# Patient Record
Sex: FEMALE | Race: WHITE | Employment: OTHER
[De-identification: names, ages, dates, MRNs, and addresses within clinical notes are randomized per-mention and may not be internally consistent; named-entity substitution may affect disease eponyms.]

---

## 2017-06-24 ENCOUNTER — HEALTH MAINTENANCE LETTER (OUTPATIENT)
Age: 62
End: 2017-06-24

## 2017-08-01 ENCOUNTER — OFFICE VISIT (OUTPATIENT)
Dept: FAMILY MEDICINE | Facility: CLINIC | Age: 62
End: 2017-08-01
Payer: COMMERCIAL

## 2017-08-01 VITALS
HEART RATE: 87 BPM | BODY MASS INDEX: 34.16 KG/M2 | HEIGHT: 65 IN | DIASTOLIC BLOOD PRESSURE: 87 MMHG | WEIGHT: 205 LBS | OXYGEN SATURATION: 97 % | TEMPERATURE: 97.4 F | SYSTOLIC BLOOD PRESSURE: 138 MMHG

## 2017-08-01 DIAGNOSIS — G89.29 CHRONIC LOW BACK PAIN, UNSPECIFIED BACK PAIN LATERALITY, WITH SCIATICA PRESENCE UNSPECIFIED: ICD-10-CM

## 2017-08-01 DIAGNOSIS — M54.32 LEFT SCIATIC NERVE PAIN: Primary | ICD-10-CM

## 2017-08-01 DIAGNOSIS — M79.662 PAIN OF LEFT LOWER LEG: ICD-10-CM

## 2017-08-01 DIAGNOSIS — M54.5 CHRONIC LOW BACK PAIN, UNSPECIFIED BACK PAIN LATERALITY, WITH SCIATICA PRESENCE UNSPECIFIED: ICD-10-CM

## 2017-08-01 PROCEDURE — 99214 OFFICE O/P EST MOD 30 MIN: CPT | Performed by: FAMILY MEDICINE

## 2017-08-01 RX ORDER — IBUPROFEN 200 MG
200 TABLET ORAL DAILY PRN
Qty: 1 TABLET | Refills: 0 | COMMUNITY
Start: 2017-08-01 | End: 2017-11-14

## 2017-08-01 NOTE — MR AVS SNAPSHOT
After Visit Summary   8/1/2017    Gerri Owens    MRN: [id number]           Patient Information     Date Of Birth          1955        Visit Information        Provider Department      8/1/2017 1:20 PM Karo Doty MD [facility]        Today's Diagnoses     Left sciatic nerve pain    -  1    Pain of left lower leg        Chronic low back pain, unspecified back pain laterality, with sciatica presence unspecified          Care Instructions    Continue with being active  and modify activity that causes more pain   Ice then heat 20 min twice a day  Ibuprofen 600 mg with food three times a day 5 days   Tylenol as needed  Over the counter pain creams to area  Can try flexeril at home  Can make you tired, do not drive or operate machinery on this medication   Therapy and ortho consult for further evaluation and treatment  Follow up with primary if symptoms persist  If get worse such that loose control of bladder , bowels or difficulty walking go to Urgent care/ ER   Return when insurance sorted out for physical , hep c, colon canner screen, mammogram, pap, advance directives.               Follow-ups after your visit        Additional Services     [facility] PT, HAND, AND CHIROPRACTIC REFERRAL       **This order will print in the [facility] Scheduling Office**    Physical Therapy, Hand Therapy and Chiropractic Care are available through:    *[facility]  *[facility]  *[facility]    Call one number to schedule at any of the above locations: [phone].    Your provider has referred you to: Physical Therapy at [facility] or [facility]    Indication/Reason for Referral: Low Back Pain  Onset of Illness: ?  Therapy Orders: Evaluate and Treat  Special Programs: None  Special Request: None    Barbara Jimenez      Additional Comments for the Therapist or Chiropractor:     Please be aware that coverage of these services is subject to the terms and limitations of  your health insurance plan.  Call member services at your health plan with any benefit or coverage questions.      Please bring the following to your appointment:    *Your personal calendar for scheduling future appointments  *Comfortable clothing            ORTHO  REFERRAL       [facility] is referring you to the Orthopedic  Services at [facility].       The  Representative will assist you in the coordination of your Orthopedic and Musculoskeletal Care as prescribed by your physician.    The  Representative will call you within 1 business day to help schedule your appointment, or you may contact the  Representative at:    All areas ~ [phone]     Type of Referral : Spine: Lumbar  **Choose Medical Spine Specialist (unless patient was seen by a Medical Spine Specialist within the past 6 months).**  Surgical Evaluation is advised if the patient presents with one or more of the following red flags: Evidence of Spinal Tumor, Infection or Fracture, Cauda Equina Syndrome, Sudden or Progressive Weakness, Loss of Bowel or Bladder Control, or any other documented emergent neurological condition resulting from a Lumbar Spinal Condition. Medical Spine Specialist        Timeframe requested: 1 - 2 days    Coverage of these services is subject to the terms and limitations of your health insurance plan.  Please call member services at your health plan with any benefit or coverage questions.      If X-rays, CT or MRI's have been performed, please contact the facility where they were done to arrange for , prior to your scheduled appointment.  Please bring this referral request to your appointment and present it to your specialist.                  Who to contact     If you have questions or need follow up information about today's clinic visit or your schedule please contact [facility] directly at [phone].  Normal or  "non-critical lab and imaging results will be communicated to you by MyChart, letter or phone within 4 business days after the clinic has received the results. If you do not hear from us within 7 days, please contact the clinic through Flowgeart or phone. If you have a critical or abnormal lab result, we will notify you by phone as soon as possible.  Submit refill requests through [email or web address] or call your pharmacy and they will forward the refill request to us. Please allow 3 business days for your refill to be completed.          Additional Information About Your Visit        [email or web address] Information     [email or web address] gives you secure access to your electronic health record. If you see a primary care provider, you can also send messages to your care team and make appointments. If you have questions, please call your primary care clinic.  If you do not have a primary care provider, please call [phone] and they will assist you.        Care EveryWhere ID     This is your Care EveryWhere ID. This could be used by other organizations to access your [place] medical records  [id number]        Your Vitals Were     Pulse Temperature Height Last Period Pulse Oximetry BMI (Body Mass Index)    87 97.4  F (36.3  C) (Oral) 5' 4.5\" (1.638 m) 03/04/2005 97% 34.64 kg/m2       Blood Pressure from Last 3 Encounters:   08/01/17 138/87   06/09/16 134/84   09/18/12 122/81    Weight from Last 3 Encounters:   08/01/17 205 lb (93 kg)   06/09/16 208 lb 4 oz (94.5 kg)   09/18/12 216 lb (98 kg)              We Performed the Following     [facility] PT, HAND, AND CHIROPRACTIC REFERRAL     ORTHO  REFERRAL        Primary Care Provider Office Phone # Fax #    Andreina Esparza [phone]       [facility] [address]  [facility] [address]        Equal Access to Services     JEREMÍAS BUCK AH: Gopi Springer, waaxda luqadaha, qaybta kaalmacarol almanzar. So wa " [phone].    ATENCIÓN: Si donna long, tiene a graham disposición servicios gratuitos de asistencia lingüística. Avril jimenez [phone].    We comply with applicable federal civil rights laws and [place] laws. We do not discriminate on the basis of race, color, national origin, age, disability sex, sexual orientation or gender identity.            Thank you!     Thank you for choosing [facility]  for your care. Our goal is always to provide you with excellent care. Hearing back from our patients is one way we can continue to improve our services. Please take a few minutes to complete the written survey that you may receive in the mail after your visit with us. Thank you!             Your Updated Medication List - Protect others around you: Learn how to safely use, store and throw away your medicines at www.disposemymeds.org.          This list is accurate as of: 8/1/17  2:03 PM.  Always use your most recent med list.                   Brand Name Dispense Instructions for use Diagnosis    ascorbic acid 250 MG tablet    VITAMIN C    90    Take  by mouth. 500 mg 1xqd.    Routine general medical examination at a health care facility       cyclobenzaprine 10 MG tablet    FLEXERIL    30 tablet    Take 0.5-1 tablets (5-10 mg) by mouth 3 times daily as needed for muscle spasms    Acute left-sided low back pain without sciatica       GLUCOSAMINE CHONDROITIN COMPLX Tabs      Take  by mouth. 1500 mg 1xqd.    Routine general medical examination at a health care facility       TYLENOL 325 MG tablet   Generic drug:  acetaminophen      Take 325-650 mg by mouth every 6 hours as needed.        vitamin E 400 UNIT capsule      Take 1 capsule by mouth three times a week.

## 2017-08-01 NOTE — NURSING NOTE
"Chief Complaint   Patient presents with     Musculoskeletal Problem     leg       Initial /87  Pulse 87  Temp 97.4  F (36.3  C) (Oral)  Ht 5' 4.5\" (1.638 m)  Wt 205 lb (93 kg)  LMP 03/04/2005  SpO2 97%  BMI 34.64 kg/m2 Estimated body mass index is 34.64 kg/(m^2) as calculated from the following:    Height as of this encounter: 5' 4.5\" (1.638 m).    Weight as of this encounter: 205 lb (93 kg).  Medication Reconciliation: complete   Gege England MA      "

## 2017-08-01 NOTE — PROGRESS NOTES
SUBJECTIVE:                                                    Gerri Owens is a 62 year old female who presents to clinic today for the following health issues:      Musculoskeletal problem/pain      Duration: on going    Description  Location: left leg    Intensity: no    Accompanying signs and symptoms: numbness and tingling    History  Previous similar problem: YES  Previous evaluation:  none    Precipitating or alleviating factors:  Trauma or overuse: no   Aggravating factors include: lifting and bending    Therapies tried and outcome: stretching and exercises    Having left leg pain, thinks coming from her back, different from her back pain in the past., beginning to affect her mobility so  urged her to come in to be checked out.   Previously had low back pain and spasm.. Did PT and it helped a lot. Then home exercises. On stopping exercises had a recurrence. Given flexeril on e year ago. used it one week then and not since. Has rest at home. Leg pain comes and goes. Sometimes hurts with standing other times when lying down, never while sitting. Getting into the car her left leg feels heavier to point where she has to lift it with her hands. Its as if she has no strength. If sitting on th floor has to get up on her right leg.if uses her left leg she has to use her arms to lift herself up on the couch. Is right handed. Walking make sit better. Has had no fall or inciting event she knows of. knows though which specific movements seem to make her pain worse. Movements like if were to bend down to  dog poop and come up again if she were to do that several times or movements like picking up a log and placing it to be chopped at her cabin done repetitively or bending working in her garden will reproduce the pain. No groin tingly on numbness. Has had difficulty lying in lithotomy position so as to get pelvic exam in the past despite doing stretching exercises. Wears supportive hose ever since had  right upper arm DVT unknown cause in . Work up at that time was negative for hypercoagulable state. Took warfarin for 1 yr. After that thinks may have had a phlebitis right leg as it was once swollen, warm and hot and she treated it with symptomatic cares after reading on the Internet and it resolved. Has some stress incontinence that is not new. Stopped taking fish oil. Stopped calcium and instead drinks 4 cups of almond milk a day. Does take 200 mg of ibuprofen a day and occasional Prilosec or Nexium OTC for GERD.  Is a nurse at behavioral health  Had a mix up with her insurance sign up and until can get it fixed wants to defer known over due preventive care.     Problem list and histories reviewed & adjusted, as indicated.  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      Past Surgical History:   Procedure Laterality Date     BREAST BIOPSY, RT/LT      left breast     TONSILLECTOMY  1960    tonsillectomy       Social History   Substance Use Topics     Smoking status: Never Smoker     Smokeless tobacco: Never Used     Alcohol use Yes      Comment: occasional     Family History   Problem Relation Age of Onset     DIABETES Mother      type 2     HEART DISEASE Mother      Hypertension Mother      C.A.D. Mother       after 2nd heart attack     Hypertension Father      C.A.D. Father      mild heart attack     CANCER Father      skin cancer     HEART DISEASE Father      Arthritis Sister      rheumatoid arthritis     HEART DISEASE Brother      hereditary heart murmer     Lipids Brother      Lipids Brother          Current Outpatient Prescriptions   Medication Sig Dispense Refill     ibuprofen (ADVIL/MOTRIN) 200 MG tablet Take 1 tablet (200 mg) by mouth daily as needed for  "mild pain 1 tablet 0     cyclobenzaprine (FLEXERIL) 10 MG tablet Take 0.5-1 tablets (5-10 mg) by mouth 3 times daily as needed for muscle spasms 30 tablet 0     acetaminophen (TYLENOL) 325 MG tablet Take 325-650 mg by mouth every 6 hours as needed.       vitamin E 400 UNIT capsule Take 1 capsule by mouth three times a week.       GLUCOSAMINE CHONDROITIN COMPLX OR TABS Take  by mouth. 1500 mg 1xqd.   0     VITAMIN C 250 MG OR TABS Take  by mouth. 500 mg 1xqd.  90 0     Allergies   Allergen Reactions     Nickel      Recent Labs   Lab Test  09/11/12   1108   LDL  175*   HDL  45*   TRIG  197*   ALT  27   CR  0.85   GFRESTIMATED  69   GFRESTBLACK  84   POTASSIUM  4.1   TSH  1.02      BP Readings from Last 3 Encounters:   08/01/17 138/87   06/09/16 134/84   09/18/12 122/81    Wt Readings from Last 3 Encounters:   08/01/17 205 lb (93 kg)   06/09/16 208 lb 4 oz (94.5 kg)   09/18/12 216 lb (98 kg)                  Labs reviewed in EPIC          Reviewed and updated as needed this visit by clinical staffAllergies       Reviewed and updated as needed this visit by Provider         ROS:  Constitutional, HE ENT, cardiovascular, pulmonary, GI, , musculoskeletal, neuro, skin, endocrine and psych systems are negative, except as otherwise noted.      OBJECTIVE:   /87  Pulse 87  Temp 97.4  F (36.3  C) (Oral)  Ht 5' 4.5\" (1.638 m)  Wt 205 lb (93 kg)  LMP 03/04/2005  SpO2 97%  BMI 34.64 kg/m2  Body mass index is 34.64 kg/(m^2).  GENERAL: healthy, alert and no distress  EYES: Eyes grossly normal to inspection, PERRL and conjunctivae and sclerae normal  HENT: ear canals and TM's normal, nose and mouth without ulcers or lesions  NECK: no adenopathy, no asymmetry, masses, or scars and thyroid normal to palpation  RESP: lungs clear to auscultation - no rales, rhonchi or wheezes  CV: regular rate and rhythm, normal S1 S2, no S3 or S4, no murmur, click or rub, no peripheral edema and peripheral pulses strong  ABDOMEN: soft, " non tender, no hepatosplenomegaly, no masses and bowel sounds normal  MS: has difficult getting up on bed using left leg and uses right instead, no point tenderness, C , T oral spine or B/l lower back. No bruising noted. SLR neg on right and positive on left. Also limited ROM left hip area not sure if just being stiff or having spasm from pain. Able to lift leg against gravity and resistance, no gross musculoskeletal defects noted, no edema  SKIN: no suspicious lesions or rashes  NEURO: Normal strength and tone, mentation intact and speech normal  PSYCH: mentation appears normal, affect normal/bright    Diagnostic Test Results:  No results found for this or any previous visit (from the past 24 hour(s)).    ASSESSMENT/PLAN:   Hx of obesity, BMI 34, HLD, prior DVT RUE 2012 unknown causes reported negative hypercoagulable workup, treated with warfarin 1 year , resolved post phelbetic syndrome RUE, prior tonsillectomy, left breast biopsy, hx of back spasm , seen in 6/2016 and given flexeril and did PT with good resolution here for     1. Left sciatic nerve pain  Sciatica vs pyriformis syndrome. No sign of stroke. Continue with being active  and modify activity that causes more pain. Ice then heat 20 min twice a day. Ibuprofen 600 mg with food three times a day 5 days then back to 200 mg a day. Declined medrol dose pack.  Tylenol as needed. Over the counter pain creams to area. Can try flexeril at home. counseled it could make her tired, advised not to drive or operate machinery on this medication. Therapy and ortho consult for further evaluation and treatment. They can decide if MRI needed. Xray too if not better. Follow up with primary if symptoms persist. If gets worse such that loose control of bladder , bowels or difficulty walking advised to go to Urgent care/ ER. Return when insurance sorted out for physical , hep C, colon canner screen, mammogram, pap, advance directives. Counseled that chronic use of PPI med's  could put her at risk of atypical pneumonia, fractures, C diff, vit B 12  & magnesium deficiency and to consider taking zantac 150 mg twice a day OTC instead.  - [facility] PT, HAND, AND CHIROPRACTIC REFERRAL  - ORTHO  REFERRAL    2. Pain of left lower leg  - [facility] PT, HAND, AND CHIROPRACTIC REFERRAL  - ORTHO  REFERRAL    3. Chronic low back pain, unspecified back pain laterality, with sciatica presence unspecified  No reproducible pain today   - [facility] PT, HAND, AND CHIROPRACTIC REFERRAL  - ORTHO  REFERRAL    See Patient Instructions  Patient Instructions   Continue with being active  and modify activity that causes more pain   Ice then heat 20 min twice a day  Ibuprofen 600 mg with food three times a day 5 days   Tylenol as needed  Over the counter pain creams to area  Can try flexeril at home  Can make you tired, do not drive or operate machinery on this medication   Therapy and ortho consult for further evaluation and treatment  Follow up with primary if symptoms persist  If get worse such that loose control of bladder , bowels or difficulty walking go to Urgent care/ ER   Return when insurance sorted out for physical , hep c, colon canner screen, mammogram, pap, advance directives.           Karo Doty MD  [facility]

## 2017-08-01 NOTE — PATIENT INSTRUCTIONS
Continue with being active  and modify activity that causes more pain   Ice then heat 20 min twice a day  Ibuprofen 600 mg with food three times a day 5 days   Tylenol as needed  Over the counter pain creams to area  Can try flexeril at home  Can make you tired, do not drive or operate machinery on this medication   Therapy and ortho consult for further evaluation and treatment  Follow up with primary if symptoms persist  If get worse such that loose control of bladder , bowels or difficulty walking go to Urgent care/ ER   Return when insurance sorted out for physical , hep c, colon canner screen, mammogram, pap, advance directives.

## 2017-08-08 ENCOUNTER — OFFICE VISIT (OUTPATIENT)
Dept: NEUROSURGERY | Facility: CLINIC | Age: 62
End: 2017-08-08
Attending: NURSE PRACTITIONER
Payer: COMMERCIAL

## 2017-08-08 ENCOUNTER — THERAPY VISIT (OUTPATIENT)
Dept: PHYSICAL THERAPY | Facility: CLINIC | Age: 62
End: 2017-08-08
Payer: COMMERCIAL

## 2017-08-08 VITALS
WEIGHT: 205 LBS | HEIGHT: 65 IN | BODY MASS INDEX: 34.16 KG/M2 | DIASTOLIC BLOOD PRESSURE: 83 MMHG | TEMPERATURE: 98.6 F | OXYGEN SATURATION: 96 % | HEART RATE: 80 BPM | SYSTOLIC BLOOD PRESSURE: 118 MMHG

## 2017-08-08 DIAGNOSIS — M25.552 LEFT HIP PAIN: Primary | ICD-10-CM

## 2017-08-08 DIAGNOSIS — M25.552 HIP PAIN, LEFT: Primary | ICD-10-CM

## 2017-08-08 PROCEDURE — 99203 OFFICE O/P NEW LOW 30 MIN: CPT | Performed by: NURSE PRACTITIONER

## 2017-08-08 PROCEDURE — 97110 THERAPEUTIC EXERCISES: CPT | Mod: GP | Performed by: PHYSICAL THERAPIST

## 2017-08-08 PROCEDURE — 97162 PT EVAL MOD COMPLEX 30 MIN: CPT | Mod: GP | Performed by: PHYSICAL THERAPIST

## 2017-08-08 PROCEDURE — 99211 OFF/OP EST MAY X REQ PHY/QHP: CPT | Performed by: NURSE PRACTITIONER

## 2017-08-08 RX ORDER — CALCIUM CARBONATE 500 MG/1
1 TABLET, CHEWABLE ORAL DAILY
COMMUNITY
End: 2017-11-21

## 2017-08-08 NOTE — PROGRESS NOTES
"[facility] Initial Evaluation    Subjective:  Pt presents to PT with a chief complaint of left anterior thigh pain with reported initial onset ~2-3 years ago w/o an acute precipitating event. Pain has recently worsened over the past 1 week after prolonged forward bending/lifting.. Primary pain location identified at L anterior hip/groin w/ intermittent radiation into anterior thigh. Pt does report having some difficulty lifting her left leg under her own power, and has some sensation of \"heaviness\". Pt denies any sxs distal to her knee and denies any changes her bowel/bladder. Pain reported to be worse with forward bending, transitional movements (sit<->stand), and prolonged walking. Pt does report a previous hx of LBP ~ 4 years ago which was managed successfully with PT.      Patient is a 62 year old female presenting with rehab left hip hpi.   Gerri Owens is a 62 year old female with a left hip condition.  Condition occurred with:  Repetition/overuse.  Condition occurred: in the community.  This is a recurrent condition  1 week ago; 07/2017.    Patient reports pain:  Anterior.  Radiates to:  Thigh.  Pain is described as aching and is intermittent and reported as 5/10.  Associated symptoms:  Loss of motion/stiffness and tingling. Pain is worse in the A.M..  Symptoms are exacerbated by ascending stairs, descending stairs, bending/squatting, walking and weight bearing and relieved by rest.  Since onset symptoms are gradually worsening.    Previous treatment includes physical therapy (LBP).  There was moderate improvement following previous treatment.  General health as reported by patient is fair.  Pertinent medical history includes:  Overweight (DVT R arm 3 yrs ago).  Medical allergies: yes.  Other surgeries include:  None reported.  Current medications:  None as reported by the patient.  Current occupation is RN.  Patient is working in normal job without restrictions.  Primary job tasks " include:  Prolonged sitting.    Barriers include:  None as reported by the patient.    Red flags:  None as reported by the patient.                        Objective:      Gait:      Deviations:  Hip:  Trendelenberg L, Trendelenberg R, decr dynamic control L and decr dynamic control R               Lumbar/SI Evaluation  ROM:    AROM Lumbar:   Flexion:          WNL  Ext:                    Mod loss, min pain at L anterior thigh (stretch)   Side Bend:        Left:     Right:   Rotation:           Left:  Mod loss    Right:  Mod loss  Side Glide:        Left:     Right:                                                               Hip Evaluation  Hip PROM:    Flexion: Left: 100*   Right: 110    Abduction: Left: 25    Right: 30    Internal Rotation: Left: 10*    Right: 15  External Rotation: Left: 60    Right: 60      Pain: Pain reproduced with L hip Flexion and IR         Hip Strength:    Flexion:   Left: 4+/5   +  Pain:  Right: 5-/5   Pain:                    Extension:  Left: 3+/5  Pain:Right: 3+/5    Pain:    Abduction:  Left: 4-/5     Pain:Right: 4-/5    Pain:    Internal Rotation:  Left: 4+/5    Pain:Right: 4+/5   Pain:  External Rotation:  Left: 4/5   Pain:  Right: 4/5   Pain:            Hip Special Testing:    Left hip positive for the following special tests:  Fadir/Labrum      Hip Palpation:    Left hip tenderness present at:   hip flexors and Adductors    Functional Testing:  Functional test hip: decreased hip inferior glide.                       General     ROS    Assessment/Plan:      Patient is a 62 year old female with left side hip complaints.    Patient has the following significant findings with corresponding treatment plan.                Diagnosis 1:  L hip pain  Pain -  hot/cold therapy, manual therapy, self management and education  Decreased ROM/flexibility - manual therapy and therapeutic exercise  Decreased joint mobility - manual therapy and therapeutic exercise  Decreased strength - therapeutic  exercise and therapeutic activities  Impaired muscle performance - neuro re-education    Therapy Evaluation Codes:   1) History comprised of:   Personal factors that impact the plan of care:      Time since onset of symptoms.    Comorbidity factors that impact the plan of care are:      None.     Medications impacting care: None.  2) Examination of Body Systems comprised of:   Body structures and functions that impact the plan of care:      Hip.   Activity limitations that impact the plan of care are:      Bending, Cooking, Lifting, Squatting/kneeling and Stairs.  3) Clinical presentation characteristics are:   Evolving/Changing.  4) Decision-Making    Moderate complexity using standardized patient assessment instrument and/or measureable assessment of functional outcome.  Cumulative Therapy Evaluation is: Moderate complexity.    Previous and current functional limitations:  (See Goal Flow Sheet for this information)    Short term and Long term goals: (See Goal Flow Sheet for this information)     Communication ability:  Patient appears to be able to clearly communicate and understand verbal and written communication and follow directions correctly.  Treatment Explanation - The following has been discussed with the patient:   RX ordered/plan of care  Anticipated outcomes  Possible risks and side effects  This patient would benefit from PT intervention to resume normal activities.   Rehab potential is good.    Frequency:  1 X week, once daily  Duration:  for 8 weeks  Discharge Plan:  Achieve all LTG.  Independent in home treatment program.  Reach maximal therapeutic benefit.    Please refer to the daily flowsheet for treatment today, total treatment time and time spent performing 1:1 timed codes.

## 2017-08-08 NOTE — PROGRESS NOTES
Dr. Isaiah Nails  [facility]  Neurosurgery Clinic Visit        CC: back and left leg pain    Primary care Provider: Andreina Esparza      Reason For Visit:   I was asked by Dr. Esparza to consult on the patient for lumbar radicular pain.      HPI: Gerri Owens is a 62 year old female with lumbar radicular pain. She reports today that after PT she feels that her pain is mostly in her hips.  She states that she found out at PT that she has very weak lower extremities.  She states that when she walks her hips hurt bilaterally.  She states that the left hurts greater than her right.  She denies any back pain. She states that most of her pain is in her left hip and some left quadriceps weakness.  She denies any numbness and tingling to her legs.  She states that sitting does not cause her any pain.      Pain at its worst  More bothersome then pain.   Pain right now:  0    Past Medical History:   Diagnosis Date     Advanced directives, counseling/discussion      DVT of upper extremity (deep vein thrombosis) (H) 3/27/2012     Hyperlipidemia LDL goal < 160      MEDICAL HISTORY OF - 1997    left breast density     Unspecified episodic mood disorder      Whooping cough due to Bordetella pertussis (p. pertussis) infant    whooping cough       Past Medical History reviewed with patient during visit.    Past Surgical History:   Procedure Laterality Date     BREAST BIOPSY, RT/LT  2004    left breast     TONSILLECTOMY  1960    tonsillectomy     Past Surgical History reviewed with patient during visit.    Current Outpatient Prescriptions   Medication     calcium carbonate (TUMS) 500 MG chewable tablet     ibuprofen (ADVIL/MOTRIN) 200 MG tablet     acetaminophen (TYLENOL) 325 MG tablet     vitamin E 400 UNIT capsule     GLUCOSAMINE CHONDROITIN COMPLX OR TABS     VITAMIN C 250 MG OR TABS     cyclobenzaprine (FLEXERIL) 10 MG tablet     No current facility-administered medications for this visit.         Allergies   Allergen Reactions     Nickel        Social History     Social History     Marital status:      Spouse name: Doug     Number of children: 0     Years of education: N/A     Occupational History     [employer]     Social History Main Topics     Smoking status: Never Smoker     Smokeless tobacco: Never Used     Alcohol use Yes      Comment: occasional     Drug use: No     Sexual activity: Yes     Partners: Male     Other Topics Concern     Parent/Sibling W/ Cabg, Mi Or Angioplasty Before 65f 55m? No     Social History Narrative    Balanced Diet - Yes, in the last 6 months    Osteoporosis Prevention Measures - Dairy servings per day: 2 servings plus a supplement    Regular Exercise -  Yes Describe walks for 30 to 40 minutes 4 to 5 times a week    Dental Exam - YES - Date: 1 year ago, and is going today    Eye Exam - YES - Date: 4 months ago    Self Breast Exam - Yes    Abuse: Current or Past (Physical, Sexual or Emotional)- No    Do you feel safe in your environment - Yes    Guns stored in the home - Yes, locked    Sunscreen used - Yes    Seatbelts used - Yes    Lipids -  YES - Date: 10-02    Glucose -  YES - Date: 10-02    Colon Cancer Screening - No    Hemoccults - NO    Pap Test -  YES - Date: 10-02    Do you have any concerns about STD's -  No    Mammography - YES - Date:     DEXA - NO    Immunizations reviewed and up to date - Yes, lst td 7-2000    10-23-07  MEL Mueller CMA       Family History   Problem Relation Age of Onset     DIABETES Mother      type 2     HEART DISEASE Mother      Hypertension Mother      C.A.D. Mother       after 2nd heart attack     Hypertension Father      C.A.D. Father      mild heart attack     CANCER Father      skin cancer     HEART DISEASE Father      Arthritis Sister      rheumatoid arthritis     HEART DISEASE Brother      hereditary heart murmer     Lipids Brother      Lipids Brother          Review Of Systems  Skin: negative  Eyes:  "negative  Ears/Nose/Throat: negative  Respiratory: No shortness of breath, dyspnea on exertion, cough, or hemoptysis  Cardiovascular: negative  Gastrointestinal: negative  Genitourinary: negative  Musculoskeletal: hip pain  Neurologic: negative  Psychiatric: negative  Hematologic/Lymphatic/Immunologic: negative  Endocrine: negative     ROS: 10 point ROS neg other than the symptoms noted above in the HPI.      Vital Signs: /83 (BP Location: Right arm, Cuff Size: Adult Regular)  Pulse 80  Temp 98.6  F (37  C) (Oral)  Ht 5' 4.5\" (1.638 m)  Wt 205 lb (93 kg)  LMP 03/04/2005  SpO2 96%  Breastfeeding? No  BMI 34.64 kg/m2    Examination:  Constitutional:  Alert, well nourished, NAD.  HEENT: Normocephalic, atraumatic.   Pulm:  Without shortness of breath   CV:  No pitting edema of BLE.    Neurological:  Awake  Alert  Oriented x 3  Speech clear  Cranial nerves II - XII intact  PERRL  EOMI  Face symmetric  Tongue midline  Motor exam   Shoulder Abduction:  Right:  5/5   Left:  5/5  Biceps:                      Right:  5/5   Left:  5/5  Triceps:                     Right:  5/5   Left:  5/5  Wrist Extensors:       Right:  5/5   Left:  5/5  Wrist Flexors:           Right:  5/5   Left:  5/5  Intrinsics:                   Right:  5/5   Left:  5/5   Hip Flexor:                Right: 5/5  Left:  5/5  Hip Adductor:             Right:  5/5  Left:  5/5  Hip Abductor:             Right:  5/5  Left:  5/5  Gastroc Soleus:        Right:  5/5  Left:  5/5  Tib/Ant:                      Right:  5/5  Left:  5/5  EHL:                          Right:  5/5  Left:  5/5   Sensation normal to bilateral upper and lower extremities    Gait: Able to stand from a seated position. Normal non-antalgic, non-myelopathic gait.  Lumbar examination reveals no tenderness of the spine or paraspinous muscles.  Hip height is symmetrical. Negative SI joint, Severe pain with palpation to left greater trochanteric. Severe pain with ROM of left hip.  "       Imaging: NONE    Assessment/Plan:   Gerri Owens is a 62 year old female with lumbar radicular pain. She reports today that after PT she feels that her pain is mostly in her hips.  She states that she found out at PT that she has very weak lower extremities.  She states that when she walks her hips hurt bilaterally.  She states that the left hurts greater than her right.  She denies any back pain. She states that most of her pain is in her left hip and some left quadriceps weakness.  She denies any numbness and tingling to her legs.  She states that sitting does not cause her any pain.  The pt does not have any pain with lumbar ROM and no radicular symptoms. She does have severe pain with palpation of her left hip and with left hip ROM. At this time I do not feel that her pain is radicular in nature.  It is recommended that she return to her PCP for a work up of her hip.  She agreed.     Patient Instructions   1.  Return to Primary care or to orthopedics for a work up of your hips.  If pain persists and hip work up negative then call clinic at [phone] for a lumbar MRI order.               Gaye Olmos [facility]  [facility]  [address] [address]    Tel [phone]  Pager [phone]

## 2017-08-08 NOTE — PATIENT INSTRUCTIONS
1.  Return to Primary care or to orthopedics for a work up of your hips.  If pain persists and hip work up negative then call clinic at [phone] for a lumbar MRI order.

## 2017-08-08 NOTE — MR AVS SNAPSHOT
After Visit Summary   8/8/2017    Gerri Owens    MRN: [id number]           Patient Information     Date Of Birth          1955        Visit Information        Provider Department      8/8/2017 7:30 AM Monico Pop PT [facility] Physical [facility]        Today's Diagnoses     Hip pain, left    -  1       Follow-ups after your visit        Your next 10 appointments already scheduled     Aug 15, 2017  7:30 AM CDT   MANE Spine with Monico Pop PT   [facility] Physical Therapy ([facility]  )    [address] [place] [id number] [phone]            Aug 22, 2017  7:30 AM CDT   MANE Spine with Monico Pop PT   [facility] Physical Therapy ([facility]  )    [address] [place] [address] [phone]              Who to contact     If you have questions or need follow up information about today's clinic visit or your schedule please contact [facility] PHYSICAL THERAPY directly at [phone].  Normal or non-critical lab and imaging results will be communicated to you by Social Projecthart, letter or phone within 4 business days after the clinic has received the results. If you do not hear from us within 7 days, please contact the clinic through Social Projecthart or phone. If you have a critical or abnormal lab result, we will notify you by phone as soon as possible.  Submit refill requests through ScreenMedix or call your pharmacy and they will forward the refill request to us. Please allow 3 business days for your refill to be completed.          Additional Information About Your Visit        MyChart Information     ScreenMedix gives you secure access to your electronic health record. If you see a primary care provider, you can also send messages to your care team and make appointments. If you have questions, please call your primary care  clinic.  If you do not have a primary care provider, please call [phone] and they will assist you.        Care EveryWhere ID     This is your Care EveryWhere ID. This could be used by other organizations to access your [place] medical records  [id number]        Your Vitals Were     Last Period                   03/04/2005            Blood Pressure from Last 3 Encounters:   08/08/17 118/83   08/01/17 138/87   06/09/16 134/84    Weight from Last 3 Encounters:   08/08/17 93 kg (205 lb)   08/01/17 93 kg (205 lb)   06/09/16 94.5 kg (208 lb 4 oz)              We Performed the Following     HC PT EVAL, MODERATE COMPLEXITY     MANE INITIAL EVAL REPORT     THERAPEUTIC EXERCISES        Primary Care Provider Office Phone # Fax #    Andreina Esparza [phone]       [address]  [facility] [address]        Equal Access to Services     JEREMÍAS BUCK : Hadii thalia ku hadasho Soisaiahali, waaxda luqadaha, qaybta kaalmada adeegyada, waxay tamarain haychristinen catia escobar . So [facility] [phone].    ATENCIÓN: Si habla español, tiene a graham disposición servicios gratuitos de asistencia lingüística. Avril al [phone].    We comply with applicable federal civil rights laws and [place] laws. We do not discriminate on the basis of race, color, national origin, age, disability sex, sexual orientation or gender identity.            Thank you!     Thank you for choosing [facility] PHYSICAL THERAPY  for your care. Our goal is always to provide you with excellent care. Hearing back from our patients is one way we can continue to improve our services. Please take a few minutes to complete the written survey that you may receive in the mail after your visit with us. Thank you!             Your Updated Medication List - Protect others around you: Learn how to safely use, store and throw away your medicines at www.disposemymeds.org.          This list is accurate as of: 8/8/17   4:57 PM.  Always use your most recent med list.                   Brand Name Dispense Instructions for use Diagnosis    ascorbic acid 250 MG tablet    VITAMIN C    90    Take  by mouth. 500 mg 1xqd.    Routine general medical examination at a health care facility       calcium carbonate 500 MG chewable tablet    TUMS     Take 1 chew tab by mouth daily        cyclobenzaprine 10 MG tablet    FLEXERIL    30 tablet    Take 0.5-1 tablets (5-10 mg) by mouth 3 times daily as needed for muscle spasms    Acute left-sided low back pain without sciatica       GLUCOSAMINE CHONDROITIN COMPLX Tabs      Take  by mouth. 1500 mg 1xqd.    Routine general medical examination at a health care facility       ibuprofen 200 MG tablet    ADVIL/MOTRIN    1 tablet    Take 1 tablet (200 mg) by mouth daily as needed for mild pain        TYLENOL 325 MG tablet   Generic drug:  acetaminophen      Take 325-650 mg by mouth every 6 hours as needed.        vitamin E 400 UNIT capsule      Take 1 capsule by mouth three times a week.

## 2017-08-08 NOTE — NURSING NOTE
"Gerri Owens is a 62 year old female who presents for:  Chief Complaint   Patient presents with     Neurologic Problem     referral from Dr. Doty for low back pain with left sided sciatica        Initial Vitals:  LMP 03/04/2005 Estimated body mass index is 34.64 kg/(m^2) as calculated from the following:    Height as of 8/1/17: 5' 4.5\" (1.638 m).    Weight as of 8/1/17: 205 lb (93 kg).. There is no height or weight on file to calculate BSA. BP completed using cuff size: regular forearm  Data Unavailable    Do you feel safe in your environment?  Yes  Do you need any refills today? No    Nursing Comments: referral from Dr. Doty for low back pain with left sided sciatica.  Patient rates her pain today as 0      5 min. nursing intake time  Haydee Metcalf CMA, AAS      Discharge plan:   1.  Return to Primary care or to orthopedics for a work up of your hips.  If pain persists and hip work up negative then call clinic at [phone] for a lumbar MRI order.      2 min. nursing discharge time  Haydee Metcalf CMA, AAS       "

## 2017-08-08 NOTE — MR AVS SNAPSHOT
After Visit Summary   8/8/2017    Gerri Owens    MRN: [id number]           Patient Information     Date Of Birth          1955        Visit Information        Provider Department      8/8/2017 2:40 PM Gaye Olmos APRN CNP [facility]        Care Instructions    1.  Return to Primary care or to orthopedics for a work up of your hips.  If pain persists and hip work up negative then call clinic at [phone] for a lumbar MRI order.            Follow-ups after your visit        Your next 10 appointments already scheduled     Aug 15, 2017  7:30 AM CDT   MANE Spine with Monico Pop PT   [facility] Physical Therapy ([facility]  )    [address] [facility] [address] [phone]            Aug 22, 2017  7:30 AM CDT   MANE Spine with Monico Pop PT   [facility] Physical Therapy ([facility]  )    [address] [facility] [address] [phone]              Who to contact     If you have questions or need follow up information about today's clinic visit or your schedule please contact [facility] directly at [phone].  Normal or non-critical lab and imaging results will be communicated to you by Telebithart, letter or phone within 4 business days after the clinic has received the results. If you do not hear from us within 7 days, please contact the clinic through Telebithart or phone. If you have a critical or abnormal lab result, we will notify you by phone as soon as possible.  Submit refill requests through OnAir Player or call your pharmacy and they will forward the refill request to us. Please allow 3 business days for your refill to be completed.          Additional Information About Your Visit        TelebitharIndow Windows Information     OnAir Player gives you secure access to your electronic health record. If you see a primary care provider, you can  "also send messages to your care team and make appointments. If you have questions, please call your primary care clinic.  If you do not have a primary care provider, please call [phone] and they will assist you.        Care EveryWhere ID     This is your Care EveryWhere ID. This could be used by other organizations to access your [place] medical records  [id number]        Your Vitals Were     Pulse Temperature Height Last Period Pulse Oximetry Breastfeeding?    80 98.6  F (37  C) (Oral) 5' 4.5\" (1.638 m) 03/04/2005 96% No    BMI (Body Mass Index)                   34.64 kg/m2            Blood Pressure from Last 3 Encounters:   08/08/17 118/83   08/01/17 138/87   06/09/16 134/84    Weight from Last 3 Encounters:   08/08/17 205 lb (93 kg)   08/01/17 205 lb (93 kg)   06/09/16 208 lb 4 oz (94.5 kg)              Today, you had the following     No orders found for display       Primary Care Provider Office Phone # Fax #    Andreina Esparza [phone]       [facility] [place] [facility] [address]  [facility] [address]        Equal Access to Services     JEREMÍAS BUCK : Hadii thalia ku johno Soisaiahali, waaxda luqadaha, qaybta kaalmada adeegyada, carol roland. So [facility] [phone].    ATENCIÓN: Si habla español, tiene a graham disposición servicios gratuitos de asistencia lingüística. Llame al [phone].    We comply with applicable federal civil rights laws and [place] laws. We do not discriminate on the basis of race, color, national origin, age, disability sex, sexual orientation or gender identity.            Thank you!     Thank you for choosing [facility] NEUROSURGERY [facility]  for your care. Our goal is always to provide you with excellent care. Hearing back from our patients is one way we can continue to improve our services. Please take a few minutes to complete the written survey that you may receive in the mail after your visit with us. Thank you!      "        Your Updated Medication List - Protect others around you: Learn how to safely use, store and throw away your medicines at www.disposemymeds.org.          This list is accurate as of: 8/8/17  3:00 PM.  Always use your most recent med list.                   Brand Name Dispense Instructions for use Diagnosis    ascorbic acid 250 MG tablet    VITAMIN C    90    Take  by mouth. 500 mg 1xqd.    Routine general medical examination at a health care facility       calcium carbonate 500 MG chewable tablet    TUMS     Take 1 chew tab by mouth daily        cyclobenzaprine 10 MG tablet    FLEXERIL    30 tablet    Take 0.5-1 tablets (5-10 mg) by mouth 3 times daily as needed for muscle spasms    Acute left-sided low back pain without sciatica       GLUCOSAMINE CHONDROITIN COMPLX Tabs      Take  by mouth. 1500 mg 1xqd.    Routine general medical examination at a health care facility       ibuprofen 200 MG tablet    ADVIL/MOTRIN    1 tablet    Take 1 tablet (200 mg) by mouth daily as needed for mild pain        TYLENOL 325 MG tablet   Generic drug:  acetaminophen      Take 325-650 mg by mouth every 6 hours as needed.        vitamin E 400 UNIT capsule      Take 1 capsule by mouth three times a week.

## 2017-08-09 ENCOUNTER — MYC MEDICAL ADVICE (OUTPATIENT)
Dept: FAMILY MEDICINE | Facility: CLINIC | Age: 62
End: 2017-08-09

## 2017-08-09 DIAGNOSIS — M25.552 HIP PAIN, LEFT: Primary | ICD-10-CM

## 2017-08-09 NOTE — TELEPHONE ENCOUNTER
pended ortho  referral for non surgical with a routine time frame  Please sign if you agree.  Isabella Jaramillo RN

## 2017-08-10 ENCOUNTER — TELEPHONE (OUTPATIENT)
Dept: FAMILY MEDICINE | Facility: CLINIC | Age: 62
End: 2017-08-10

## 2017-08-15 ENCOUNTER — THERAPY VISIT (OUTPATIENT)
Dept: PHYSICAL THERAPY | Facility: CLINIC | Age: 62
End: 2017-08-15
Payer: COMMERCIAL

## 2017-08-15 ENCOUNTER — RADIANT APPOINTMENT (OUTPATIENT)
Dept: GENERAL RADIOLOGY | Facility: CLINIC | Age: 62
End: 2017-08-15
Attending: FAMILY MEDICINE
Payer: COMMERCIAL

## 2017-08-15 ENCOUNTER — OFFICE VISIT (OUTPATIENT)
Dept: ORTHOPEDICS | Facility: CLINIC | Age: 62
End: 2017-08-15
Payer: COMMERCIAL

## 2017-08-15 VITALS
DIASTOLIC BLOOD PRESSURE: 84 MMHG | HEIGHT: 64 IN | BODY MASS INDEX: 35 KG/M2 | WEIGHT: 205 LBS | SYSTOLIC BLOOD PRESSURE: 122 MMHG

## 2017-08-15 DIAGNOSIS — M25.552 HIP PAIN, LEFT: ICD-10-CM

## 2017-08-15 DIAGNOSIS — M16.0 PRIMARY OSTEOARTHRITIS OF BOTH HIPS: ICD-10-CM

## 2017-08-15 DIAGNOSIS — M25.552 LEFT HIP PAIN: ICD-10-CM

## 2017-08-15 DIAGNOSIS — M25.552 LEFT HIP PAIN: Primary | ICD-10-CM

## 2017-08-15 PROCEDURE — 73502 X-RAY EXAM HIP UNI 2-3 VIEWS: CPT

## 2017-08-15 PROCEDURE — 97140 MANUAL THERAPY 1/> REGIONS: CPT | Mod: GP | Performed by: PHYSICAL THERAPIST

## 2017-08-15 PROCEDURE — 97110 THERAPEUTIC EXERCISES: CPT | Mod: GP | Performed by: PHYSICAL THERAPIST

## 2017-08-15 PROCEDURE — 97112 NEUROMUSCULAR REEDUCATION: CPT | Mod: GP | Performed by: PHYSICAL THERAPIST

## 2017-08-15 PROCEDURE — 99203 OFFICE O/P NEW LOW 30 MIN: CPT | Performed by: FAMILY MEDICINE

## 2017-08-15 NOTE — NURSING NOTE
"Chief Complaint   Patient presents with     Musculoskeletal Problem     left hip pain       Initial /84  Ht 5' 4\" (1.626 m)  Wt 205 lb (93 kg)  LMP 03/04/2005  BMI 35.19 kg/m2 Estimated body mass index is 35.19 kg/(m^2) as calculated from the following:    Height as of this encounter: 5' 4\" (1.626 m).    Weight as of this encounter: 205 lb (93 kg).  Medication Reconciliation: complete     Pancho Thompson, ATC    "

## 2017-08-15 NOTE — PROGRESS NOTES
"ASSESSMENT & PLAN    ICD-10-CM    1. Left hip pain M25.552 XR Pelvis and Hip Left 1 View   2. Primary osteoarthritis of both hips M16.0    Discussed your xray - moderate osteoarthritis  Injection options: cortisone or PRP ($450/joint)  Would recommend continuing with Physical therapy once pain is controlled: [facility] - [phone]    Follow up for injection is desired. Pancho can look at my schedule tonight. Call direct clinic number [[phone]] at any time with questions or concerns.    -----    SUBJECTIVE  Gerri Owens is a/an 62 year old female who is seen in consultation at the request of Dr. Doty for evaluation of left lateral and anterior hip pain. The patient is seen by themselves.    Onset: 2 years(s) ago with more constant pain in the last year. Reports insidious onset without acute precipitating event.  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    Patient's past medical, surgical, social, and family histories were reviewed today and no changes are noted.    REVIEW OF SYSTEMS:  10 point ROS is negative other than symptoms noted above in HPI, Past Medical History or as stated below  Constitutional: NEGATIVE for fever, chills, change in weight  Skin: NEGATIVE for worrisome rashes, moles or lesions  GI/: NEGATIVE for bowel or bladder changes  Neuro: NEGATIVE for weakness, dizziness or paresthesias    OBJECTIVE:  /84  Ht 5' 4\" (1.626 m)  Wt 205 lb (93 kg)  LMP 03/04/2005  BMI 35.19 kg/m2   General: healthy, alert and in no distress  HEENT: no scleral icterus or conjunctival erythema  Skin: " no suspicious lesions or rash. No jaundice.  CV: no pedal edema  Resp: normal respiratory effort without conversational dyspnea   Psych: normal mood and affect  Gait: normal steady gait with appropriate coordination and balance  Neuro: Normal light sensory exam of lower extremity  MSK:  LEFT HIP  Inspection:    No obvious deformity or asymmetry, level pelvis  Palpation:    Tender about the anterior groin/joint line. Otherwise all other landmarks are nontender.  Active Range of Motion:     Flexion limited by pain, IR limited by pain, ER  limited by pain  Strength:    Flexion 5-/5, adduction 5-/5, abduction 5-/5  Special Tests:    Positive: Logroll, anterior impingement (FADIR)    Independent visualization of the below image:  PELVIS AND LEFT HIP ONE VIEW   8/15/2017 4:37 PM     HISTORY: Pain in left hip.     COMPARISON: None.     FINDINGS: No fracture or osseous lesion is seen. There is prominent  joint space loss of both hips with marginal osteophyte formation.  Degenerative change is also seen in the visualized lower lumbar spine.  No other abnormality is seen.         IMPRESSION: Prominent degenerative changes of the hips bilaterally.      VANDANA KAUFFMAN MD    Patient's conditions were thoroughly discussed during today's visit with greater than 50% of the visit spent counseling the patient with total time spent face-to-face with the patient being 20 minutes.    Fortino Rausch, DO [facility]

## 2017-08-15 NOTE — MR AVS SNAPSHOT
After Visit Summary   8/15/2017    Gerri Owens    MRN: [id number]           Patient Information     Date Of Birth          1955        Visit Information        Provider Department      8/15/2017 4:20 PM Fortino Rausch DO [facility]        Today's Diagnoses     Left hip pain    -  1    Primary osteoarthritis of both hips          Care Instructions    Thank you for allowing us to participate in your care today.  Please find below your visit diagnosis and the plan going forward.    1. Left hip pain    2. Primary osteoarthritis of both hips      Discussed your xray - moderate osteoarthritis  Injection options: cortisone or PRP ($450/joint)  Would recommend continuing with Physical therapy once pain is controlled: [facility] - [phone]    Follow up for injection is desired. Pancho can look at my schedule tonight. Call direct clinic number [[phone]] at any time with questions or concerns.    Fortino Rausch DO [facility]  Website: [email or web address]  Twitter: [email or web address]            Follow-ups after your visit        Your next 10 appointments already scheduled     Aug 22, 2017  7:30 AM CDT   [facility] Spine with Monico Pop PT   [facility] ([facility]  )    [address] [place] [id number] [phone]              Who to contact     If you have questions or need follow up information about today's clinic visit or your schedule please contact [facility] directly at [phone].  Normal or non-critical lab and imaging results will be communicated to you by MyChart, letter or phone within 4 business days after the clinic has received the results. If you do not hear from us within 7 days, please contact the clinic through MyChart or phone. If you have a critical or abnormal lab result, we will notify you by phone as  "soon as possible.  Submit refill requests through Gigit or call your pharmacy and they will forward the refill request to us. Please allow 3 business days for your refill to be completed.          Additional Information About Your Visit        T[employer] Information     Gigit gives you secure access to your electronic health record. If you see a primary care provider, you can also send messages to your care team and make appointments. If you have questions, please call your primary care clinic.  If you do not have a primary care provider, please call [phone] and they will assist you.        Care EveryWhere ID     This is your Care EveryWhere ID. This could be used by other organizations to access your [place] medical records  [id number]        Your Vitals Were     Height Last Period BMI (Body Mass Index)             5' 4\" (1.626 m) 03/04/2005 35.19 kg/m2          Blood Pressure from Last 3 Encounters:   08/15/17 122/84   08/08/17 118/83   08/01/17 138/87    Weight from Last 3 Encounters:   08/15/17 205 lb (93 kg)   08/08/17 205 lb (93 kg)   08/01/17 205 lb (93 kg)               Primary Care Provider Office Phone # Fax #    Andreina Esparza [phone] [address] [facility] [address]        Equal Access to Services     MARLEY BUCK AH: Hadii thalia ku hadasho Soisaiahali, waaxda luqadaha, qaybta kaalmada adeegyada, carol dang hayblue escobar . So [facility] [phone].    ATENCIÓN: Si habla español, tiene a graham disposición servicios gratuitos de asistencia lingüística. Llame al [phone].    We comply with applicable federal civil rights laws and [place] laws. We do not discriminate on the basis of race, color, national origin, age, disability sex, sexual orientation or gender identity.            Thank you!     Thank you for choosing [facility]  for your care. Our goal is always to provide you with excellent care. Hearing back from our patients is one " way we can continue to improve our services. Please take a few minutes to complete the written survey that you may receive in the mail after your visit with us. Thank you!             Your Updated Medication List - Protect others around you: Learn how to safely use, store and throw away your medicines at www.disposemymeds.org.          This list is accurate as of: 8/15/17  5:16 PM.  Always use your most recent med list.                   Brand Name Dispense Instructions for use Diagnosis    ascorbic acid 250 MG tablet    VITAMIN C    90    Take  by mouth. 500 mg 1xqd.    Routine general medical examination at a health care facility       calcium carbonate 500 MG chewable tablet    TUMS     Take 1 chew tab by mouth daily        cyclobenzaprine 10 MG tablet    FLEXERIL    30 tablet    Take 0.5-1 tablets (5-10 mg) by mouth 3 times daily as needed for muscle spasms    Acute left-sided low back pain without sciatica       GLUCOSAMINE CHONDROITIN COMPLX Tabs      Take  by mouth. 1500 mg 1xqd.    Routine general medical examination at a health care facility       ibuprofen 200 MG tablet    ADVIL/MOTRIN    1 tablet    Take 1 tablet (200 mg) by mouth daily as needed for mild pain        TYLENOL 325 MG tablet   Generic drug:  acetaminophen      Take 325-650 mg by mouth every 6 hours as needed.        vitamin E 400 UNIT capsule      Take 1 capsule by mouth three times a week.

## 2017-08-15 NOTE — PATIENT INSTRUCTIONS
Thank you for allowing us to participate in your care today.  Please find below your visit diagnosis and the plan going forward.    1. Left hip pain    2. Primary osteoarthritis of both hips      Discussed your xray - moderate osteoarthritis  Injection options: cortisone or PRP ($450/joint)  Would recommend continuing with Physical therapy once pain is controlled: [facility] - [phone]    Follow up for injection is desired. Pancho can look at my schedule tonight. Call direct clinic number [[phone]] at any time with questions or concerns.    Fortino Rausch DO [facility]  Website: [email or web address]  Twitter: [email or web address]

## 2017-08-22 ENCOUNTER — THERAPY VISIT (OUTPATIENT)
Dept: PHYSICAL THERAPY | Facility: CLINIC | Age: 62
End: 2017-08-22
Payer: COMMERCIAL

## 2017-08-22 DIAGNOSIS — M25.552 HIP PAIN, LEFT: ICD-10-CM

## 2017-08-22 PROCEDURE — 97110 THERAPEUTIC EXERCISES: CPT | Mod: GP | Performed by: PHYSICAL THERAPIST

## 2017-08-22 PROCEDURE — 97112 NEUROMUSCULAR REEDUCATION: CPT | Mod: GP | Performed by: PHYSICAL THERAPIST

## 2017-08-22 PROCEDURE — 97140 MANUAL THERAPY 1/> REGIONS: CPT | Mod: GP | Performed by: PHYSICAL THERAPIST

## 2017-08-24 ENCOUNTER — OFFICE VISIT (OUTPATIENT)
Dept: ORTHOPEDICS | Facility: CLINIC | Age: 62
End: 2017-08-24
Payer: COMMERCIAL

## 2017-08-24 VITALS — BODY MASS INDEX: 35 KG/M2 | RESPIRATION RATE: 12 BRPM | WEIGHT: 205 LBS | HEIGHT: 64 IN

## 2017-08-24 DIAGNOSIS — M25.552 LEFT HIP PAIN: ICD-10-CM

## 2017-08-24 DIAGNOSIS — M16.0 PRIMARY OSTEOARTHRITIS OF BOTH HIPS: Primary | ICD-10-CM

## 2017-08-24 PROCEDURE — 20611 DRAIN/INJ JOINT/BURSA W/US: CPT | Mod: LT | Performed by: FAMILY MEDICINE

## 2017-08-24 RX ORDER — METHYLPREDNISOLONE ACETATE 40 MG/ML
40 INJECTION, SUSPENSION INTRA-ARTICULAR; INTRALESIONAL; INTRAMUSCULAR; SOFT TISSUE ONCE
Qty: 1 ML | Refills: 0 | OUTPATIENT
Start: 2017-08-24 | End: 2017-08-24

## 2017-08-24 NOTE — PATIENT INSTRUCTIONS
Thank you for allowing us to participate in your care today.  Please find below your visit diagnosis and the plan going forward.    1. Primary osteoarthritis of both hips    2. Left hip pain      Steroid injection of the left hip: intra-articular  was performed today in clinic  - Ok to shower  - No bathtub, hot tub or swimming for 2 days  - The lidocaine (what is giving you pain relief right now) will likely stop working in 1-2 hours.  You will then have pain again, similar to before you received the injection. The corticosteroid will not start working until approximately 1-2 weeks from now.  - Ice today and only do your normal amounts of activity    Follow up as needed. Call direct clinic number [[phone]] at any time with questions or concerns.    Fortino Rausch DO CAQSM  [place] [facility]  Website: [email or web address]  Twitter: [email or web address]

## 2017-08-24 NOTE — MR AVS SNAPSHOT
After Visit Summary   8/24/2017    Gerri Owens    MRN: [id number]           Patient Information     Date Of Birth          1955        Visit Information        Provider Department      8/24/2017 8:40 AM Fortino Rausch DO [facility]        Today's Diagnoses     Primary osteoarthritis of both hips    -  1    Left hip pain          Care Instructions    Thank you for allowing us to participate in your care today.  Please find below your visit diagnosis and the plan going forward.    1. Primary osteoarthritis of both hips    2. Left hip pain      Steroid injection of the left hip: intra-articular  was performed today in clinic  - Ok to shower  - No bathtub, hot tub or swimming for 2 days  - The lidocaine (what is giving you pain relief right now) will likely stop working in 1-2 hours.  You will then have pain again, similar to before you received the injection. The corticosteroid will not start working until approximately 1-2 weeks from now.  - Ice today and only do your normal amounts of activity    Follow up as needed. Call direct clinic number [[phone]] at any time with questions or concerns.    Fortino Rausch DO [facility]  Website: [email or web address]  Twitter: [email or web address]            Follow-ups after your visit        Your next 10 appointments already scheduled     Aug 24, 2017  8:40 AM CDT   (Arrive by 8:30 AM)   Return Visit with Fortino Rausch DO   [facility] ([place] [facility] [place])    [address] [address] [address]   [phone]            Aug 31, 2017  7:30 AM CDT   [facility] Spine with Monico Pop PT   [place] [facility] ([facility] [place]  )    [address] [place] [id number] [phone]            Sep 14, 2017  7:30 AM CDT   [facility] Spine with Monico Pop PT   [place] [facility]   [facility] ([facility]  )    [address] [facility] [id number] [phone]            Sep 28, 2017  7:30 AM CDT   [facility] Spine with Monico Pop PT   [place] [facility] - [facility] ([facility]  )    [address] [facility] [phone]              Who to contact     If you have questions or need follow up information about today's clinic visit or your schedule please contact [facility] directly at [phone].  Normal or non-critical lab and imaging results will be communicated to you by "[employer]"hart, letter or phone within 4 business days after the clinic has received the results. If you do not hear from us within 7 days, please contact the clinic through [employer] or phone. If you have a critical or abnormal lab result, we will notify you by phone as soon as possible.  Submit refill requests through Shoppilot or call your pharmacy and they will forward the refill request to us. Please allow 3 business days for your refill to be completed.          Additional Information About Your Visit        "[employer]"harTalaentia Information     Shoppilot gives you secure access to your electronic health record. If you see a primary care provider, you can also send messages to your care team and make appointments. If you have questions, please call your primary care clinic.  If you do not have a primary care provider, please call [phone] and they will assist you.        Care EveryWhere ID     This is your Care EveryWhere ID. This could be used by other organizations to access your [place] medical records  [id number]        Your Vitals Were     Last Period                   03/04/2005            Blood Pressure from Last 3 Encounters:   08/15/17 122/84   08/08/17 118/83   08/01/17 138/87    Weight from Last 3 Encounters:   08/15/17 205 lb (93 kg)   08/08/17 205 lb (93 kg)   08/01/17 205 lb (93 kg)              Today, you had the  following     No orders found for display       Primary Care Provider Office Phone # Fax #    Andreina Esparza [phone] [address]  [facility] [address]        Equal Access to Services     JEREMÍAS BUCK : Hadii thalia hurd mike Springer, waaxda luqadaha, qaybta kaalmada adecasiyada, carol hunt luz roland. So [facility] [phone].    ATENCIÓN: Si habla español, tiene a graham disposición servicios gratuitos de asistencia lingüística. Llame al [phone].    We comply with applicable federal civil rights laws and [place] laws. We do not discriminate on the basis of race, color, national origin, age, disability sex, sexual orientation or gender identity.            Thank you!     Thank you for choosing [facility]  for your care. Our goal is always to provide you with excellent care. Hearing back from our patients is one way we can continue to improve our services. Please take a few minutes to complete the written survey that you may receive in the mail after your visit with us. Thank you!             Your Updated Medication List - Protect others around you: Learn how to safely use, store and throw away your medicines at www.disposemymeds.org.          This list is accurate as of: 8/24/17  8:39 AM.  Always use your most recent med list.                   Brand Name Dispense Instructions for use Diagnosis    ascorbic acid 250 MG tablet    VITAMIN C    90    Take  by mouth. 500 mg 1xqd.    Routine general medical examination at a health care facility       calcium carbonate 500 MG chewable tablet    TUMS     Take 1 chew tab by mouth daily        cyclobenzaprine 10 MG tablet    FLEXERIL    30 tablet    Take 0.5-1 tablets (5-10 mg) by mouth 3 times daily as needed for muscle spasms    Acute left-sided low back pain without sciatica       GLUCOSAMINE CHONDROITIN COMPLX Tabs      Take  by mouth. 1500 mg 1xqd.    Routine general medical examination at a [facility]  care facility       ibuprofen 200 MG tablet    ADVIL/MOTRIN    1 tablet    Take 1 tablet (200 mg) by mouth daily as needed for mild pain        TYLENOL 325 MG tablet   Generic drug:  acetaminophen      Take 325-650 mg by mouth every 6 hours as needed.        vitamin E 400 UNIT capsule      Take 1 capsule by mouth three times a week.

## 2017-08-24 NOTE — PROGRESS NOTES
Left Hip Intra-Articular Corticosteroid Injection     Diagnoses (preoperative and postoperative):  Primary osteoarthritis of left hip   Current Procedure (include preoperative):  Sonographically guided left hip intra-articular corticosteroid injection  Current Indication (include preoperative):  Alleviation of pain  REASON FOR PROCEDURE: Gerri has requested a left hip joint corticosteroid injection for modulation of pain. Sonographic guidance will be used to ensure accurate placement of the medication within the joint space and avoid nearby neurovascular structures.  PATIENT EDUCATION:  Ready to learn with no apparent learning barriers identified.  Learning preferences include listening. Explained diagnosis and treatment plan as well as treatment alternatives. Patient expressed understanding of the content.  Following denial of allergy and review of potential side effects and complications including but not necessarily limited to infection, bleeding, allergic reaction, post-injection flare, local tissue breakdown (including but not limited to potential for skin depigmentation and/or subcutaneous fat atrophy), systemic effects of corticosteroids, elevation of blood glucose, injury to soft tissue and/or nerves and seizure, patient indicated their understanding and agreed to proceed. Written and signed consent was obtained and is scanned into the chart.  PROCEDURE: Prior to the procedure, the left anterior hip joint was examined with a 4 MHz curvilinear transducer to visualize the joint space and determine the approach for the procedure.  Procedure was carried out using sterile technique including Chloraprep, a sterile transducer cover, and a sterile transducer gel. A simple surgical tray was used.  PROCEDURAL PAUSE:  Procedural pause conducted to verify correct patient identity, procedure to be performed, and as applicable, correct side/site, correct patient position, availability of implants, special equipment, or  special requirements.  Patient position: Supine  Transducer type: 4 MHz curvilinear  Approach: Lateral to medial parallel to long axis of transducer  Local Anesthesia: Sonographically guided 22-gauge 3.5 inch needle was used to anesthetize the skin, subcutaneous tissue and advanced down to the Left hip joint with 5 ml of 1% Lidocaine  Injectate: After confirming needle tip position, syringe was replaced with one containing a solution of 1 ml of 40 mg/ml Depo Medrol and 4 ml of 1% Lidocaine which was injected and seen filling the hip joint.  AFTERCARE:  Patient tolerated the procedure without complication. After a short observation period, patient was discharged under their own power and in excellent condition.    Patient noted to have 4/10 with walking down the jones pain before the procedure and 0/10 with walking after completion of the procedure.  Fortino Rausch DO [facility]

## 2017-08-24 NOTE — NURSING NOTE
"Chief Complaint   Patient presents with     RECHECK     left hip pain       Initial Resp 12  Ht 5' 4\" (1.626 m)  Wt 205 lb (93 kg)  LMP 03/04/2005  BMI 35.19 kg/m2 Estimated body mass index is 35.19 kg/(m^2) as calculated from the following:    Height as of this encounter: 5' 4\" (1.626 m).    Weight as of this encounter: 205 lb (93 kg).  Medication Reconciliation: complete     Pancho Thompson, ATC    "

## 2017-09-14 ENCOUNTER — THERAPY VISIT (OUTPATIENT)
Dept: PHYSICAL THERAPY | Facility: CLINIC | Age: 62
End: 2017-09-14
Payer: COMMERCIAL

## 2017-09-14 DIAGNOSIS — M25.552 HIP PAIN, LEFT: ICD-10-CM

## 2017-09-14 PROCEDURE — 97110 THERAPEUTIC EXERCISES: CPT | Mod: GP | Performed by: PHYSICAL THERAPIST

## 2017-09-14 PROCEDURE — 97112 NEUROMUSCULAR REEDUCATION: CPT | Mod: GP | Performed by: PHYSICAL THERAPIST

## 2017-10-10 ENCOUNTER — THERAPY VISIT (OUTPATIENT)
Dept: PHYSICAL THERAPY | Facility: CLINIC | Age: 62
End: 2017-10-10
Payer: COMMERCIAL

## 2017-10-10 DIAGNOSIS — M25.552 HIP PAIN, LEFT: ICD-10-CM

## 2017-10-10 PROCEDURE — 97110 THERAPEUTIC EXERCISES: CPT | Mod: GP | Performed by: PHYSICAL THERAPIST

## 2017-10-10 PROCEDURE — 97112 NEUROMUSCULAR REEDUCATION: CPT | Mod: GP | Performed by: PHYSICAL THERAPIST

## 2017-10-10 NOTE — MR AVS SNAPSHOT
After Visit Summary   10/10/2017    Gerri Owens    MRN: [id number]           Patient Information     Date Of Birth          1955        Visit Information        Provider Department      10/10/2017 8:10 AM Monico Pop PT [facility]        Today's Diagnoses     Hip pain, left           Follow-ups after your visit        Who to contact     If you have questions or need follow up information about today's clinic visit or your schedule please contact [facility] directly at [phone].  Normal or non-critical lab and imaging results will be communicated to you by [employer]hart, letter or phone within 4 business days after the clinic has received the results. If you do not hear from us within 7 days, please contact the clinic through [employer] Travel Technologieshart or phone. If you have a critical or abnormal lab result, we will notify you by phone as soon as possible.  Submit refill requests through [employer] or call your pharmacy and they will forward the refill request to us. Please allow 3 business days for your refill to be completed.          Additional Information About Your Visit        MyChart Information     [employer] gives you secure access to your electronic health record. If you see a primary care provider, you can also send messages to your care team and make appointments. If you have questions, please call your primary care clinic.  If you do not have a primary care provider, please call [phone] and they will assist you.        Care EveryWhere ID     This is your Care EveryWhere ID. This could be used by other organizations to access your [place] medical records  [id number]        Your Vitals Were     Last Period                   03/04/2005            Blood Pressure from Last 3 Encounters:   08/15/17 122/84   08/08/17 118/83   08/01/17 138/87    Weight from Last 3 Encounters:   08/24/17 93 kg (205  lb)   08/15/17 93 kg (205 lb)   08/08/17 93 kg (205 lb)              We Performed the Following     NEUROMUSCULAR RE-EDUCATION     THERAPEUTIC EXERCISES        Primary Care Provider Office Phone # Fax #    Andreina Esparza [phone] [address]  [facility] [address]        Equal Access to Services     Alta Bates CampusBELLO : Hadii aad ku hadasho Soomaali, waaxda luqadaha, qaybta kaalmada adeegyada, waxay tamarain hayaan adecasi urrutiaboydchristiano laeleazar . So [facility] [phone].    ATENCIÓN: Si donna long, tiene a graham disposición servicios gratuitos de asistencia lingüística. [facility] [phone].    We comply with applicable federal civil rights laws and [place] laws. We do not discriminate on the basis of race, color, national origin, age, disability, sex, sexual orientation, or gender identity.            Thank you!     Thank you for choosing [place] [facility]ER[facility]  for your care. Our goal is always to provide you with excellent care. Hearing back from our patients is one way we can continue to improve our services. Please take a few minutes to complete the written survey that you may receive in the mail after your visit with us. Thank you!             Your Updated Medication List - Protect others around you: Learn how to safely use, store and throw away your medicines at www.disposemymeds.org.          This list is accurate as of: 10/10/17  9:30 AM.  Always use your most recent med list.                   Brand Name Dispense Instructions for use Diagnosis    ascorbic acid 250 MG tablet    VITAMIN C    90    Take  by mouth. 500 mg 1xqd.    Routine general medical examination at a health care facility       calcium carbonate 500 MG chewable tablet    TUMS     Take 1 chew tab by mouth daily        cyclobenzaprine 10 MG tablet    FLEXERIL    30 tablet    Take 0.5-1 tablets (5-10 mg) by mouth 3 times daily as needed for muscle spasms    Acute left-sided low back pain  without sciatica       GLUCOSAMINE CHONDROITIN COMPLX Tabs      Take  by mouth. 1500 mg 1xqd.    Routine general medical examination at a health care facility       ibuprofen 200 MG tablet    ADVIL/MOTRIN    1 tablet    Take 1 tablet (200 mg) by mouth daily as needed for mild pain        TYLENOL 325 MG tablet   Generic drug:  acetaminophen      Take 325-650 mg by mouth every 6 hours as needed.        vitamin E 400 UNIT capsule      Take 1 capsule by mouth three times a week.

## 2017-10-10 NOTE — PROGRESS NOTES
Subjective:    HPI                 Objective:    System    Physical Exam    General     ROS    Assessment/Plan:      PROGRESS  REPORT    Progress reporting period is from 8/8/17 to 10/10/17. Pt has attended 5 PT sessions addressing patient's chief complaints of L anterior hip pain associated with osteoarthritis. Pt had cortizone injection on 8/15/17 and reported significant pain relief. Patient has made good progress throughout PT emphasizing gluteal and quadriceps strengthening and is appropriate for independent management with HEP with plans to f/u as needed/    SUBJECTIVE  Subjective: Pt returns to PT after completing HEP independently over the past month. Pt reports doing very well and continues to have no c/o hip pain after injection ~2 months ago. Still has some difficulty with sit<->stand transfers    Current Pain level: 0/10.     Initial Pain level: 4/10.   Changes in function:  Yes (See Goal flowsheet attached for changes in current functional level)  Adverse reaction to treatment or activity: None    OBJECTIVE  Changes noted in objective findings:  None  L Hip PROM:   Flexion: 100 deg  ER: 45 deg  IR: 5 deg w/ min pain.   Limitation in Hip PROM consistent w/ OA, but much less painful.   Good technique and understanding for HEP.   Pt appropriate for independent management with plans to f/u as needed if sxs return     ASSESSMENT/PLAN  Updated problem list and treatment plan: Diagnosis 1:  L hip pain    STG/LTGs have been met or progress has been made towards goals:  Yes (See Goal flow sheet completed today.)  Assessment of Progress: The patient's condition is improving.  Self Management Plans:  Patient has been instructed in a home treatment program.  Patient is independent in a home treatment program.  I have re-evaluated this patient and find that the nature, scope, duration and intensity of the therapy is appropriate for the medical condition of the patient.  Gerri continues to require the following  intervention to meet STG and LTG's:  PT    Recommendations:  This patient would benefit from continued management with HEP with plans to f/u as needed            Please refer to the daily flowsheet for treatment today, total treatment time and time spent performing 1:1 timed codes.

## 2017-10-25 ENCOUNTER — MYC MEDICAL ADVICE (OUTPATIENT)
Dept: ORTHOPEDICS | Facility: CLINIC | Age: 62
End: 2017-10-25

## 2017-10-25 DIAGNOSIS — M16.12 PRIMARY OSTEOARTHRITIS OF LEFT HIP: Primary | ICD-10-CM

## 2017-11-02 ENCOUNTER — OFFICE VISIT (OUTPATIENT)
Dept: ORTHOPEDICS | Facility: CLINIC | Age: 62
End: 2017-11-02
Payer: COMMERCIAL

## 2017-11-02 VITALS
SYSTOLIC BLOOD PRESSURE: 128 MMHG | BODY MASS INDEX: 35 KG/M2 | WEIGHT: 205 LBS | HEIGHT: 64 IN | DIASTOLIC BLOOD PRESSURE: 83 MMHG

## 2017-11-02 DIAGNOSIS — M16.12 PRIMARY OSTEOARTHRITIS OF LEFT HIP: Primary | ICD-10-CM

## 2017-11-02 PROCEDURE — 99203 OFFICE O/P NEW LOW 30 MIN: CPT | Performed by: ORTHOPAEDIC SURGERY

## 2017-11-02 RX ORDER — AMOXICILLIN 500 MG/1
CAPSULE ORAL
Refills: 0 | COMMUNITY
Start: 2017-10-31 | End: 2017-11-14

## 2017-11-02 NOTE — NURSING NOTE
"Chief Complaint   Patient presents with     Musculoskeletal Problem     L hip pain       Initial /83  Ht 5' 4\" (1.626 m)  Wt 205 lb (93 kg)  LMP 03/04/2005  BMI 35.19 kg/m2 Estimated body mass index is 35.19 kg/(m^2) as calculated from the following:    Height as of this encounter: 5' 4\" (1.626 m).    Weight as of this encounter: 205 lb (93 kg).  Medication Reconciliation: complete     Elver Martinez, ATC    "

## 2017-11-02 NOTE — PROGRESS NOTES
HISTORY OF PRESENT ILLNESS:    Gerri Owens is a 62 year old female who is seen in consultation at the request of Dr. Rausch for chronic left hip pain.    Present symptoms: left anterior hip/groin pain, decreased ROM of the left hip  Treatments tried to this point: Injections:  Steroid of the  left hip: intra-articular  performed via ultrasound  OTC Medication:  Naproxen (Aleve)  Other care:  Physical Therapy Topical Treatments: Ice or Heat  Orthopedic PMH: none     Past Medical History:   Diagnosis Date     Advanced directives, counseling/discussion      DVT of upper extremity (deep vein thrombosis) (H) 3/27/2012     Hyperlipidemia LDL goal < 160      MEDICAL HISTORY OF -     left breast density     Unspecified episodic mood disorder      Whooping cough due to Bordetella pertussis (p. pertussis) infant    whooping cough       Past Surgical History:   Procedure Laterality Date     BREAST BIOPSY, RT/LT      left breast     TONSILLECTOMY  1960    tonsillectomy       Family History   Problem Relation Age of Onset     DIABETES Mother      type 2     HEART DISEASE Mother      Hypertension Mother      C.A.D. Mother       after 2nd heart attack     Hypertension Father      C.A.D. Father      mild heart attack     CANCER Father      skin cancer     HEART DISEASE Father      Arthritis Sister      rheumatoid arthritis     HEART DISEASE Brother      hereditary heart murmer     Lipids Brother      Lipids Brother        Social History     Social History     Marital status:      Spouse name: Doug     Number of children: 0     Years of education: N/A     Occupational History     rn [employer]     Social History Main Topics     Smoking status: Never Smoker     Smokeless tobacco: Never Used     Alcohol use Yes      Comment: occasional     Drug use: No     Sexual activity: Yes     Partners: Male     Other Topics Concern     Parent/Sibling W/ Cabg, Mi Or Angioplasty Before 65f 55m? No     Social History  Narrative    Balanced Diet - Yes, in the last 6 months    Osteoporosis Prevention Measures - Dairy servings per day: 2 servings plus a supplement    Regular Exercise -  Yes Describe walks for 30 to 40 minutes 4 to 5 times a week    Dental Exam - YES - Date: 1 year ago, and is going today    Eye Exam - YES - Date: 4 months ago    Self Breast Exam - Yes    Abuse: Current or Past (Physical, Sexual or Emotional)- No    Do you feel safe in your environment - Yes    Guns stored in the home - Yes, locked    Sunscreen used - Yes    Seatbelts used - Yes    Lipids -  YES - Date: 10-02    Glucose -  YES - Date: 10-02    Colon Cancer Screening - No    Hemoccults - NO    Pap Test -  YES - Date: 10-02    Do you have any concerns about STD's -  No    Mammography - YES - Date: 8-06    DEXA - NO    Immunizations reviewed and up to date - Yes, lst td 7-2000    10-23-07  MEL Mueller CMA       Current Outpatient Prescriptions   Medication Sig Dispense Refill     amoxicillin (AMOXIL) 500 MG capsule TK 1 C PO TID TAT  0     calcium carbonate (TUMS) 500 MG chewable tablet Take 1 chew tab by mouth daily       ibuprofen (ADVIL/MOTRIN) 200 MG tablet Take 1 tablet (200 mg) by mouth daily as needed for mild pain 1 tablet 0     acetaminophen (TYLENOL) 325 MG tablet Take 325-650 mg by mouth every 6 hours as needed.       vitamin E 400 UNIT capsule Take 1 capsule by mouth three times a week.       GLUCOSAMINE CHONDROITIN COMPLX OR TABS Take  by mouth. 1500 mg 1xqd.   0     VITAMIN C 250 MG OR TABS Take  by mouth. 500 mg 1xqd.  90 0       Allergies   Allergen Reactions     Nickel        REVIEW OF SYSTEMS:  CONSTITUTIONAL:  NEGATIVE for fever, chills, change in weight  INTEGUMENTARY/SKIN:  NEGATIVE for worrisome rashes, moles or lesions  EYES:  NEGATIVE for vision changes or irritation  ENT/MOUTH:  NEGATIVE for ear, mouth and throat problems  RESP:  NEGATIVE for significant cough or SOB  BREAST:  NEGATIVE for masses, tenderness or discharge  CV:   "NEGATIVE for chest pain, palpitations or peripheral edema  GI:  NEGATIVE for nausea, abdominal pain, heartburn, or change in bowel habits  :  Negative   MUSCULOSKELETAL:  See HPI above  NEURO:  NEGATIVE for weakness, dizziness or paresthesias  ENDOCRINE:  NEGATIVE for temperature intolerance, skin/hair changes  HEME/ALLERGY/IMMUNE:  NEGATIVE for bleeding problems  PSYCHIATRIC:  NEGATIVE for changes in mood or affect      PHYSICAL EXAM:  /83  Ht 5' 4\" (1.626 m)  Wt 205 lb (93 kg)  LMP 03/04/2005  BMI 35.19 kg/m2  Body mass index is 35.19 kg/(m^2).   GENERAL APPEARANCE: healthy, alert and no distress   SKIN: no suspicious lesions or rashes  NEURO: Normal strength and tone, mentation intact and speech normal  VASCULAR: Good pulses, and capillary refill   LYMPH: no lymphadenopathy   PSYCH:  mentation appears normal and affect normal/bright    MSK:  A&OX3, NAD  Neck supple, no lymphadenopathy  The patient ambulates without an antalgic gait.  The patient is able to get on and off the exam table without difficulty.    Examination of the spine reveals a normal lordosis to the cervical and lumbar spine, and a normal kyphosis to the thoracic spine.  There is no clinical evidence of scoliosis.    The pelvis is clinically level.  Trendelenberg is negative.  The patient is non-tender to palpation over the greater trochanteric bursal region or piriformis fossa.  With the hip flexed to 90 degrees, internal and external rotation is 15/30 respectively,  with pain at extremes.  The calves and thighs are symmetric, without atrophy and non-tender to palpation. Juan Jose's sign is negative, bilaterally.   CMS is intact to the toes.      ASSESSMENT / PLAN: Primary osteoarthritis left hip. We discussed the potential risks as well as benefits of a total hip arthroplasty and she'll take this into consideration.    Imaging Interpretation:       PELVIS AND LEFT HIP ONE VIEW   8/15/2017 4:37 PM     HISTORY: Pain in left " hip.     COMPARISON: None.     FINDINGS: No fracture or osseous lesion is seen. There is prominent  joint space loss of both hips with marginal osteophyte formation.  Degenerative change is also seen in the visualized lower lumbar spine.  No other abnormality is seen.         IMPRESSION: Prominent degenerative changes of the hips bilaterally.      MD Christian KNOWLES MD  Department of Orthopedic Surgery        Disclaimer: This note consists of symbols derived from keyboarding, dictation and/or voice recognition software. As a result, there may be errors in the script that have gone undetected. Please consider this when interpreting information found in this chart.

## 2017-11-02 NOTE — MR AVS SNAPSHOT
After Visit Summary   11/2/2017    Gerri Owens    MRN: [id number]           Patient Information     Date Of Birth          1955        Visit Information        Provider Department      11/2/2017 4:00 PM Rajinder Goodwin MD [facility]        Today's Diagnoses     Primary osteoarthritis of left hip    -  1       Follow-ups after your visit        Your next 10 appointments already scheduled     Nov 14, 2017  8:20 AM CST   Pre-Op physical with Karo Doty MD   [facility]    [address] [place] [address] [phone]            Dec 04, 2017   Procedure with Rajinder Goodwin MD   [facility] ([facility]    [address] [facility] [address]   [phone]            Dec 14, 2017  9:00 AM CST   Return Visit with Anderson Calloway PA-C   [facility] ([facility])    [id number] [place], [place] [address]  [address] [place] [address]   [phone]            Jan 04, 2018  9:00 AM CST   Pre-Op physical with Karo Doty MD   [facility] ([facility])    [address] [place] [address] [phone]            Rashad 15, 2018   Procedure with Rajinder Goodwin MD   [facility] (--)    [address] [facility] [address]   [phone] Jan 25, 2018  8:40 AM CST   Return Visit with Anderson Calloway PA-C   [facility] ([facility])    [address] [facility]  [address] [place] [address]   [phone]              Who to contact     If you have questions or need follow up information about today's clinic visit or your schedule please contact [facility] directly at [phone].  Normal or non-critical lab and imaging results will be communicated to you by MyChart, letter or phone within 4  "business days after the clinic has received the results. If you do not hear from us within 7 days, please contact the clinic through [employer]Auto or phone. If you have a critical or abnormal lab result, we will notify you by phone as soon as possible.  Submit refill requests through gAuto or call your pharmacy and they will forward the refill request to us. Please allow 3 business days for your refill to be completed.          Additional Information About Your Visit        gAuto Information     [employer]Auto gives you secure access to your electronic health record. If you see a primary care provider, you can also send messages to your care team and make appointments. If you have questions, please call your primary care clinic.  If you do not have a primary care provider, please call [phone] and they will assist you.        Care EveryWhere ID     This is your Care EveryWhere ID. This could be used by other organizations to access your [facility] medical records  [id number]        Your Vitals Were     Height Last Period BMI (Body Mass Index)             5' 4\" (1.626 m) 03/04/2005 35.19 kg/m2          Blood Pressure from Last 3 Encounters:   11/02/17 128/83   08/15/17 122/84   08/08/17 118/83    Weight from Last 3 Encounters:   11/02/17 205 lb (93 kg)   08/24/17 205 lb (93 kg)   08/15/17 205 lb (93 kg)              Today, you had the following     No orders found for display         Today's Medication Changes          These changes are accurate as of: 11/2/17 11:59 PM.  If you have any questions, ask your nurse or doctor.               Stop taking these medicines if you haven't already. Please contact your care team if you have questions.     cyclobenzaprine 10 MG tablet   Commonly known as:  FLEXERIL   Stopped by:  Rajinder Goodwin MD                    Primary Care Provider Office Phone # Fax #    Andreina Esparza [phone] [address] [facility] [address]        Equal Access to " Services     [facility]: Hadii thalia hurd johnsissy Kiannaali, waursulada luqadaha, qaybta kaalmanelly benavides, carol escobar . So [facility] [phone].    ATENCIÓN: Si hilaryla mercedes, tiene a graham disposición servicios gratuitos de asistencia lingüística. Llame al [phone].    We comply with applicable federal civil rights laws and [place] laws. We do not discriminate on the basis of race, color, national origin, age, disability, sex, sexual orientation, or gender identity.            Thank you!     Thank you for choosing [facility]  for your care. Our goal is always to provide you with excellent care. Hearing back from our patients is one way we can continue to improve our services. Please take a few minutes to complete the written survey that you may receive in the mail after your visit with us. Thank you!             Your Updated Medication List - Protect others around you: Learn how to safely use, store and throw away your medicines at www.disposemymeds.org.          This list is accurate as of: 11/2/17 11:59 PM.  Always use your most recent med list.                   Brand Name Dispense Instructions for use Diagnosis    amoxicillin 500 MG capsule    AMOXIL     TK 1 C PO TID TAT        ascorbic acid 250 MG tablet    VITAMIN C    90    Take  by mouth. 500 mg 1xqd.    Routine general medical examination at a health care facility       calcium carbonate 500 MG chewable tablet    TUMS     Take 1 chew tab by mouth daily        GLUCOSAMINE CHONDROITIN COMPLX Tabs      Take  by mouth. 1500 mg 1xqd.    Routine general medical examination at a health care facility       ibuprofen 200 MG tablet    ADVIL/MOTRIN    1 tablet    Take 1 tablet (200 mg) by mouth daily as needed for mild pain        TYLENOL 325 MG tablet   Generic drug:  acetaminophen      Take 325-650 mg by mouth every 6 hours as needed.        vitamin E 400 UNIT capsule      Take 1 capsule by mouth three times a  week.

## 2017-11-07 ENCOUNTER — TELEPHONE (OUTPATIENT)
Dept: ORTHOPEDICS | Facility: CLINIC | Age: 62
End: 2017-11-07

## 2017-11-07 NOTE — TELEPHONE ENCOUNTER
Schedule Left PRESLEY for 12/4/17 at [facility] at 10:15am.    Scheduled Right PRESLEY for 1/15/18 at [facility] at 7:30am.    Surgery packed mailed to patient.     Myesha Perdomo, ATC

## 2017-11-09 ENCOUNTER — MYC MEDICAL ADVICE (OUTPATIENT)
Dept: ORTHOPEDICS | Facility: CLINIC | Age: 62
End: 2017-11-09

## 2017-11-09 ENCOUNTER — TELEPHONE (OUTPATIENT)
Dept: ORTHOPEDICS | Facility: CLINIC | Age: 62
End: 2017-11-09

## 2017-11-09 NOTE — TELEPHONE ENCOUNTER
[facility] to verify information for claim#[id number] for STD. Please call [phone].     DAVIDSON White RN

## 2017-11-09 NOTE — TELEPHONE ENCOUNTER
Our goal is to complete and return forms within 5-7 business days of receiving the request.    Type of form Requested: Attending Physician Statement  Form requested by (include company):   [employer]    Phone number for requestor: [phone]  Date form is requested by: not listed    How will form be returned?:  fax to [phone]  Has the patient signed a consent form for release of information (may be included with form)? Unknown  Additional comments: Pt is scheduled for Left PRESLEY on 12/4/2017    Form was started and place in the brown accordion file for provider review/ completion at [facility].

## 2017-11-10 NOTE — TELEPHONE ENCOUNTER
I called and spoke with Sarahi at [facility] - She began asking verification of questions over the phone that sounded the same as the question on the STD form.     I verified that they were indeed the same through Sarahi.    I told her that we would be filling out and faxing the forms 1-2 business days after the surgery has been completed.    She confirmed the surgery date of 12/4/17.    Pancho Thompson ATC

## 2017-11-11 ENCOUNTER — TELEPHONE (OUTPATIENT)
Dept: FAMILY MEDICINE | Facility: CLINIC | Age: 62
End: 2017-11-11

## 2017-11-12 PROBLEM — Z86.718 HISTORY OF DEEP VENOUS THROMBOSIS: Status: ACTIVE | Noted: 2017-11-12

## 2017-11-12 PROBLEM — M16.0 PRIMARY OSTEOARTHRITIS OF BOTH HIPS: Status: ACTIVE | Noted: 2017-11-12

## 2017-11-12 PROBLEM — E78.5 HYPERLIPIDEMIA, UNSPECIFIED HYPERLIPIDEMIA TYPE: Status: ACTIVE | Noted: 2017-11-12

## 2017-11-13 ENCOUNTER — HOSPITAL ENCOUNTER (OUTPATIENT)
Dept: LAB | Facility: CLINIC | Age: 62
Discharge: HOME | End: 2017-11-13
Attending: ORTHOPAEDIC SURGERY | Admitting: ORTHOPAEDIC SURGERY
Payer: COMMERCIAL

## 2017-11-13 DIAGNOSIS — Z01.812 PRE-OPERATIVE LABORATORY EXAMINATION: ICD-10-CM

## 2017-11-13 PROCEDURE — 40000829 ZZHCL STATISTIC STAPH AUREUS SUSCEPT SCREEN PCR: Performed by: ORTHOPAEDIC SURGERY

## 2017-11-13 PROCEDURE — 87641 MR-STAPH DNA AMP PROBE: CPT | Performed by: ORTHOPAEDIC SURGERY

## 2017-11-13 PROCEDURE — 40000830 ZZHCL STATISTIC STAPH AUREUS METH RESIST SCREEN PCR: Performed by: ORTHOPAEDIC SURGERY

## 2017-11-13 PROCEDURE — 87640 STAPH A DNA AMP PROBE: CPT | Performed by: ORTHOPAEDIC SURGERY

## 2017-11-14 ENCOUNTER — OFFICE VISIT (OUTPATIENT)
Dept: FAMILY MEDICINE | Facility: CLINIC | Age: 62
End: 2017-11-14
Payer: COMMERCIAL

## 2017-11-14 ENCOUNTER — TELEPHONE (OUTPATIENT)
Dept: FAMILY MEDICINE | Facility: CLINIC | Age: 62
End: 2017-11-14

## 2017-11-14 VITALS
WEIGHT: 201.75 LBS | HEART RATE: 62 BPM | OXYGEN SATURATION: 95 % | BODY MASS INDEX: 34.63 KG/M2 | RESPIRATION RATE: 12 BRPM | SYSTOLIC BLOOD PRESSURE: 112 MMHG | DIASTOLIC BLOOD PRESSURE: 82 MMHG | TEMPERATURE: 97.7 F

## 2017-11-14 DIAGNOSIS — R06.83 SNORING: ICD-10-CM

## 2017-11-14 DIAGNOSIS — R74.8 ELEVATED CREATINE KINASE: Primary | ICD-10-CM

## 2017-11-14 DIAGNOSIS — R00.1 SINUS BRADYCARDIA: ICD-10-CM

## 2017-11-14 DIAGNOSIS — E78.5 HYPERLIPIDEMIA, UNSPECIFIED HYPERLIPIDEMIA TYPE: ICD-10-CM

## 2017-11-14 DIAGNOSIS — Z01.818 PRE-OPERATIVE GENERAL PHYSICAL EXAMINATION: Primary | ICD-10-CM

## 2017-11-14 DIAGNOSIS — R94.4 DECREASED GFR: ICD-10-CM

## 2017-11-14 DIAGNOSIS — Z86.718 HISTORY OF DEEP VENOUS THROMBOSIS: ICD-10-CM

## 2017-11-14 DIAGNOSIS — Z11.59 NEED FOR HEPATITIS C SCREENING TEST: ICD-10-CM

## 2017-11-14 DIAGNOSIS — M16.0 PRIMARY OSTEOARTHRITIS OF BOTH HIPS: ICD-10-CM

## 2017-11-14 DIAGNOSIS — N93.9 VAGINAL SPOTTING: ICD-10-CM

## 2017-11-14 DIAGNOSIS — Z01.818 PREOP GENERAL PHYSICAL EXAM: ICD-10-CM

## 2017-11-14 DIAGNOSIS — Z86.39 H/O HYPERTHYROIDISM: ICD-10-CM

## 2017-11-14 DIAGNOSIS — M25.552 HIP PAIN, LEFT: ICD-10-CM

## 2017-11-14 LAB
ALBUMIN SERPL-MCNC: 3.7 G/DL (ref 3.4–5)
ALP SERPL-CCNC: 98 U/L (ref 40–150)
ALT SERPL W P-5'-P-CCNC: 22 U/L (ref 0–50)
ANION GAP SERPL CALCULATED.3IONS-SCNC: 7 MMOL/L (ref 3–14)
AST SERPL W P-5'-P-CCNC: 20 U/L (ref 0–45)
BASOPHILS # BLD AUTO: 0.1 10E9/L (ref 0–0.2)
BASOPHILS NFR BLD AUTO: 1.5 %
BILIRUB SERPL-MCNC: 0.5 MG/DL (ref 0.2–1.3)
BUN SERPL-MCNC: 21 MG/DL (ref 7–30)
CALCIUM SERPL-MCNC: 10.2 MG/DL (ref 8.5–10.1)
CHLORIDE SERPL-SCNC: 105 MMOL/L (ref 94–109)
CO2 SERPL-SCNC: 26 MMOL/L (ref 20–32)
CREAT SERPL-MCNC: 1.6 MG/DL (ref 0.52–1.04)
DIFFERENTIAL METHOD BLD: NORMAL
EOSINOPHIL # BLD AUTO: 0.2 10E9/L (ref 0–0.7)
EOSINOPHIL NFR BLD AUTO: 3.6 %
ERYTHROCYTE [DISTWIDTH] IN BLOOD BY AUTOMATED COUNT: 14.3 % (ref 10–15)
GFR SERPL CREATININE-BSD FRML MDRD: 33 ML/MIN/1.7M2
GLUCOSE SERPL-MCNC: 82 MG/DL (ref 70–99)
HCT VFR BLD AUTO: 43.3 % (ref 35–47)
HCV AB SERPL QL IA: NONREACTIVE
HGB BLD-MCNC: 13.8 G/DL (ref 11.7–15.7)
INR PPP: 0.93 (ref 0.86–1.14)
LYMPHOCYTES # BLD AUTO: 1.5 10E9/L (ref 0.8–5.3)
LYMPHOCYTES NFR BLD AUTO: 25 %
MCH RBC QN AUTO: 29.1 PG (ref 26.5–33)
MCHC RBC AUTO-ENTMCNC: 31.9 G/DL (ref 31.5–36.5)
MCV RBC AUTO: 91 FL (ref 78–100)
MONOCYTES # BLD AUTO: 0.7 10E9/L (ref 0–1.3)
MONOCYTES NFR BLD AUTO: 11.1 %
MRSA DNA SPEC QL NAA+PROBE: NEGATIVE
NEUTROPHILS # BLD AUTO: 3.6 10E9/L (ref 1.6–8.3)
NEUTROPHILS NFR BLD AUTO: 58.8 %
PLATELET # BLD AUTO: 223 10E9/L (ref 150–450)
POTASSIUM SERPL-SCNC: 5.1 MMOL/L (ref 3.4–5.3)
PROT SERPL-MCNC: 7.3 G/DL (ref 6.8–8.8)
RBC # BLD AUTO: 4.74 10E12/L (ref 3.8–5.2)
SODIUM SERPL-SCNC: 138 MMOL/L (ref 133–144)
SPECIMEN SOURCE: NORMAL
TSH SERPL DL<=0.005 MIU/L-ACNC: 2.02 MU/L (ref 0.4–4)
WBC # BLD AUTO: 6.2 10E9/L (ref 4–11)

## 2017-11-14 PROCEDURE — 86803 HEPATITIS C AB TEST: CPT | Performed by: FAMILY MEDICINE

## 2017-11-14 PROCEDURE — 36415 COLL VENOUS BLD VENIPUNCTURE: CPT | Performed by: FAMILY MEDICINE

## 2017-11-14 PROCEDURE — 80050 GENERAL HEALTH PANEL: CPT | Performed by: FAMILY MEDICINE

## 2017-11-14 PROCEDURE — 93000 ELECTROCARDIOGRAM COMPLETE: CPT | Performed by: FAMILY MEDICINE

## 2017-11-14 PROCEDURE — 85610 PROTHROMBIN TIME: CPT | Performed by: FAMILY MEDICINE

## 2017-11-14 PROCEDURE — 99215 OFFICE O/P EST HI 40 MIN: CPT | Performed by: FAMILY MEDICINE

## 2017-11-14 NOTE — PATIENT INSTRUCTIONS
Labs and diagnostics today   If stable will clear for surgery otherwise may need additional work up   Take no meds am of surgery  Hold aspirin, antiinflammatories like motrin aleve etc, fish oil and glucosamine, vitamin e preferable 5 to 7 days prior to surgery  Will fax /send note to surgeon once completed  Electronically   Because of DVT or PE history, patient should be on low molecular weight heparin and wear compression hose before & after surgery  See gyn for the spotting and get pap done at same time   Return  for physical , hep C, colon cancer screen, mammogram, pap, advance directives.   chronic use of PPI med's could put you at risk of atypical pneumonia, fractures, C diff, vit B 12  & magnesium deficiency and to consider taking zantac 150 mg twice a day OTC instead.  Can take ranitidine 150 mg twice a day of having a lot of heart burn   Recommend flu shot yearly got at work   Before Your Surgery      Call your surgeon if there is any change in your health. This includes signs of a cold or flu (such as a sore throat, runny nose, cough, rash or fever).    Do not smoke, drink alcohol or take over the counter medicine (unless your surgeon or primary care doctor tells you to) for the 24 hours before and after surgery.    If you take prescribed drugs: Follow your doctor s orders about which medicines to take and which to stop until after surgery.    Eating and drinking prior to surgery: follow the instructions from your surgeon    Take a shower or bath the night before surgery. Use the soap your surgeon gave you to gently clean your skin. If you do not have soap from your surgeon, use your regular soap. Do not shave or scrub the surgery site.  Wear clean pajamas and have clean sheets on your bed.

## 2017-11-14 NOTE — MR AVS SNAPSHOT
After Visit Summary   11/14/2017    Gerri Owens    MRN: [id number]           Patient Information     Date Of Birth          1955        Visit Information        Provider Department      11/14/2017 8:20 AM Karo Doty MD [facility]        Today's Diagnoses     Pre-operative general physical examination    -  1    Primary osteoarthritis of both hips        Hip pain, left        History of deep venous thrombosis        Hyperlipidemia, unspecified hyperlipidemia type        BMI 35.0-35.9,adult        Need for hepatitis C screening test        Preop general physical exam        H/O hyperthyroidism        Vaginal spotting          Care Instructions    Labs and diagnostics today   If stable will clear for surgery otherwise may need additional work up   Take no meds am of surgery  Hold aspirin, antiinflammatories like motrin aleve etc, fish oil and glucosamine, vitamin e preferable 5 to 7 days prior to surgery  Will fax /send note to surgeon once completed  Electronically   Because of DVT or PE history, patient should be on low molecular weight heparin and wear compression hose before & after surgery  See gyn for the spotting and get pap done at same time   Return  for physical , hep C, colon cancer screen, mammogram, pap, advance directives.   chronic use of PPI med's could put you at risk of atypical pneumonia, fractures, C diff, vit B 12  & magnesium deficiency and to consider taking zantac 150 mg twice a day OTC instead.  Can take ranitidine 150 mg twice a day of having a lot of heart burn   Recommend flu shot yearly got at work   Before Your Surgery      Call your surgeon if there is any change in your health. This includes signs of a cold or flu (such as a sore throat, runny nose, cough, rash or fever).    Do not smoke, drink alcohol or take over the counter medicine (unless your surgeon or primary care doctor tells you to) for the 24 hours before and after surgery.    If you  take prescribed drugs: Follow your doctor s orders about which medicines to take and which to stop until after surgery.    Eating and drinking prior to surgery: follow the instructions from your surgeon    Take a shower or bath the night before surgery. Use the soap your surgeon gave you to gently clean your skin. If you do not have soap from your surgeon, use your regular soap. Do not shave or scrub the surgery site.  Wear clean pajamas and have clean sheets on your bed.           Follow-ups after your visit        Additional Services     OB/GYN REFERRAL       Your provider has referred you to:  [facility]: [facility] [phone]   [email or web address]    Please be aware that coverage of these services is subject to the terms and limitations of your health insurance plan.  Call member services at your health plan with any benefit or coverage questions.      Please bring the following with you to your appointment:    (1) Any X-Rays, CTs or MRIs which have been performed.  Contact the facility where they were done to arrange for  prior to your scheduled appointment.  Any new CT, MRI or other procedures ordered by your specialist must be performed at a [place] facility or coordinated by your clinic's referral office.    (2) List of current medications   (3) This referral request   (4) Any documents/labs given to you for this referral                  Your next 10 appointments already scheduled     Dec 04, 2017   Procedure with Rajinder Goodwin MD   [facility] (--[facility]    [address] [facility] [address]   [phone]            Dec 14, 2017  9:00 AM CST   Return Visit with Anderson Calloway PA-C   [facility] ([facility])    [address] [place] [address]  [address]  [facility] [address]   [phone]            Jan 04, 2018  9:00 AM CST   Pre-Op physical with Karo Doty MD   [place]  [facility] ([facility])    [address] [place] [address] [phone]            Rashad 15, 2018   Procedure with Rajinder Goodwin MD   [facility] (--)    [address]  [facility] [address]   [phone]            Jan 25, 2018  8:40 AM CST   Return Visit with Anderson Calloway PA-C   [facility] ([facility])    [address]  [address]  [facility] [address]   [phone]              Future tests that were ordered for you today     Open Future Orders        Priority Expected Expires Ordered    Methicillin Resistant Staph Aureus PCR Routine 11/13/2017 12/4/2017 11/13/2017            Who to contact     If you have questions or need follow up information about today's clinic visit or your schedule please contact [facility] directly at [phone].  Normal or non-critical lab and imaging results will be communicated to you by [employer], letter or phone within 4 business days after the clinic has received the results. If you do not hear from us within 7 days, please contact the clinic through [employer] or phone. If you have a critical or abnormal lab result, we will notify you by phone as soon as possible.  Submit refill requests through [employer] or call your pharmacy and they will forward the refill request to us. Please allow 3 business days for your refill to be completed.          Additional Information About Your Visit        [employer] Information     [employer] gives you secure access to your electronic health record. If you see a primary care provider, you can also send messages to your care team and make appointments. If you have questions, please call your primary care clinic.  If you do not have a primary care provider, please call [phone] and they will assist you.        Care EveryWhere ID     This is your Care EveryWhere ID. This could be used by other  organizations to access your [facility] medical records  [id number]        Your Vitals Were     Pulse Temperature Respirations Last Period Pulse Oximetry BMI (Body Mass Index)    62 97.7  F (36.5  C) (Oral) 12 03/04/2005 95% 34.63 kg/m2       Blood Pressure from Last 3 Encounters:   11/14/17 112/82   11/02/17 128/83   08/15/17 122/84    Weight from Last 3 Encounters:   11/14/17 201 lb 12 oz (91.5 kg)   11/02/17 205 lb (93 kg)   08/24/17 205 lb (93 kg)              We Performed the Following     CBC with platelets differential     Comprehensive metabolic panel     EKG 12-lead complete w/read - Clinics     Hepatitis C Screen Reflex to HCV RNA Quant and Genotype     INR     OB/GYN REFERRAL     TSH with free T4 reflex        Primary Care Provider Office Phone # Fax #    Andreina Esparza [phone]       [address]  [facility] [address]        Equal Access to Services     JEREMÍAS BUCK : Hadii aad ku hadasho Soomaali, waaxda luqadaha, qaybta kaalmada adeegyada, waxay tamarain rafaela escobar . So [facility] [phone].    ATENCIÓN: Si habla español, tiene a graham disposición servicios gratuitos de asistencia lingüística. Llame al [phone].    We comply with applicable federal civil rights laws and [place] laws. We do not discriminate on the basis of race, color, national origin, age, disability, sex, sexual orientation, or gender identity.            Thank you!     Thank you for choosing [facility]  for your care. Our goal is always to provide you with excellent care. Hearing back from our patients is one way we can continue to improve our services. Please take a few minutes to complete the written survey that you may receive in the mail after your visit with us. Thank you!             Your Updated Medication List - Protect others around you: Learn how to safely use, store and throw away your medicines at www.disposemymeds.org.          This list is accurate as of: 11/14/17   8:58 AM.  Always use your most recent med list.                   Brand Name Dispense Instructions for use Diagnosis    ascorbic acid 250 MG tablet    VITAMIN C    90    Take  by mouth. 500 mg 1xqd.    Routine general medical examination at a health care facility       calcium carbonate 500 MG chewable tablet    TUMS     Take 1 chew tab by mouth daily        calcium citrate-vitamin D 315-200 MG-UNIT Tabs per tablet    CITRACAL     Take 2 tablets by mouth daily        GLUCOSAMINE CHONDROITIN COMPLX Tabs      Take  by mouth. 1500 mg 1xqd.    Routine general medical examination at a health care facility       NAPROXEN PO      Take 220 mg by mouth 2 times daily        RANITIDINE HCL PO      Take 75 mg by mouth daily        vitamin E 400 UNIT capsule      Take 1 capsule by mouth three times a week.

## 2017-11-14 NOTE — PROGRESS NOTES
[facility]  [address] [place] [address] [phone]  Dept: [phone]    PRE-OP EVALUATION:  Today's date: 2017    Gerri Owens (: 1955) presents for pre-operative evaluation assessment as requested by Dr. Goodwin, Rajinder Duran MD.  She requires evaluation and anesthesia risk assessment prior to undergoing surgery/procedure for treatment of ARTHROPLASTY HIP .  Proposed procedure: left Total hip Arthroplasty   SurgeoN Request Choice Anesthesia    Date of Surgery/ Procedure: 2017  Time of Surgery/ Procedure: 10:05am  Hospital/Surgical Facility: [facility]  Fax number for surgical facility:   Primary Physician: Andreina Esparza  Type of Anesthesia Anticipated: General    Patient has a Health Care Directive or Living Will:  NO    Preop Questions 2017   1.  Do you have a history of heart attack, stroke, stent, bypass or surgery on an artery in the head, neck, heart or legs? No   2.  Do you ever have any pain or discomfort in your chest? No   3.  Do you have a history of  Heart Failure? No   4.   Are you troubled by shortness of breath when:  walking on a level surface, or up a slight hill, or at night? No   5.  Do you currently have a cold, bronchitis or other respiratory infection? No   6.  Do you have a cough, shortness of breath, or wheezing? No   7.  Do you sometimes get pains in the calves of your legs when you walk? No   8. Do you or anyone in your family have previous history of blood clots? YES - both two  brother DVT and self   9.  Do you or does anyone in your family have a serious bleeding problem such as prolonged bleeding following surgeries or cuts? No   10. Have you ever had problems with anemia or been told to take iron pills? No   11. Have you had any abnormal blood loss such as black, tarry or bloody stools, or abnormal vaginal bleeding? YES - self vaginal bleeding    12. Have you ever had a blood transfusion? No    13. Have you or any of your relatives ever had problems with anesthesia? No   14. Do you have sleep apnea, excessive snoring or daytime drowsiness? No   15. Do you have any prosthetic heart valves? No   16. Do you have prosthetic joints? No   17. Is there any chance that you may be pregnant? No           HPI:                                                      Brief HPI related to upcoming procedure:   Has been having left hip pain,worse then right. Cortisone shot helped some  Here for preop for left PRESLEY, also scheduled for the right but is reconsidering cancelling that  Cut back activity due to hip pain so mostly sedentary   Has a ghada allergy   Tested with titanium patch under bra against skin 1 weeks and no allergy noted   MRSA nasal screen negative    Two brothers with hx of DVT. One had a factor problem but she was checked and  Didn't have it. One brother remains on life long coumadin. She herself had DVT of right upper extremity in 2012  , had maybe phlebitis in both legs 1 yr out  After that never seen for it . 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     Along time ago she had hyperthyroidism, treated methimazole, cleared up and not had since . No symptoms/    Notes Hx of spotting from vagina , off and on long time, thinks from vag dryness, no discharge or itching or burning or pain. No weight loss or night sweats. Is over due for pap.    Had infected tooth , treated with amoxil , tooth extracted 1 week ago. Doing ok.    No fever or chills, no headache or dizziness, no earache, sore throat, runny nose, no trouble hearing, smelling, tasting or swallowing, no chest pain trouble breathing or palpitations, No heart burn, reflux, abdominal pain, nausea or vomiting or diarrhea or constipation, no blood in stools or black stools, no weight loss or night sweats. No urinary complaints. No pelvic complaints. No leg swelling or rash. Or  joint pain.     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    Switched to ranitidine and doing ok . Occasional Tums still though. No longer on PPI    Occasional snoring     MEDICAL HISTORY:                                                    Patient Active Problem List    Diagnosis Date Noted     Primary osteoarthritis of both hips 11/12/2017     Priority: Medium     History of deep venous thrombosis 11/12/2017     Priority: Medium     Hyperlipidemia, unspecified hyperlipidemia type 11/12/2017     Priority: Medium     BMI 35.0-35.9,adult 11/12/2017     Priority: Medium     Hip pain, left 08/08/2017     Priority: Medium     Advanced directives, counseling/discussion 05/07/2012     Priority: Medium     Discussed advance care planning with patient; information given to patient to review. 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  7/17/2012     Unspecified episodic mood disorder      Whooping cough due to Bordetella pertussis (p. pertussis) infant    whooping cough     Past Surgical History:   Procedure Laterality Date     BREAST BIOPSY, RT/LT  2004    left breast     TONSILLECTOMY  1960    tonsillectomy     Current Outpatient Prescriptions   Medication Sig Dispense Refill     NAPROXEN PO Take 220 mg by mouth 2 times daily       RANITIDINE HCL PO Take 75 mg by mouth daily       calcium citrate-vitamin D (CITRACAL) 315-200 MG-UNIT TABS per tablet Take 2 tablets by mouth daily       calcium carbonate (TUMS) 500 MG chewable tablet Take 1 chew tab by mouth daily       vitamin E 400 UNIT capsule Take 1 capsule by mouth three times a week.       GLUCOSAMINE CHONDROITIN COMPLX OR TABS Take  by mouth. 1500 mg 1xqd.   0     VITAMIN C  250 MG OR TABS Take  by mouth. 500 mg 1xqd.  90 0     OTC products: None, except as noted above    Allergies   Allergen Reactions     Nickel       Latex Allergy: NO    Social History   Substance Use Topics     Smoking status: Never Smoker     Smokeless tobacco: Never Used     Alcohol use Yes      Comment: occasional     History   Drug Use No       REVIEW OF SYSTEMS:                                                    C: NEGATIVE for fever, chills, change in weight  I: NEGATIVE for worrisome rashes, moles or lesions  E: NEGATIVE for vision changes or irritation  E/M: NEGATIVE for ear, mouth and throat problems  R: NEGATIVE for significant cough or SOB  B: NEGATIVE for masses, tenderness or discharge  CV: NEGATIVE for chest pain, palpitations or peripheral edema  GI: NEGATIVE for nausea, abdominal pain, heartburn, or change in bowel habits   female: negative for  decreased urinary stream, dysmenorrhea, dyspareunia, dysuria, frequency, hematuria, hesitancy, Hx kidney stone, Hx pyelonephritis, Hx urinary tract infection, Hx STD's, incontinence-urge, incontinence-stress, retention, terminal dribbling, urgency or vaginal discharge  but does report off and on mild vaginal spotting   M: NEGATIVE for significant arthralgias or myalgia  N: NEGATIVE for weakness, dizziness or paresthesias  E: NEGATIVE for temperature intolerance, skin/hair changes  H: NEGATIVE for bleeding problems  P: NEGATIVE for changes in mood or affect    EXAM:                                                    /82 (BP Location: Left arm, Patient Position: Chair, Cuff Size: Adult Large)  Pulse 62  Temp 97.7  F (36.5  C) (Oral)  Resp 12  Wt 201 lb 12 oz (91.5 kg)  LMP 03/04/2005  SpO2 95%  BMI 34.63 kg/m2    GENERAL APPEARANCE: healthy, alert and no distress     EYES: EOMI, PERRL     HENT: ear canals and TM's normal and nose and mouth without ulcers or lesions     NECK: no adenopathy, no asymmetry, masses, or scars and thyroid normal to  palpation     RESP: lungs clear to auscultation - no rales, rhonchi or wheezes     CV: regular rates and rhythm, normal S1 S2, no S3 or S4 and no murmur, click or rub     ABDOMEN:  soft, non tender, no HSM or masses and bowel sounds normal     MS: extremities normal- no gross deformities noted, no evidence of inflammation in joints, FULL RANGE OF MOTION in all extremities except limited mildly at hips     SKIN: no suspicious lesions or rashes     NEURO: Normal strength and tone, sensory exam grossly normal, mentation intact and speech normal     PSYCH: mentation appears normal. and affect normal/bright, mildly anxious     LYMPHATICS: No axillary, cervical, or supraclavicular nodes    DIAGNOSTICS:                                                      EKG: sinus bradycardia, normal axis, normal intervals, no acute ST/T changes C/w ischemia, no LVH by voltage criteria  Labs Resulted Today:   Results for orders placed or performed in visit on 11/14/17   CBC with platelets differential   Result Value Ref Range    WBC 6.2 4.0 - 11.0 10e9/L    RBC Count 4.74 3.8 - 5.2 10e12/L    Hemoglobin 13.8 11.7 - 15.7 g/dL    Hematocrit 43.3 35.0 - 47.0 %    MCV 91 78 - 100 fl    MCH 29.1 26.5 - 33.0 pg    MCHC 31.9 31.5 - 36.5 g/dL    RDW 14.3 10.0 - 15.0 %    Platelet Count 223 150 - 450 10e9/L    Diff Method Automated Method     % Neutrophils 58.8 %    % Lymphocytes 25.0 %    % Monocytes 11.1 %    % Eosinophils 3.6 %    % Basophils 1.5 %    Absolute Neutrophil 3.6 1.6 - 8.3 10e9/L    Absolute Lymphocytes 1.5 0.8 - 5.3 10e9/L    Absolute Monocytes 0.7 0.0 - 1.3 10e9/L    Absolute Eosinophils 0.2 0.0 - 0.7 10e9/L    Absolute Basophils 0.1 0.0 - 0.2 10e9/L     Labs Drawn and in Process:   Unresulted Labs Ordered in the Past 30 Days of this Admission     Date and Time Order Name Status Description    11/14/2017 0856 TSH WITH FREE T4 REFLEX In process     11/14/2017 0856 INR In process     11/14/2017 0856 HEPATITIS C SCREEN REFLEX TO HCV  RNA QUANT AND GENOTYPE In process     11/14/2017 0856 COMPREHENSIVE METABOLIC PANEL In process           Recent Labs   Lab Test 03/21/13 12/27/12 09/11/12   1108   03/27/12   0908   HGB   --    --    --    --    --   14.6   PLT   --    --    --    --    --   165   INR  2.6*  2.5*   < >   --    < >  1.00   NA   --    --    --   142   --    --    POTASSIUM   --    --    --   4.1   --    --    CR   --    --    --   0.85   --    --     < > = values in this interval not displayed.        IMPRESSION:                                                    Nurse at [employer] with Hx of obesity, BMI 34, HLD, prior DVT RUE 2012 unknown causes reported negative hypercoagulable workup, treated with warfarin 1 year , resolved post phelbetic syndrome RUE, prior tonsillectomy, left breast biopsy, hx of back spasm , allergic to ghada, occasional GERD with prn use of OTC PPI, seen in 6/2016 and given flexeril and did PT with good resolution here for left hip pain , ?Left sciatic nerve pain seen 8/1 for low back pain, left hip pain, Sciatica vs pyriformis syndrome. No sign of stroke, referred to PT and ortho, seen by PT 8/8 , after PT felt pain mostly in her hips, seen by ortho 8/15 had moderate OA on xray. Had left interarticular injection 8/24 with significant pain improvement, made good progress with PT completed 10/10/17, seen by ortho 11/2 discussed arthroplasty for primary OA hips, scheduled for Left PRESLEY 12/4 and right PRESLEY 1/15/18. [place]  negative. Here for Preop   Reason for surgery/procedure: Primary OA left hip for Left PRESLEY   Diagnosis/reason for consult: preop for above    The proposed surgical procedure is considered INTERMEDIATE risk.    REVISED CARDIAC RISK INDEX  The patient has the following serious cardiovascular risks for perioperative complications such as (MI, PE, VFib and 3  AV Block):  No serious cardiac risks  INTERPRETATION: 0 risks: Class I (very low risk - 0.4% complication rate)    The patient has the  following additional risks for perioperative complications:  snoring      ICD-10-CM    1. Pre-operative general physical examination Z01.818 CBC with platelets differential     Comprehensive metabolic panel     EKG 12-lead complete w/read - Clinics     INR     TSH with free T4 reflex   2. Primary osteoarthritis of both hips M16.0    3. Hip pain, left M25.552    4. History of deep venous thrombosis Z86.718 INR   5. Hyperlipidemia, unspecified hyperlipidemia type E78.5    6. BMI 35.0-35.9,adult Z68.35    7. Need for hepatitis C screening test Z11.59 Hepatitis C Screen Reflex to HCV RNA Quant and Genotype   8. Preop general physical exam Z01.818    9. H/O hyperthyroidism Z86.39 TSH with free T4 reflex   10. Vaginal spotting N92.0 OB/GYN REFERRAL   11. Sinus bradycardia R00.1    12. Snoring R06.83        RECOMMENDATIONS:                                                    Labs and diagnostics today. If stable will clear for surgery otherwise may need additional work up   Take no meds am of surgery. Hold aspirin, antiinflammatories like motrin aleve etc, fish oil and glucosamine, vitamin e preferable 5 to 7 days prior to surgery. Will fax /send note to surgeon once completed  Electronically. Because of DVT or PE history, patient should be on low molecular weight heparin and wear compression hose before & after surgery. See gyn for the spotting and get pap done at same time. Return  for physical , hep C, colon cancer screen, mammogram, pap, advance directives.   chronic use of PPI med's could put you at risk of atypical pneumonia, fractures, C diff, vit B 12  & magnesium deficiency and to consider taking zantac 150 mg twice a day OTC instead. Has come off PPI so given GERD symptoms increase  ranitidine to 150 mg twice a day if having a lot of heart burn. Recommend flu shot yearly, reports got at work   --Consult hospital rounder / IM to assist post-op medical management  --Because of DVT or PE history, patient should be on  low molecular weight heparin and wear compression hose before & after surgery    Cardiovascular Risk  Performs 4 Mets exercise without symptoms (Light housework (dusting, washing dishes), Climb a flight of stairs, Walk on level ground at 15 minutes per mile (4 miles/hour) and Shoveling) .     Pulmonary Risk  Incentive spirometry post op  Respiratory Therapy (Respiratory Care IP Consult)  post op  NG tube decompression if abdominal distension or significant vomiting     Obstructive Sleep Apnea (or suspected sleep apnea)  Hospital staff are advised to monitor for sleep related oxygen desaturations due to suspicion of QIANA given hx of snoring and obesity    APPROVAL GIVEN to proceed with proposed procedure, without further diagnostic evaluation if TSH and CMP pending are normal        Signed Electronically by: Karo Doty MD    Copy of this evaluation report is provided to requesting physician.    [place] Preop Guidelines

## 2017-11-14 NOTE — PROGRESS NOTES
Flor Ms. Owens,  Your results came back and are within acceptable limits. -Normal red blood cell (hgb) levels, normal white blood cell count and normal platelet levels.. If you have any further concerns please do not hesitate to contact us by message, phone or making an appointment.  Have a good day   Dr Soren MATHIS

## 2017-11-14 NOTE — NURSING NOTE
"Chief Complaint   Patient presents with     Pre-Op Exam       Initial /82 (BP Location: Left arm, Patient Position: Chair, Cuff Size: Adult Large)  Pulse 62  Temp 97.7  F (36.5  C) (Oral)  Resp 12  Wt 201 lb 12 oz (91.5 kg)  LMP 03/04/2005  SpO2 95%  BMI 34.63 kg/m2 Estimated body mass index is 34.63 kg/(m^2) as calculated from the following:    Height as of 11/2/17: 5' 4\" (1.626 m).    Weight as of this encounter: 201 lb 12 oz (91.5 kg).  Medication Reconciliation: complete Esvin Bran MA      "

## 2017-11-15 ENCOUNTER — TELEPHONE (OUTPATIENT)
Dept: ORTHOPEDICS | Facility: CLINIC | Age: 62
End: 2017-11-15

## 2017-11-15 NOTE — TELEPHONE ENCOUNTER
Creatinine elevated at 1.6 making GFR 40 % ( should be around 60 at her age)   Not sure if new acute kidney injury or chronic over time given last cmp was checked in 2012 when normal  Also calcium mildly elevated  Advised increase fluids, avoid all NSAIDs and recheck in 2 days   If still abnorla would recomend seeing nephrology prior to surgery

## 2017-11-15 NOTE — TELEPHONE ENCOUNTER
No extra plan for the calcium, increasing water intake and rechecking bmp which includes a calcium is sufficent thanks

## 2017-11-15 NOTE — PROGRESS NOTES
Flor Ms. Owens,  Your results came back and are within acceptable limits. -Hepatitis C antibody screen test shows no signs of a previous hepatitis C infection.. If you have any further concerns please do not hesitate to contact us by message, phone or making an appointment.  Have a good day   Dr Soren MATHIS

## 2017-11-15 NOTE — TELEPHONE ENCOUNTER
Called patient, reviewed message per below from Dr. Doty.    Patient verbalized understanding and in agreement with plan.    Lab only appt scheduled on 11/17/17.  Appt date, time and location confirmed with patient.    Patient informed these labs are non-fasting.    Per patient:  1. Does take NSAID medication on a regular basis  2. Will stop taking NSAID medication and drink plenty of water  3. Anything needed for Calcium elevation at this time?    Dr. Doty-Please review and advise.    Thank you!  JAMIA RodriguezN, RN

## 2017-11-15 NOTE — TELEPHONE ENCOUNTER
Prior Authorization request sent 11/15/2017 by [employer] in [employer] for surgery, Left total hip arthroplasty with Dr. Goodwin.

## 2017-11-15 NOTE — TELEPHONE ENCOUNTER
Called patient, reviewed message per below from Dr. Doty.    Patient verbalized understanding and in agreement with plan.    JAMIA RodriguezN, RN

## 2017-11-16 ENCOUNTER — MYC MEDICAL ADVICE (OUTPATIENT)
Dept: ORTHOPEDICS | Facility: CLINIC | Age: 62
End: 2017-11-16

## 2017-11-16 ENCOUNTER — TELEPHONE (OUTPATIENT)
Dept: ORTHOPEDICS | Facility: CLINIC | Age: 62
End: 2017-11-16

## 2017-11-16 NOTE — TELEPHONE ENCOUNTER
Letter faxed per [employer] request.   See telephone encounter dated 11/9/17.   Closing encounter.     DAVIDSON White RN

## 2017-11-16 NOTE — TELEPHONE ENCOUNTER
Patient called 11/16/17 to cancel Right PRESLEY scheduled for 1/15/17. She would like to see how her Left PRESLEY goes on 12/4/17. Cancelled Surgery and apportionments.    Myesha Perdomo, ATC

## 2017-11-16 NOTE — TELEPHONE ENCOUNTER
Faxed requested letter. See telephone encounter dated 11/9/17. Closing encounter.   DAVIDSON White RN

## 2017-11-16 NOTE — TELEPHONE ENCOUNTER
Letter faxed to both [employer] and [employer](employer) per patient request in [employer] message dated 11/16/17.     DAVIDSON White RN

## 2017-11-16 NOTE — LETTER
November 16, 2017      Gerri Owens  [address] [facility] [address]        To Whom It May Concern,      Gerri Owens is a patient under my care. She is having left hip surgery on 12/4/17. She will be off work for approximately six weeks with a tentative return to work date of 1/15/18.      Sincerely,          Rajinder Goodwin MD

## 2017-11-17 DIAGNOSIS — R74.8 ELEVATED CREATINE KINASE: ICD-10-CM

## 2017-11-17 DIAGNOSIS — R94.4 DECREASED GFR: ICD-10-CM

## 2017-11-17 PROCEDURE — 36415 COLL VENOUS BLD VENIPUNCTURE: CPT | Performed by: FAMILY MEDICINE

## 2017-11-17 PROCEDURE — 80048 BASIC METABOLIC PNL TOTAL CA: CPT | Performed by: FAMILY MEDICINE

## 2017-11-18 LAB
ANION GAP SERPL CALCULATED.3IONS-SCNC: 8 MMOL/L (ref 3–14)
BUN SERPL-MCNC: 16 MG/DL (ref 7–30)
CALCIUM SERPL-MCNC: 9.5 MG/DL (ref 8.5–10.1)
CHLORIDE SERPL-SCNC: 106 MMOL/L (ref 94–109)
CO2 SERPL-SCNC: 25 MMOL/L (ref 20–32)
CREAT SERPL-MCNC: 1.34 MG/DL (ref 0.52–1.04)
GFR SERPL CREATININE-BSD FRML MDRD: 40 ML/MIN/1.7M2
GLUCOSE SERPL-MCNC: 72 MG/DL (ref 70–99)
POTASSIUM SERPL-SCNC: 4.5 MMOL/L (ref 3.4–5.3)
SODIUM SERPL-SCNC: 139 MMOL/L (ref 133–144)

## 2017-11-19 ENCOUNTER — TELEPHONE (OUTPATIENT)
Dept: FAMILY MEDICINE | Facility: CLINIC | Age: 62
End: 2017-11-19

## 2017-11-19 DIAGNOSIS — N28.9 ABNORMAL KIDNEY FUNCTION: Primary | ICD-10-CM

## 2017-11-19 NOTE — TELEPHONE ENCOUNTER
On recheck bmp   Creatinine improved and calcium normal bu tgfr continues to be low at 49 %. This might be her new baseline from 5 yrs ago when normal  continue increased water intake avoiding NSAIDS as much as possible and recmmen she see nephrology preferably before knee surgery on 12/4  if possible

## 2017-11-20 ENCOUNTER — MYC MEDICAL ADVICE (OUTPATIENT)
Dept: FAMILY MEDICINE | Facility: CLINIC | Age: 62
End: 2017-11-20

## 2017-11-20 NOTE — TELEPHONE ENCOUNTER
Nephrology referral pended.    Dr. Doty-Please sign/adjust referral and sign if agree.  Priority changed to ASAP.    Thank you!  SHAKIRA Calzada, JAMIAN, RN

## 2017-11-20 NOTE — TELEPHONE ENCOUNTER
Called patient and reviewed Dr. Doty's message.    Patient verbalized understanding and in agreement with plan.    Patient stated she will contact her surgeon to see if she can wait to follow up with nephrology until after surgery.    In meantime, writer to send patient [employer] message with nephrology referral.    MyChart message sent to patient, per patient request.    Encouraged patient to call back with any questions or concerns.    JAMIA RodriguezN, RN

## 2017-11-20 NOTE — TELEPHONE ENCOUNTER
Called patient and reviewed message per below from Dr. Doty.    Patient verbalized understanding and stated:  1. Did not take Naproxen Thursday nor Friday of last week   A. Okay to wait a bit longer and recheck lab, or does Dr. Doty prefer nephrology follow up?   B. If unable to get in with nephrology before surgery, should surgery be cancelled/rescheduled?  2. Drinking more water  3. Ranitidine 150 mg BID is helping heartburn  4. Stopped taking TUMS, as she found online taking excess TUMS can raise blood calcium levels    Dr. Doty-Please review and advise.    Thank you!  SHAKIRA Calzada, BSN, RN

## 2017-11-20 NOTE — TELEPHONE ENCOUNTER
Prefer to get in with nephrology  If surgeon ok can go ahead with surgery and adjust meds given kidney function

## 2017-11-21 ENCOUNTER — TELEPHONE (OUTPATIENT)
Dept: ORTHOPEDICS | Facility: CLINIC | Age: 62
End: 2017-11-21

## 2017-11-21 NOTE — TELEPHONE ENCOUNTER
Left message on machine to call back.  Ask to speak to an RN, let them know it's a return call.  Leave a number and time that you can be reached.   Isabella Jaramillo RN

## 2017-11-21 NOTE — TELEPHONE ENCOUNTER
Patient requests a call back with questions regarding her lab results. She would like to know if she is still ok for surgery with Dr. Goodwin for Left total hip arthroplasty on 12/04/2017 @ [facility].  Callback number  [phone].

## 2017-11-21 NOTE — TELEPHONE ENCOUNTER
Lets do lab recheck if stable ckd can proceed with surgery and see nephrology post op if cant get in sooner

## 2017-11-21 NOTE — TELEPHONE ENCOUNTER
Tena Summers RN at [facility] in the pre-admit department called to advise the patient can not get in to a kidney specialist prior to her surgery 12/4/17.  Tena Summers has asked that staff at  advocate on the patients behalf and help facilitate getting her an appointment before her surgery date.  Please follow up with Tena Summers at , okay to leave a message.

## 2017-11-21 NOTE — TELEPHONE ENCOUNTER
Spoke with patient, final call regarding ok for surgery will come from the hospital as the anesthesiologist would have to ok it, the preadmission's nurse from [facility] has called patient, chart is being reviewed at this time.  Pt verbalized understanding will wait for hospital call before rescheduling surgery if needed.

## 2017-11-21 NOTE — PROVIDER NOTIFICATION
Pt had abnormal LFTS at pre op. Pt told by primary that she should see a Nephrologist prior to surgery. Pt called but was unable to get an appointment before surgery. Primary Dr Soren MCKNIGHT's office called and they will try to get pt in before surgery.

## 2017-11-21 NOTE — TELEPHONE ENCOUNTER
Looks like hip surgery is for 12/4/17 and nephrology appt is 12/13/17.    Should pt be calling other options on the nephrology referral? PT did call all the options on the referral.  Some are booked into January.  Pt has been pushing fluids and is off the naproxen. Any chance of a lab recheck?  Routing to Dr. Roberto Sanders RN

## 2017-11-22 DIAGNOSIS — N28.9 ABNORMAL KIDNEY FUNCTION: ICD-10-CM

## 2017-11-22 LAB
ANION GAP SERPL CALCULATED.3IONS-SCNC: 6 MMOL/L (ref 3–14)
BUN SERPL-MCNC: 14 MG/DL (ref 7–30)
CALCIUM SERPL-MCNC: 9.2 MG/DL (ref 8.5–10.1)
CHLORIDE SERPL-SCNC: 108 MMOL/L (ref 94–109)
CO2 SERPL-SCNC: 26 MMOL/L (ref 20–32)
CREAT SERPL-MCNC: 1.12 MG/DL (ref 0.52–1.04)
GFR SERPL CREATININE-BSD FRML MDRD: 49 ML/MIN/1.7M2
GLUCOSE SERPL-MCNC: 87 MG/DL (ref 70–99)
POTASSIUM SERPL-SCNC: 4.5 MMOL/L (ref 3.4–5.3)
SODIUM SERPL-SCNC: 140 MMOL/L (ref 133–144)

## 2017-11-22 PROCEDURE — 80048 BASIC METABOLIC PNL TOTAL CA: CPT | Performed by: FAMILY MEDICINE

## 2017-11-22 PROCEDURE — 36415 COLL VENOUS BLD VENIPUNCTURE: CPT | Performed by: FAMILY MEDICINE

## 2017-11-22 NOTE — PROGRESS NOTES
Flor Ms. Owens,  Your results came back and areasfollows. -Kidney function (GFR) is decreased but improved from before. Keep up with water intake, can go ahead with surgery but see nephrology as planned as likely has chronic kidney disease. Kidney function is now up form 40 % to 60 % which is close to what you should have .  -Sodium is normal.  -Potassium is normal.  -Glucose is normal.  . If you have any further concerns please do not hesitate to contact us by message, phone or making an appointment.  Have a good day   Dr Soren MATHIS

## 2017-11-24 ENCOUNTER — ANESTHESIA EVENT (OUTPATIENT)
Dept: SURGERY | Facility: CLINIC | Age: 62
DRG: 470 | End: 2017-11-24
Payer: COMMERCIAL

## 2017-11-27 ENCOUNTER — TELEPHONE (OUTPATIENT)
Dept: ORTHOPEDICS | Facility: CLINIC | Age: 62
End: 2017-11-27

## 2017-11-27 NOTE — PROGRESS NOTES
Pre-Surgery Review for Left Total Hip Arthroplasty on 12/4/2017:   Patient flagged with the following risk factors:   -  History of DVT in RU in 2012: Cause unknown. Was treated with Coumadin for 1 year. Hypercoagulable workup reportedly negative. Two brothers with history of DVT s. One with a factor problem but she was checked and did not have it. Has had some history of phlebitis and improved with compression stockings.  PCP recommends patient be put on Lovenox post-op and wear compression stockings due to increased risk for DVT given history above. 11/27/17: L/M at [facility] notifying Dr. Goodwin of DVT history and recommendations from PCP above.    -  Decreased renal function at pre-op 11/14/17: GFR 33 and creatinine 1.60 on 11/14/17. Instructed to stop NSAIDS and increase water intake and referred to Nephrology. Patient unable to get into Nephrology until 12/14/17. Renal function improved on re-check 11/22/17: Creatinine 1.12, GFR 49. Ok per PCP to proceed with surgery but should still keep appt with Nephrology on 12/14/17 as likely has CKD. Reviewed by Anesthesia and they have ordered repeat BMP on the day of surgery.   -  Suspected Sleep Apnea per Pre-Op: Monitor post-op for O2 de-saturations.   Assessment/Plan:  Risk Factors identified above. Ok to proceed to surgery as long as renal function stable or improved on repeat BMP day of surgery. Could consider Hospitalist consult to assist with medical management. L/M at [facility] 11/27/17 notifying Dr. Goodwin of patient s past history of DVT and phlebitis- PCP recommendation is that patient should be put on Lovenox and use compression stockings due to increased DVT risk. Recommend avoiding NSAIDs and other nephrotoxins and close monitoring of renal function in alicia-op period given decreased renal function at pre-op and possible CKD. Anesthesia has ordered BMP day of surgery. Recommend close monitoring of respiratory status with monitoring for O2 de-saturations given  suspected sleep apnea (not formally diagnosed and does not have CPAP).

## 2017-11-27 NOTE — TELEPHONE ENCOUNTER
Received voicemail from Austin Gallardo NP, [facility]. He states patient is scheduled for surgery on 12/4/17 L PRESLEY by Dr. Goodwin.   Patient has history of DVT in right upper extremity in 2012 and has two brothers with history of DVT's.  No history of known factor problem or coagulation problems.   PCP felt patient should be on Lovenox after surgery to prevent DVT, as well as wearing compression stockings.   Patient also had labs done showing decreased kidney function.  More recent labs showed improvement. Patient can't get in to see nephrologist until mid December after surgery. Anesthesia felt it was ok to continue surgery but wants to repeat BMP morning of surgery to make sure kidney function remains good.   He is suggesting a hospitalist consult for patient after surgery.   He can be reached at [phone] if questions.     Routing to Dr. Goodwin and Coty White RN

## 2017-11-29 NOTE — PHARMACY-ADMISSION MEDICATION HISTORY
Admission medication history interview status for this patient is complete. See [facility] admission navigator for allergy information, prior to admission medications and immunization status.     PTA med list completed by pre-admitting nurse,    Tena Esposito RN Tue Nov 21, 2017 10:05 AM       Prior to Admission medications    Medication Sig Last Dose Taking? Auth Provider   RANITIDINE HCL PO Take 75 mg by mouth 2 times daily   Yes Reported, Patient   calcium citrate-vitamin D (CITRACAL) 315-200 MG-UNIT TABS per tablet Take 2 tablets by mouth daily  Yes Reported, Patient   vitamin E 400 UNIT capsule Take 1 capsule by mouth three times a week.  Yes Reported, Patient   GLUCOSAMINE CHONDROITIN COMPLX OR TABS Take  by mouth. 1500 mg 1xqd.   Yes Spatz, Lisa, MD   VITAMIN C 250 MG OR TABS Take  by mouth. 500 mg 1xqd.   Yes Spatz, Lisa, MD

## 2017-11-30 ENCOUNTER — TELEPHONE (OUTPATIENT)
Dept: ORTHOPEDICS | Facility: CLINIC | Age: 62
End: 2017-11-30

## 2017-11-30 NOTE — H&P (VIEW-ONLY)
[facility]  [address] [place] [id number] [phone]  Dept: [phone]    PRE-OP EVALUATION:  Today's date: 2017    Gerri Owens (: 1955) presents for pre-operative evaluation assessment as requested by Dr. Goodwin, Rajinder Duran MD.  She requires evaluation and anesthesia risk assessment prior to undergoing surgery/procedure for treatment of ARTHROPLASTY HIP .  Proposed procedure: left Total hip Arthroplasty   SurgeoN Request Choice Anesthesia    Date of Surgery/ Procedure: 2017  Time of Surgery/ Procedure: 10:05am  Hospital/Surgical Facility: [facility]  Fax number for surgical facility:   Primary Physician: Andreina Esparza  Type of Anesthesia Anticipated: General    Patient has a Health Care Directive or Living Will:  NO    Preop Questions 2017   1.  Do you have a history of heart attack, stroke, stent, bypass or surgery on an artery in the head, neck, heart or legs? No   2.  Do you ever have any pain or discomfort in your chest? No   3.  Do you have a history of  Heart Failure? No   4.   Are you troubled by shortness of breath when:  walking on a level surface, or up a slight hill, or at night? No   5.  Do you currently have a cold, bronchitis or other respiratory infection? No   6.  Do you have a cough, shortness of breath, or wheezing? No   7.  Do you sometimes get pains in the calves of your legs when you walk? No   8. Do you or anyone in your family have previous history of blood clots? YES - both two  brother DVT and self   9.  Do you or does anyone in your family have a serious bleeding problem such as prolonged bleeding following surgeries or cuts? No   10. Have you ever had problems with anemia or been told to take iron pills? No   11. Have you had any abnormal blood loss such as black, tarry or bloody stools, or abnormal vaginal bleeding? YES - self vaginal bleeding    12. Have you ever had a blood transfusion? No    13. Have you or any of your relatives ever had problems with anesthesia? No   14. Do you have sleep apnea, excessive snoring or daytime drowsiness? No   15. Do you have any prosthetic heart valves? No   16. Do you have prosthetic joints? No   17. Is there any chance that you may be pregnant? No           HPI:                                                      Brief HPI related to upcoming procedure:   Has been having left hip pain,worse then right. Cortisone shot helped some  Here for preop for left PRESLEY, also scheduled for the right but is reconsidering cancelling that  Cut back activity due to hip pain so mostly sedentary   Has a ghada allergy   Tested with titanium patch under bra against skin 1 weeks and no allergy noted   MRSA nasal screen negative    Two brothers with hx of DVT. One had a factor problem but she was checked and  Didn't have it. One brother remains on life long coumadin. She herself had DVT of right upper extremity in 2012  , had maybe phlebitis in both legs 1 yr out  After that never seen for it . 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     Along time ago she had hyperthyroidism, treated methimazole, cleared up and not had since . No symptoms/    Notes Hx of spotting from vagina , off and on long time, thinks from vag dryness, no discharge or itching or burning or pain. No weight loss or night sweats. Is over due for pap.    Had infected tooth , treated with amoxil , tooth extracted 1 week ago. Doing ok.    No fever or chills, no headache or dizziness, no earache, sore throat, runny nose, no trouble hearing, smelling, tasting or swallowing, no chest pain trouble breathing or palpitations, No heart burn, reflux, abdominal pain, nausea or vomiting or diarrhea or constipation, no blood in stools or black stools, no weight loss or night sweats. No urinary complaints. No pelvic complaints. No leg swelling or rash. Or  joint pain.     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    Switched to ranitidine and doing ok . Occasional Tums still though. No longer on PPI    Occasional snoring     MEDICAL HISTORY:                                                    Patient Active Problem List    Diagnosis Date Noted     Primary osteoarthritis of both hips 11/12/2017     Priority: Medium     History of deep venous thrombosis 11/12/2017     Priority: Medium     Hyperlipidemia, unspecified hyperlipidemia type 11/12/2017     Priority: Medium     BMI 35.0-35.9,adult 11/12/2017     Priority: Medium     Hip pain, left 08/08/2017     Priority: Medium     Advanced directives, counseling/discussion 05/07/2012     Priority: Medium     Discussed advance care planning with patient; information given to patient to review. 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  7/17/2012     Unspecified episodic mood disorder      Whooping cough due to Bordetella pertussis (p. pertussis) infant    whooping cough     Past Surgical History:   Procedure Laterality Date     BREAST BIOPSY, RT/LT  2004    left breast     TONSILLECTOMY  1960    tonsillectomy     Current Outpatient Prescriptions   Medication Sig Dispense Refill     NAPROXEN PO Take 220 mg by mouth 2 times daily       RANITIDINE HCL PO Take 75 mg by mouth daily       calcium citrate-vitamin D (CITRACAL) 315-200 MG-UNIT TABS per tablet Take 2 tablets by mouth daily       calcium carbonate (TUMS) 500 MG chewable tablet Take 1 chew tab by mouth daily       vitamin E 400 UNIT capsule Take 1 capsule by mouth three times a week.       GLUCOSAMINE CHONDROITIN COMPLX OR TABS Take  by mouth. 1500 mg 1xqd.   0     VITAMIN C  250 MG OR TABS Take  by mouth. 500 mg 1xqd.  90 0     OTC products: None, except as noted above    Allergies   Allergen Reactions     Nickel       Latex Allergy: NO    Social History   Substance Use Topics     Smoking status: Never Smoker     Smokeless tobacco: Never Used     Alcohol use Yes      Comment: occasional     History   Drug Use No       REVIEW OF SYSTEMS:                                                    C: NEGATIVE for fever, chills, change in weight  I: NEGATIVE for worrisome rashes, moles or lesions  E: NEGATIVE for vision changes or irritation  E/M: NEGATIVE for ear, mouth and throat problems  R: NEGATIVE for significant cough or SOB  B: NEGATIVE for masses, tenderness or discharge  CV: NEGATIVE for chest pain, palpitations or peripheral edema  GI: NEGATIVE for nausea, abdominal pain, heartburn, or change in bowel habits   female: negative for  decreased urinary stream, dysmenorrhea, dyspareunia, dysuria, frequency, hematuria, hesitancy, Hx kidney stone, Hx pyelonephritis, Hx urinary tract infection, Hx STD's, incontinence-urge, incontinence-stress, retention, terminal dribbling, urgency or vaginal discharge  but does report off and on mild vaginal spotting   M: NEGATIVE for significant arthralgias or myalgia  N: NEGATIVE for weakness, dizziness or paresthesias  E: NEGATIVE for temperature intolerance, skin/hair changes  H: NEGATIVE for bleeding problems  P: NEGATIVE for changes in mood or affect    EXAM:                                                    /82 (BP Location: Left arm, Patient Position: Chair, Cuff Size: Adult Large)  Pulse 62  Temp 97.7  F (36.5  C) (Oral)  Resp 12  Wt 201 lb 12 oz (91.5 kg)  LMP 03/04/2005  SpO2 95%  BMI 34.63 kg/m2    GENERAL APPEARANCE: healthy, alert and no distress     EYES: EOMI, PERRL     HENT: ear canals and TM's normal and nose and mouth without ulcers or lesions     NECK: no adenopathy, no asymmetry, masses, or scars and thyroid normal to  palpation     RESP: lungs clear to auscultation - no rales, rhonchi or wheezes     CV: regular rates and rhythm, normal S1 S2, no S3 or S4 and no murmur, click or rub     ABDOMEN:  soft, non tender, no HSM or masses and bowel sounds normal     MS: extremities normal- no gross deformities noted, no evidence of inflammation in joints, FULL RANGE OF MOTION in all extremities except limited mildly at hips     SKIN: no suspicious lesions or rashes     NEURO: Normal strength and tone, sensory exam grossly normal, mentation intact and speech normal     PSYCH: mentation appears normal. and affect normal/bright, mildly anxious     LYMPHATICS: No axillary, cervical, or supraclavicular nodes    DIAGNOSTICS:                                                      EKG: sinus bradycardia, normal axis, normal intervals, no acute ST/T changes C/w ischemia, no LVH by voltage criteria  Labs Resulted Today:   Results for orders placed or performed in visit on 11/14/17   CBC with platelets differential   Result Value Ref Range    WBC 6.2 4.0 - 11.0 10e9/L    RBC Count 4.74 3.8 - 5.2 10e12/L    Hemoglobin 13.8 11.7 - 15.7 g/dL    Hematocrit 43.3 35.0 - 47.0 %    MCV 91 78 - 100 fl    MCH 29.1 26.5 - 33.0 pg    MCHC 31.9 31.5 - 36.5 g/dL    RDW 14.3 10.0 - 15.0 %    Platelet Count 223 150 - 450 10e9/L    Diff Method Automated Method     % Neutrophils 58.8 %    % Lymphocytes 25.0 %    % Monocytes 11.1 %    % Eosinophils 3.6 %    % Basophils 1.5 %    Absolute Neutrophil 3.6 1.6 - 8.3 10e9/L    Absolute Lymphocytes 1.5 0.8 - 5.3 10e9/L    Absolute Monocytes 0.7 0.0 - 1.3 10e9/L    Absolute Eosinophils 0.2 0.0 - 0.7 10e9/L    Absolute Basophils 0.1 0.0 - 0.2 10e9/L     Labs Drawn and in Process:   Unresulted Labs Ordered in the Past 30 Days of this Admission     Date and Time Order Name Status Description    11/14/2017 0856 TSH WITH FREE T4 REFLEX In process     11/14/2017 0856 INR In process     11/14/2017 0856 HEPATITIS C SCREEN REFLEX TO HCV  RNA QUANT AND GENOTYPE In process     11/14/2017 0856 COMPREHENSIVE METABOLIC PANEL In process           Recent Labs   Lab Test 03/21/13 12/27/12 09/11/12   1108   03/27/12   0908   HGB   --    --    --    --    --   14.6   PLT   --    --    --    --    --   165   INR  2.6*  2.5*   < >   --    < >  1.00   NA   --    --    --   142   --    --    POTASSIUM   --    --    --   4.1   --    --    CR   --    --    --   0.85   --    --     < > = values in this interval not displayed.        IMPRESSION:                                                    Nurse at [employer] with Hx of obesity, BMI 34, HLD, prior DVT RUE 2012 unknown causes reported negative hypercoagulable workup, treated with warfarin 1 year , resolved post phelbetic syndrome RUE, prior tonsillectomy, left breast biopsy, hx of back spasm , allergic to ghada, occasional GERD with prn use of OTC PPI, seen in 6/2016 and given flexeril and did PT with good resolution here for left hip pain , ?Left sciatic nerve pain seen 8/1 for low back pain, left hip pain, Sciatica vs pyriformis syndrome. No sign of stroke, referred to PT and ortho, seen by PT 8/8 , after PT felt pain mostly in her hips, seen by ortho 8/15 had moderate OA on xray. Had left interarticular injection 8/24 with significant pain improvement, made good progress with PT completed 10/10/17, seen by ortho 11/2 discussed arthroplasty for primary OA hips, scheduled for Left PRESLEY 12/4 and right PRESLEY 1/15/18. [place]  negative. Here for Preop   Reason for surgery/procedure: Primary OA left hip for Left PRESLEY   Diagnosis/reason for consult: preop for above    The proposed surgical procedure is considered INTERMEDIATE risk.    REVISED CARDIAC RISK INDEX  The patient has the following serious cardiovascular risks for perioperative complications such as (MI, PE, VFib and 3  AV Block):  No serious cardiac risks  INTERPRETATION: 0 risks: Class I (very low risk - 0.4% complication rate)    The patient has the  following additional risks for perioperative complications:  snoring      ICD-10-CM    1. Pre-operative general physical examination Z01.818 CBC with platelets differential     Comprehensive metabolic panel     EKG 12-lead complete w/read - Clinics     INR     TSH with free T4 reflex   2. Primary osteoarthritis of both hips M16.0    3. Hip pain, left M25.552    4. History of deep venous thrombosis Z86.718 INR   5. Hyperlipidemia, unspecified hyperlipidemia type E78.5    6. BMI 35.0-35.9,adult Z68.35    7. Need for hepatitis C screening test Z11.59 Hepatitis C Screen Reflex to HCV RNA Quant and Genotype   8. Preop general physical exam Z01.818    9. H/O hyperthyroidism Z86.39 TSH with free T4 reflex   10. Vaginal spotting N92.0 OB/GYN REFERRAL   11. Sinus bradycardia R00.1    12. Snoring R06.83        RECOMMENDATIONS:                                                    Labs and diagnostics today. If stable will clear for surgery otherwise may need additional work up   Take no meds am of surgery. Hold aspirin, antiinflammatories like motrin aleve etc, fish oil and glucosamine, vitamin e preferable 5 to 7 days prior to surgery. Will fax /send note to surgeon once completed  Electronically. Because of DVT or PE history, patient should be on low molecular weight heparin and wear compression hose before & after surgery. See gyn for the spotting and get pap done at same time. Return  for physical , hep C, colon cancer screen, mammogram, pap, advance directives.   chronic use of PPI med's could put you at risk of atypical pneumonia, fractures, C diff, vit B 12  & magnesium deficiency and to consider taking zantac 150 mg twice a day OTC instead. Has come off PPI so given GERD symptoms increase  ranitidine to 150 mg twice a day if having a lot of heart burn. Recommend flu shot yearly, reports got at work   --Consult hospital rounder / IM to assist post-op medical management  --Because of DVT or PE history, patient should be on  low molecular weight heparin and wear compression hose before & after surgery    Cardiovascular Risk  Performs 4 Mets exercise without symptoms (Light housework (dusting, washing dishes), Climb a flight of stairs, Walk on level ground at 15 minutes per mile (4 miles/hour) and Shoveling) .     Pulmonary Risk  Incentive spirometry post op  Respiratory Therapy (Respiratory Care IP Consult)  post op  NG tube decompression if abdominal distension or significant vomiting     Obstructive Sleep Apnea (or suspected sleep apnea)  Hospital staff are advised to monitor for sleep related oxygen desaturations due to suspicion of QIANA given hx of snoring and obesity    APPROVAL GIVEN to proceed with proposed procedure, without further diagnostic evaluation if TSH and CMP pending are normal        Signed Electronically by: Karo Doty MD    Copy of this evaluation report is provided to requesting physician.    [facility] Preop Guidelines

## 2017-11-30 NOTE — TELEPHONE ENCOUNTER
Rosa M from [facility] Pre-admitting calls needing corrected order for surgery. Order states Right PRESLEY and should be Left PRESLEY.     Dr. Buddy dick.     DAVIDSON White RN

## 2017-12-04 ENCOUNTER — HOSPITAL ENCOUNTER (INPATIENT)
Facility: CLINIC | Age: 62
LOS: 2 days | Discharge: HOME | DRG: 470 | End: 2017-12-06
Attending: ORTHOPAEDIC SURGERY | Admitting: ORTHOPAEDIC SURGERY
Payer: COMMERCIAL

## 2017-12-04 ENCOUNTER — APPOINTMENT (OUTPATIENT)
Dept: PHYSICAL THERAPY | Facility: CLINIC | Age: 62
DRG: 470 | End: 2017-12-04
Attending: ORTHOPAEDIC SURGERY
Payer: COMMERCIAL

## 2017-12-04 ENCOUNTER — APPOINTMENT (OUTPATIENT)
Dept: GENERAL RADIOLOGY | Facility: CLINIC | Age: 62
DRG: 470 | End: 2017-12-04
Attending: ORTHOPAEDIC SURGERY
Payer: COMMERCIAL

## 2017-12-04 ENCOUNTER — ANESTHESIA (OUTPATIENT)
Dept: SURGERY | Facility: CLINIC | Age: 62
DRG: 470 | End: 2017-12-04
Payer: COMMERCIAL

## 2017-12-04 DIAGNOSIS — Z96.642 STATUS POST LEFT HIP REPLACEMENT: Primary | ICD-10-CM

## 2017-12-04 DIAGNOSIS — R94.4 ABNORMAL RENAL FUNCTION TEST: Primary | ICD-10-CM

## 2017-12-04 DIAGNOSIS — K59.03 CONSTIPATION DUE TO PAIN MEDICATION: ICD-10-CM

## 2017-12-04 PROBLEM — M16.9 HIP OSTEOARTHRITIS: Status: ACTIVE | Noted: 2017-12-04

## 2017-12-04 LAB
ABO + RH BLD: NORMAL
ABO + RH BLD: NORMAL
ANION GAP SERPL CALCULATED.3IONS-SCNC: 6 MMOL/L (ref 3–14)
BLD GP AB SCN SERPL QL: NORMAL
BLOOD BANK CMNT PATIENT-IMP: NORMAL
BUN SERPL-MCNC: 15 MG/DL (ref 7–30)
CALCIUM SERPL-MCNC: 8.8 MG/DL (ref 8.5–10.1)
CHLORIDE SERPL-SCNC: 108 MMOL/L (ref 94–109)
CO2 SERPL-SCNC: 26 MMOL/L (ref 20–32)
CREAT SERPL-MCNC: 0.97 MG/DL (ref 0.52–1.04)
CREAT SERPL-MCNC: 1.13 MG/DL (ref 0.52–1.04)
GFR SERPL CREATININE-BSD FRML MDRD: 49 ML/MIN/1.7M2
GFR SERPL CREATININE-BSD FRML MDRD: 58 ML/MIN/1.7M2
GLUCOSE SERPL-MCNC: 82 MG/DL (ref 70–99)
PLATELET # BLD AUTO: 199 10E9/L (ref 150–450)
POTASSIUM SERPL-SCNC: 3.9 MMOL/L (ref 3.4–5.3)
SODIUM SERPL-SCNC: 140 MMOL/L (ref 133–144)
SPECIMEN EXP DATE BLD: NORMAL

## 2017-12-04 PROCEDURE — 25000128 H RX IP 250 OP 636: Performed by: NURSE ANESTHETIST, CERTIFIED REGISTERED

## 2017-12-04 PROCEDURE — 97116 GAIT TRAINING THERAPY: CPT | Mod: GP | Performed by: PHYSICAL THERAPIST

## 2017-12-04 PROCEDURE — 36415 COLL VENOUS BLD VENIPUNCTURE: CPT | Performed by: ANESTHESIOLOGY

## 2017-12-04 PROCEDURE — 25000128 H RX IP 250 OP 636: Performed by: ORTHOPAEDIC SURGERY

## 2017-12-04 PROCEDURE — 27210794 ZZH OR GENERAL SUPPLY STERILE: Performed by: ORTHOPAEDIC SURGERY

## 2017-12-04 PROCEDURE — 82565 ASSAY OF CREATININE: CPT | Performed by: ORTHOPAEDIC SURGERY

## 2017-12-04 PROCEDURE — 73502 X-RAY EXAM HIP UNI 2-3 VIEWS: CPT

## 2017-12-04 PROCEDURE — 97161 PT EVAL LOW COMPLEX 20 MIN: CPT | Mod: GP | Performed by: PHYSICAL THERAPIST

## 2017-12-04 PROCEDURE — 25000128 H RX IP 250 OP 636: Performed by: ANESTHESIOLOGY

## 2017-12-04 PROCEDURE — 86901 BLOOD TYPING SEROLOGIC RH(D): CPT | Performed by: ANESTHESIOLOGY

## 2017-12-04 PROCEDURE — 37000008 ZZH ANESTHESIA TECHNICAL FEE, 1ST 30 MIN: Performed by: ORTHOPAEDIC SURGERY

## 2017-12-04 PROCEDURE — C1713 ANCHOR/SCREW BN/BN,TIS/BN: HCPCS | Performed by: ORTHOPAEDIC SURGERY

## 2017-12-04 PROCEDURE — 12000007 ZZH R&B INTERMEDIATE

## 2017-12-04 PROCEDURE — 97530 THERAPEUTIC ACTIVITIES: CPT | Mod: GP | Performed by: PHYSICAL THERAPIST

## 2017-12-04 PROCEDURE — 97110 THERAPEUTIC EXERCISES: CPT | Mod: GP | Performed by: PHYSICAL THERAPIST

## 2017-12-04 PROCEDURE — 40000306 ZZH STATISTIC PRE PROC ASSESS II: Performed by: ORTHOPAEDIC SURGERY

## 2017-12-04 PROCEDURE — 37000009 ZZH ANESTHESIA TECHNICAL FEE, EACH ADDTL 15 MIN: Performed by: ORTHOPAEDIC SURGERY

## 2017-12-04 PROCEDURE — 36415 COLL VENOUS BLD VENIPUNCTURE: CPT | Performed by: ORTHOPAEDIC SURGERY

## 2017-12-04 PROCEDURE — 85049 AUTOMATED PLATELET COUNT: CPT | Performed by: ORTHOPAEDIC SURGERY

## 2017-12-04 PROCEDURE — 71000013 ZZH RECOVERY PHASE 1 LEVEL 1 EA ADDTL HR: Performed by: ORTHOPAEDIC SURGERY

## 2017-12-04 PROCEDURE — 36000063 ZZH SURGERY LEVEL 4 EA 15 ADDTL MIN: Performed by: ORTHOPAEDIC SURGERY

## 2017-12-04 PROCEDURE — 25800025 ZZH RX 258: Performed by: ORTHOPAEDIC SURGERY

## 2017-12-04 PROCEDURE — 86900 BLOOD TYPING SEROLOGIC ABO: CPT | Performed by: ANESTHESIOLOGY

## 2017-12-04 PROCEDURE — 71000012 ZZH RECOVERY PHASE 1 LEVEL 1 FIRST HR: Performed by: ORTHOPAEDIC SURGERY

## 2017-12-04 PROCEDURE — 27211024 ZZHC OR SUPPLY OTHER OPNP: Performed by: ORTHOPAEDIC SURGERY

## 2017-12-04 PROCEDURE — 36000093 ZZH SURGERY LEVEL 4 1ST 30 MIN: Performed by: ORTHOPAEDIC SURGERY

## 2017-12-04 PROCEDURE — 25000125 ZZHC RX 250: Performed by: NURSE ANESTHETIST, CERTIFIED REGISTERED

## 2017-12-04 PROCEDURE — 80048 BASIC METABOLIC PNL TOTAL CA: CPT | Performed by: ANESTHESIOLOGY

## 2017-12-04 PROCEDURE — 25000132 ZZH RX MED GY IP 250 OP 250 PS 637: Performed by: ORTHOPAEDIC SURGERY

## 2017-12-04 PROCEDURE — 86850 RBC ANTIBODY SCREEN: CPT | Performed by: ANESTHESIOLOGY

## 2017-12-04 PROCEDURE — 40000193 ZZH STATISTIC PT WARD VISIT: Performed by: PHYSICAL THERAPIST

## 2017-12-04 PROCEDURE — C1776 JOINT DEVICE (IMPLANTABLE): HCPCS | Performed by: ORTHOPAEDIC SURGERY

## 2017-12-04 PROCEDURE — 27130 TOTAL HIP ARTHROPLASTY: CPT | Mod: LT | Performed by: ORTHOPAEDIC SURGERY

## 2017-12-04 PROCEDURE — 0SRB06A REPLACEMENT OF LEFT HIP JOINT WITH OXIDIZED ZIRCONIUM ON POLYETHYLENE SYNTHETIC SUBSTITUTE, UNCEMENTED, OPEN APPROACH: ICD-10-PCS | Performed by: ORTHOPAEDIC SURGERY

## 2017-12-04 DEVICE — IMP SHELL SNR ACET R3 3H 54MM 71335554: Type: IMPLANTABLE DEVICE | Site: HIP | Status: FUNCTIONAL

## 2017-12-04 DEVICE — IMP STEM FEM HIP SNN SYNERGY POROUS SZ 13 71306613: Type: IMPLANTABLE DEVICE | Site: HIP | Status: FUNCTIONAL

## 2017-12-04 DEVICE — IMPLANTABLE DEVICE: Type: IMPLANTABLE DEVICE | Site: HIP | Status: FUNCTIONAL

## 2017-12-04 RX ORDER — HYDROMORPHONE HYDROCHLORIDE 1 MG/ML
.3-.5 INJECTION, SOLUTION INTRAMUSCULAR; INTRAVENOUS; SUBCUTANEOUS EVERY 5 MIN PRN
Status: DISCONTINUED | OUTPATIENT
Start: 2017-12-04 | End: 2017-12-04 | Stop reason: HOSPADM

## 2017-12-04 RX ORDER — HYDROMORPHONE HYDROCHLORIDE 1 MG/ML
.5-1 INJECTION, SOLUTION INTRAMUSCULAR; INTRAVENOUS; SUBCUTANEOUS
Status: DISCONTINUED | OUTPATIENT
Start: 2017-12-04 | End: 2017-12-06 | Stop reason: HOSPADM

## 2017-12-04 RX ORDER — KETOROLAC TROMETHAMINE 30 MG/ML
30 INJECTION, SOLUTION INTRAMUSCULAR; INTRAVENOUS EVERY 6 HOURS
Status: DISPENSED | OUTPATIENT
Start: 2017-12-04 | End: 2017-12-05

## 2017-12-04 RX ORDER — LABETALOL HYDROCHLORIDE 5 MG/ML
10 INJECTION, SOLUTION INTRAVENOUS
Status: DISCONTINUED | OUTPATIENT
Start: 2017-12-04 | End: 2017-12-04 | Stop reason: HOSPADM

## 2017-12-04 RX ORDER — PROPOFOL 10 MG/ML
INJECTION, EMULSION INTRAVENOUS CONTINUOUS PRN
Status: DISCONTINUED | OUTPATIENT
Start: 2017-12-04 | End: 2017-12-04

## 2017-12-04 RX ORDER — FENTANYL CITRATE 50 UG/ML
INJECTION, SOLUTION INTRAMUSCULAR; INTRAVENOUS PRN
Status: DISCONTINUED | OUTPATIENT
Start: 2017-12-04 | End: 2017-12-04

## 2017-12-04 RX ORDER — CEFAZOLIN SODIUM 1 G/3ML
1 INJECTION, POWDER, FOR SOLUTION INTRAMUSCULAR; INTRAVENOUS SEE ADMIN INSTRUCTIONS
Status: DISCONTINUED | OUTPATIENT
Start: 2017-12-04 | End: 2017-12-04 | Stop reason: HOSPADM

## 2017-12-04 RX ORDER — HYDROMORPHONE HYDROCHLORIDE 2 MG/1
2-4 TABLET ORAL
Status: DISCONTINUED | OUTPATIENT
Start: 2017-12-04 | End: 2017-12-06 | Stop reason: HOSPADM

## 2017-12-04 RX ORDER — SODIUM CHLORIDE 9 MG/ML
INJECTION, SOLUTION INTRAVENOUS CONTINUOUS
Status: DISCONTINUED | OUTPATIENT
Start: 2017-12-04 | End: 2017-12-06 | Stop reason: HOSPADM

## 2017-12-04 RX ORDER — SODIUM CHLORIDE, SODIUM LACTATE, POTASSIUM CHLORIDE, CALCIUM CHLORIDE 600; 310; 30; 20 MG/100ML; MG/100ML; MG/100ML; MG/100ML
INJECTION, SOLUTION INTRAVENOUS CONTINUOUS
Status: DISCONTINUED | OUTPATIENT
Start: 2017-12-04 | End: 2017-12-04 | Stop reason: HOSPADM

## 2017-12-04 RX ORDER — NALOXONE HYDROCHLORIDE 0.4 MG/ML
.1-.4 INJECTION, SOLUTION INTRAMUSCULAR; INTRAVENOUS; SUBCUTANEOUS
Status: DISCONTINUED | OUTPATIENT
Start: 2017-12-04 | End: 2017-12-06 | Stop reason: HOSPADM

## 2017-12-04 RX ORDER — DIPHENHYDRAMINE HCL 25 MG
25 CAPSULE ORAL EVERY 6 HOURS PRN
Status: DISCONTINUED | OUTPATIENT
Start: 2017-12-04 | End: 2017-12-06 | Stop reason: HOSPADM

## 2017-12-04 RX ORDER — DIPHENHYDRAMINE HYDROCHLORIDE 50 MG/ML
25 INJECTION INTRAMUSCULAR; INTRAVENOUS EVERY 6 HOURS PRN
Status: DISCONTINUED | OUTPATIENT
Start: 2017-12-04 | End: 2017-12-06 | Stop reason: HOSPADM

## 2017-12-04 RX ORDER — CEFAZOLIN SODIUM 2 G/100ML
2 INJECTION, SOLUTION INTRAVENOUS EVERY 8 HOURS
Status: COMPLETED | OUTPATIENT
Start: 2017-12-04 | End: 2017-12-05

## 2017-12-04 RX ORDER — LIDOCAINE 40 MG/G
CREAM TOPICAL
Status: DISCONTINUED | OUTPATIENT
Start: 2017-12-04 | End: 2017-12-04 | Stop reason: HOSPADM

## 2017-12-04 RX ORDER — ACETAMINOPHEN 325 MG/1
975 TABLET ORAL EVERY 8 HOURS
Status: DISCONTINUED | OUTPATIENT
Start: 2017-12-04 | End: 2017-12-06 | Stop reason: HOSPADM

## 2017-12-04 RX ORDER — CEFAZOLIN SODIUM 2 G/100ML
2 INJECTION, SOLUTION INTRAVENOUS
Status: COMPLETED | OUTPATIENT
Start: 2017-12-04 | End: 2017-12-04

## 2017-12-04 RX ORDER — TEMAZEPAM 7.5 MG/1
15 CAPSULE ORAL
Status: DISCONTINUED | OUTPATIENT
Start: 2017-12-05 | End: 2017-12-06 | Stop reason: HOSPADM

## 2017-12-04 RX ORDER — EPHEDRINE SULFATE 50 MG/ML
INJECTION, SOLUTION INTRAMUSCULAR; INTRAVENOUS; SUBCUTANEOUS PRN
Status: DISCONTINUED | OUTPATIENT
Start: 2017-12-04 | End: 2017-12-04

## 2017-12-04 RX ORDER — DIAZEPAM 5 MG
5 TABLET ORAL EVERY 6 HOURS PRN
Status: DISCONTINUED | OUTPATIENT
Start: 2017-12-04 | End: 2017-12-06 | Stop reason: HOSPADM

## 2017-12-04 RX ORDER — BUPIVACAINE HYDROCHLORIDE 7.5 MG/ML
INJECTION, SOLUTION INTRASPINAL PRN
Status: DISCONTINUED | OUTPATIENT
Start: 2017-12-04 | End: 2017-12-04

## 2017-12-04 RX ORDER — GLYCOPYRROLATE 0.2 MG/ML
INJECTION, SOLUTION INTRAMUSCULAR; INTRAVENOUS PRN
Status: DISCONTINUED | OUTPATIENT
Start: 2017-12-04 | End: 2017-12-04

## 2017-12-04 RX ORDER — ONDANSETRON 2 MG/ML
4 INJECTION INTRAMUSCULAR; INTRAVENOUS EVERY 30 MIN PRN
Status: DISCONTINUED | OUTPATIENT
Start: 2017-12-04 | End: 2017-12-04 | Stop reason: HOSPADM

## 2017-12-04 RX ORDER — ONDANSETRON 4 MG/1
4 TABLET, ORALLY DISINTEGRATING ORAL EVERY 30 MIN PRN
Status: DISCONTINUED | OUTPATIENT
Start: 2017-12-04 | End: 2017-12-04 | Stop reason: HOSPADM

## 2017-12-04 RX ORDER — LIDOCAINE 40 MG/G
CREAM TOPICAL
Status: DISCONTINUED | OUTPATIENT
Start: 2017-12-04 | End: 2017-12-06 | Stop reason: HOSPADM

## 2017-12-04 RX ORDER — FENTANYL CITRATE 50 UG/ML
25-50 INJECTION, SOLUTION INTRAMUSCULAR; INTRAVENOUS
Status: DISCONTINUED | OUTPATIENT
Start: 2017-12-04 | End: 2017-12-04 | Stop reason: HOSPADM

## 2017-12-04 RX ORDER — ACETAMINOPHEN 325 MG/1
650 TABLET ORAL EVERY 4 HOURS PRN
Status: DISCONTINUED | OUTPATIENT
Start: 2017-12-07 | End: 2017-12-06 | Stop reason: HOSPADM

## 2017-12-04 RX ADMIN — FENTANYL CITRATE 25 MCG: 50 INJECTION, SOLUTION INTRAMUSCULAR; INTRAVENOUS at 11:20

## 2017-12-04 RX ADMIN — Medication 10 MG: at 10:44

## 2017-12-04 RX ADMIN — BUPIVACAINE HYDROCHLORIDE IN DEXTROSE 1.8 ML: 7.5 INJECTION, SOLUTION SUBARACHNOID at 10:39

## 2017-12-04 RX ADMIN — RANITIDINE 150 MG: 150 TABLET ORAL at 19:19

## 2017-12-04 RX ADMIN — SODIUM CHLORIDE: 9 INJECTION, SOLUTION INTRAVENOUS at 14:15

## 2017-12-04 RX ADMIN — CEFAZOLIN SODIUM 2 G: 2 INJECTION, SOLUTION INTRAVENOUS at 18:03

## 2017-12-04 RX ADMIN — Medication 5 MG: at 11:35

## 2017-12-04 RX ADMIN — SODIUM CHLORIDE, POTASSIUM CHLORIDE, SODIUM LACTATE AND CALCIUM CHLORIDE: 600; 310; 30; 20 INJECTION, SOLUTION INTRAVENOUS at 10:31

## 2017-12-04 RX ADMIN — ACETAMINOPHEN 975 MG: 325 TABLET, FILM COATED ORAL at 14:37

## 2017-12-04 RX ADMIN — KETOROLAC TROMETHAMINE 30 MG: 30 INJECTION, SOLUTION INTRAMUSCULAR at 21:42

## 2017-12-04 RX ADMIN — HYDROMORPHONE HYDROCHLORIDE 2 MG: 2 TABLET ORAL at 16:21

## 2017-12-04 RX ADMIN — CEFAZOLIN SODIUM 2 G: 2 INJECTION, SOLUTION INTRAVENOUS at 10:31

## 2017-12-04 RX ADMIN — HYDROMORPHONE HYDROCHLORIDE 4 MG: 2 TABLET ORAL at 19:19

## 2017-12-04 RX ADMIN — ACETAMINOPHEN 975 MG: 325 TABLET, FILM COATED ORAL at 21:42

## 2017-12-04 RX ADMIN — Medication 0.5 MG: at 15:12

## 2017-12-04 RX ADMIN — SODIUM CHLORIDE, POTASSIUM CHLORIDE, SODIUM LACTATE AND CALCIUM CHLORIDE: 600; 310; 30; 20 INJECTION, SOLUTION INTRAVENOUS at 10:49

## 2017-12-04 RX ADMIN — HYDROMORPHONE HYDROCHLORIDE 2 MG: 2 TABLET ORAL at 22:35

## 2017-12-04 RX ADMIN — PROPOFOL 75 MCG/KG/MIN: 10 INJECTION, EMULSION INTRAVENOUS at 10:39

## 2017-12-04 RX ADMIN — GLYCOPYRROLATE 0.2 MG: 0.2 INJECTION, SOLUTION INTRAMUSCULAR; INTRAVENOUS at 10:41

## 2017-12-04 RX ADMIN — MIDAZOLAM HYDROCHLORIDE 2 MG: 1 INJECTION, SOLUTION INTRAMUSCULAR; INTRAVENOUS at 10:31

## 2017-12-04 RX ADMIN — Medication 5 MG: at 11:27

## 2017-12-04 RX ADMIN — Medication 0.5 MG: at 15:34

## 2017-12-04 RX ADMIN — FENTANYL CITRATE 75 MCG: 50 INJECTION, SOLUTION INTRAMUSCULAR; INTRAVENOUS at 10:35

## 2017-12-04 ASSESSMENT — ENCOUNTER SYMPTOMS: DYSRHYTHMIAS: 0

## 2017-12-04 NOTE — IP AVS SNAPSHOT
[facility]    [address]    [facility] [address]    Phone:  [phone]    Fax:  [phone]                                       After Visit Summary   12/4/2017    Gerri Owens    MRN: [id number]           After Visit Summary Signature Page     I have received my discharge instructions, and my questions have been answered. I have discussed any challenges I see with this plan with the nurse or doctor.    ..........................................................................................................................................  Patient/Patient Representative Signature      ..........................................................................................................................................  Patient Representative Print Name and Relationship to Patient    ..................................................               ................................................  Date                                            Time    ..........................................................................................................................................  Reviewed by Signature/Title    ...................................................              ..............................................  Date                                                            Time

## 2017-12-04 NOTE — ANESTHESIA PROCEDURE NOTES
Peripheral nerve/Neuraxial procedure note : intrathecal  Pre-Procedure  Performed by CED ORTIZ  Referred by LUCÍA  Location: OR      Pre-Anesthestic Checklist: patient identified, IV checked, site marked, risks and benefits discussed, informed consent, monitors and equipment checked, pre-op evaluation, at physician/surgeon's request and post-op pain management    .   Procedure Documentation    .    Procedure:    Intrathecal.   (midline approach)      Patient Prep;mask, sterile gloves, povidone-iodine 7.5% surgical scrub.  .  Needle: Ned tip Spinal Needle (gauge): 25  Spinal/LP Needle Length (inches): 3 # of attempts: 2 and 1 and # of redirects:  Introducer used Introducer: 20 G .       Assessment/Narrative  Paresthesias: No.  .  .  clear CSF fluid removed .

## 2017-12-04 NOTE — IP AVS SNAPSHOT
MRN:[id number]                      After Visit Summary   12/4/2017    Gerri Owens    MRN: [id number]           Thank you!     Thank you for choosing [facility] for your care. Our goal is always to provide you with excellent care. Hearing back from our patients is one way we can continue to improve our services. Please take a few minutes to complete the written survey that you may receive in the mail after you visit. If you would like to speak to someone directly about your visit please contact Patient Relations at [phone]. Thank you!          Patient Information     Date Of Birth          1955        Designated Caregiver       Most Recent Value    Caregiver    Will someone help with your care after discharge? yes    Name of designated caregiver Doug ()    Phone number of caregiver [phone]    Caregiver address [place], [place]      About your hospital stay     You were admitted on:  December 4, 2017 You last received care in the:  [facility]    You were discharged on:  December 6, 2017        Reason for your hospital stay       Left total hip arthroplasty                  Who to Call     For medical emergencies, please call 911.  For non-urgent questions about your medical care, please call your primary care provider or clinic, [phone]  For questions related to your surgery, please call your surgery clinic        Attending Provider     Provider Specialty    Rajinder Goodwin MD Orthopedics       Primary Care Provider Office Phone # Fax #    Karo Doty [phone]      After Care Instructions     Diet       Follow this diet upon discharge: Advance to a regular diet as tolerated  Increase water and fiber intake while on pain medication                  Follow-up Appointments     Follow-up and recommended labs and tests        Follow up with me,  Anderson Calloway, within 14 days as previously scheduled. to evaluate  after surgery.  No follow up labs or test are needed.    Anderson Calloway PA-C  [facility] - Surgery  [facility]  [id number] [place] [address]  [place]  [address]  [phone]  [phone] fax  [phone] for scheduling                  Your next 10 appointments already scheduled     Dec 13, 2017 10:30 AM CST   LAB with LAB FIRST FLOOR [facility] ([facility])    [id number] [place] [id number]   [phone]           Please do not eat 10-12 hours before your appointment if you are coming in fasting for labs on lipids, cholesterol, or glucose (sugar). This does not apply to pregnant women. Water, hot tea and black coffee (with nothing added) are okay. Do not drink other fluids, diet soda or chew gum.            Dec 13, 2017 11:00 AM CST   New Visit with Ambar Pollard MD   [facility])    [address] [place] [id number]   [phone]            Dec 14, 2017  9:00 AM CST   Return Visit with Anderson Calloway PA-C   [facility] ([facility])    [id number] [place] [address]   [phone]              Further instructions from your care team       POST OP TOTAL HIP INSTRUCTION:    Incision care:  To close the incision, staples will be used in most cases. The staples will be removed at the office in 10-14 days from the surgery. If you are going to a TCU (nursing home) from the hospital, the staples can be removed at the nursing home in that time frame. For first 3 days, place a water proof cover over the dressing for showers. If the dressings get wet, change it with new gauze and paper tape. The incision can be wet after 4 days from the surgery.  You can take a shower but do not soak the incision. The incision should be covered with a light gauze that is held by 2 inch paper tape until  follow up.     If you have an aquacell dressing, (flesh colored rubber like bandage), you may leave this dressing in place until follow up in the clinic, unless; the dressing becomes completely saturated with blood, is leaking, gets water inside as evident by moisture appearing on the inside of the dressing, or you have a skin reaction.  In this case you should remove and use dressings like what is mentioned above.    Soft tissue: It is not unusual to have swelling in the leg (thigh down to the foot) up to 2 months from the surgery. Frequent ankle pumping, elevation of the leg above the heart level and gentle compression support with ace wrap should help with swelling. Icing regularly, for 20 minutes every hour, will also help with swelling and pain.  Due to soft tissue swelling, increased amount of redness around the incision site is also commonly seen. Progressively worsening redness along with increasing pain or increasing drainage from the wound should be a reason to alert us immediately.  You may also experience bruising.  This may occur anywhere from your toes, through the leg and even onto your torso.  It may show up even 1 week after surgery.      Medication:You will be discharged from the hospital with pain medication(s). In most cases, consistent use the pain pills can be limited to a few days. After this period, the medication should be weaned off as soon as possible to avoid potential complications of constipation, nausea, or rash, etc. Until you can be completely off the pain pills, using them only at nighttime along with controlling the pain with over-the-counter medication(s) such as Ibuprofen/Naproxen and/or Tylenol during the day would be a good way of managing the pain.       In order to minimize the potential problem of blood clot, you'll be asked to take aspirin (low risk patients) or undergo a period of blood thinner injections. If you were on Coumadin before the surgery you will be going back  to it after the surgery.    It is also recommend you take a stool softener while taking opioid prescription pain mediation to avoid constipation.  Also stay well hydrated in general during this period.    Activities:  One of the main problems related to the hip replacement is that there is 1-3% chance for dislocation. This means the ball comes out of the socket.  This is due to the fact that the size of the femoral head is going to be smaller than the native femoral head and also due to the fact that the hip capsule which supports the joint is disrupted and repaired to perform the surgery.  As a result, there is a lack of a protecting barrier until it is re-established with healing of the capsule, which takes a minimum of 4 months.  For this reason, you are asked to avoid bending your hip greater than 90 degrees of flexion for the first 4 months. This also means avoiding sitting on a very plush chair/couch and not crossing the legs.    As long as there is no fracture complication, you can put all the weight on the operated leg. Walker/crutches can be discontinued according to your own progress. The general guideline is to discontinue the walker/crutches when you feel fairly comfortable and confident with your balance. You can return to walking, swimming, golfing, double tennis but jogging or running are not recommended.     You can lie on either side of your body as soon as you feel comfortable putting pressure on the incision. We recommend putting one or 2 pillows in between the legs when you lie on that side.     Please do not undergo procedures such as dental cleaning, oral surgery, colonoscopy as well as any type of surgery that involves a significant incision for 4 months after surgery.  In case of an emergency, you will need an antibiotic, usually amoxicillin or clindamycin is used for this purpose.  You'll be taking the medication one hour before the procedure.  Please let the provider know that you have  artificial implants in your body    Also, for the rest of your life, some providers recommend you take an antibiotic for procedures such as dental cleaning, oral surgery, colonoscopy as well as any type of surgery that involves a significant incision.  Please check with the provider performing the procedure and notify them of your implant.    Follow up in clinic within 14 days after surgery.  May call [phone] to schedule.    Anderson Calloway PA-C  [facility] - Surgery  [facility]  [address] [place]  [address]  [phone]              Pending Results     No orders found from 12/2/2017 to 12/5/2017.            Statement of Approval     Ordered          12/06/17 0779  I have reviewed and agree with all the recommendations and orders detailed in this document.  EFFECTIVE NOW     Approved and electronically signed by:  Anderson Calloway PA-C             Admission Information     Date & Time Provider Department Dept. Phone    12/4/2017 Rajinder oGodwin MD [facility] [phone]      Your Vitals Were     Blood Pressure Pulse Temperature Respirations Weight Last Period    110/61 80 97.9  F (36.6  C) 16 88.9 kg (196 lb) 03/04/2005    Pulse Oximetry BMI (Body Mass Index)                98% 33.64 kg/m2          MyChart Information     [email or web address] gives you secure access to your electronic health record. If you see a primary care provider, you can also send messages to your care team and make appointments. If you have questions, please call your primary care clinic.  If you do not have a primary care provider, please call [phone] and they will assist you.        Care EveryWhere ID     This is your Care EveryWhere ID. This could be used by other organizations to access your [facility] records  [id number]        Equal Access to Services     JEREMÍAS BUCK : kiko Zamarripa qaybta kaalmada adeegyada, waxay  laurence kirbycasi duarte'aan ah. So [facility] [phone].    ATENCIÓN: Si hilaryla mercedes, tiene a graham disposición servicios gratuitos de asistencia lingüística. Avril al [phone].    We comply with applicable federal civil rights laws and [place] laws. We do not discriminate on the basis of race, color, national origin, age, disability, sex, sexual orientation, or gender identity.               Review of your medicines      START taking        Dose / Directions    acetaminophen 325 MG tablet   Commonly known as:  TYLENOL   Used for:  Status post left hip replacement        Dose:  975 mg   Take 3 tablets (975 mg) by mouth every 8 hours   Quantity:  100 tablet   Refills:  0       enoxaparin 40 MG/0.4ML injection   Commonly known as:  LOVENOX   Used for:  Venous thromboembolism (VTE) prophylaxis prescribed at discharge        Dose:  40 mg   Start taking on:  12/7/2017   Inject 0.4 mLs (40 mg) Subcutaneous every 24 hours   Quantity:  20 Syringe   Refills:  0       HYDROmorphone 2 MG tablet   Commonly known as:  DILAUDID   Used for:  Status post left hip replacement        Dose:  2-4 mg   Take 1-2 tablets (2-4 mg) by mouth every 4 hours as needed for moderate to severe pain   Quantity:  40 tablet   Refills:  0       senna-docusate 8.6-50 MG per tablet   Commonly known as:  SENOKOT-S;PERICOLACE   Used for:  Constipation due to pain medication        Dose:  1-2 tablet   Take 1-2 tablets by mouth 2 times daily as needed for constipation   Quantity:  60 tablet   Refills:  1         CONTINUE these medicines which have NOT CHANGED        Dose / Directions    ascorbic acid 250 MG tablet   Commonly known as:  VITAMIN C   Used for:  Routine general medical examination at a health care facility   Notes to Patient:  Home schedule        Take  by mouth. 500 mg 1xqd.   Quantity:  90   Refills:  0       calcium citrate-vitamin D 315-200 MG-UNIT Tabs per tablet   Commonly known as:  CITRACAL   Indication:  630/500 mg   Notes to  Patient:  Home schedule        Dose:  2 tablet   Take 2 tablets by mouth daily   Refills:  0       GLUCOSAMINE CHONDROITIN COMPLX Tabs   Used for:  Routine general medical examination at a health care facility   Notes to Patient:  Home schedule        Take  by mouth. 1500 mg 1xqd.   Refills:  0       RANITIDINE HCL PO        Dose:  150 mg   Take 150 mg by mouth 2 times daily   Refills:  0       VITAMIN D (CHOLECALCIFEROL) PO   Notes to Patient:  Home schedule        Dose:  2000 Units   Take 2,000 Units by mouth daily   Refills:  0       vitamin E 400 UNIT capsule   Notes to Patient:  Home schedule        Dose:  1 capsule   Take 1 capsule by mouth three times a week.   Refills:  0            Where to get your medicines      These medications were sent to [place], [place] - [address] [facility]  [address] [facility] [address]     Phone:  [phone]     acetaminophen 325 MG tablet    enoxaparin 40 MG/0.4ML injection    senna-docusate 8.6-50 MG per tablet         Some of these will need a paper prescription and others can be bought over the counter. Ask your nurse if you have questions.     Bring a paper prescription for each of these medications     HYDROmorphone 2 MG tablet                Protect others around you: Learn how to safely use, store and throw away your medicines at www.disposemymeds.org.             Medication List: This is a list of all your medications and when to take them. Check marks below indicate your daily home schedule. Keep this list as a reference.      Medications           Morning Afternoon Evening Bedtime As Needed    acetaminophen 325 MG tablet   Commonly known as:  TYLENOL   Take 3 tablets (975 mg) by mouth every 8 hours   Last time this was given:  975 mg on 12/6/2017  6:22 AM   Next Dose Due:  200pm                                         ascorbic acid 250 MG tablet   Commonly known as:  VITAMIN C   Take  by mouth. 500 mg 1xqd.   Notes to  Patient:  Home schedule                                calcium citrate-vitamin D 315-200 MG-UNIT Tabs per tablet   Commonly known as:  CITRACAL   Take 2 tablets by mouth daily   Notes to Patient:  Home schedule                                enoxaparin 40 MG/0.4ML injection   Commonly known as:  LOVENOX   Inject 0.4 mLs (40 mg) Subcutaneous every 24 hours   Start taking on:  12/7/2017   Last time this was given:  40 mg on 12/6/2017 11:27 AM   Next Dose Due:  Thursday at 1100                                   GLUCOSAMINE CHONDROITIN COMPLX Tabs   Take  by mouth. 1500 mg 1xqd.   Notes to Patient:  Home schedule                                HYDROmorphone 2 MG tablet   Commonly known as:  DILAUDID   Take 1-2 tablets (2-4 mg) by mouth every 4 hours as needed for moderate to severe pain   Last time this was given:  2 mg on 12/6/2017  9:21 AM                                   RANITIDINE HCL PO   Take 150 mg by mouth 2 times daily   Last time this was given:  150 mg on 12/6/2017  8:31 AM   Next Dose Due:  This evening                                      senna-docusate 8.6-50 MG per tablet   Commonly known as:  SENOKOT-S;PERICOLACE   Take 1-2 tablets by mouth 2 times daily as needed for constipation   Last time this was given:  2 tablets on 12/6/2017  9:21 AM   Next Dose Due:  This evening                                      VITAMIN D (CHOLECALCIFEROL) PO   Take 2,000 Units by mouth daily   Notes to Patient:  Home schedule                                vitamin E 400 UNIT capsule   Take 1 capsule by mouth three times a week.   Notes to Patient:  Home schedule                                          More Information        IT IS IMPORTANT TO CONTINUE TO USE YOUR INCENTIVE SPIROMETER WHILE AT HOME.    YOU will continue to be at Risk for Pneumonia following your surgery during your recovery, especially while on continued narcotics.   If you continue to take narcotics at home or are experiencing at Temperature it is  recommended to continue using .   Using an Incentive Spirometer    An incentive spirometer is a device that helps you do deep breathing exercises. These exercises expand your lungs, aid in circulation, and help prevent pneumonia. Deep breathing exercises also help you breathe better and improve the function of your lungs by:    Keeping your lungs clear    Strengthening your breathing muscles    Helping prevent respiratory complications or problems  The incentive spirometer gives you a way to take an active part in recover. A nurse or therapist will teach you breathing exercises. To do these exercises, you will breathe in through your mouth and not your nose. The incentive spirometer only works correctly if you breathe in through your mouth.  Steps to clear lungs  Step 1. Exhale normally. Then, inhale normally.    Relax and breathe out.  Step 2. Place your lips tightly around the mouthpiece.    Make sure the device is upright and not tilted.  Step 3. Inhale as much air as you can through the mouthpiece (don't breath through your nose).    Inhale slowly and deeply.    Hold your breath long enough to keep the balls or disk raised for at least 3 to 5 seconds, or as instructed by your healthcare provider.    Some spirometers have an indicator to let you know that you are breathing in too fast. If the indicator goes off, breathe in more slowly.  Step 4. Repeat the exercise regularly.    Do this exercise every hour while you're awake, or as instructed by your healthcare provider.    If you were taught deep breathing and coughing exercises, do them regularly as instructed by your healthcare provider.   Follow-up care  Make a follow up appointment, or as directed. Also, follow up with your healthcare provider as advised or if your symptoms do not improve or continue to get worse.  When to call your healthcare provider  Call your healthcare provider right away if you have any of the following:    Fever 100.4  (38 C) or  higher, or as directed by your healthcare provider    Brownish, bloody, or smelly sputum  Call 911  Call 911 if any of these occur:     Shortness of breath that doesn't get better after taking your medicine    Cool, moist, pale or blue skin    Trouble breathing or swallowing, wheezing    Fainting or loss of consciousness    Feeling of fizziness or weakness or a sudden drop in blood pressure    Feeling of doom or lightheaded    Chest pain or rapid heart rate  Date Last Reviewed: 1/1/2017 2000-2017 [employer]. [address] [place] [address]. All rights reserved. This information is not intended as a substitute for professional medical care. Always follow your healthcare professional's instructions.                Constipation (Adult)  Constipation means that you have bowel movements that are less frequent than usual. Stools often become very hard and difficult to pass.  Constipation is very common. At some point in life it affects almost everyone. Since everyone's bowel habits are different, what is constipation to one person may not be to another. Your healthcare provider may do tests to diagnose constipation. It depends on what he or she finds when evaluating you.    Symptoms of constipation include:    Abdominal pain    Bloating    Vomiting    Painful bowel movements    Itching, swelling, bleeding, or pain around the anus  Causes  Constipation can have many causes. These include:    Diet low in fiber    Too much dairy    Not drinking enough liquids    Lack of exercise or physical activity. This is especially true for older adults.    Changes in lifestyle or daily routine, including pregnancy, aging, work, and travel    Frequent use or misuse of laxatives    Ignoring the urge to have a bowel movement or delaying it until later    Medicines, such as certain prescription pain medicines, iron supplements, antacids, certain antidepressants, and calcium supplements    Diseases like irritable  bowel syndrome, bowel obstructions, stroke, diabetes, thyroid disease, Parkinson disease, hemorrhoids, and colon cancer  Complications  Potential complications of constipation can include:    Hemorrhoids    Rectal bleeding from hemorrhoids or anal fissures (skin tears)    Hernias    Dependency on laxatives    Chronic constipation    Fecal impaction    Bowel obstruction or perforation  Home care  All treatment should be done after talking with your healthcare provider. This is especially true if you have another medical problems, are taking prescription medicines, or are an older adult. Treatment most often involves lifestyle changes. You may also need medicines. Your healthcare provider will tell you which will work best for you. Follow the advice below to help avoid this problem in the future.  Lifestyle changes  These lifestyle changes can help prevent constipation:    Diet. Eat a high-fiber diet, with fresh fruit and vegetables, and reduce dairy intake, meats, and processed foods    Fluids. It's important to get enough fluids each day. Drink plenty of water when you eat more fiber. If you are on diet that limits the amount of fluid you can have, talk about this with your healthcare provider.    Regular exercise. Check with your healthcare provider first.  Medications  Take any medicines as directed. Some laxatives are safe to use only every now and then. Others can be taken on a regular basis. Talk with your doctor or pharmacist if you have questions.  Prescription pain medicines can cause constipation. If you are taking this kind of medicine, ask your healthcare provider if you should also take a stool softener.  Medicines you may take to treat constipation include:    Fiber supplements    Stool softeners    Laxatives    Enemas    Rectal suppositories  Follow-up care  Follow up with your healthcare provider if symptoms don't get better in the next few days. You may need to have more tests or see a  specialist.  Call 911  Call 911 if any of these occur:    Trouble breathing    Stiff, rigid abdomen that is severely painful to touch    Confusion    Fainting or loss of consciousness    Rapid heart rate    Chest pain  When to seek medical advice  Call your healthcare provider right away if any of these occur:    Fever over 100.4 F (38 C)    Failure to resume normal bowel movements    Pain in your abdomen or back gets worse    Nausea or vomiting    Swelling in your abdomen    Blood in the stool    Black, tarry stool    Involuntary weight loss    Weakness  Date Last Reviewed: 12/30/2015 2000-2017 [employer]. [address] [place] [address]. All rights reserved. This information is not intended as a substitute for professional medical care. Always follow your healthcare professional's instructions.

## 2017-12-04 NOTE — BRIEF OP NOTE
[facility]  Orthopedics Brief Operative Note    Pre-operative diagnosis: Osteoarthritis    Post-operative diagnosis Same   Procedure: Procedure(s):  Left Total hip Arthroplasty    - Wound Class: I-Clean   Surgeon: Rajinder Goodwin MD   Assistants(s): Surgeon(s) and Role:     * Rajinder Goodwin MD - Primary     * Anderson Calloway PA-C   Anesthesia: Spinal    Estimated blood loss: 100 mL    Total IV fluids: See anesthesia record   Blood transfusion: None       Drains: None   Specimens: * No specimens in log *   Implants: Foster Nephew 56 acetab, 30mm screw, 20 degree liner, #13 stem, 32mm+8 Oxinium head   Findings: Dictated   Complications: None   Condition: Stable   Comments: See Dictated Operative report for full details

## 2017-12-04 NOTE — PLAN OF CARE
Problem: Patient Care Overview  Goal: Plan of Care/Patient Progress Review  PT: Order received; Initial evaluation completed and treatment initiated POD #0 s/p L PRESLEY; Prior to admit patient was independent with mobility but limited by pain; Patient has stairs to access home envirpnment but will have assist of spouse as needed.  Discharge Planner PT   Patient plan for discharge: Home with assist from spouse  Current status: Patient needing min/mod A for bed mobility, tx, and min A x 1/SBA of another for gait x 60 feet with a rolling walker; not symptomatic with OOB mobility except pain.  Barriers to return to prior living situation: stairs; pain; will have assist of spouse  Recommendations for discharge: Defer to POD #2  Rationale for recommendations: Defer to POD #2       Entered by: Natalia Raymond 12/04/2017 5:16 PM

## 2017-12-04 NOTE — OP NOTE
DATE OF PROCEDURE:  12/04/2017      SURGEON:  Rajinder Goodwin MD      1ST ASSISTANT:  Anderson Calloway PA-C   2ND ASSISTANT:   Siddharth Owusu      PREOPERATIVE DIAGNOSIS:  Primary osteoarthritis, left hip.      POSTOPERATIVE DIAGNOSIS:  Primary osteoarthritis, left hip.      PROCEDURE PERFORMED:  Left total hip arthroplasty.      INDICATIONS FOR PROCEDURE:  Gerri Owens is a 62-year-old woman with bilateral osteoarthritis of her hips who elected to undergo a left total hip arthroplasty today, fully understanding the potential risks as well as benefits of this procedure.      OPERATIVE NOTE:  Patient was brought to the operating room, placed in a supine position on the operating table, where general anesthesia was administered without difficulty.  She was then placed in a right lateral decubitus position in a hip operating frame, carefully padding all dependent appendages.  Her left hip and lower extremity were scrubbed and prepped in the usual sterile fashion and draped sterilely.      Using a standard posterolateral approach the skin and subcutaneous tissue were dissected sharply down to the gluteus derrick and iliotibial band which were split along their fibers over the greater trochanter.  The dissection was carried around the posterior aspect of the greater trochanter to the short external rotators and posterior hip capsule which were opened in a T fashion and tagged with #1 Ethibond suture.  The hip was dislocated and the femoral neck was cut off 15 mm above the lesser trochanter as preoperatively planned.  Sequential reaming of the acetabulum allowed for an ingrowth size 56 mm acetabular shell to be placed.  This shell was quite solid in the pelvis, but was augmented with a single 30 mm screw.  A 20 degree superior and posterior poly liner for a 32 mm head was then fitted into the shell.      Our attention was turned to the femoral side where sequential reaming and broaching allowed for a size 13 ingrowth  stem.  Following trialing the prosthetic ingrowth size 13 femoral stem from the [employer] system was implanted.  It was trialed and a 32 mm head with a +8 mm neck length gave the best stability and range of motion.  An Oxinium head of that size was then fitted to the taper and the hip was reduced one final time.  Hemostasis was observed.  The wound was irrigated.  The short external rotators and posterior hip capsule were reapproximated to the greater trochanter through drill holes.  The wound was then closed in a routine layered fashion.  It was dressed sterilely.  Patient tolerated the procedure well and left the operating room in satisfactory and stable condition.      ESTIMATED BLOOD LOSS:  200 mL.      SPONGE AND NEEDLE COUNT:  Correct x2.         ANTHONY CASTREJON MD             D: 2017 11:53   T: 2017 13:43   MT: ALLEN#[id number]      Name:     MARILEE EDWARD   MRN:      [id number]        Account:        [id number]   :      1955           Procedure Date: 2017      Document: [id number]

## 2017-12-04 NOTE — ANESTHESIA PREPROCEDURE EVALUATION
Anesthesia Evaluation     .             ROS/MED HX    ENT/Pulmonary:      (-) asthma, sleep apnea and Other pulmonary disease   Neurologic:      (-) TIA, Other neuro hx and Dementia   Cardiovascular:        (-) hypertension, CHF, arrhythmias, pulmonary hypertension, dyslipidemia and stent   METS/Exercise Tolerance:     Hematologic:        (-) anemia   Musculoskeletal:   (+) arthritis, , , -       GI/Hepatic:     (+) GERD      (-) hiatal hernia and hepatitis   Renal/Genitourinary:     (+) chronic renal disease, type: CRI,       Endo:     (+) Obesity, .   (-) Type I DM, Type II DM, thyroid disease, chronic steroid usage and other endocrine disorder   Psychiatric:        (-) psychiatric history   Infectious Disease:  - neg infectious disease ROS       Malignancy:         Other:    (+) H/O Chronic Pain,                   Physical Exam      Airway   Mallampati: II  TM distance: >3 FB  Neck ROM: full    Dental     Cardiovascular   Rhythm and rate: regular and normal  (-) no murmur    Pulmonary    breath sounds clear to auscultation    Other findings: Lab Test        11/14/17 03/21/13 12/27/12      --          03/27/12                       0923                                               --           0908           WBC          6.2           --           --           --          6.1           HGB          13.8          --           --           --          14.6          MCV          91            --           --           --          89            PLT          223           --           --           --          165           INR          0.93         2.6*         2.5*           < >        1.00           < > = values in this interval not displayed.                  Lab Test        11/22/17 11/17/17 11/14/17                       0839          0847          0923          NA           140          139          138           POTASSIUM    4.5          4.5          5.1           CHLORIDE     108          106           105           CO2          26           25           26            BUN          14           16           21            CR           1.12*        1.34*        1.60*         ANIONGAP     6            8            7             LAURENT          9.2          9.5          10.2*         GLC          87           72           82                          Anesthesia Plan      History & Physical Review  History and physical reviewed and following examination; no interval change.    ASA Status:  3 .    NPO Status:  > 8 hours    Plan for Spinal with Propofol induction.   PONV prophylaxis:  Ondansetron (or other 5HT-3) and Dexamethasone or Solumedrol       Postoperative Care  Postoperative pain management:  IV analgesics and Oral pain medications.      Consents  Anesthetic plan, risks, benefits and alternatives discussed with:  Patient.  Use of blood products discussed: Yes.   Use of blood products discussed with Patient.  Consented to blood products.  .                          .

## 2017-12-04 NOTE — ANESTHESIA POSTPROCEDURE EVALUATION
Patient: Gerri Owens    Procedure(s):  Left Total hip Arthroplasty    - Wound Class: I-Clean    Diagnosis:Osteoarthritis   Diagnosis Additional Information: Pre-operative diagnosis: Osteoarthritis   Post-operative diagnosis Same  Procedure: Procedure(s):  Left Total hip Arthroplasty    - Wound Class: I-Clean        Anesthesia Type:  Spinal    Note:  Anesthesia Post Evaluation    Patient location during evaluation: PACU  Patient participation: Able to participate in evaluation but full recovery from regional anesthesia has not yet ocurrred but is anticipated to occur within 48 hours  Level of consciousness: awake  Pain management: adequate  Airway patency: patent  Cardiovascular status: acceptable  Respiratory status: acceptable  Hydration status: euvolemic  PONV: controlled     Anesthetic complications: None          Last vitals:  Vitals:    12/04/17 0841 12/04/17 0843 12/04/17 1200   BP:  116/72 102/65   Resp: 16  15   Temp: 98  F (36.7  C)  97.1  F (36.2  C)   SpO2: 94%  90%         Electronically Signed By: Doug Schneider MD  December 4, 2017  12:45 PM

## 2017-12-04 NOTE — PROGRESS NOTES
12/04/17 1600   Quick Adds   Type of Visit Initial PT Evaluation   Living Environment   Lives With spouse   Living Arrangements house   Home Accessibility stairs to enter home;stairs within home   Number of Stairs to Enter Home 4  (rail present)   Number of Stairs Within Home 10  (rail present)   Transportation Available car;family or friend will provide   Living Environment Comment patient lives in a split level home   Self-Care   Usual Activity Tolerance moderate   Current Activity Tolerance poor   Regular Exercise no  (limited by pain)   Equipment Currently Used at Home none  (has a cane and walker)   Activity/Exercise/Self-Care Comment normally independent; has been limited by L LE pain   Functional Level Prior   Ambulation 0-->independent   Transferring 0-->independent   Toileting 0-->independent   Bathing 0-->independent   Dressing 0-->independent   Eating 0-->independent   Communication 0-->understands/communicates without difficulty   Swallowing 0-->swallows foods/liquids without difficulty   Cognition 0 - no cognition issues reported   Fall history within last six months no   Which of the above functional risks had a recent onset or change? ambulation;transferring   Prior Functional Level Comment patient normally independent and active prior to admit without use of an assistive device; limited by L LE pain   General Information   Onset of Illness/Injury or Date of Surgery - Date 12/04/17   Referring Physician Rajinder Goodwin MD   Patient/Family Goals Statement return home with assist of spouse   Pertinent History of Current Problem (include personal factors and/or comorbidities that impact the POC) Gerri Owens is a 62-year-old woman with bilateral osteoarthritis of her hips who elected to undergo a left total hip arthroplasty today; also has R hip arthritis but less pain   Precautions/Limitations left hip precautions   Weight-Bearing Status - LLE weight-bearing as tolerated   General Info  Comments Activity: ambulate   Cognitive Status Examination   Orientation orientation to person, place and time   Level of Consciousness alert   Follows Commands and Answers Questions 100% of the time   Personal Safety and Judgment intact   Memory intact   Pain Assessment   Patient Currently in Pain Yes, see Vital Sign flowsheet  (L hip discomfort; doesn't rate)   Integumentary/Edema   Integumentary/Edema Comments L hip incision; draining   Posture    Posture Comments WFL   Range of Motion (ROM)   ROM Comment L hip limited by recent surgery and pain; others appear WFL   Strength   Strength Comments L hip limited by recent surgery and pain; others appear WFL   Bed Mobility   Bed Mobility Comments min A x 2 for supine to sit bed mobility; mod A of 1 to return to supine   Transfer Skills   Transfer Comments sit<>stand with min-mod A of 1-2 with safety and sequencing cues; use of a walker   Gait   Gait Comments able to ambulate with min A of 1 and rolling walker; SBA of another to assist in management of equipment   Balance   Balance Comments current need for a walker and assist   Sensory Examination   Sensory Perception Comments intact   Modality Interventions   Planned Modality Interventions Comments ice to L hip   General Therapy Interventions   Planned Therapy Interventions bed mobility training;gait training;ROM;strengthening;transfer training;progressive activity/exercise   Clinical Impression   Criteria for Skilled Therapeutic Intervention yes, treatment indicated   PT Diagnosis impaired gait/transfers   Influenced by the following impairments L hip precautions; decreased L hip ROM/strength; pain   Functional limitations due to impairments impaired independence with functional mobility   Clinical Presentation Stable/Uncomplicated   Clinical Presentation Rationale assist for mobility; supportive living environment; medically stable   Clinical Decision Making (Complexity) Low complexity   Therapy Frequency` 2  "times/day   Predicted Duration of Therapy Intervention (days/wks) 3 days   Anticipated Equipment Needs at Discharge front wheeled walker   Anticipated Discharge Disposition Home with Assist   Risk & Benefits of therapy have been explained Yes   Patient, Family & other staff in agreement with plan of care Yes   [facility] TM \"6 Clicks\"   2016, Trustees of [facility], under license to [employer].  All rights reserved.   6 Clicks Short Forms Basic Mobility Inpatient Short Form   [facility]  \"6 Clicks\" V.2 Basic Mobility Inpatient Short Form   1. Turning from your back to your side while in a flat bed without using bedrails? 3 - A Little   2. Moving from lying on your back to sitting on the side of a flat bed without using bedrails? 2 - A Lot   3. Moving to and from a bed to a chair (including a wheelchair)? 2 - A Lot   4. Standing up from a chair using your arms (e.g., wheelchair, or bedside chair)? 2 - A Lot   5. To walk in hospital room? 3 - A Little   6. Climbing 3-5 steps with a railing? 2 - A Lot   Basic Mobility Raw Score (Score out of 24.Lower scores equate to lower levels of function) 14   Total Evaluation Time   Total Evaluation Time (Minutes) 10     "

## 2017-12-04 NOTE — PLAN OF CARE
Problem: Patient Care Overview  Goal: Plan of Care/Patient Progress Review  Outcome: No Change  Day RN  VS monitored, 2L NC, numbness present until spinal block wore off and then pt very painful, IV Dilaudid given 2x w/small relief, pt declined PO Dilaudid afterwards for a little bit, drsg w/scant drainage, 2+ dorsal pedals, able to wiggle toes and feel some sensation in feet, pena patent, will cont to monitor.

## 2017-12-04 NOTE — LETTER
Transition Communication Hand-off for Care Transitions to Next Level of Care Provider    Name: Gerri Owens  MRN #: [id number]  Primary Care Provider: Karo Doty     Primary Clinic: [address] [facility] [address]     Reason for Hospitalization:  Osteoarthritis   Hip osteoarthritis  Admit Date/Time: 12/4/2017  8:22 AM  Discharge Date: ***  Payor Source: Payor: [employer] / Plan: [employer] / Product Type: HMO /     Readmission Assessment Measure (VIN) Risk Score/category: LOW          Reason for Communication Hand-off Referral: Other Post op Hip Patient flagged pre-surgery with the following risk factors: History of DVT, Decreased renal function, Suspected sleep apnea.     ORTHO  discharge follow up plan to include,   DVT Prophylaxis ***    Pt.'s  PCP is Karo Doty    No post operative acute medical concerns identified for PCP follow up on discharge.     NO follow up appointment/handoff indicated.****  PTA medications were reconciled prior to DC***  These were the new meds these were changed etc etc ***  IP consulted prior to dc--copy and paste***    Mitzy Salinas RN BSN CTS  Care Transitions Team  [phone]

## 2017-12-04 NOTE — ANESTHESIA CARE TRANSFER NOTE
Patient: Gerri Owens    Procedure(s):  Left Total hip Arthroplasty    - Wound Class: I-Clean    Diagnosis: Osteoarthritis   Diagnosis Additional Information: No value filed.    Anesthesia Type:   Spinal     Note:  Airway :Face Mask  Patient transferred to:PACU  Comments: To PACU, Awake, VSS, SV, O2, Report to RNHandoff Report: Identifed the Patient, Identified the Reponsible Provider, Reviewed the pertinent medical history, Discussed the surgical course, Reviewed Intra-OP anesthesia mangement and issues during anesthesia, Set expectations for post-procedure period and Allowed opportunity for questions and acknowledgement of understanding      Vitals: (Last set prior to Anesthesia Care Transfer)    CRNA VITALS  12/4/2017 1124 - 12/4/2017 1201      12/4/2017             Pulse: 71    SpO2: 99 %                Electronically Signed By: Dean Dennis Severson, APRN CRNA  December 4, 2017  12:01 PM

## 2017-12-05 ENCOUNTER — APPOINTMENT (OUTPATIENT)
Dept: OCCUPATIONAL THERAPY | Facility: CLINIC | Age: 62
DRG: 470 | End: 2017-12-05
Attending: ORTHOPAEDIC SURGERY
Payer: COMMERCIAL

## 2017-12-05 ENCOUNTER — APPOINTMENT (OUTPATIENT)
Dept: PHYSICAL THERAPY | Facility: CLINIC | Age: 62
DRG: 470 | End: 2017-12-05
Attending: ORTHOPAEDIC SURGERY
Payer: COMMERCIAL

## 2017-12-05 LAB
GLUCOSE SERPL-MCNC: 88 MG/DL (ref 70–99)
HGB BLD-MCNC: 10.4 G/DL (ref 11.7–15.7)

## 2017-12-05 PROCEDURE — 40000133 ZZH STATISTIC OT WARD VISIT: Performed by: REHABILITATION PRACTITIONER

## 2017-12-05 PROCEDURE — 40000193 ZZH STATISTIC PT WARD VISIT: Performed by: PHYSICAL THERAPY ASSISTANT

## 2017-12-05 PROCEDURE — 97110 THERAPEUTIC EXERCISES: CPT | Mod: GP | Performed by: PHYSICAL THERAPY ASSISTANT

## 2017-12-05 PROCEDURE — 82947 ASSAY GLUCOSE BLOOD QUANT: CPT | Performed by: ORTHOPAEDIC SURGERY

## 2017-12-05 PROCEDURE — 97165 OT EVAL LOW COMPLEX 30 MIN: CPT | Mod: GO | Performed by: REHABILITATION PRACTITIONER

## 2017-12-05 PROCEDURE — 25000128 H RX IP 250 OP 636: Performed by: ORTHOPAEDIC SURGERY

## 2017-12-05 PROCEDURE — 97116 GAIT TRAINING THERAPY: CPT | Mod: GP | Performed by: PHYSICAL THERAPY ASSISTANT

## 2017-12-05 PROCEDURE — 97535 SELF CARE MNGMENT TRAINING: CPT | Mod: GO | Performed by: REHABILITATION PRACTITIONER

## 2017-12-05 PROCEDURE — 36415 COLL VENOUS BLD VENIPUNCTURE: CPT | Performed by: ORTHOPAEDIC SURGERY

## 2017-12-05 PROCEDURE — 97530 THERAPEUTIC ACTIVITIES: CPT | Mod: GP | Performed by: PHYSICAL THERAPY ASSISTANT

## 2017-12-05 PROCEDURE — 85018 HEMOGLOBIN: CPT | Performed by: ORTHOPAEDIC SURGERY

## 2017-12-05 PROCEDURE — 25000132 ZZH RX MED GY IP 250 OP 250 PS 637: Performed by: ORTHOPAEDIC SURGERY

## 2017-12-05 PROCEDURE — 12000000 ZZH R&B MED SURG/OB

## 2017-12-05 RX ADMIN — KETOROLAC TROMETHAMINE 30 MG: 30 INJECTION, SOLUTION INTRAMUSCULAR at 04:09

## 2017-12-05 RX ADMIN — HYDROMORPHONE HYDROCHLORIDE 2 MG: 2 TABLET ORAL at 13:19

## 2017-12-05 RX ADMIN — HYDROMORPHONE HYDROCHLORIDE 2 MG: 2 TABLET ORAL at 09:16

## 2017-12-05 RX ADMIN — RANITIDINE 150 MG: 150 TABLET ORAL at 19:47

## 2017-12-05 RX ADMIN — ACETAMINOPHEN 975 MG: 325 TABLET, FILM COATED ORAL at 22:08

## 2017-12-05 RX ADMIN — HYDROMORPHONE HYDROCHLORIDE 2 MG: 2 TABLET ORAL at 02:07

## 2017-12-05 RX ADMIN — SODIUM CHLORIDE: 9 INJECTION, SOLUTION INTRAVENOUS at 08:05

## 2017-12-05 RX ADMIN — CEFAZOLIN SODIUM 2 G: 2 INJECTION, SOLUTION INTRAVENOUS at 02:07

## 2017-12-05 RX ADMIN — RANITIDINE 150 MG: 150 TABLET ORAL at 08:06

## 2017-12-05 RX ADMIN — HYDROMORPHONE HYDROCHLORIDE 2 MG: 2 TABLET ORAL at 06:05

## 2017-12-05 RX ADMIN — KETOROLAC TROMETHAMINE 30 MG: 30 INJECTION, SOLUTION INTRAMUSCULAR at 09:25

## 2017-12-05 RX ADMIN — ENOXAPARIN SODIUM 40 MG: 40 INJECTION SUBCUTANEOUS at 12:03

## 2017-12-05 RX ADMIN — ACETAMINOPHEN 975 MG: 325 TABLET, FILM COATED ORAL at 06:05

## 2017-12-05 RX ADMIN — ACETAMINOPHEN 975 MG: 325 TABLET, FILM COATED ORAL at 13:19

## 2017-12-05 ASSESSMENT — ACTIVITIES OF DAILY LIVING (ADL): PREVIOUS_RESPONSIBILITIES: MEAL PREP;HOUSEKEEPING;LAUNDRY;SHOPPING;YARDWORK;MEDICATION MANAGEMENT;FINANCES;DRIVING;WORK

## 2017-12-05 NOTE — PLAN OF CARE
Problem: Patient Care Overview  Goal: Plan of Care/Patient Progress Review  Discharge Planner PT   Patient plan for discharge: home hoping for later today  Current status: S for basic transfers and gait to 120 feet with RW not dizziness noted  Barriers to return to prior living situation: stairs but will try this Pm session  Recommendations for discharge: TBD POD#2  Rationale for recommendations: will met goals soon        Entered by: Ankita Azevedo 12/05/2017 10:28 AM

## 2017-12-05 NOTE — PROGRESS NOTES
12/05/17 1130   Quick Adds   Type of Visit Initial Occupational Therapy Evaluation   Living Environment   Lives With spouse   Living Arrangements house   Home Accessibility stairs to enter home;stairs within home   Number of Stairs to Enter Home 4   Number of Stairs Within Home 10   Transportation Available car;family or friend will provide   Living Environment Comment Pt reported to be independent in mobility while living in a split foyer home PTA.   Self-Care   Dominant Hand right   Usual Activity Tolerance moderate   Current Activity Tolerance poor   Regular Exercise no   Equipment Currently Used at Home none   Activity/Exercise/Self-Care Comment Pt reported to be independent in ADLs/IADLs PTA.   Functional Level Prior   Ambulation 0-->independent   Transferring 0-->independent   Toileting 0-->independent   Bathing 0-->independent   Dressing 0-->independent   Eating 0-->independent   Communication 0-->understands/communicates without difficulty   Swallowing 0-->swallows foods/liquids without difficulty   Cognition 0 - no cognition issues reported   Fall history within last six months no   Which of the above functional risks had a recent onset or change? ambulation;transferring;bathing       Present no   General Information   Onset of Illness/Injury or Date of Surgery - Date 12/04/17   Referring Physician Dr. Rajinder Goodwin   Patient/Family Goals Statement Return to home   Additional Occupational Profile Info/Pertinent History of Current Problem Pt is a 62-year-old woman with bilateral osteoarthritis of her hips who elected to undergo a left total hip arthroplasty today; also has R hip arthritis but less pain   Precautions/Limitations left hip precautions   Weight-Bearing Status - LLE weight-bearing as tolerated   Cognitive Status Examination   Orientation orientation to person, place and time   Level of Consciousness alert   Able to Follow Commands WNL/WFL   Personal Safety (Cognitive)  WNL/WFL   Memory intact   Attention No deficits were identified   Organization/Problem Solving No deficits were identified   Executive Function No deficits were identified   Visual Perception   Visual Perception Wears glasses   Sensory Examination   Sensory Quick Adds No deficits were identified   Pain Assessment   Patient Currently in Pain No   Posture   Posture not impaired   Range of Motion (ROM)   ROM Quick Adds No deficits were identified   ROM Comment B UE AROM WFL   Strength   Manual Muscle Testing Quick Adds No deficits were identified   Strength Comments B UE strength WFL   Hand Strength   Hand Strength Comments WFL   Muscle Tone Assessment   Muscle Tone Quick Adds No deficits were identified   Coordination   Upper Extremity Coordination No deficits were identified   Transfer Skill: Bed to Chair/Chair to Bed   Level of Ware: Bed to Chair contact guard   Physical Assist/Nonphysical Assist: Bed to Chair verbal cues   Weight-Bearing Restrictions weight-bearing as tolerated   Assistive Device - Transfer Skill Bed to Chair Chair to Bed Rehab Eval rolling walker   Transfer Skill: Sit to Stand   Level of Ware: Sit/Stand contact guard   Physical Assist/Nonphysical Assist: Sit/Stand verbal cues   Transfer Skill: Sit to Stand weight-bearing as tolerated   Assistive Device for Transfer: Sit/Stand rolling walker   Transfer Skill: Toilet Transfer   Level of Ware: Toilet contact guard   Physical Assist/Nonphysical Assist: Toilet verbal cues   Weight-Bearing Restrictions: Toilet weight-bearing as tolerated   Assistive Device rolling walker;grab bars   Lower Body Dressing   Level of Ware: Dress Lower Body maximum assist (25% patients effort)   Physical Assist/Nonphysical Assist: Dress Lower Body set-up required   Grooming   Level of Ware: Grooming stand-by assist   Physical Assist/Nonphysical Assist: Grooming set-up required   Eating/Self Feeding   Level of Ware: Eating  "independent   Physical Assist/Nonphysical Assist: Eating set-up required   Instrumental Activities of Daily Living (IADL)   Previous Responsibilities meal prep;housekeeping;laundry;shopping;yardwork;medication management;finances;driving;work   Activities of Daily Living Analysis   Impairments Contributing to Impaired Activities of Daily Living pain;post surgical precautions;ROM decreased;strength decreased   General Therapy Interventions   Planned Therapy Interventions ADL retraining;transfer training   Clinical Impression   Criteria for Skilled Therapeutic Interventions Met yes, treatment indicated   OT Diagnosis decreased ADL performance   Influenced by the following impairments L PRESLEY   Assessment of Occupational Performance 3-5 Performance Deficits   Identified Performance Deficits Pt with decreased ability to manage dressing, bathing, toileting, cooking, homemaking   Clinical Decision Making (Complexity) Low complexity   Therapy Frequency daily   Predicted Duration of Therapy Intervention (days/wks) 3 days   Anticipated Equipment Needs at Discharge dressing equipment   Anticipated Discharge Disposition Home with Assist   Risks and Benefits of Treatment have been explained. Yes   Patient, Family & other staff in agreement with plan of care Yes   [facility] TM \"6 Clicks\"   2016, Trustees of [facility], under license to [employer].  All rights reserved.   6 Clicks Short Forms Daily Activity Inpatient Short Form   [facility]  \"6 Clicks\" Daily Activity Inpatient Short Form   1. Putting on and taking off regular lower body clothing? 2 - A Lot   2. Bathing (including washing, rinsing, drying)? 2 - A Lot   3. Toileting, which includes using toilet, bedpan or urinal? 3 - A Little   4. Putting on and taking off regular upper body clothing? 4 - None   5. Taking care of personal grooming such as brushing teeth? 3 - A Little   6. Eating meals? 4 - None   Daily Activity Raw Score (Score " out of 24.Lower scores equate to lower levels of function) 18   Total Evaluation Time   Total Evaluation Time (Minutes) 8

## 2017-12-05 NOTE — PLAN OF CARE
Problem: Hip Arthroplasty (Total, Partial) (Adult)  Goal: Signs and Symptoms of Listed Potential Problems Will be Absent, Minimized or Managed (Hip Arthroplasty)  Signs and symptoms of listed potential problems will be absent, minimized or managed by discharge/transition of care (reference Hip Arthroplasty (Total, Partial) (Adult) CPG).   Outcome: Improving  Afeb, BPs running 80's/40's w/o symptoms did rise after up walking. Cms intact, reinforced dressing stays clean and dry. Up with assist of 1 and walker, ambulated in the halls with 1 assist twice today and sat up in chair twice inspite of the lower BPs. Minimal pain ratings kept in check with dilaudid po [id number]. Plans on going home tomorrow with family.

## 2017-12-05 NOTE — PLAN OF CARE
Problem: Hip Arthroplasty (Total, Partial) (Adult)  Goal: Signs and Symptoms of Listed Potential Problems Will be Absent, Minimized or Managed (Hip Arthroplasty)  Signs and symptoms of listed potential problems will be absent, minimized or managed by discharge/transition of care (reference Hip Arthroplasty (Total, Partial) (Adult) CPG).   Outcome: Improving  Alert and oriented. VSS. Tolerating full liquid diet. Ambulated in room and to hallway with Ax2, gait belt, and walker. Dressing had moderate amount moist drainage, reinforced with ABDs and pressure tape, CDI. CMS intact. Lund putting out adequate amounts. Pain managed with scheduled Tylenol and prn PO Dilaudid. Plans d/c to home.

## 2017-12-05 NOTE — PLAN OF CARE
Problem: Patient Care Overview  Goal: Plan of Care/Patient Progress Review  OT:  eval complete and treatment initiated.  Pt is a 62-year-old woman with bilateral osteoarthritis of her hips who elected to undergo a left total hip arthroplasty today; also has R hip arthritis but less pain.  She reported to be independent in ADLs, IADLs and mobility while living in a multi-level house with  PTA.  Pt does have a RTS and grab bars in place at home.    Discharge Planner OT   Patient plan for discharge: home with assist  Current status: Pt sitting up in chair upon arrival.  OT provided education in L total hip precautions and demonstration in use of AE for LE dressing.  Pt now able to don/doff pants and socks with min A using reacher and sock aide.  Sit<>stand and functional mobility to [facility] completed with CGA and cueing for walker safety.  OT provided instruction in safe technique for toilet and tub transfers.  Pt now able to transfer to toilet with SBA using raised seat with handrails.  Tub transfer completed with min A using FWW and grab bars with cueing for safety, sequencing and hand placement.  Barriers to return to prior living situation: stairs to enter and within, L total hip precautions, pain, mobility and increased need for assistance with ADLs/IADLs.  Recommendations for discharge: TBD POD #2  Rationale for recommendations: TBD POD #2       Entered by: Mariposa Marin 12/05/2017 5:27 PM

## 2017-12-05 NOTE — PROGRESS NOTES
[facility]  Orthopedics Progress Note             Interval History:     62 year old female admitted postoperatively for elective Left total hip arthroplasty  with Dr. JENNIFER Goodwin due to DJD unresponsive to non operative treatment.  There were no intraoperative complications.  Patient currently Post op day #1.    Patient reports doing well.  Minimal pain, eating, resting.  Lund present draining.  PT/OT going well.  No new complaints.  Has several stairs at home.    Pain: tolerable  Nausea: none  Light headedness : none  Chest pain: none  Shortness of breath: none              Assessment and Plan:   S/p L PRESLEY, day #1.  Some mild hypotension otherwise,VSS, hemodynamically asymptomatic, pain management adequate.    PLAN:  DVT proph  PT/OT  Hip restrictions  Pain control    Discharge to home tomorrow if progress continues and managed stairs.                  Physical Exam:   Patient Vitals for the past 12 hrs:   BP Temp Temp src Heart Rate Resp SpO2   12/05/17 1159 (!) 89/48 98.5  F (36.9  C) - 60 16 96 %   12/05/17 1025 116/49 - - 77 - 97 %   12/05/17 0743 (!) 83/47 97.7  F (36.5  C) - 54 15 97 %   12/05/17 0346 (!) 84/47 96.2  F (35.7  C) Oral 55 16 95 %     Hemoglobin   Date Value Ref Range Status   12/05/2017 10.4 (L) 11.7 - 15.7 g/dL Final   11/14/2017 13.8 11.7 - 15.7 g/dL Final       Patient is alert and oriented.  Vitals: stable  Neurovascular status: intact  Dressing: clean and dry  Calves: soft and non tender          Anderson Calloway PA-C  [facility] - Surgery    12/5/2017

## 2017-12-05 NOTE — PLAN OF CARE
Problem: Patient Care Overview  Goal: Plan of Care/Patient Progress Review  A&O x4. Soft BPs mid80s/40s, continued IVF. All other VSS. Reports dizziness on standing only, left pena catheter in for this reason. LS CTA all fields on 2LPM of O2. BS hypoactive, no flatus. Reinforced dressing to L hip is CDI, CMS intact. shamar PO well. up with A1 and walker. PO dilaudid 2mg managing pain. voiding in good amts. Will continue to monitor.

## 2017-12-06 ENCOUNTER — APPOINTMENT (OUTPATIENT)
Dept: OCCUPATIONAL THERAPY | Facility: CLINIC | Age: 62
DRG: 470 | End: 2017-12-06
Attending: ORTHOPAEDIC SURGERY
Payer: COMMERCIAL

## 2017-12-06 ENCOUNTER — APPOINTMENT (OUTPATIENT)
Dept: PHYSICAL THERAPY | Facility: CLINIC | Age: 62
DRG: 470 | End: 2017-12-06
Attending: ORTHOPAEDIC SURGERY
Payer: COMMERCIAL

## 2017-12-06 VITALS
BODY MASS INDEX: 33.64 KG/M2 | RESPIRATION RATE: 16 BRPM | OXYGEN SATURATION: 98 % | DIASTOLIC BLOOD PRESSURE: 61 MMHG | TEMPERATURE: 97.9 F | HEART RATE: 80 BPM | WEIGHT: 196 LBS | SYSTOLIC BLOOD PRESSURE: 110 MMHG

## 2017-12-06 LAB
GLUCOSE SERPL-MCNC: 83 MG/DL (ref 70–99)
HGB BLD-MCNC: 10.4 G/DL (ref 11.7–15.7)

## 2017-12-06 PROCEDURE — 40000193 ZZH STATISTIC PT WARD VISIT: Performed by: PHYSICAL THERAPY ASSISTANT

## 2017-12-06 PROCEDURE — 85018 HEMOGLOBIN: CPT | Performed by: ORTHOPAEDIC SURGERY

## 2017-12-06 PROCEDURE — 97535 SELF CARE MNGMENT TRAINING: CPT | Mod: GO

## 2017-12-06 PROCEDURE — 25000132 ZZH RX MED GY IP 250 OP 250 PS 637: Performed by: PHYSICIAN ASSISTANT

## 2017-12-06 PROCEDURE — 25000128 H RX IP 250 OP 636: Performed by: ORTHOPAEDIC SURGERY

## 2017-12-06 PROCEDURE — 82947 ASSAY GLUCOSE BLOOD QUANT: CPT | Performed by: ORTHOPAEDIC SURGERY

## 2017-12-06 PROCEDURE — 97530 THERAPEUTIC ACTIVITIES: CPT | Mod: GP | Performed by: PHYSICAL THERAPY ASSISTANT

## 2017-12-06 PROCEDURE — 97116 GAIT TRAINING THERAPY: CPT | Mod: GP | Performed by: PHYSICAL THERAPY ASSISTANT

## 2017-12-06 PROCEDURE — 25000132 ZZH RX MED GY IP 250 OP 250 PS 637: Performed by: ORTHOPAEDIC SURGERY

## 2017-12-06 PROCEDURE — 36415 COLL VENOUS BLD VENIPUNCTURE: CPT | Performed by: ORTHOPAEDIC SURGERY

## 2017-12-06 PROCEDURE — 40000133 ZZH STATISTIC OT WARD VISIT

## 2017-12-06 PROCEDURE — 97110 THERAPEUTIC EXERCISES: CPT | Mod: GP | Performed by: PHYSICAL THERAPY ASSISTANT

## 2017-12-06 RX ORDER — HYDROMORPHONE HYDROCHLORIDE 2 MG/1
2-4 TABLET ORAL EVERY 4 HOURS PRN
Qty: 40 TABLET | Refills: 0 | Status: SHIPPED | OUTPATIENT
Start: 2017-12-06 | End: 2018-03-13

## 2017-12-06 RX ORDER — AMOXICILLIN 250 MG
1-2 CAPSULE ORAL 2 TIMES DAILY PRN
Qty: 60 TABLET | Refills: 1 | Status: SHIPPED | OUTPATIENT
Start: 2017-12-06 | End: 2018-03-13

## 2017-12-06 RX ORDER — AMOXICILLIN 250 MG
1-2 CAPSULE ORAL 2 TIMES DAILY PRN
Status: DISCONTINUED | OUTPATIENT
Start: 2017-12-06 | End: 2017-12-06 | Stop reason: HOSPADM

## 2017-12-06 RX ORDER — ACETAMINOPHEN 325 MG/1
975 TABLET ORAL EVERY 8 HOURS
Qty: 100 TABLET | Refills: 0 | Status: SHIPPED | OUTPATIENT
Start: 2017-12-06 | End: 2018-03-22

## 2017-12-06 RX ADMIN — ACETAMINOPHEN 975 MG: 325 TABLET, FILM COATED ORAL at 13:05

## 2017-12-06 RX ADMIN — SENNOSIDES AND DOCUSATE SODIUM 2 TABLET: 8.6; 5 TABLET ORAL at 09:21

## 2017-12-06 RX ADMIN — ACETAMINOPHEN 975 MG: 325 TABLET, FILM COATED ORAL at 06:22

## 2017-12-06 RX ADMIN — HYDROMORPHONE HYDROCHLORIDE 2 MG: 2 TABLET ORAL at 13:05

## 2017-12-06 RX ADMIN — ENOXAPARIN SODIUM 40 MG: 40 INJECTION SUBCUTANEOUS at 11:27

## 2017-12-06 RX ADMIN — HYDROMORPHONE HYDROCHLORIDE 2 MG: 2 TABLET ORAL at 09:21

## 2017-12-06 RX ADMIN — RANITIDINE 150 MG: 150 TABLET ORAL at 08:31

## 2017-12-06 RX ADMIN — HYDROMORPHONE HYDROCHLORIDE 2 MG: 2 TABLET ORAL at 01:17

## 2017-12-06 NOTE — PLAN OF CARE
Problem: Patient Care Overview  Goal: Plan of Care/Patient Progress Review  Discharge Planner OT   Patient plan for discharge:Home with A  Current status: SBA- MARIBEL for LE dressing with AE. Supervision and grab bar for tub transfer  Barriers to return to prior living situation: Stairs, step-over bathtub  Recommendations for discharge: Home with A  Rationale for recommendations: Pt able to complete ADL's safely with AE or A from        Entered by: Lucille Estrada 12/06/2017 3:29 PM         Occupational Therapy Discharge Summary    Reason for therapy discharge:    All goals and outcomes met, no further needs identified.    Progress towards therapy goal(s). See goals on Care Plan in [facility] electronic health record for goal details.  Goals met    Therapy recommendation(s):    No further therapy is recommended.

## 2017-12-06 NOTE — PLAN OF CARE
Problem: Hip Arthroplasty (Total, Partial) (Adult)  Goal: Signs and Symptoms of Listed Potential Problems Will be Absent, Minimized or Managed (Hip Arthroplasty)  Signs and symptoms of listed potential problems will be absent, minimized or managed by discharge/transition of care (reference Hip Arthroplasty (Total, Partial) (Adult) CPG).   Outcome: Improving  A&O x4. VSS. LS CTA all fields. BS active x4. Reinforcement to L hip dressing is CDI, CMS intact. shamar PO well. up with A1 and walker. PO dilaudid 2mg managing pain. voiding in good amts. plans to dc home today.

## 2017-12-06 NOTE — DISCHARGE INSTRUCTIONS
POST OP TOTAL HIP INSTRUCTION:    Incision care:  To close the incision, staples will be used in most cases. The staples will be removed at the office in 10-14 days from the surgery. If you are going to a TCU (nursing home) from the hospital, the staples can be removed at the nursing home in that time frame. For first 3 days, place a water proof cover over the dressing for showers. If the dressings get wet, change it with new gauze and paper tape. The incision can be wet after 4 days from the surgery.  You can take a shower but do not soak the incision. The incision should be covered with a light gauze that is held by 2 inch paper tape until follow up.     If you have an aquacell dressing, (flesh colored rubber like bandage), you may leave this dressing in place until follow up in the clinic, unless; the dressing becomes completely saturated with blood, is leaking, gets water inside as evident by moisture appearing on the inside of the dressing, or you have a skin reaction.  In this case you should remove and use dressings like what is mentioned above.    Soft tissue: It is not unusual to have swelling in the leg (thigh down to the foot) up to 2 months from the surgery. Frequent ankle pumping, elevation of the leg above the heart level and gentle compression support with ace wrap should help with swelling. Icing regularly, for 20 minutes every hour, will also help with swelling and pain.  Due to soft tissue swelling, increased amount of redness around the incision site is also commonly seen. Progressively worsening redness along with increasing pain or increasing drainage from the wound should be a reason to alert us immediately.  You may also experience bruising.  This may occur anywhere from your toes, through the leg and even onto your torso.  It may show up even 1 week after surgery.      Medication:You will be discharged from the hospital with pain medication(s). In most cases, consistent use the pain pills can  be limited to a few days. After this period, the medication should be weaned off as soon as possible to avoid potential complications of constipation, nausea, or rash, etc. Until you can be completely off the pain pills, using them only at nighttime along with controlling the pain with over-the-counter medication(s) such as Ibuprofen/Naproxen and/or Tylenol during the day would be a good way of managing the pain.       In order to minimize the potential problem of blood clot, you'll be asked to take aspirin (low risk patients) or undergo a period of blood thinner injections. If you were on Coumadin before the surgery you will be going back to it after the surgery.    It is also recommend you take a stool softener while taking opioid prescription pain mediation to avoid constipation.  Also stay well hydrated in general during this period.    Activities:  One of the main problems related to the hip replacement is that there is 1-3% chance for dislocation. This means the ball comes out of the socket.  This is due to the fact that the size of the femoral head is going to be smaller than the native femoral head and also due to the fact that the hip capsule which supports the joint is disrupted and repaired to perform the surgery.  As a result, there is a lack of a protecting barrier until it is re-established with healing of the capsule, which takes a minimum of 4 months.  For this reason, you are asked to avoid bending your hip greater than 90 degrees of flexion for the first 4 months. This also means avoiding sitting on a very plush chair/couch and not crossing the legs.    As long as there is no fracture complication, you can put all the weight on the operated leg. Walker/crutches can be discontinued according to your own progress. The general guideline is to discontinue the walker/crutches when you feel fairly comfortable and confident with your balance. You can return to walking, swimming, golfing, double tennis but  jogging or running are not recommended.     You can lie on either side of your body as soon as you feel comfortable putting pressure on the incision. We recommend putting one or 2 pillows in between the legs when you lie on that side.     Please do not undergo procedures such as dental cleaning, oral surgery, colonoscopy as well as any type of surgery that involves a significant incision for 4 months after surgery.  In case of an emergency, you will need an antibiotic, usually amoxicillin or clindamycin is used for this purpose.  You'll be taking the medication one hour before the procedure.  Please let the provider know that you have artificial implants in your body    Also, for the rest of your life, some providers recommend you take an antibiotic for procedures such as dental cleaning, oral surgery, colonoscopy as well as any type of surgery that involves a significant incision.  Please check with the provider performing the procedure and notify them of your implant.    Follow up in clinic within 14 days after surgery.  May call [phone] to schedule.    Anderson Calloway PA-C  [facility] - Surgery  [facility]  [address] [place]  [address]  [phone]

## 2017-12-06 NOTE — DISCHARGE SUMMARY
[facility]  Discharge Summary            Interval History:     62 year old female admitted postoperatively for elective Left Total Hip arthroplasty  with Dr. JENNIFER Goodwin due to DJD unresponsive to non operative treatment.  There were no notable intraoperative complications.  Patient being discharged post op Day #2.  Gerri Owens received skilled nursing, PT, OT, SW inquiry.  There was no Hospitalist care during the stay.     Gerri Owens has progressed satisfactorily with ambulation and pain management and without medical complication.  Patient is confident in their discharge and has  met goals with PT and OT. Pt lives at home with capable spouse and will be discharged to home based on home living conditions and level of support.  Gerri Owens leaves the hospital in stable condition with good chance for recovery.  Today: doing well.  Voiding, eating, ambulating with walker, was observed doing stairs with cane.  No new complaints or concerns.  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    Pain: min.   Nausea: none.   Light headedness : none  Chest pain: none  Shortness of breath: none              Assessment and Plan:   S/P Left total hip replacement, Day #2.  Final Diagnosis: same.  VSS, Hemodynamically asymptomatic, pain management adequate.    PLAN:  DVT prophylaxis with Lovenox sub Q, as patient has history of VTE.  Pain management with Dilaudid and tylenol.  Pericolace for constipation  Hip restrictions.      Patient instructions attached to discharge.      Discharge to home  Follow up in clinic as previously scheduled, approx 10-14 days post op.    [facility]  [address]  [facility]  [address]  [phone]                    Physical Exam:   Patient Vitals for the past 12 hrs:   BP Temp Temp src Pulse Resp SpO2   12/06/17 0710 110/61 97.9  F (36.6  C) - 80 16 98 %   12/06/17 0101 111/62 97.4  F (36.3  C) Oral 86 16 95 %     Hemoglobin    Date Value Ref Range Status   12/06/2017 10.4 (L) 11.7 - 15.7 g/dL Final   12/05/2017 10.4 (L) 11.7 - 15.7 g/dL Final       Patient is alert and oriented.  Vitals: stable  Neurovascular status: intact  Dressing: clean and dry  Calves: soft and non tender          Anderson Calloway PA-C  [facility] - Surgery    12/6/2017

## 2017-12-06 NOTE — PLAN OF CARE
Problem: Patient Care Overview  Goal: Plan of Care/Patient Progress Review  Discharge Planner PT   Patient plan for discharge: home today  Current status: Mod I to Ind all mobility and gait with RW not ready for SEC yet. No DME needs, goals are all   Barriers to return to prior living situation: none  Recommendations for discharge: after conversation with PT recommend home today no issues and all goals are met  Rationale for recommendations: goals all met will sign off from skilled PT       Entered by: Ankita Azevedo 12/06/2017 11:30 AM

## 2017-12-06 NOTE — PLAN OF CARE
Problem: Hip Arthroplasty (Total, Partial) (Adult)  Goal: Signs and Symptoms of Listed Potential Problems Will be Absent, Minimized or Managed (Hip Arthroplasty)  Signs and symptoms of listed potential problems will be absent, minimized or managed by discharge/transition of care (reference Hip Arthroplasty (Total, Partial) (Adult) CPG).   Outcome: Improving  AOx4, Ax1 with transfers, gait belt and walker, able to make needs known. Left hip dressing is C/D/I. Pt c/o pain with movement, denies interventions. Pt continues to be hypotensive but is asymptomatic. Pt is voiding sufficient amounts of urine.Fluids encouraged. P,R,T WNL's.

## 2017-12-07 ENCOUNTER — TELEPHONE (OUTPATIENT)
Dept: FAMILY MEDICINE | Facility: CLINIC | Age: 62
End: 2017-12-07

## 2017-12-07 ENCOUNTER — TELEPHONE (OUTPATIENT)
Dept: ORTHOPEDICS | Facility: CLINIC | Age: 62
End: 2017-12-07

## 2017-12-07 NOTE — TELEPHONE ENCOUNTER
Total Hip replacement. Discharged yesterday.    ED / Discharge Outreach Protocol    Patient Contact    Attempt # 1    Was call answered?  No.  Left message on voicemail with information to call me back.

## 2017-12-07 NOTE — TELEPHONE ENCOUNTER
"Patient returned Donte' call. She states she is \"doing just fine\".   She states she discussed 4 to 6 weeks with Dr. Goodwin and has a sedentary job, but would like to return to work on 1/15/18 and take the full 6 weeks off.  She pays for STD and would like to take the full 6 weeks. Informed that medical necessity will need to be determined. Will discuss with Donte Calloway PA-C and get back with her.     Please advise if we are able to complete for RTW 1/15/18.     DAVIDSON White RN    "

## 2017-12-07 NOTE — TELEPHONE ENCOUNTER
NA / LVM - Surgical follow up call: Left non descript, confirming follow up and left phone number for questions.    Need to discuss RTW date to complete STD forms.    Anderson Calloway PA-C  [facility] - Surgery

## 2017-12-07 NOTE — TELEPHONE ENCOUNTER
"ED/Discharge Protocol    \"Hi, my name is DAVIDSON Jaramillo RN, a registered nurse, and I am calling on behalf of Dr. Doty's office at [facility].  I am calling to follow up and see how things are going for you after your recent visit.\"    \"I see that you were in the hospital with discharge yesterday   How are you doing now that you are home?\" fine    Is patient experiencing symptoms that may require a hospital visit?  no    Discharge Instructions    \"Let's review your discharge instructions.  What is/are the follow-up recommendations?  Pt. Response: See the orthopedic surgeon's office     \"Were you instructed to make a follow-up appointment?\"  Pt. Response: Yes.  Has appointment been made?   Yes      \"When you see the provider, I would recommend that you bring your discharge instructions with you.    Medications    \"How many new medications are you on since your hospitalization/ED visit?\"    Lovenox, pain med and stool softener  \"How many of your current medicines changed (dose, timing, name, etc.) while you were in the hospital/ED visit?\"   0-1  \"Do you have questions about your medications?\"   No  \"Were you newly diagnosed with heart failure, COPD, diabetes or did you have a heart attack?\"   No  For patients on insulin: \"Did you start on insulin in the hospital or did you have your insulin dose changed?\"   No    Medication reconciliation completed? Yes    Was MTM referral placed (*Make sure to put transitions as reason for referral)?   No    Call Summary    \"Do you have any questions or concerns about your condition or care plan at the moment?\"    No  Triage nurse advice given: follow-up as directed by orthopedic office    Patient was in ER  in the past year (assess appropriateness of ER visits.)      \"If you have questions or things don't continue to improve, we encourage you contact us through the main clinic number,  [phone].  Even if the clinic is not open, triage nurses are available 24/7 to help you.     We would " "like you to know that our clinic has extended hours (provide information).  We also have urgent care (provide details on closest location and hours/contact info)\"      \"Thank you for your time and take care!\"      "

## 2017-12-14 ENCOUNTER — OFFICE VISIT (OUTPATIENT)
Dept: ORTHOPEDICS | Facility: CLINIC | Age: 62
End: 2017-12-14
Payer: COMMERCIAL

## 2017-12-14 DIAGNOSIS — Z96.642 S/P HIP REPLACEMENT, LEFT: ICD-10-CM

## 2017-12-14 DIAGNOSIS — Z47.89 ORTHOPEDIC AFTERCARE: Primary | ICD-10-CM

## 2017-12-14 PROCEDURE — 99024 POSTOP FOLLOW-UP VISIT: CPT | Performed by: PHYSICIAN ASSISTANT

## 2017-12-14 NOTE — PROGRESS NOTES
HISTORY OF PRESENT ILLNESS:    Gerri Owens is a 62 year old female who is seen in follow up for Left PRESLEY, DOS 12/4/2017, Dr. Goodwin.  Present symptoms: Pt reports using walker, Taking lovenox though side effect of diarrhea, using tylenol for pain on schedule and Dilaudid PRN usually at night.  Following hip restrictions.  Sleep ok, couple hours at a time.  No new complaints.    Denies Chest pain, Calve pain, Fever, Chills.    Current Treatment: Post op.    PHYSICAL EXAM:  [facility] 03/04/2005  There is no height or weight on file to calculate BMI.   GENERAL APPEARANCE: healthy, alert and no distress   PSYCH:  mentation appears normal and affect normal/bright    MSK:  Left: Hip .  Ambulates: WEll with walker.  Incision clean and dry, STaples under intact aquacell present, healing.  Appropriate incisional erythema.   Yes Ecchymosis mild resolving.  No calve pain on palpation.  Edema Min at ankle.  CMS: alicia incisional numbness, otherwise grossly intact..      IMAGING INTERPRETATION:  None today.  Images read and interpreted by me.     ASSESSMENT:  Gerri Owens is a 62 year old female S/P Left PRESLEY, DOS 12/4/2017, Dr. Goodwin..    Doing well.    PLAN:  - Surgery discussed, images reviewed if applicable, and all questions were answered at this time.  - Staples removed with sterile technique, steri-strips applied in usual fashion, care instructions given and verbally acknowledged.  - Medications: as current, add immodium  - Physical therapy: new referral  - Hip restrictions  - AAT  - disability parking permit.    Return to clinic 6, weeks.    Anderson Calloway PA-C    Dept. Orthopedic Surgery  [facility]   12/14/2017

## 2017-12-14 NOTE — PATIENT INSTRUCTIONS
Incision care instructions:  Keep dry 24 hours.  Showering is ok after that time, however no soaking or scrubbing of incision for 2 weeks.  Tape-strips will most likely fall off on their own, however they may be removed after 2 weeks with rubbing alcohol if they have not.    If draining or bleeding stops the tape-strips are enough coverage unless you were instructed otherwise or you would like to cover for comfort.  If drainage or bleeding continues please cover with clean dressings.     Some providers recommend that from now on, you take a single dose antibiotic prior to any invasive procedure such as dental work, colonoscopy or minor surgeries.  Please notify the provider of the procedure of your implant prior to the procedure. They may prescribe an antibiotic for you.    It is recommended that you avoid invasive procedures such as the before mentioned for 4 months after surgery unless it is an emergency.    Continue lovenox until complete.    You may increase your activities as you can tolerate them.  Walking is a good activity.    No bending past 90 degrees at the hip joint, do not cross your legs, or perform excessive twisting at the hip for 4 months from surgery.    Physical therapy - may call [phone] to schedule.    Please follow up in 6 weeks or sooner if needed.

## 2017-12-14 NOTE — MR AVS SNAPSHOT
After Visit Summary   12/14/2017    Gerri Owens    MRN: [id number]           Patient Information     Date Of Birth          1955        Visit Information        Provider Department      12/14/2017 9:00 AM Anderson Calloway PA-C [facility] ORTHOPEDIC SURGERY        Today's Diagnoses     Orthopedic aftercare    -  1      Care Instructions    Incision care instructions:  Keep dry 24 hours.  Showering is ok after that time, however no soaking or scrubbing of incision for 2 weeks.  Tape-strips will most likely fall off on their own, however they may be removed after 2 weeks with rubbing alcohol if they have not.    If draining or bleeding stops the tape-strips are enough coverage unless you were instructed otherwise or you would like to cover for comfort.  If drainage or bleeding continues please cover with clean dressings.     Some providers recommend that from now on, you take a single dose antibiotic prior to any invasive procedure such as dental work, colonoscopy or minor surgeries.  Please notify the provider of the procedure of your implant prior to the procedure. They may prescribe an antibiotic for you.    It is recommended that you avoid invasive procedures such as the before mentioned for 4 months after surgery unless it is an emergency.    Continue lovenox until complete.    You may increase your activities as you can tolerate them.  Walking is a good activity.    No bending past 90 degrees at the hip joint, do not cross your legs, or perform excessive twisting at the hip for 4 months from surgery.    Physical therapy - may call [phone] to schedule.    Please follow up in 6 weeks or sooner if needed.            Follow-ups after your visit        Follow-up notes from your care team     Return in about 6 weeks (around 1/25/2018).      Your next 10 appointments already scheduled     Jan 30, 2018  9:00 AM CST   New Visit with Santo Espino MD   [facility]  [facility] ([facility])    [address] [address] [place] [id number] [phone]              Who to contact     If you have questions or need follow up information about today's clinic visit or your schedule please contact [facility] ORTHOPEDIC SURGERY directly at [phone].  Normal or non-critical lab and imaging results will be communicated to you by MyChart, letter or phone within 4 business days after the clinic has received the results. If you do not hear from us within 7 days, please contact the clinic through MyChart or phone. If you have a critical or abnormal lab result, we will notify you by phone as soon as possible.  Submit refill requests through [employer] or call your pharmacy and they will forward the refill request to us. Please allow 3 business days for your refill to be completed.          Additional Information About Your Visit        MyChart Information     [employer] gives you secure access to your electronic health record. If you see a primary care provider, you can also send messages to your care team and make appointments. If you have questions, please call your primary care clinic.  If you do not have a primary care provider, please call [phone] and they will assist you.        Care EveryWhere ID     This is your Care EveryWhere ID. This could be used by other organizations to access your [facility] medical records  [id number]        Your Vitals Were     Last Period                   03/04/2005            Blood Pressure from Last 3 Encounters:   12/06/17 110/61   11/14/17 112/82   11/02/17 128/83    Weight from Last 3 Encounters:   12/04/17 196 lb (88.9 kg)   11/14/17 201 lb 12 oz (91.5 kg)   11/02/17 205 lb (93 kg)              Today, you had the following     No orders found for display       Primary Care Provider Office Phone # Fax #    Karo Doty [phone] [address] [facility] [address]        Equal Access to Services      JEREMÍAS BUCK : Hadii aad ku mike Springer, waaxda luqadaha, qaybta kaalmada makayla, carol laurence hayblue urrutiaboydchristiano escobar . So [facility] [phone].    ATENCIÓN: Si habla español, tiene a graham disposición servicios gratuitos de asistencia lingüística. Llame al [phone].    We comply with applicable federal civil rights laws and [place] laws. We do not discriminate on the basis of race, color, national origin, age, disability, sex, sexual orientation, or gender identity.            Thank you!     Thank you for choosing [facility] ORTHOPEDIC SURGERY  for your care. Our goal is always to provide you with excellent care. Hearing back from our patients is one way we can continue to improve our services. Please take a few minutes to complete the written survey that you may receive in the mail after your visit with us. Thank you!             Your Updated Medication List - Protect others around you: Learn how to safely use, store and throw away your medicines at www.disposemymeds.org.          This list is accurate as of: 12/14/17  9:31 AM.  Always use your most recent med list.                   Brand Name Dispense Instructions for use Diagnosis    acetaminophen 325 MG tablet    TYLENOL    100 tablet    Take 3 tablets (975 mg) by mouth every 8 hours    Status post left hip replacement       ascorbic acid 250 MG tablet    VITAMIN C    90    Take  by mouth. 500 mg 1xqd.    Routine general medical examination at a health care facility       calcium citrate-vitamin D 315-200 MG-UNIT Tabs per tablet    CITRACAL     Take 2 tablets by mouth daily        enoxaparin 40 MG/0.4ML injection    LOVENOX    20 Syringe    Inject 0.4 mLs (40 mg) Subcutaneous every 24 hours    Venous thromboembolism (VTE) prophylaxis prescribed at discharge       GLUCOSAMINE CHONDROITIN COMPLX Tabs      Take  by mouth. 1500 mg 1xqd.    Routine general medical examination at a health care facility       HYDROmorphone 2 MG tablet    DILAUDID     40 tablet    Take 1-2 tablets (2-4 mg) by mouth every 4 hours as needed for moderate to severe pain    Status post left hip replacement       RANITIDINE HCL PO      Take 150 mg by mouth 2 times daily        senna-docusate 8.6-50 MG per tablet    SENOKOT-S;PERICOLACE    60 tablet    Take 1-2 tablets by mouth 2 times daily as needed for constipation    Constipation due to pain medication       VITAMIN D (CHOLECALCIFEROL) PO      Take 2,000 Units by mouth daily        vitamin E 400 UNIT capsule      Take 1 capsule by mouth three times a week.

## 2017-12-26 ENCOUNTER — THERAPY VISIT (OUTPATIENT)
Dept: PHYSICAL THERAPY | Facility: CLINIC | Age: 62
End: 2017-12-26
Payer: COMMERCIAL

## 2017-12-26 DIAGNOSIS — Z47.1 AFTERCARE FOLLOWING LEFT HIP JOINT REPLACEMENT SURGERY: ICD-10-CM

## 2017-12-26 DIAGNOSIS — Z96.642 PRESENCE OF LEFT HIP IMPLANT: Primary | ICD-10-CM

## 2017-12-26 DIAGNOSIS — Z96.642 AFTERCARE FOLLOWING LEFT HIP JOINT REPLACEMENT SURGERY: ICD-10-CM

## 2017-12-26 PROCEDURE — 97161 PT EVAL LOW COMPLEX 20 MIN: CPT | Mod: GP | Performed by: PHYSICAL THERAPIST

## 2017-12-26 PROCEDURE — 97110 THERAPEUTIC EXERCISES: CPT | Mod: GP | Performed by: PHYSICAL THERAPIST

## 2017-12-26 ASSESSMENT — ACTIVITIES OF DAILY LIVING (ADL)
GOING_DOWN_1_FLIGHT_OF_STAIRS: MODERATE DIFFICULTY
WALKING_INITIALLY: SLIGHT DIFFICULTY
WALKING_UP_STEEP_HILLS: UNABLE TO DO
LIGHT_TO_MODERATE_WORK: EXTREME DIFFICULTY
RECREATIONAL_ACTIVITIES: UNABLE TO DO
WALKING_15_MINUTES_OR_GREATER: UNABLE TO DO
HOS_ADL_HIGHEST_POTENTIAL_SCORE: 64
HOS_ADL_SCORE(%): 21.88
GOING_UP_1_FLIGHT_OF_STAIRS: MODERATE DIFFICULTY
HOW_WOULD_YOU_RATE_YOUR_CURRENT_LEVEL_OF_FUNCTION_DURING_YOUR_USUAL_ACTIVITIES_OF_DAILY_LIVING_FROM_0_TO_100_WITH_100_BEING_YOUR_LEVEL_OF_FUNCTION_PRIOR_TO_YOUR_HIP_PROBLEM_AND_0_BEING_THE_INABILITY_TO_PERFORM_ANY_OF_YOUR_USUAL_DAILY_ACTIVITIES?: 25
STANDING_FOR_15_MINUTES: EXTREME DIFFICULTY
HEAVY_WORK: UNABLE TO DO
DEEP_SQUATTING: UNABLE TO DO
PUTTING_ON_SOCKS_AND_SHOES: UNABLE TO DO
GETTING_INTO_AND_OUT_OF_A_BATHTUB: EXTREME DIFFICULTY
ROLLING_OVER_IN_BED: MODERATE DIFFICULTY
TWISTING/PIVOTING_ON_INVOLVED_LEG: UNABLE TO DO
STEPPING_UP_AND_DOWN_CURBS: UNABLE TO DO
HOS_ADL_ITEM_SCORE_TOTAL: 14
HOS_ADL_COUNT: 16
WALKING_DOWN_STEEP_HILLS: UNABLE TO DO
SITTING_FOR_15_MINUTES: NO DIFFICULTY AT ALL
WALKING_APPROXIMATELY_10_MINUTES: MODERATE DIFFICULTY

## 2017-12-26 NOTE — MR AVS SNAPSHOT
After Visit Summary   12/26/2017    Gerri Owens    MRN: [id number]           Patient Information     Date Of Birth          1955        Visit Information        Provider Department      12/26/2017 2:00 PM Sravanthi Fry PT [facility] Physical Therapy        Today's Diagnoses     Presence of left hip implant    -  1    Aftercare following left hip joint replacement surgery           Follow-ups after your visit        Your next 10 appointments already scheduled     Jan 02, 2018  2:00 PM CST   MANE Extremity with Sravanthi Fry PT   [facility] Physical Therapy ([facility]  )    [address] [place] [id number]   [phone]            Jan 09, 2018  2:00 PM CST   MANE Extremity with Sravanthi Fry PT   [facility] Physical Therapy ([facility]  )    [address] [place] [address]   [phone]            Jan 30, 2018  9:00 AM CST   New Visit with Santo Espino MD   [facility] ([facility])    [address] [place] [id number] [phone]              Who to contact     If you have questions or need follow up information about today's clinic visit or your schedule please contact [facility] PHYSICAL THERAPY directly at [phone].  Normal or non-critical lab and imaging results will be communicated to you by MyChart, letter or phone within 4 business days after the clinic has received the results. If you do not hear from us within 7 days, please contact the clinic through MyChart or phone. If you have a critical or abnormal lab result, we will notify you by phone as soon as possible.  Submit refill requests through [employer] or call your pharmacy and they will forward the refill request to us. Please allow 3 business days for your refill to be completed.           Additional Information About Your Visit        MyChart Information     [employer] gives you secure access to your electronic health record. If you see a primary care provider, you can also send messages to your care team and make appointments. If you have questions, please call your primary care clinic.  If you do not have a primary care provider, please call [phone] and they will assist you.        Care EveryWhere ID     This is your Care EveryWhere ID. This could be used by other organizations to access your [facility] medical records  [id number]        Your Vitals Were     Last Period                   03/04/2005            Blood Pressure from Last 3 Encounters:   12/06/17 110/61   11/14/17 112/82   11/02/17 128/83    Weight from Last 3 Encounters:   12/04/17 88.9 kg (196 lb)   11/14/17 91.5 kg (201 lb 12 oz)   11/02/17 93 kg (205 lb)              We Performed the Following     MANE Inital Eval Report     PT Eval, Low Complexity ([address])     Therapeutic Exercises        Primary Care Provider Office Phone # Fax #    Karo Doty [phone] [address]  [facility] [address]        Equal Access to Services     [facility]: Hadii aad ku hadasho Soisaiahali, waaxda luqadaha, qaybta kaalmada adeegyada, carol dang haychristinen catia escobar . So [facility] [phone].    ATENCIÓN: Si habla español, tiene a graham disposición servicios gratowjos de asistencia lingüística. Llame al [phone].    We comply with applicable federal civil rights laws and [place] laws. We do not discriminate on the basis of race, color, national origin, age, disability, sex, sexual orientation, or gender identity.            Thank you!     Thank you for choosing [facility] PHYSICAL THERAPY  for your care. Our goal is always to provide you with excellent care. Hearing back from our patients is one way we can continue to improve our services. Please take a few minutes to complete  the written survey that you may receive in the mail after your visit with us. Thank you!             Your Updated Medication List - Protect others around you: Learn how to safely use, store and throw away your medicines at www.disposemymeds.org.          This list is accurate as of: 12/26/17  4:43 PM.  Always use your most recent med list.                   Brand Name Dispense Instructions for use Diagnosis    acetaminophen 325 MG tablet    TYLENOL    100 tablet    Take 3 tablets (975 mg) by mouth every 8 hours    Status post left hip replacement       ascorbic acid 250 MG tablet    VITAMIN C    90    Take  by mouth. 500 mg 1xqd.    Routine general medical examination at a health care facility       calcium citrate-vitamin D 315-200 MG-UNIT Tabs per tablet    CITRACAL     Take 2 tablets by mouth daily        enoxaparin 40 MG/0.4ML injection    LOVENOX    20 Syringe    Inject 0.4 mLs (40 mg) Subcutaneous every 24 hours    Venous thromboembolism (VTE) prophylaxis prescribed at discharge       GLUCOSAMINE CHONDROITIN COMPLX Tabs      Take  by mouth. 1500 mg 1xqd.    Routine general medical examination at a health care facility       HYDROmorphone 2 MG tablet    DILAUDID    40 tablet    Take 1-2 tablets (2-4 mg) by mouth every 4 hours as needed for moderate to severe pain    Status post left hip replacement       RANITIDINE HCL PO      Take 150 mg by mouth 2 times daily        senna-docusate 8.6-50 MG per tablet    SENOKOT-S;PERICOLACE    60 tablet    Take 1-2 tablets by mouth 2 times daily as needed for constipation    Constipation due to pain medication       VITAMIN D (CHOLECALCIFEROL) PO      Take 2,000 Units by mouth daily        vitamin E 400 UNIT capsule      Take 1 capsule by mouth three times a week.

## 2017-12-26 NOTE — PROGRESS NOTES
[facility] Initial Evaluation  Subjective:  Patient is a 62 year old female presenting with rehab left hip hpi. The history is provided by the patient.   Gerri Owens is a 62 year old female with a left hip condition.  Condition occurred with:  Degenerative joint disease.  Condition occurred: other.  This is a new condition  12/4/17; L hip PRESLEY, 2 nights in the hospital, dc to home.    Notes uneven gait, feels like her legs are uneven.     Social: works as an RN, desk job. Stairs at home, using a walker in the middle of the night at home.    PMH: Low back pain, worked on PT  .    Patient reports pain:  Lateral and posterior.  Radiates to:  Low back.  Pain is described as aching and is intermittent and reported as 4/10.  Associated symptoms:  Loss of strength. Pain is worse in the P.M..  Symptoms are exacerbated by standing, walking, ascending stairs and descending stairs and relieved by NSAID's, ice and rest.  Since onset symptoms are rapidly improving.    Previous treatment includes physical therapy.  There was moderate improvement following previous treatment.  General health as reported by patient is good.                                              Objective:  Standing Alignment:              Knee:  Genu varus R      Gait:    Weight Bearing Status:  WBAT   Assistive Devices:  Cane  Deviations:  Hip:  Trendelenberg R and Trendelenberg LAnkle:  Push off decr LGeneral Deviations:  Base of support decr and stance time decr                                                 Hip Evaluation    Hip Strength:  : Poor QS B, poor glute control in standing with stance phase, poor lumbopelvic strength.                            Hip Palpation:    Left hip tenderness present at:   Greater Trachanter and IT Band               General     ROS    Assessment/Plan:    Patient is a 62 year old female with left side hip complaints.    Patient has the following significant findings with corresponding treatment  plan.                Diagnosis 1:  S/p L hip PRESLEY  Pain -  hot/cold therapy, manual therapy, self management, education and home program  Decreased ROM/flexibility - manual therapy, therapeutic exercise and home program  Decreased joint mobility - manual therapy, therapeutic exercise and home program  Decreased strength - therapeutic exercise, therapeutic activities and home program  Decreased proprioception - neuro re-education, gait training, therapeutic activities and home program  Impaired gait - gait training, assistive devices and home program    Therapy Evaluation Codes:   1) History comprised of:   Personal factors that impact the plan of care:      Age, Anxiety, Education, Gender, Living environment and Time since onset of symptoms.    Comorbidity factors that impact the plan of care are:      Osteoarthritis and Weakness.     Medications impacting care: Anti-inflammatory, Pain and None.  2) Examination of Body Systems comprised of:   Body structures and functions that impact the plan of care:      Hip and Lumbar spine.   Activity limitations that impact the plan of care are:      Bathing, Bending, Driving, Dressing, Lifting, Sitting, Squatting/kneeling, Stairs, Standing, Walking and Laying down.  3) Clinical presentation characteristics are:   Evolving/Changing.  4) Decision-Making    Low complexity using standardized patient assessment instrument and/or measureable assessment of functional outcome.  Cumulative Therapy Evaluation is: Low complexity.    Previous and current functional limitations:  (See Goal Flow Sheet for this information)    Short term and Long term goals: (See Goal Flow Sheet for this information)     Communication ability:  Patient appears to be able to clearly communicate and understand verbal and written communication and follow directions correctly.  Treatment Explanation - The following has been discussed with the patient:   RX ordered/plan of care  Anticipated outcomes  Possible  risks and side effects  This patient would benefit from PT intervention to resume normal activities.   Rehab potential is excellent.    Frequency:  1 X week, once daily  Duration:  for 6 weeks tapering to 2 X a month over 4 weeks  Discharge Plan:  Achieve all LTG.  Independent in home treatment program.  Return to work with or without restrictions.  Return to previous functional level by discharge.  Reach maximal therapeutic benefit.    Please refer to the daily flowsheet for treatment today, total treatment time and time spent performing 1:1 timed codes.

## 2018-01-02 ENCOUNTER — THERAPY VISIT (OUTPATIENT)
Dept: PHYSICAL THERAPY | Facility: CLINIC | Age: 63
End: 2018-01-02
Payer: COMMERCIAL

## 2018-01-02 DIAGNOSIS — Z96.642 AFTERCARE FOLLOWING LEFT HIP JOINT REPLACEMENT SURGERY: ICD-10-CM

## 2018-01-02 DIAGNOSIS — Z96.642 PRESENCE OF LEFT HIP IMPLANT: ICD-10-CM

## 2018-01-02 DIAGNOSIS — Z47.1 AFTERCARE FOLLOWING LEFT HIP JOINT REPLACEMENT SURGERY: ICD-10-CM

## 2018-01-02 PROCEDURE — 97110 THERAPEUTIC EXERCISES: CPT | Mod: GP | Performed by: PHYSICAL THERAPIST

## 2018-01-09 ENCOUNTER — MYC MEDICAL ADVICE (OUTPATIENT)
Dept: ORTHOPEDICS | Facility: CLINIC | Age: 63
End: 2018-01-09

## 2018-01-09 ENCOUNTER — THERAPY VISIT (OUTPATIENT)
Dept: PHYSICAL THERAPY | Facility: CLINIC | Age: 63
End: 2018-01-09
Payer: COMMERCIAL

## 2018-01-09 DIAGNOSIS — Z47.1 AFTERCARE FOLLOWING LEFT HIP JOINT REPLACEMENT SURGERY: ICD-10-CM

## 2018-01-09 DIAGNOSIS — Z96.642 AFTERCARE FOLLOWING LEFT HIP JOINT REPLACEMENT SURGERY: ICD-10-CM

## 2018-01-09 DIAGNOSIS — Z96.642 PRESENCE OF LEFT HIP IMPLANT: ICD-10-CM

## 2018-01-09 PROCEDURE — 97112 NEUROMUSCULAR REEDUCATION: CPT | Mod: GP | Performed by: PHYSICAL THERAPIST

## 2018-01-09 PROCEDURE — 97110 THERAPEUTIC EXERCISES: CPT | Mod: GP | Performed by: PHYSICAL THERAPIST

## 2018-01-30 ENCOUNTER — OFFICE VISIT (OUTPATIENT)
Dept: ORTHOPEDICS | Facility: CLINIC | Age: 63
End: 2018-01-30
Payer: COMMERCIAL

## 2018-01-30 DIAGNOSIS — Z47.89 ORTHOPEDIC AFTERCARE: Primary | ICD-10-CM

## 2018-01-30 PROCEDURE — 99024 POSTOP FOLLOW-UP VISIT: CPT | Performed by: PHYSICIAN ASSISTANT

## 2018-01-30 NOTE — PROGRESS NOTES
HISTORY OF PRESENT ILLNESS:    Gerri Owens is a 62 year old female who is seen in follow up for Left PRESLEY, DOS 12/4/2017, Dr. Goodwin.  Present symptoms: Pt walking better.  Using cane for longer walks.  Occasional tylenol 500 mg.  Right hip hurting, thinks may be due to increased use.  Following hip restrictions.  Walks daily.  Still doing PT, focusing on left hip strength.  Wearing lift in right shoe.  Denies Chest pain, Calve pain, Fever, Chills.    Current Treatment: POstop, restrictions, cane, PT.    PHYSICAL EXAM:  LMP 03/04/2005  There is no height or weight on file to calculate BMI.   GENERAL APPEARANCE: healthy, alert and no distress   PSYCH:  mentation appears normal and affect normal/bright    MSK:  Left: HIp .  Ambulates: Well with cane contralateral..  Incision clean and dry, well healed.  Appropriate incisional erythema.   No Ecchymosis.  No calve pain on palpation.  Edema min.  CMS: alicia incisional numbness, otherwise grossly intact.      IMAGING INTERPRETATION:  None today, formers reviewed- impression is that that the left hip is only 4-6 mm longer than pre op.  Images read and interpreted by me.     ASSESSMENT:  Gerri Owens is a 62 year old female S/P Left PRESLEY, DOS 12/4/2017, Dr. Goodwin.  Doing well functionally.  Posture issues more than anatomic affecting leg length.  It is unclear as to the level of spinal involvement.    PLAN:  - Care instructions given and verbally acknowledged.  - Medications: as current.  - Physical therapy: continue with current physical therapy  - 4 months restrictions.     Return to clinic 4 weeks.    Anderson Calloway PA-C    Dept. Orthopedic Surgery  [facility]   1/30/2018

## 2018-01-30 NOTE — PATIENT INSTRUCTIONS
Some providers recommend that from now on, you take a single dose antibiotic prior to any invasive procedure such as dental work, colonoscopy or minor surgeries.  Please notify the provider of the procedure of your implant prior to the procedure. They may prescribe an antibiotic for you.    It is recommended that you avoid invasive procedures such as the before mentioned for 4 months after surgery unless it is an emergency.    You may discontinue the aspirin regimen or return to a dose recommended by your Primary care provider 6 weeks after surgery.    You may increase your activities as you can tolerate them.  Walking is a good activity.    No bending past 90 degrees at the hip joint, do not cross your legs, or perform excessive twisting at the hip for 4 months from surgery.    Please follow up in 6 weeks.

## 2018-02-07 ENCOUNTER — THERAPY VISIT (OUTPATIENT)
Dept: PHYSICAL THERAPY | Facility: CLINIC | Age: 63
End: 2018-02-07
Payer: COMMERCIAL

## 2018-02-07 DIAGNOSIS — Z96.642 PRESENCE OF LEFT HIP IMPLANT: ICD-10-CM

## 2018-02-07 DIAGNOSIS — Z47.1 AFTERCARE FOLLOWING LEFT HIP JOINT REPLACEMENT SURGERY: ICD-10-CM

## 2018-02-07 DIAGNOSIS — Z96.642 AFTERCARE FOLLOWING LEFT HIP JOINT REPLACEMENT SURGERY: ICD-10-CM

## 2018-02-07 PROCEDURE — 97110 THERAPEUTIC EXERCISES: CPT | Mod: GP | Performed by: PHYSICAL THERAPIST

## 2018-02-07 PROCEDURE — 97112 NEUROMUSCULAR REEDUCATION: CPT | Mod: GP | Performed by: PHYSICAL THERAPIST

## 2018-02-13 ENCOUNTER — RADIANT APPOINTMENT (OUTPATIENT)
Dept: MAMMOGRAPHY | Facility: CLINIC | Age: 63
End: 2018-02-13
Payer: COMMERCIAL

## 2018-02-13 ENCOUNTER — OFFICE VISIT (OUTPATIENT)
Dept: OBGYN | Facility: CLINIC | Age: 63
End: 2018-02-13
Payer: COMMERCIAL

## 2018-02-13 VITALS
HEART RATE: 98 BPM | WEIGHT: 193 LBS | BODY MASS INDEX: 33.13 KG/M2 | SYSTOLIC BLOOD PRESSURE: 127 MMHG | TEMPERATURE: 98.3 F | DIASTOLIC BLOOD PRESSURE: 75 MMHG

## 2018-02-13 DIAGNOSIS — Z12.31 VISIT FOR SCREENING MAMMOGRAM: ICD-10-CM

## 2018-02-13 DIAGNOSIS — N95.0 POSTMENOPAUSAL BLEEDING: Primary | ICD-10-CM

## 2018-02-13 DIAGNOSIS — N95.0 POST-MENOPAUSAL BLEEDING: Primary | ICD-10-CM

## 2018-02-13 DIAGNOSIS — Z12.4 CERVICAL CANCER SCREENING: ICD-10-CM

## 2018-02-13 PROCEDURE — 87624 HPV HI-RISK TYP POOLED RSLT: CPT | Performed by: OBSTETRICS & GYNECOLOGY

## 2018-02-13 PROCEDURE — G0145 SCR C/V CYTO,THINLAYER,RESCR: HCPCS | Performed by: OBSTETRICS & GYNECOLOGY

## 2018-02-13 PROCEDURE — 77067 SCR MAMMO BI INCL CAD: CPT

## 2018-02-13 PROCEDURE — 99204 OFFICE O/P NEW MOD 45 MIN: CPT | Performed by: OBSTETRICS & GYNECOLOGY

## 2018-02-13 NOTE — Clinical Note
Thank you for the referral.  After discussion today, we made the plan to schedule hysteroscopy, D&C in the operating room for evaluation of endometrium.  Adry Tineo MD

## 2018-02-13 NOTE — PROGRESS NOTES
GYN Clinic Note  Date: 2018    CC:  Abnormal Uterine Bleeding    HPI:  Gerri Owens is a 62 year old female  presentsfor evaluation of abnormal uterine bleeding inconsultation from Ramin Doty MD.     Duration of bleeding problem: 1.5 years  Frequency of bleeding: once a week  Flow: very light.  Wears pad daily for incontinence, but wouldn't have to for this bleeding  Post-coital bleeding: not having vaginal intercourse due to dryness, hip arthritis  Pelvic pain: no    Her work-up thus far for her abnormal bleeding has included:  - TSH: 2.02 on 17  - Hgb: 10.4 on 17   - Last pap: NIL in     Patient has tried to following treatments: none.     GYN HISTORY:  Patient's last menstrual period was 2005.     Menarche: 11 (5th grade)  Menopause: 50yo  Menses: occured every 30 days lasting 3 days in duration.   STI history: no  History of abnormal pap: no  History of cervical procedures: no   Contraceptive History: OCP  The patient is sexually active with male partner.   She has the following concerns about sexual function: vaginal dryness, limitations due to hip arthritis   She reports she is satisfied in her relationship.     Patient has following risk factors for endometrial cancer:  - Unopposed estrogen exposure: No  - Obesity: Yes. Details: BMI 33  - Smoking: No  - Nulliparity: Yes. Details: didn't get  until 40yo  - Infertility: No  - Early age of menarche / late age of menopause: No  - Family history of colon cancer, ovarian cancer, and type I endometrial cancer: No    OBSTETRIC HISTORY:   Obstetric History       T0      L0     SAB0   TAB0   Ectopic0   Multiple0   Live Births0           Past Medical History:   Diagnosis Date     Advanced directives, counseling/discussion      Arthritis      Coagulation disorder (H)     Had DVT     DVT (deep vein thrombosis) in pregnancy (H)     right arm     DVT of upper extremity (deep vein thrombosis) (H) 3/27/2012      Gastroesophageal reflux disease      Hyperlipidemia LDL goal < 160      MEDICAL HISTORY OF -     left breast density     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 pertussis) infant    whooping cough       Past Surgical History:   Procedure Laterality Date     ARTHROPLASTY HIP Left 2017    Procedure: ARTHROPLASTY HIP;  Left total hip arthroplasty;  Surgeon: Rajinder Goodwin MD;  Location: [facility] OR     BREAST BIOPSY, RT/LT      left breast     TONSILLECTOMY      tonsillectomy       SOCIAL HISTORY:  Lives with .  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  Tobacco: no  Alcohol: no  Drugs: no  History of abuse:  no    Family History   Problem Relation Age of Onset     DIABETES Mother      type 2     HEART DISEASE Mother      Hypertension Mother      C.A.D. Mother       after 2nd heart attack     Hypertension Father      C.A.D. Father      mild heart attack     CANCER Father      skin cancer     HEART DISEASE Father      Arthritis Sister      rheumatoid arthritis     HEART DISEASE Brother      hereditary heart murmer     Lipids Brother      Lipids Brother      There is no family history of uterine, ovarian, breast, colon cancer.     Current Outpatient Prescriptions   Medication Sig Dispense Refill     HYDROmorphone (DILAUDID) 2 MG tablet Take 1-2 tablets (2-4 mg) by mouth every 4 hours as needed for moderate to severe pain 40 tablet 0     acetaminophen (TYLENOL) 325 MG tablet Take 3 tablets (975 mg) by mouth every 8 hours 100 tablet 0     senna-docusate (SENOKOT-S;PERICOLACE) 8.6-50 MG per tablet Take 1-2 tablets by mouth 2 times daily as needed for constipation 60 tablet 1     enoxaparin (LOVENOX) 40 MG/0.4ML injection Inject 0.4 mLs (40 mg) Subcutaneous every 24 hours 20 Syringe 0     VITAMIN D,  CHOLECALCIFEROL, PO Take 2,000 Units by mouth daily       RANITIDINE HCL PO Take 150 mg by mouth 2 times daily        calcium citrate-vitamin D (CITRACAL) 315-200 MG-UNIT TABS per tablet Take 2 tablets by mouth daily       vitamin E 400 UNIT capsule Take 1 capsule by mouth three times a week.       GLUCOSAMINE CHONDROITIN COMPLX OR TABS Take  by mouth. 1500 mg 1xqd.   0     VITAMIN C 250 MG OR TABS Take  by mouth. 500 mg 1xqd.  90 0       Allergies: Nickel    ROS:  C: NEGATIVE for fever, chills, change in weight  I: NEGATIVE for worrisome rashes, moles or lesions  E: NEGATIVE for vision changes or irritation  E/M: NEGATIVE for ear, mouth and throat problems  R: NEGATIVE for significant cough or SOB  CV: NEGATIVE for chest pain, palpitations or peripheral edema  GI: NEGATIVE for nausea, abdominal pain, heartburn, or change in bowel habits  : +abnormal bleeding as above. NEGATIVE for frequency, dysuria, hematuria, vaginal discharge  M: NEGATIVE for significant arthralgias or myalgia  N: NEGATIVE for weakness, dizziness or paresthesias  E: NEGATIVE for temperature intolerance, skin/hair changes  P: NEGATIVE for changes in mood or affect    EXAM:  Last menstrual period 03/04/2005, not currently breastfeeding.   BMI= Body mass index is 33.13 kg/(m^2).  General - pleasant female in no acute distress.  HEENT:  Normocephalic, atraumatic.  No cervical LAD  CV:  RRR  Pulm:  CTAB  Abdomen - soft, nontender, nondistended, no hepatosplenomegaly.  Pelvic - external genitalia: normal adult female without lesions or abnormalities; BUS: within normal limits; Vagina: significant atrophy, no discharge, no lesions; Cervix: no lesions or CMT, but also atrophied; Uterus: anteverted, unable to palpate but appears nontender; Adnexae: no masses or tenderness.  Rectovaginal - deferred.  Musculoskeletal - no gross deformities.  Neurological - normal strength, sensation, and mental status.  Psych:  Appropriate mood and  affect      ASSESSMENT:  Gerri Owens is a 62 year old  who presents with abnormal uterine bleeding. Discussed differential diagnosis: vaginal bleeding from atrophy, endometrial polyp or fibroid, hyperplasia or malignancy     PLAN:  Discussed options for evaluation including endometrial biopsy and ultrasound.  Given significant atrophy on exam and nulliparous state, uncertain that office biopsy would be successful.  Discussed biopsy as gold standard for evaluation.  However, if lining thin on US, malignancy is considered unlikely.  After review of this information, patient would like to proceed with biopsy, but in the OR.  Given the high likelihood for failed office biopsy, this is not unreasonable.  Will schedule with ultrasound guidance.  Reviewed risks of hysteroscopy D&C.  Risk include bleeding, infection, uterine perforation, cervical laceration, injury to other organs, VTE, risks of anesthesia.    As patient was instructed to defer any procedures until 4 weeks after hip replacement, this procedure will be scheduled after 2018.  Will need to see PCP for pre-op clearance.    Transvaginal ultrasound: deferred as patient would like biopsy in OR   Cervical cancer screening: pap smear obtained today    Adry Tineo MD

## 2018-02-13 NOTE — MR AVS SNAPSHOT
After Visit Summary   2/13/2018    Gerri Owens    MRN: [id number]           Patient Information     Date Of Birth          1955        Visit Information        Provider Department      2/13/2018 10:00 AM Adry Tineo MD [facility]        Today's Diagnoses     Post-menopausal bleeding    -  1    Cervical cancer screening           Follow-ups after your visit        Your next 10 appointments already scheduled     Feb 20, 2018 10:10 AM CST   MANE Extremity with Sravanthi Fry PT   [facility] ([facility]  )    [address] [place] [id number]   [phone]            Mar 06, 2018 10:10 AM CST   MANE Extremity with Sravanthi Fry PT   [facility] ([facility]  )    [address] [place] [address]   [phone]            Apr 09, 2018  8:30 AM CDT   LAB with RD LAB   [facility] ([facility])    [address] [place] [id number] [phone]           Please do not eat 10-12 hours before your appointment if you are coming in fasting for labs on lipids, cholesterol, or glucose (sugar). This does not apply to pregnant women. Water, hot tea and black coffee (with nothing added) are okay. Do not drink other fluids, diet soda or chew gum.            Apr 11, 2018   Procedure with Adry Tineo MD   [facility], Same Day Surgery (--)    [address] [place] [id number] [phone]            Apr 11, 2018  7:30 AM CDT   (Arrive by 7:15 AM)   US INTRAOPERATIVE with URUS3   [facility], Ultrasound ([facility])    [address] [place] [address] [phone]           Please bring a list of your medicines (including vitamins, minerals and over-the-counter drugs). Also, tell your doctor about any allergies  you may have. Wear comfortable clothes and leave your valuables at home.  You do not need to do anything special to prepare for your exam.  Please call the Imaging Department at your exam site with any questions.              Future tests that were ordered for you today     Open Future Orders        Priority Expected Expires Ordered    US Intraoperative Routine 4/11/2018 2/13/2019 2/13/2018            Who to contact     If you have questions or need follow up information about today's clinic visit or your schedule please contact [facility] directly at [phone].  Normal or non-critical lab and imaging results will be communicated to you by [employer], letter or phone within 4 business days after the clinic has received the results. If you do not hear from us within 7 days, please contact the clinic through [employer] or phone. If you have a critical or abnormal lab result, we will notify you by phone as soon as possible.  Submit refill requests through [employer] or call your pharmacy and they will forward the refill request to us. Please allow 3 business days for your refill to be completed.          Additional Information About Your Visit        [employer] Information     [employer] gives you secure access to your electronic health record. If you see a primary care provider, you can also send messages to your care team and make appointments. If you have questions, please call your primary care clinic.  If you do not have a primary care provider, please call [phone] and they will assist you.        Care EveryWhere ID     This is your Care EveryWhere ID. This could be used by other organizations to access your [place] medical records  [id number]        Your Vitals Were     Pulse Temperature Last Period BMI (Body Mass Index)          98 98.3  F (36.8  C) (Oral) 03/04/2005 33.13 kg/m2         Blood Pressure from Last 3 Encounters:   02/13/18 127/75   12/06/17 110/61   11/14/17 112/82    Weight from Last 3  Encounters:   02/13/18 193 lb (87.5 kg)   12/04/17 196 lb (88.9 kg)   11/14/17 201 lb 12 oz (91.5 kg)              We Performed the Following     Pap imaged thin layer screen with HPV - recommended age 30 - 65 years (select HPV order below)        Primary Care Provider Office Phone # Fax #    Karo Doty [phone]       [address]  [facility] [address]        Equal Access to Services     JEREMÍAS BUCK : Hadii aad ku hadasho Soomaali, waaxda luqadaha, qaybta kaalmada adeegyada, waxay idiin hayaan adeeg kharachristiano laeleazar . So [facility] [phone].    ATENCIÓN: Si habla español, tiene a graham disposición servicios gratuitos de asistencia lingüística. [facility] [phone].    We comply with applicable federal civil rights laws and [place] laws. We do not discriminate on the basis of race, color, national origin, age, disability, sex, sexual orientation, or gender identity.            Thank you!     Thank you for choosing [facility]  for your care. Our goal is always to provide you with excellent care. Hearing back from our patients is one way we can continue to improve our services. Please take a few minutes to complete the written survey that you may receive in the mail after your visit with us. Thank you!             Your Updated Medication List - Protect others around you: Learn how to safely use, store and throw away your medicines at www.disposemymeds.org.          This list is accurate as of 2/13/18 12:06 PM.  Always use your most recent med list.                   Brand Name Dispense Instructions for use Diagnosis    acetaminophen 325 MG tablet    TYLENOL    100 tablet    Take 3 tablets (975 mg) by mouth every 8 hours    Status post left hip replacement       ascorbic acid 250 MG tablet    VITAMIN C    90    Take  by mouth. 500 mg 1xqd.    Routine general medical examination at a health care facility       calcium citrate-vitamin D 315-200 MG-UNIT Tabs per tablet    CITRACAL      Take 2 tablets by mouth daily        enoxaparin 40 MG/0.4ML injection    LOVENOX    20 Syringe    Inject 0.4 mLs (40 mg) Subcutaneous every 24 hours    Venous thromboembolism (VTE) prophylaxis prescribed at discharge       GLUCOSAMINE CHONDROITIN COMPLX Tabs      Take  by mouth. 1500 mg 1xqd.    Routine general medical examination at a health care facility       HYDROmorphone 2 MG tablet    DILAUDID    40 tablet    Take 1-2 tablets (2-4 mg) by mouth every 4 hours as needed for moderate to severe pain    Status post left hip replacement       RANITIDINE HCL PO      Take 150 mg by mouth 2 times daily        senna-docusate 8.6-50 MG per tablet    SENOKOT-S;PERICOLACE    60 tablet    Take 1-2 tablets by mouth 2 times daily as needed for constipation    Constipation due to pain medication       VITAMIN D (CHOLECALCIFEROL) PO      Take 2,000 Units by mouth daily        vitamin E 400 UNIT capsule      Take 1 capsule by mouth three times a week.

## 2018-02-13 NOTE — NURSING NOTE
"Chief Complaint   Patient presents with     Vaginal Problem     Spotting       Initial /75  Pulse 98  Temp 98.3  F (36.8  C) (Oral)  Wt 193 lb (87.5 kg)  LMP 2005  BMI 33.13 kg/m2 Estimated body mass index is 33.13 kg/(m^2) as calculated from the following:    Height as of 17: 5' 4\" (1.626 m).    Weight as of this encounter: 193 lb (87.5 kg).  BP completed using cuff size: regular        The following HM Due: pap smear      The following patient reported/Care Every where data was sent to:  P ABSTRACT QUALITY INITIATIVES [08213]  DOUGLAS Chamorro           "

## 2018-02-13 NOTE — Clinical Note
Surgeon: Adry Tineo Assistant: yes resident Procedure: hysteroscopy, D&C under US Diagnosis: post-menopausal bleeding Length of surgery: 60min Morning admit: No Day/Time Preference: after April 4 Anesthesia: IV sedation with paracervical block Pre-op: With Primary Latex Allergy: No Want pre op labs done: Yes

## 2018-02-15 ENCOUNTER — TELEPHONE (OUTPATIENT)
Dept: FAMILY MEDICINE | Facility: CLINIC | Age: 63
End: 2018-02-15

## 2018-02-15 DIAGNOSIS — R92.8 ABNORMALITY OF LEFT BREAST ON SCREENING MAMMOGRAM: Primary | ICD-10-CM

## 2018-02-15 LAB
COPATH REPORT: NORMAL
PAP: NORMAL

## 2018-02-15 NOTE — PROGRESS NOTES
Flor Ms. Owens,  I got your routine screening mammogram back today and it says it sees some shadows on the left breast that need further investigation with a diagnostic mammogram and ultrasound. I will put those orders in and can schedule those in next week.  I see you had your joint surgery. Hope it went well and that you are now seeing gyn as discussed.    If you have any further concerns please do not hesitate to contact us by message, phone or making an appointment.  Have a good day   Dr Soren MATHIS

## 2018-02-15 NOTE — TELEPHONE ENCOUNTER
diag mammogram dn u/s ordered and message net on abnormal screening mammogram to Gerri via my chart

## 2018-02-16 LAB
FINAL DIAGNOSIS: NORMAL
HPV HR 12 DNA CVX QL NAA+PROBE: NEGATIVE
HPV16 DNA SPEC QL NAA+PROBE: NEGATIVE
HPV18 DNA SPEC QL NAA+PROBE: NEGATIVE
SPECIMEN DESCRIPTION: NORMAL
SPECIMEN SOURCE CVX/VAG CYTO: NORMAL

## 2018-02-26 DIAGNOSIS — R94.4 ABNORMAL RENAL FUNCTION TEST: Primary | ICD-10-CM

## 2018-02-27 ENCOUNTER — RADIANT APPOINTMENT (OUTPATIENT)
Dept: MAMMOGRAPHY | Facility: CLINIC | Age: 63
End: 2018-02-27
Attending: FAMILY MEDICINE
Payer: OTHER GOVERNMENT

## 2018-02-27 DIAGNOSIS — R92.8 ABNORMALITY OF LEFT BREAST ON SCREENING MAMMOGRAM: ICD-10-CM

## 2018-02-27 NOTE — LETTER
Gerri Owens  [address] [facility] [address]        February 27, 2018  Date of Exam: 2/27/2018      Dear Gerri:    Thank you for your recent visit.  Mammogram Result: Normal. We are pleased to inform you that the interpretation of your recent mammography did not find cancer.    Breast Problems: If you are experiencing any breast problems such as a lump or localized pain we request that you discuss this with your health care provider if you haven t already done so, as additional testing may be necessary.    Your Next Mammogram: The American College of Radiology and the Society of Breast Imaging recommend a yearly screening mammogram beginning at the age of 40 as the most effective way of saving lives from breast cancer. Please be aware that other screening recommendations do exist.    Mammogram Records: A report of your mammogram results was sent to the health care provider you indicated. Your mammogram and report will become part of your medical file here at [facility] for at least 10 years. You are responsible for informing any new health care provider or mammography facility of the date and location of this examination.     We appreciate the opportunity to participate in your health care.    Sincerely,    Dr. Angelia Ga  Interpreting Radiologist  [facility]

## 2018-02-27 NOTE — PROGRESS NOTES
Flor MsAarti Roman,  Your left diagnostic mammogram results were normal  If you have any further concerns please do not hesitate to contact us by message, phone or making an appointment.  Have a good day   Dr Soren MATHIS

## 2018-03-07 ASSESSMENT — ENCOUNTER SYMPTOMS
HEARTBURN: 1
STIFFNESS: 1
ARTHRALGIAS: 1

## 2018-03-13 ENCOUNTER — RADIANT APPOINTMENT (OUTPATIENT)
Dept: ULTRASOUND IMAGING | Facility: CLINIC | Age: 63
End: 2018-03-13
Attending: INTERNAL MEDICINE
Payer: OTHER GOVERNMENT

## 2018-03-13 ENCOUNTER — OFFICE VISIT (OUTPATIENT)
Dept: NEPHROLOGY | Facility: CLINIC | Age: 63
End: 2018-03-13
Attending: INTERNAL MEDICINE
Payer: COMMERCIAL

## 2018-03-13 VITALS
OXYGEN SATURATION: 99 % | TEMPERATURE: 98.2 F | BODY MASS INDEX: 31.79 KG/M2 | HEIGHT: 64 IN | HEART RATE: 76 BPM | WEIGHT: 186.2 LBS | SYSTOLIC BLOOD PRESSURE: 103 MMHG | DIASTOLIC BLOOD PRESSURE: 72 MMHG

## 2018-03-13 DIAGNOSIS — R31.9 HEMATURIA, UNSPECIFIED TYPE: Primary | ICD-10-CM

## 2018-03-13 DIAGNOSIS — N28.9 ABNORMAL KIDNEY FUNCTION: ICD-10-CM

## 2018-03-13 DIAGNOSIS — R94.4 ABNORMAL RENAL FUNCTION TEST: ICD-10-CM

## 2018-03-13 DIAGNOSIS — R31.9 HEMATURIA: ICD-10-CM

## 2018-03-13 DIAGNOSIS — R31.9 HEMATURIA: Primary | ICD-10-CM

## 2018-03-13 LAB
ALBUMIN SERPL-MCNC: 3.6 G/DL (ref 3.4–5)
ALBUMIN UR-MCNC: 30 MG/DL
ANION GAP SERPL CALCULATED.3IONS-SCNC: 6 MMOL/L (ref 3–14)
APPEARANCE UR: ABNORMAL
BILIRUB UR QL STRIP: NEGATIVE
BUN SERPL-MCNC: 15 MG/DL (ref 7–30)
CALCIUM SERPL-MCNC: 9.3 MG/DL (ref 8.5–10.1)
CHLORIDE SERPL-SCNC: 106 MMOL/L (ref 94–109)
CO2 SERPL-SCNC: 28 MMOL/L (ref 20–32)
COLOR UR AUTO: YELLOW
CREAT SERPL-MCNC: 0.93 MG/DL (ref 0.52–1.04)
CREAT UR-MCNC: 210 MG/DL
ERYTHROCYTE [DISTWIDTH] IN BLOOD BY AUTOMATED COUNT: 12.8 % (ref 10–15)
FERRITIN SERPL-MCNC: 18 NG/ML (ref 8–252)
GFR SERPL CREATININE-BSD FRML MDRD: 61 ML/MIN/1.7M2
GLUCOSE SERPL-MCNC: 85 MG/DL (ref 70–99)
GLUCOSE UR STRIP-MCNC: NEGATIVE MG/DL
HCT VFR BLD AUTO: 44.3 % (ref 35–47)
HGB BLD-MCNC: 13.8 G/DL (ref 11.7–15.7)
HGB UR QL STRIP: ABNORMAL
IRON SATN MFR SERPL: 15 % (ref 15–46)
IRON SERPL-MCNC: 51 UG/DL (ref 35–180)
KETONES UR STRIP-MCNC: NEGATIVE MG/DL
LEUKOCYTE ESTERASE UR QL STRIP: ABNORMAL
MCH RBC QN AUTO: 28.4 PG (ref 26.5–33)
MCHC RBC AUTO-ENTMCNC: 31.2 G/DL (ref 31.5–36.5)
MCV RBC AUTO: 91 FL (ref 78–100)
MUCOUS THREADS #/AREA URNS LPF: PRESENT /LPF
NITRATE UR QL: NEGATIVE
PH UR STRIP: 5 PH (ref 5–7)
PHOSPHATE SERPL-MCNC: 2.9 MG/DL (ref 2.5–4.5)
PLATELET # BLD AUTO: 225 10E9/L (ref 150–450)
POTASSIUM SERPL-SCNC: 4 MMOL/L (ref 3.4–5.3)
PROT UR-MCNC: 0.46 G/L
PROT/CREAT 24H UR: 0.22 G/G CR (ref 0–0.2)
PTH-INTACT SERPL-MCNC: 34 PG/ML (ref 18–80)
RBC # BLD AUTO: 4.86 10E12/L (ref 3.8–5.2)
RBC #/AREA URNS AUTO: 122 /HPF (ref 0–2)
SODIUM SERPL-SCNC: 140 MMOL/L (ref 133–144)
SOURCE: ABNORMAL
SP GR UR STRIP: 1.03 (ref 1–1.03)
SQUAMOUS #/AREA URNS AUTO: <1 /HPF (ref 0–1)
TIBC SERPL-MCNC: 337 UG/DL (ref 240–430)
UROBILINOGEN UR STRIP-MCNC: 0 MG/DL (ref 0–2)
WBC # BLD AUTO: 6.3 10E9/L (ref 4–11)
WBC #/AREA URNS AUTO: 28 /HPF (ref 0–5)

## 2018-03-13 PROCEDURE — 83540 ASSAY OF IRON: CPT | Performed by: INTERNAL MEDICINE

## 2018-03-13 PROCEDURE — 87086 URINE CULTURE/COLONY COUNT: CPT | Performed by: INTERNAL MEDICINE

## 2018-03-13 PROCEDURE — G0463 HOSPITAL OUTPT CLINIC VISIT: HCPCS | Mod: ZF

## 2018-03-13 PROCEDURE — 85027 COMPLETE CBC AUTOMATED: CPT | Performed by: INTERNAL MEDICINE

## 2018-03-13 PROCEDURE — 82728 ASSAY OF FERRITIN: CPT | Performed by: INTERNAL MEDICINE

## 2018-03-13 PROCEDURE — 80069 RENAL FUNCTION PANEL: CPT | Performed by: INTERNAL MEDICINE

## 2018-03-13 PROCEDURE — 81001 URINALYSIS AUTO W/SCOPE: CPT | Performed by: INTERNAL MEDICINE

## 2018-03-13 PROCEDURE — 84156 ASSAY OF PROTEIN URINE: CPT | Performed by: INTERNAL MEDICINE

## 2018-03-13 PROCEDURE — 83550 IRON BINDING TEST: CPT | Performed by: INTERNAL MEDICINE

## 2018-03-13 PROCEDURE — 83970 ASSAY OF PARATHORMONE: CPT | Performed by: INTERNAL MEDICINE

## 2018-03-13 PROCEDURE — 82306 VITAMIN D 25 HYDROXY: CPT | Performed by: INTERNAL MEDICINE

## 2018-03-13 PROCEDURE — 36415 COLL VENOUS BLD VENIPUNCTURE: CPT | Performed by: INTERNAL MEDICINE

## 2018-03-13 ASSESSMENT — PAIN SCALES - GENERAL: PAINLEVEL: NO PAIN (0)

## 2018-03-13 NOTE — PATIENT INSTRUCTIONS
1. Your kidney function have improved  2. We will obtain renal Ultrasound  3. Your should have a repeat urine analysis after your vaginal bleeding resolves  4. Avoid NSAIDs  5. Continue ranitidine  6. Follow up with nephrology clinic as needed

## 2018-03-13 NOTE — NURSING NOTE
"Chief Complaint   Patient presents with     New Patient     abnormal kidney levels consult       Initial /72  Pulse 76  Temp 98.2  F (36.8  C) (Oral)  Ht 1.626 m (5' 4\")  Wt 84.5 kg (186 lb 3.2 oz)  LMP 03/04/2005  SpO2 99%  BMI 31.96 kg/m2 Estimated body mass index is 31.96 kg/(m^2) as calculated from the following:    Height as of this encounter: 1.626 m (5' 4\").    Weight as of this encounter: 84.5 kg (186 lb 3.2 oz).  Medication Reconciliation: complete   ESTELA GAITAN CMA      "

## 2018-03-13 NOTE — PROGRESS NOTES
Assessment and Plan:   1. Stage II CKD-baseline serum creatinine of 0.8 mg/dL and GFR of 69 ml/min/1.73m2 BSA likely from chronic NSAIDs use. Low range proteinuria, protein-to-creatinine ratio is 0.22 g/gCr.Microscopic hematuria( hpf) is likely secondary to ongoing uterine bleeding.   -we will obtain Renal Ultrasound to assess renal anatomy and rule out nephrolithiasis or renal mass  -patient should have a repeat urine analysis after her uterine bleeding resolved. She should have further evaluation by urologist if her hematuria persist  -patient was advised to avoid NSAIDs  -patient should follow up with nephrology clinic as needed    2.Electrolytes/ Acid/Base status:-within acceptable limits    3. Blood pressure/Volume status: Patient's blood pressure is stable, controlled. Patient is not on antihypertensive medications at home. Patient looks euvolemic on exam    4. Anemia of acute blood loss: Likely secondary to blood loss during  Left PRESLEY n 12/2017. Resolved. Hgb is within normal limits 13.8 g/dl. Patient is iron replete .     7. BMD-normal serum calcium and phosphorus. Normal PTH level. She dose have elevated vitamin D level of 78 ug/L. Patient was advised to discontinue vitamin D supplements.    8. GERD-continue ranitidine. Avoid PPIs.       Assessment and plan was discussed with patient and she voiced her understanding and agreement.    I have seen and discussed the patient with Dr.Manske Clare Garcia MD  Nephrology Fellow          Consult:  Gerri Owens was seen in consultation at the request of Dr. Doty for CKD management.    Reason for Visit:  Gerri Owens is a 62 year old female with CKD from , who presents for further evaluation    HPI:  A 62 year old female with PMHx significant for one episode of unprovoked deep vein thrombosis in the right upper extremity in 3/2012 treated with coumadin (patient reports negative extensive work up, she is not on chronic anticoagulation),  hyperlipidemia, GERD and osteoarthritis. Patient states that she was found to have elevated creatinine of 1.6 mg/dl during routing pre-op blood work in November of 2017. Patient was scheduled to have  right total hip replacement in December of 2017. Her previous creatinine in 9/2012 was within normal limits at 0.85 mg/dl. Patient did not have creatinine checked in between.      Patient reports taking OCT Ibuprofen 200 mg every 4 hours for few months, then OCT Naproxen 500 mg every 6 hours for few weeks prior to surgery. The patient was evaluated by her primary physician  who discontinued over-the-counter NSAIDs ( naproxen and ibuprofen) and switched to Acetaminophen 975 mg every 9 hours as needed. Patient reports history of acid reflex. She has been taking OCT Prevacid for many years. Prevacid was discontinued as well by patient's PCP in 11/2017. Patient states that she takes OCT Ranitidine 150 mg twice daily with good control of her GERD symptoms.  Subsequently, patient's creatinine improved down to 0.97 mg/dl on day of surgery 12/04/2017.     Today laboratory work up showed creatinine of 0.93 mg/dl. Urine analysis showed 30 g/gcr of protein albumin, moderate blood, 122 RBC hpf, pyuria with 28 WBC. Patient states that she developed vaginal bleeding about 1.5 years ago. She gets them 1-2 times per week. Patient states that she had vaginal bleeding this morning. She was evaluated by gynecologist in February of 2018 and planning to have an endometrial biopsy in near future.     Patient denies: fever, chills, weight loss, dizziness, adenopathy, sore throat, rhinorrhea, cough, shortness of breath , chest pain, palpitations, orthopnea, nausea, vomiting, abdominal pain, changes in bowel habits, dysuria, urinary frequency, urgency, hematuria, rash.  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 She is planning to retire in April of 2018. She walks one mile a day. And lost ~15lbs in last  3 months.           Kidney Disease and Medical Hx:       h/o HTN: No  Usual BP: 110/70 mmHg       h/o DM: No       h/o Protein in Urine: No       h/o Blood in Urine: No       h/o Kidney Stones:No       h/o UTI  No       h/o Chronic NSAID Use: Yes         Previous Transplant Hx:      No         Uremic Symptoms:       Fatigue: No; Cold: No; Nausea: No; Poor Appetite: No; Metallic Taste: No; Edema: No; Pruritis: No; Tremor: No;    ROS:   A comprehensive review of systems was obtained and negative, except as noted in the HPI or PMH.    Active Medical Problems:  Patient Active Problem List   Diagnosis     Advanced directives, counseling/discussion     Hip pain, left     Primary osteoarthritis of both hips     History of deep venous thrombosis     Hyperlipidemia, unspecified hyperlipidemia type     BMI 35.0-35.9,adult     Abnormal renal function test     Hip osteoarthritis     Aftercare following left hip joint replacement surgery     Presence of left hip implant     PMH:   Medical record was reviewed and PMH was discussed with patient and noted below.  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  7/17/2012     Renal disease     Did have some abnormal labs but improved after stopping advil     Unspecified episodic mood disorder      Whooping cough due to Bordetella pertussis (p. pertussis) infant    whooping cough     PSH:   Past Surgical History:   Procedure Laterality Date     ARTHROPLASTY HIP Left 12/4/2017    Procedure: ARTHROPLASTY HIP;  Left total hip arthroplasty;  Surgeon: Rajinder Goodwin MD;  Location:   OR     BREAST BIOPSY, RT/LT      left breast     TONSILLECTOMY  1960    tonsillectomy       Family Hx:   Family History   Problem Relation Age of Onset     DIABETES Mother      type 2     HEART DISEASE Mother      Hypertension Mother      C.A.D. Mother       after 2nd heart attack     Hypertension Father      C.A.D. Father      mild heart attack     CANCER Father      skin cancer     HEART DISEASE Father      Arthritis Sister      rheumatoid arthritis     HEART DISEASE Brother      hereditary heart murmer     Lipids Brother      Lipids Brother      KIDNEY DISEASE No family hx of      Personal Hx:   Social History     Social History     Marital status:      Spouse name: Doug     Number of children: 0     Years of education: N/A     Occupational History     rn [employer]     Social History Main Topics     Smoking status: Never Smoker     Smokeless tobacco: Never Used     Alcohol use Yes      Comment: 1 glass of wine weekly     Drug use: No     Sexual activity: Yes     Partners: Male     Other Topics Concern     Parent/Sibling W/ Cabg, Mi Or Angioplasty Before 65f 55m? No     Social History Narrative    Balanced Diet - Yes, in the last 6 months    Osteoporosis Prevention Measures - Dairy servings per day: 2 servings plus a supplement    Regular Exercise -  Yes Describe walks for 30 to 40 minutes 4 to 5 times a week    Dental Exam - YES - Date: 1 year ago, and is going today    Eye Exam - YES - Date: 4 months ago    Self Breast Exam - Yes    Abuse: Current or Past (Physical, Sexual or Emotional)- No    Do you feel safe in your environment - Yes    Guns stored in the home - Yes, locked    Sunscreen used - Yes    Seatbelts used - Yes    Lipids -  YES - Date: 10-02    Glucose -  YES - Date: 10-02    Colon Cancer Screening - No    Hemoccults - NO    Pap Test -  YES - Date: 10-02    Do you have any concerns about STD's -  No    Mammography - YES - Date:     DEXA - NO    Immunizations reviewed and  "up to date - Yes, lst td 7-2000    10-23-07  MEL Mueller CMA       Allergies:  Allergies   Allergen Reactions     Nickel Rash       Medications:  Prior to Admission medications    Medication Sig Start Date End Date Taking? Authorizing Provider   acetaminophen (TYLENOL) 325 MG tablet Take 3 tablets (975 mg) by mouth every 8 hours 12/6/17  Yes Anderson Calloway PA-C   VITAMIN D, CHOLECALCIFEROL, PO Take 2,000 Units by mouth daily   Yes Reported, Patient   RANITIDINE HCL PO Take 150 mg by mouth 2 times daily    Yes Reported, Patient   calcium citrate-vitamin D (CITRACAL) 315-200 MG-UNIT TABS per tablet Take 2 tablets by mouth daily   Yes Reported, Patient   vitamin E 400 UNIT capsule Take 1 capsule by mouth three times a week.   Yes Reported, Patient   GLUCOSAMINE CHONDROITIN COMPLX OR TABS Take  by mouth. 1500 mg 1xqd.  10/23/07  Yes Spatz, Lisa, MD   VITAMIN C 250 MG OR TABS Take  by mouth. 500 mg 1xqd.  10/23/07  Yes Spatz, Lisa, MD     Vitals:  /72  Pulse 76  Temp 98.2  F (36.8  C) (Oral)  Ht 1.626 m (5' 4\")  Wt 84.5 kg (186 lb 3.2 oz)  LMP 03/04/2005  SpO2 99%  BMI 31.96 kg/m2    Exam:  GENERAL APPEARANCE: alert and no distress  HENT: mouth without ulcers or lesions  LYMPHATICS: no cervical or supraclavicular nodes  RESP: lungs clear to auscultation - no rales, rhonchi or wheezes  CV: regular rhythm, normal rate, no rub, no murmur  EDEMA: no LE edema bilaterally  ABDOMEN: soft, nondistended, nontender, bowel sounds normal  MS: extremities normal - no gross deformities noted, no evidence of inflammation in joints, no muscle tenderness  SKIN: no rash      Results:  Recent Results (from the past 336 hour(s))   Renal panel    Collection Time: 03/13/18 11:51 AM   Result Value Ref Range    Sodium 140 133 - 144 mmol/L    Potassium 4.0 3.4 - 5.3 mmol/L    Chloride 106 94 - 109 mmol/L    Carbon Dioxide 28 20 - 32 mmol/L    Anion Gap 6 3 - 14 mmol/L    Glucose 85 70 - 99 mg/dL    Urea Nitrogen 15 7 - 30 " mg/dL    Creatinine 0.93 0.52 - 1.04 mg/dL    GFR Estimate 61 >60 mL/min/1.7m2    GFR Estimate If Black 74 >60 mL/min/1.7m2    Calcium 9.3 8.5 - 10.1 mg/dL    Phosphorus 2.9 2.5 - 4.5 mg/dL    Albumin 3.6 3.4 - 5.0 g/dL   CBC with platelets    Collection Time: 03/13/18 11:51 AM   Result Value Ref Range    WBC 6.3 4.0 - 11.0 10e9/L    RBC Count 4.86 3.8 - 5.2 10e12/L    Hemoglobin 13.8 11.7 - 15.7 g/dL    Hematocrit 44.3 35.0 - 47.0 %    MCV 91 78 - 100 fl    MCH 28.4 26.5 - 33.0 pg    MCHC 31.2 (L) 31.5 - 36.5 g/dL    RDW 12.8 10.0 - 15.0 %    Platelet Count 225 150 - 450 10e9/L   Ferritin    Collection Time: 03/13/18 11:51 AM   Result Value Ref Range    Ferritin 18 8 - 252 ng/mL   Iron and iron binding capacity    Collection Time: 03/13/18 11:51 AM   Result Value Ref Range    Iron 51 35 - 180 ug/dL    Iron Binding Cap 337 240 - 430 ug/dL    Iron Saturation Index 15 15 - 46 %   Parathyroid Hormone Intact    Collection Time: 03/13/18 11:51 AM   Result Value Ref Range    Parathyroid Hormone Intact 34 18 - 80 pg/mL   Vitamin D Deficiency    Collection Time: 03/13/18 11:51 AM   Result Value Ref Range    Vitamin D Deficiency screening 78 (H) 20 - 75 ug/L   Protein  random urine with Creat Ratio    Collection Time: 03/13/18 12:07 PM   Result Value Ref Range    Protein Random Urine 0.46 g/L    Protein Total Urine g/gr Creatinine 0.22 (H) 0 - 0.2 g/g Cr   UA with Microscopic reflex to Culture    Collection Time: 03/13/18 12:07 PM   Result Value Ref Range    Color Urine Yellow     Appearance Urine Slightly Cloudy     Glucose Urine Negative NEG^Negative mg/dL    Bilirubin Urine Negative NEG^Negative    Ketones Urine Negative NEG^Negative mg/dL    Specific Gravity Urine 1.031 1.003 - 1.035    Blood Urine Moderate (A) NEG^Negative    pH Urine 5.0 5.0 - 7.0 pH    Protein Albumin Urine 30 (A) NEG^Negative mg/dL    Urobilinogen mg/dL 0.0 0.0 - 2.0 mg/dL    Nitrite Urine Negative NEG^Negative    Leukocyte Esterase Urine Large (A)  NEG^Negative    Source Midstream Urine     WBC Urine 28 (H) 0 - 5 /HPF    RBC Urine 122 (H) 0 - 2 /HPF    Squamous Epithelial /HPF Urine <1 0 - 1 /HPF    Mucous Urine Present (A) NEG^Negative /LPF   Creatinine urine calculation only    Collection Time: 03/13/18 12:07 PM   Result Value Ref Range    Creatinine Urine 210 mg/dL   Urine Culture Aerobic Bacterial    Collection Time: 03/13/18 12:07 PM   Result Value Ref Range    Specimen Description Midstream Urine     Culture Micro No growth      I have seen and examined this patient with the resident.  This note reflects our joint assessment and plan.     Teresa Painter MD

## 2018-03-13 NOTE — MR AVS SNAPSHOT
After Visit Summary   3/13/2018    Gerri Owens    MRN: [id number]           Patient Information     Date Of Birth          1955        Visit Information        Provider Department      3/13/2018 1:00 PM Clare Garcia MD [facility]        Today's Diagnoses     Hematuria, unspecified type    -  1    Abnormal kidney function          Care Instructions    1. Your kidney function have improved  2. We will obtain renal Ultrasound  3. Your should have a repeat urine analysis after your vaginal bleeding resolves  4. Avoid NSAIDs  5. Continue ranitidine  6. Follow up with nephrology clinic as needed          Follow-ups after your visit        Your next 10 appointments already scheduled     Mar 13, 2018  3:00 PM CDT   US RENAL COMPLETE with [email or web address]   [facility] ([facility])    [address] [address] [phone]           Please bring a list of your medicines (including vitamins, minerals and over-the-counter drugs). Also, tell your doctor about any allergies you may have. Wear comfortable clothes and leave your valuables at home.  You do not need to do anything special to prepare for your exam.  Please call the Imaging Department at your exam site with any questions.            Mar 22, 2018  4:20 PM CDT   Pre-Op physical with Karo Doty MD   [facility]    [address] [place] [address] [phone]            Apr 09, 2018  8:30 AM CDT   LAB with RD LAB   [facility] ([facility])    [address] [address] [phone]           Please do not eat 10-12 hours before your appointment if you are coming in fasting for labs on lipids, cholesterol, or glucose (sugar). This does not apply to pregnant women. Water, hot tea and black coffee (with nothing added) are okay. Do not drink  other fluids, diet soda or chew gum.            Apr 11, 2018   Procedure with Adry Tineo MD   [facility] [place], Same Day Surgery (--)    [address] [facility] [address] [phone]            Apr 11, 2018  7:30 AM CDT   (Arrive by 7:15 AM)   US INTRAOPERATIVE with URUS3   [facility] [place], Ultrasound ([facility])    [address] [facility] [address] [phone]           Please bring a list of your medicines (including vitamins, minerals and over-the-counter drugs). Also, tell your doctor about any allergies you may have. Wear comfortable clothes and leave your valuables at home.  You do not need to do anything special to prepare for your exam.  Please call the Imaging Department at your exam site with any questions.              Future tests that were ordered for you today     Open Future Orders        Priority Expected Expires Ordered    US Retroperitoneal complete Routine  3/13/2019 3/13/2018    US Renal Complete Routine  3/13/2019 3/13/2018            Who to contact     If you have questions or need follow up information about today's clinic visit or your schedule please contact [facility] NEPH[facility]OLOG[facility] directly at [phone].  Normal or non-critical lab and imaging results will be communicated to you by [employer], letter or phone within 4 business days after the clinic has received the results. If you do not hear from us within 7 days, please contact the clinic through [employer] or phone. If you have a critical or abnormal lab result, we will notify you by phone as soon as possible.  Submit refill requests through [employer] or call your pharmacy and they will forward the refill request to us. Please allow 3 business days for your refill to be completed.          Additional Information About Your Visit        [employer] Information     [employer] gives you secure access to your electronic health record. If you see a primary care provider, you can  "also send messages to your care team and make appointments. If you have questions, please call your primary care clinic.  If you do not have a primary care provider, please call [phone] and they will assist you.        Care EveryWhere ID     This is your Care EveryWhere ID. This could be used by other organizations to access your [facility] medical records  [id number]        Your Vitals Were     Pulse Temperature Height Last Period Pulse Oximetry BMI (Body Mass Index)    76 98.2  F (36.8  C) (Oral) 1.626 m (5' 4\") 03/04/2005 99% 31.96 kg/m2       Blood Pressure from Last 3 Encounters:   03/13/18 103/72   02/13/18 127/75   12/06/17 110/61    Weight from Last 3 Encounters:   03/13/18 84.5 kg (186 lb 3.2 oz)   02/13/18 87.5 kg (193 lb)   12/04/17 88.9 kg (196 lb)               Primary Care Provider Office Phone # Fax #    Karo Doty [phone] [address] [place] [address]        Equal Access to Services     [facility] : Hadii thalia hurd hadmohindero Sograce, waaxda ludennisadaha, qaybta kaalmada makayla, carol escobar . So [facility] [phone].    ATENCIÓN: Si habla español, tiene a graham disposición servicios gratuitos de asistencia lingüística. [facility] [phone].    We comply with applicable federal civil rights laws and [place] laws. We do not discriminate on the basis of race, color, national origin, age, disability, sex, sexual orientation, or gender identity.            Thank you!     Thank you for choosing [facility] NEPHROLOGY  for your care. Our goal is always to provide you with excellent care. Hearing back from our patients is one way we can continue to improve our services. Please take a few minutes to complete the written survey that you may receive in the mail after your visit with us. Thank you!             Your Updated Medication List - Protect others around you: Learn how to safely use, store and throw away your medicines at www.[employer].org. "          This list is accurate as of 3/13/18  2:16 PM.  Always use your most recent med list.                   Brand Name Dispense Instructions for use Diagnosis    acetaminophen 325 MG tablet    TYLENOL    100 tablet    Take 3 tablets (975 mg) by mouth every 8 hours    Status post left hip replacement       ascorbic acid 250 MG tablet    VITAMIN C    90    Take  by mouth. 500 mg 1xqd.    Routine general medical examination at a health care facility       calcium citrate-vitamin D 315-200 MG-UNIT Tabs per tablet    CITRACAL     Take 2 tablets by mouth daily        GLUCOSAMINE CHONDROITIN COMPLX Tabs      Take  by mouth. 1500 mg 1xqd.    Routine general medical examination at a health care facility       RANITIDINE HCL PO      Take 150 mg by mouth 2 times daily        VITAMIN D (CHOLECALCIFEROL) PO      Take 2,000 Units by mouth daily        vitamin E 400 UNIT capsule      Take 1 capsule by mouth three times a week.

## 2018-03-13 NOTE — LETTER
3/13/2018      RE: Gerri Owens  [address]  [facility] [id number]       Assessment and Plan:   1. Stage II CKD-baseline serum creatinine of 0.8 mg/dL and GFR of 69 ml/min/1.73m2 BSA likely from chronic NSAIDs use. Low range proteinuria, protein-to-creatinine ratio is 0.22 g/gCr.Microscopic hematuria( hpf) is likely secondary to ongoing uterine bleeding.   -we will obtain Renal Ultrasound to assess renal anatomy and rule out nephrolithiasis or renal mass  -patient should have a repeat urine analysis after her uterine bleeding resolved. She should have further evaluation by urologist if her hematuria persist  -patient was advised to avoid NSAIDs  -patient should follow up with nephrology clinic as needed    2.Electrolytes/ Acid/Base status:-within acceptable limits    3. Blood pressure/Volume status: Patient's blood pressure is stable, controlled. Patient is not on antihypertensive medications at home. Patient looks euvolemic on exam    4. Anemia of acute blood loss: Likely secondary to blood loss during  Left PRESLEY n 12/2017. Resolved. Hgb is within normal limits 13.8 g/dl. Patient is iron replete .     7. BMD-normal serum calcium and phosphorus. Normal PTH level. She dose have elevated vitamin D level of 78 ug/L. Patient was advised to discontinue vitamin D supplements.    8. GERD-continue ranitidine. Avoid PPIs.       Assessment and plan was discussed with patient and she voiced her understanding and agreement.    I have seen and discussed the patient with Dr.Manske Clare Garcia MD  Nephrology Fellow          Consult:  Gerri Owens was seen in consultation at the request of Dr. Doty for CKD management.    Reason for Visit:  Gerri Owens is a 62 year old female with CKD from , who presents for further evaluation    HPI:  A 62 year old female with PMHx significant for one episode of unprovoked deep vein thrombosis in the right upper extremity in 3/2012 treated with coumadin  (patient reports negative extensive work up, she is not on chronic anticoagulation), hyperlipidemia, GERD and osteoarthritis. Patient states that she was found to have elevated creatinine of 1.6 mg/dl during routing pre-op blood work in November of 2017. Patient was scheduled to have  right total hip replacement in December of 2017. Her previous creatinine in 9/2012 was within normal limits at 0.85 mg/dl. Patient did not have creatinine checked in between.      Patient reports taking OCT Ibuprofen 200 mg every 4 hours for few months, then OCT Naproxen 500 mg every 6 hours for few weeks prior to surgery. The patient was evaluated by her primary physician  who discontinued over-the-counter NSAIDs ( naproxen and ibuprofen) and switched to Acetaminophen 975 mg every 9 hours as needed. Patient reports history of acid reflex. She has been taking OCT Prevacid for many years. Prevacid was discontinued as well by patient's PCP in 11/2017. Patient states that she takes OCT Ranitidine 150 mg twice daily with good control of her GERD symptoms.  Subsequently, patient's creatinine improved down to 0.97 mg/dl on day of surgery 12/04/2017.     Today laboratory work up showed creatinine of 0.93 mg/dl. Urine analysis showed 30 g/gcr of protein albumin, moderate blood, 122 RBC hpf, pyuria with 28 WBC. Patient states that she developed vaginal bleeding about 1.5 years ago. She gets them 1-2 times per week. Patient states that she had vaginal bleeding this morning. She was evaluated by gynecologist in February of 2018 and planning to have an endometrial biopsy in near future.     Patient denies: fever, chills, weight loss, dizziness, adenopathy, sore throat, rhinorrhea, cough, shortness of breath , chest pain, palpitations, orthopnea, nausea, vomiting, abdominal pain, changes in bowel habits, dysuria, urinary frequency, urgency, hematuria, rash.  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 She is  planning to retire in April of 2018. She walks one mile a day. And lost ~15lbs in last 3 months.           Kidney Disease and Medical Hx:       h/o HTN: No  Usual BP: 110/70 mmHg       h/o DM: No       h/o Protein in Urine: No       h/o Blood in Urine: No       h/o Kidney Stones:No       h/o UTI  No       h/o Chronic NSAID Use: Yes         Previous Transplant Hx:      No         Uremic Symptoms:       Fatigue: No; Cold: No; Nausea: No; Poor Appetite: No; Metallic Taste: No; Edema: No; Pruritis: No; Tremor: No;    ROS:   A comprehensive review of systems was obtained and negative, except as noted in the HPI or PMH.    Active Medical Problems:  Patient Active Problem List   Diagnosis     Advanced directives, counseling/discussion     Hip pain, left     Primary osteoarthritis of both hips     History of deep venous thrombosis     Hyperlipidemia, unspecified hyperlipidemia type     BMI 35.0-35.9,adult     Abnormal renal function test     Hip osteoarthritis     Aftercare following left hip joint replacement surgery     Presence of left hip implant     PMH:   Medical record was reviewed and PMH was discussed with patient and noted below.  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  7/17/2012     Renal disease     Did have some abnormal labs but improved after stopping advil     Unspecified episodic mood disorder      Whooping cough due to Bordetella pertussis (p. pertussis) infant    whooping cough     PSH:   Past Surgical History:   Procedure Laterality Date     ARTHROPLASTY HIP Left 12/4/2017    Procedure: ARTHROPLASTY  HIP;  Left total hip arthroplasty;  Surgeon: Rajinder Goodwin MD;  Location: [facility] OR     BREAST BIOPSY, RT/LT      left breast     TONSILLECTOMY  1960    tonsillectomy       Family Hx:   Family History   Problem Relation Age of Onset     DIABETES Mother      type 2     HEART DISEASE Mother      Hypertension Mother      C.A.D. Mother       after 2nd heart attack     Hypertension Father      C.A.D. Father      mild heart attack     CANCER Father      skin cancer     HEART DISEASE Father      Arthritis Sister      rheumatoid arthritis     HEART DISEASE Brother      hereditary heart murmer     Lipids Brother      Lipids Brother      KIDNEY DISEASE No family hx of      Personal Hx:   Social History     Social History     Marital status:      Spouse name: Doug     Number of children: 0     Years of education: N/A     Occupational History     rn [employer]     Social History Main Topics     Smoking status: Never Smoker     Smokeless tobacco: Never Used     Alcohol use Yes      Comment: 1 glass of wine weekly     Drug use: No     Sexual activity: Yes     Partners: Male     Other Topics Concern     Parent/Sibling W/ Cabg, Mi Or Angioplasty Before 65f 55m? No     Social History Narrative    Balanced Diet - Yes, in the last 6 months    Osteoporosis Prevention Measures - Dairy servings per day: 2 servings plus a supplement    Regular Exercise -  Yes Describe walks for 30 to 40 minutes 4 to 5 times a week    Dental Exam - YES - Date: 1 year ago, and is going today    Eye Exam - YES - Date: 4 months ago    Self Breast Exam - Yes    Abuse: Current or Past (Physical, Sexual or Emotional)- No    Do you feel safe in your environment - Yes    Guns stored in the home - Yes, locked    Sunscreen used - Yes    Seatbelts used - Yes    Lipids -  YES - Date: 10-02    Glucose -  YES - Date: 10-02    Colon Cancer Screening - No    Hemoccults - NO    Pap Test -  YES - Date: 10-02    Do you have any concerns about  "STD's -  No    Mammography - YES - Date: 8-06    DEXA - NO    Immunizations reviewed and up to date - Yes, lst td 7-2000    10-23-07  MEL Mueller CMA       Allergies:  Allergies   Allergen Reactions     Nickel Rash       Medications:  Prior to Admission medications    Medication Sig Start Date End Date Taking? Authorizing Provider   acetaminophen (TYLENOL) 325 MG tablet Take 3 tablets (975 mg) by mouth every 8 hours 12/6/17  Yes Anderson Calloway PA-C   VITAMIN D, CHOLECALCIFEROL, PO Take 2,000 Units by mouth daily   Yes Reported, Patient   RANITIDINE HCL PO Take 150 mg by mouth 2 times daily    Yes Reported, Patient   calcium citrate-vitamin D (CITRACAL) 315-200 MG-UNIT TABS per tablet Take 2 tablets by mouth daily   Yes Reported, Patient   vitamin E 400 UNIT capsule Take 1 capsule by mouth three times a week.   Yes Reported, Patient   GLUCOSAMINE CHONDROITIN COMPLX OR TABS Take  by mouth. 1500 mg 1xqd.  10/23/07  Yes Spatz, Lisa, MD   VITAMIN C 250 MG OR TABS Take  by mouth. 500 mg 1xqd.  10/23/07  Yes Spatz, Lisa, MD     Vitals:  /72  Pulse 76  Temp 98.2  F (36.8  C) (Oral)  Ht 1.626 m (5' 4\")  Wt 84.5 kg (186 lb 3.2 oz)  LMP 03/04/2005  SpO2 99%  BMI 31.96 kg/m2    Exam:  GENERAL APPEARANCE: alert and no distress  HENT: mouth without ulcers or lesions  LYMPHATICS: no cervical or supraclavicular nodes  RESP: lungs clear to auscultation - no rales, rhonchi or wheezes  CV: regular rhythm, normal rate, no rub, no murmur  EDEMA: no LE edema bilaterally  ABDOMEN: soft, nondistended, nontender, bowel sounds normal  MS: extremities normal - no gross deformities noted, no evidence of inflammation in joints, no muscle tenderness  SKIN: no rash      Results:  Recent Results (from the past 336 hour(s))   Renal panel    Collection Time: 03/13/18 11:51 AM   Result Value Ref Range    Sodium 140 133 - 144 mmol/L    Potassium 4.0 3.4 - 5.3 mmol/L    Chloride 106 94 - 109 mmol/L    Carbon Dioxide 28 20 - 32 mmol/L "    Anion Gap 6 3 - 14 mmol/L    Glucose 85 70 - 99 mg/dL    Urea Nitrogen 15 7 - 30 mg/dL    Creatinine 0.93 0.52 - 1.04 mg/dL    GFR Estimate 61 >60 mL/min/1.7m2    GFR Estimate If Black 74 >60 mL/min/1.7m2    Calcium 9.3 8.5 - 10.1 mg/dL    Phosphorus 2.9 2.5 - 4.5 mg/dL    Albumin 3.6 3.4 - 5.0 g/dL   CBC with platelets    Collection Time: 03/13/18 11:51 AM   Result Value Ref Range    WBC 6.3 4.0 - 11.0 10e9/L    RBC Count 4.86 3.8 - 5.2 10e12/L    Hemoglobin 13.8 11.7 - 15.7 g/dL    Hematocrit 44.3 35.0 - 47.0 %    MCV 91 78 - 100 fl    MCH 28.4 26.5 - 33.0 pg    MCHC 31.2 (L) 31.5 - 36.5 g/dL    RDW 12.8 10.0 - 15.0 %    Platelet Count 225 150 - 450 10e9/L   Ferritin    Collection Time: 03/13/18 11:51 AM   Result Value Ref Range    Ferritin 18 8 - 252 ng/mL   Iron and iron binding capacity    Collection Time: 03/13/18 11:51 AM   Result Value Ref Range    Iron 51 35 - 180 ug/dL    Iron Binding Cap 337 240 - 430 ug/dL    Iron Saturation Index 15 15 - 46 %   Parathyroid Hormone Intact    Collection Time: 03/13/18 11:51 AM   Result Value Ref Range    Parathyroid Hormone Intact 34 18 - 80 pg/mL   Vitamin D Deficiency    Collection Time: 03/13/18 11:51 AM   Result Value Ref Range    Vitamin D Deficiency screening 78 (H) 20 - 75 ug/L   Protein  random urine with Creat Ratio    Collection Time: 03/13/18 12:07 PM   Result Value Ref Range    Protein Random Urine 0.46 g/L    Protein Total Urine g/gr Creatinine 0.22 (H) 0 - 0.2 g/g Cr   UA with Microscopic reflex to Culture    Collection Time: 03/13/18 12:07 PM   Result Value Ref Range    Color Urine Yellow     Appearance Urine Slightly Cloudy     Glucose Urine Negative NEG^Negative mg/dL    Bilirubin Urine Negative NEG^Negative    Ketones Urine Negative NEG^Negative mg/dL    Specific Gravity Urine 1.031 1.003 - 1.035    Blood Urine Moderate (A) NEG^Negative    pH Urine 5.0 5.0 - 7.0 pH    Protein Albumin Urine 30 (A) NEG^Negative mg/dL    Urobilinogen mg/dL 0.0 0.0 - 2.0  mg/dL    Nitrite Urine Negative NEG^Negative    Leukocyte Esterase Urine Large (A) NEG^Negative    Source Midstream Urine     WBC Urine 28 (H) 0 - 5 /HPF    RBC Urine 122 (H) 0 - 2 /HPF    Squamous Epithelial /HPF Urine <1 0 - 1 /HPF    Mucous Urine Present (A) NEG^Negative /LPF   Creatinine urine calculation only    Collection Time: 03/13/18 12:07 PM   Result Value Ref Range    Creatinine Urine 210 mg/dL   Urine Culture Aerobic Bacterial    Collection Time: 03/13/18 12:07 PM   Result Value Ref Range    Specimen Description Midstream Urine     Culture Micro No growth      I have seen and examined this patient with the resident.  This note reflects our joint assessment and plan.     MD Clare Delgado MD

## 2018-03-14 LAB
BACTERIA SPEC CULT: NO GROWTH
DEPRECATED CALCIDIOL+CALCIFEROL SERPL-MC: 78 UG/L (ref 20–75)
SPECIMEN SOURCE: NORMAL

## 2018-03-14 RX ORDER — PHENAZOPYRIDINE HYDROCHLORIDE 200 MG/1
200 TABLET, FILM COATED ORAL ONCE
Status: CANCELLED | OUTPATIENT
Start: 2018-04-11

## 2018-03-15 ENCOUNTER — TELEPHONE (OUTPATIENT)
Dept: NEPHROLOGY | Facility: CLINIC | Age: 63
End: 2018-03-15

## 2018-03-15 NOTE — TELEPHONE ENCOUNTER
Per Dr. Garcia:    Please call this patient and let her know that her vitamin D level is slightly above upper limit of normal. She should stop taking vitamin D supplements. Furthermore, her renal Ultrasound showed 7 mm nonobstructing calculus in the left mid/upper pole. She should seek medical help if she develops left flank pain.     Left detailed voicemail for patient to call back.    Jacqueline Watson RN

## 2018-03-16 NOTE — TELEPHONE ENCOUNTER
Spoke with patient, confirmed message received. Discussed symptoms related to kidney stones and when to seek medical attention. Patient denied further questions.    Jacqueline Watson RN

## 2018-03-22 ENCOUNTER — OFFICE VISIT (OUTPATIENT)
Dept: FAMILY MEDICINE | Facility: CLINIC | Age: 63
End: 2018-03-22
Payer: COMMERCIAL

## 2018-03-22 VITALS
BODY MASS INDEX: 31.97 KG/M2 | SYSTOLIC BLOOD PRESSURE: 117 MMHG | WEIGHT: 186.25 LBS | RESPIRATION RATE: 18 BRPM | OXYGEN SATURATION: 99 % | DIASTOLIC BLOOD PRESSURE: 76 MMHG | TEMPERATURE: 98 F | HEART RATE: 69 BPM

## 2018-03-22 DIAGNOSIS — N95.0 POST-MENOPAUSAL BLEEDING: ICD-10-CM

## 2018-03-22 DIAGNOSIS — Z01.818 PREOP GENERAL PHYSICAL EXAM: Primary | ICD-10-CM

## 2018-03-22 DIAGNOSIS — N20.0 CALCULUS OF LEFT KIDNEY: ICD-10-CM

## 2018-03-22 DIAGNOSIS — Z86.718 HISTORY OF DEEP VENOUS THROMBOSIS: ICD-10-CM

## 2018-03-22 DIAGNOSIS — Z12.11 SPECIAL SCREENING FOR MALIGNANT NEOPLASMS, COLON: ICD-10-CM

## 2018-03-22 DIAGNOSIS — R31.29 MICROSCOPIC HEMATURIA: ICD-10-CM

## 2018-03-22 DIAGNOSIS — Z13.6 ENCOUNTER FOR SCREENING FOR CARDIOVASCULAR DISORDERS: ICD-10-CM

## 2018-03-22 DIAGNOSIS — Z96.642 PRESENCE OF LEFT HIP IMPLANT: ICD-10-CM

## 2018-03-22 DIAGNOSIS — N18.2 CKD (CHRONIC KIDNEY DISEASE) STAGE 2, GFR 60-89 ML/MIN: ICD-10-CM

## 2018-03-22 DIAGNOSIS — Z71.89 ADVANCED DIRECTIVES, COUNSELING/DISCUSSION: ICD-10-CM

## 2018-03-22 PROBLEM — Z47.1 AFTERCARE FOLLOWING LEFT HIP JOINT REPLACEMENT SURGERY: Status: RESOLVED | Noted: 2017-12-26 | Resolved: 2018-03-22

## 2018-03-22 PROBLEM — R94.4 ABNORMAL RENAL FUNCTION TEST: Status: RESOLVED | Noted: 2017-12-04 | Resolved: 2018-03-22

## 2018-03-22 PROBLEM — M16.9 HIP OSTEOARTHRITIS: Status: RESOLVED | Noted: 2017-12-04 | Resolved: 2018-03-22

## 2018-03-22 PROBLEM — M25.552 HIP PAIN, LEFT: Status: RESOLVED | Noted: 2017-08-08 | Resolved: 2018-03-22

## 2018-03-22 PROCEDURE — 36415 COLL VENOUS BLD VENIPUNCTURE: CPT | Performed by: FAMILY MEDICINE

## 2018-03-22 PROCEDURE — 80061 LIPID PANEL: CPT | Performed by: FAMILY MEDICINE

## 2018-03-22 PROCEDURE — 99215 OFFICE O/P EST HI 40 MIN: CPT | Performed by: FAMILY MEDICINE

## 2018-03-22 RX ORDER — AMOXICILLIN 500 MG/1
2000 CAPSULE ORAL ONCE
Qty: 4 CAPSULE | Refills: 0 | Status: SHIPPED | OUTPATIENT
Start: 2018-03-22 | End: 2018-03-22

## 2018-03-22 NOTE — PROGRESS NOTES
[facility]  [address] [place] [id number] [phone]  Dept: [phone]    PRE-OP EVALUATION:  Today's date: 3/22/2018    Gerri Owens (: 1955) presents for pre-operative evaluation assessment as requested by Dr. Dr Tineo .  She requires evaluation and anesthesia risk assessment prior to undergoing surgery/procedure for vaginal bleeding with evaluation by Hysteroscopy with ultrasound  .    Fax number for surgical facility: [facility]   Primary Physician: Karo Doty  Type of Anesthesia Anticipated: General    Patient has a Health Care Directive or Living Will:  NO    Pre op Questions 3/20/2018   Who is doing your surgery? Dr Tineo    What are you having done? Hysteroscopy with ultrasound    Date of Surgery/Procedure: 18   Facility or Hospital where procedure/surgery will be performed: [facility]   1.  Do you have a history of Heart attack, stroke, stent, coronary bypass surgery, or other heart surgery? No   2.  Do you ever have any pain or discomfort in your chest? No   3.  Do you have a history of  Heart Failure? No   4.   Are you troubled by shortness of breath when:  walking on a level surface, or up a slight hill, or at night? No   5.  Do you currently have a cold, bronchitis or other respiratory infection? No   6.  Do you have a cough, shortness of breath, or wheezing? No   7.  Do you sometimes get pains in the calves of your legs when you walk? No   8. Do you or anyone in your family have previous history of blood clots? YES - brothers DVT x 2, one has factor 5, had severe Clots & on chronic on warfarin, she and another brother do not have the factor issue   9.  Do you or does anyone in your family have a serious bleeding problem such as prolonged bleeding following surgeries or cuts? No   10. Have you ever had problems with anemia or been told to take iron pills? No   11. Have you had any abnormal blood loss such as black, tarry or  bloody stools, or abnormal vaginal bleeding? YES - only vaginal spotting. Every week, every other , wears a pad, first noted 1.5 yr ago, wear a larger one   12. Have you ever had a blood transfusion? No   13. Have you or any of your relatives ever had problems with anesthesia? No   14. Do you have sleep apnea, excessive snoring or daytime drowsiness? No   15. Do you have any prosthetic heart valves? No   16. Do you have prosthetic joints? YES - left hip replaced 12/2017    17. Is there any chance that you may be pregnant? No         HPI:     HPI related to upcoming procedure:   Has had Hx of spotting from vagina , off and on long time at least 1.5 years , thinks from vag dryness, no discharge or itching or burning or pain. No weight loss or night sweats. Seen by gyn 2/13 and to get Hysteroscopy, Dilation and Curettage Under Ultrasound Guidance (choice with paracervical block). Here for pre op.      HYPERLIPIDEMIA - Patient has a long history of mild Hyperlipidemia not on medication                                        .  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 One had a factor problem but reports she was checked and didn't have it. One brother remains on life long coumadin. She herself had DVT of right upper extremity in 2012 , had maybe phlebitis in both legs 1 yr out  After that never seen for it . Took coumadin 1 yr after original DVT and since then wears compression socks which helped with post phlebitic syndrome. Along time ago she had hyperthyroidism, treated methimazole, cleared up and not had since. Is euthyroid clinically and chemically. 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 No sign of stroke, referred to PT and ortho, seen by PT 8/8 , after PT felt pain mostly in her hips, seen by ortho 8/15 had moderate OA on xray. Had left interarticular injection 8/24 with significant pain improvement, made good progress with PT completed 10/10/17, seen by ortho 11/2 discussed arthroplasty for primary OA hips, scheduled for Left PRESLEY 12/4 and right PRESLEY 1/15/18.  Seen November 14, 2017 for pre op.  CBC was normal.  Creatinine was elevated at 1.6 with GFR down at 40%.  Hepatitis C was negative.  Was recommended to stop NSAIDS,  PPI's and increase fluid intake.  Recheck GFR 11/19 was 49% and creatinine had improved and recheck again 11/22 showed further improvement with GFR 60%.  Underwent left hip arthroplasty 12/4/2017 and discharged on postop day 2 with good progress and received OT, PT and social work consult in house.  Seen 12/14 for staple removal.  Seen 1/30/2018 postop doing well on physical therapy.  Seen by Gyn 2/13 for abnormal uterine bleeding of 1-1/2 year Pap was normal opted, to get her endometrial biopsy done under anesthesia.  Mammogram done was slightly abnormal but diagnostic studies were done and those were normal.  Seen by nephrology 3/13 as noted below.  Vitamin D noted to be elevated so discontinued.  Renal ultrasound showed a stone of 7 mm nonobstructive in left mid kidney.  Has occasional cramps relieved with Tylenol.  Manages her hip pain with ice and rest.  Plans to retire by the summer.  Post hip surgery was on Lovenox 3 weeks then discontinued. continues with her compression socks daily.    RENAL INSUFFICIENCY -  Improved. Seen by nephrology 3/13 noted had Stage II CKD-baseline serum creatinine of 0.8 mg/dL and GFR of 69 ml/min/1.73m2 BSA likely from chronic NSAIDS use. Low range proteinuria, protein-to-creatinine ratio is 0.22  g/gCr.Microscopic hematuria( hpf) was likely secondary to ongoing uterine bleeding. Recommended a repeat urine analysis after her uterine bleeding has resolved.and further evaluation by urologist if her hematuria persist, was advised to avoid NSAIDS, follow up with nephrology clinic as needed, Electrolytes/ Acid/Base status were within acceptable limits. Her blood pressure was stable & controlled. She was not on antihypertensive medications at home. Euvolemic on exam. Anemia of acute blood loss secondary to blood loss during Left PRESLEY in 12/2017 had resolved and Hgb was within normal limits of 13.8 g/dl and iron replete Had normal serum calcium and phosphorus. Normal PTH level. She did have elevated vitamin D level of 78 ug/L and was advised to discontinue vitamin D supplements. For her GERD was advised to continue ranitidine & avoid PPI's.  Agreeable to FIT and lipids today  She will work on advance directives  MEDICAL HISTORY:     Patient Active Problem List    Diagnosis Date Noted     CKD (chronic kidney disease) stage 2, GFR 60-89 ml/min 03/22/2018     Priority: Medium     Microscopic hematuria 03/22/2018     Priority: Medium     Calculus of left kidney 03/22/2018     Priority: Medium     Presence of left hip implant 12/26/2017     Priority: Medium     Primary osteoarthritis of both hips 11/12/2017     Priority: Medium     History of deep venous thrombosis 11/12/2017     Priority: Medium     Hyperlipidemia, unspecified hyperlipidemia type 11/12/2017     Priority: Medium     BMI 35.0-35.9,adult 11/12/2017     Priority: Medium     Advanced directives, counseling/discussion 05/07/2012     Priority: Medium     Discussed advance care planning with patient; information given to patient to review. 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  7/17/2012     Renal disease     Did have some abnormal labs but improved after stopping advil     Unspecified episodic mood disorder      Whooping cough due to Bordetella pertussis (p. pertussis) infant    whooping cough     Past Surgical History:   Procedure Laterality Date     ARTHROPLASTY HIP Left 12/4/2017    Procedure: ARTHROPLASTY HIP;  Left total hip arthroplasty;  Surgeon: Rajinder Goodwin MD;  Location: [facility] OR     BREAST BIOPSY, RT/LT  2004    left breast     TONSILLECTOMY  1960    tonsillectomy     Current Outpatient Prescriptions   Medication Sig Dispense Refill     amoxicillin (AMOXIL) 500 MG capsule Take 4 capsules (2,000 mg) by mouth once for 1 dose 4 capsule 0     ACETAMINOPHEN PO Take 500 mg by mouth       RANITIDINE HCL PO Take 150 mg by mouth 2 times daily        calcium citrate-vitamin D (CITRACAL) 315-200 MG-UNIT TABS per tablet Take 2 tablets by mouth daily       vitamin E 400 UNIT capsule Take 1 capsule by mouth three times a week.       GLUCOSAMINE CHONDROITIN COMPLX OR TABS Take  by mouth. 1500 mg 1xqd.   0     VITAMIN C 250 MG OR TABS 250 mg 90 0     OTC products: None, except as noted above    Allergies   Allergen Reactions     Nickel Rash      Latex Allergy: NO    Social History   Substance Use Topics     Smoking status: Never Smoker     Smokeless tobacco: Never Used     Alcohol use Yes      Comment: 1 glass of wine weekly     History   Drug Use No       REVIEW OF SYSTEMS:   CONSTITUTIONAL: NEGATIVE for fever, chills, change in weight  INTEGUMENTARY/SKIN: NEGATIVE for worrisome rashes, moles or lesions  EYES: NEGATIVE for vision changes or irritation  ENT/MOUTH: NEGATIVE for ear, mouth and  throat problems  RESP: NEGATIVE for significant cough or SOB  BREAST: NEGATIVE for masses, tenderness or discharge  CV: NEGATIVE for chest pain, palpitations or peripheral edema  GI: NEGATIVE for nausea, abdominal pain, heartburn, or change in bowel habits   female: Hx kidney stone and irregular vaginal bleeding  MUSCULOSKELETAL: NEGATIVE for significant arthralgias or myalgia  NEURO: NEGATIVE for weakness, dizziness or paresthesias  ENDOCRINE: NEGATIVE for temperature intolerance, skin/hair changes  HEME: NEGATIVE for bleeding problems  PSYCHIATRIC: NEGATIVE for changes in mood or affect    EXAM:   /76 (BP Location: Left arm, Patient Position: Chair, Cuff Size: Adult Regular)  Pulse 69  Temp 98  F (36.7  C) (Oral)  Resp 18  Wt 186 lb 4 oz (84.5 kg)  LMP 03/04/2005  SpO2 99%  BMI 31.97 kg/m2    GENERAL APPEARANCE: healthy, alert and no distress     EYES: EOMI, PERRL     HENT: ear canals and TM's normal and nose and mouth without ulcers or lesions     NECK: no adenopathy, no asymmetry, masses, or scars and thyroid normal to palpation     RESP: lungs clear to auscultation - no rales, rhonchi or wheezes     CV: regular rates and rhythm, normal S1 S2, no S3 or S4 and no murmur, click or rub     ABDOMEN:  soft, non tender, no HSM or masses and bowel sounds normal     MS: extremities normal- no gross deformities noted, no evidence of inflammation in joints, FULL RANGE OF MOTION in all extremities. Left hip scar present     SKIN: no suspicious lesions or rashes     NEURO: Normal strength and tone, sensory exam grossly normal, mentation intact and speech normal     PSYCH: mentation appears normal. and affect normal/bright     LYMPHATICS: No cervical adenopathy    DIAGNOSTICS:     EKG: Not indicated due to non-vascular surgery and low risk of event (age <65 and without cardiac risk factors) plus normal in nov 2017  CXR not needed  BMP & CBC normal 3/13/18  Labs Resulted Today:   Results for orders placed or  performed in visit on 03/13/18   US Retroperitoneal complete    Narrative    EXAMINATION: Renal ultrasound, 3/13/2018 2:44 PM     COMPARISON: None.    HISTORY: Hematuria    FINDINGS:    Right kidney: Measures 9.4 cm in length. Parenchyma is of normal  thickness and echogenicity. No focal mass. No hydronephrosis or  nephrolithiasis.    Left kidney: Measures 9.5 cm in length. Parenchyma is of normal  thickness and echogenicity. No focal mass. No hydronephrosis. In the  mid/upper pole, there is a 7 x 5 x 5 mm echogenic calculus with  twinkle artifact.    Bladder: Unremarkable.      Impression    IMPRESSION: 7 mm nonobstructing calculus in the left mid/upper pole.    I have personally reviewed the examination and initial interpretation  and I agree with the findings.    TAB BENZ MD     Labs Drawn and in Process:   Unresulted Labs Ordered in the Past 30 Days of this Admission     Date and Time Order Name Status Description    3/22/2018 1653 LIPID REFLEX TO DIRECT LDL PANEL In process           Recent Labs   Lab Test  03/13/18   1151  12/06/17   0735   12/04/17   1653  12/04/17   0920   11/14/17   0923 03/21/13   HGB  13.8  10.4*   < >   --    --    --   13.8   --    PLT  225   --    --   199   --    --   223   --    INR   --    --    --    --    --    --   0.93  2.6*   NA  140   --    --    --   140   < >  138   --    POTASSIUM  4.0   --    --    --   3.9   < >  5.1   --    CR  0.93   --    --   0.97  1.13*   < >  1.60*   --     < > = values in this interval not displayed.      IMPRESSION:     Reason for surgery/procedure: post menopausal vaginal bleeding   Diagnosis/reason for consult: pre op for D & C for above    The proposed surgical procedure is considered INTERMEDIATE risk.    REVISED CARDIAC RISK INDEX  The patient has the following serious cardiovascular risks for perioperative complications such as (MI, PE, VFib and 3  AV Block):  No serious cardiac risks  INTERPRETATION: 0 risks: Class I (very low risk -  0.4% complication rate)    The patient has the following additional risks for perioperative complications:  No identified additional risks  The ASCVD Risk score (Aditya ROSAS Jr, et al., 2013) failed to calculate for the following reasons:    Cannot find a previous HDL lab    Cannot find a previous total cholesterol lab      ICD-10-CM    1. Preop general physical exam Z01.818 amoxicillin (AMOXIL) 500 MG capsule   2. Post-menopausal bleeding N95.0    3. History of deep venous thrombosis Z86.718    4. BMI 35.0-35.9,adult Z68.35 Lipid panel reflex to direct LDL Non-fasting   5. CKD (chronic kidney disease) stage 2, GFR 60-89 ml/min N18.2    6. Microscopic hematuria R31.29    7. Calculus of left kidney N20.0    8. Presence of left hip implant Z96.642 amoxicillin (AMOXIL) 500 MG capsule   9. Encounter for screening for cardiovascular disorders Z13.6 Lipid panel reflex to direct LDL Non-fasting   10. Special screening for malignant neoplasms, colon Z12.11 Fecal colorectal cancer screen FIT   11. Advanced directives, counseling/discussion Z71.89        RECOMMENDATIONS:     --Consult hospital rounder / IM to assist post-op medical management  --Because of remote DVT history, wear compression hose before & after surgery. Given short duration of day procedure dont feel Lovenox currently indicated, early ambulation recommended.  Cbc , & BMP normal 3/13/18 so no need to repeat  EKG normal in nov so no need to repeat as normal and non cardiac surgery  FIT & lipids ordered  Cleared for surgery  Take amoxil 2 gram total 1 hour prior to surgery with small sip of water   Ask ortho surgeon for future guidelines on abx and operations/ procedures prophylaxsis  Hold aspirin, antiinflammatories like motrin aleve etc, fish oil and glucosamine preferable 5 to 7 days prior to surgery  Will fax /send note to surgeon once completed   Work on advance directives  Follow up ortho  Follow up nephrology prn  Monitor for kidney stone symptoms  A repeat  urine analysis after her uterine bleeding has resolved.and further evaluation by urologist if her hematuria persist    Cardiovascular Risk  Performs 4 METs exercise without symptoms (Light housework (dusting, washing dishes), Climb a flight of stairs and Walk on level ground at 15 minutes per mile (4 miles/hour)) .     Anemia  resolved    APPROVAL GIVEN to proceed with proposed procedure, without further diagnostic evaluation     Signed Electronically by: Karo Doty MD    Copy of this evaluation report is provided to requesting physician.    [facility] Pre op Guidelines

## 2018-03-22 NOTE — MR AVS SNAPSHOT
After Visit Summary   3/22/2018    Gerri Owens    MRN: [id number]           Patient Information     Date Of Birth          1955        Visit Information        Provider Department      3/22/2018 4:20 PM Karo Doty MD [facility]        Today's Diagnoses     Preop general physical exam    -  1    Post-menopausal bleeding        History of deep venous thrombosis        BMI 35.0-35.9,adult        CKD (chronic kidney disease) stage 2, GFR 60-89 ml/min        Microscopic hematuria        Calculus of left kidney        Presence of left hip implant        Encounter for screening for cardiovascular disorders        Special screening for malignant neoplasms, colon        Advanced directives, counseling/discussion          Care Instructions    Cbc , & BMP normal 3/13/18 so no need to repeat  EKG normal nov so no need to repeat as normal and non cardiac surgery  FIT & lipids ordered  Cleared for surgery otherwise may need additional work up   Take amoxil 2 gram total 1 hour prior to surgery with small sip of water   Ask ortho surgeon for future guidelines  Hold aspirin, antiinflammatories like motrin aleve etc, fish oil and glucosamine preferable 5 to 7 days prior to surgery  Will fax /s end note to surgeon once completed       Before Your Surgery      Call your surgeon if there is any change in your health. This includes signs of a cold or flu (such as a sore throat, runny nose, cough, rash or fever).    Do not smoke, drink alcohol or take over the counter medicine (unless your surgeon or primary care doctor tells you to) for the 24 hours before and after surgery.    If you take prescribed drugs: Follow your doctor s orders about which medicines to take and which to stop until after surgery.    Eating and drinking prior to surgery: follow the instructions from your surgeon    Take a shower or bath the night before surgery. Use the soap your surgeon gave you to gently clean your skin. If  you do not have soap from your surgeon, use your regular soap. Do not shave or scrub the surgery site.  Wear clean pajamas and have clean sheets on your bed.           Follow-ups after your visit        Your next 10 appointments already scheduled     Apr 03, 2018  8:20 AM CDT   Return Visit with Anderson Calloway PA-C   [facility] ([facility])    [address] [facility] [address]  [address]  [facility] [address]   [phone]            Apr 09, 2018  8:30 AM CDT   LAB with RD LAB   [facility] ([facility])    [address] [place] [address]  [address]  [facility] [address] [phone]           Please do not eat 10-12 hours before your appointment if you are coming in fasting for labs on lipids, cholesterol, or glucose (sugar). This does not apply to pregnant women. Water, hot tea and black coffee (with nothing added) are okay. Do not drink other fluids, diet soda or chew gum.            Apr 11, 2018   Procedure with Adry Tineo MD   [facility] [place], Same Day Surgery (--)    [address] [place] [id number] [phone]            Apr 11, 2018  7:30 AM CDT   (Arrive by 7:15 AM)   US INTRAOPERATIVE with URUS3   [facility] [place], Ultrasound ([facility])    [address] [facility] [address] [phone]           Please bring a list of your medicines (including vitamins, minerals and over-the-counter drugs). Also, tell your doctor about any allergies you may have. Wear comfortable clothes and leave your valuables at home.  You do not need to do anything special to prepare for your exam.  Please call the Imaging Department at your exam site with any questions.              Future tests that were ordered for you today     Open Future Orders        Priority Expected Expires Ordered    Fecal colorectal cancer screen FIT Routine 4/12/2018 6/14/2018 3/22/2018            Who to contact      If you have questions or need follow up information about today's clinic visit or your schedule please contact [facility] directly at [phone].  Normal or non-critical lab and imaging results will be communicated to you by Gen[employer] Expressionhart, letter or phone within 4 business days after the clinic has received the results. If you do not hear from us within 7 days, please contact the clinic through [employer] or phone. If you have a critical or abnormal lab result, we will notify you by phone as soon as possible.  Submit refill requests through [employer]ap or call your pharmacy and they will forward the refill request to us. Please allow 3 business days for your refill to be completed.          Additional Information About Your Visit        Cotap Information     Cotap gives you secure access to your electronic health record. If you see a primary care provider, you can also send messages to your care team and make appointments. If you have questions, please call your primary care clinic.  If you do not have a primary care provider, please call [phone] and they will assist you.        Care EveryWhere ID     This is your Care EveryWhere ID. This could be used by other organizations to access your [facility] medical records  [id number]        Your Vitals Were     Pulse Temperature Respirations Last Period Pulse Oximetry BMI (Body Mass Index)    69 98  F (36.7  C) (Oral) 18 03/04/2005 99% 31.97 kg/m2       Blood Pressure from Last 3 Encounters:   03/22/18 117/76   03/13/18 103/72   02/13/18 127/75    Weight from Last 3 Encounters:   03/22/18 186 lb 4 oz (84.5 kg)   03/13/18 186 lb 3.2 oz (84.5 kg)   02/13/18 193 lb (87.5 kg)              We Performed the Following     Lipid panel reflex to direct LDL Non-fasting          Today's Medication Changes          These changes are accurate as of 3/22/18  5:03 PM.  If you have any questions, ask your nurse or doctor.               Start taking these medicines.         Dose/Directions    amoxicillin 500 MG capsule   Commonly known as:  AMOXIL   Used for:  Preop general physical exam, Presence of left hip implant   Started by:  Karo Doty MD        Dose:  2000 mg   Take 4 capsules (2,000 mg) by mouth once for 1 dose   Quantity:  4 capsule   Refills:  0         These medicines have changed or have updated prescriptions.        Dose/Directions    ACETAMINOPHEN PO   This may have changed:  Another medication with the same name was removed. Continue taking this medication, and follow the directions you see here.   Changed by:  Karo Doty MD        Dose:  500 mg   Take 500 mg by mouth   Refills:  0            Where to get your medicines      These medications were sent to [facility] [place], [place] - [address]  [address], [facility] [address]     Phone:  [phone]     amoxicillin 500 MG capsule                Primary Care Provider Office Phone # Fax #    Karo Doty [phone]       [address]  [facility] [address]        Equal Access to Services     [facility]: Hadii thalia hurd hadasho Soisaiahali, waaxda luqadaha, qaybta kaalmada adeegyada, carol mcdonaldin rafaela escobar . So [facility] [phone].    ATENCIÓN: Si habla español, tiene a graham disposición servicios gratuitos de asistencia lingüística. Llame al [phone].    We comply with applicable federal civil rights laws and [place] laws. We do not discriminate on the basis of race, color, national origin, age, disability, sex, sexual orientation, or gender identity.            Thank you!     Thank you for choosing [facility]  for your care. Our goal is always to provide you with excellent care. Hearing back from our patients is one way we can continue to improve our services. Please take a few minutes to complete the written survey that you may receive in the mail after your visit with us. Thank you!             Your Updated Medication List -  Protect others around you: Learn how to safely use, store and throw away your medicines at www.disposemymeds.org.          This list is accurate as of 3/22/18  5:03 PM.  Always use your most recent med list.                   Brand Name Dispense Instructions for use Diagnosis    ACETAMINOPHEN PO      Take 500 mg by mouth        amoxicillin 500 MG capsule    AMOXIL    4 capsule    Take 4 capsules (2,000 mg) by mouth once for 1 dose    Preop general physical exam, Presence of left hip implant       ascorbic acid 250 MG tablet    VITAMIN C    90    250 mg    Routine general medical examination at a health care facility       calcium citrate-vitamin D 315-200 MG-UNIT Tabs per tablet    CITRACAL     Take 2 tablets by mouth daily        GLUCOSAMINE CHONDROITIN COMPLX Tabs      Take  by mouth. 1500 mg 1xqd.    Routine general medical examination at a health care facility       RANITIDINE HCL PO      Take 150 mg by mouth 2 times daily        vitamin E 400 UNIT capsule      Take 1 capsule by mouth three times a week.

## 2018-03-22 NOTE — PATIENT INSTRUCTIONS
Cbc , & BMP normal 3/13/18 so no need to repeat  EKG normal nov so no need to repeat as normal and non cardiac surgery  FIT & lipids ordered  Cleared for surgery  Take amoxil 2 gram total 1 hour prior to surgery with small sip of water   Ask ortho surgeon for future guidelines  Hold aspirin, antiinflammatories like motrin aleve etc, fish oil and glucosamine preferable 5 to 7 days prior to surgery  Will fax /s end note to surgeon once completed       Before Your Surgery      Call your surgeon if there is any change in your health. This includes signs of a cold or flu (such as a sore throat, runny nose, cough, rash or fever).    Do not smoke, drink alcohol or take over the counter medicine (unless your surgeon or primary care doctor tells you to) for the 24 hours before and after surgery.    If you take prescribed drugs: Follow your doctor s orders about which medicines to take and which to stop until after surgery.    Eating and drinking prior to surgery: follow the instructions from your surgeon    Take a shower or bath the night before surgery. Use the soap your surgeon gave you to gently clean your skin. If you do not have soap from your surgeon, use your regular soap. Do not shave or scrub the surgery site.  Wear clean pajamas and have clean sheets on your bed.

## 2018-03-23 LAB
CHOLEST SERPL-MCNC: 225 MG/DL
HDLC SERPL-MCNC: 47 MG/DL
LDLC SERPL CALC-MCNC: 149 MG/DL
NONHDLC SERPL-MCNC: 178 MG/DL
TRIGL SERPL-MCNC: 143 MG/DL

## 2018-03-23 NOTE — PROGRESS NOTES
Flor Ms. Owens,  Your results came back and show some improvement in your cholesterol levels.   -LDL(bad) cholesterol level is elevated,  A diet high in fat and simple carbohydrates, genetics and being overweight can contribute to this. ADVISE: Exercise, a low fat, low carbohydrate diet and weight control are helpful to improve this.  Rechecking your fasting cholesterol panel in 6 months is recommended (Lipid w/ LDL reflex, DX:hyperlipidemia). If you have any further concerns please do not hesitate to contact us by message, phone or making an appointment.  The 10-year ASCVD risk score (Mason ROSAS Jr, et al., 2013) is: 3.7%    Values used to calculate the score:      Age: 62 years      Sex: Female      Is Non- : No      Diabetic: No      Tobacco smoker: No      Systolic Blood Pressure: 117 mmHg      Is BP treated: No      HDL Cholesterol: 47 mg/dL      Total Cholesterol: 225 mg/dL    Have a good day   Dr Soren MATHIS

## 2018-04-03 ENCOUNTER — OFFICE VISIT (OUTPATIENT)
Dept: ORTHOPEDICS | Facility: CLINIC | Age: 63
End: 2018-04-03
Payer: COMMERCIAL

## 2018-04-03 DIAGNOSIS — Z47.89 ORTHOPEDIC AFTERCARE: Primary | ICD-10-CM

## 2018-04-03 PROCEDURE — 99212 OFFICE O/P EST SF 10 MIN: CPT | Performed by: PHYSICIAN ASSISTANT

## 2018-04-03 NOTE — PROGRESS NOTES
HISTORY OF PRESENT ILLNESS:    Gerri Owens is a 62 year old female who is seen in follow up for L PRESLEY, DOS 12/04/18, Dr. Goodwin.  Present symptoms: Pt reports no pain.  Following protocol.  Leg length seem to be more symmetric.  Stopped PT, feels right hip is more of a problem.  Denies Chest pain, Calve pain, Fever, Chills.    Current Treatment: Postop.    PHYSICAL EXAM:  [facility] 03/04/2005  There is no height or weight on file to calculate BMI.   GENERAL APPEARANCE: healthy, alert and no distress   PSYCH:  mentation appears normal and affect normal/bright    MSK:  Left: Hip. .  Ambulates: Slight trendeleburg.  Incision well healed.  Edema none at ankles..      IMAGING INTERPRETATION:  None today.     ASSESSMENT:  Gerri Owens is a 62 year old female S/P L PRESLEY, DOS 12/04/18, Dr. Goodwin.  Doing well.    Some weakness.    PLAN:  - Long term care instructions given and verbally acknowledged.  - Medications: none.  - Hip restrictions discussed.  - Pt may change insurance, recommended she follow up at 1 year post op regardless with an orthopedist.    Return to clinic 1, year post op, or FROILAN Calloway PA-C    Dept. Orthopedic Surgery  [facility]   4/3/2018

## 2018-04-03 NOTE — MR AVS SNAPSHOT
After Visit Summary   4/3/2018    Gerri Owens    MRN: [id number]           Patient Information     Date Of Birth          1955        Visit Information        Provider Department      4/3/2018 8:20 AM Anderson Calloway PA-C [facility]        Today's Diagnoses     Orthopedic aftercare    -  1      Care Instructions    Some providers recommend that from now on, you take a single dose antibiotic prior to any invasive procedure such as dental work, colonoscopy or minor surgeries.  Please notify the provider of the procedure of your implant prior to the procedure. They may prescribe an antibiotic for you if you are an infection risk..    It is recommended that you avoid invasive procedures such as the before mentioned for 4 months after surgery unless it is an emergency.    You may increase your activities as you can tolerate them.  Walking is a good activity.    No bending past 90 degrees at the hip joint, do not cross your legs, or perform excessive twisting at the hip for 4 months from surgery.    Please follow up with your surgeon or any orthopedic surgion at one year from surgery for an X ray of your hip.              Follow-ups after your visit        Follow-up notes from your care team     Return in about 8 months (around 12/3/2018).      Your next 10 appointments already scheduled     Apr 09, 2018  8:30 AM CDT   LAB with RD LAB   [facility] ([facility])    [address] [place] [address] [phone]           Please do not eat 10-12 hours before your appointment if you are coming in fasting for labs on lipids, cholesterol, or glucose (sugar). This does not apply to pregnant women. Water, hot tea and black coffee (with nothing added) are okay. Do not drink other fluids, diet soda or chew gum.            Apr 11, 2018   Procedure with Adry Tineo MD   [facility] [place], Same Day Surgery (--)     [address] [facility] [id number] [phone]            Apr 11, 2018  7:30 AM CDT   (Arrive by 7:15 AM)   US INTRAOPERATIVE with URUS3   [facility]    [address] [facility] [address] [phone]           Please bring a list of your medicines (including vitamins, minerals and over-the-counter drugs). Also, tell your doctor about any allergies you may have. Wear comfortable clothes and leave your valuables at home.  You do not need to do anything special to prepare for your exam.  Please call the Imaging Department at your exam site with any questions.              Who to contact     If you have questions or need follow up information about today's clinic visit or your schedule please contact [facility] directly at [phone].  Normal or non-critical lab and imaging results will be communicated to you by MyChart, letter or phone within 4 business days after the clinic has received the results. If you do not hear from us within 7 days, please contact the clinic through [employer] e-Mediahart or phone. If you have a critical or abnormal lab result, we will notify you by phone as soon as possible.  Submit refill requests through [employer] or call your pharmacy and they will forward the refill request to us. Please allow 3 business days for your refill to be completed.          Additional Information About Your Visit        MyChart Information     [employer] gives you secure access to your electronic health record. If you see a primary care provider, you can also send messages to your care team and make appointments. If you have questions, please call your primary care clinic.  If you do not have a primary care provider, please call [phone] and they will assist you.        Care EveryWhere ID     This is your Care EveryWhere ID. This could be used by other organizations to access your [facility]  records  [id number]        Your Vitals Were     Last Period                   03/04/2005            Blood Pressure from Last 3 Encounters:   03/22/18 117/76   03/13/18 103/72   02/13/18 127/75    Weight from Last 3 Encounters:   03/22/18 186 lb 4 oz (84.5 kg)   03/13/18 186 lb 3.2 oz (84.5 kg)   02/13/18 193 lb (87.5 kg)              Today, you had the following     No orders found for display       Primary Care Provider Office Phone # Fax #    Karo Doty [phone] [address]  [facility] [address]        Equal Access to Services     [employer]ER[employer]AS BU[employer] : Gopi Springer, kiko colbert, catracho benavides, carol roland. So [facility] [phone].    ATENCIÓN: Si habla español, tiene a graham disposición servicios gratuitos de asistencia lingüística. Llame al [phone].    We comply with applicable federal civil rights laws and [place] laws. We do not discriminate on the basis of race, color, national origin, age, disability, sex, sexual orientation, or gender identity.            Thank you!     Thank you for choosing [facility]  for your care. Our goal is always to provide you with excellent care. Hearing back from our patients is one way we can continue to improve our services. Please take a few minutes to complete the written survey that you may receive in the mail after your visit with us. Thank you!             Your Updated Medication List - Protect others around you: Learn how to safely use, store and throw away your medicines at www.disposemymeds.org.          This list is accurate as of 4/3/18  8:36 AM.  Always use your most recent med list.                   Brand Name Dispense Instructions for use Diagnosis    ACETAMINOPHEN PO      Take 500 mg by mouth        ascorbic acid 250 MG tablet    VITAMIN C    90    250 mg    Routine general medical examination at a health care facility       calcium citrate-vitamin D  315-200 MG-UNIT Tabs per tablet    CITRACAL     Take 2 tablets by mouth daily        GLUCOSAMINE CHONDROITIN COMPLX Tabs      Take  by mouth. 1500 mg 1xqd.    Routine general medical examination at a health care facility       RANITIDINE HCL PO      Take 150 mg by mouth 2 times daily        vitamin E 400 UNIT capsule      Take 1 capsule by mouth three times a week.

## 2018-04-03 NOTE — PATIENT INSTRUCTIONS
Some providers recommend that from now on, you take a single dose antibiotic prior to any invasive procedure such as dental work, colonoscopy or minor surgeries.  Please notify the provider of the procedure of your implant prior to the procedure. They may prescribe an antibiotic for you if you are an infection risk..    It is recommended that you avoid invasive procedures such as the before mentioned for 4 months after surgery unless it is an emergency.    You may increase your activities as you can tolerate them.  Walking is a good activity.    No bending past 90 degrees at the hip joint, do not cross your legs, or perform excessive twisting at the hip for 4 months from surgery.    Please follow up with your surgeon or any orthopedic surgion at one year from surgery for an X ray of your hip.

## 2018-04-04 DIAGNOSIS — N95.0 POSTMENOPAUSAL BLEEDING: Primary | ICD-10-CM

## 2018-04-09 DIAGNOSIS — R30.0 DYSURIA: ICD-10-CM

## 2018-04-09 DIAGNOSIS — N95.0 POSTMENOPAUSAL BLEEDING: ICD-10-CM

## 2018-04-09 LAB
ABO + RH BLD: NORMAL
ABO + RH BLD: NORMAL
ALBUMIN UR-MCNC: 100 MG/DL
APPEARANCE UR: ABNORMAL
BILIRUB UR QL STRIP: NEGATIVE
BLD GP AB SCN SERPL QL: NORMAL
BLOOD BANK CMNT PATIENT-IMP: NORMAL
COLOR UR AUTO: ABNORMAL
ERYTHROCYTE [DISTWIDTH] IN BLOOD BY AUTOMATED COUNT: 13.6 % (ref 10–15)
GLUCOSE UR STRIP-MCNC: NEGATIVE MG/DL
HCT VFR BLD AUTO: 44.7 % (ref 35–47)
HGB BLD-MCNC: 13.9 G/DL (ref 11.7–15.7)
HGB UR QL STRIP: ABNORMAL
KETONES UR STRIP-MCNC: NEGATIVE MG/DL
LEUKOCYTE ESTERASE UR QL STRIP: ABNORMAL
MCH RBC QN AUTO: 28.4 PG (ref 26.5–33)
MCHC RBC AUTO-ENTMCNC: 31.1 G/DL (ref 31.5–36.5)
MCV RBC AUTO: 91 FL (ref 78–100)
MUCOUS THREADS #/AREA URNS LPF: PRESENT /LPF
NITRATE UR QL: NEGATIVE
PH UR STRIP: 6 PH (ref 5–7)
PLATELET # BLD AUTO: 269 10E9/L (ref 150–450)
RBC # BLD AUTO: 4.89 10E12/L (ref 3.8–5.2)
RBC #/AREA URNS AUTO: >182 /HPF (ref 0–2)
SOURCE: ABNORMAL
SP GR UR STRIP: 1.02 (ref 1–1.03)
SPECIMEN EXP DATE BLD: NORMAL
TRANS CELLS #/AREA URNS HPF: 4 /HPF (ref 0–1)
UROBILINOGEN UR STRIP-MCNC: NORMAL MG/DL (ref 0–2)
WBC # BLD AUTO: 9.7 10E9/L (ref 4–11)
WBC #/AREA URNS AUTO: >182 /HPF (ref 0–5)
WBC CLUMPS #/AREA URNS HPF: PRESENT /HPF

## 2018-04-09 PROCEDURE — 81003 URINALYSIS AUTO W/O SCOPE: CPT | Performed by: OBSTETRICS & GYNECOLOGY

## 2018-04-09 PROCEDURE — 85027 COMPLETE CBC AUTOMATED: CPT | Performed by: OBSTETRICS & GYNECOLOGY

## 2018-04-09 PROCEDURE — 86900 BLOOD TYPING SEROLOGIC ABO: CPT | Performed by: OBSTETRICS & GYNECOLOGY

## 2018-04-09 PROCEDURE — 86850 RBC ANTIBODY SCREEN: CPT | Performed by: OBSTETRICS & GYNECOLOGY

## 2018-04-09 PROCEDURE — 86901 BLOOD TYPING SEROLOGIC RH(D): CPT | Performed by: OBSTETRICS & GYNECOLOGY

## 2018-04-09 PROCEDURE — 81001 URINALYSIS AUTO W/SCOPE: CPT | Performed by: OBSTETRICS & GYNECOLOGY

## 2018-04-09 PROCEDURE — 36415 COLL VENOUS BLD VENIPUNCTURE: CPT | Performed by: OBSTETRICS & GYNECOLOGY

## 2018-04-09 PROCEDURE — 87088 URINE BACTERIA CULTURE: CPT

## 2018-04-09 PROCEDURE — 87086 URINE CULTURE/COLONY COUNT: CPT

## 2018-04-09 PROCEDURE — 87186 SC STD MICRODIL/AGAR DIL: CPT

## 2018-04-10 ENCOUNTER — ANESTHESIA EVENT (OUTPATIENT)
Dept: SURGERY | Facility: CLINIC | Age: 63
End: 2018-04-10
Payer: COMMERCIAL

## 2018-04-10 LAB
BACTERIA SPEC CULT: ABNORMAL
BACTERIA SPEC CULT: ABNORMAL
SPECIMEN SOURCE: ABNORMAL

## 2018-04-11 ENCOUNTER — ANESTHESIA (OUTPATIENT)
Dept: SURGERY | Facility: CLINIC | Age: 63
End: 2018-04-11
Payer: COMMERCIAL

## 2018-04-11 ENCOUNTER — HOSPITAL ENCOUNTER (OUTPATIENT)
Facility: CLINIC | Age: 63
Discharge: HOME | End: 2018-04-11
Attending: OBSTETRICS & GYNECOLOGY | Admitting: OBSTETRICS & GYNECOLOGY
Payer: COMMERCIAL

## 2018-04-11 ENCOUNTER — HOSPITAL ENCOUNTER (OUTPATIENT)
Dept: ULTRASOUND IMAGING | Facility: CLINIC | Age: 63
End: 2018-04-11
Attending: OBSTETRICS & GYNECOLOGY | Admitting: OBSTETRICS & GYNECOLOGY
Payer: COMMERCIAL

## 2018-04-11 ENCOUNTER — SURGERY (OUTPATIENT)
Age: 63
End: 2018-04-11

## 2018-04-11 VITALS
WEIGHT: 185.19 LBS | BODY MASS INDEX: 30.85 KG/M2 | SYSTOLIC BLOOD PRESSURE: 124 MMHG | RESPIRATION RATE: 12 BRPM | TEMPERATURE: 97.7 F | HEIGHT: 65 IN | HEART RATE: 63 BPM | DIASTOLIC BLOOD PRESSURE: 69 MMHG | OXYGEN SATURATION: 96 %

## 2018-04-11 DIAGNOSIS — N95.0 POSTMENOPAUSAL BLEEDING: ICD-10-CM

## 2018-04-11 LAB — GLUCOSE BLDC GLUCOMTR-MCNC: 75 MG/DL (ref 70–99)

## 2018-04-11 PROCEDURE — 40000170 ZZH STATISTIC PRE-PROCEDURE ASSESSMENT II: Performed by: OBSTETRICS & GYNECOLOGY

## 2018-04-11 PROCEDURE — 88342 IMHCHEM/IMCYTCHM 1ST ANTB: CPT | Performed by: OBSTETRICS & GYNECOLOGY

## 2018-04-11 PROCEDURE — 25000132 ZZH RX MED GY IP 250 OP 250 PS 637: Performed by: OBSTETRICS & GYNECOLOGY

## 2018-04-11 PROCEDURE — 71000027 ZZH RECOVERY PHASE 2 EACH 15 MINS: Performed by: OBSTETRICS & GYNECOLOGY

## 2018-04-11 PROCEDURE — 36000057 ZZH SURGERY LEVEL 3 1ST 30 MIN - UMMC: Performed by: OBSTETRICS & GYNECOLOGY

## 2018-04-11 PROCEDURE — 36000059 ZZH SURGERY LEVEL 3 EA 15 ADDTL MIN UMMC: Performed by: OBSTETRICS & GYNECOLOGY

## 2018-04-11 PROCEDURE — 76499 UNLISTED DX RADIOGRAPHIC PX: CPT

## 2018-04-11 PROCEDURE — 25000128 H RX IP 250 OP 636: Performed by: NURSE ANESTHETIST, CERTIFIED REGISTERED

## 2018-04-11 PROCEDURE — 25000125 ZZHC RX 250: Performed by: NURSE ANESTHETIST, CERTIFIED REGISTERED

## 2018-04-11 PROCEDURE — 37000008 ZZH ANESTHESIA TECHNICAL FEE, 1ST 30 MIN: Performed by: OBSTETRICS & GYNECOLOGY

## 2018-04-11 PROCEDURE — 37000009 ZZH ANESTHESIA TECHNICAL FEE, EACH ADDTL 15 MIN: Performed by: OBSTETRICS & GYNECOLOGY

## 2018-04-11 PROCEDURE — 88341 IMHCHEM/IMCYTCHM EA ADD ANTB: CPT | Performed by: OBSTETRICS & GYNECOLOGY

## 2018-04-11 PROCEDURE — 25000132 ZZH RX MED GY IP 250 OP 250 PS 637: Performed by: ANESTHESIOLOGY

## 2018-04-11 PROCEDURE — 58558 HYSTEROSCOPY BIOPSY: CPT | Mod: GC | Performed by: OBSTETRICS & GYNECOLOGY

## 2018-04-11 PROCEDURE — 82962 GLUCOSE BLOOD TEST: CPT

## 2018-04-11 PROCEDURE — 88341 IMHCHEM/IMCYTCHM EA ADD ANTB: CPT | Mod: 26 | Performed by: OBSTETRICS & GYNECOLOGY

## 2018-04-11 PROCEDURE — 40000985 US INTRAOPERATIVE: Mod: TC

## 2018-04-11 PROCEDURE — 71000014 ZZH RECOVERY PHASE 1 LEVEL 2 FIRST HR: Performed by: OBSTETRICS & GYNECOLOGY

## 2018-04-11 PROCEDURE — 88342 IMHCHEM/IMCYTCHM 1ST ANTB: CPT | Mod: 26 | Performed by: OBSTETRICS & GYNECOLOGY

## 2018-04-11 PROCEDURE — 25000125 ZZHC RX 250: Performed by: OBSTETRICS & GYNECOLOGY

## 2018-04-11 PROCEDURE — 88305 TISSUE EXAM BY PATHOLOGIST: CPT | Performed by: OBSTETRICS & GYNECOLOGY

## 2018-04-11 PROCEDURE — 27210794 ZZH OR GENERAL SUPPLY STERILE: Performed by: OBSTETRICS & GYNECOLOGY

## 2018-04-11 PROCEDURE — 88305 TISSUE EXAM BY PATHOLOGIST: CPT | Mod: 26 | Performed by: OBSTETRICS & GYNECOLOGY

## 2018-04-11 RX ORDER — ACETAMINOPHEN 325 MG/1
975 TABLET ORAL ONCE
Status: COMPLETED | OUTPATIENT
Start: 2018-04-11 | End: 2018-04-11

## 2018-04-11 RX ORDER — FENTANYL CITRATE 50 UG/ML
INJECTION, SOLUTION INTRAMUSCULAR; INTRAVENOUS PRN
Status: DISCONTINUED | OUTPATIENT
Start: 2018-04-11 | End: 2018-04-11

## 2018-04-11 RX ORDER — PROPOFOL 10 MG/ML
INJECTION, EMULSION INTRAVENOUS CONTINUOUS PRN
Status: DISCONTINUED | OUTPATIENT
Start: 2018-04-11 | End: 2018-04-11

## 2018-04-11 RX ORDER — LIDOCAINE HYDROCHLORIDE 10 MG/ML
INJECTION, SOLUTION EPIDURAL; INFILTRATION; INTRACAUDAL; PERINEURAL PRN
Status: DISCONTINUED | OUTPATIENT
Start: 2018-04-11 | End: 2018-04-11 | Stop reason: HOSPADM

## 2018-04-11 RX ORDER — SODIUM CHLORIDE, SODIUM LACTATE, POTASSIUM CHLORIDE, CALCIUM CHLORIDE 600; 310; 30; 20 MG/100ML; MG/100ML; MG/100ML; MG/100ML
INJECTION, SOLUTION INTRAVENOUS CONTINUOUS
Status: DISCONTINUED | OUTPATIENT
Start: 2018-04-11 | End: 2018-04-11 | Stop reason: HOSPADM

## 2018-04-11 RX ORDER — DEXAMETHASONE SODIUM PHOSPHATE 4 MG/ML
INJECTION, SOLUTION INTRA-ARTICULAR; INTRALESIONAL; INTRAMUSCULAR; INTRAVENOUS; SOFT TISSUE PRN
Status: DISCONTINUED | OUTPATIENT
Start: 2018-04-11 | End: 2018-04-11

## 2018-04-11 RX ORDER — ACETAMINOPHEN 325 MG/1
650 TABLET ORAL
Status: DISCONTINUED | OUTPATIENT
Start: 2018-04-11 | End: 2018-04-11 | Stop reason: HOSPADM

## 2018-04-11 RX ORDER — PROPOFOL 10 MG/ML
INJECTION, EMULSION INTRAVENOUS PRN
Status: DISCONTINUED | OUTPATIENT
Start: 2018-04-11 | End: 2018-04-11

## 2018-04-11 RX ORDER — ONDANSETRON 2 MG/ML
4 INJECTION INTRAMUSCULAR; INTRAVENOUS EVERY 30 MIN PRN
Status: DISCONTINUED | OUTPATIENT
Start: 2018-04-11 | End: 2018-04-11 | Stop reason: HOSPADM

## 2018-04-11 RX ORDER — ONDANSETRON 4 MG/1
4 TABLET, ORALLY DISINTEGRATING ORAL EVERY 30 MIN PRN
Status: DISCONTINUED | OUTPATIENT
Start: 2018-04-11 | End: 2018-04-11 | Stop reason: HOSPADM

## 2018-04-11 RX ORDER — LIDOCAINE 40 MG/G
CREAM TOPICAL
Status: DISCONTINUED | OUTPATIENT
Start: 2018-04-11 | End: 2018-04-11 | Stop reason: HOSPADM

## 2018-04-11 RX ORDER — SODIUM CHLORIDE, SODIUM LACTATE, POTASSIUM CHLORIDE, CALCIUM CHLORIDE 600; 310; 30; 20 MG/100ML; MG/100ML; MG/100ML; MG/100ML
INJECTION, SOLUTION INTRAVENOUS CONTINUOUS PRN
Status: DISCONTINUED | OUTPATIENT
Start: 2018-04-11 | End: 2018-04-11

## 2018-04-11 RX ORDER — NALOXONE HYDROCHLORIDE 0.4 MG/ML
.1-.4 INJECTION, SOLUTION INTRAMUSCULAR; INTRAVENOUS; SUBCUTANEOUS
Status: DISCONTINUED | OUTPATIENT
Start: 2018-04-11 | End: 2018-04-11 | Stop reason: HOSPADM

## 2018-04-11 RX ORDER — LIDOCAINE HYDROCHLORIDE 20 MG/ML
INJECTION, SOLUTION INFILTRATION; PERINEURAL PRN
Status: DISCONTINUED | OUTPATIENT
Start: 2018-04-11 | End: 2018-04-11

## 2018-04-11 RX ORDER — AMOXICILLIN 875 MG
1750 TABLET ORAL ONCE
Status: COMPLETED | OUTPATIENT
Start: 2018-04-11 | End: 2018-04-11

## 2018-04-11 RX ADMIN — PROPOFOL 200 MCG/KG/MIN: 10 INJECTION, EMULSION INTRAVENOUS at 07:38

## 2018-04-11 RX ADMIN — DEXAMETHASONE SODIUM PHOSPHATE 4 MG: 4 INJECTION, SOLUTION INTRAMUSCULAR; INTRAVENOUS at 07:39

## 2018-04-11 RX ADMIN — FENTANYL CITRATE 50 MCG: 50 INJECTION, SOLUTION INTRAMUSCULAR; INTRAVENOUS at 08:05

## 2018-04-11 RX ADMIN — AMOXICILLIN 1750 MG: 875 TABLET, FILM COATED ORAL at 07:17

## 2018-04-11 RX ADMIN — SODIUM CHLORIDE, POTASSIUM CHLORIDE, SODIUM LACTATE AND CALCIUM CHLORIDE: 600; 310; 30; 20 INJECTION, SOLUTION INTRAVENOUS at 07:28

## 2018-04-11 RX ADMIN — PROPOFOL 200 MG: 10 INJECTION, EMULSION INTRAVENOUS at 07:37

## 2018-04-11 RX ADMIN — FENTANYL CITRATE 50 MCG: 50 INJECTION, SOLUTION INTRAMUSCULAR; INTRAVENOUS at 07:37

## 2018-04-11 RX ADMIN — ACETAMINOPHEN 975 MG: 325 TABLET ORAL at 06:00

## 2018-04-11 RX ADMIN — LIDOCAINE HYDROCHLORIDE 100 MG: 20 INJECTION, SOLUTION INFILTRATION; PERINEURAL at 07:37

## 2018-04-11 RX ADMIN — MIDAZOLAM 2 MG: 1 INJECTION INTRAMUSCULAR; INTRAVENOUS at 07:28

## 2018-04-11 RX ADMIN — PROPOFOL 50 MG: 10 INJECTION, EMULSION INTRAVENOUS at 08:00

## 2018-04-11 RX ADMIN — LIDOCAINE HYDROCHLORIDE 20 ML: 10 INJECTION, SOLUTION EPIDURAL; INFILTRATION; INTRACAUDAL; PERINEURAL at 08:00

## 2018-04-11 NOTE — IP AVS SNAPSHOT
MAIN OR    [address]    [place] [address]    Phone:  [phone]                                       After Visit Summary   4/11/2018    Gerri Owens    MRN: [id number]           After Visit Summary Signature Page     I have received my discharge instructions, and my questions have been answered. I have discussed any challenges I see with this plan with the nurse or doctor.    ..........................................................................................................................................  Patient/Patient Representative Signature      ..........................................................................................................................................  Patient Representative Print Name and Relationship to Patient    ..................................................               ................................................  Date                                            Time    ..........................................................................................................................................  Reviewed by Signature/Title    ...................................................              ..............................................  Date                                                            Time

## 2018-04-11 NOTE — IP AVS SNAPSHOT
MRN:[id number]                      After Visit Summary   4/11/2018    Gerri Owens    MRN: [id number]           Thank you!     Thank you for choosing [facility] for your care. Our goal is always to provide you with excellent care. Hearing back from our patients is one way we can continue to improve our services. Please take a few minutes to complete the written survey that you may receive in the mail after you visit with us. Thank you!        Patient Information     Date Of Birth          1955        About your hospital stay     You were admitted on:  April 11, 2018 You last received care in the:  [facility] OR    You were discharged on:  April 11, 2018       Who to Call     For medical emergencies, please call 911.  For non-urgent questions about your medical care, please call your primary care provider or clinic, [phone]  For questions related to your surgery, please call your surgery clinic        Attending Provider     Provider Adry Salinas MD OB/Gyn       Primary Care Provider Office Phone # Fax #    Karo Doty [phone]      After Care Instructions     Discharge Instructions       Resume pre procedure diet            Discharge Instructions       Pelvic Rest. No tampons, douching or intercourse for 1 week.            Discharge Instructions       No follow-up appointment needed.  Dr. Tineo will call patient with results.            No alcohol       NO ALCOHOL for 24 hours post procedure            No driving or operating machinery       No driving or operating machinery until day after procedure                  Further instructions from your care team       Same-Day Surgery   Adult Discharge Orders & Instructions     For 24 hours after surgery:  1. Get plenty of rest.  A responsible adult must stay with you for at least 24 hours after you leave the hospital.   2. Pain medication can slow your reflexes. Do not drive or use heavy equipment.   If you have weakness or tingling, don't drive or use heavy equipment until this feeling goes away.  3. Mixing alcohol and pain medication can cause dizziness and slow your breathing. It can even be fatal. Do not drink alcohol while taking pain medication.  4. Avoid strenuous or risky activities.  Ask for help when climbing stairs.   5. You may feel lightheaded.  If so, sit for a few minutes before standing.  Have someone help you get up.   6. If you have nausea (feel sick to your stomach), drink only clear liquids such as apple juice, ginger ale, broth or 7-Up.  Rest may also help.  Be sure to drink enough fluids.  Move to a regular diet as you feel able. Take pain medications with a small amount of solid food, such as toast or crackers, to avoid nausea.   7. A slight fever is normal. Call the doctor if your fever is over 100 F (37.7 C) (taken under the tongue) or lasts longer than 24 hours.  8. You may have a dry mouth, muscle aches, trouble sleeping or a sore throat.  These symptoms should go away after 24 hours.  9. Do not make important or legal decisions.   Pain Management:      1. Take pain medication (if prescribed) for pain as directed by your physician.        2. WARNING: If the pain medication you have been prescribed contains Tylenol  (acetaminophen), DO NOT take additional doses of Tylenol (acetaminophen).     Call your doctor for any of the followin.  Signs of infection (fever, growing tenderness at the surgery site, severe pain, a large amount of drainage or bleeding, foul-smelling drainage, redness, swelling).    2.  It has been over 8 to 10 hours since surgery and you are still not able to urinate (pee).    3.  Headache for over 24 hours.    4.  Numbness, tingling or weakness the day after surgery (if you had spinal anesthesia).  To contact a doctor, call :      [phone] and ask for the Resident On Call for:          OB/GYN, Dr. Ocampo (answered 24 hours a day)      Emergency  Department:  [facility]: [phone]  [facility]: [phone]               Rev. 10/2014   Discharge Instructions: Following a Dilation   and Curettage    What to expect:    Expect small to moderate amount of vaginal bleeding which should taper off in 4-5 days. It should not be heavier than your regular menstrual flow.    Do not douche, and use a pad rather than tampons.     No intercourse until bleeding has ceased.    Activity:    Rest the day of surgery. You may resume normal activity the next day.    You may bathe or shower.    Avoid heavy lifting (10-15 lbs) for one week.    Comfort:    The amount of discomfort you can expect is very unpredictable. If you have pain that cannot be controlled with non-aspirin pain relievers or with the prescription you may have received, you should notify your doctor.    Abdominal cramping (like menstrual cramps) or low back ache are common and should not be a cause for concern. You will be drowsy and weak the day of surgery and possibly the following day.    Diet:    You have no restrictions on your diet. Following surgery, drink plenty of fluids and eat a light meal.    Nausea:    The anesthesia medications you received during your surgical procedure may produce some nausea.    If you feel nauseated, stay in bed, keep your head down and try drinking fluids such as Seven-Up, tea or soup.    Notify Physician at once if you experience:    A fever over 100 degrees (a low grade fever under 100 degrees is usual after surgery).    Heavy flow and/or passing large clots. Saturating more than 1 pad per hour for 2 or more hours.     Severe pain or cramps.  Rev. 5/12      What happens after hysteroscopy?  You may have cramps and bleeding for 24 hours after the procedure. This is normal. Use pads instead of tampons.  Do not douche or use tampons until your health care provider says it s OK.  Do not use any vaginal medicines until you are told it s  "OK.  Ask your health care provider when it s OK to have sex again.  When should I call my health care provider?  Call your health care provider if you have:  Heavy bleeding (more than 1 pad an hour for 2 or more hours)  A fever over 100.4 F (38.0 C)  Increasing abdominal pain or tenderness  Foul-smelling discharge  Follow-up care  Schedule a follow-up visit with your health care provider. Based on the results of your test, you may need more treatment. Be sure to follow instructions and keep your appointments.      3567-8077 [employer]. [address] [place] [address]. All rights reserved. This information is not intended as a substitute for professional medical care. Always follow your healthcare professional's instructions.           Pending Results     No orders found from 4/9/2018 to 4/12/2018.            Admission Information     Date & Time Provider Department Dept. Phone    4/11/2018 Adry Tineo MD [facility] [phone]      Your Vitals Were     Blood Pressure Pulse Temperature Respirations Height Weight    107/75 53 96.3  F (35.7  C) (Axillary) 14 1.651 m (5' 5\") 84 kg (185 lb 3 oz)    Last Period Pulse Oximetry BMI (Body Mass Index)             03/04/2005 100% 30.82 kg/m2         MyChart Information     "[employer] gives you secure access to your electronic health record. If you see a primary care provider, you can also send messages to your care team and make appointments. If you have questions, please call your primary care clinic.  If you do not have a primary care provider, please call [phone] and they will assist you.        Care EveryWhere ID     This is your Care EveryWhere ID. This could be used by other organizations to access your [facility] medical records  [id number]        Equal Access to Services     JEREMÍAS BUCK AH: Gopi Springer, kiko colbert, catracho benavides, carol roland. So trina " [phone].    ATENCIÓN: Si donna long, tiene a graham disposición servicios gratuitos de asistencia lingüística. Avril jimenez [phone].    We comply with applicable federal civil rights laws and [place] laws. We do not discriminate on the basis of race, color, national origin, age, disability, sex, sexual orientation, or gender identity.               Review of your medicines      CONTINUE these medicines which have NOT CHANGED        Dose / Directions    ACETAMINOPHEN PO        Dose:  500 mg   Take 500 mg by mouth   Refills:  0       ascorbic acid 250 MG tablet   Commonly known as:  VITAMIN C   Used for:  Routine general medical examination at a health care facility        250 mg   Quantity:  90   Refills:  0       calcium citrate-vitamin D 315-200 MG-UNIT Tabs per tablet   Commonly known as:  CITRACAL   Indication:  630/500 mg        Dose:  2 tablet   Take 2 tablets by mouth daily   Refills:  0       GLUCOSAMINE CHONDROITIN COMPLX Tabs   Used for:  Routine general medical examination at a health care facility        Take  by mouth. 1500 mg 1xqd.   Refills:  0       nitroFURantoin (macrocrystal-monohydrate) 100 MG capsule   Commonly known as:  MACROBID   Used for:  Dysuria        Dose:  100 mg   Take 1 capsule (100 mg) by mouth 2 times daily for 5 days   Quantity:  10 capsule   Refills:  0       RANITIDINE HCL PO        Dose:  150 mg   Take 150 mg by mouth 2 times daily   Refills:  0       vitamin E 400 UNIT capsule        Dose:  1 capsule   Take 1 capsule by mouth three times a week.   Refills:  0                Protect others around you: Learn how to safely use, store and throw away your medicines at www.disposemymeds.org.             Medication List: This is a list of all your medications and when to take them. Check marks below indicate your daily home schedule. Keep this list as a reference.      Medications           Morning Afternoon Evening Bedtime As Needed    ACETAMINOPHEN PO   Take 500 mg by mouth    Last time this was given:  975 mg on 4/11/2018  6:00 AM                                ascorbic acid 250 MG tablet   Commonly known as:  VITAMIN C   250 mg                                calcium citrate-vitamin D 315-200 MG-UNIT Tabs per tablet   Commonly known as:  CITRACAL   Take 2 tablets by mouth daily                                GLUCOSAMINE CHONDROITIN COMPLX Tabs   Take  by mouth. 1500 mg 1xqd.                                nitroFURantoin (macrocrystal-monohydrate) 100 MG capsule   Commonly known as:  MACROBID   Take 1 capsule (100 mg) by mouth 2 times daily for 5 days                                RANITIDINE HCL PO   Take 150 mg by mouth 2 times daily                                vitamin E 400 UNIT capsule   Take 1 capsule by mouth three times a week.

## 2018-04-11 NOTE — ANESTHESIA POSTPROCEDURE EVALUATION
Patient: Gerri Owens    Procedure(s):  Hysteroscopy, Dilation and Curettage Under Ultrasound Guidance  - Wound Class: II-Clean Contaminated    Diagnosis:Post Menopausal Bleeding   Diagnosis Additional Information: No value filed.    Anesthesia Type:  General, LMA    Note:  Anesthesia Post Evaluation    Patient location during evaluation: PACU  Patient participation: Able to fully participate in evaluation  Level of consciousness: awake  Pain management: adequate  Airway patency: patent  Cardiovascular status: acceptable  Respiratory status: acceptable  Hydration status: acceptable  PONV: none             Last vitals:  Vitals:    04/11/18 0900 04/11/18 0915 04/11/18 0942   BP: 124/69 115/75 124/69   Pulse:      Resp: 22 24 12   Temp:  36.5  C (97.7  F) 36.5  C (97.7  F)   SpO2: 92% 94% 96%         Electronically Signed By: Frandy Hebert MD  April 11, 2018  3:31 PM

## 2018-04-11 NOTE — ANESTHESIA CARE TRANSFER NOTE
Patient: Gerri Owens    Procedure(s):  Hysteroscopy, Dilation and Curettage Under Ultrasound Guidance  - Wound Class: II-Clean Contaminated    Diagnosis: Post Menopausal Bleeding   Diagnosis Additional Information: No value filed.    Anesthesia Type:   General, LMA     Note:  Airway :Face Mask  Patient transferred to:PACU  Comments: Report to Carol RN  Supine on cart with O2 FM  RR 14, clear, SaO2 100 %,  Denies pain or nausea.  Temp 35.7 C  Skin warm, pt does not feel cold.  Vs at baselineHandoff Report: Identifed the Patient, Identified the Reponsible Provider, Reviewed the pertinent medical history, Discussed the surgical course, Reviewed Intra-OP anesthesia mangement and issues during anesthesia, Set expectations for post-procedure period and Allowed opportunity for questions and acknowledgement of understanding      Vitals: (Last set prior to Anesthesia Care Transfer)    CRNA VITALS  4/11/2018 0805 - 4/11/2018 0842      4/11/2018             NIBP: 107/75    Ht Rate: 63    Resp Rate (observed): 14                Electronically Signed By: ALFONZO Mott CRNA  April 11, 2018  8:42 AM

## 2018-04-11 NOTE — OP NOTE
[facility] Operative Note    Patient: Gerri Owens  : 1955  MRN: [id number]    Date of Service: 2018    Pre-operative diagnosis:  1. Postmenopausal bleeding    Post-operative diagnosis:  1. Same    Procedure:   1. Exam under anesthesia  2. 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  Small vaginal canal; normal cervix and anteverted uterus, no adnexal masses.  Uterine cavity with diffuse proliferative, vascular endometrium    Indications: Gerri Owens is a 62 year old female who developed postmenopausal bleeding.  Given her nulliparous status, she did not think she could tolerate biopsy in clinic and plan was made for sampling in the OR. Risks, benefits, and alternatives to the procedure were discussed. The patient's questions were answered, understanding confirmed, and the patient signed written informed consent.    Technique: The patient was taken to the operating room where she was placed under General anesthesia.  She was placed in the dorsal lithotomy position with feet in yellow fin stirrups in what appeared to be a neurologically safe position.  An exam under anesthesia was perfromed. The patient was prepped and draped in the usual sterile fashion. A speculum was placed in the vagina and the cervix visualized. A single-toothed tenaculum was placed on the anterior lip of the cervix at 12 o'clock.  A paracervical block was placed at 4- and 8-o'clock with 1% lidocaine. The cervical os was carefully dilated to 7 mm with sequential Hegar dilators under ultrasound guidance. The operating hysteroscope introducer was placed through the cervix into  the uterine cavity. The hysteroscope was attached. Examination of the uterine cavity demonstrated the above findings. The remainder of the cavity was unremarkable. Pictures were taken. The reciprocating hysteroscopic morcellator was used to sample the proliferative endometrium throughout the cavity with good success and again under ultrasound guidance.The hysteroscope apparatus was removed and total of 210 mL fluid deficit of normal saline noted. The curetings were sent to pathology. The tenaculum was removed from the cervix and good hemostasis was noted. The speculum was removed from the vagina.    Instrument counts were correct x2. Dr. Tineo was present and scrubbed for the entire procedure. The patient was awoken in the OR and transferred to the PACU in stable condition.    Roxie Martinez MD  OB/GYN Resident, PGY4  04/11/18

## 2018-04-11 NOTE — DISCHARGE INSTRUCTIONS
Same-Day Surgery   Adult Discharge Orders & Instructions     For 24 hours after surgery:  1. Get plenty of rest.  A responsible adult must stay with you for at least 24 hours after you leave the hospital.   2. Pain medication can slow your reflexes. Do not drive or use heavy equipment.  If you have weakness or tingling, don't drive or use heavy equipment until this feeling goes away.  3. Mixing alcohol and pain medication can cause dizziness and slow your breathing. It can even be fatal. Do not drink alcohol while taking pain medication.  4. Avoid strenuous or risky activities.  Ask for help when climbing stairs.   5. You may feel lightheaded.  If so, sit for a few minutes before standing.  Have someone help you get up.   6. If you have nausea (feel sick to your stomach), drink only clear liquids such as apple juice, ginger ale, broth or 7-Up.  Rest may also help.  Be sure to drink enough fluids.  Move to a regular diet as you feel able. Take pain medications with a small amount of solid food, such as toast or crackers, to avoid nausea.   7. A slight fever is normal. Call the doctor if your fever is over 100 F (37.7 C) (taken under the tongue) or lasts longer than 24 hours.  8. You may have a dry mouth, muscle aches, trouble sleeping or a sore throat.  These symptoms should go away after 24 hours.  9. Do not make important or legal decisions.   Pain Management:      1. Take pain medication (if prescribed) for pain as directed by your physician.        2. WARNING: If the pain medication you have been prescribed contains Tylenol  (acetaminophen), DO NOT take additional doses of Tylenol (acetaminophen).     Call your doctor for any of the followin.  Signs of infection (fever, growing tenderness at the surgery site, severe pain, a large amount of drainage or bleeding, foul-smelling drainage, redness, swelling).    2.  It has been over 8 to 10 hours since surgery and you are still not able to urinate (pee).    3.   Headache for over 24 hours.    4.  Numbness, tingling or weakness the day after surgery (if you had spinal anesthesia).  To contact a doctor, call :      [phone] and ask for the Resident On Call for:          OB/GYN, Dr. Ocampo (answered 24 hours a day)      Emergency Department:  [facility]: [phone]  [facility]: [phone]               Rev. 10/2014   Discharge Instructions: Following a Dilation   and Curettage    What to expect:    Expect small to moderate amount of vaginal bleeding which should taper off in 4-5 days. It should not be heavier than your regular menstrual flow.    Do not douche, and use a pad rather than tampons.     No intercourse until bleeding has ceased.    Activity:    Rest the day of surgery. You may resume normal activity the next day.    You may bathe or shower.    Avoid heavy lifting (10-15 lbs) for one week.    Comfort:    The amount of discomfort you can expect is very unpredictable. If you have pain that cannot be controlled with non-aspirin pain relievers or with the prescription you may have received, you should notify your doctor.    Abdominal cramping (like menstrual cramps) or low back ache are common and should not be a cause for concern. You will be drowsy and weak the day of surgery and possibly the following day.    Diet:    You have no restrictions on your diet. Following surgery, drink plenty of fluids and eat a light meal.    Nausea:    The anesthesia medications you received during your surgical procedure may produce some nausea.    If you feel nauseated, stay in bed, keep your head down and try drinking fluids such as Seven-Up, tea or soup.    Notify Physician at once if you experience:    A fever over 100 degrees (a low grade fever under 100 degrees is usual after surgery).    Heavy flow and/or passing large clots. Saturating more than 1 pad per hour for 2 or more hours.     Severe pain or cramps.  Rev. 5/12      What  happens after hysteroscopy?  You may have cramps and bleeding for 24 hours after the procedure. This is normal. Use pads instead of tampons.  Do not douche or use tampons until your health care provider says it s OK.  Do not use any vaginal medicines until you are told it s OK.  Ask your health care provider when it s OK to have sex again.  When should I call my health care provider?  Call your health care provider if you have:  Heavy bleeding (more than 1 pad an hour for 2 or more hours)  A fever over 100.4 F (38.0 C)  Increasing abdominal pain or tenderness  Foul-smelling discharge  Follow-up care  Schedule a follow-up visit with your health care provider. Based on the results of your test, you may need more treatment. Be sure to follow instructions and keep your appointments.      5358-7410 [employer]. [address] [place] [address]. All rights reserved. This information is not intended as a substitute for professional medical care. Always follow your healthcare professional's instructions.

## 2018-04-11 NOTE — ANESTHESIA PREPROCEDURE EVALUATION
Anesthesia Evaluation     . Pt has had prior anesthetic. Type: General    No history of anesthetic complications          ROS/MED HX    ENT/Pulmonary:  - neg pulmonary ROS     Neurologic:  - neg neurologic ROS     Cardiovascular:     (+) Dyslipidemia, ----. : . . . :. . Previous cardiac testing date:results:date: results:ECG reviewed date:11/14/17 results:SR date: results:          METS/Exercise Tolerance:  >4 METS   Hematologic:         Musculoskeletal:         GI/Hepatic:     (+) GERD Asymptomatic on medication,       Renal/Genitourinary:     (+) chronic renal disease, type: CRI,       Endo:  - neg endo ROS   (+) Obesity, .      Psychiatric:  - neg psychiatric ROS       Infectious Disease:  - neg infectious disease ROS       Malignancy:      - no malignancy   Other:                   Procedure: Procedure(s):  Hysteroscopy, Dilation and Curttage Under Ultrasound Guidance (choice with paracervical block)  - Wound Class:     HPI: Gerri Owens is a 62 year old female who is presenting for above stated procedure.    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  7/17/2012     Renal disease     Did have some abnormal labs but improved after stopping advil     Unspecified episodic mood disorder      Whooping cough due to Bordetella pertussis (p. pertussis) infant    whooping cough       Past Surgical History:   Procedure Laterality Date     ARTHROPLASTY HIP Left 12/4/2017    Procedure: ARTHROPLASTY HIP;  Left total hip arthroplasty;  Surgeon: Rajinder Goodwin  MD Roger;  Location: [facility] OR     BREAST BIOPSY, RT/LT  2004    left breast     TONSILLECTOMY  1960    tonsillectomy       ROS as stated above    Soc Hx:   Social History   Substance Use Topics     Smoking status: Never Smoker     Smokeless tobacco: Never Used     Alcohol use Yes      Comment: 1 glass of wine weekly       Allergies:   Allergies   Allergen Reactions     Nickel Rash       Meds:   No prescriptions prior to admission.       Current Outpatient Prescriptions   Medication Sig Dispense Refill     nitroFURantoin, macrocrystal-monohydrate, (MACROBID) 100 MG capsule Take 1 capsule (100 mg) by mouth 2 times daily for 5 days 10 capsule 0     ACETAMINOPHEN PO Take 500 mg by mouth       RANITIDINE HCL PO Take 150 mg by mouth 2 times daily        calcium citrate-vitamin D (CITRACAL) 315-200 MG-UNIT TABS per tablet Take 2 tablets by mouth daily       vitamin E 400 UNIT capsule Take 1 capsule by mouth three times a week.       GLUCOSAMINE CHONDROITIN COMPLX OR TABS Take  by mouth. 1500 mg 1xqd.   0     VITAMIN C 250 MG OR TABS 250 mg 90 0       Physical Exam:  VS:      , Weight   Wt Readings from Last 2 Encounters:   03/22/18 84.5 kg (186 lb 4 oz)   03/13/18 84.5 kg (186 lb 3.2 oz)       Labs:    BMP:  Recent Labs   Lab Test  03/13/18   1151   NA  140   POTASSIUM  4.0   CHLORIDE  106   CO2  28   BUN  15   CR  0.93   GLC  85   LAURENT  9.3     LFTs:   Recent Labs   Lab Test  03/13/18   1151  11/14/17   0923   PROTTOTAL   --   7.3   ALBUMIN  3.6  3.7   BILITOTAL   --   0.5   ALKPHOS   --   98   AST   --   20   ALT   --   22     CBC:   Recent Labs   Lab Test  04/09/18   0837   WBC  9.7   RBC  4.89   HGB  13.9   HCT  44.7   MCV  91   MCH  28.4   MCHC  31.1*   RDW  13.6   PLT  269     Coags:  Recent Labs   Lab Test  11/14/17   0923   INR  0.93         Physical Exam  Normal systems: dental    Airway   Mallampati: I  TM distance: >3 FB  Neck ROM: full  Comment: Small mouth opening    Dental     Cardiovascular   Rhythm and rate:  regular and normal      Pulmonary    breath sounds clear to auscultation                    Anesthesia Plan      History & Physical Review  History and physical reviewed and following examination; no interval change.    ASA Status:  2 .    NPO Status:  > 8 hours    Plan for General and LMA with Intravenous and Propofol induction. Maintenance will be TIVA.    PONV prophylaxis:  Ondansetron (or other 5HT-3) and Dexamethasone or Solumedrol       Postoperative Care  Postoperative pain management:  Oral pain medications and Multi-modal analgesia.      Consents  Anesthetic plan, risks, benefits and alternatives discussed with:  Patient.  Use of blood products discussed: Yes.   Consented to blood products.  .        I have personally discussed the risks and benefits of anesthesia with the patient and patient agrees to proceed.    Frandy Hebert MD  Anesthesiology                  .

## 2018-04-16 ENCOUNTER — TELEPHONE (OUTPATIENT)
Dept: OBGYN | Facility: CLINIC | Age: 63
End: 2018-04-16

## 2018-04-16 DIAGNOSIS — C54.1 ENDOMETRIAL ADENOCARCINOMA (H): Primary | ICD-10-CM

## 2018-04-16 ASSESSMENT — ENCOUNTER SYMPTOMS
DECREASED LIBIDO: 0
FLANK PAIN: 0
DYSURIA: 1
HEMATURIA: 1
DIFFICULTY URINATING: 0

## 2018-04-16 NOTE — TELEPHONE ENCOUNTER
Gyn/onc referral placed. TC to gyn/onc to schedule consult. Appt on 4/23 at 11am at the [facility] ([address] [place] [address]), 2nd floor.  parking in front. Pt can call [phone] if she needs to change the time or has any questions.   Sowmya Villalobos RN-BSN

## 2018-04-16 NOTE — TELEPHONE ENCOUNTER
----- Message from Adry Tineo MD sent at 4/16/2018 12:18 PM CDT -----  Regarding: gyn onc consult  Please schedule this patient for consult with gyn onc for new diagnosis of endometrial cancer.  Biopsy results pasted at the end of this message.    I will call the patient to discuss results and notify of appointment AFTER the consult is made, so please let them know not to get in touch with her immediately.    Thanks!  Adry    SPECIMEN(S):   Endometrial curettings     FINAL DIAGNOSIS:   ENDOMETRIAL CURETTINGS:   - Endometrial endometrioid adenocarcinoma, FIGO grade 2     COMMENT:   While the tumor maintained a glandular morphology, the occasional high   nuclear grade warranted upgrading to   FIGO 2.   I have personally reviewed all specimens and/or slides, including the   listed special stains, and used them   with my medical judgement to determine or confirm the final diagnosis.

## 2018-04-22 NOTE — PROGRESS NOTES
Consult Notes on Referred Patient    Date: 2018       Dr. Karo Doty MD  [address] [address]       RE: Gerri Owens  : 1955  PRO: 2018    Dear Dr. Karo Doty:    I had the pleasure of seeing your patient Gerri Owens here at the Gynecologic Cancer Clinic at the [facility] on 2018.  As you know she is a very pleasant 62 year old woman with a recent diagnosis of  endometrial cancer.  Given these findings she was subsequently sent to the Gynecologic Cancer Clinic for new patient consultation.     Today the patient reports that she is feeling overall well.  She is nervous about the visit and her recent cancer diagnosis.    Cancer Treatment History:  18- OBGYN visit with Dr. Tineo.  Complains of 1.5 years of post-menopausal bleeding.     EMB performed and showed     18- EUA, hysteroscopy D&C under ultrasound guidance.  Diffuse proliferative vascular endometrium noted.   Pathology:    FINAL DIAGNOSIS:    ENDOMETRIAL CURETTINGS:    - Endometrial endometrioid adenocarcinoma, FIGO grade 2      COMMENT:    While the tumor maintained a glandular morphology, the occasional high    nuclear grade warranted upgrading to    FIGO 2.     18- Patient reports that she continues to have some post-menopausal spotting       Review of Systems:    Systemic           no weight changes; no fever; no chills; no night sweats; no appetite changes  Skin           no rashes, or lesions  Eye           no irritation; no changes in vision  Consuelo-Laryngeal           no dysphagia; no hoarseness   Pulmonary    no cough; no shortness of breath  Cardiovascular    no chest pain; no palpitations  Gastrointestinal    no diarrhea; no constipation; no abdominal pain; no changes in bowel  habits; no blood in stool, positive for acid reflux  Genitourinary   no urinary frequency; no urinary urgency; no dysuria; no pain; no abnormal vaginal discharge; Positive for  abnormal vaginal bleeding over the past two years, positive for vaginal dryness  Breast   no breast discharge; no breast changes; no breast pain  Musculoskeletal    no myalgias; no back pain, Positive for right knee and right hip pain.  Positive for limited mobility in left hip following hip replacement  Psychiatric           no depressed mood; no anxiety    Hematologic           no tender lymph nodes; no noticeable swellings or lumps   Endocrine    no hot flashes; no heat/cold intolerance         Neurological   no tremor; no numbness and tingling; no headaches; no difficulty  Sleeping    OBGYNHx:    Menarche age 12  Menopause age 47  No history of hormone replacement therapy  Does not have vaginal intercourse due to discomfort after menopause.      Past Medical History:  Patient reports that she has a history of arthritis.  Currently affects her right hip, right knee, and hands.  History of unprovoked DVT.  Patient reports that she had a full work up and she was negative for everything including negative factor V Leiden testing.    GERD  History of whooping cough  CKD.  Patient reports that her kidney function has improved since stopping her NSAID use.      Past Surgical History:    Past Surgical History:   Procedure Laterality Date     ARTHROPLASTY HIP Left 2017    Procedure: ARTHROPLASTY HIP;  Left total hip arthroplasty;  Surgeon: Rajinder Goodwin MD;  Location: [facility] OR     BREAST BIOPSY, RT/LT      left breast     DILATION AND CURETTAGE, OPERATIVE HYSTEROSCOPY WITH MORCELLATOR, COMBINED N/A 2018    Procedure: COMBINED DILATION AND CURETTAGE, OPERATIVE HYSTEROSCOPY WITH MORCELLATOR;  Hysteroscopy, Dilation and Curettage Under Ultrasound Guidance ;  Surgeon: Adry Tineo MD;  [facility]     DILATION AND CURETTAGE, WITH ULTRASOUND GUIDANCE  2018    Procedure: DILATION AND CURETTAGE, WITH ULTRASOUND GUIDANCE;;  Surgeon: Adry Tineo MD;  Location: [facility]      "TONSILLECTOMY  1960    tonsillectomy     Patient denies a history of anesthesia complication       Health Maintenance:  Health Maintenance Due   Topic Date Due     HIV SCREEN (SYSTEM ASSIGNED)  1974     FIT Q1 YR  2013     INFLUENZA VACCINE (1) 2017       Last Pap Smear: 18- NILM HPV negative    Last Mammogram: 2018 BIRADS 1    Last Colonoscopy: Patient reports that she has never had a colonoscopy.         Current Medications:   has a current medication list which includes the following prescription(s): acetaminophen, calcium citrate-vitamin d, glucosamine chondroitin complx, ranitidine hcl, ascorbic acid, and vitamin e.     Allergies:   Patient denies any allergies    Social History:  Patient denies any tobacco use.  Patient states that she drinks one drink per month.  She denies any drug use.    Patient is      Family History:   The patient's family history is notable for:    Family History   Problem Relation Age of Onset     DIABETES Mother      type 2     HEART DISEASE Mother      Hypertension Mother      C.A.D. Mother       after 2nd heart attack     Hypertension Father      C.A.D. Father      mild heart attack     CANCER Father      skin cancer     HEART DISEASE Father      Arthritis Sister      rheumatoid arthritis     HEART DISEASE Brother      hereditary heart murmer     Lipids Brother      Lipids Brother      KIDNEY DISEASE No family hx of    Patient reports that she has two brothers who also have a history of DVT.  One brother was tested and was positive for factor V Leiden mutation and her other brother was tested and was negative for factor V Leiden mutation.  No on in her family is a smoker.        Physical Exam:     /66  Pulse 61  Temp 98.1  F (36.7  C) (Oral)  Ht 1.676 m (5' 6\")  Wt 84.9 kg (187 lb 2.7 oz)  LMP 2005  SpO2 100%  BMI 30.21 kg/m2  Body mass index is 30.21 kg/(m^2).    General Appearance: healthy and alert, no distress     HEENT:  no " thyromegaly, no palpable nodules or masses        Cardiovascular: regular rate and rhythm, no gallops, rubs or murmurs     Respiratory: lungs clear, no rales, rhonchi or wheezes, normal diaphragmatic excursion    Musculoskeletal: extremities non tender.  Bilateral lower extremities without edema.  Compression stockings in place     Skin: no lesions or rashes     Neurological: normal gait, no gross defects     Psychiatric: appropriate mood and affect                               Hematological: normal cervical, supraclavicular and inguinal lymph nodes     Gastrointestinal:       abdomen soft, non-tender, non-distended, no organomegaly or masses    Genitourinary: External genitalia and urethral meatus appears normal.  Moderate amount of thin clear vaginal discharge.  Atrophic appearing vaginal mucosa.  Small nulliparous appearing cervix.  On bimanual exam 10 week sized uterus.  No adnexal masses palpated.  On recto-vaginal exam there is no clear evidence of malignancy.  The uterus will be a tight fit for vaginal delivery - but I think this is feasible.      Assessment:    Gerri Owens is a 62 year old woman with a new diagnosis of grade 2 endometrial adenocarcinoma.      A total of 60 minutes was spent with the patient, 40 minutes of which were spent in counseling the patient and/or treatment planning.      Plan:     1.)   Endometrial adenocarcinoma - we have discussed the clinical and pathologic findings.  She is aware that the standard of care is for surgery.  She is amenable to this and we have discussed the potential options including open and laparoscopic approaches.  She is prepared for staging if indicated by the intra-operative findings.  Indications, alternatives, benefits, and risks were explained, questions and concerns were addressed and surgical consent was signed.  We explained that we will start with a laparoscopic approach and if it is too large to be removed from the vagina, then she will require  an open abdominal incision.  The patient expresses understanding and is in agreement with the plan.  We also discussed the option of participating in the Pap study.  We explained the objectives of the study, explained that she is not obligated to participate, explained the steps of collecting the specimens intraoperatively, and that by participating in the study she would be agreeing to have her chart reviewed in addition to specimens collected.  The patient was very interested and enthusiastic about participating.  Will plan to sign consent for the Pap study the day of surgery.      2.) Genetic risk factors were assessed and the patient does not meet the qualifications for a referral.      3.) Labs and/or tests ordered include:  Pre-operative labs.     4.) Health maintenance issues addressed today include none.    5.) Pre-op teaching was completed today.  Risks of surgery were discussed to include: bleeding, transfusion, infection, unintentional injury to surrounding organs/structures.      Thank you for allowing us to participate in the care of your patient.         Sincerely,    Bradley Tierney        Patient Care Team:  Karo Doty MD as PCP - General (Family Practice)  KARO DOTY    Answers for HPI/ROS submitted by the patient on 4/16/2018   General Symptoms: No  Skin Symptoms: No  HENT Symptoms: No  EYE SYMPTOMS: No  HEART SYMPTOMS: No  LUNG SYMPTOMS: No  INTESTINAL SYMPTOMS: No  URINARY SYMPTOMS: Yes  GYNECOLOGIC SYMPTOMS: Yes  BREAST SYMPTOMS: No  SKELETAL SYMPTOMS: No  BLOOD SYMPTOMS: No  NERVOUS SYSTEM SYMPTOMS: No  MENTAL HEALTH SYMPTOMS: No  Trouble holding urine or incontinence: No  Pain or burning: Yes  Trouble starting or stopping: No  Increased frequency of urination: Yes  Blood in urine: Yes  Decreased frequency of urination: No  Frequent nighttime urination: Yes  Flank pain: No  Difficulty emptying bladder: No  Bleeding or spotting between periods: No  Heavy or painful periods:  No  Irregular periods: No  Vaginal discharge: No  Vaginal dryness: Yes  Genital ulcers: No  Reduced libido: No  Painful intercourse: Yes  Difficulty with sexual arousal: No  Post-menopausal bleeding: Yes

## 2018-04-23 ENCOUNTER — RADIANT APPOINTMENT (OUTPATIENT)
Dept: GENERAL RADIOLOGY | Facility: CLINIC | Age: 63
End: 2018-04-23
Payer: OTHER GOVERNMENT

## 2018-04-23 ENCOUNTER — RESULTS ONLY (OUTPATIENT)
Dept: ADMINISTRATIVE | Facility: CLINIC | Age: 63
End: 2018-04-23

## 2018-04-23 ENCOUNTER — ONCOLOGY VISIT (OUTPATIENT)
Dept: ONCOLOGY | Facility: CLINIC | Age: 63
End: 2018-04-23
Attending: OBSTETRICS & GYNECOLOGY
Payer: COMMERCIAL

## 2018-04-23 VITALS
HEART RATE: 61 BPM | DIASTOLIC BLOOD PRESSURE: 66 MMHG | HEIGHT: 66 IN | OXYGEN SATURATION: 100 % | BODY MASS INDEX: 30.08 KG/M2 | TEMPERATURE: 98.1 F | WEIGHT: 187.17 LBS | SYSTOLIC BLOOD PRESSURE: 121 MMHG

## 2018-04-23 DIAGNOSIS — C54.1 ENDOMETRIAL ADENOCARCINOMA (H): ICD-10-CM

## 2018-04-23 DIAGNOSIS — C54.1 ENDOMETRIAL ADENOCARCINOMA (H): Primary | ICD-10-CM

## 2018-04-23 LAB
ALBUMIN SERPL-MCNC: 3.4 G/DL (ref 3.4–5)
ALP SERPL-CCNC: 106 U/L (ref 40–150)
ALT SERPL W P-5'-P-CCNC: 20 U/L (ref 0–50)
ANION GAP SERPL CALCULATED.3IONS-SCNC: 7 MMOL/L (ref 3–14)
AST SERPL W P-5'-P-CCNC: 18 U/L (ref 0–45)
BILIRUB SERPL-MCNC: 0.8 MG/DL (ref 0.2–1.3)
BUN SERPL-MCNC: 13 MG/DL (ref 7–30)
CALCIUM SERPL-MCNC: 9.2 MG/DL (ref 8.5–10.1)
CHLORIDE SERPL-SCNC: 107 MMOL/L (ref 94–109)
CO2 SERPL-SCNC: 26 MMOL/L (ref 20–32)
CREAT SERPL-MCNC: 0.86 MG/DL (ref 0.52–1.04)
ERYTHROCYTE [DISTWIDTH] IN BLOOD BY AUTOMATED COUNT: 13.7 % (ref 10–15)
GFR SERPL CREATININE-BSD FRML MDRD: 66 ML/MIN/1.7M2
GLUCOSE SERPL-MCNC: 80 MG/DL (ref 70–99)
HCT VFR BLD AUTO: 43.3 % (ref 35–47)
HGB BLD-MCNC: 13.3 G/DL (ref 11.7–15.7)
INTERPRETATION ECG - MUSE: NORMAL
MCH RBC QN AUTO: 28.2 PG (ref 26.5–33)
MCHC RBC AUTO-ENTMCNC: 30.7 G/DL (ref 31.5–36.5)
MCV RBC AUTO: 92 FL (ref 78–100)
PLATELET # BLD AUTO: 240 10E9/L (ref 150–450)
POTASSIUM SERPL-SCNC: 4.2 MMOL/L (ref 3.4–5.3)
PROT SERPL-MCNC: 7.4 G/DL (ref 6.8–8.8)
RBC # BLD AUTO: 4.71 10E12/L (ref 3.8–5.2)
SODIUM SERPL-SCNC: 139 MMOL/L (ref 133–144)
WBC # BLD AUTO: 7 10E9/L (ref 4–11)

## 2018-04-23 PROCEDURE — 85027 COMPLETE CBC AUTOMATED: CPT | Performed by: OBSTETRICS & GYNECOLOGY

## 2018-04-23 PROCEDURE — 99205 OFFICE O/P NEW HI 60 MIN: CPT | Mod: ZP | Performed by: OBSTETRICS & GYNECOLOGY

## 2018-04-23 PROCEDURE — 80053 COMPREHEN METABOLIC PANEL: CPT | Performed by: OBSTETRICS & GYNECOLOGY

## 2018-04-23 PROCEDURE — G0463 HOSPITAL OUTPT CLINIC VISIT: HCPCS | Mod: ZF

## 2018-04-23 PROCEDURE — 93010 ELECTROCARDIOGRAM REPORT: CPT | Performed by: INTERNAL MEDICINE

## 2018-04-23 RX ORDER — PHENAZOPYRIDINE HYDROCHLORIDE 200 MG/1
200 TABLET, FILM COATED ORAL ONCE
Status: CANCELLED | OUTPATIENT
Start: 2018-04-23 | End: 2018-04-23

## 2018-04-23 RX ORDER — CEFAZOLIN SODIUM 1 G/50ML
1 INJECTION, SOLUTION INTRAVENOUS SEE ADMIN INSTRUCTIONS
Status: CANCELLED | OUTPATIENT
Start: 2018-04-23

## 2018-04-23 RX ORDER — ACETAMINOPHEN 325 MG/1
975 TABLET ORAL ONCE
Status: CANCELLED | OUTPATIENT
Start: 2018-04-23 | End: 2018-04-23

## 2018-04-23 NOTE — NURSING NOTE
Pre Op Nurse Teaching Template    Relevant Diagnosis: uterine cancer    Teaching Topic: laparoscopy removal of uterus tubes ovaries cervix  Possible open surgery possible cancer operation    Person(s) involved in teaching :  Patient    Motivation Level:  Asks Questions:    Yes      Eager to Learn:     Yes     Cooperative:          Yes    Receptive (willing. Able to accept information):    Yes      Patient and those who are listed above demonstrates understanding of the following:   Reason for the appointment, diagnosis and treatment plan:   Yes   Knowledge of proper use of medications and conditions for which they are ordered (with special attention to potential side effects or drug interactions): Yes   Which situations necessitate calling provider and whom to contact: Yes         Nutritional needs and diet plan:  Yes      Pain management techniques:     Yes, Pain Scale   Diet:   Yes, [facility] Diet Instructions    Teaching Concerns addressed: Yes    Infection Prevention:  Patient and those who are listed above demonstrate understanding of the following:  Pre-Op CHG Bathing Instructions: Yes  Surgical procedure site care taught:   Yes   Signs and symptoms of infection taught: Yes       Instructional Materials Used/Given:  The [facility] Before You Surgery Booklet  Showering or Bathing before Surgery Instructions & CHG Product  Hysterectomy Guidelines  Pain Assessment Tool   Home Care after Major Abdominal or Vaginal Surgery  Map  Accommodations Brochure  Phone numbers for [facility] and [facility]  Copy of Surgical Consent    Done Today:  Lab work, EKG, Chest Xray,   Preop Visit not needed   Tests Ordered:  Post Op Visit Scheduled:tbd  Comments:    Surgery date/time:tbd    Consent to file in medical records  .

## 2018-04-23 NOTE — MR AVS SNAPSHOT
"              After Visit Summary   4/23/2018    Gerri Owens    MRN: [id number]           Patient Information     Date Of Birth          1955        Visit Information        Provider Department      4/23/2018 11:00 AM Bradley Tierney MD [facility]        Today's Diagnoses     Endometrial adenocarcinoma (H)    -  1       Follow-ups after your visit        Who to contact     If you have questions or need follow up information about today's clinic visit or your schedule please contact [facility] directly at [phone].  Normal or non-critical lab and imaging results will be communicated to you by [employer], letter or phone within 4 business days after the clinic has received the results. If you do not hear from us within 7 days, please contact the clinic through [employer] or phone. If you have a critical or abnormal lab result, we will notify you by phone as soon as possible.  Submit refill requests through [employer] or call your pharmacy and they will forward the refill request to us. Please allow 3 business days for your refill to be completed.          Additional Information About Your Visit        MyChart Information     [employer] gives you secure access to your electronic health record. If you see a primary care provider, you can also send messages to your care team and make appointments. If you have questions, please call your primary care clinic.  If you do not have a primary care provider, please call [phone] and they will assist you.        Care EveryWhere ID     This is your Care EveryWhere ID. This could be used by other organizations to access your [facility] medical records  [id number]        Your Vitals Were     Pulse Temperature Height Last Period Pulse Oximetry BMI (Body Mass Index)    61 98.1  F (36.7  C) (Oral) 1.676 m (5' 6\") 03/04/2005 100% 30.21 kg/m2       Blood Pressure from Last 3 Encounters:   04/23/18 121/66   04/11/18 124/69   03/22/18 " 117/76    Weight from Last 3 Encounters:   04/23/18 84.9 kg (187 lb 2.7 oz)   04/11/18 84 kg (185 lb 3 oz)   03/22/18 84.5 kg (186 lb 4 oz)              We Performed the Following     EKG 12-lead complete w/read - Clinics     [facility]hi-Operative Worksheet - Laparoscopic Total Hysterectomy, bilateral, Salpingo-Oophorectomy, possible open, possible lymph node dissection        Primary Care Provider Office Phone # Fax #    Karo Doty [phone] [address]  [facility] [address]        Equal Access to Services     [facility] : Hadii thalia hurd haddeepak Sograce, waaxda filemon, qaybta kaalmada makayla, acrol escobar . [facility] [phone].    ATENCIÓN: Si habla español, tiene a graham disposición servicios gratuitos de asistencia lingüística. [facility] [phone].    We comply with applicable federal civil rights laws and [place] laws. We do not discriminate on the basis of race, color, national origin, age, disability, sex, sexual orientation, or gender identity.            Thank you!     Thank you for choosing [facility]  for your care. Our goal is always to provide you with excellent care. Hearing back from our patients is one way we can continue to improve our services. Please take a few minutes to complete the written survey that you may receive in the mail after your visit with us. Thank you!             Your Updated Medication List - Protect others around you: Learn how to safely use, store and throw away your medicines at www.disposemymeds.org.          This list is accurate as of 4/23/18  1:33 PM.  Always use your most recent med list.                   Brand Name Dispense Instructions for use Diagnosis    ACETAMINOPHEN PO      Take 500 mg by mouth        ascorbic acid 250 MG tablet    VITAMIN C    90    250 mg    Routine general medical examination at a health care facility       calcium citrate-vitamin D 315-200 MG-UNIT Tabs per tablet     CITRACAL     Take 2 tablets by mouth daily        GLUCOSAMINE CHONDROITIN COMPLX Tabs      Take  by mouth. 1500 mg 1xqd.    Routine general medical examination at a health care facility       RANITIDINE HCL PO      Take 150 mg by mouth 2 times daily        vitamin E 400 UNIT capsule      Take 1 capsule by mouth three times a week.

## 2018-04-23 NOTE — LETTER
2018       RE: Gerri Owens  [address]  [facility] [address]     Dear Colleague,    Thank you for referring your patient, Gerri Owens, to the [facility]. Please see a copy of my visit note below.                            Consult Notes on Referred Patient    Date: 2018       Dr. Karo Doty MD  [address] [place] [address]       RE: Gerri Owens  : 1955  PRO: 2018    Dear Dr. Karo Doty:    I had the pleasure of seeing your patient Gerri Owens here at the Gynecologic Cancer Clinic at the [facility] on 2018.  As you know she is a very pleasant 62 year old woman with a recent diagnosis of  endometrial cancer.  Given these findings she was subsequently sent to the Gynecologic Cancer Clinic for new patient consultation.     Today the patient reports that she is feeling overall well.  She is nervous about the visit and her recent cancer diagnosis.    Cancer Treatment History:  18- OBGYN visit with Dr. Tineo.  Complains of 1.5 years of post-menopausal bleeding.     EMB performed and showed     18- EUA, hysteroscopy D&C under ultrasound guidance.  Diffuse proliferative vascular endometrium noted.   Pathology:    FINAL DIAGNOSIS:    ENDOMETRIAL CURETTINGS:    - Endometrial endometrioid adenocarcinoma, FIGO grade 2      COMMENT:    While the tumor maintained a glandular morphology, the occasional high    nuclear grade warranted upgrading to    FIGO 2.     18- Patient reports that she continues to have some post-menopausal spotting       Review of Systems:    Systemic           no weight changes; no fever; no chills; no night sweats; no appetite changes  Skin           no rashes, or lesions  Eye           no irritation; no changes in vision  Consuelo-Laryngeal           no dysphagia; no hoarseness   Pulmonary    no cough; no shortness of breath  Cardiovascular    no chest pain; no palpitations  Gastrointestinal     no diarrhea; no constipation; no abdominal pain; no changes in bowel  habits; no blood in stool, positive for acid reflux  Genitourinary   no urinary frequency; no urinary urgency; no dysuria; no pain; no abnormal vaginal discharge; Positive for abnormal vaginal bleeding over the past two years, positive for vaginal dryness  Breast   no breast discharge; no breast changes; no breast pain  Musculoskeletal    no myalgias; no back pain, Positive for right knee and right hip pain.  Positive for limited mobility in left hip following hip replacement  Psychiatric           no depressed mood; no anxiety    Hematologic           no tender lymph nodes; no noticeable swellings or lumps   Endocrine    no hot flashes; no heat/cold intolerance         Neurological   no tremor; no numbness and tingling; no headaches; no difficulty  Sleeping    OBGYNHx:    Menarche age 12  Menopause age 47  No history of hormone replacement therapy  Does not have vaginal intercourse due to discomfort after menopause.      Past Medical History:  Patient reports that she has a history of arthritis.  Currently affects her right hip, right knee, and hands.  History of unprovoked DVT.  Patient reports that she had a full work up and she was negative for everything including negative factor V Leiden testing.    GERD  History of whooping cough  CKD.  Patient reports that her kidney function has improved since stopping her NSAID use.      Past Surgical History:    Past Surgical History:   Procedure Laterality Date     ARTHROPLASTY HIP Left 2017    Procedure: ARTHROPLASTY HIP;  Left total hip arthroplasty;  Surgeon: Rajinder Goodwin MD;  Location: [facility] OR     BREAST BIOPSY, RT/LT      left breast     DILATION AND CURETTAGE, OPERATIVE HYSTEROSCOPY WITH MORCELLATOR, COMBINED N/A 2018    Procedure: COMBINED DILATION AND CURETTAGE, OPERATIVE HYSTEROSCOPY WITH MORCELLATOR;  Hysteroscopy, Dilation and Curettage Under Ultrasound Guidance  ;  Surgeon: Adry Tineo MD;  Location: [facility] OR     DILATION AND CURETTAGE, WITH ULTRASOUND GUIDANCE  2018    Procedure: DILATION AND CURETTAGE, WITH ULTRASOUND GUIDANCE;;  Surgeon: Adry Tineo MD;  Location: [facility] OR     TONSILLECTOMY  1960    tonsillectomy     Patient denies a history of anesthesia complication       Health Maintenance:  Health Maintenance Due   Topic Date Due     HIV SCREEN (SYSTEM ASSIGNED)  1974     FIT Q1 YR  2013     INFLUENZA VACCINE (1) 2017       Last Pap Smear: 18- NILM HPV negative    Last Mammogram:  BIRADS 1    Last Colonoscopy: Patient reports that she has never had a colonoscopy.         Current Medications:   has a current medication list which includes the following prescription(s): acetaminophen, calcium citrate-vitamin d, glucosamine chondroitin complx, ranitidine hcl, ascorbic acid, and vitamin e.     Allergies:   Patient denies any allergies    Social History:  Patient denies any tobacco use.  Patient states that she drinks one drink per month.  She denies any drug use.    Patient is      Family History:   The patient's family history is notable for:    Family History   Problem Relation Age of Onset     DIABETES Mother      type 2     HEART DISEASE Mother      Hypertension Mother      C.A.D. Mother       after 2nd heart attack     Hypertension Father      C.A.D. Father      mild heart attack     CANCER Father      skin cancer     HEART DISEASE Father      Arthritis Sister      rheumatoid arthritis     HEART DISEASE Brother      hereditary heart murmer     Lipids Brother      Lipids Brother      KIDNEY DISEASE No family hx of    Patient reports that she has two brothers who also have a history of DVT.  One brother was tested and was positive for factor V Leiden mutation and her other brother was tested and was negative for factor V Leiden mutation.  No on in her family is a smoker.        Physical Exam:     /66   "Pulse 61  Temp 98.1  F (36.7  C) (Oral)  Ht 1.676 m (5' 6\")  Wt 84.9 kg (187 lb 2.7 oz)  LMP 03/04/2005  SpO2 100%  BMI 30.21 kg/m2  Body mass index is 30.21 kg/(m^2).    General Appearance: healthy and alert, no distress     HEENT:  no thyromegaly, no palpable nodules or masses        Cardiovascular: regular rate and rhythm, no gallops, rubs or murmurs     Respiratory: lungs clear, no rales, rhonchi or wheezes, normal diaphragmatic excursion    Musculoskeletal: extremities non tender.  Bilateral lower extremities without edema.  Compression stockings in place     Skin: no lesions or rashes     Neurological: normal gait, no gross defects     Psychiatric: appropriate mood and affect                               Hematological: normal cervical, supraclavicular and inguinal lymph nodes     Gastrointestinal:       abdomen soft, non-tender, non-distended, no organomegaly or masses    Genitourinary: External genitalia and urethral meatus appears normal.  Moderate amount of thin clear vaginal discharge.  Atrophic appearing vaginal mucosa.  Small nulliparous appearing cervix.  On bimanual exam 10 week sized uterus.  No adnexal masses palpated.  On recto-vaginal exam there is no clear evidence of malignancy.  The uterus will be a tight fit for vaginal delivery - but I think this is feasible.      Assessment:    Gerri Owens is a 62 year old woman with a new diagnosis of grade 2 endometrial adenocarcinoma.      A total of 60 minutes was spent with the patient, 40 minutes of which were spent in counseling the patient and/or treatment planning.      Plan:     1.)   Endometrial adenocarcinoma - we have discussed the clinical and pathologic findings.  She is aware that the standard of care is for surgery.  She is amenable to this and we have discussed the potential options including open and laparoscopic approaches.  She is prepared for staging if indicated by the intra-operative findings.  Indications, alternatives, " benefits, and risks were explained, questions and concerns were addressed and surgical consent was signed.  We explained that we will start with a laparoscopic approach and if it is too large to be removed from the vagina, then she will require an open abdominal incision.  The patient expresses understanding and is in agreement with the plan.  We also discussed the option of participating in the Pap study.  We explained the objectives of the study, explained that she is not obligated to participate, explained the steps of collecting the specimens intraoperatively, and that by participating in the study she would be agreeing to have her chart reviewed in addition to specimens collected.  The patient was very interested and enthusiastic about participating.  Will plan to sign consent for the Pap study the day of surgery.      2.) Genetic risk factors were assessed and the patient does not meet the qualifications for a referral.      3.) Labs and/or tests ordered include:  Pre-operative labs.     4.) Health maintenance issues addressed today include none.    5.) Pre-op teaching was completed today.  Risks of surgery were discussed to include: bleeding, transfusion, infection, unintentional injury to surrounding organs/structures.      Thank you for allowing us to participate in the care of your patient.         Sincerely,    Bradley Tierney      CC  Patient Care Team:  Karo Doty MD as PCP - General (Family Practice)

## 2018-04-23 NOTE — NURSING NOTE
"Oncology Rooming Note    April 23, 2018 10:50 AM   Gerri Owens is a 62 year old female who presents for:    Chief Complaint   Patient presents with     Oncology Clinic Visit     consult for endometrial CA     Initial Vitals: /66  Pulse 61  Temp 98.1  F (36.7  C) (Oral)  Ht 1.676 m (5' 6\")  Wt 84.9 kg (187 lb 2.7 oz)  LMP 03/04/2005  SpO2 100%  BMI 30.21 kg/m2 Estimated body mass index is 30.21 kg/(m^2) as calculated from the following:    Height as of this encounter: 1.676 m (5' 6\").    Weight as of this encounter: 84.9 kg (187 lb 2.7 oz). Body surface area is 1.99 meters squared.  Data Unavailable Comment: Data Unavailable   Patient's last menstrual period was 03/04/2005.  Allergies reviewed: Yes  Medications reviewed: Yes    Medications: Medication refills not needed today.  Pharmacy name entered into [facility]:    [facility] [place], [place] - [address] [place] [address] OUTPATIENT PHARMACY  [facility] [place], [place] - [address] [facility]    Clinical concerns: Here for consult regarding endometrial CA Dr Tierney was notified.    12 minutes for nursing intake (face to face time)     Sujata Sainz CMA              "

## 2018-04-25 LAB — INTERPRETATION ECG - MUSE: NORMAL

## 2018-04-26 ENCOUNTER — ANESTHESIA EVENT (OUTPATIENT)
Dept: SURGERY | Facility: CLINIC | Age: 63
DRG: 740 | End: 2018-04-26
Payer: COMMERCIAL

## 2018-04-27 ENCOUNTER — ANESTHESIA (OUTPATIENT)
Dept: SURGERY | Facility: CLINIC | Age: 63
DRG: 740 | End: 2018-04-27
Payer: COMMERCIAL

## 2018-04-27 ENCOUNTER — HOSPITAL ENCOUNTER (INPATIENT)
Facility: CLINIC | Age: 63
LOS: 5 days | Discharge: HOME | DRG: 740 | End: 2018-05-02
Attending: OBSTETRICS & GYNECOLOGY | Admitting: OBSTETRICS & GYNECOLOGY
Payer: COMMERCIAL

## 2018-04-27 ENCOUNTER — SURGERY (OUTPATIENT)
Age: 63
End: 2018-04-27
Payer: COMMERCIAL

## 2018-04-27 DIAGNOSIS — Z90.710 S/P TAH-BSO: Primary | ICD-10-CM

## 2018-04-27 DIAGNOSIS — Z90.722 S/P TAH-BSO: Primary | ICD-10-CM

## 2018-04-27 DIAGNOSIS — Z90.79 S/P TAH-BSO: Primary | ICD-10-CM

## 2018-04-27 LAB
ABO + RH BLD: NORMAL
ABO + RH BLD: NORMAL
BLD GP AB SCN SERPL QL: NORMAL
BLOOD BANK CMNT PATIENT-IMP: NORMAL
COPATH REPORT: NORMAL
GLUCOSE BLDC GLUCOMTR-MCNC: 79 MG/DL (ref 70–99)
SPECIMEN EXP DATE BLD: NORMAL

## 2018-04-27 PROCEDURE — 86900 BLOOD TYPING SEROLOGIC ABO: CPT | Performed by: ANESTHESIOLOGY

## 2018-04-27 PROCEDURE — 88307 TISSUE EXAM BY PATHOLOGIST: CPT | Performed by: OBSTETRICS & GYNECOLOGY

## 2018-04-27 PROCEDURE — 12000001 ZZH R&B MED SURG/OB UMMC

## 2018-04-27 PROCEDURE — C9290 INJ, BUPIVACAINE LIPOSOME: HCPCS | Performed by: ANESTHESIOLOGY

## 2018-04-27 PROCEDURE — 88341 IMHCHEM/IMCYTCHM EA ADD ANTB: CPT | Performed by: OBSTETRICS & GYNECOLOGY

## 2018-04-27 PROCEDURE — 27210995 ZZH RX 272: Performed by: OBSTETRICS & GYNECOLOGY

## 2018-04-27 PROCEDURE — 00000146 ZZHCL STATISTIC GLUCOSE BY METER IP

## 2018-04-27 PROCEDURE — 86850 RBC ANTIBODY SCREEN: CPT | Performed by: ANESTHESIOLOGY

## 2018-04-27 PROCEDURE — 0DBU0ZZ EXCISION OF OMENTUM, OPEN APPROACH: ICD-10-PCS | Performed by: OBSTETRICS & GYNECOLOGY

## 2018-04-27 PROCEDURE — 36000064 ZZH SURGERY LEVEL 4 EA 15 ADDTL MIN - UMMC: Performed by: OBSTETRICS & GYNECOLOGY

## 2018-04-27 PROCEDURE — 88305 TISSUE EXAM BY PATHOLOGIST: CPT | Performed by: OBSTETRICS & GYNECOLOGY

## 2018-04-27 PROCEDURE — 37000009 ZZH ANESTHESIA TECHNICAL FEE, EACH ADDTL 15 MIN: Performed by: OBSTETRICS & GYNECOLOGY

## 2018-04-27 PROCEDURE — 88331 PATH CONSLTJ SURG 1 BLK 1SPC: CPT | Performed by: OBSTETRICS & GYNECOLOGY

## 2018-04-27 PROCEDURE — 25000128 H RX IP 250 OP 636: Performed by: STUDENT IN AN ORGANIZED HEALTH CARE EDUCATION/TRAINING PROGRAM

## 2018-04-27 PROCEDURE — P9041 ALBUMIN (HUMAN),5%, 50ML: HCPCS | Performed by: STUDENT IN AN ORGANIZED HEALTH CARE EDUCATION/TRAINING PROGRAM

## 2018-04-27 PROCEDURE — 25000128 H RX IP 250 OP 636: Performed by: ANESTHESIOLOGY

## 2018-04-27 PROCEDURE — 88112 CYTOPATH CELL ENHANCE TECH: CPT | Performed by: OBSTETRICS & GYNECOLOGY

## 2018-04-27 PROCEDURE — 38770 REMOVE PELVIS LYMPH NODES: CPT | Mod: GC | Performed by: OBSTETRICS & GYNECOLOGY

## 2018-04-27 PROCEDURE — 25000566 ZZH SEVOFLURANE, EA 15 MIN: Performed by: OBSTETRICS & GYNECOLOGY

## 2018-04-27 PROCEDURE — 37000008 ZZH ANESTHESIA TECHNICAL FEE, 1ST 30 MIN: Performed by: OBSTETRICS & GYNECOLOGY

## 2018-04-27 PROCEDURE — 25000125 ZZHC RX 250: Performed by: ANESTHESIOLOGY

## 2018-04-27 PROCEDURE — 86850 RBC ANTIBODY SCREEN: CPT | Performed by: OBSTETRICS & GYNECOLOGY

## 2018-04-27 PROCEDURE — 86901 BLOOD TYPING SEROLOGIC RH(D): CPT | Performed by: OBSTETRICS & GYNECOLOGY

## 2018-04-27 PROCEDURE — 86900 BLOOD TYPING SEROLOGIC ABO: CPT | Performed by: OBSTETRICS & GYNECOLOGY

## 2018-04-27 PROCEDURE — 40000171 ZZH STATISTIC PRE-PROCEDURE ASSESSMENT III: Performed by: OBSTETRICS & GYNECOLOGY

## 2018-04-27 PROCEDURE — 36000062 ZZH SURGERY LEVEL 4 1ST 30 MIN - UMMC: Performed by: OBSTETRICS & GYNECOLOGY

## 2018-04-27 PROCEDURE — 27210794 ZZH OR GENERAL SUPPLY STERILE: Performed by: OBSTETRICS & GYNECOLOGY

## 2018-04-27 PROCEDURE — 88309 TISSUE EXAM BY PATHOLOGIST: CPT | Performed by: OBSTETRICS & GYNECOLOGY

## 2018-04-27 PROCEDURE — 0UT70ZZ RESECTION OF BILATERAL FALLOPIAN TUBES, OPEN APPROACH: ICD-10-PCS | Performed by: OBSTETRICS & GYNECOLOGY

## 2018-04-27 PROCEDURE — 25000132 ZZH RX MED GY IP 250 OP 250 PS 637: Performed by: OBSTETRICS & GYNECOLOGY

## 2018-04-27 PROCEDURE — 25000128 H RX IP 250 OP 636: Performed by: OBSTETRICS & GYNECOLOGY

## 2018-04-27 PROCEDURE — 58150 TOTAL HYSTERECTOMY: CPT | Mod: GC | Performed by: OBSTETRICS & GYNECOLOGY

## 2018-04-27 PROCEDURE — 0DCC0ZZ EXTIRPATION OF MATTER FROM ILEOCECAL VALVE, OPEN APPROACH: ICD-10-PCS | Performed by: OBSTETRICS & GYNECOLOGY

## 2018-04-27 PROCEDURE — 00000155 ZZHCL STATISTIC H-CELL BLOCK W/STAIN: Performed by: OBSTETRICS & GYNECOLOGY

## 2018-04-27 PROCEDURE — C9399 UNCLASSIFIED DRUGS OR BIOLOG: HCPCS | Performed by: STUDENT IN AN ORGANIZED HEALTH CARE EDUCATION/TRAINING PROGRAM

## 2018-04-27 PROCEDURE — 0DCP0ZZ EXTIRPATION OF MATTER FROM RECTUM, OPEN APPROACH: ICD-10-PCS | Performed by: OBSTETRICS & GYNECOLOGY

## 2018-04-27 PROCEDURE — 0UT20ZZ RESECTION OF BILATERAL OVARIES, OPEN APPROACH: ICD-10-PCS | Performed by: OBSTETRICS & GYNECOLOGY

## 2018-04-27 PROCEDURE — 36415 COLL VENOUS BLD VENIPUNCTURE: CPT | Performed by: ANESTHESIOLOGY

## 2018-04-27 PROCEDURE — 88342 IMHCHEM/IMCYTCHM 1ST ANTB: CPT | Performed by: OBSTETRICS & GYNECOLOGY

## 2018-04-27 PROCEDURE — 3E1M38X IRRIGATION OF PERITONEAL CAVITY USING IRRIGATING SUBSTANCE, PERCUTANEOUS APPROACH, DIAGNOSTIC: ICD-10-PCS | Performed by: OBSTETRICS & GYNECOLOGY

## 2018-04-27 PROCEDURE — 71000014 ZZH RECOVERY PHASE 1 LEVEL 2 FIRST HR: Performed by: OBSTETRICS & GYNECOLOGY

## 2018-04-27 PROCEDURE — 0WJJ4ZZ INSPECTION OF PELVIC CAVITY, PERCUTANEOUS ENDOSCOPIC APPROACH: ICD-10-PCS | Performed by: OBSTETRICS & GYNECOLOGY

## 2018-04-27 PROCEDURE — 25000125 ZZHC RX 250: Performed by: STUDENT IN AN ORGANIZED HEALTH CARE EDUCATION/TRAINING PROGRAM

## 2018-04-27 PROCEDURE — 86901 BLOOD TYPING SEROLOGIC RH(D): CPT | Performed by: ANESTHESIOLOGY

## 2018-04-27 PROCEDURE — 0UT90ZZ RESECTION OF UTERUS, OPEN APPROACH: ICD-10-PCS | Performed by: OBSTETRICS & GYNECOLOGY

## 2018-04-27 RX ORDER — SODIUM CHLORIDE, SODIUM LACTATE, POTASSIUM CHLORIDE, CALCIUM CHLORIDE 600; 310; 30; 20 MG/100ML; MG/100ML; MG/100ML; MG/100ML
INJECTION, SOLUTION INTRAVENOUS CONTINUOUS PRN
Status: DISCONTINUED | OUTPATIENT
Start: 2018-04-27 | End: 2018-04-27

## 2018-04-27 RX ORDER — FENTANYL CITRATE 50 UG/ML
INJECTION, SOLUTION INTRAMUSCULAR; INTRAVENOUS PRN
Status: DISCONTINUED | OUTPATIENT
Start: 2018-04-27 | End: 2018-04-27

## 2018-04-27 RX ORDER — PHENAZOPYRIDINE HYDROCHLORIDE 200 MG/1
200 TABLET, FILM COATED ORAL ONCE
Status: COMPLETED | OUTPATIENT
Start: 2018-04-27 | End: 2018-04-27

## 2018-04-27 RX ORDER — ONDANSETRON 4 MG/1
4 TABLET, ORALLY DISINTEGRATING ORAL EVERY 8 HOURS PRN
Status: DISCONTINUED | OUTPATIENT
Start: 2018-04-28 | End: 2018-05-02 | Stop reason: HOSPADM

## 2018-04-27 RX ORDER — POLYETHYLENE GLYCOL 3350 17 G/17G
17 POWDER, FOR SOLUTION ORAL DAILY PRN
Status: DISCONTINUED | OUTPATIENT
Start: 2018-04-27 | End: 2018-05-02 | Stop reason: HOSPADM

## 2018-04-27 RX ORDER — SODIUM CHLORIDE, SODIUM LACTATE, POTASSIUM CHLORIDE, CALCIUM CHLORIDE 600; 310; 30; 20 MG/100ML; MG/100ML; MG/100ML; MG/100ML
INJECTION, SOLUTION INTRAVENOUS CONTINUOUS
Status: DISCONTINUED | OUTPATIENT
Start: 2018-04-27 | End: 2018-04-28

## 2018-04-27 RX ORDER — OXYCODONE HYDROCHLORIDE 5 MG/1
5-10 TABLET ORAL
Status: DISCONTINUED | OUTPATIENT
Start: 2018-04-27 | End: 2018-05-02 | Stop reason: HOSPADM

## 2018-04-27 RX ORDER — DEXAMETHASONE SODIUM PHOSPHATE 4 MG/ML
INJECTION, SOLUTION INTRA-ARTICULAR; INTRALESIONAL; INTRAMUSCULAR; INTRAVENOUS; SOFT TISSUE PRN
Status: DISCONTINUED | OUTPATIENT
Start: 2018-04-27 | End: 2018-04-27

## 2018-04-27 RX ORDER — LIDOCAINE HYDROCHLORIDE 20 MG/ML
INJECTION, SOLUTION INFILTRATION; PERINEURAL PRN
Status: DISCONTINUED | OUTPATIENT
Start: 2018-04-27 | End: 2018-04-27

## 2018-04-27 RX ORDER — LIDOCAINE 40 MG/G
CREAM TOPICAL
Status: DISCONTINUED | OUTPATIENT
Start: 2018-04-27 | End: 2018-05-02 | Stop reason: HOSPADM

## 2018-04-27 RX ORDER — HYDROMORPHONE HYDROCHLORIDE 1 MG/ML
.3-.5 INJECTION, SOLUTION INTRAMUSCULAR; INTRAVENOUS; SUBCUTANEOUS EVERY 10 MIN PRN
Status: DISCONTINUED | OUTPATIENT
Start: 2018-04-27 | End: 2018-04-28

## 2018-04-27 RX ORDER — NALOXONE HYDROCHLORIDE 0.4 MG/ML
.1-.4 INJECTION, SOLUTION INTRAMUSCULAR; INTRAVENOUS; SUBCUTANEOUS
Status: DISCONTINUED | OUTPATIENT
Start: 2018-04-27 | End: 2018-04-28

## 2018-04-27 RX ORDER — FENTANYL CITRATE 50 UG/ML
25-50 INJECTION, SOLUTION INTRAMUSCULAR; INTRAVENOUS
Status: DISCONTINUED | OUTPATIENT
Start: 2018-04-27 | End: 2018-04-27 | Stop reason: HOSPADM

## 2018-04-27 RX ORDER — SIMETHICONE 80 MG
80 TABLET,CHEWABLE ORAL 4 TIMES DAILY PRN
Status: DISCONTINUED | OUTPATIENT
Start: 2018-04-27 | End: 2018-05-02 | Stop reason: HOSPADM

## 2018-04-27 RX ORDER — ALBUMIN, HUMAN INJ 5% 5 %
SOLUTION INTRAVENOUS CONTINUOUS PRN
Status: DISCONTINUED | OUTPATIENT
Start: 2018-04-27 | End: 2018-04-27

## 2018-04-27 RX ORDER — FLUMAZENIL 0.1 MG/ML
0.2 INJECTION, SOLUTION INTRAVENOUS
Status: DISCONTINUED | OUTPATIENT
Start: 2018-04-27 | End: 2018-04-27 | Stop reason: HOSPADM

## 2018-04-27 RX ORDER — NALOXONE HYDROCHLORIDE 0.4 MG/ML
.1-.4 INJECTION, SOLUTION INTRAMUSCULAR; INTRAVENOUS; SUBCUTANEOUS
Status: DISCONTINUED | OUTPATIENT
Start: 2018-04-27 | End: 2018-05-02 | Stop reason: HOSPADM

## 2018-04-27 RX ORDER — AMOXICILLIN 250 MG
1 CAPSULE ORAL 2 TIMES DAILY PRN
Status: DISCONTINUED | OUTPATIENT
Start: 2018-04-27 | End: 2018-05-02 | Stop reason: HOSPADM

## 2018-04-27 RX ORDER — PROPOFOL 10 MG/ML
INJECTION, EMULSION INTRAVENOUS PRN
Status: DISCONTINUED | OUTPATIENT
Start: 2018-04-27 | End: 2018-04-27

## 2018-04-27 RX ORDER — BUPIVACAINE HYDROCHLORIDE 2.5 MG/ML
INJECTION, SOLUTION EPIDURAL; INFILTRATION; INTRACAUDAL PRN
Status: DISCONTINUED | OUTPATIENT
Start: 2018-04-27 | End: 2018-04-27

## 2018-04-27 RX ORDER — CEFAZOLIN SODIUM 2 G/100ML
2 INJECTION, SOLUTION INTRAVENOUS
Status: COMPLETED | OUTPATIENT
Start: 2018-04-27 | End: 2018-04-27

## 2018-04-27 RX ORDER — HYDROMORPHONE HCL/0.9% NACL/PF 0.2MG/0.2
0.2 SYRINGE (ML) INTRAVENOUS
Status: DISCONTINUED | OUTPATIENT
Start: 2018-04-27 | End: 2018-04-30

## 2018-04-27 RX ORDER — FENTANYL CITRATE 50 UG/ML
25-50 INJECTION, SOLUTION INTRAMUSCULAR; INTRAVENOUS
Status: DISCONTINUED | OUTPATIENT
Start: 2018-04-27 | End: 2018-04-28

## 2018-04-27 RX ORDER — ACETAMINOPHEN 325 MG/1
650 TABLET ORAL EVERY 6 HOURS
Status: DISCONTINUED | OUTPATIENT
Start: 2018-04-27 | End: 2018-05-02 | Stop reason: HOSPADM

## 2018-04-27 RX ORDER — IBUPROFEN 200 MG
600 TABLET ORAL
Status: DISCONTINUED | OUTPATIENT
Start: 2018-04-28 | End: 2018-04-29

## 2018-04-27 RX ORDER — SODIUM CHLORIDE 9 MG/ML
INJECTION, SOLUTION INTRAVENOUS CONTINUOUS
Status: DISCONTINUED | OUTPATIENT
Start: 2018-04-27 | End: 2018-04-28

## 2018-04-27 RX ORDER — BISACODYL 10 MG
10 SUPPOSITORY, RECTAL RECTAL DAILY
Status: DISCONTINUED | OUTPATIENT
Start: 2018-04-28 | End: 2018-05-02 | Stop reason: HOSPADM

## 2018-04-27 RX ORDER — AMOXICILLIN 250 MG
2 CAPSULE ORAL 2 TIMES DAILY PRN
Status: DISCONTINUED | OUTPATIENT
Start: 2018-04-27 | End: 2018-05-02 | Stop reason: HOSPADM

## 2018-04-27 RX ORDER — ONDANSETRON 2 MG/ML
4 INJECTION INTRAMUSCULAR; INTRAVENOUS EVERY 30 MIN PRN
Status: DISCONTINUED | OUTPATIENT
Start: 2018-04-27 | End: 2018-04-28

## 2018-04-27 RX ORDER — ONDANSETRON 4 MG/1
4 TABLET, ORALLY DISINTEGRATING ORAL EVERY 8 HOURS
Status: DISPENSED | OUTPATIENT
Start: 2018-04-27 | End: 2018-04-28

## 2018-04-27 RX ORDER — ONDANSETRON 4 MG/1
4 TABLET, ORALLY DISINTEGRATING ORAL EVERY 30 MIN PRN
Status: DISCONTINUED | OUTPATIENT
Start: 2018-04-27 | End: 2018-04-28

## 2018-04-27 RX ORDER — ONDANSETRON 2 MG/ML
INJECTION INTRAMUSCULAR; INTRAVENOUS PRN
Status: DISCONTINUED | OUTPATIENT
Start: 2018-04-27 | End: 2018-04-27

## 2018-04-27 RX ORDER — NALOXONE HYDROCHLORIDE 0.4 MG/ML
.1-.4 INJECTION, SOLUTION INTRAMUSCULAR; INTRAVENOUS; SUBCUTANEOUS
Status: DISCONTINUED | OUTPATIENT
Start: 2018-04-27 | End: 2018-04-27 | Stop reason: HOSPADM

## 2018-04-27 RX ORDER — ACETAMINOPHEN 325 MG/1
975 TABLET ORAL ONCE
Status: COMPLETED | OUTPATIENT
Start: 2018-04-27 | End: 2018-04-27

## 2018-04-27 RX ORDER — CEFAZOLIN SODIUM 1 G/3ML
1 INJECTION, POWDER, FOR SOLUTION INTRAMUSCULAR; INTRAVENOUS SEE ADMIN INSTRUCTIONS
Status: DISCONTINUED | OUTPATIENT
Start: 2018-04-27 | End: 2018-04-27 | Stop reason: HOSPADM

## 2018-04-27 RX ORDER — KETOROLAC TROMETHAMINE 30 MG/ML
30 INJECTION, SOLUTION INTRAMUSCULAR; INTRAVENOUS EVERY 6 HOURS
Status: DISCONTINUED | OUTPATIENT
Start: 2018-04-27 | End: 2018-04-27

## 2018-04-27 RX ADMIN — SODIUM CHLORIDE: 9 INJECTION, SOLUTION INTRAVENOUS at 23:56

## 2018-04-27 RX ADMIN — PROPOFOL 110 MG: 10 INJECTION, EMULSION INTRAVENOUS at 18:34

## 2018-04-27 RX ADMIN — ROCURONIUM BROMIDE 20 MG: 10 INJECTION INTRAVENOUS at 19:04

## 2018-04-27 RX ADMIN — BUPIVACAINE 20 ML: 13.3 INJECTION, SUSPENSION, LIPOSOMAL INFILTRATION at 17:00

## 2018-04-27 RX ADMIN — HYDROMORPHONE HYDROCHLORIDE 0.3 MG: 1 INJECTION, SOLUTION INTRAMUSCULAR; INTRAVENOUS; SUBCUTANEOUS at 19:57

## 2018-04-27 RX ADMIN — CEFAZOLIN SODIUM 1 G: 2 INJECTION, SOLUTION INTRAVENOUS at 21:01

## 2018-04-27 RX ADMIN — FENTANYL CITRATE 50 MCG: 50 INJECTION, SOLUTION INTRAMUSCULAR; INTRAVENOUS at 22:26

## 2018-04-27 RX ADMIN — DEXAMETHASONE SODIUM PHOSPHATE 4 MG: 4 INJECTION, SOLUTION INTRA-ARTICULAR; INTRALESIONAL; INTRAMUSCULAR; INTRAVENOUS; SOFT TISSUE at 19:11

## 2018-04-27 RX ADMIN — FENTANYL CITRATE 50 MCG: 50 INJECTION INTRAMUSCULAR; INTRAVENOUS at 23:22

## 2018-04-27 RX ADMIN — FENTANYL CITRATE 50 MCG: 50 INJECTION INTRAMUSCULAR; INTRAVENOUS at 23:11

## 2018-04-27 RX ADMIN — ALBUMIN HUMAN: 0.05 INJECTION, SOLUTION INTRAVENOUS at 21:40

## 2018-04-27 RX ADMIN — LIDOCAINE HYDROCHLORIDE 80 MG: 20 INJECTION, SOLUTION INFILTRATION; PERINEURAL at 18:34

## 2018-04-27 RX ADMIN — CEFAZOLIN SODIUM 2 G: 2 INJECTION, SOLUTION INTRAVENOUS at 18:59

## 2018-04-27 RX ADMIN — ONDANSETRON 4 MG: 2 INJECTION INTRAMUSCULAR; INTRAVENOUS at 22:24

## 2018-04-27 RX ADMIN — ACETAMINOPHEN 975 MG: 325 TABLET, FILM COATED ORAL at 16:10

## 2018-04-27 RX ADMIN — PHENYLEPHRINE HYDROCHLORIDE 100 MCG: 10 INJECTION, SOLUTION INTRAMUSCULAR; INTRAVENOUS; SUBCUTANEOUS at 21:28

## 2018-04-27 RX ADMIN — FENTANYL CITRATE 50 MCG: 50 INJECTION, SOLUTION INTRAMUSCULAR; INTRAVENOUS at 19:27

## 2018-04-27 RX ADMIN — FENTANYL CITRATE 50 MCG: 50 INJECTION INTRAMUSCULAR; INTRAVENOUS at 23:01

## 2018-04-27 RX ADMIN — SUGAMMADEX 170 MG: 100 INJECTION, SOLUTION INTRAVENOUS at 22:36

## 2018-04-27 RX ADMIN — ROCURONIUM BROMIDE 20 MG: 10 INJECTION INTRAVENOUS at 19:46

## 2018-04-27 RX ADMIN — FENTANYL CITRATE 50 MCG: 50 INJECTION INTRAMUSCULAR; INTRAVENOUS at 23:05

## 2018-04-27 RX ADMIN — ROCURONIUM BROMIDE 20 MG: 10 INJECTION INTRAVENOUS at 20:19

## 2018-04-27 RX ADMIN — HYDROMORPHONE HYDROCHLORIDE 0.2 MG: 1 INJECTION, SOLUTION INTRAMUSCULAR; INTRAVENOUS; SUBCUTANEOUS at 22:00

## 2018-04-27 RX ADMIN — Medication 1 PAD.: at 20:45

## 2018-04-27 RX ADMIN — ALBUMIN HUMAN: 0.05 INJECTION, SOLUTION INTRAVENOUS at 21:29

## 2018-04-27 RX ADMIN — FENTANYL CITRATE 50 MCG: 50 INJECTION, SOLUTION INTRAMUSCULAR; INTRAVENOUS at 19:17

## 2018-04-27 RX ADMIN — PHENAZOPYRIDINE HYDROCHLORIDE 200 MG: 200 TABLET, FILM COATED ORAL at 16:09

## 2018-04-27 RX ADMIN — ROCURONIUM BROMIDE 20 MG: 10 INJECTION INTRAVENOUS at 21:21

## 2018-04-27 RX ADMIN — FENTANYL CITRATE 50 MCG: 50 INJECTION, SOLUTION INTRAMUSCULAR; INTRAVENOUS at 19:04

## 2018-04-27 RX ADMIN — PROPOFOL 20 MG: 10 INJECTION, EMULSION INTRAVENOUS at 22:27

## 2018-04-27 RX ADMIN — FENTANYL CITRATE 50 MCG: 50 INJECTION, SOLUTION INTRAMUSCULAR; INTRAVENOUS at 18:34

## 2018-04-27 RX ADMIN — BUPIVACAINE HYDROCHLORIDE 20 ML: 2.5 INJECTION, SOLUTION EPIDURAL; INFILTRATION; INTRACAUDAL at 17:00

## 2018-04-27 RX ADMIN — SODIUM CHLORIDE, POTASSIUM CHLORIDE, SODIUM LACTATE AND CALCIUM CHLORIDE: 600; 310; 30; 20 INJECTION, SOLUTION INTRAVENOUS at 18:22

## 2018-04-27 RX ADMIN — HYDROMORPHONE HYDROCHLORIDE 0.5 MG: 1 INJECTION, SOLUTION INTRAMUSCULAR; INTRAVENOUS; SUBCUTANEOUS at 23:44

## 2018-04-27 RX ADMIN — ROCURONIUM BROMIDE 40 MG: 10 INJECTION INTRAVENOUS at 18:34

## 2018-04-27 NOTE — IP AVS SNAPSHOT
[address] [address]    Phone:  [phone]                                       After Visit Summary   4/27/2018    Gerri Owens    MRN: [id number]           After Visit Summary Signature Page     I have received my discharge instructions, and my questions have been answered. I have discussed any challenges I see with this plan with the nurse or doctor.    ..........................................................................................................................................  Patient/Patient Representative Signature      ..........................................................................................................................................  Patient Representative Print Name and Relationship to Patient    ..................................................               ................................................  Date                                            Time    ..........................................................................................................................................  Reviewed by Signature/Title    ...................................................              ..............................................  Date                                                            Time

## 2018-04-27 NOTE — ANESTHESIA PREPROCEDURE EVALUATION
Anesthesia Evaluation     . Pt has had prior anesthetic. Type: General    No history of anesthetic complications          ROS/MED HX    ENT/Pulmonary:  - neg pulmonary ROS     Neurologic:  - neg neurologic ROS     Cardiovascular:     (+) Dyslipidemia, ----. : . . . :. . Previous cardiac testing date:results:date: results:ECG reviewed date:04/23/2018 results:Sinus bradycardia. Otherwise normal EKG. date: results:          METS/Exercise Tolerance:  >4 METS   Hematologic:  - neg hematologic  ROS       Musculoskeletal:  - neg musculoskeletal ROS       GI/Hepatic:     (+) GERD Asymptomatic on medication,       Renal/Genitourinary:     (+) chronic renal disease, type: CRI, Pt does not require dialysis, Pt has no history of transplant,       Endo:  - neg endo ROS       Psychiatric:  - neg psychiatric ROS       Infectious Disease:  - neg infectious disease ROS       Malignancy:      - no malignancy   Other:    - neg other ROS                 Physical Exam  Normal systems: pulmonary and dental    Airway   Mallampati: II  TM distance: >3 FB  Neck ROM: full    Dental     Cardiovascular   Rhythm and rate: regular and normal      Pulmonary                        Lab / Radiology Results:   Reviewed current labs when avail, see EMR for details.      BMP:  Recent Labs   Lab Test  04/23/18   1250   NA  139   POTASSIUM  4.2   CHLORIDE  107   CO2  26   BUN  13   CR  0.86   GLC  80   LAURENT  9.2       LFTs:   Recent Labs   Lab Test  04/23/18   1250   PROTTOTAL  7.4   ALBUMIN  3.4   BILITOTAL  0.8   ALKPHOS  106   AST  18   ALT  20       CBC:   Recent Labs   Lab Test  04/23/18   1250   WBC  7.0   HGB  13.3   PLT  240       Coags:  Recent Labs   Lab Test  11/14/17   0923   INR  0.93       Blood Bank:  Lab Results   Component Value Date    ABO PENDING 04/27/2018    [facility] Pos 04/09/2018    AS PENDING 04/27/2018       Studies:  See EMR for current studies, reviewed when available.      Anesthesia Plan      History & Physical Review  History and  physical reviewed and following examination; no interval change.    ASA Status:  2 .    NPO Status:  > 8 hours    Plan for General and ETT with Intravenous induction. Maintenance will be Balanced.    PONV prophylaxis:  Ondansetron (or other 5HT-3) and Dexamethasone or Solumedrol  Additional equipment: 2nd IV     Plan for GETA with PIV x 2, routine analgesia and antiemetics, bilateral TAP blocks for post-operative pain management.      Reinier Porter MD  Anesthesiologist  5:09 PM  April 27, 2018        Postoperative Care  Postoperative pain management:  Peripheral nerve block (Single Shot) and Multi-modal analgesia.      Consents  Anesthetic plan, risks, benefits and alternatives discussed with:  Patient.  Use of blood products discussed: Yes.   Use of blood products discussed with Patient.  Consented to blood products.  .                          .

## 2018-04-27 NOTE — OR NURSING
Pt received a preop block.  Pt received 1mg Versed and 50mcg Fentanyl; pt's O2 sat decreased to 91% on 2L nasal cannula post medication, O2 increased to 4L.  No s/s of complications noted.

## 2018-04-27 NOTE — IP AVS SNAPSHOT
MRN:[id number]                      After Visit Summary   4/27/2018    Gerri Owens    MRN: [id number]           Thank you!     Thank you for choosing [facility] for your care. Our goal is always to provide you with excellent care. Hearing back from our patients is one way we can continue to improve our services. Please take a few minutes to complete the written survey that you may receive in the mail after you visit with us. Thank you!        Patient Information     Date Of Birth          1955        Designated Caregiver       Most Recent Value    Caregiver    Will someone help with your care after discharge? yes    Name of designated caregiver Doug ()    Phone number of caregiver [phone]    Caregiver address same as patient      About your hospital stay     You were admitted on:  April 27, 2018 You last received care in the:  [address] [facility]    You were discharged on:  May 2, 2018        Reason for your hospital stay       Surgery                  Who to Call     For medical emergencies, please call 911.  For non-urgent questions about your medical care, please call your primary care provider or clinic, [phone]  For questions related to your surgery, please call your surgery clinic        Attending Provider     Provider Specialty    Bradley Tierney MD Oncology    Madison Rocha MD Oncology       Primary Care Provider Office Phone # Fax #    Karo Doty [phone]       When to contact your care team       GENERAL POST-OPERATIVE  PATIENT INSTRUCTIONS      FOLLOW-UP:    Call Surgeon if you have:  Temperature greater than 100.4  Persistent nausea and vomiting  Severe uncontrolled pain  Redness, tenderness, or signs of infection (pain, swelling, redness, odor or green/yellow discharge around the site)  Difficulty breathing, headache or visual disturbances  Hives  Persistent dizziness or light-headedness  Extreme fatigue  Any other  questions or concerns you may have after discharge    In an emergency, call 911 or go to an Emergency Department at a nearby hospital       WOUND CARE INSTRUCTIONS:  Keep a dry clean dressing on the wound if there is drainage. If you had a bandage initially, it may be removed after 24 hours.  Once the wound has quit draining you may leave it open to air.  If clothing rubs against the wound or causes irritation and the wound is not draining you may cover it with a dry dressing during the daytime.  Try to keep the wound dry and avoid ointments on the wound unless directed to do so.  If the wound becomes bright red and painful or starts to drain infected material that is not clear, please contact your physician immediately.    1.  You may shower 24 hrs after surgery   2.  No soaking in the tub for 4 weeks       DIET:  There are no dietary restrictions.  You may eat any foods that you can tolerate unless instructed otherwise.  It is a good idea to eat a high fiber diet and take in plenty of fluids to prevent constipation.  If you become constipated, please follow the instructions below.    ACTIVITY:  You are encouraged to cough, deep breath and use your incentive spirometer if you were given one, every 15-30 minutes when awake.  This will help prevent respiratory complications and low grade fevers post-operatively.  You may want to hug a pillow when coughing and sneezing to add additional support to the surgical area, if you had abdominal surgery, which will decrease pain during these times.      1.  No heavy lifting >20lbs or strenuous exercise for six-eight weeks.  No exercise in which you are using core muscles (yoga, pilates, swimming, weight lifting)  2.  You may walk as much as you wish.  You are encouraged to increase your activity each day after surgery.  Stairs are okay.   3.  Nothing per vagina for eight weeks.  No tampons, no intercourse, no douching.  You can expect some light vaginal spotting and discharge  for up to six weeks.  If bleeding becomes heavy, please contact the office.     MEDICATIONS:  Try to take narcotic medications and anti-inflammatory medications, such as tylenol, ibuprofen, naprosyn, etc., with food.  This will minimize stomach upset from the medication.  Should you develop nausea and vomiting from the pain medication, or develop a rash, please discontinue the medication and contact your physician.  You should not drive, make important decisions, or operate machinery when taking narcotic pain medication.    OTHER:  Patients are often constipated after general anesthesia and surgery.  The patient should continue to take stool softeners (for example, Senokot-S) for the next six weeks (unless diarrhea develops) and consume adequate amounts of water.  If the patient remains constipated or unable to pass stool, please try one or all of the following measures:  1.  Milk of Magnesia 30cc twice a day as needed by mouth  2.  Metamucil 2 tablespoons in 12 ounces of fluid  3.  Dulcolax oral or suppositories  4.  Prunes or prune juice  5.  Miralax daily      QUESTIONS:  Please feel free to call your physician or the hospital  if you have any questions, and they will be glad to assist you.                  After Care Instructions     Activity       Your activity upon discharge: activity as tolerated            Diet       Follow this diet upon discharge: Orders Placed This Encounter      Regular Diet Adult                  Follow-up Appointments     Adult [facility] Follow-up and recommended labs and tests       Follow up in gyn onc clinic 10-14 days post op to have your staples removed.     Follow up 5/14/18 with Dr Tierney. Please call prior to then for questions or concerns [phone]    Follow up with your PCP regarding hospital follow up and thyroid nodule seen on your CT of the chest    Appointments on [facility] and/or [facility] (with [facility] provider or service). Call [phone] if  "you haven't heard regarding these appointments within 7 days of discharge.                  Your next 10 appointments already scheduled     May 07, 2018 12:00 PM CDT   Nurse Visit with [facility] Nurse   [facility]    [address]  [address]  [place] [id number] [phone]            May 14, 2018 12:00 PM CDT   (Arrive by 11:45 AM)   Post-Op with Bradley Tierney MD   [facility] ([facility])    [address]  [address]  [place] [id number] [phone]              Additional Information     If you use hormonal birth control (such as the pill, patch, ring or implants): You'll need a second form of birth control for 7 days (condoms, a diaphragm or contraceptive foam). While in the hospital, you received a medicine called Bridion. Your normal birth control will not work as well for a week after taking this medicine.          Pending Results     Date and Time Order Name Status Description    4/27/2018 1920 Surgical pathology exam In process             Statement of Approval     Ordered          05/02/18 1013  I have reviewed and agree with all the recommendations and orders detailed in this document.  EFFECTIVE NOW     Approved and electronically signed by:  Tara Izquierdo MD             Admission Information     Date & Time Provider Department Dept. Phone    4/27/2018 Madison Rocha MD [address] [facility] [phone]      Your Vitals Were     Blood Pressure Pulse Temperature Respirations Height Weight    93/57 (BP Location: Left arm) 101 97.5  F (36.4  C) (Oral) 16 1.67 m (5' 5.75\") 83.9 kg (184 lb 14.4 oz)    Last Period Pulse Oximetry BMI (Body Mass Index)             03/04/2005 92% 30.07 kg/m2         Passport Systemshart Information     [facility] gives you secure access to your electronic health record. If you see a primary care provider, you can also send messages to your care " team and make appointments. If you have questions, please call your primary care clinic.  If you do not have a primary care provider, please call [phone] and they will assist you.        Care EveryWhere ID     This is your Care EveryWhere ID. This could be used by other organizations to access your [facility] [place] medical records  [id number]        Equal Access to Services     JEREMÍAS BUCK : Hadlisha mckeon Sograce, waaxda elbertqadaha, qaybta kaalmada makayla, carol escobar . So [facility] [phone].    ATENCIÓN: Si habla español, tiene a graham disposición servicios gratuitos de asistencia lingüística. Llame al [phone].    We comply with applicable federal civil rights laws and [place] laws. We do not discriminate on the basis of race, color, national origin, age, disability, sex, sexual orientation, or gender identity.               Review of your medicines      START taking        Dose / Directions    enoxaparin 40 MG/0.4ML injection   Commonly known as:  LOVENOX        Dose:  40 mg   Inject 0.4 mLs (40 mg) Subcutaneous every 24 hours   Quantity:  25 Syringe   Refills:  0       ibuprofen 600 MG tablet   Commonly known as:  ADVIL/MOTRIN        Dose:  600 mg   Take 1 tablet (600 mg) by mouth every 6 hours as needed for moderate pain   Quantity:  30 tablet   Refills:  0       oxyCODONE IR 5 MG tablet   Commonly known as:  ROXICODONE   Notes to Patient:  Take 10mg for severe pain. If pain is feeling better controlled, try 5mg.         Dose:  5-10 mg   Take 1-2 tablets (5-10 mg) by mouth every 4 hours as needed for other (pain control or improvement in physical function.??Hold dose for analgesics side effects.)   Quantity:  25 tablet   Refills:  0       polyethylene glycol Packet   Commonly known as:  MIRALAX/GLYCOLAX        Dose:  17 g   Take 17 g by mouth daily as needed for constipation   Quantity:  7 packet   Refills:  1       senna-docusate 8.6-50 MG per tablet   Commonly known as:   SENOKOT-S;PERICOLACE        Dose:  1-2 tablet   Take 1-2 tablets by mouth 2 times daily as needed for constipation   Quantity:  100 tablet   Refills:  0         CONTINUE these medicines which may have CHANGED, or have new prescriptions. If we are uncertain of the size of tablets/capsules you have at home, strength may be listed as something that might have changed.        Dose / Directions    acetaminophen 500 MG tablet   Commonly known as:  TYLENOL   This may have changed:    - medication strength  - when to take this  - reasons to take this        Dose:  500 mg   Take 1 tablet (500 mg) by mouth every 4 hours as needed   Refills:  0         CONTINUE these medicines which have NOT CHANGED        Dose / Directions    ascorbic acid 250 MG tablet   Commonly known as:  VITAMIN C   Used for:  Routine general medical examination at a health care facility        250 mg   Quantity:  90   Refills:  0       calcium citrate-vitamin D 315-200 MG-UNIT Tabs per tablet   Commonly known as:  CITRACAL   Indication:  630/500 mg        Dose:  2 tablet   Take 2 tablets by mouth daily   Refills:  0       GLUCOSAMINE CHONDROITIN COMPLX Tabs   Used for:  Routine general medical examination at a health care facility        Take  by mouth. 1500 mg 1xqd.   Refills:  0       RANITIDINE HCL PO   Indication:  8am and 8pm        Dose:  150 mg   Take 150 mg by mouth 2 times daily   Refills:  0       vitamin E 400 UNIT capsule        Dose:  1 capsule   Take 1 capsule by mouth three times a week.   Refills:  0            Where to get your medicines      These medications were sent to [facility] - [place], [place] - [address] [address]     Phone:  [phone]     enoxaparin 40 MG/0.4ML injection    ibuprofen 600 MG tablet    polyethylene glycol Packet    senna-docusate 8.6-50 MG per tablet         Some of these will need a paper prescription and others can be bought over the counter. Ask  your nurse if you have questions.     Bring a paper prescription for each of these medications     oxyCODONE IR 5 MG tablet       You don't need a prescription for these medications     acetaminophen 500 MG tablet                Protect others around you: Learn how to safely use, store and throw away your medicines at www.disposemymeds.org.        Information about OPIOIDS     PRESCRIPTION OPIOIDS: WHAT YOU NEED TO KNOW   You have a prescription for an opioid (narcotic) pain medicine. Opioids can cause addiction. If you have a history of chemical dependency of any type, you are at a higher risk of becoming addicted to opioids. Only take this medicine after all other options have been tried. Take it for as short a time and as few doses as possible.     Do not:    Drive. If you drive while taking these medicines, you could be arrested for driving under the influence (DUI).    Operate heavy machinery    Do any other dangerous activities while taking these medicines.     Drink any alcohol while taking these medicines.      Take with any other medicines that contain acetaminophen. Read all labels carefully. Look for the word  acetaminophen  or  Tylenol.  Ask your pharmacist if you have questions or are unsure.    Store your pills in a secure place, locked if possible. We will not replace any lost or stolen medicine. If you don t finish your medicine, please throw away (dispose) as directed by your pharmacist. The [place] [employer]ution [employer] has more information about safe disposal: https://www.pca.Counts include 234 beds at the [facility].[place]/living-green/managing-unwanted-medications    All opioids tend to cause constipation. Drink plenty of water and eat foods that have a lot of fiber, such as fruits, vegetables, prune juice, apple juice and high-fiber cereal. Take a laxative (Miralax, milk of magnesia, Colace, Senna) if you don t move your bowels at least every other day.              Medication List: This is a list of all your medications and  when to take them. Check marks below indicate your daily home schedule. Keep this list as a reference.      Medications           Morning Afternoon Evening Bedtime As Needed    acetaminophen 500 MG tablet   Commonly known as:  TYLENOL   Take 1 tablet (500 mg) by mouth every 4 hours as needed   Last time this was given:  650 mg on 5/2/2018  8:12 AM                                   ascorbic acid 250 MG tablet   Commonly known as:  VITAMIN C   250 mg                                   calcium citrate-vitamin D 315-200 MG-UNIT Tabs per tablet   Commonly known as:  CITRACAL   Take 2 tablets by mouth daily                                   enoxaparin 40 MG/0.4ML injection   Commonly known as:  LOVENOX   Inject 0.4 mLs (40 mg) Subcutaneous every 24 hours   Last time this was given:  40 mg on 5/2/2018  9:57 AM            10 am                       GLUCOSAMINE CHONDROITIN COMPLX Tabs   Take  by mouth. 1500 mg 1xqd.                                   ibuprofen 600 MG tablet   Commonly known as:  ADVIL/MOTRIN   Take 1 tablet (600 mg) by mouth every 6 hours as needed for moderate pain                                   oxyCODONE IR 5 MG tablet   Commonly known as:  ROXICODONE   Take 1-2 tablets (5-10 mg) by mouth every 4 hours as needed for other (pain control or improvement in physical function.??Hold dose for analgesics side effects.)   Last time this was given:  10 mg on 5/2/2018  8:36 AM   Next Dose Due:  10:30 AM   Notes to Patient:  Take 10mg for severe pain. If pain is feeling better controlled, try 5mg.                                    polyethylene glycol Packet   Commonly known as:  MIRALAX/GLYCOLAX   Take 17 g by mouth daily as needed for constipation   Last time this was given:  17 g on 4/29/2018  9:04 PM                                   RANITIDINE HCL PO   Take 150 mg by mouth 2 times daily   Last time this was given:  150 mg on 5/2/2018  8:12 AM            Before breakfast           Before dinner                senna-docusate 8.6-50 MG per tablet   Commonly known as:  SENOKOT-S;PERICOLACE   Take 1-2 tablets by mouth 2 times daily as needed for constipation   Last time this was given:  1 tablet on 5/1/2018  8:06 AM                                   vitamin E 400 UNIT capsule   Take 1 capsule by mouth three times a week.

## 2018-04-27 NOTE — ANESTHESIA PROCEDURE NOTES
Peripheral Nerve Block Procedure Note    Staff:     Anesthesiologist:  LISA WATKINS    Resident/CRNA:  LISA NUÑEZ    Block performed by resident/CRNA in the presence of a teaching physician    Location: Pre-op  Procedure Start/Stop TImes:      4/27/2018 4:55 PM     4/27/2018 5:04 PM    patient identified, IV checked, site marked, risks and benefits discussed, informed consent, monitors and equipment checked, pre-op evaluation, at physician/surgeon's request and post-op pain management      Correct Patient: Yes      Correct Position: Yes      Correct Site: Yes      Correct Procedure: Yes      Correct Laterality:  Yes    Site Marked:  Yes  Procedure details:     Procedure:  TAP    ASA:  2    Laterality:  Bilateral    Position:  Supine    Sterile Prep: chloraprep, mask and sterile gloves      Needle:  Short bevel and insulated    Needle gauge:  21    Needle length (inches):  3.1    Ultrasound: Yes      Ultrasound used to identify targeted nerve, plexus, or vascular structure and placed a needle adjacent to it      Permanent Image entered into patiient's record      Abnormal pain on injection: No      Blood Aspirated: No      Paresthesias:  No    Bleeding at site: No      Bolus via:  Needle    Infusion Method:  Single Shot    Complications:  None

## 2018-04-28 LAB
ALBUMIN SERPL-MCNC: 3.3 G/DL (ref 3.4–5)
ALP SERPL-CCNC: 64 U/L (ref 40–150)
ALT SERPL W P-5'-P-CCNC: 24 U/L (ref 0–50)
ANION GAP SERPL CALCULATED.3IONS-SCNC: 8 MMOL/L (ref 3–14)
AST SERPL W P-5'-P-CCNC: 28 U/L (ref 0–45)
BILIRUB SERPL-MCNC: 0.9 MG/DL (ref 0.2–1.3)
BUN SERPL-MCNC: 13 MG/DL (ref 7–30)
CALCIUM SERPL-MCNC: 8 MG/DL (ref 8.5–10.1)
CHLORIDE SERPL-SCNC: 108 MMOL/L (ref 94–109)
CO2 SERPL-SCNC: 24 MMOL/L (ref 20–32)
CREAT SERPL-MCNC: 0.77 MG/DL (ref 0.52–1.04)
ERYTHROCYTE [DISTWIDTH] IN BLOOD BY AUTOMATED COUNT: 14.4 % (ref 10–15)
GFR SERPL CREATININE-BSD FRML MDRD: 76 ML/MIN/1.7M2
GLUCOSE SERPL-MCNC: 125 MG/DL (ref 70–99)
HCT VFR BLD AUTO: 31.8 % (ref 35–47)
HGB BLD-MCNC: 10.1 G/DL (ref 11.7–15.7)
MCH RBC QN AUTO: 28.5 PG (ref 26.5–33)
MCHC RBC AUTO-ENTMCNC: 31.8 G/DL (ref 31.5–36.5)
MCV RBC AUTO: 90 FL (ref 78–100)
PLATELET # BLD AUTO: 207 10E9/L (ref 150–450)
POTASSIUM SERPL-SCNC: 4.1 MMOL/L (ref 3.4–5.3)
PROT SERPL-MCNC: 5.8 G/DL (ref 6.8–8.8)
RBC # BLD AUTO: 3.55 10E12/L (ref 3.8–5.2)
SODIUM SERPL-SCNC: 140 MMOL/L (ref 133–144)
WBC # BLD AUTO: 9.8 10E9/L (ref 4–11)

## 2018-04-28 PROCEDURE — 85027 COMPLETE CBC AUTOMATED: CPT | Performed by: STUDENT IN AN ORGANIZED HEALTH CARE EDUCATION/TRAINING PROGRAM

## 2018-04-28 PROCEDURE — 25000132 ZZH RX MED GY IP 250 OP 250 PS 637: Performed by: STUDENT IN AN ORGANIZED HEALTH CARE EDUCATION/TRAINING PROGRAM

## 2018-04-28 PROCEDURE — 25000125 ZZHC RX 250: Performed by: STUDENT IN AN ORGANIZED HEALTH CARE EDUCATION/TRAINING PROGRAM

## 2018-04-28 PROCEDURE — 80053 COMPREHEN METABOLIC PANEL: CPT | Performed by: STUDENT IN AN ORGANIZED HEALTH CARE EDUCATION/TRAINING PROGRAM

## 2018-04-28 PROCEDURE — 36415 COLL VENOUS BLD VENIPUNCTURE: CPT | Performed by: STUDENT IN AN ORGANIZED HEALTH CARE EDUCATION/TRAINING PROGRAM

## 2018-04-28 PROCEDURE — 99024 POSTOP FOLLOW-UP VISIT: CPT | Mod: GC | Performed by: OBSTETRICS & GYNECOLOGY

## 2018-04-28 PROCEDURE — 25000128 H RX IP 250 OP 636: Performed by: STUDENT IN AN ORGANIZED HEALTH CARE EDUCATION/TRAINING PROGRAM

## 2018-04-28 PROCEDURE — 12000001 ZZH R&B MED SURG/OB UMMC

## 2018-04-28 RX ORDER — PHENAZOPYRIDINE HYDROCHLORIDE 100 MG/1
100 TABLET, FILM COATED ORAL 3 TIMES DAILY PRN
Status: DISCONTINUED | OUTPATIENT
Start: 2018-04-28 | End: 2018-05-02 | Stop reason: HOSPADM

## 2018-04-28 RX ADMIN — ENOXAPARIN SODIUM 40 MG: 40 INJECTION SUBCUTANEOUS at 11:40

## 2018-04-28 RX ADMIN — OXYCODONE HYDROCHLORIDE 5 MG: 5 TABLET ORAL at 02:15

## 2018-04-28 RX ADMIN — OXYCODONE HYDROCHLORIDE 10 MG: 5 TABLET ORAL at 14:20

## 2018-04-28 RX ADMIN — Medication 0.2 MG: at 01:28

## 2018-04-28 RX ADMIN — ONDANSETRON 4 MG: 4 TABLET, ORALLY DISINTEGRATING ORAL at 14:20

## 2018-04-28 RX ADMIN — OXYCODONE HYDROCHLORIDE 10 MG: 5 TABLET ORAL at 06:30

## 2018-04-28 RX ADMIN — PROCHLORPERAZINE EDISYLATE 5 MG: 5 INJECTION INTRAMUSCULAR; INTRAVENOUS at 08:30

## 2018-04-28 RX ADMIN — RANITIDINE 150 MG: 150 TABLET ORAL at 08:18

## 2018-04-28 RX ADMIN — OXYCODONE HYDROCHLORIDE 10 MG: 5 TABLET ORAL at 09:45

## 2018-04-28 RX ADMIN — OXYCODONE HYDROCHLORIDE 5 MG: 5 TABLET ORAL at 23:51

## 2018-04-28 RX ADMIN — ACETAMINOPHEN 650 MG: 325 TABLET, FILM COATED ORAL at 20:09

## 2018-04-28 RX ADMIN — MAGNESIUM HYDROXIDE 15 ML: 400 SUSPENSION ORAL at 08:17

## 2018-04-28 RX ADMIN — ACETAMINOPHEN 650 MG: 325 TABLET, FILM COATED ORAL at 08:17

## 2018-04-28 RX ADMIN — RANITIDINE 150 MG: 150 TABLET ORAL at 01:27

## 2018-04-28 RX ADMIN — ACETAMINOPHEN 650 MG: 325 TABLET, FILM COATED ORAL at 14:20

## 2018-04-28 RX ADMIN — ACETAMINOPHEN 650 MG: 325 TABLET, FILM COATED ORAL at 01:27

## 2018-04-28 RX ADMIN — MAGNESIUM HYDROXIDE 15 ML: 400 SUSPENSION ORAL at 18:44

## 2018-04-28 RX ADMIN — OXYCODONE HYDROCHLORIDE 10 MG: 5 TABLET ORAL at 20:09

## 2018-04-28 RX ADMIN — Medication 0.2 MG: at 05:49

## 2018-04-28 RX ADMIN — RANITIDINE 150 MG: 150 TABLET ORAL at 20:09

## 2018-04-28 ASSESSMENT — PAIN DESCRIPTION - DESCRIPTORS
DESCRIPTORS: ACHING;SORE
DESCRIPTORS: ACHING
DESCRIPTORS: SORE
DESCRIPTORS: ACHING
DESCRIPTORS: SORE
DESCRIPTORS: SORE
DESCRIPTORS: SHARP
DESCRIPTORS: ACHING;SHARP
DESCRIPTORS: SORE
DESCRIPTORS: SORE;ACHING;TENDER

## 2018-04-28 ASSESSMENT — ACTIVITIES OF DAILY LIVING (ADL)
BATHING: 0-->INDEPENDENT
RETIRED_COMMUNICATION: 0-->UNDERSTANDS/COMMUNICATES WITHOUT DIFFICULTY
TOILETING: 0-->INDEPENDENT
AMBULATION: 0-->INDEPENDENT
DRESS: 0-->INDEPENDENT
FALL_HISTORY_WITHIN_LAST_SIX_MONTHS: NO
COGNITION: 0 - NO COGNITION ISSUES REPORTED
RETIRED_EATING: 0-->INDEPENDENT
TRANSFERRING: 0-->INDEPENDENT
SWALLOWING: 0-->SWALLOWS FOODS/LIQUIDS WITHOUT DIFFICULTY

## 2018-04-28 NOTE — OR NURSING
Dr. TALI Raymond notified that pt needs anesthesia sign out. MD said she will place sign out. Pt ok to transfer to floor.

## 2018-04-28 NOTE — DISCHARGE SUMMARY
Gynecologic Oncology Discharge Summary    Gerri Owens  [id number]    Admit Date: 4/27/2018  Discharge Date: 5/2/18  Admitting Provider: Dr. Bradley Tierney  Discharge Provider: Dr. Madison Rocha    Admission Dx:   -grade 2 endometrial adenocarcinoma  -H/O unprovoked DVT    Discharge Dx:  -grade 2 endometrial adenocarcinoma  -H/O unprovoked DVT  - acute blood loss anemia    Patient Active Problem List   Diagnosis     Advanced directives, counseling/discussion     Primary osteoarthritis of both hips     History of deep venous thrombosis     Hyperlipidemia, unspecified hyperlipidemia type     BMI 35.0-35.9,adult     Presence of left hip implant     CKD (chronic kidney disease) stage 2, GFR 60-89 ml/min     Microscopic hematuria     Calculus of left kidney     S/P DEMETRIO-BSO     Procedures: diagnostic laparoscopy converted to total abdominal hysterectomy, bilateral salpingo-ophorectomy, pelvic and para-aortic lymphadenectomy, tumor debulking    Prior to Admission Medications:  Prescriptions Prior to Admission   Medication Sig Dispense Refill Last Dose     RANITIDINE HCL PO Take 150 mg by mouth 2 times daily    4/27/2018 at 0700     VITAMIN C 250 MG OR TABS 250 mg 90 0 Past Week at Unknown time     calcium citrate-vitamin D (CITRACAL) 315-200 MG-UNIT TABS per tablet Take 2 tablets by mouth daily   4/20/2018     GLUCOSAMINE CHONDROITIN COMPLX OR TABS Take  by mouth. 1500 mg 1xqd.   0 4/26/2018 at 0700     vitamin E 400 UNIT capsule Take 1 capsule by mouth three times a week.   More than a month at Unknown time       Discharge Medications:     Review of your medicines      START taking       Dose / Directions    enoxaparin 40 MG/0.4ML injection   Commonly known as:  LOVENOX        Dose:  40 mg   Inject 0.4 mLs (40 mg) Subcutaneous every 24 hours   Quantity:  25 Syringe   Refills:  0       ibuprofen 600 MG tablet   Commonly known as:  ADVIL/MOTRIN        Dose:  600 mg   Take 1 tablet (600 mg) by mouth every 6 hours as  needed for moderate pain   Quantity:  30 tablet   Refills:  0       oxyCODONE IR 5 MG tablet   Commonly known as:  ROXICODONE        Dose:  5-10 mg   Take 1-2 tablets (5-10 mg) by mouth every 4 hours as needed for other (pain control or improvement in physical function.  Hold dose for analgesics side effects.)   Quantity:  25 tablet   Refills:  0       polyethylene glycol Packet   Commonly known as:  MIRALAX/GLYCOLAX        Dose:  17 g   Take 17 g by mouth daily as needed for constipation   Quantity:  7 packet   Refills:  1       senna-docusate 8.6-50 MG per tablet   Commonly known as:  SENOKOT-S;PERICOLACE        Dose:  1-2 tablet   Take 1-2 tablets by mouth 2 times daily as needed for constipation   Quantity:  100 tablet   Refills:  0         CONTINUE these medicines which may have CHANGED, or have new prescriptions. If we are uncertain of the size of tablets/capsules you have at home, strength may be listed as something that might have changed.       Dose / Directions    acetaminophen 500 MG tablet   Commonly known as:  TYLENOL   This may have changed:    - medication strength  - when to take this  - reasons to take this        Dose:  500 mg   Take 1 tablet (500 mg) by mouth every 4 hours as needed   Refills:  0         CONTINUE these medicines which have NOT CHANGED       Dose / Directions    ascorbic acid 250 MG tablet   Commonly known as:  VITAMIN C   Used for:  Routine general medical examination at a health care facility        250 mg   Quantity:  90   Refills:  0       calcium citrate-vitamin D 315-200 MG-UNIT Tabs per tablet   Commonly known as:  CITRACAL   Indication:  630/500 mg        Dose:  2 tablet   Take 2 tablets by mouth daily   Refills:  0       GLUCOSAMINE CHONDROITIN COMPLX Tabs   Used for:  Routine general medical examination at a health care facility        Take  by mouth. 1500 mg 1xqd.   Refills:  0       RANITIDINE HCL PO   Indication:  8am and 8pm        Dose:  150 mg   Take 150 mg by mouth  2 times daily   Refills:  0       vitamin E 400 UNIT capsule        Dose:  1 capsule   Take 1 capsule by mouth three times a week.   Refills:  0            Where to get your medicines      These medications were sent to [facility] - [place], [place] - [address]  [address] [address]     Phone:  [phone]      enoxaparin 40 MG/0.4ML injection     ibuprofen 600 MG tablet     polyethylene glycol Packet     senna-docusate 8.6-50 MG per tablet         Some of these will need a paper prescription and others can be bought over the counter. Ask your nurse if you have questions.     Bring a paper prescription for each of these medications      oxyCODONE IR 5 MG tablet       You don't need a prescription for these medications      acetaminophen 500 MG tablet           Consultations:   anesthesiology    Brief History of Illness:  Gerri Owens is a 62 year old female who presented to her gynecologist with 1.5 years of postmenopausal bleeding.  She underwent an endometrial biopsy which showed grade 2 endometrial adenocarcinoma.  She underwent consultation with Dr. Tierney and hysterectomy with staging was recommended.  She was admitted after the procedure.    Hospital Course:  Dz:   - Preoperative diagnosis was grade 2 endometrial adenocarcinoma.  Frozen section at the time of the surgery showed endometrial adenocarcinoma on the surface of the uterus.  Final pathology is pending at the time of discharge.  She will follow-up postoperatively for a care plan.  FEN:   - She was maintained on IVF until POD#1, when her diet was slowly advanced.  By discharge, she was tolerating a regular diet without nausea and vomiting and able to maintain her hydration without IVF supplementation.  Pain:   - Her pain was initially controlled on a combination of IV and PO pain medications.  Once tolerating PO intake, she was transitioned to a PO pain regimen.  Her pain was well controlled on this  and she was discharged home with these medications.  CV:   - She has no history of CV issues.  Her vital signs were stable while in house and she had no acute CV issues. On POD#3 she had new hypoxia at rest and tachycardia. She had a CT PE which was negative for pulmonary emboli.  PULM:   - She has no history of pulmonary issues.  She was initially given O2 supplementation in to maintain her O2 sats in the immediate postop period and was transitioned off of this without difficulty.  By discharge, her O2 sats were greater than 94% on RA.  She was encouraged to use her bedside IS while in house.     - Her preoperative Hgb was 13.9.  Her hgb dropped to 10.1 postoperatively and was stable at 8.3 at the time of discharge.  She had no other acute heme issues while in house.  GI:   - She was made NPO prior to the procedure.  On POD#0, her diet was advanced to clear liquids and then advanced slowly as tolerated.  At the time of discharge, she was tolerating a regular diet without nausea and vomiting.  She will be discharged with a bowel regimen to prevent constipation in the postoperative period.  She was maintained on her home ranitidine for GERD.  She had no acute GI issues while in house.  She was regaining bowel function at the time of discharge.  :    -  A pena catheter was placed at the time of the surgery.  Once ambulating unassisted, the pena catheter was removed.  Prior to discharge, the patient was voiding spontaneously without difficulty.  She had no acute  issues while in house.  ID:   - The patient was afebrile during her hospitalization.  She received standard preoperative antibiotics without incident.    ENDO:   - no issues  PSYCH/NEURO:   - no issues  PPX:    -  She was given SCDs, IS, and lovenox during her hospital course.  She tolerated these prophylactic interventions without incident. She was continued on lovenox for a total of 28 days.    Discharge Instructions and Follow up:  Ms. Gerri MONSALVE  Roman was discharged from the hospital with follow up for post-operative planning.    Discharge Diet: Regular  Discharge Activity: Activity as tolerated  Discharge Follow up: Dr. Tierney 5/14, RN visit 5/7/18 for staple removal.    Discharge Disposition:  Discharged to home    Discharge Staff: Dr. Madison Izquierdo MD  OBGYN PGY3    Madison Rocha MD    Department of Ob/Gyn and Women's Health  Division of Gynecologic Oncology  [facility]  [phone]    I saw and evaluated the patient with the resident.  I edited and reviewed the above note.       PATHOLOGY DIAGNOSIS ADDENDUM    DIAGNOSIS:  Endometrial endometrioid adenocarcinoma FIGO grade 1 with cervical and left adnexa invasion.  Right ovarian surface adhesions involved by endometrial adenocarcinoma and metastasis to pelvic lymph nodes.  Right fallopian tube endometriosis.    Addendum added for Madison Rocha MD on 5/24/2018 by Nabila Rocha MD    Department of Ob/Gyn and Women's Health  Division of Gynecologic Oncology  [facility]  [phone]

## 2018-04-28 NOTE — PLAN OF CARE
Problem: Patient Care Overview  Goal: Plan of Care/Patient Progress Review  POD1: DEMETRIO-BSO, pelvic and para-aortic lymphadectomy, tumor debulking. Writer cared for pt from 2322-1209.  A&Ox4. AVSS, on RA, needed 1L NC while asleep. BPs soft in 90s/50s-60s. CAPNO DC'd per provider orders. Pain well controlled with PRN oxycodone and tylenol. Pt refusing to take ibuprofen d/t primary Dr's recommendation. Incision dressing changed this morning per nursing d/t dressing saturation, MD gave verbal order. Staples in place, serosanguinous drainage, mild dehiscence at bottom of incision. Abdominal binder on during transfers and ambulation. Pt c/o nausea, well controlled with scheduled zofran and PRN 5mg compazine. Pt taking mostly fluids today, on regular diet. Lund DC'd around 1000, pt voiding adequate amounts, frequent voiding encouraged and alicia-pad changed, scant drainage. Pt ambulated 3x with assist of 1. No BM, no gas. Continue POC.

## 2018-04-28 NOTE — PLAN OF CARE
"Problem: Patient Care Overview  Goal: Plan of Care/Patient Progress Review  Outcome: No Change  Pt arrived to 7C @approx 0030 from PACU. A&Ox4. Soft BP, slightly tachy to low 100s at times when pain increased, AOVSS on 2L O2. C/o abdominal pain, somewhat managed with IV dilaudid, tylenol & oxycodone. Pt anxious. Says pain is tolerable if \"I just don't really breathe.\" Reinforced pain management plan with patient. Due to dangle. ML covered with primapore, drainage extended. Regular diet - tolerating sips of water, declining scheduled zofran as not nauseated. Post-op instruction of IS completed. Continue to provide emotional support, cont post-op POC.       "

## 2018-04-28 NOTE — ANESTHESIA POSTPROCEDURE EVALUATION
Patient: Gerri Ownes    Procedure(s):  Laparoscopic converted to open Removal Of Uterus, Tubes, Ovaries, Cervix, Pelvic and Para Aortic Lymphnode Dissection, Tumor Debulking, CUSA, Partial Omentectomy, Anesthesia Block - Wound Class: II-Clean Contaminated   - Wound Class: II-Clean Contaminated    Diagnosis:Endometrial Adenocarcinoma  Diagnosis Additional Information: No value filed.    Anesthesia Type:  General, ETT    Note:  Anesthesia Post Evaluation    Patient location during evaluation: PACU  Patient participation: Able to fully participate in evaluation  Level of consciousness: awake and alert  Pain management: adequate  Airway patency: patent  Cardiovascular status: acceptable and hemodynamically stable  Respiratory status: acceptable  Hydration status: acceptable  PONV: none     Anesthetic complications: None          Last vitals:  Vitals:    04/27/18 2345 04/28/18 0000 04/28/18 0035   BP: 126/76 102/75 123/63   Pulse:      Resp: 15 15 13   Temp: 36.6  C (97.8  F)  35.4  C (95.7  F)   SpO2: 98% 97% 97%         Electronically Signed By: Cyn Raymond MD  April 28, 2018  1:06 AM

## 2018-04-28 NOTE — PROGRESS NOTES
"    Gynecology Oncology Progress Note    Date: 4/28/2018     POD# 1 (Postop check note)  Procedure: diagnostic laparoscopy converted to total abdominal hysterectomy, bilateral salpingo-ophorectomy, pelvic and para-aortic lymphadenectomy, tumor debulking  Disease: endometrial endometrioid adenocarcinoma, FIGO grade II.    24 hour events:   - above stated procedure    Subjective: Patient is feeling well, but starting to notice some pain.  She feels thirsty.  Denies nausea or vomiting.  Denies dizziness, chest pain, shortness of breath.    Objective:   Patient Vitals for the past 12 hrs:   BP Temp Temp src Pulse Heart Rate Resp SpO2 Height Weight   04/28/18 0035 123/63 95.7  F (35.4  C) Axillary - 75 13 97 % - -   04/28/18 0000 102/75 - - - 78 15 97 % - -   04/27/18 2345 126/76 97.8  F (36.6  C) Oral - 85 15 98 % - -   04/27/18 2330 119/72 - - - 90 16 97 % - -   04/27/18 2315 122/72 98  F (36.7  C) Oral - 83 17 96 % - -   04/27/18 2300 116/77 - - - 86 16 98 % - -   04/27/18 2254 125/76 98  F (36.7  C) Oral - 86 19 97 % - -   04/27/18 1745 105/64 - - - - 16 97 % - -   04/27/18 1730 108/72 - - - - 16 98 % - -   04/27/18 1700 98/60 - - 62 - 14 98 % - -   04/27/18 1655 107/62 - - 65 - 15 96 % - -   04/27/18 1530 111/79 97.8  F (36.6  C) Oral 63 - 16 99 % 1.67 m (5' 5.75\") 83.1 kg (183 lb 3.2 oz)     EXAM:   General: appears comfortable, in NAD  CV: RRR, no murmurs noted  Resp: CTAB, no increased work of breathing  Abdomen: soft, nontender to gentle palpation, nondistended  Incision: island dressing is dry, intact with extensive shadowing.  Laparoscopic port site dressings c/d/i.  Extremities: nontender, no edema, SCDs in place    Hemoglobin   Date Value Ref Range Status   04/23/2018 13.3 11.7 - 15.7 g/dL Final   04/09/2018 13.9 11.7 - 15.7 g/dL Final     Assessment: Gerri BELLO Owens is a 62 year old female POD#1 s/p diagnostic laparoscopy converted to total abdominal hysterectomy, bilateral salpingo-ophorectomy, pelvic and " para-aortic lymphadenectomy, tumor debulking for grade 2 EAC    Active Problem list:  Grade 2 endometrial adenocarcinoma  H/O unprovoked DVT    Plan:    Dz: endometrial endometrioid adenocarcinoma, FIGO grade II.  Surgical findings suggest at least stage IIIC1.  FEN: IVF NS @ 100 mL/h, ADAT to regular diet  Pain: s/p TAP block. Will continue PO acetaminophen, ibuprofen, and oxycodone.  Heme: stable, no symptoms of acute blood loss anemia.  Remote H/O unprovoked DVT in RUE and strong FamHx of DVT with and with factor V Leiden, pt is thus high risk for thrombus.  CV: no issues  Resp: no issues  GI: PRN antiemetics and stool softener.  : pena in place, plan to remove POD#1 as long as UOP adequate and ambulating  ID: afebrile, s/p periop abx  MSK: no issues  Neuro/Psych:no issues  Endocrine: no issues  PPx: SCDs in place, IS, Lovenox today  Disposition: Discharge when meeting discharge goals including tolerating regular diet without nausea/emesis, pain well controlled on PO medications, voiding/ambulating without issue, passing flatus, vitals within normal limits. Anticipate discharge home POD#2-4.    Kary Cloud MD  OB/GYN PGY2  Gyn Onc Pager [phone]

## 2018-04-28 NOTE — PROGRESS NOTES
Pt arrived to 7C @approx 0030 from PACU. A&Ox4. C/o abdominal pain, improving with cold packs, IV dilaudid, tylenol & oxycodone. ML covered with primapore, drainage extended. Regular diet - tolerating sips of water. Oriented to room, call light, PCDs in place.

## 2018-04-28 NOTE — BRIEF OP NOTE
[facility], [place]    Brief Operative Note    Pre-operative diagnosis: Endometrial Adenocarcinoma  Post-operative diagnosis * No post-op diagnosis entered *  Procedure: Procedure(s):  Laparoscopic converted to open Removal Of Uterus, Tubes, Ovaries, Cervix, Pelvic and Para Aortic Lymphnode Dissection, Tumor Debulking, CUSA, Partial Omentectomy, Anesthesia Block - Wound Class: II-Clean Contaminated   - Wound Class: II-Clean Contaminated  Surgeon: Surgeon(s) and Role:     * Bradley Tierney MD - Primary     * Kary Cloud MD - Resident - Assisting  Anesthesia: Combined General with Block   Estimated blood loss: 500 ml  IVF: 2L  UOP: 125mL  Drains: Lund  Specimens:   ID Type Source Tests Collected by Time Destination   1 : Pelvic Washings Washings Pelvis CYTOLOGY NON GYN Bradley Tierney MD 4/27/2018  7:21 PM    A : Anterior Fundus Biopsy Tissue Uterus SURGICAL PATHOLOGY EXAM Bradley Tierney MD 4/27/2018  7:20 PM    B : Uterus, Cervix, Bilateral Fallopian tubes and Ovaries Tissue Uterus with Bilateral Ovaries and Fallopian Tubes SURGICAL PATHOLOGY EXAM Bradley Tierney MD 4/27/2018  8:57 PM    C : Left Pelvic Lymphnodes Tissue Lymph Node, Left Pelvic SURGICAL PATHOLOGY EXAM Bradley Tierney MD 4/27/2018  9:05 PM    D : Left Para Aortic Lymphnodes Tissue Lymph Node, Left Para Aortic SURGICAL PATHOLOGY EXAM Bradley Tierney MD 4/27/2018  9:09 PM    E : Right Pelvic Lymphnodes Tissue Lymph Node, Right Pelvic SURGICAL PATHOLOGY EXAM Bradley Tierney MD 4/27/2018  9:16 PM    F : Right Para Aortic Lymphnodes Tissue Lymph Node, Right Para Aortic SURGICAL PATHOLOGY EXAM Bradley Tierney MD 4/27/2018  9:29 PM    G : Omentum Tissue Omentum SURGICAL PATHOLOGY EXAM Bradley Tierney MD 4/27/2018  9:56 PM      Findings:   Normal cervix but sharply deviated to patient's right.  Lapacoscopy revealed lesion suspicion for  malignancy on anterior fundus and diaphragm.  Anterior fundal biopsy was positive for adenocarcinoma,  Cystic pelvic mass in right pelvis.  Tumor palpable in omentum and mesentery.  Tumor on right uterine corpus/fundus.  Enlarged right pelvic lymph node..  Complications: None.  Implants: None.

## 2018-04-28 NOTE — PROGRESS NOTES
Gynecology Oncology Progress Note    Date: 4/28/2018     POD# 1 (Postop check note)  Procedure: diagnostic laparoscopy converted to total abdominal hysterectomy, bilateral salpingo-ophorectomy, pelvic and para-aortic lymphadenectomy, tumor debulking  Disease: endometrial endometrioid adenocarcinoma, FIGO grade II.    24 hour events:   - above stated procedure    Subjective: Patient is feeling a lot of pain just a few hours out from oxycodone.  She is concerned about using IV pain meds.  She tolerated water PO last night.  She denies nausea/vomiting.  She denies dizziness, chest pain, shortness of breath.    Objective:   Patient Vitals for the past 12 hrs:   BP Temp Temp src Pulse Heart Rate Resp SpO2   04/28/18 0651 94/61 97.9  F (36.6  C) Oral 82 - 16 96 %   04/28/18 0420 105/56 96.5  F (35.8  C) Oral 75 - 12 96 %   04/28/18 0035 123/63 95.7  F (35.4  C) Axillary - 75 13 97 %   04/28/18 0000 102/75 - - - 78 15 97 %   04/27/18 2345 126/76 97.8  F (36.6  C) Oral - 85 15 98 %   04/27/18 2330 119/72 - - - 90 16 97 %   04/27/18 2315 122/72 98  F (36.7  C) Oral - 83 17 96 %   04/27/18 2300 116/77 - - - 86 16 98 %   04/27/18 2254 125/76 98  F (36.7  C) Oral - 86 19 97 %     EXAM:   General: appears comfortable, in NAD  CV: RRR, no murmurs noted  Resp: CTAB, no increased work of breathing  Abdomen: soft, nontender to gentle palpation, nondistended  Incision: island dressing with shadowing.  Laparoscopic port site dressings c/d/i.  Extremities: nontender, no edema, SCDs in place    Hemoglobin   Date Value Ref Range Status   04/28/2018 10.1 (L) 11.7 - 15.7 g/dL Final   04/23/2018 13.3 11.7 - 15.7 g/dL Final     Assessment: Gerri Owens is a 62 year old female POD#1 s/p diagnostic laparoscopy converted to total abdominal hysterectomy, bilateral salpingo-ophorectomy, pelvic and para-aortic lymphadenectomy, tumor debulking for grade 2 EAC    Active Problem list:  Grade 2 endometrial adenocarcinoma  H/O unprovoked  DVT    Plan:    Dz: endometrial endometrioid adenocarcinoma, FIGO grade II.  Surgical findings suggest at least stage IIIC1.  FEN: Cont regular diet, d/c IVF today.  Pain: Will continue PO acetaminophen, ibuprofen, and oxycodone.  Heme: acute blood loss anemia due to surgical blood loss.   CV: no issues  Resp: no issues  GI: PRN antiemetics and stool softener.  : pena to be removed this morning  ID:  No issues  MSK: no issues  Neuro/Psych:no issues  Endocrine: no issues  PPx: SCDs in place, IS, Lovenox today  Disposition: Discharge when meeting discharge goals. Anticipate discharge home POD#2-4.    Kary Cloud MD  OB/GYN PGY2  Gyn Onc Pager [phone]    I, Efren Kee personally examined and evaluated this patient on 04/28/18.  I discussed the patient with the resident and care team, and agree with the assessment and plan of care as documented in the residents note above.    I personally reviewed vital signs, laboratory values and imaging results.    Pt is POD#1 s/p cancer staging procedure for endometrial cancer.  Plan routine post-operative cares.      Namrata Kee MD  Gynecologic Oncology  [facility]

## 2018-04-28 NOTE — ANESTHESIA CARE TRANSFER NOTE
Patient: Gerri Owens    Procedure(s):  Laparoscopic converted to open Removal Of Uterus, Tubes, Ovaries, Cervix, Pelvic and Para Aortic Lymphnode Dissection, Tumor Debulking, CUSA, Partial Omentectomy, Anesthesia Block - Wound Class: II-Clean Contaminated   - Wound Class: II-Clean Contaminated    Diagnosis: Endometrial Adenocarcinoma  Diagnosis Additional Information: No value filed.    Anesthesia Type:   General, ETT     Note:  Airway :Face Mask  Patient transferred to:PACU  Handoff Report: Identifed the Patient, Identified the Reponsible Provider, Reviewed the pertinent medical history, Discussed the surgical course, Reviewed Intra-OP anesthesia mangement and issues during anesthesia, Set expectations for post-procedure period and Allowed opportunity for questions and acknowledgement of understanding      Vitals: (Last set prior to Anesthesia Care Transfer)    CRNA VITALS  4/27/2018 2220 - 4/27/2018 2257      4/27/2018             NIBP: 125/76    Pulse: 80                Electronically Signed By: Emil Artis MD  April 27, 2018  10:57 PM

## 2018-04-29 ENCOUNTER — APPOINTMENT (OUTPATIENT)
Dept: CT IMAGING | Facility: CLINIC | Age: 63
DRG: 740 | End: 2018-04-29
Attending: OBSTETRICS & GYNECOLOGY
Payer: COMMERCIAL

## 2018-04-29 LAB
ANION GAP SERPL CALCULATED.3IONS-SCNC: 6 MMOL/L (ref 3–14)
BUN SERPL-MCNC: 12 MG/DL (ref 7–30)
CALCIUM SERPL-MCNC: 8 MG/DL (ref 8.5–10.1)
CHLORIDE SERPL-SCNC: 103 MMOL/L (ref 94–109)
CO2 SERPL-SCNC: 27 MMOL/L (ref 20–32)
CREAT SERPL-MCNC: 0.94 MG/DL (ref 0.52–1.04)
ERYTHROCYTE [DISTWIDTH] IN BLOOD BY AUTOMATED COUNT: 14.6 % (ref 10–15)
ERYTHROCYTE [DISTWIDTH] IN BLOOD BY AUTOMATED COUNT: 14.7 % (ref 10–15)
GFR SERPL CREATININE-BSD FRML MDRD: 60 ML/MIN/1.7M2
GLUCOSE BLDC GLUCOMTR-MCNC: 102 MG/DL (ref 70–99)
GLUCOSE SERPL-MCNC: 109 MG/DL (ref 70–99)
HCT VFR BLD AUTO: 27.2 % (ref 35–47)
HCT VFR BLD AUTO: 28.1 % (ref 35–47)
HGB BLD-MCNC: 8.3 G/DL (ref 11.7–15.7)
HGB BLD-MCNC: 8.5 G/DL (ref 11.7–15.7)
HGB BLD-MCNC: 9.4 G/DL (ref 11.7–15.7)
MCH RBC QN AUTO: 28.1 PG (ref 26.5–33)
MCH RBC QN AUTO: 28.4 PG (ref 26.5–33)
MCHC RBC AUTO-ENTMCNC: 30.2 G/DL (ref 31.5–36.5)
MCHC RBC AUTO-ENTMCNC: 30.5 G/DL (ref 31.5–36.5)
MCV RBC AUTO: 93 FL (ref 78–100)
MCV RBC AUTO: 93 FL (ref 78–100)
PLATELET # BLD AUTO: 170 10E9/L (ref 150–450)
PLATELET # BLD AUTO: 187 10E9/L (ref 150–450)
POTASSIUM SERPL-SCNC: 3.9 MMOL/L (ref 3.4–5.3)
RBC # BLD AUTO: 2.92 10E12/L (ref 3.8–5.2)
RBC # BLD AUTO: 3.02 10E12/L (ref 3.8–5.2)
SODIUM SERPL-SCNC: 136 MMOL/L (ref 133–144)
WBC # BLD AUTO: 5 10E9/L (ref 4–11)
WBC # BLD AUTO: 5.7 10E9/L (ref 4–11)

## 2018-04-29 PROCEDURE — 25000128 H RX IP 250 OP 636: Performed by: OBSTETRICS & GYNECOLOGY

## 2018-04-29 PROCEDURE — 12000001 ZZH R&B MED SURG/OB UMMC

## 2018-04-29 PROCEDURE — 25000125 ZZHC RX 250: Performed by: STUDENT IN AN ORGANIZED HEALTH CARE EDUCATION/TRAINING PROGRAM

## 2018-04-29 PROCEDURE — 25000132 ZZH RX MED GY IP 250 OP 250 PS 637: Performed by: STUDENT IN AN ORGANIZED HEALTH CARE EDUCATION/TRAINING PROGRAM

## 2018-04-29 PROCEDURE — 00000146 ZZHCL STATISTIC GLUCOSE BY METER IP

## 2018-04-29 PROCEDURE — 80048 BASIC METABOLIC PNL TOTAL CA: CPT | Performed by: OBSTETRICS & GYNECOLOGY

## 2018-04-29 PROCEDURE — 85027 COMPLETE CBC AUTOMATED: CPT | Performed by: STUDENT IN AN ORGANIZED HEALTH CARE EDUCATION/TRAINING PROGRAM

## 2018-04-29 PROCEDURE — 71260 CT THORAX DX C+: CPT

## 2018-04-29 PROCEDURE — 85018 HEMOGLOBIN: CPT | Performed by: STUDENT IN AN ORGANIZED HEALTH CARE EDUCATION/TRAINING PROGRAM

## 2018-04-29 PROCEDURE — 36415 COLL VENOUS BLD VENIPUNCTURE: CPT | Performed by: OBSTETRICS & GYNECOLOGY

## 2018-04-29 PROCEDURE — 85027 COMPLETE CBC AUTOMATED: CPT | Performed by: OBSTETRICS & GYNECOLOGY

## 2018-04-29 PROCEDURE — 40000141 ZZH STATISTIC PERIPHERAL IV START W/O US GUIDANCE

## 2018-04-29 PROCEDURE — 36415 COLL VENOUS BLD VENIPUNCTURE: CPT | Performed by: STUDENT IN AN ORGANIZED HEALTH CARE EDUCATION/TRAINING PROGRAM

## 2018-04-29 PROCEDURE — 25000128 H RX IP 250 OP 636: Performed by: STUDENT IN AN ORGANIZED HEALTH CARE EDUCATION/TRAINING PROGRAM

## 2018-04-29 PROCEDURE — 25000125 ZZHC RX 250: Performed by: OBSTETRICS & GYNECOLOGY

## 2018-04-29 RX ORDER — IBUPROFEN 600 MG/1
600 TABLET, FILM COATED ORAL EVERY 6 HOURS PRN
Status: DISCONTINUED | OUTPATIENT
Start: 2018-04-29 | End: 2018-05-02 | Stop reason: HOSPADM

## 2018-04-29 RX ORDER — IOPAMIDOL 755 MG/ML
63 INJECTION, SOLUTION INTRAVASCULAR ONCE
Status: COMPLETED | OUTPATIENT
Start: 2018-04-29 | End: 2018-04-29

## 2018-04-29 RX ADMIN — ACETAMINOPHEN 650 MG: 325 TABLET, FILM COATED ORAL at 14:49

## 2018-04-29 RX ADMIN — OXYCODONE HYDROCHLORIDE 5 MG: 5 TABLET ORAL at 05:20

## 2018-04-29 RX ADMIN — IOPAMIDOL 63 ML: 755 INJECTION, SOLUTION INTRAVENOUS at 22:53

## 2018-04-29 RX ADMIN — SENNOSIDES AND DOCUSATE SODIUM 1 TABLET: 8.6; 5 TABLET ORAL at 18:23

## 2018-04-29 RX ADMIN — POLYETHYLENE GLYCOL 3350 17 G: 17 POWDER, FOR SOLUTION ORAL at 21:04

## 2018-04-29 RX ADMIN — OXYCODONE HYDROCHLORIDE 5 MG: 5 TABLET ORAL at 20:38

## 2018-04-29 RX ADMIN — OXYCODONE HYDROCHLORIDE 5 MG: 5 TABLET ORAL at 02:08

## 2018-04-29 RX ADMIN — ACETAMINOPHEN 650 MG: 325 TABLET, FILM COATED ORAL at 20:38

## 2018-04-29 RX ADMIN — ENOXAPARIN SODIUM 40 MG: 40 INJECTION SUBCUTANEOUS at 10:11

## 2018-04-29 RX ADMIN — SODIUM CHLORIDE 92 ML: 9 INJECTION, SOLUTION INTRAVENOUS at 22:53

## 2018-04-29 RX ADMIN — RANITIDINE 150 MG: 150 TABLET ORAL at 08:29

## 2018-04-29 RX ADMIN — OXYCODONE HYDROCHLORIDE 10 MG: 5 TABLET ORAL at 08:29

## 2018-04-29 RX ADMIN — OXYCODONE HYDROCHLORIDE 5 MG: 5 TABLET ORAL at 17:45

## 2018-04-29 RX ADMIN — ONDANSETRON 4 MG: 4 TABLET, ORALLY DISINTEGRATING ORAL at 15:09

## 2018-04-29 RX ADMIN — OXYCODONE HYDROCHLORIDE 5 MG: 5 TABLET ORAL at 12:56

## 2018-04-29 RX ADMIN — ACETAMINOPHEN 650 MG: 325 TABLET, FILM COATED ORAL at 08:29

## 2018-04-29 RX ADMIN — RANITIDINE 150 MG: 150 TABLET ORAL at 20:38

## 2018-04-29 RX ADMIN — ACETAMINOPHEN 650 MG: 325 TABLET, FILM COATED ORAL at 02:08

## 2018-04-29 RX ADMIN — SENNOSIDES AND DOCUSATE SODIUM 1 TABLET: 8.6; 5 TABLET ORAL at 10:11

## 2018-04-29 ASSESSMENT — PAIN DESCRIPTION - DESCRIPTORS
DESCRIPTORS: ACHING;SORE
DESCRIPTORS: SORE
DESCRIPTORS: BURNING;SORE
DESCRIPTORS: ACHING;SORE;TENDER
DESCRIPTORS: SORE;ACHING
DESCRIPTORS: SORE
DESCRIPTORS: ACHING;SORE;TENDER
DESCRIPTORS: SORE
DESCRIPTORS: BURNING;ACHING

## 2018-04-29 NOTE — PROGRESS NOTES
Gynecology Oncology Progress Note    Date: 4/29/2018     POD#2  Procedure: diagnostic laparoscopy converted to total abdominal hysterectomy, bilateral salpingo-ophorectomy, pelvic and para-aortic lymphadenectomy, tumor debulking  Disease: endometrial endometrioid adenocarcinoma, FIGO grade II.    24 hour events:   - Lund removed    Subjective: Patient is feeling well.  No issues with PO intake  She denies nausea/vomiting.  She denies dizziness, chest pain, shortness of breath.  She has not passed flatus.    Objective:   Patient Vitals for the past 12 hrs:   BP Temp Temp src Pulse Heart Rate Resp SpO2 Weight   04/28/18 2221 101/64 98.6  F (37  C) Oral - 99 16 97 % -   04/28/18 2000 - - - - - - 95 % -   04/28/18 1946 104/65 98.6  F (37  C) Oral 90 90 16 (!) 88 % -   04/28/18 1505 - - - - - - - 82.6 kg (182 lb 3.2 oz)   04/28/18 1425 94/54 98  F (36.7  C) Oral - 77 16 96 % -     EXAM:   General: appears comfortable, in NAD  CV: RRR, no murmurs noted  Resp: CTAB, no increased work of breathing  Abdomen: soft, nontender to palpation, nondistended  Incision: c/d/i VML incision closed with staples.  Laparoscopic port site dressings c/d/i.  Extremities: nontender, no edema, SCDs in place    Hemoglobin   Date Value Ref Range Status   04/28/2018 10.1 (L) 11.7 - 15.7 g/dL Final   04/23/2018 13.3 11.7 - 15.7 g/dL Final     Assessment: Gerri Owens is a 62 year old female POD#2 s/p diagnostic laparoscopy converted to total abdominal hysterectomy, bilateral salpingo-ophorectomy, pelvic and para-aortic lymphadenectomy, tumor debulking for grade 2 EAC    Active Problem list:  Grade 2 endometrial adenocarcinoma  H/O unprovoked DVT    Plan:    Dz: endometrial endometrioid adenocarcinoma, FIGO grade II.  Surgical findings suggest at least stage IIIC1.  FEN: Cont regular diet  Pain: Will continue PO acetaminophen, ibuprofen, and oxycodone.  Heme: acute blood loss anemia due to surgical blood loss.  Will repeat Hgb this  The patient is a 47y Male complaining of dizziness. afternoon  CV: no issues  Resp: no issues  GI: PRN antiemetics and stool softener.  : S/p pena  ID:  No issues  MSK: no issues  Neuro/Psych:no issues  Endocrine: no issues  PPx: SCDs in place, IS, Lovenox  Disposition: Discharge when meeting discharge goals, likely tomorrow    Kary Cloud MD  OB/GYN PGY2  Gyn Onc Pager [phone]      GYN ONC Fellow Addendum:  Pt seen and examined at bedside with team.    POD#2 s/p diagnostic laparoscopy converted to total abdominal hysterectomy, bilateral salpingo-ophorectomy, pelvic and para-aortic lymphadenectomy, tumor debulking for grade 2 EAC, likely stage IIIC1. No acute events, doing well overall: pain controlled, tolerating regular diet.  Voiding without issue.  Flatus pending.    Hgb trending down, asymptomatic - no CP/SOB/dizziness/lightheadedness.  Ambulating without difficulty.  10.1 > 8.5 this AM, repeat at noon 9.4, appropriate.    Awaiting bowel function, continue routine postop care.    Isabela Aggarwal MD

## 2018-04-29 NOTE — PLAN OF CARE
Problem: Patient Care Overview  Goal: Plan of Care/Patient Progress Review  Outcome: Improving  [id number]:   Soft bps but within parameters, AOVSS on 1-2L O2, continuing to encourage IS and deep breathing/coughing (pt was limited by pain to perform this earlier, now pain is improved) to wean O2. Denies dizziness. Pt reports pain well managed with scheduled tylenol + oxycodone prn. Pt refuses ibuprofen. ML incision covered with gauze & binder. Regular diet, pt had apple juice/water this shift, denies nausea. Voids spont with adequate UOP, no alicia spotting. Denies passing gas, no BM this shift. Ambulated in halls this evening with SBA/assist of 1, total of 4 walks 4/28. Awaiting ROBF. Cont POC.

## 2018-04-29 NOTE — PLAN OF CARE
Problem: Patient Care Overview  Goal: Plan of Care/Patient Progress Review  Outcome: Improving  Writer cared for pt from [id number]. AVSS, hypotensive but within parameters. Pt c/o abdominal pain by incision, well controlled with tylenol and 5 mg oxycodone. Pt worried about nausea before a big lunch, zofran given. Pt had 1 tab of senna today, no BM, no gas. Refused suppository. Pt voiding adequately. Pt up independently, ambulated 3x in halls. Using IS. Showered today. Continue POC.

## 2018-04-29 NOTE — OP NOTE
[facility]  Operative Note    Date of Service:  4/27/2018    Surgeon: Bradley Tierney MD  Assistants: Kary Cloud MD, PGY-2    Maria M Jasso, MS3  Preop Dx: Endometrioid endometrial adenocarcinoma, FIGO grade II  Postop Dx: Same, s/p procedure    Procedure: Laparoscopic converted to open hysterectomy, bilateral salpingo-oophorectomy, pelvic and para-aortic lymph node dissection, tumor debulking, CUSA, partial omentectomy    Anesthesia: General endotracheal  IVF: 2000 mL crystalloid  EBL: 500 mL  UOP: 125 mL urine at the end of the case with pyridium effect  Drains: Lund catheter  Specimens: Uterus, fallopian tubes, ovaries  Complications: None apparent    Indications:   Gerri Owens is a 62 year old female who presented to her gynecologist with 1.5 years of postmenopausal bleeding.  She underwent an endometrial biopsy which showed grade 2 endometrial adenocarcinoma.  She underwent consultation with Dr. Tierney and hysterectomy with staging was recommended.  The risks, benefits, and alternatives to the procedure were discussed and she wished to proceed.  Written informed consent was obtained.    Findings:   Normal cervix but sharply deviated to patient's right.  Laparoscopy revealed lesion suspicion for malignancy on anterior fundus and diaphragm.  Smooth liver surface.  Peritoneal surfaces including pelvic, bladder peritoneum, and cul-de-sac without nodularity. Some nodularity palpated on diaphragm.   Anterior fundal biopsy was positive for adenocarcinoma. Cystic pelvic mass in right pelvis. Nodularity palpable in omentum and mesentery.  Tumor on right uterine corpus/fundus.  Enlarged right pelvic lymph nodes.    Procedure Details:  The consent was reviewed with the patient in the preoperative setting and confirmed.  The patient was brought to the operating room, where adequate general anesthesia was administered. She was placed in the dorsal lithotomy position and prepped and draped in  the usual sterile fashion. Surgical time out was performed. She received perioperative antibiotics prophylactically.    The umbilicus was everted and incised. The abdomen was then insufflated with carbon dioxide gas via a Veress needle. Opening pressure was 1mm Hg. A 5mm laparoscopic trochar was placed through the umbilical incision without difficulty. The laparoscope was introduced, and no evidence of trauma beneath the port site was found. An abdominal survey was performed with findings as listed above. The patient was placed in steep Trendelenberg, and a 12 mm trocar was placed in the right lower quadrant, 2 cm medial and 2 cm superior to the ASIS, under direct visualization, followed by a 5 mm trochar medial and slightly more cephalad on the left.  Placement of a uterine manipulator was attempted and appropriate placement was not possible due to the uterine cervix being deviated sharply to the right.  The decision was made to proceed with an open procedure.  Subsequently, a sharp vertical skin incision was made with the scalpel from the level of the pubic symphysis extending to the umbilicus. Incision was carried through the subcutaneous layer with cautery. Fascia was scored with electrocautery and the fascial incision was extended superiorly and inferiorly. The peritoneum was then elevated and entered sharply with the Metzenbaum scissors. Exploration of the pelvis and upper abdomen was performed with the findings described above. Pelvic washings were then obtained and sent off to cytology. The Bookwalter retractor was positioned and bowels were packed up into the upper abdomen.   Attention was then turned to completion of the hysterectomy. We isolated and suture ligated the right round ligament. This was transected. Peritoneum over the psoas muscle was incised. Right retroperitoneal exploration was next performed. We identified the ureter deep in the pelvis. Well above this site, the right ovarian vessels were  isolated, doubly clamped, cut, and suture ligated. The peritoneal incision was then continued anteriorly along the vesicouterine peritoneum. We developed the bladder flap to the level of the upper vagina.  This procedure was performed on the left. Attention was then turned to skeletonizing the uterine arteries with the use of cautery. Bilateral uterine arteries were then isolated, clamped, cut, and suture ligated with 2-0 Vicryl. We continued along the cardinal and uterosacral ligaments and these were similarly clamped, cut, and suture ligated with 2-0 Vicryl. At this point, the bladder flap had been developed well beyond the upper vagina. We were then able to clamp the vaginal cuff angles and the full cervix was resected intact. Specimen was sent off to Pathology. The vaginal cuff was then closed with multiple interrupted 2-0 Vicryl sutures with excellent reapproximation. Pelvic lymph node dissection was next performed, initially on the left and repeated on the right. The borders of dissection were lateral to the genitofemoral nerve, medial to the ureter, cephalad to the bifurcation of the common iliac artery, caudad to the deep circumflex iliac vein. The lymph nodes overlying the vessels were removed. Next, we identified the obturator nerve and above the level of the nerve we resected lymph nodes without injury to the vasculature. Similarly, we performed the right pelvic lymph node dissection without injury to the vasculature.  There were two firm prominent right pelvic lymph nodes.  Hemostasis was assured and included placement of several vascular clips.   Attention was then turned to the upper abdomen to examine the omentum.  A nodule was noted and a partial omentectomy was performed. The Bovie cautery and Ligasure device was used to resect the omentum and then the omentum was sent to pathology.  We then explored the full-course of the bowel noted a patch of tiny nodularities at the ileocecal junction and the  serosa on the anterior rectum.  The CUSA was used on these areas.  At this point, the pelvis and upper abdomen were copiously irrigated and all pedicles were noted to be hemostatic. All laparotomy sponges were removed. Fascia was closed with 0 looped PDS in 2 segments meeting in the middle. Subcutaneous tissue was irrigated and hemostasis assured.  The subcutaneous tissue was closed with 3-0 Vicryl with a series of single simple interrupted sutures.  The fascia of the 12 mm incision was closed with 0 Vicryl.  The skin of this incision was closed with 4-0 Monocryl in a running subcuticular fashion. Skin was closed with staples on the vertical midline incision.  The 5mm laparoscopic sites were closed with a single horizontal mattress suture of 4-0 Monocryl.  Sterile bandages were placed over all incision.  All sponge, needle, and instrument counts were correct. The patient tolerated the procedure well and was transferred to recovery in stable condition. Dr. Tierney was present and scrubbed for the entire procedure.    Kary Cloud MD  PGY-2 OB/GYN      I was present and scrubbed throughout this case.    Bradley Tierney

## 2018-04-30 LAB
ERYTHROCYTE [DISTWIDTH] IN BLOOD BY AUTOMATED COUNT: 14.7 % (ref 10–15)
HCT VFR BLD AUTO: 26.7 % (ref 35–47)
HGB BLD-MCNC: 8.1 G/DL (ref 11.7–15.7)
MCH RBC QN AUTO: 28 PG (ref 26.5–33)
MCHC RBC AUTO-ENTMCNC: 30.3 G/DL (ref 31.5–36.5)
MCV RBC AUTO: 92 FL (ref 78–100)
PLATELET # BLD AUTO: 193 10E9/L (ref 150–450)
RBC # BLD AUTO: 2.89 10E12/L (ref 3.8–5.2)
WBC # BLD AUTO: 4.9 10E9/L (ref 4–11)

## 2018-04-30 PROCEDURE — 25000132 ZZH RX MED GY IP 250 OP 250 PS 637: Performed by: STUDENT IN AN ORGANIZED HEALTH CARE EDUCATION/TRAINING PROGRAM

## 2018-04-30 PROCEDURE — 99024 POSTOP FOLLOW-UP VISIT: CPT | Mod: GC | Performed by: OBSTETRICS & GYNECOLOGY

## 2018-04-30 PROCEDURE — 25000128 H RX IP 250 OP 636: Performed by: STUDENT IN AN ORGANIZED HEALTH CARE EDUCATION/TRAINING PROGRAM

## 2018-04-30 PROCEDURE — 85027 COMPLETE CBC AUTOMATED: CPT | Performed by: STUDENT IN AN ORGANIZED HEALTH CARE EDUCATION/TRAINING PROGRAM

## 2018-04-30 PROCEDURE — 12000001 ZZH R&B MED SURG/OB UMMC

## 2018-04-30 PROCEDURE — 36415 COLL VENOUS BLD VENIPUNCTURE: CPT | Performed by: STUDENT IN AN ORGANIZED HEALTH CARE EDUCATION/TRAINING PROGRAM

## 2018-04-30 RX ORDER — HYDROMORPHONE HYDROCHLORIDE 1 MG/ML
0.2 INJECTION, SOLUTION INTRAMUSCULAR; INTRAVENOUS; SUBCUTANEOUS
Status: DISCONTINUED | OUTPATIENT
Start: 2018-04-30 | End: 2018-05-02 | Stop reason: HOSPADM

## 2018-04-30 RX ORDER — ACETAMINOPHEN 500 MG
500 TABLET ORAL EVERY 4 HOURS PRN
COMMUNITY
Start: 2018-04-30 | End: 2018-11-19

## 2018-04-30 RX ORDER — OXYCODONE HYDROCHLORIDE 5 MG/1
5-10 TABLET ORAL EVERY 4 HOURS PRN
Qty: 25 TABLET | Refills: 0 | Status: SHIPPED | OUTPATIENT
Start: 2018-04-30 | End: 2018-05-25

## 2018-04-30 RX ORDER — IBUPROFEN 600 MG/1
600 TABLET, FILM COATED ORAL EVERY 6 HOURS PRN
Qty: 30 TABLET | Refills: 0 | Status: SHIPPED | OUTPATIENT
Start: 2018-04-30 | End: 2018-05-03

## 2018-04-30 RX ORDER — AMOXICILLIN 250 MG
1-2 CAPSULE ORAL 2 TIMES DAILY PRN
Qty: 100 TABLET | Refills: 0 | Status: SHIPPED | OUTPATIENT
Start: 2018-04-30 | End: 2018-05-25

## 2018-04-30 RX ADMIN — ACETAMINOPHEN 650 MG: 325 TABLET, FILM COATED ORAL at 02:52

## 2018-04-30 RX ADMIN — SENNOSIDES AND DOCUSATE SODIUM 1 TABLET: 8.6; 5 TABLET ORAL at 21:45

## 2018-04-30 RX ADMIN — ACETAMINOPHEN 650 MG: 325 TABLET, FILM COATED ORAL at 20:55

## 2018-04-30 RX ADMIN — ACETAMINOPHEN 650 MG: 325 TABLET, FILM COATED ORAL at 13:37

## 2018-04-30 RX ADMIN — BISACODYL 10 MG: 10 SUPPOSITORY RECTAL at 07:45

## 2018-04-30 RX ADMIN — OXYCODONE HYDROCHLORIDE 5 MG: 5 TABLET ORAL at 10:13

## 2018-04-30 RX ADMIN — OXYCODONE HYDROCHLORIDE 5 MG: 5 TABLET ORAL at 05:46

## 2018-04-30 RX ADMIN — ENOXAPARIN SODIUM 40 MG: 40 INJECTION SUBCUTANEOUS at 10:12

## 2018-04-30 RX ADMIN — ACETAMINOPHEN 650 MG: 325 TABLET, FILM COATED ORAL at 07:44

## 2018-04-30 RX ADMIN — OXYCODONE HYDROCHLORIDE 5 MG: 5 TABLET ORAL at 18:38

## 2018-04-30 RX ADMIN — OXYCODONE HYDROCHLORIDE 5 MG: 5 TABLET ORAL at 15:41

## 2018-04-30 RX ADMIN — OXYCODONE HYDROCHLORIDE 5 MG: 5 TABLET ORAL at 07:44

## 2018-04-30 RX ADMIN — OXYCODONE HYDROCHLORIDE 5 MG: 5 TABLET ORAL at 21:45

## 2018-04-30 RX ADMIN — RANITIDINE 150 MG: 150 TABLET ORAL at 20:56

## 2018-04-30 RX ADMIN — RANITIDINE 150 MG: 150 TABLET ORAL at 07:44

## 2018-04-30 ASSESSMENT — PAIN DESCRIPTION - DESCRIPTORS
DESCRIPTORS: SORE;ACHING
DESCRIPTORS: ACHING
DESCRIPTORS: BURNING;ACHING
DESCRIPTORS: ACHING;DISCOMFORT;SORE
DESCRIPTORS: ACHING
DESCRIPTORS: BURNING;ACHING

## 2018-04-30 NOTE — PROGRESS NOTES
"MD to the bedside due to new tachycardia and O2 requirements.   Pt reports that she needed some O2 this morning, but was able to maintain her saturation off oxygen for the entire day. She has been walking a lot today and think this might be why she is tachycardic. She has been sitting in bed for 1 hr though by the time of interview. She is tolerating a regular diet with no n/v. Voiding spontaneously. Pain is well-controlled. She denies feeling short of breath, but does feel like she isn't taking a full deep breath because of pressure on her incision. She denies any cough or wheeze. No leg pain or swelling. Denies feeling anxious. She has been doing her IS every hour. No flatus or BM as of yet. Really wants to go home tomorrow.   O:   BP 94/62 (BP Location: Left arm)  Pulse 90  Temp 98.9  F (37.2  C) (Oral)  Resp 16  Ht 1.67 m (5' 5.75\")  Wt 82.6 kg (182 lb 3.2 oz)  LMP 03/04/2005  SpO2 97%  BMI 29.63 kg/m2  Gen: NAD, resting comfortably in bed  Resp: lungs clear, no wheeze or crackles, requiring 2L O2 to maintain 94% O2 saturation   Cardiac: tachycardic to 120s during my interview, regular rhtyhm, no m/g/r  Abd: soft, non-tender  LE: non-tender, SCDs in place  A/P: new hypoxia and tachycardia concerning for poss PE, especially in pt with hx of DVT. Will get a STAT CT PE, CBC and BMP.  Discussed with GYN ONC Fellow and OTIS Yun MD  OB/GYN Resident PGY-4    "

## 2018-04-30 NOTE — PROVIDER NOTIFICATION
Notified MD at 2050 regarding changes in vital signs. Pt's -120s, and de-satting to mid-upper 80%s on room air. Denies SOB, chest pain, any other s/s. Pt denies new or worsened pain or anxiety.     Spoke with: Jennie Yun #[id number]    Orders were obtained. Order for CBC, BMP and Hg stat as well as chest CT with contrast.    Comments: Resident assessed patient. Tests ordered to r/o PE. Pulse ox on continuous, will cont to monitor closely. Pt updated.

## 2018-04-30 NOTE — PROGRESS NOTES
Gynecology Oncology Progress Note    Date: 5/1/2018     POD#4  Procedure: diagnostic laparoscopy converted to total abdominal hysterectomy, bilateral salpingo-ophorectomy, pelvic and para-aortic lymphadenectomy, tumor debulking  Disease: endometrial endometrioid adenocarcinoma, FIGO grade II.    24 hour events:   - none    Subjective: Patient is feeling well.  Pain is under good control with PO meds.  She denies nausea/vomiting.  Abdominal fullness improved and pt passing small amount of gas, no BM.  She denies dizziness, chest pain, shortness of breath. Ambulating well.  Voiding spontaneously.       Objective:   Patient Vitals for the past 12 hrs:   BP Temp Temp src Heart Rate Resp SpO2 Weight   04/30/18 1446 101/76 98.3  F (36.8  C) Oral 107 18 94 % -   04/30/18 1338 - - - 102 - 94 % -   04/30/18 1129 - - - 109 - 93 % -   04/30/18 0900 - - - - - - 83.9 kg (184 lb 14.4 oz)   04/30/18 0753 - - - 91 - 97 % -     EXAM:   General: appears comfortable, in NAD  CV: RRR, no murmurs noted  Resp: CTAB, no increased work of breathing  Abdomen: normoactive bowel sounds, mildly distended in upper abdomen but nontender, lower abdomen is soft and nondistended, nontender to palpation,   Incision: c/d/i VML incision closed with staples. Laparoscopic port site dressings c/d/i.  Extremities: nontender, no edema, SCDs in place    Intake/Output   // 0  // 360    Hemoglobin   Date Value Ref Range Status   04/30/2018 8.1 (L) 11.7 - 15.7 g/dL Final   04/29/2018 8.3 (L) 11.7 - 15.7 g/dL Final     Assessment: Gerri Owens is a 62 year old female POD#4 s/p diagnostic laparoscopy converted to total abdominal hysterectomy, bilateral salpingo-ophorectomy, pelvic and para-aortic lymphadenectomy, tumor debulking for grade 2 EAC    Active Problem list:  - Grade 2 endometrial adenocarcinoma  - H/O unprovoked DVT  - Adynamic ileus - resolved    Plan:    Dz: endometrial endometrioid adenocarcinoma, FIGO grade II. Surgical findings  suggest at least stage IIIC1.  FEN: Cont regular diet  Pain: Will continue PO acetaminophen, ibuprofen, and oxycodone.  Heme: acute blood loss anemia due to surgical blood loss, now stable with serial blood draws, asymptomatic  CV: Mild tachycardia, improving. Likely due to acute blood loss anemia.  CT-PE negative.  Resp: Improving respiratory status and now able to maintain O2 saturation on room air with pulmonary toilet.  CT-PE negative as above. Suspect atelectasis.  Patient is using IS and have encouraged ambulation.  Will do trending pulse ox today.  GI: Concern for adynamic ileus on CT-PE.  Now with return of bowel function.  PRN antiemetics and stool softener.  : S/p pena, adequate UOP   ID:  No issues  MSK: no issues  Neuro/Psych:no issues  Endocrine: no issues  PPx: SCDs in place, IS, Lovenox  Disposition: Discharge when meeting discharge goals, likely today     Kary Cloud MD  OB/GYN Resident PGY-2  Gyn Onc Pager [phone]    Madison Rocha MD    Department of Ob/Gyn and Women's Health  Division of Gynecologic Oncology  [facility]  [phone]    I saw and evaluated the patient with the resident.  I edited and reviewed the above note.   Ate half an omelette this am. No nausea or vomiting. Passed flatus yesterday but none today. Abdomen less distended today but patient is feeling full. Will see how lunch goes today and decide if to be discharged today or tomorrow. Suppository given today.

## 2018-04-30 NOTE — PROGRESS NOTES
"Gynecology Oncology Progress Note    Date: 4/30/2018     POD#3  Procedure: diagnostic laparoscopy converted to total abdominal hysterectomy, bilateral salpingo-ophorectomy, pelvic and para-aortic lymphadenectomy, tumor debulking  Disease: endometrial endometrioid adenocarcinoma, FIGO grade II.    24 hour events:   - new hypoxia and tachycardia  - CT-PE    Subjective: Patient is feeling well.  Pain is under good control with PO meds.  She denies nausea/vomiting.  She felt fullness in her upper abdomen after drinking a lot of water.  She denies dizziness, chest pain, shortness of breath. She ambulated 6 times in the jones yesterday and did not feel lightheaded but she feels a little \"winded\" which she attributes to shallow breathing to avoid incisional pain.  Voiding spontaneously.  Passing flatus.     Objective:   Patient Vitals for the past 12 hrs:   BP Temp Temp src Heart Rate Resp SpO2 Weight   04/30/18 1446 101/76 98.3  F (36.8  C) Oral 107 18 94 % -   04/30/18 1338 - - - 102 - 94 % -   04/30/18 1129 - - - 109 - 93 % -   04/30/18 0900 - - - - - - 83.9 kg (184 lb 14.4 oz)   04/30/18 0753 - - - 91 - 97 % -   04/30/18 0500 92/53 98  F (36.7  C) Oral 94 18 98 % -     EXAM:   General: appears comfortable, in NAD  CV: RRR, no murmurs noted  Resp: CTAB, no increased work of breathing  Abdomen: normoactive bowel sounds, mildly distended in upper abdomen but nontender, lower abdomen is soft and nondistended, nontender to palpation,   Incision: c/d/i VML incision closed with staples. Laparoscopic port site dressings c/d/i.  Extremities: nontender, no edema, SCDs in place    Intake/Output   // 0  UOP 1550// 0    Hemoglobin   Date Value Ref Range Status   04/30/2018 8.1 (L) 11.7 - 15.7 g/dL Final   04/29/2018 8.3 (L) 11.7 - 15.7 g/dL Final     Assessment: Gerri Owens is a 62 year old female POD#3 s/p diagnostic laparoscopy converted to total abdominal hysterectomy, bilateral salpingo-ophorectomy, pelvic and " para-aortic lymphadenectomy, tumor debulking for grade 2 EAC    Active Problem list:  Grade 2 endometrial adenocarcinoma  H/O unprovoked DVT    Plan:    Dz: endometrial endometrioid adenocarcinoma, FIGO grade II. Surgical findings suggest at least stage IIIC1.  FEN: Cont regular diet  Pain: Will continue PO acetaminophen, ibuprofen, and oxycodone.  Heme: acute blood loss anemia due to surgical blood loss, now stable with serial blood draws, asymptomatic  CV: Tachycardia no 120s likely due to acute blood loss anemia.  CT-PE negative.  Resp: Oxygen saturations to 80s after walking, low 90s at rest in bed while talking, CT-PE negative as above.  Suspect atelectasis.  Patient is using IS and have encouraged ambulation.  GI: Upper abdomen is somewhat distended and patient has not passed flatus. On CT-PE, part of the upper abdomen was observed and adynamic ileus was suggested.  No nausea/vomiting so will continue to monitor.  Patient will try suppository today.  PRN antiemetics and stool softener.  : S/p pena, adequate UOP   ID:  No issues  MSK: no issues  Neuro/Psych:no issues  Endocrine: no issues  PPx: SCDs in place, IS, Lovenox  Disposition: Discharge when meeting discharge goals, likely today     Kary Cloud MD  OB/GYN Resident PGY-2  Gyn Onc Pager [phone]    Madison Rocha MD    Department of Ob/Gyn and Women's Health  Division of Gynecologic Oncology  [facility]  [phone]    I saw and evaluated the patient with the resident. I edited and reviewed the above note.  Awaiting flatus, upper abdominal distension. Probable adynamic ileus, suppository today. Hold discharge until flatus. Tolerating po now.

## 2018-04-30 NOTE — PLAN OF CARE
"Problem: Patient Care Overview  Goal: Plan of Care/Patient Progress Review  Outcome: No Change  9352-6019:   Tmax 99.5 (improved to 98), HR max 120s (MDs aware) - has since improved and has lately been in 80s-90s, hypotensive but within parameters, AOVSS on 1-1.5 L O2. Denies SOB, chest pain. Using IS independently. Denies passing gas, no BM yet, awaiting ROBF. Voids adequate. Up with minimal assistance, ambulating frequently. ML incision with staples, bruised, very scant drainage covered with ABD. Steristrips CDI. Wears binder at all times for comfort. Regular diet, had minimal intake and per pt she felt \"full\" but this improved by this am. Hg 8.1 this am. CT results were negative for PE. PCDs in place. Cares clustered per request. Cont to monitor for ROBF, wean O2, cont POC.       "

## 2018-04-30 NOTE — PLAN OF CARE
Problem: Patient Care Overview  Goal: Plan of Care/Patient Progress Review  Outcome: Improving  Pt A/O x4, HR . Sating 93-96% on rm air. Afebrile, OVSS. Denies any SOB, chest pain, dizziness. Hgb trending down from POD1. Pain well managed with oxy prn and schduled Tylenol. Abd incision c/d/i w/ staples, covered with abd for scant drainage. Abd binder on for comfort. Pt reports improved gas pain with ambulation. Supp given this AM, passed gas x1. No BM yet. Reg diet, pt not eating much d/t abd fullness. Ate small lunch. Will order small lunch. UO decreasing, 275 out from 4140-8965. Resident aware. No fluid orders placed, wants to watch and wait. Will cont to monitor. UAL, ambulating in halls several times. PIV SL. Plan: cont to monitor 02 needs, HR and return of bowel function. Possible DC tomorrow.

## 2018-05-01 LAB — COPATH REPORT: NORMAL

## 2018-05-01 PROCEDURE — 25000132 ZZH RX MED GY IP 250 OP 250 PS 637: Performed by: STUDENT IN AN ORGANIZED HEALTH CARE EDUCATION/TRAINING PROGRAM

## 2018-05-01 PROCEDURE — 25000125 ZZHC RX 250: Performed by: STUDENT IN AN ORGANIZED HEALTH CARE EDUCATION/TRAINING PROGRAM

## 2018-05-01 PROCEDURE — 12000001 ZZH R&B MED SURG/OB UMMC

## 2018-05-01 PROCEDURE — 25000128 H RX IP 250 OP 636: Performed by: STUDENT IN AN ORGANIZED HEALTH CARE EDUCATION/TRAINING PROGRAM

## 2018-05-01 RX ADMIN — SIMETHICONE CHEW TAB 80 MG 80 MG: 80 TABLET ORAL at 17:11

## 2018-05-01 RX ADMIN — RANITIDINE 150 MG: 150 TABLET ORAL at 20:12

## 2018-05-01 RX ADMIN — OXYCODONE HYDROCHLORIDE 5 MG: 5 TABLET ORAL at 13:20

## 2018-05-01 RX ADMIN — OXYCODONE HYDROCHLORIDE 10 MG: 5 TABLET ORAL at 20:12

## 2018-05-01 RX ADMIN — ACETAMINOPHEN 650 MG: 325 TABLET, FILM COATED ORAL at 08:06

## 2018-05-01 RX ADMIN — ONDANSETRON 4 MG: 4 TABLET, ORALLY DISINTEGRATING ORAL at 14:16

## 2018-05-01 RX ADMIN — ACETAMINOPHEN 650 MG: 325 TABLET, FILM COATED ORAL at 02:00

## 2018-05-01 RX ADMIN — OXYCODONE HYDROCHLORIDE 10 MG: 5 TABLET ORAL at 23:29

## 2018-05-01 RX ADMIN — ACETAMINOPHEN 650 MG: 325 TABLET, FILM COATED ORAL at 20:12

## 2018-05-01 RX ADMIN — ENOXAPARIN SODIUM 40 MG: 40 INJECTION SUBCUTANEOUS at 09:58

## 2018-05-01 RX ADMIN — SIMETHICONE CHEW TAB 80 MG 80 MG: 80 TABLET ORAL at 09:58

## 2018-05-01 RX ADMIN — OXYCODONE HYDROCHLORIDE 10 MG: 5 TABLET ORAL at 17:11

## 2018-05-01 RX ADMIN — RANITIDINE 150 MG: 150 TABLET ORAL at 08:06

## 2018-05-01 RX ADMIN — BISACODYL 10 MG: 10 SUPPOSITORY RECTAL at 08:06

## 2018-05-01 RX ADMIN — SENNOSIDES AND DOCUSATE SODIUM 1 TABLET: 8.6; 5 TABLET ORAL at 08:06

## 2018-05-01 RX ADMIN — ACETAMINOPHEN 650 MG: 325 TABLET, FILM COATED ORAL at 13:20

## 2018-05-01 RX ADMIN — OXYCODONE HYDROCHLORIDE 5 MG: 5 TABLET ORAL at 06:16

## 2018-05-01 RX ADMIN — OXYCODONE HYDROCHLORIDE 5 MG: 5 TABLET ORAL at 09:58

## 2018-05-01 ASSESSMENT — PAIN DESCRIPTION - DESCRIPTORS
DESCRIPTORS: ACHING
DESCRIPTORS: ACHING;DISCOMFORT
DESCRIPTORS: ACHING

## 2018-05-01 NOTE — PLAN OF CARE
Problem: Patient Care Overview  Goal: Plan of Care/Patient Progress Review   05/01/18 4931   OTHER   Plan Of Care Reviewed With patient   Plan of Care Review   Progress improving   Midline abdominal incision with staples and lap sites with dermabond CDI. Hypoactive bowel sounds, had medium brown liquid stool. Pt reports abdominal fullness and gas discomfort, simethicone given. Abdominal pain managed with tylenol and 5 mg oxycodone. PIV saline locked. On regular diet, low appetite but eating 50-75% of meals. Voiding, continue to monitor output. Up ad binh. Ambulated in the halls several times this shift, oxygen saturations >92%.

## 2018-05-01 NOTE — PLAN OF CARE
Problem: Patient Care Overview  Goal: Plan of Care/Patient Progress Review  Outcome: No Change  Midline abdominal incision with staples and lap sites with dermabond CDI. Hypoactive bowel sounds. Pt reports not passing flatus since yesterday afternoon. Pt continues to report some abdominal fullness but gas discomfort has decreased since yesterday. Abdominal pain managed with tylenol and 5 mg oxycodone. PIV saline locked. On regular diet, encouraging PO intake. Voiding spontaneously marginal amounts, continue to monitor output. Up ad binh. Tachycardic, temp max 99. Placed on 1 L O2 overnight d/t desating to mid-80s on room air when sleeping.

## 2018-05-02 VITALS
TEMPERATURE: 97.5 F | RESPIRATION RATE: 16 BRPM | BODY MASS INDEX: 29.72 KG/M2 | HEART RATE: 101 BPM | WEIGHT: 184.9 LBS | SYSTOLIC BLOOD PRESSURE: 93 MMHG | OXYGEN SATURATION: 92 % | HEIGHT: 66 IN | DIASTOLIC BLOOD PRESSURE: 57 MMHG

## 2018-05-02 PROCEDURE — 25000128 H RX IP 250 OP 636: Performed by: STUDENT IN AN ORGANIZED HEALTH CARE EDUCATION/TRAINING PROGRAM

## 2018-05-02 PROCEDURE — 25000132 ZZH RX MED GY IP 250 OP 250 PS 637: Performed by: STUDENT IN AN ORGANIZED HEALTH CARE EDUCATION/TRAINING PROGRAM

## 2018-05-02 PROCEDURE — 99024 POSTOP FOLLOW-UP VISIT: CPT | Mod: GC | Performed by: OBSTETRICS & GYNECOLOGY

## 2018-05-02 RX ORDER — POLYETHYLENE GLYCOL 3350 17 G/17G
17 POWDER, FOR SOLUTION ORAL DAILY PRN
Qty: 7 PACKET | Refills: 1 | Status: SHIPPED | OUTPATIENT
Start: 2018-05-02 | End: 2018-05-03

## 2018-05-02 RX ADMIN — OXYCODONE HYDROCHLORIDE 5 MG: 5 TABLET ORAL at 02:28

## 2018-05-02 RX ADMIN — ACETAMINOPHEN 650 MG: 325 TABLET, FILM COATED ORAL at 08:12

## 2018-05-02 RX ADMIN — ACETAMINOPHEN 650 MG: 325 TABLET, FILM COATED ORAL at 02:28

## 2018-05-02 RX ADMIN — OXYCODONE HYDROCHLORIDE 10 MG: 5 TABLET ORAL at 08:36

## 2018-05-02 RX ADMIN — ENOXAPARIN SODIUM 40 MG: 40 INJECTION SUBCUTANEOUS at 09:57

## 2018-05-02 RX ADMIN — OXYCODONE HYDROCHLORIDE 10 MG: 5 TABLET ORAL at 05:24

## 2018-05-02 RX ADMIN — RANITIDINE 150 MG: 150 TABLET ORAL at 08:12

## 2018-05-02 ASSESSMENT — PAIN DESCRIPTION - DESCRIPTORS
DESCRIPTORS: SORE
DESCRIPTORS: ACHING
DESCRIPTORS: SORE
DESCRIPTORS: ACHING;SORE

## 2018-05-02 NOTE — PLAN OF CARE
Problem: Patient Care Overview  Goal: Plan of Care/Patient Progress Review  A&Ox4. AVSS. Pt c/o abdominal fullness/gas pain, well controlled with PRN oxycodone, simethicone and frequent ambulation. Pt denies nausea, tolerating regular diet. Pt ambulating independently. PIV SL. Voiding good amounts, not saving. Pt reported having 1 BM this morning, none since, + gas. Pt to possibly DC home tomorrow. Continue POC.

## 2018-05-02 NOTE — PLAN OF CARE
Problem: Patient Care Overview  Goal: Plan of Care/Patient Progress Review  Outcome: Adequate for Discharge Date Met: 05/02/18  A&Ox4. AVSS. Pt's abdominal pain well controlled with tylenol and oxycodone. Pt denies nausea, tolerating regular diet. Pt up independently. Passing gas, last BM was on 5/1. Incision c/d/i. Writer went over DC instructions with pt, pt voiced understanding. Pt picked up meds from DC and DC'd home with transport from spouse. Pt has given lovenox to herself before, feels comfortable doing so at home, declined any education materials as she has it at home.

## 2018-05-02 NOTE — PROGRESS NOTES
Gynecology Oncology Progress Note    Date: 5/2/2018     POD#5  Procedure: diagnostic laparoscopy converted to total abdominal hysterectomy, bilateral salpingo-ophorectomy, pelvic and para-aortic lymphadenectomy, tumor debulking  Disease: endometrial endometrioid adenocarcinoma, FIGO grade II.    24 hour events:   - return of bowel function     Subjective: Patient is feeling well.  Pain is under good control with PO meds.  She denies nausea/vomiting.  Abdominal fullness improved and pt passing gas.  She was able to eat dinner last night.  She did have watery diarrhea once yesterday.  She denies dizziness, chest pain, shortness of breath. Ambulating well.  Voiding spontaneously.       Objective:   Patient Vitals for the past 12 hrs:   BP Temp Temp src Pulse Heart Rate Resp SpO2   05/01/18 1511 101/58 97  F (36.1  C) Axillary - 95 18 90 %   05/01/18 1319 116/70 97.1  F (36.2  C) Oral 101 - 20 92 %     EXAM:   General: appears comfortable, in NAD  CV: RRR, no murmurs noted  Resp: CTAB, no increased work of breathing  Abdomen: normoactive bowel sounds, soft and nondistended, nontender to palpation,   Incision: c/d/i VML incision closed with staples. Laparoscopic port site dressings c/d/i.  Extremities: nontender, no edema, SCDs in place    Intake/Output      BM x2    Hemoglobin   Date Value Ref Range Status   04/30/2018 8.1 (L) 11.7 - 15.7 g/dL Final   04/29/2018 8.3 (L) 11.7 - 15.7 g/dL Final     Assessment: Gerri Owens is a 62 year old female POD#5 s/p diagnostic laparoscopy converted to total abdominal hysterectomy, bilateral salpingo-ophorectomy, pelvic and para-aortic lymphadenectomy, tumor debulking for grade 2 EAC    Active Problem list:  - Grade 2 endometrial adenocarcinoma  - H/O unprovoked DVT  - Adynamic ileus - resolved    Plan:    Dz: endometrial endometrioid adenocarcinoma, FIGO grade II. Surgical findings suggest at least stage IIIC1.  FEN: Cont regular diet  Pain: Will continue PO  acetaminophen, ibuprofen, and oxycodone.  Heme: acute blood loss anemia due to surgical blood loss, now stable with serial blood draws, asymptomatic  CV: Mild tachycardia - resolved. Likely due to acute blood loss anemia.  CT-PE negative.  Resp: Improving respiratory status and now able to maintain O2 saturation on room air with pulmonary toilet.  CT-PE negative as above. Suspect atelectasis.  Patient is using IS and have encouraged ambulation. Walking O2 saturation normal.   GI: Concern for adynamic ileus on CT-PE. Now with return of bowel function.  PRN antiemetics and stool softener.  : S/p pena, adequate UOP   ID:  No issues  MSK: no issues  Neuro/Psych:no issues  Endocrine: no issues  PPx: SCDs in place, IS, Lovenox  Disposition: Discharge when meeting discharge goals, likely today     Kary Cloud MD  OB/GYN Resident PGY-2  Gyn Onc Pager [phone]    Madison Rocha MD    Department of Ob/Gyn and Women's Health  Division of Gynecologic Oncology  [facility]  [phone]    I saw and evaluated the patient with the resident.  I edited and reviewed the above note.

## 2018-05-02 NOTE — PLAN OF CARE
Problem: Patient Care Overview  Goal: Plan of Care/Patient Progress Review  Outcome: Improving  Assumed care of patient 1930. AVSS on RA, A+OX4. Pain controlled with prn oxycodone and scheduled tylenol. Does report feeling more distended/bloated. Regular diet, denies nausea. Incisions c/d/i. Voiding good amounts, had BM yesterday morning. Reports passing gas. UAL, steady on feet.     Continue POC. Possible DC to home today.

## 2018-05-03 ENCOUNTER — TELEPHONE (OUTPATIENT)
Dept: FAMILY MEDICINE | Facility: CLINIC | Age: 63
End: 2018-05-03

## 2018-05-03 DIAGNOSIS — R14.2 FLATULENCE, ERUCTATION AND GAS PAIN: Primary | ICD-10-CM

## 2018-05-03 DIAGNOSIS — R14.1 FLATULENCE, ERUCTATION AND GAS PAIN: Primary | ICD-10-CM

## 2018-05-03 DIAGNOSIS — R14.3 FLATULENCE, ERUCTATION AND GAS PAIN: Primary | ICD-10-CM

## 2018-05-03 RX ORDER — SIMETHICONE 180 MG
CAPSULE ORAL
Qty: 0.0001 CAPSULE | Refills: 0 | COMMUNITY
Start: 2018-05-03 | End: 2018-05-25

## 2018-05-03 NOTE — TELEPHONE ENCOUNTER
"Dr.Paul CASE only,  --Patient was told when she was [facility]  that her o2 sats dropped and pulse went up at one time post op.  Patient says hospital providers wondered if she should have a sleep study.   Patient says she did not want me even to tell this to   because she is sure her o2 sat dropped and pulse went up when she was awake and relaxing in bed.  --Also patient plans to  contact the endocrine clinic she saw in 2007 to get  all her records on her thyroid so they are in her chart..  --Also she is taking one medication that is not on her medication list - simethicone.  She says she was told to take this medication and she told hosp she did  Not need order because she has at home.  I added to medication list.        Hospital/TCU/ED for chronic condition Discharge Protocol    \"Hi, my name is Bella Wooten, a registered nurse, and I am calling from [facility].  I am calling to follow up and see how things are going for you after your recent emergency visit/hospital/TCU stay.\"    Tell me how you are doing now that you are home?\" \" Little more sore than I thought but I am good.\"   Discharge Instructions    \"Let's review your discharge instructions.  What is/are the follow-up recommendations?  Pt. Response: She says she is not to lift more than 20 pounds, don't take anything out of the oven, does breathing exersize very hour and walk and drink some water.    \"Has an appointment with your primary care provider been scheduled?\"   No (schedule appointment)    \"When you see the provider, I would recommend that you bring your medications with you.\"    Medications    \"Tell me what changed about your medicines when you discharged?\"    Changes to chronic meds?    0-1    \"What questions do you have about your medications?\"    None     New diagnoses of heart failure, COPD, diabetes, or MI?    No              Medication reconciliation completed? Yes  Was MTM referral placed (*Make sure to put " "transitions as reason for referral)?   No    Call Summary    \"What questions or concerns do you have about your recent visit and your follow-up care?\"     none    \"If you have questions or things don't continue to improve, we encourage you contact us through the main clinic number (give number).  Even if the clinic is not open, triage nurses are available 24/7 to help you.     We would like you to know that our clinic has extended hours (provide information).  We also have urgent care (provide details on closest location and hours/contact info)\"      \"Thank you for your time and take care!\"             "

## 2018-05-04 ENCOUNTER — TELEPHONE (OUTPATIENT)
Dept: FAMILY MEDICINE | Facility: CLINIC | Age: 63
End: 2018-05-04

## 2018-05-04 NOTE — TELEPHONE ENCOUNTER
15 pages of records form endocrine clinic of [place] received reviewed and sent to scanning from 2007 to 2008

## 2018-05-07 ENCOUNTER — ALLIED HEALTH/NURSE VISIT (OUTPATIENT)
Dept: ONCOLOGY | Facility: CLINIC | Age: 63
End: 2018-05-07
Attending: OBSTETRICS & GYNECOLOGY
Payer: OTHER GOVERNMENT

## 2018-05-07 VITALS — TEMPERATURE: 97.1 F

## 2018-05-07 DIAGNOSIS — C54.1 ENDOMETRIAL ADENOCARCINOMA (H): Primary | ICD-10-CM

## 2018-05-07 PROCEDURE — 40000114 ZZH STATISTIC NO CHARGE CLINIC VISIT

## 2018-05-07 PROCEDURE — 40000268 ZZH STATISTIC NO CHARGES: Mod: ZF

## 2018-05-07 NOTE — MR AVS SNAPSHOT
After Visit Summary   5/7/2018    Gerri Owens    MRN: [id number]           Patient Information     Date Of Birth          1955        Visit Information        Provider Department      5/7/2018 12:00 PM Nurse, Irene [facility]        Today's Diagnoses     Endometrial adenocarcinoma (H)    -  1       Follow-ups after your visit        Your next 10 appointments already scheduled     May 14, 2018 12:00 PM CDT   (Arrive by 11:45 AM)   Post-Op with Bradley Tierney MD   [facility] ([facility])    [address] [place] [address]  [address] [place] [id number] [phone]              Who to contact     If you have questions or need follow up information about today's clinic visit or your schedule please contact [facility] directly at [phone].  Normal or non-critical lab and imaging results will be communicated to you by MyChart, letter or phone within 4 business days after the clinic has received the results. If you do not hear from us within 7 days, please contact the clinic through Reviews[employer] or phone. If you have a critical or abnormal lab result, we will notify you by phone as soon as possible.  Submit refill requests through [employer] or call your pharmacy and they will forward the refill request to us. Please allow 3 business days for your refill to be completed.          Additional Information About Your Visit        MyChart Information     [employer] gives you secure access to your electronic health record. If you see a primary care provider, you can also send messages to your care team and make appointments. If you have questions, please call your primary care clinic.  If you do not have a primary care provider, please call [phone] and they will assist you.        Care EveryWhere ID     This is your Care EveryWhere ID. This could be used by other organizations to access your [facility]  records  [id number]        Your Vitals Were     Temperature Last Period                97.1  F (36.2  C) (Oral) 03/04/2005           Blood Pressure from Last 3 Encounters:   05/02/18 93/57   04/23/18 121/66   04/11/18 124/69    Weight from Last 3 Encounters:   04/30/18 83.9 kg (184 lb 14.4 oz)   04/23/18 84.9 kg (187 lb 2.7 oz)   04/11/18 84 kg (185 lb 3 oz)              Today, you had the following     No orders found for display       Primary Care Provider Office Phone # Fax #    Karo Doty [phone] [address]  [facility] [address]        Equal Access to Services     JEREMÍAS BUCK : Gopi lopezo Gian, waaxda luqadaha, qaybta kaalmada adeegyanelly, carol roland. So [facility] [phone].    ATENCIÓN: Si habla español, tiene a graham disposición servicios gratuitos de asistencia lingüística. [facility] [phone].    We comply with applicable federal civil rights laws and [place] laws. We do not discriminate on the basis of race, color, national origin, age, disability, sex, sexual orientation, or gender identity.            Thank you!     Thank you for choosing [facility]  for your care. Our goal is always to provide you with excellent care. Hearing back from our patients is one way we can continue to improve our services. Please take a few minutes to complete the written survey that you may receive in the mail after your visit with us. Thank you!             Your Updated Medication List - Protect others around you: Learn how to safely use, store and throw away your medicines at www.disposemymeds.org.          This list is accurate as of 5/7/18 12:18 PM.  Always use your most recent med list.                   Brand Name Dispense Instructions for use Diagnosis    acetaminophen 500 MG tablet    TYLENOL     Take 1 tablet (500 mg) by mouth every 4 hours as needed    S/P DEMETRIO-BSO       ascorbic acid 250 MG tablet    VITAMIN C    90     250 mg    Routine general medical examination at a health care facility       calcium citrate-vitamin D 315-200 MG-UNIT Tabs per tablet    CITRACAL     Take 2 tablets by mouth daily        enoxaparin 40 MG/0.4ML injection    LOVENOX    25 Syringe    Inject 0.4 mLs (40 mg) Subcutaneous every 24 hours    S/P DEMETRIO-BSO       GLUCOSAMINE CHONDROITIN COMPLX Tabs      Take  by mouth. 1500 mg 1xqd.    Routine general medical examination at a health care facility       oxyCODONE IR 5 MG tablet    ROXICODONE    25 tablet    Take 1-2 tablets (5-10 mg) by mouth every 4 hours as needed for other (pain control or improvement in physical function.??Hold dose for analgesics side effects.)    S/P DEMETRIO-BSO       RANITIDINE HCL PO      Take 150 mg by mouth 2 times daily        senna-docusate 8.6-50 MG per tablet    SENOKOT-S;PERICOLACE    100 tablet    Take 1-2 tablets by mouth 2 times daily as needed for constipation    S/P DEMETRIO-BSO       Simethicone 180 MG Caps     0.0001 capsule    Patient is taking only two per day as needed as instructed on package.  Purchased OTC. This medication is reported by patient and she says she was told in hospital to take as needed.        vitamin E 400 UNIT capsule      Take 1 capsule by mouth three times a week.

## 2018-05-07 NOTE — NURSING NOTE
Gerri Owens presents to the clinic today for removal of sutures and staples.  The patient has had the sutures and staples in place for 11 days.  There has been no history of infection or drainage.  25 sutures and staples are seen located on the lower abdomen.  The wound is healing well with no signs of infection.  Tetanus status is up to date.   All sutures and staples were easily removed today.  Routine wound care discussed.  The patient will follow up with Dr Tierney on 05/14/18.    Sujata Sainz CMA ([facility])

## 2018-05-08 ENCOUNTER — CARE COORDINATION (OUTPATIENT)
Dept: ONCOLOGY | Facility: CLINIC | Age: 63
End: 2018-05-08

## 2018-05-08 ASSESSMENT — ENCOUNTER SYMPTOMS
HOT FLASHES: 0
DECREASED LIBIDO: 0

## 2018-05-14 ENCOUNTER — OFFICE VISIT (OUTPATIENT)
Dept: ONCOLOGY | Facility: CLINIC | Age: 63
End: 2018-05-14
Attending: OBSTETRICS & GYNECOLOGY
Payer: COMMERCIAL

## 2018-05-14 VITALS
HEART RATE: 82 BPM | BODY MASS INDEX: 29.25 KG/M2 | HEIGHT: 66 IN | WEIGHT: 182 LBS | DIASTOLIC BLOOD PRESSURE: 80 MMHG | SYSTOLIC BLOOD PRESSURE: 114 MMHG | OXYGEN SATURATION: 99 % | RESPIRATION RATE: 16 BRPM | TEMPERATURE: 98.2 F

## 2018-05-14 DIAGNOSIS — C54.1 ENDOMETRIAL ADENOCARCINOMA (H): ICD-10-CM

## 2018-05-14 LAB — COPATH REPORT: NORMAL

## 2018-05-14 PROCEDURE — G0463 HOSPITAL OUTPT CLINIC VISIT: HCPCS | Mod: ZF

## 2018-05-14 PROCEDURE — 99214 OFFICE O/P EST MOD 30 MIN: CPT | Mod: 24 | Performed by: OBSTETRICS & GYNECOLOGY

## 2018-05-14 ASSESSMENT — PAIN SCALES - GENERAL: PAINLEVEL: NO PAIN (0)

## 2018-05-14 NOTE — MR AVS SNAPSHOT
"              After Visit Summary   5/14/2018    Gerri Owens    MRN: [id number]           Patient Information     Date Of Birth          1955        Visit Information        Provider Department      5/14/2018 12:00 PM Bradley Tierney MD [facility]        Today's Diagnoses     Endometrial adenocarcinoma (H)    -  1       Follow-ups after your visit        Who to contact     If you have questions or need follow up information about today's clinic visit or your schedule please contact [facility] directly at [phone].  Normal or non-critical lab and imaging results will be communicated to you by [employer], letter or phone within 4 business days after the clinic has received the results. If you do not hear from us within 7 days, please contact the clinic through VHTt or phone. If you have a critical or abnormal lab result, we will notify you by phone as soon as possible.  Submit refill requests through [employer] or call your pharmacy and they will forward the refill request to us. Please allow 3 business days for your refill to be completed.          Additional Information About Your Visit        MyChart Information     [employer] gives you secure access to your electronic health record. If you see a primary care provider, you can also send messages to your care team and make appointments. If you have questions, please call your primary care clinic.  If you do not have a primary care provider, please call [phone] and they will assist you.        Care EveryWhere ID     This is your Care EveryWhere ID. This could be used by other organizations to access your [place] medical records  [id number]        Your Vitals Were     Pulse Temperature Respirations Height Last Period Pulse Oximetry    82 98.2  F (36.8  C) (Oral) 16 1.67 m (5' 5.75\") 03/04/2005 99%    BMI (Body Mass Index)                   29.6 kg/m2            Blood Pressure from Last 3 Encounters: "   05/14/18 114/80   05/02/18 93/57   04/23/18 121/66    Weight from Last 3 Encounters:   05/14/18 82.6 kg (182 lb)   04/30/18 83.9 kg (184 lb 14.4 oz)   04/23/18 84.9 kg (187 lb 2.7 oz)              Today, you had the following     No orders found for display       Primary Care Provider Office Phone # Fax #    Karo Doty [phone] [address]  [facility] [address]        Equal Access to Services     [facility] Center[facility] : Hadii thalia ku hadasho Sograce, waaxda luqadaha, qaybta kaalmada makayla, carol escobar . So [facility] [phone].    ATENCIÓN: Si habla español, tiene a graham disposición servicios gratuitos de asistencia lingüística. [facility] [phone].    We comply with applicable federal civil rights laws and [place] laws. We do not discriminate on the basis of race, color, national origin, age, disability, sex, sexual orientation, or gender identity.            Thank you!     Thank you for choosing [facility]  for your care. Our goal is always to provide you with excellent care. Hearing back from our patients is one way we can continue to improve our services. Please take a few minutes to complete the written survey that you may receive in the mail after your visit with us. Thank you!             Your Updated Medication List - Protect others around you: Learn how to safely use, store and throw away your medicines at www.disposemymeds.org.          This list is accurate as of 5/14/18  2:14 PM.  Always use your most recent med list.                   Brand Name Dispense Instructions for use Diagnosis    acetaminophen 500 MG tablet    TYLENOL     Take 1 tablet (500 mg) by mouth every 4 hours as needed    S/P DEMETRIO-BSO       ascorbic acid 250 MG tablet    VITAMIN C    90    250 mg    Routine general medical examination at a health care facility       calcium citrate-vitamin D 315-200 MG-UNIT Tabs per tablet    CITRACAL     Take 2 tablets by mouth  daily        enoxaparin 40 MG/0.4ML injection    LOVENOX    25 Syringe    Inject 0.4 mLs (40 mg) Subcutaneous every 24 hours    S/P DEMETRIO-BSO       GLUCOSAMINE CHONDROITIN COMPLX Tabs      Take  by mouth. 1500 mg 1xqd.    Routine general medical examination at a health care facility       oxyCODONE IR 5 MG tablet    ROXICODONE    25 tablet    Take 1-2 tablets (5-10 mg) by mouth every 4 hours as needed for other (pain control or improvement in physical function.??Hold dose for analgesics side effects.)    S/P DEMETRIO-BSO       RANITIDINE HCL PO      Take 150 mg by mouth 2 times daily        senna-docusate 8.6-50 MG per tablet    SENOKOT-S;PERICOLACE    100 tablet    Take 1-2 tablets by mouth 2 times daily as needed for constipation    S/P DEMETRIO-BSO       Simethicone 180 MG Caps     0.0001 capsule    Patient is taking only two per day as needed as instructed on package.  Purchased OTC. This medication is reported by patient and she says she was told in hospital to take as needed.        vitamin E 400 UNIT capsule      Take 1 capsule by mouth three times a week.

## 2018-05-14 NOTE — LETTER
2018       RE: Gerri Owens  [address]  [facility] [address]     Dear Colleague,    Thank you for referring your patient, Gerri Owens, to the [facility]. Please see a copy of my visit note below.                            Consult Notes on Referred Patient      Dr. Karo Doty MD  [address] [place] [address]       RE: Gerri Owens  : 1955  PRO: 2018     Dear Dr. Karo Doty:    I had the pleasure of seeing your patient Gerri Owens here at the Gynecologic Cancer Clinic at the [facility] on 2018.  As you know she is a very pleasant 62 year old woman with a recent diagnosis of  endometrial cancer.  Given these findings she was subsequently sent to the Gynecologic Cancer Clinic for new patient consultation.     Today the patient reports that she is feeling overall well.  She is nervous about the visit and her recent cancer diagnosis.    Cancer Treatment History:  18- OBGYN visit with Dr. Tineo.  Complains of 1.5 years of post-menopausal bleeding.     EMB performed and showed     18- EUA, hysteroscopy D&C under ultrasound guidance.  Diffuse proliferative vascular endometrium noted.   Pathology:    FINAL DIAGNOSIS:    ENDOMETRIAL CURETTINGS:    - Endometrial endometrioid adenocarcinoma, FIGO grade 2      COMMENT:    While the tumor maintained a glandular morphology, the occasional high    nuclear grade warranted upgrading to    FIGO 2.     18- Patient reports that she continues to have some post-menopausal spotting     18 DEMETRIO/BSO and staging:  PATH:  A. ANTERIOR FUNDUS, BIOPSY:   - Positive for adenocarcinoma     B. UTERUS, CERVIX, BILATERAL TUBES AND OVARIES, HYSTERECTOMY WITH   BILATERAL SALPINGO-OOPHORECTOMY:   - Endometrial endometrioid adenocarcinoma, FIGO grade 1   - Adenomyosis   - Cervix invaded by endometrial adenocarcinoma   - Right ovarian surface adhesions involved by endometrial adenocarcinoma    - Right fallopian tube with endometriosis   - Left adnexa invaded by endometrial adenocarcinoma (5.5 cm)     C. LYMPH NODES, LEFT PELVIC, EXCISION:   - Metastatic adenocarcinoma to one of eight lymph nodes (1/8)   - The metastatic focus is 3 mm in greatest dimension with no extranodal   extension     D. LYMPH NODES, LEFT PARA-AORTIC, EXCISION:   - One reactive lymph node, negative for malignancy (0/1)     E. LYMPH NODES, RIGHT PELVIC, EXCISION:   - Metastatic adenocarcinoma to one of six lymph nodes (1/6)   - The metastatic focus is 21 mm in greatest dimension with positive   extranodal extension     F. LYMPH NODES, RIGHT PARA-AORTIC, EXCISION:   - Three reactive lymph nodes, negative for malignancy (0/3)     G. OMENTUM, RESECTION:   - Omental adipose tissue with focal inflammation and surface mesothelial   hyperplasia   - Negative for malignancy     COMMENT:   The final diagnosis confirms the interpretation provided intraoperatively.     Report Name: Endometrium - Hysterectomy        Status: Submitted Checklist Inst: 1      Last Updated By: Fernie Ramirez M.D., PhD, [facility],   05/06/2018 13:52:11   Part(s) Involved:   B: Uterus, cervix, bilateral tubes and ovaries     Synoptic Report:     SPECIMEN     Procedure:         - Simple hysterectomy         - Bilateral salpingo-oophorectomy         - Omentectomy         - Peritoneal washing     Specimen Integrity:         - Opened     TUMOR     Tumor Site:         - Endometrium         - Lower uterine segment     Histologic Type:         - Endometrioid carcinoma, NOS     Histologic Grade:         - FIGO grade 1     Tumor Size: 5.5 Centimeters (cm)     Tumor Extent       Myometrial Invasion:           - Present         Depth of Invasion: 15 Millimeters (mm)         Myometrial Thickness: 15 Millimeters (mm)         Percentage of Myometrial Invasion: 100%       Adenomyosis:           - Present, involved by carcinoma       Uterine Serosa Involvement:           -  Present       Lower Uterine Segment Involvement:           - Present         Level of Tumor Involvement:             - Myoinvasive       Cervical Stromal Involvement:           - Present       Other Tissue / Organ Involvement:           - Right ovary           - Left ovary           - Left fallopian tube       Peritoneal Ascitic Fluid:           - Negative for malignancy (normal / benign)     Accessory Findings       Lymphovascular Invasion:           - Present     MARGINS     Ectocervical / Vaginal Cuff Margin:         - Uninvolved by carcinoma     LYMPH NODES     Number of Pelvic Nodes with Macrometastasis: 2     Number of Pelvic Nodes with Micrometastasis: 0     Laterality of Pelvic Node(s) with Tumor:         - Right         - Left     Total Number of Pelvic Node(s) Examined (sentinel and nonsentinel): 14     Number of Pelvic [place] Nodes Examined: 0     Laterality of Pelvic Node(s) Examined:         - Right         - Left     Number of Para-Aortic Nodes with Macrometastasis: 0     Number of Para-Aortic Nodes with Micrometastasis: 0     Total Number of Para-Aortic Node(s) Examined (sentinel and nonsentinel):    4     Number of Para-Aortic [place] Nodes Examined: 0     Laterality of Para-Aortic Node(s) Examined:         - Right         - Left     PATHOLOGIC STAGE CLASSIFICATION (PTNM, AJCC 8TH EDITION)     Primary Tumor (pT):         - pT3a     Regional Lymph Nodes (pN)       Category (pN):           - pN1a     FIGO STAGE     FIGO Stage:         - IIIC1     4/29/2018: CT:  IMPRESSION:   1. No pulmonary embolism. Postop atelectasis.  2. Fluid-filled loops of dilated small bowel in the upper abdomen,  favored to represent adynamic ileus although incompletely visualized.  3. Pneumoperitoneum and small volume ascites, expected given  postoperative day 2 following open surgery.  4. Partially visualized 10 mm right thyroid nodule.      Review of Systems:    Systemic           no weight changes; no fever; no chills;  no night sweats; no appetite changes  Skin           no rashes, or lesions  Eye           no irritation; no changes in vision  Consuelo-Laryngeal           no dysphagia; no hoarseness   Pulmonary    no cough; no shortness of breath  Cardiovascular    no chest pain; no palpitations  Gastrointestinal    no diarrhea; no constipation; no abdominal pain; no changes in bowel  habits; no blood in stool, positive for acid reflux  Genitourinary   no urinary frequency; no urinary urgency; no dysuria; no pain; no abnormal vaginal discharge; Positive for abnormal vaginal bleeding over the past two years, positive for vaginal dryness  Breast   no breast discharge; no breast changes; no breast pain  Musculoskeletal    no myalgias; no back pain, Positive for right knee and right hip pain.  Positive for limited mobility in left hip following hip replacement  Psychiatric           no depressed mood; no anxiety    Hematologic           no tender lymph nodes; no noticeable swellings or lumps   Endocrine    no hot flashes; no heat/cold intolerance         Neurological   no tremor; no numbness and tingling; no headaches; no difficulty  Sleeping    OBGYNHx:    Menarche age 12  Menopause age 47  No history of hormone replacement therapy  Does not have vaginal intercourse due to discomfort after menopause.      Past Medical History:  Patient reports that she has a history of arthritis.  Currently affects her right hip, right knee, and hands.  History of unprovoked DVT.  Patient reports that she had a full work up and she was negative for everything including negative factor V Leiden testing.    GERD  History of whooping cough  CKD.  Patient reports that her kidney function has improved since stopping her NSAID use.      Past Surgical History:    Past Surgical History:   Procedure Laterality Date     ARTHROPLASTY HIP Left 2017    Procedure: ARTHROPLASTY HIP;  Left total hip arthroplasty;  Surgeon: Rajinder Goodwin MD;  Location:  [facility] OR     BREAST BIOPSY, RT/LT  2004    left breast     DILATION AND CURETTAGE, OPERATIVE HYSTEROSCOPY WITH MORCELLATOR, COMBINED N/A 4/11/2018    Procedure: COMBINED DILATION AND CURETTAGE, OPERATIVE HYSTEROSCOPY WITH MORCELLATOR;  Hysteroscopy, Dilation and Curettage Under Ultrasound Guidance ;  Surgeon: Adry Tineo MD;  Location: [facility] OR     DILATION AND CURETTAGE, WITH ULTRASOUND GUIDANCE  4/11/2018    Procedure: DILATION AND CURETTAGE, WITH ULTRASOUND GUIDANCE;;  Surgeon: Adry Tineo MD;  Location: [facility] OR     HYSTERECTOMY TOTAL ABDOMINAL, BILATERAL SALPINGO-OOPHORECTOMY, NODE DISSECTION, COMBINED Bilateral 4/27/2018    Procedure: COMBINED HYSTERECTOMY TOTAL ABDOMINAL, SALPINGO-OOPHORECTOMY, NODE DISSECTION;;  Surgeon: Bradley Tierney MD;  Location: [facility] OR     LAPAROSCOPIC HYSTERECTOMY TOTAL, BILATERAL SALPINGO-OOPHORECTOMY, NODE DISSECTION, COMBINED N/A 4/27/2018    Procedure: COMBINED LAPAROSCOPIC HYSTERECTOMY TOTAL, SALPINGO-OOPHORECTOMY, NODE DISSECTION;  Laparoscopic converted to open Removal Of Uterus, Tubes, Ovaries, Cervix, Pelvic and Para Aortic Lymphnode Dissection, Tumor Debulking, CUSA, Partial Omentectomy, Anesthesia Block;  Surgeon: Bradley Tierney MD;  Location: [facility] OR     TONSILLECTOMY  1960    tonsillectomy     Patient denies a history of anesthesia complication       Health Maintenance:  Health Maintenance Due   Topic Date Due     HIV SCREEN (SYSTEM ASSIGNED)  05/07/1974     FIT Q1 YR  04/03/2013       Last Pap Smear: 2/13/18- NILM HPV negative    Last Mammogram: 2018 BIRADS 1    Last Colonoscopy: Patient reports that she has never had a colonoscopy.         Current Medications:   has a current medication list which includes the following prescription(s): acetaminophen, calcium citrate-vitamin d, enoxaparin, glucosamine chondroitin complx, ranitidine hcl, ascorbic acid, oxycodone ir, senna-docusate, simethicone, and vitamin e.     Allergies:   Patient denies  "any allergies    Social History:  Patient denies any tobacco use.  Patient states that she drinks one drink per month.  She denies any drug use.    Patient is      Family History:   The patient's family history is notable for:    Family History   Problem Relation Age of Onset     DIABETES Mother      type 2     HEART DISEASE Mother      Hypertension Mother      C.A.D. Mother       after 2nd heart attack     Hypertension Father      C.A.D. Father      mild heart attack     CANCER Father      skin cancer     HEART DISEASE Father      Arthritis Sister      rheumatoid arthritis     HEART DISEASE Brother      hereditary heart murmer     Lipids Brother      Lipids Brother      KIDNEY DISEASE No family hx of    Patient reports that she has two brothers who also have a history of DVT.  One brother was tested and was positive for factor V Leiden mutation and her other brother was tested and was negative for factor V Leiden mutation.  No on in her family is a smoker.        Physical Exam:     /80  Pulse 82  Temp 98.2  F (36.8  C) (Oral)  Resp 16  Ht 1.67 m (5' 5.75\")  Wt 82.6 kg (182 lb)  LMP 2005  SpO2 99%  BMI 29.6 kg/m2  Body mass index is 29.6 kg/(m^2).    General Appearance: healthy and alert, no distress     HEENT:  no thyromegaly, no palpable nodules or masses     Musculoskeletal: extremities non tender.  Bilateral lower extremities without edema.  Compression stockings in place     Skin: no lesions or rashes     Neurological: normal gait, no gross defects     Psychiatric: appropriate mood and affect                               Hematological: normal cervical, supraclavicular and inguinal lymph nodes    Genitourinary: deferred    Assessment:    Gerri Owens is a  woman with a new diagnosis of IIIC1 EMCA      A total of 40 minutes was spent with the patient, 30 minutes of which were spent in counseling the patient and/or treatment planning.      Plan:     1.)   Endometrial " adenocarcinoma - WE have discussed the findings and plan I have recommended consideration of sandwiched chemotherapy and RT.  She is reticent to consider this and asked about alternate options. We therefore discussed hormonal therapy which would be reasonable, albeit not our first line treatment,  Unfortunately she has a h/o DVT for which she has been on anti-coagulation in the past, which would typically be a relative contra-indication for progestin therapy.  Lastly we discussed pelvic RT alone which would not be ideal, but may have benefit should she be unwilling to take further treatment.  WE have discussed the NCCN guidelines which are not definitive in this setting.    At the conclusion of this meeting she and her  are interested in further considering their options prior to making a definitive decision.  This is reasonable     2.) Genetic risk factors were assessed and the patient does not meet the qualifications for a referral.          Thank you for allowing us to participate in the care of your patient.         Sincerely,    Bradley WONG  Patient Care Team:  Karo Doty MD as PCP - General ([facility])

## 2018-05-14 NOTE — PROGRESS NOTES
Consult Notes on Referred Patient      Dr. Karo Doty MD  [address] [place] [address]       RE: Gerri Owens  : 1955  PRO: 2018     Dear Dr. Karo Doty:    I had the pleasure of seeing your patient Gerri Owesn here at the Gynecologic Cancer Clinic at the [facility] on 2018.  As you know she is a very pleasant 62 year old woman with a recent diagnosis of  endometrial cancer.  Given these findings she was subsequently sent to the Gynecologic Cancer Clinic for new patient consultation.     Today the patient reports that she is feeling overall well.  She is nervous about the visit and her recent cancer diagnosis.    Cancer Treatment History:  18- OBGYN visit with Dr. Tineo.  Complains of 1.5 years of post-menopausal bleeding.     EMB performed and showed     18- EUA, hysteroscopy D&C under ultrasound guidance.  Diffuse proliferative vascular endometrium noted.   Pathology:    FINAL DIAGNOSIS:    ENDOMETRIAL CURETTINGS:    - Endometrial endometrioid adenocarcinoma, FIGO grade 2      COMMENT:    While the tumor maintained a glandular morphology, the occasional high    nuclear grade warranted upgrading to    FIGO 2.     18- Patient reports that she continues to have some post-menopausal spotting     18 DEMETRIO/BSO and staging:  PATH:  A. ANTERIOR FUNDUS, BIOPSY:   - Positive for adenocarcinoma     B. UTERUS, CERVIX, BILATERAL TUBES AND OVARIES, HYSTERECTOMY WITH   BILATERAL SALPINGO-OOPHORECTOMY:   - Endometrial endometrioid adenocarcinoma, FIGO grade 1   - Adenomyosis   - Cervix invaded by endometrial adenocarcinoma   - Right ovarian surface adhesions involved by endometrial adenocarcinoma   - Right fallopian tube with endometriosis   - Left adnexa invaded by endometrial adenocarcinoma (5.5 cm)     C. LYMPH NODES, LEFT PELVIC, EXCISION:   - Metastatic adenocarcinoma to one of eight lymph nodes ()   - The metastatic focus is 3  mm in greatest dimension with no extranodal   extension     D. LYMPH NODES, LEFT PARA-AORTIC, EXCISION:   - One reactive lymph node, negative for malignancy (0/1)     E. LYMPH NODES, RIGHT PELVIC, EXCISION:   - Metastatic adenocarcinoma to one of six lymph nodes (1/6)   - The metastatic focus is 21 mm in greatest dimension with positive   extranodal extension     F. LYMPH NODES, RIGHT PARA-AORTIC, EXCISION:   - Three reactive lymph nodes, negative for malignancy (0/3)     G. OMENTUM, RESECTION:   - Omental adipose tissue with focal inflammation and surface mesothelial   hyperplasia   - Negative for malignancy     COMMENT:   The final diagnosis confirms the interpretation provided intraoperatively.     Report Name: Endometrium - Hysterectomy        Status: Submitted Checklist Inst: 1      Last Updated By: Fernie Ramirez M.D., PhD, RUST,   05/06/2018 13:52:11   Part(s) Involved:   B: Uterus, cervix, bilateral tubes and ovaries     Synoptic Report:     SPECIMEN     Procedure:         - Simple hysterectomy         - Bilateral salpingo-oophorectomy         - Omentectomy         - Peritoneal washing     Specimen Integrity:         - Opened     TUMOR     Tumor Site:         - Endometrium         - Lower uterine segment     Histologic Type:         - Endometrioid carcinoma, NOS     Histologic Grade:         - FIGO grade 1     Tumor Size: 5.5 Centimeters (cm)     Tumor Extent       Myometrial Invasion:           - Present         Depth of Invasion: 15 Millimeters (mm)         Myometrial Thickness: 15 Millimeters (mm)         Percentage of Myometrial Invasion: 100%       Adenomyosis:           - Present, involved by carcinoma       Uterine Serosa Involvement:           - Present       Lower Uterine Segment Involvement:           - Present         Level of Tumor Involvement:             - Myoinvasive       Cervical Stromal Involvement:           - Present       Other Tissue / Organ Involvement:           - Right  ovary           - Left ovary           - Left fallopian tube       Peritoneal Ascitic Fluid:           - Negative for malignancy (normal / benign)     Accessory Findings       Lymphovascular Invasion:           - Present     MARGINS     Ectocervical / Vaginal Cuff Margin:         - Uninvolved by carcinoma     LYMPH NODES     Number of Pelvic Nodes with Macrometastasis: 2     Number of Pelvic Nodes with Micrometastasis: 0     Laterality of Pelvic Node(s) with Tumor:         - Right         - Left     Total Number of Pelvic Node(s) Examined (sentinel and nonsentinel): 14     Number of Pelvic Hawley Nodes Examined: 0     Laterality of Pelvic Node(s) Examined:         - Right         - Left     Number of Para-Aortic Nodes with Macrometastasis: 0     Number of Para-Aortic Nodes with Micrometastasis: 0     Total Number of Para-Aortic Node(s) Examined (sentinel and nonsentinel):    4     Number of Para-Aortic Hawley Nodes Examined: 0     Laterality of Para-Aortic Node(s) Examined:         - Right         - Left     PATHOLOGIC STAGE CLASSIFICATION (PTNM, AJCC 8TH EDITION)     Primary Tumor (pT):         - pT3a     Regional Lymph Nodes (pN)       Category (pN):           - pN1a     FIGO STAGE     FIGO Stage:         - IIIC1     4/29/2018: CT:  IMPRESSION:   1. No pulmonary embolism. Postop atelectasis.  2. Fluid-filled loops of dilated small bowel in the upper abdomen,  favored to represent adynamic ileus although incompletely visualized.  3. Pneumoperitoneum and small volume ascites, expected given  postoperative day 2 following open surgery.  4. Partially visualized 10 mm right thyroid nodule.      Review of Systems:    Systemic           no weight changes; no fever; no chills; no night sweats; no appetite changes  Skin           no rashes, or lesions  Eye           no irritation; no changes in vision  Consuelo-Laryngeal           no dysphagia; no hoarseness   Pulmonary    no cough; no shortness of breath  Cardiovascular     no chest pain; no palpitations  Gastrointestinal    no diarrhea; no constipation; no abdominal pain; no changes in bowel  habits; no blood in stool, positive for acid reflux  Genitourinary   no urinary frequency; no urinary urgency; no dysuria; no pain; no abnormal vaginal discharge; Positive for abnormal vaginal bleeding over the past two years, positive for vaginal dryness  Breast   no breast discharge; no breast changes; no breast pain  Musculoskeletal    no myalgias; no back pain, Positive for right knee and right hip pain.  Positive for limited mobility in left hip following hip replacement  Psychiatric           no depressed mood; no anxiety    Hematologic           no tender lymph nodes; no noticeable swellings or lumps   Endocrine    no hot flashes; no heat/cold intolerance         Neurological   no tremor; no numbness and tingling; no headaches; no difficulty  Sleeping    OBGYNHx:    Menarche age 12  Menopause age 47  No history of hormone replacement therapy  Does not have vaginal intercourse due to discomfort after menopause.      Past Medical History:  Patient reports that she has a history of arthritis.  Currently affects her right hip, right knee, and hands.  History of unprovoked DVT.  Patient reports that she had a full work up and she was negative for everything including negative factor V Leiden testing.    GERD  History of whooping cough  CKD.  Patient reports that her kidney function has improved since stopping her NSAID use.      Past Surgical History:    Past Surgical History:   Procedure Laterality Date     ARTHROPLASTY HIP Left 2017    Procedure: ARTHROPLASTY HIP;  Left total hip arthroplasty;  Surgeon: Rajinder Goodwin MD;  Location: [facility] OR     BREAST BIOPSY, RT/LT      left breast     DILATION AND CURETTAGE, OPERATIVE HYSTEROSCOPY WITH MORCELLATOR, COMBINED N/A 2018    Procedure: COMBINED DILATION AND CURETTAGE, OPERATIVE HYSTEROSCOPY WITH MORCELLATOR;   Hysteroscopy, Dilation and Curettage Under Ultrasound Guidance ;  Surgeon: Adry Tineo MD;  Location: [facility]     DILATION AND CURETTAGE, WITH ULTRASOUND GUIDANCE  4/11/2018    Procedure: DILATION AND CURETTAGE, WITH ULTRASOUND GUIDANCE;;  Surgeon: Adry Tineo MD;  Location: [facility]     HYSTERECTOMY TOTAL ABDOMINAL, BILATERAL SALPINGO-OOPHORECTOMY, NODE DISSECTION, COMBINED Bilateral 4/27/2018    Procedure: COMBINED HYSTERECTOMY TOTAL ABDOMINAL, SALPINGO-OOPHORECTOMY, NODE DISSECTION;;  Surgeon: Bradley Tierney MD;  Location: [facility]     LAPAROSCOPIC HYSTERECTOMY TOTAL, BILATERAL SALPINGO-OOPHORECTOMY, NODE DISSECTION, COMBINED N/A 4/27/2018    Procedure: COMBINED LAPAROSCOPIC HYSTERECTOMY TOTAL, SALPINGO-OOPHORECTOMY, NODE DISSECTION;  Laparoscopic converted to open Removal Of Uterus, Tubes, Ovaries, Cervix, Pelvic and Para Aortic Lymphnode Dissection, Tumor Debulking, CUSA, Partial Omentectomy, Anesthesia Block;  Surgeon: Bradley Tierney MD;  Location: [facility] OR     TONSILLECTOMY  1960    tonsillectomy     Patient denies a history of anesthesia complication       Health Maintenance:  Health Maintenance Due   Topic Date Due     HIV SCREEN (SYSTEM ASSIGNED)  05/07/1974     FIT Q1 YR  04/03/2013       Last Pap Smear: 2/13/18- NILM HPV negative    Last Mammogram: 2018 BIRADS 1    Last Colonoscopy: Patient reports that she has never had a colonoscopy.         Current Medications:   has a current medication list which includes the following prescription(s): acetaminophen, calcium citrate-vitamin d, enoxaparin, glucosamine chondroitin complx, ranitidine hcl, ascorbic acid, oxycodone ir, senna-docusate, simethicone, and vitamin e.     Allergies:   Patient denies any allergies    Social History:  Patient denies any tobacco use.  Patient states that she drinks one drink per month.  She denies any drug use.    Patient is      Family History:   The patient's family history is notable  "for:    Family History   Problem Relation Age of Onset     DIABETES Mother      type 2     HEART DISEASE Mother      Hypertension Mother      C.A.D. Mother       after 2nd heart attack     Hypertension Father      C.A.D. Father      mild heart attack     CANCER Father      skin cancer     HEART DISEASE Father      Arthritis Sister      rheumatoid arthritis     HEART DISEASE Brother      hereditary heart murmer     Lipids Brother      Lipids Brother      KIDNEY DISEASE No family hx of    Patient reports that she has two brothers who also have a history of DVT.  One brother was tested and was positive for factor V Leiden mutation and her other brother was tested and was negative for factor V Leiden mutation.  No on in her family is a smoker.        Physical Exam:     /80  Pulse 82  Temp 98.2  F (36.8  C) (Oral)  Resp 16  Ht 1.67 m (5' 5.75\")  Wt 82.6 kg (182 lb)  LMP 2005  SpO2 99%  BMI 29.6 kg/m2  Body mass index is 29.6 kg/(m^2).    General Appearance: healthy and alert, no distress     HEENT:  no thyromegaly, no palpable nodules or masses     Musculoskeletal: extremities non tender.  Bilateral lower extremities without edema.  Compression stockings in place     Skin: no lesions or rashes     Neurological: normal gait, no gross defects     Psychiatric: appropriate mood and affect                               Hematological: normal cervical, supraclavicular and inguinal lymph nodes    Genitourinary: deferred    Assessment:    Gerri Owens is a  woman with a new diagnosis of IIIC1 EMCA      A total of 40 minutes was spent with the patient, 30 minutes of which were spent in counseling the patient and/or treatment planning.      Plan:     1.)   Endometrial adenocarcinoma - WE have discussed the findings and plan I have recommended consideration of sandwiched chemotherapy and RT.  She is reticent to consider this and asked about alternate options. We therefore discussed hormonal therapy which " would be reasonable, albeit not our first line treatment,  Unfortunately she has a h/o DVT for which she has been on anti-coagulation in the past, which would typically be a relative contra-indication for progestin therapy.  Lastly we discussed pelvic RT alone which would not be ideal, but may have benefit should she be unwilling to take further treatment.  WE have discussed the NCCN guidelines which are not definitive in this setting.    At the conclusion of this meeting she and her  are interested in further considering their options prior to making a definitive decision.  This is reasonable     2.) Genetic risk factors were assessed and the patient does not meet the qualifications for a referral.          Thank you for allowing us to participate in the care of your patient.         Sincerely,    Bradley Tierney      CC  Patient Care Team:  Karo Doty MD as PCP - General (Family Practice)  KARO DOTY      Answers for HPI/ROS submitted by the patient on 5/8/2018   General Symptoms: No  Skin Symptoms: No  HENT Symptoms: No  EYE SYMPTOMS: No  HEART SYMPTOMS: No  LUNG SYMPTOMS: No  INTESTINAL SYMPTOMS: No  URINARY SYMPTOMS: No  GYNECOLOGIC SYMPTOMS: Yes  BREAST SYMPTOMS: No  SKELETAL SYMPTOMS: No  BLOOD SYMPTOMS: No  NERVOUS SYSTEM SYMPTOMS: No  MENTAL HEALTH SYMPTOMS: No  Vaginal discharge: Yes  Hot flashes: No  Vaginal dryness: No  Genital ulcers: No  Reduced libido: No  Painful intercourse: Yes  Difficulty with sexual arousal: No  Post-menopausal bleeding: Yes

## 2018-05-14 NOTE — NURSING NOTE
"Oncology Rooming Note    May 14, 2018 11:53 AM   Gerri Owens is a 63 year old female who presents for:    Chief Complaint   Patient presents with     Oncology Clinic Visit     Return Hysterectomy Post op     Initial Vitals: /80  Pulse 82  Temp 98.2  F (36.8  C) (Oral)  Resp 16  Ht 1.67 m (5' 5.75\")  Wt 82.6 kg (182 lb)  LMP 03/04/2005  SpO2 99%  BMI 29.6 kg/m2 Estimated body mass index is 29.6 kg/(m^2) as calculated from the following:    Height as of this encounter: 1.67 m (5' 5.75\").    Weight as of this encounter: 82.6 kg (182 lb). Body surface area is 1.96 meters squared.  No Pain (0) Comment: Data Unavailable   Patient's last menstrual period was 03/04/2005.  Allergies reviewed: Yes  Medications reviewed: Yes    Medications: Medication refills not needed today.  Pharmacy name entered into [facility]:    [facility] [place], [place] - [address] [place] [address] OUTPATIENT PHARMACY  [facility] [place], [place] - [address] [facility]    Clinical concerns: Post op     6 minutes for nursing intake (face to face time)     Joyce Jimenez CMA              "

## 2018-05-16 ENCOUNTER — CARE COORDINATION (OUTPATIENT)
Dept: ONCOLOGY | Facility: CLINIC | Age: 63
End: 2018-05-16

## 2018-05-16 NOTE — PROGRESS NOTES
Care Coordinator Note  Returned call from patient.  She states she has decided to go forward with Dr. Tierney's recommendation of chemotherapy/radiation therapy (sandwich protocol).  She will meet with me on 5/22/18 for a teaching session.  Patient states she would like to start after holiday.      Patient verbalized back understanding of the above information discussed.     Joyce MEDEROS, RN  Care Coordinator  Gynecologic Cancer   Office:  [phone]  Pager: [phone] #[phone]

## 2018-05-21 PROBLEM — C54.1 ENDOMETRIAL CANCER (H): Status: ACTIVE | Noted: 2018-05-21

## 2018-05-21 PROBLEM — Z79.899 ENCOUNTER FOR LONG-TERM (CURRENT) USE OF MEDICATIONS: Status: ACTIVE | Noted: 2018-05-21

## 2018-05-21 RX ORDER — DIPHENHYDRAMINE HYDROCHLORIDE 50 MG/ML
50 INJECTION INTRAMUSCULAR; INTRAVENOUS
Status: CANCELLED
Start: 2018-06-13

## 2018-05-21 RX ORDER — ALBUTEROL SULFATE 0.83 MG/ML
2.5 SOLUTION RESPIRATORY (INHALATION)
Status: CANCELLED | OUTPATIENT
Start: 2018-06-13

## 2018-05-21 RX ORDER — LORAZEPAM 2 MG/ML
1 INJECTION INTRAMUSCULAR EVERY 6 HOURS PRN
Status: CANCELLED
Start: 2018-06-13

## 2018-05-21 RX ORDER — MEPERIDINE HYDROCHLORIDE 25 MG/ML
25 INJECTION INTRAMUSCULAR; INTRAVENOUS; SUBCUTANEOUS EVERY 30 MIN PRN
Status: CANCELLED | OUTPATIENT
Start: 2018-06-13

## 2018-05-21 RX ORDER — METHYLPREDNISOLONE SODIUM SUCCINATE 125 MG/2ML
125 INJECTION, POWDER, LYOPHILIZED, FOR SOLUTION INTRAMUSCULAR; INTRAVENOUS
Status: CANCELLED
Start: 2018-06-13

## 2018-05-21 RX ORDER — ALBUTEROL SULFATE 90 UG/1
1-2 AEROSOL, METERED RESPIRATORY (INHALATION)
Status: CANCELLED
Start: 2018-06-13

## 2018-05-21 RX ORDER — EPINEPHRINE 1 MG/ML
0.3 INJECTION, SOLUTION INTRAMUSCULAR; SUBCUTANEOUS EVERY 5 MIN PRN
Status: CANCELLED | OUTPATIENT
Start: 2018-06-13

## 2018-05-21 RX ORDER — PALONOSETRON 0.05 MG/ML
0.25 INJECTION, SOLUTION INTRAVENOUS ONCE
Status: CANCELLED
Start: 2018-06-13

## 2018-05-21 RX ORDER — EPINEPHRINE 0.3 MG/.3ML
0.3 INJECTION SUBCUTANEOUS EVERY 5 MIN PRN
Status: CANCELLED | OUTPATIENT
Start: 2018-06-13

## 2018-05-21 RX ORDER — DIPHENHYDRAMINE HCL 25 MG
50 CAPSULE ORAL ONCE
Status: CANCELLED
Start: 2018-06-13

## 2018-05-21 RX ORDER — SODIUM CHLORIDE 9 MG/ML
1000 INJECTION, SOLUTION INTRAVENOUS CONTINUOUS PRN
Status: CANCELLED
Start: 2018-06-13

## 2018-05-22 ENCOUNTER — APPOINTMENT (OUTPATIENT)
Dept: ONCOLOGY | Facility: CLINIC | Age: 63
End: 2018-05-22
Attending: NURSE PRACTITIONER
Payer: COMMERCIAL

## 2018-05-22 ENCOUNTER — CARE COORDINATION (OUTPATIENT)
Dept: ONCOLOGY | Facility: CLINIC | Age: 63
End: 2018-05-22

## 2018-05-22 DIAGNOSIS — C54.1 ENDOMETRIAL CANCER (H): Primary | ICD-10-CM

## 2018-05-22 DIAGNOSIS — Z79.899 ENCOUNTER FOR LONG-TERM (CURRENT) USE OF MEDICATIONS: ICD-10-CM

## 2018-05-22 PROCEDURE — 40000114 ZZH STATISTIC NO CHARGE CLINIC VISIT

## 2018-05-22 NOTE — PATIENT INSTRUCTIONS
Call your Care Coordinator if you have chills and/or temperature greater then or equal to 100.4, uncontrolled nausea and vomiting, diarrhea, constipation, dizziness, light headedness, shortness of breath, chest pain, unexplained bruising, bleeding that does not stop with 10 minutes of pressure or any new symptoms, questions/concerns  Monday- Friday.    If after hours, weekends or holidays call the [facility] hospital at [phone] and ask for the GYN ONCOLOGY resident on call.    Your  Care Coordinator is    Joyce MEDEROS, RN  Gynecologic Cancer   Office:  [phone]  Pager: [phone] #[phone]

## 2018-05-22 NOTE — PROGRESS NOTES
[facility] GYN-Oncology Teaching Note    Relevant Diagnosis:  Endometrial Cancer    Teaching Topic:    Chemotherapy:  Taxol/Carbo  Implanted Port    Person(s) involved in teaching:  Patient and     Motivation Level:   Asks Questions:   Yes  Eager to Learn:  Yes  Cooperative:  Yes  Receptive (willing/able to accept information):  Yes      Patient demonstrates understanding of the following:  Reason for the appointment, diagnosis and treatment plan:  Yes  Knowledge of proper use of medications and conditions for which they are ordered (with special attention to potential side effects or drug interactions):  Yes  Which situations necessitate calling provider and whom to contact:  Yes    Teaching Concerns:  Plan port and then sandwich protocol (Taxol 175 mg/m2/Carbo AUC 6 x 3/Rad Rx/Taxol/Carbo x 3).  Patient would like to start as soon as possible and is okay to come in on separate days for port and chemo start.  She is retired and has halfway celebration trip planned with her  end of September.       Instructional Materials Used/Given:  Implanted Port Booklet  Chemotherapy Packet and Cards  Disease Packet   Cancer Center Handbook  Care Coordinator Card and after hours contact information     Time spent teaching with patient:  60 minutes    Joyce MEDEROS, RN  Care Coordinator  Gynecologic Cancer   Office:  [phone]  Pager: [phone] #[phone]

## 2018-05-23 ENCOUNTER — CARE COORDINATION (OUTPATIENT)
Dept: ONCOLOGY | Facility: CLINIC | Age: 63
End: 2018-05-23

## 2018-05-23 NOTE — PROGRESS NOTES
Care Coordinator Note  Reviewed plan for port 5/31/18 and chemotherapy 6/1/18 with patient.  Discussed port prep and patient states she has completed Lovenox.      Patient verbalized back understanding of the above information discussed.     Joyce Gil MSN, RN  Care Coordinator  Gynecologic Cancer   Office:  [phone]  Pager: [phone] #[phone]

## 2018-05-30 ENCOUNTER — ANESTHESIA EVENT (OUTPATIENT)
Dept: SURGERY | Facility: AMBULATORY SURGERY CENTER | Age: 63
End: 2018-05-30

## 2018-05-31 ENCOUNTER — RADIANT APPOINTMENT (OUTPATIENT)
Dept: RADIOLOGY | Facility: AMBULATORY SURGERY CENTER | Age: 63
End: 2018-05-31
Attending: OBSTETRICS & GYNECOLOGY
Payer: OTHER GOVERNMENT

## 2018-05-31 ENCOUNTER — ANESTHESIA (OUTPATIENT)
Dept: SURGERY | Facility: AMBULATORY SURGERY CENTER | Age: 63
End: 2018-05-31

## 2018-05-31 ENCOUNTER — HOSPITAL ENCOUNTER (OUTPATIENT)
Facility: AMBULATORY SURGERY CENTER | Age: 63
End: 2018-05-31
Attending: PHYSICIAN ASSISTANT
Payer: OTHER GOVERNMENT

## 2018-05-31 ENCOUNTER — SURGERY (OUTPATIENT)
Age: 63
End: 2018-05-31

## 2018-05-31 VITALS
OXYGEN SATURATION: 98 % | HEIGHT: 66 IN | RESPIRATION RATE: 16 BRPM | SYSTOLIC BLOOD PRESSURE: 97 MMHG | DIASTOLIC BLOOD PRESSURE: 59 MMHG | WEIGHT: 182 LBS | TEMPERATURE: 97.5 F | BODY MASS INDEX: 29.25 KG/M2

## 2018-05-31 DIAGNOSIS — C54.1 ENDOMETRIAL CANCER (H): Primary | ICD-10-CM

## 2018-05-31 DIAGNOSIS — C54.1 ENDOMETRIAL CANCER (H): ICD-10-CM

## 2018-05-31 DIAGNOSIS — Z79.899 ENCOUNTER FOR LONG-TERM (CURRENT) USE OF MEDICATIONS: ICD-10-CM

## 2018-05-31 LAB
ERYTHROCYTE [DISTWIDTH] IN BLOOD BY AUTOMATED COUNT: 13.7 % (ref 10–15)
HCT VFR BLD AUTO: 31.9 % (ref 35–47)
HGB BLD-MCNC: 9.7 G/DL (ref 11.7–15.7)
INR PPP: 1 (ref 0.86–1.14)
MCH RBC QN AUTO: 27.3 PG (ref 26.5–33)
MCHC RBC AUTO-ENTMCNC: 30.4 G/DL (ref 31.5–36.5)
MCV RBC AUTO: 90 FL (ref 78–100)
PLATELET # BLD AUTO: 207 10E9/L (ref 150–450)
RBC # BLD AUTO: 3.55 10E12/L (ref 3.8–5.2)
WBC # BLD AUTO: 3.3 10E9/L (ref 4–11)

## 2018-05-31 DEVICE — CATH PORT POWERPORT CLEARVUE ISP 8FR 5608062: Type: IMPLANTABLE DEVICE | Site: CHEST  WALL | Status: FUNCTIONAL

## 2018-05-31 RX ORDER — PROPOFOL 10 MG/ML
INJECTION, EMULSION INTRAVENOUS CONTINUOUS PRN
Status: DISCONTINUED | OUTPATIENT
Start: 2018-05-31 | End: 2018-05-31

## 2018-05-31 RX ORDER — SODIUM CHLORIDE, SODIUM LACTATE, POTASSIUM CHLORIDE, CALCIUM CHLORIDE 600; 310; 30; 20 MG/100ML; MG/100ML; MG/100ML; MG/100ML
INJECTION, SOLUTION INTRAVENOUS CONTINUOUS
Status: DISCONTINUED | OUTPATIENT
Start: 2018-05-31 | End: 2018-06-01 | Stop reason: HOSPADM

## 2018-05-31 RX ORDER — NALOXONE HYDROCHLORIDE 0.4 MG/ML
.1-.4 INJECTION, SOLUTION INTRAMUSCULAR; INTRAVENOUS; SUBCUTANEOUS
Status: DISCONTINUED | OUTPATIENT
Start: 2018-05-31 | End: 2018-06-01 | Stop reason: HOSPADM

## 2018-05-31 RX ORDER — PROCHLORPERAZINE MALEATE 10 MG
10 TABLET ORAL EVERY 6 HOURS PRN
Qty: 30 TABLET | Refills: 2 | Status: SHIPPED | OUTPATIENT
Start: 2018-05-31 | End: 2018-08-17

## 2018-05-31 RX ORDER — ACETAMINOPHEN 325 MG/1
975 TABLET ORAL ONCE
Status: COMPLETED | OUTPATIENT
Start: 2018-05-31 | End: 2018-05-31

## 2018-05-31 RX ORDER — SODIUM CHLORIDE 9 MG/ML
INJECTION, SOLUTION INTRAVENOUS CONTINUOUS
Status: DISCONTINUED | OUTPATIENT
Start: 2018-05-31 | End: 2018-06-01 | Stop reason: HOSPADM

## 2018-05-31 RX ORDER — ONDANSETRON 4 MG/1
4 TABLET, ORALLY DISINTEGRATING ORAL EVERY 30 MIN PRN
Status: DISCONTINUED | OUTPATIENT
Start: 2018-05-31 | End: 2018-06-01 | Stop reason: HOSPADM

## 2018-05-31 RX ORDER — PROPOFOL 10 MG/ML
INJECTION, EMULSION INTRAVENOUS PRN
Status: DISCONTINUED | OUTPATIENT
Start: 2018-05-31 | End: 2018-05-31

## 2018-05-31 RX ORDER — LIDOCAINE 40 MG/G
CREAM TOPICAL
Status: DISCONTINUED | OUTPATIENT
Start: 2018-05-31 | End: 2018-06-01 | Stop reason: HOSPADM

## 2018-05-31 RX ORDER — HEPARIN SODIUM (PORCINE) LOCK FLUSH IV SOLN 100 UNIT/ML 100 UNIT/ML
5 SOLUTION INTRAVENOUS
Status: DISCONTINUED | OUTPATIENT
Start: 2018-05-31 | End: 2018-06-01 | Stop reason: HOSPADM

## 2018-05-31 RX ORDER — HEPARIN SODIUM,PORCINE 10 UNIT/ML
5 VIAL (ML) INTRAVENOUS EVERY 24 HOURS
Status: DISCONTINUED | OUTPATIENT
Start: 2018-05-31 | End: 2018-06-01 | Stop reason: HOSPADM

## 2018-05-31 RX ORDER — LORAZEPAM 1 MG/1
1 TABLET ORAL EVERY 6 HOURS PRN
Qty: 30 TABLET | Refills: 1 | Status: SHIPPED | OUTPATIENT
Start: 2018-05-31 | End: 2018-11-19

## 2018-05-31 RX ORDER — HEPARIN SODIUM,PORCINE 10 UNIT/ML
VIAL (ML) INTRAVENOUS PRN
Status: DISCONTINUED | OUTPATIENT
Start: 2018-05-31 | End: 2018-05-31 | Stop reason: HOSPADM

## 2018-05-31 RX ORDER — ONDANSETRON 2 MG/ML
4 INJECTION INTRAMUSCULAR; INTRAVENOUS EVERY 30 MIN PRN
Status: DISCONTINUED | OUTPATIENT
Start: 2018-05-31 | End: 2018-06-01 | Stop reason: HOSPADM

## 2018-05-31 RX ORDER — LIDOCAINE HYDROCHLORIDE 20 MG/ML
INJECTION, SOLUTION INFILTRATION; PERINEURAL PRN
Status: DISCONTINUED | OUTPATIENT
Start: 2018-05-31 | End: 2018-05-31

## 2018-05-31 RX ORDER — HEPARIN SODIUM,PORCINE 10 UNIT/ML
3 VIAL (ML) INTRAVENOUS
Status: DISCONTINUED | OUTPATIENT
Start: 2018-05-31 | End: 2018-06-01 | Stop reason: HOSPADM

## 2018-05-31 RX ORDER — GABAPENTIN 300 MG/1
300 CAPSULE ORAL ONCE
Status: COMPLETED | OUTPATIENT
Start: 2018-05-31 | End: 2018-05-31

## 2018-05-31 RX ADMIN — LIDOCAINE HYDROCHLORIDE 40 MG: 20 INJECTION, SOLUTION INFILTRATION; PERINEURAL at 07:05

## 2018-05-31 RX ADMIN — Medication 15 ML: at 07:18

## 2018-05-31 RX ADMIN — GABAPENTIN 300 MG: 300 CAPSULE ORAL at 06:22

## 2018-05-31 RX ADMIN — PROPOFOL 20 MG: 10 INJECTION, EMULSION INTRAVENOUS at 07:18

## 2018-05-31 RX ADMIN — SODIUM CHLORIDE, SODIUM LACTATE, POTASSIUM CHLORIDE, CALCIUM CHLORIDE: 600; 310; 30; 20 INJECTION, SOLUTION INTRAVENOUS at 06:59

## 2018-05-31 RX ADMIN — PROPOFOL 30 MG: 10 INJECTION, EMULSION INTRAVENOUS at 07:07

## 2018-05-31 RX ADMIN — ACETAMINOPHEN 975 MG: 325 TABLET ORAL at 06:23

## 2018-05-31 RX ADMIN — PROPOFOL 100 MCG/KG/MIN: 10 INJECTION, EMULSION INTRAVENOUS at 07:07

## 2018-05-31 RX ADMIN — Medication 5 ML: at 07:41

## 2018-05-31 NOTE — ANESTHESIA PREPROCEDURE EVALUATION
Anesthesia Evaluation     . Pt has had prior anesthetic. Type: General    No history of anesthetic complications          ROS/MED HX    ENT/Pulmonary:  - neg pulmonary ROS     Neurologic:  - neg neurologic ROS     Cardiovascular:     (+) Dyslipidemia, ----. : . . . :. . Previous cardiac testing date:results:date: results:ECG reviewed date:04/23/2018 results:Sinus bradycardia  Otherwise normal ECG  When compared with ECG of 23-APR-2018 12:02, (unconfirmed)  No significant change was found date: results:          METS/Exercise Tolerance:  >4 METS   Hematologic:  - neg hematologic  ROS   (+) History of blood clots -      Musculoskeletal:  - neg musculoskeletal ROS       GI/Hepatic:     (+) GERD Asymptomatic on medication,       Renal/Genitourinary:     (+) chronic renal disease, type: CRI, Pt does not require dialysis, Pt has no history of transplant,       Endo:  - neg endo ROS       Psychiatric:  - neg psychiatric ROS       Infectious Disease:  - neg infectious disease ROS       Malignancy:      - no malignancy   Other:    - neg other ROS                 Physical Exam  Normal systems: pulmonary and dental    Airway   Mallampati: II  TM distance: >3 FB  Neck ROM: full    Dental     Cardiovascular   Rhythm and rate: regular and normal      Pulmonary                     Anesthesia Plan      History & Physical Review  History and physical reviewed and following examination; no interval change.    ASA Status:  3 .    NPO Status:  > 8 hours    Plan for MAC Reason for MAC:  Deep or markedly invasive procedure (G8) and Procedure to face, neck, head or breast  PONV prophylaxis:  Ondansetron (or other 5HT-3)       Postoperative Care  Postoperative pain management:  Multi-modal analgesia.      Consents  Anesthetic plan, risks, benefits and alternatives discussed with:  Patient.  Use of blood products discussed: No .   .                          .

## 2018-05-31 NOTE — PROCEDURES
Interventional Radiology Brief Post Procedure Note    Procedure:  Chest Port Placement    Proceduralist: Jamila Major MS, PA-C    Assistant: None    Time Out: Prior to the start of the procedure and with procedural staff participation, I verbally confirmed the patient s identity using two indicators, relevant allergies, that the procedure was appropriate and matched the consent or emergent situation, and that the correct equipment/implants were available. Immediately prior to starting the procedure I conducted the Time Out with the procedural staff and re-confirmed the patient s name, procedure, and site/side. (The Joint Commission universal protocol was followed.)  Yes        Sedation: Monitored Anesthesia Care (MAC) administered and documented by Anesthesia Care Provider    Findings: Completed image-guided placement of 8 Polish 21 cm single lumen power-injectable central venous chest port via right IJ. Aspirates and flushes freely, heparin locked and is ready for immediate use.    Estimated Blood Loss: Minimal    Fluoroscopy Time:  minute(s)    SPECIMENS: None    Complications: 1. None     Condition: Stable    Plan: Port accessed and ready for immediate use.    Comments: See dictated procedure note for full details.    Jamila Major PA-C

## 2018-05-31 NOTE — IP AVS SNAPSHOT
MRN:[id number]                      After Visit Summary   5/31/2018    Gerri Owens    MRN: [id number]           Thank you!     Thank you for choosing [facility] for your care. Our goal is always to provide you with excellent care. Hearing back from our patients is one way we can continue to improve our services. Please take a few minutes to complete the written survey that you may receive in the mail after you visit with us. Thank you!        Patient Information     Date Of Birth          1955        About your hospital stay     You were admitted on:  May 31, 2018 You last received care in the:  [facility]    You were discharged on:  May 31, 2018       Who to Call     For medical emergencies, please call 911.  For non-urgent questions about your medical care, please call your primary care provider or clinic, [phone]  For questions related to your surgery, please call your surgery clinic        Attending Provider     Provider Specialty    Jamila Major PA-C Physician Assistant       Primary Care Provider Office Phone # Fax #    Karo Doty [phone]      Your next 10 appointments already scheduled     Jun 01, 2018  6:30 AM CDT   [facility] Lab Draw with  MASONIC LAB DRAW   [facility] ([facility])    [address]  [address]  [place] [facility] [address]   [phone]            Jun 01, 2018  7:00 AM CDT   Infusion 360 with  ONCOLOGY INFUSION, UC 11 ATC   [facility] ([facility])    [address]  [address]  [place] [facility] [address]   [phone]            Jun 22, 2018  6:30 AM CDT   Masonic Lab Draw with UC MASONIC LAB DRAW   [facility] ([facility])    [address]  [address]  [place] [facility] [address]   [phone]            Jun 22, 2018  7:00 AM CDT   (Arrive by 6:45 AM)   Return Visit with  ALFONZO Weathers CNP   [facility] ([facility])    [address]  [address]  [place] [facility] [address]   [phone]            Jun 22, 2018  9:00 AM CDT   Infusion 360 with UC ONCOLOGY INFUSION, UC 26 ATC   [facility] ([facility])    [address]  [address]  [place] [facility] [address]   [phone]            Jul 13, 2018  8:30 AM CDT   Masonic Lab Draw with  MASONIC LAB DRAW   [facility] ([facility])    [address]  [address]  [place] [facility] [address]   [phone]            Jul 13, 2018  9:00 AM CDT   (Arrive by 8:45 AM)   Return Visit with ALFONZO Canales CNP   [facility] ([facility])    [address]  [address]  [place] [facility] [address]   [phone]            Jul 13, 2018 10:00 AM CDT   Infusion 360 with UC ONCOLOGY INFUSION   [facility] ([facility])    [address]  [address]  [place] [facility] [address]   [phone]              Further instructions from your care team         A collaboration between [facility] and [facility]  Experts in minimally invasive, targeted treatments performed using imaging guidance    Venous Access Device,  Port Catheter or Tunneled or Non-Tunneled Central Line Placement    Today you had a procedure today to install a venous access device; either a tunneled central vein catheter or a subcutaneous port catheter.    One of our Radiology PAs performed this procedure for you today:  ? Gael Castillo PA-C  ? KIERA Mcclelland PA-C  ? Gigi Gibson PA-C  ? Jamila Major PA-C   ? Feng Jones PA-C    After you go home:  - Drink plenty of fluids.  Generally 6-8 (8 ounce) glasses a day is recommended.  - Resume your regular diet unless otherwise ordered  by a medical provider.  - Keep any applied tape/gauze dressings clean and dry.  Change tape/gauze dressings if they get wet or soiled.  - You may shower the following day after procedure, however cover and protect from moisture any tape/gauze dressings.  You may let water hit and run over dried skin glue, but do not scrub.  Pat the area dry after showering.  - Port placement incisions are closed with absorbable suture, meaning they do not need to be removed at a later date, and a topical skin adhesive (skin glue).  This glue will wear off in 7-14 days.  Do not remove before this time.  If 14 days have passed and residual glue is present, you may gently remove it.  - Do not apply gels, lotions, or ointments to the glue site for the first 10 days as this may cause the glue to prematurely soften and fail.  - Do not perform strenuous activities or lift greater than 10 pounds for the next three days.  - If there is bleeding or oozing from the procedure site, apply firm pressure to the area for 5-10 minutes.  If the bleeding continues seek medical advice at the numbers below.  - Mild procedure site discomfort can be treated with an ice pack and over-the-counter pain relievers.        For 24 hours after any sedation used:  - Relax and take it easy.  No strenuous activities.  - Do not drive or operate machines at home or at work.  - No alcohol consumption.  - Do not make any important or legal decisions.    Call our Interventional Radiology (IR) service if:  - If you start bleeding from the procedure site.  If you do start to bleed from the site, lie down and hold some pressure on the site.  Our radiology provider can help you decide if you need to return to the hospital.  - If you have new or worsening pain related to the procedure.  - If you have concerning swelling at the procedure site.  - If you develop persistent nausea or vomiting.  - If you develop hives or a rash or any unexplained itching.  - If you have a fever  "of greater than 100.5  F and chills in the first 5 days after procedure.  - Any other concerns related to your procedure.      [facility]  Interventional Radiology (IR)  [address]  [address]nd Floor, [place] [address]    Contact Number:  [phone]  (IR control desk)  - Monday - Friday 8:00 am - 4:30 pm    After hours for urgent concerns:  [phone]  - After 4:30 pm Monday - Friday, Weekends and Holidays.   - Ask for Interventional Radiology on-call.  Someone is available 24 hours a day.  - [facility] toll free number:  [phone]        Pending Results     Date and Time Order Name Status Description    5/31/2018 0639 IR CHEST PORT PLACEMENT > 5 YRS OF AGE In process             Admission Information     Date & Time Provider Department Dept. Phone    5/31/2018 Jamila Major PA-C [facility] [phone]      Your Vitals Were     Blood Pressure Temperature Respirations Height Weight Last Period    104/67 98  F (36.7  C) (Oral) 16 1.67 m (5' 5.75\") 82.6 kg (182 lb) 03/04/2005    Pulse Oximetry BMI (Body Mass Index)                98% 29.6 kg/m2          [facility] Information     [facility] gives you secure access to your electronic health record. If you see a primary care provider, you can also send messages to your care team and make appointments. If you have questions, please call your primary care clinic.  If you do not have a primary care provider, please call [phone] and they will assist you.      [facility] is an electronic gateway that provides easy, online access to your medical records. With [facility], you can request a clinic appointment, read your test results, renew a prescription or communicate with your care team.     To access your existing account, please contact your [facility] or call [phone] for assistance.        Care EveryWhere ID     This is your Care EveryWhere ID. This could be used " by other organizations to access your [facility] medical records  [id number]        Equal Access to Services     TIMARLEY HEBER : Hadii thalia Springer, argeliada filemon, yulissaoscar malikdennelly benavides, carol urrutiaboydchristiano roland. So [facility] [phone].    ATENCIÓN: Si habla español, tiene a graham disposición servicios gratuitos de asistencia lingüística. Danielame al [phone].    We comply with applicable federal civil rights laws and [place] laws. We do not discriminate on the basis of race, color, national origin, age, disability, sex, sexual orientation, or gender identity.               Review of your medicines      UNREVIEWED medicines. Ask your doctor about these medicines        Dose / Directions    acetaminophen 500 MG tablet   Commonly known as:  TYLENOL   Used for:  S/P DEMETRIO-BSO        Dose:  500 mg   Take 1 tablet (500 mg) by mouth every 4 hours as needed   Refills:  0       ascorbic acid 250 MG tablet   Commonly known as:  VITAMIN C   Used for:  Routine general medical examination at a health care facility        250 mg   Quantity:  90   Refills:  0       calcium citrate-vitamin D 315-200 MG-UNIT Tabs per tablet   Commonly known as:  CITRACAL   Indication:  630/500 mg        Dose:  2 tablet   Take 2 tablets by mouth daily   Refills:  0       enoxaparin 40 MG/0.4ML injection   Commonly known as:  LOVENOX   Used for:  S/P DEMETRIO-BSO        Dose:  40 mg   Inject 0.4 mLs (40 mg) Subcutaneous every 24 hours   Quantity:  25 Syringe   Refills:  0       GLUCOSAMINE CHONDROITIN COMPLX Tabs   Used for:  Routine general medical examination at a health care facility        Take  by mouth. 1500 mg 1xqd.   Refills:  0       RANITIDINE HCL PO   Indication:  8am and 8pm        Dose:  150 mg   Take 150 mg by mouth 2 times daily   Refills:  0                Protect others around you: Learn how to safely use, store and throw away your medicines at www.disposemymeds.org.             Medication List: This is a list  of all your medications and when to take them. Check marks below indicate your daily home schedule. Keep this list as a reference.      Medications           Morning Afternoon Evening Bedtime As Needed    acetaminophen 500 MG tablet   Commonly known as:  TYLENOL   Take 1 tablet (500 mg) by mouth every 4 hours as needed   Last time this was given:  975 mg on 5/31/2018  6:23 AM                                ascorbic acid 250 MG tablet   Commonly known as:  VITAMIN C   250 mg                                calcium citrate-vitamin D 315-200 MG-UNIT Tabs per tablet   Commonly known as:  CITRACAL   Take 2 tablets by mouth daily                                enoxaparin 40 MG/0.4ML injection   Commonly known as:  LOVENOX   Inject 0.4 mLs (40 mg) Subcutaneous every 24 hours                                GLUCOSAMINE CHONDROITIN COMPLX Tabs   Take  by mouth. 1500 mg 1xqd.                                RANITIDINE HCL PO   Take 150 mg by mouth 2 times daily

## 2018-05-31 NOTE — ANESTHESIA POSTPROCEDURE EVALUATION
Patient: Gerri Owens    Procedure(s):  Single Lumen Chest Power Port - Wound Class: I-Clean    Diagnosis:Endometrial Cancer  Diagnosis Additional Information: No value filed.    Anesthesia Type:  MAC    Note:  Anesthesia Post Evaluation    Patient location during evaluation: Phase 2  Patient participation: Able to fully participate in evaluation  Level of consciousness: awake and alert  Pain management: adequate  Airway patency: patent  Cardiovascular status: acceptable  Respiratory status: acceptable  Hydration status: acceptable  PONV: none     Anesthetic complications: None          Last vitals:  Vitals:    05/31/18 0601 05/31/18 0800 05/31/18 0815   BP: 104/67 102/60 97/59   Resp: 16 16 16   Temp: 36.7  C (98  F) 36.4  C (97.5  F)    SpO2: 98% 98%          Electronically Signed By: Varghese Lee DO  May 31, 2018  10:20 AM

## 2018-05-31 NOTE — DISCHARGE INSTRUCTIONS
A collaboration between [facility] and [facility]  Experts in minimally invasive, targeted treatments performed using imaging guidance    Venous Access Device,  Port Catheter or Tunneled or Non-Tunneled Central Line Placement    Today you had a procedure today to install a venous access device; either a tunneled central vein catheter or a subcutaneous port catheter.    One of our Radiology PAs performed this procedure for you today:  ? Gael Castillo PA-C  ? KIERA Mcclelland PA-C  ? Gigi Gibson PA-C  ? Jamila Major PA-C   ? Feng Jones PA-C    After you go home:  - Drink plenty of fluids.  Generally 6-8 (8 ounce) glasses a day is recommended.  - Resume your regular diet unless otherwise ordered by a medical provider.  - Keep any applied tape/gauze dressings clean and dry.  Change tape/gauze dressings if they get wet or soiled.  - You may shower the following day after procedure, however cover and protect from moisture any tape/gauze dressings.  You may let water hit and run over dried skin glue, but do not scrub.  Pat the area dry after showering.  - Port placement incisions are closed with absorbable suture, meaning they do not need to be removed at a later date, and a topical skin adhesive (skin glue).  This glue will wear off in 7-14 days.  Do not remove before this time.  If 14 days have passed and residual glue is present, you may gently remove it.  - Do not apply gels, lotions, or ointments to the glue site for the first 10 days as this may cause the glue to prematurely soften and fail.  - Do not perform strenuous activities or lift greater than 10 pounds for the next three days.  - If there is bleeding or oozing from the procedure site, apply firm pressure to the area for 5-10 minutes.  If the bleeding continues seek medical advice at the numbers below.  - Mild procedure site discomfort can be treated with an ice pack and over-the-counter pain  relievers.        For 24 hours after any sedation used:  - Relax and take it easy.  No strenuous activities.  - Do not drive or operate machines at home or at work.  - No alcohol consumption.  - Do not make any important or legal decisions.    Call our Interventional Radiology (IR) service if:  - If you start bleeding from the procedure site.  If you do start to bleed from the site, lie down and hold some pressure on the site.  Our radiology provider can help you decide if you need to return to the hospital.  - If you have new or worsening pain related to the procedure.  - If you have concerning swelling at the procedure site.  - If you develop persistent nausea or vomiting.  - If you develop hives or a rash or any unexplained itching.  - If you have a fever of greater than 100.5  F and chills in the first 5 days after procedure.  - Any other concerns related to your procedure.      [facility]  Interventional Radiology (IR)  [address] [place] [address]    Contact Number:  [phone]  (IR control desk)  - Monday - Friday 8:00 am - 4:30 pm    After hours for urgent concerns:  [phone]  - After 4:30 pm Monday - Friday, Weekends and Holidays.   - Ask for Interventional Radiology on-call.  Someone is available 24 hours a day.  - [employer] toll free number:  [phone]

## 2018-05-31 NOTE — IP AVS SNAPSHOT
[facility]    [address] [place] [address]    Phone:  [phone]    Fax:  [phone]                                       After Visit Summary   5/31/2018    Gerri Owens    MRN: [id number]           After Visit Summary Signature Page     I have received my discharge instructions, and my questions have been answered. I have discussed any challenges I see with this plan with the nurse or doctor.    ..........................................................................................................................................  Patient/Patient Representative Signature      ..........................................................................................................................................  Patient Representative Print Name and Relationship to Patient    ..................................................               ................................................  Date                                            Time    ..........................................................................................................................................  Reviewed by Signature/Title    ...................................................              ..............................................  Date                                                            Time

## 2018-05-31 NOTE — ANESTHESIA CARE TRANSFER NOTE
Patient: Gerri Owens    Procedure(s):  Single Lumen Chest Power Port - Wound Class: I-Clean    Diagnosis: Endometrial Cancer  Diagnosis Additional Information: No value filed.    Anesthesia Type:   MAC     Note:  Airway :Room Air  Patient transferred to:Phase II  Comments: Report to RN    97.5, 16, 64, 97%, 88/53Handoff Report: Identifed the Patient, Identified the Reponsible Provider, Reviewed the pertinent medical history, Discussed the surgical course, Reviewed Intra-OP anesthesia mangement and issues during anesthesia, Set expectations for post-procedure period and Allowed opportunity for questions and acknowledgement of understanding      Vitals: (Last set prior to Anesthesia Care Transfer)    CRNA VITALS  5/31/2018 0713 - 5/31/2018 0749      5/31/2018             Pulse: 59    Ht Rate: 59    SpO2: 100 %    Resp Rate (set): 10                Electronically Signed By: ALFONZO Hwang CRNA  May 31, 2018  7:49 AM

## 2018-06-01 ENCOUNTER — INFUSION THERAPY VISIT (OUTPATIENT)
Dept: ONCOLOGY | Facility: CLINIC | Age: 63
End: 2018-06-01
Attending: OBSTETRICS & GYNECOLOGY
Payer: COMMERCIAL

## 2018-06-01 ENCOUNTER — CARE COORDINATION (OUTPATIENT)
Dept: ONCOLOGY | Facility: CLINIC | Age: 63
End: 2018-06-01

## 2018-06-01 ENCOUNTER — PRE VISIT (OUTPATIENT)
Dept: RADIATION ONCOLOGY | Facility: CLINIC | Age: 63
End: 2018-06-01

## 2018-06-01 ENCOUNTER — APPOINTMENT (OUTPATIENT)
Dept: LAB | Facility: CLINIC | Age: 63
End: 2018-06-01
Attending: OBSTETRICS & GYNECOLOGY
Payer: COMMERCIAL

## 2018-06-01 VITALS
BODY MASS INDEX: 29.5 KG/M2 | RESPIRATION RATE: 18 BRPM | SYSTOLIC BLOOD PRESSURE: 115 MMHG | OXYGEN SATURATION: 99 % | DIASTOLIC BLOOD PRESSURE: 78 MMHG | HEART RATE: 78 BPM | WEIGHT: 181.4 LBS | TEMPERATURE: 98 F

## 2018-06-01 DIAGNOSIS — Z79.899 ENCOUNTER FOR LONG-TERM (CURRENT) USE OF MEDICATIONS: ICD-10-CM

## 2018-06-01 DIAGNOSIS — C54.1 ENDOMETRIAL CANCER (H): Primary | ICD-10-CM

## 2018-06-01 LAB
ALBUMIN SERPL-MCNC: 3.2 G/DL (ref 3.4–5)
ALP SERPL-CCNC: 81 U/L (ref 40–150)
ALT SERPL W P-5'-P-CCNC: 12 U/L (ref 0–50)
ANION GAP SERPL CALCULATED.3IONS-SCNC: 6 MMOL/L (ref 3–14)
AST SERPL W P-5'-P-CCNC: 11 U/L (ref 0–45)
BASOPHILS # BLD AUTO: 0 10E9/L (ref 0–0.2)
BASOPHILS # BLD AUTO: 0 10E9/L (ref 0–0.2)
BASOPHILS NFR BLD AUTO: 0.9 %
BASOPHILS NFR BLD AUTO: 0.9 %
BILIRUB SERPL-MCNC: 0.3 MG/DL (ref 0.2–1.3)
BUN SERPL-MCNC: 12 MG/DL (ref 7–30)
CALCIUM SERPL-MCNC: 8.7 MG/DL (ref 8.5–10.1)
CHLORIDE SERPL-SCNC: 110 MMOL/L (ref 94–109)
CO2 SERPL-SCNC: 25 MMOL/L (ref 20–32)
CREAT SERPL-MCNC: 0.85 MG/DL (ref 0.52–1.04)
DIFFERENTIAL METHOD BLD: ABNORMAL
DIFFERENTIAL METHOD BLD: ABNORMAL
EOSINOPHIL # BLD AUTO: 0.4 10E9/L (ref 0–0.7)
EOSINOPHIL # BLD AUTO: 0.4 10E9/L (ref 0–0.7)
EOSINOPHIL NFR BLD AUTO: 11 %
EOSINOPHIL NFR BLD AUTO: 11.6 %
ERYTHROCYTE [DISTWIDTH] IN BLOOD BY AUTOMATED COUNT: 13.6 % (ref 10–15)
ERYTHROCYTE [DISTWIDTH] IN BLOOD BY AUTOMATED COUNT: 13.8 % (ref 10–15)
GFR SERPL CREATININE-BSD FRML MDRD: 67 ML/MIN/1.7M2
GLUCOSE SERPL-MCNC: 81 MG/DL (ref 70–99)
HCT VFR BLD AUTO: 32.3 % (ref 35–47)
HCT VFR BLD AUTO: 32.9 % (ref 35–47)
HGB BLD-MCNC: 9.7 G/DL (ref 11.7–15.7)
HGB BLD-MCNC: 9.8 G/DL (ref 11.7–15.7)
IMM GRANULOCYTES # BLD: 0 10E9/L (ref 0–0.4)
IMM GRANULOCYTES # BLD: 0 10E9/L (ref 0–0.4)
IMM GRANULOCYTES NFR BLD: 0 %
IMM GRANULOCYTES NFR BLD: 0 %
LYMPHOCYTES # BLD AUTO: 1 10E9/L (ref 0.8–5.3)
LYMPHOCYTES # BLD AUTO: 1.1 10E9/L (ref 0.8–5.3)
LYMPHOCYTES NFR BLD AUTO: 29.6 %
LYMPHOCYTES NFR BLD AUTO: 32.9 %
MAGNESIUM SERPL-MCNC: 2.1 MG/DL (ref 1.6–2.3)
MCH RBC QN AUTO: 26.9 PG (ref 26.5–33)
MCH RBC QN AUTO: 26.9 PG (ref 26.5–33)
MCHC RBC AUTO-ENTMCNC: 29.8 G/DL (ref 31.5–36.5)
MCHC RBC AUTO-ENTMCNC: 30 G/DL (ref 31.5–36.5)
MCV RBC AUTO: 90 FL (ref 78–100)
MCV RBC AUTO: 90 FL (ref 78–100)
MONOCYTES # BLD AUTO: 0.4 10E9/L (ref 0–1.3)
MONOCYTES # BLD AUTO: 0.4 10E9/L (ref 0–1.3)
MONOCYTES NFR BLD AUTO: 11.3 %
MONOCYTES NFR BLD AUTO: 12.9 %
NEUTROPHILS # BLD AUTO: 1.3 10E9/L (ref 1.6–8.3)
NEUTROPHILS # BLD AUTO: 1.6 10E9/L (ref 1.6–8.3)
NEUTROPHILS NFR BLD AUTO: 41.7 %
NEUTROPHILS NFR BLD AUTO: 47.2 %
NRBC # BLD AUTO: 0 10*3/UL
NRBC # BLD AUTO: 0 10*3/UL
NRBC BLD AUTO-RTO: 0 /100
NRBC BLD AUTO-RTO: 0 /100
PLATELET # BLD AUTO: 210 10E9/L (ref 150–450)
PLATELET # BLD AUTO: 212 10E9/L (ref 150–450)
POTASSIUM SERPL-SCNC: 3.8 MMOL/L (ref 3.4–5.3)
PROT SERPL-MCNC: 6.3 G/DL (ref 6.8–8.8)
RBC # BLD AUTO: 3.6 10E12/L (ref 3.8–5.2)
RBC # BLD AUTO: 3.64 10E12/L (ref 3.8–5.2)
SODIUM SERPL-SCNC: 142 MMOL/L (ref 133–144)
WBC # BLD AUTO: 3.2 10E9/L (ref 4–11)
WBC # BLD AUTO: 3.5 10E9/L (ref 4–11)

## 2018-06-01 PROCEDURE — 85025 COMPLETE CBC W/AUTO DIFF WBC: CPT | Performed by: OBSTETRICS & GYNECOLOGY

## 2018-06-01 PROCEDURE — G0463 HOSPITAL OUTPT CLINIC VISIT: HCPCS

## 2018-06-01 PROCEDURE — 80053 COMPREHEN METABOLIC PANEL: CPT | Performed by: OBSTETRICS & GYNECOLOGY

## 2018-06-01 PROCEDURE — 36591 DRAW BLOOD OFF VENOUS DEVICE: CPT

## 2018-06-01 PROCEDURE — 83735 ASSAY OF MAGNESIUM: CPT | Performed by: OBSTETRICS & GYNECOLOGY

## 2018-06-01 PROCEDURE — 25000128 H RX IP 250 OP 636: Mod: ZF | Performed by: OBSTETRICS & GYNECOLOGY

## 2018-06-01 RX ORDER — HEPARIN SODIUM (PORCINE) LOCK FLUSH IV SOLN 100 UNIT/ML 100 UNIT/ML
5 SOLUTION INTRAVENOUS DAILY PRN
Status: DISCONTINUED | OUTPATIENT
Start: 2018-06-01 | End: 2018-06-01 | Stop reason: HOSPADM

## 2018-06-01 RX ORDER — HEPARIN SODIUM (PORCINE) LOCK FLUSH IV SOLN 100 UNIT/ML 100 UNIT/ML
5 SOLUTION INTRAVENOUS ONCE
Status: COMPLETED | OUTPATIENT
Start: 2018-06-01 | End: 2018-06-01

## 2018-06-01 RX ADMIN — Medication 5 ML: at 06:41

## 2018-06-01 RX ADMIN — SODIUM CHLORIDE, PRESERVATIVE FREE 5 ML: 5 INJECTION INTRAVENOUS at 07:43

## 2018-06-01 ASSESSMENT — PAIN SCALES - GENERAL: PAINLEVEL: NO PAIN (0)

## 2018-06-01 NOTE — MR AVS SNAPSHOT
After Visit Summary   6/1/2018    Gerri Owens    MRN: [id number]           Patient Information     Date Of Birth          1955        Visit Information        Provider Department      6/1/2018 7:00 AM  11 ATC;  ONCOLOGY INFUSION [facility]        Today's Diagnoses     Endometrial cancer (H)    -  1    Encounter for long-term (current) use of medications          Care Instructions    -Joyce Gil will call you with a plan with how to proceed.  You can reach her by calling the triage number below if you have further questions.      Contact Numbers    [facility] Main Line: [phone]  [facility] Triage and after hours / weekends / holidays:  [phone]      Please call the triage or after hours line if you experience a temperature greater than or equal to 100.5, shaking chills, have uncontrolled nausea, vomiting and/or diarrhea, dizziness, shortness of breath, chest pain, bleeding, unexplained bruising, or if you have any other new/concerning symptoms, questions or concerns.      If you are having any concerning symptoms or wish to speak to a provider before your next infusion visit, please call your care coordinator or triage to notify them so we can adequately serve you.     If you need a refill on a narcotic prescription or other medication, please call before your infusion appointment.           June 2018 Sunday Monday Tuesday Wednesday Thursday Friday Saturday                            1     [facility] DRAW    6:30 AM   (15 min.)    [facility] LAB DRAW   [facility] Lab Draw     P ONC INFUSION 360    7:00 AM   (360 min.)    ONCOLOGY INFUSION   [facility] 2       3     4     5     6     7     8     9       10     11     12     13     14     15     16       17     18     19     20     21     22     [facility] DRAW    6:30 AM   (15 min.)    [facility] DRAW   [facility] Lab Draw     [facility] RETURN    6:45 AM   (40 min.)   Tresa  ALFONZO Villalpando CNP   [facility] ONC INFUSION 360    9:00 AM   (360 min.)    ONCOLOGY INFUSION   [facility] 23       24     25     26     27     28     29     30                July 2018 Sunday Monday Tuesday Wednesday Thursday Friday Saturday   1     2     3     4     5     6     7       8     9     10     11     12     13     [facility] DRAW    8:30 AM   (15 min.)   [facility]   [facility] Lab Draw     P RETURN    8:45 AM   (30 min.)   Lisette Klein APRN CNP   [facility] ONC INFUSION 360   10:00 AM   (360 min.)    ONCOLOGY INFUSION   [facility] 14       15     16     17     18     19     20     21       22     23     24     25     26     27     28       29     30     31                                     Recent Results (from the past 24 hour(s))   CBC with platelets differential    Collection Time: 06/01/18  6:40 AM   Result Value Ref Range    WBC 3.2 (L) 4.0 - 11.0 10e9/L    RBC Count 3.64 (L) 3.8 - 5.2 10e12/L    Hemoglobin 9.8 (L) 11.7 - 15.7 g/dL    Hematocrit 32.9 (L) 35.0 - 47.0 %    MCV 90 78 - 100 fl    MCH 26.9 26.5 - 33.0 pg    MCHC 29.8 (L) 31.5 - 36.5 g/dL    RDW 13.8 10.0 - 15.0 %    Platelet Count 212 150 - 450 10e9/L    Diff Method Automated Method     % Neutrophils 41.7 %    % Lymphocytes 32.9 %    % Monocytes 12.9 %    % Eosinophils 11.6 %    % Basophils 0.9 %    % Immature Granulocytes 0.0 %    Nucleated RBCs 0 0 /100    Absolute Neutrophil 1.3 (L) 1.6 - 8.3 10e9/L    Absolute Lymphocytes 1.1 0.8 - 5.3 10e9/L    Absolute Monocytes 0.4 0.0 - 1.3 10e9/L    Absolute Eosinophils 0.4 0.0 - 0.7 10e9/L    Absolute Basophils 0.0 0.0 - 0.2 10e9/L    Abs Immature Granulocytes 0.0 0 - 0.4 10e9/L    Absolute Nucleated RBC 0.0    Comprehensive metabolic panel    Collection Time: 06/01/18  6:40 AM   Result Value Ref Range    Sodium 142 133 - 144 mmol/L    Potassium 3.8 3.4 - 5.3  mmol/L    Chloride 110 (H) 94 - 109 mmol/L    Carbon Dioxide 25 20 - 32 mmol/L    Anion Gap 6 3 - 14 mmol/L    Glucose 81 70 - 99 mg/dL    Urea Nitrogen 12 7 - 30 mg/dL    Creatinine 0.85 0.52 - 1.04 mg/dL    GFR Estimate 67 >60 mL/min/1.7m2    GFR Estimate If Black 82 >60 mL/min/1.7m2    Calcium 8.7 8.5 - 10.1 mg/dL    Bilirubin Total 0.3 0.2 - 1.3 mg/dL    Albumin 3.2 (L) 3.4 - 5.0 g/dL    Protein Total 6.3 (L) 6.8 - 8.8 g/dL    Alkaline Phosphatase 81 40 - 150 U/L    ALT 12 0 - 50 U/L    AST 11 0 - 45 U/L   Magnesium    Collection Time: 06/01/18  6:40 AM   Result Value Ref Range    Magnesium 2.1 1.6 - 2.3 mg/dL   CBC with platelets differential    Collection Time: 06/01/18  7:40 AM   Result Value Ref Range    WBC 3.5 (L) 4.0 - 11.0 10e9/L    RBC Count 3.60 (L) 3.8 - 5.2 10e12/L    Hemoglobin 9.7 (L) 11.7 - 15.7 g/dL    Hematocrit 32.3 (L) 35.0 - 47.0 %    MCV 90 78 - 100 fl    MCH 26.9 26.5 - 33.0 pg    MCHC 30.0 (L) 31.5 - 36.5 g/dL    RDW 13.6 10.0 - 15.0 %    Platelet Count 210 150 - 450 10e9/L    Diff Method Automated Method     % Neutrophils 47.2 %    % Lymphocytes 29.6 %    % Monocytes 11.3 %    % Eosinophils 11.0 %    % Basophils 0.9 %    % Immature Granulocytes 0.0 %    Nucleated RBCs 0 0 /100    Absolute Neutrophil 1.6 1.6 - 8.3 10e9/L    Absolute Lymphocytes 1.0 0.8 - 5.3 10e9/L    Absolute Monocytes 0.4 0.0 - 1.3 10e9/L    Absolute Eosinophils 0.4 0.0 - 0.7 10e9/L    Absolute Basophils 0.0 0.0 - 0.2 10e9/L    Abs Immature Granulocytes 0.0 0 - 0.4 10e9/L    Absolute Nucleated RBC 0.0                  Follow-ups after your visit        Your next 10 appointments already scheduled     Jun 22, 2018  6:30 AM CDT   [facility] Lab Draw with  [facility] DRAW   [facility] Draw ([facility])    [address] [place] [address]   [phone]            Jun 22, 2018  7:00 AM CDT   (Arrive by 6:45 AM)   Return Visit with ALFONZO Weathers CNP  [facility]    [address]  [address]  [place] [place] [address]   [phone]            Jun 22, 2018  9:00 AM CDT   Infusion 360 with UC ONCOLOGY INFUSION, UC 26 ATC   [facility]    [address]  [address]  [place] [address]   [phone]            Jul 13, 2018  8:30 AM CDT   [facility] Draw with [facility] DRAW   [facility] Lab Draw ([facility])    [address]  [address]  [place] [address]   [phone]            Jul 13, 2018  9:00 AM CDT   (Arrive by 8:45 AM)   Return Visit with ALFONZO Canales CNP   [facility]    [address]  [address]  [place] [address]   [phone]            Jul 13, 2018 10:00 AM CDT   Infusion 360 with UC ONCOLOGY INFUSION, UC 12 ATC   [facility]    [address]  [address]  [place] [address]   [phone]              Future tests that were ordered for you today     Open Future Orders        Priority Expected Expires Ordered    CBC with platelets differential STAT 6/1/2018 5/31/2019 5/31/2018    Magnesium STAT 6/1/2018 5/31/2019 5/31/2018    Comprehensive metabolic panel STAT 6/1/2018 5/31/2019 5/31/2018            Who to contact     If you have questions or need follow up information about today's clinic visit or your schedule please contact [facility] directly at [phone].  Normal or non-critical lab and imaging results will be communicated to you by MyChart, letter or phone within 4 business days after the clinic has received the results. If you do not hear from us within 7 days, please contact the clinic through MyChart or phone. If you have a critical or abnormal lab result, we will notify you by phone as soon  as possible.  Submit refill requests through batter[employer] or call your pharmacy and they will forward the refill request to us. Please allow 3 business days for your refill to be completed.          Additional Information About Your Visit        [employer] Information     [employer]atter[employer] gives you secure access to your electronic health record. If you see a primary care provider, you can also send messages to your care team and make appointments. If you have questions, please call your primary care clinic.  If you do not have a primary care provider, please call [phone] and they will assist you.        Care EveryWhere ID     This is your Care EveryWhere ID. This could be used by other organizations to access your [place] medical records  [id number]        Your Vitals Were     Pulse Temperature Respirations Last Period Pulse Oximetry BMI (Body Mass Index)    78 98  F (36.7  C) 18 03/04/2005 99% 29.5 kg/m2       Blood Pressure from Last 3 Encounters:   06/01/18 115/78   05/31/18 97/59   05/14/18 114/80    Weight from Last 3 Encounters:   06/01/18 82.3 kg (181 lb 6.4 oz)   05/31/18 82.6 kg (182 lb)   05/14/18 82.6 kg (182 lb)              We Performed the Following     CBC with platelets differential     CBC with platelets differential     Comprehensive metabolic panel     Magnesium          Today's Medication Changes          These changes are accurate as of 6/1/18  8:03 AM.  If you have any questions, ask your nurse or doctor.               Start taking these medicines.        Dose/Directions    LORazepam 1 MG tablet   Commonly known as:  ATIVAN   Used for:  Endometrial cancer (H), Encounter for long-term (current) use of medications        Dose:  1 mg   Take 1 tablet (1 mg) by mouth every 6 hours as needed (nausea/vomiting, anxiety or sleep)   Quantity:  30 tablet   Refills:  1       prochlorperazine 10 MG tablet   Commonly known as:  COMPAZINE   Used for:  Endometrial cancer (H), Encounter for long-term (current) use of  medications        Dose:  10 mg   Take 1 tablet (10 mg) by mouth every 6 hours as needed (nausea/vomiting)   Quantity:  30 tablet   Refills:  2            Where to get your medicines      These medications were sent to [place], [place] - [address]  [address], [place] [facility] [address]    Hours:  TRANSPLANT PHONE NUMBER [phone] Phone:  [phone]     prochlorperazine 10 MG tablet         Some of these will need a paper prescription and others can be bought over the counter.  Ask your nurse if you have questions.     Bring a paper prescription for each of these medications     LORazepam 1 MG tablet                Primary Care Provider Office Phone # Fax #    Karo Doty [phone] [address]  [facility] [address]        Equal Access to Services     JEREMÍAS BUCK : Gopi mckeon Sograce, waaxda luqadaha, qaybta kaalmada makayla, carol roland. So [facility] [phone].    ATENCIÓN: Si habla español, tiene a graham disposición servicios gratuitos de asistencia lingüística. Avril al [phone].    We comply with applicable federal civil rights laws and [place] laws. We do not discriminate on the basis of race, color, national origin, age, disability, sex, sexual orientation, or gender identity.            Thank you!     Thank you for choosing [facility]  for your care. Our goal is always to provide you with excellent care. Hearing back from our patients is one way we can continue to improve our services. Please take a few minutes to complete the written survey that you may receive in the mail after your visit with us. Thank you!             Your Updated Medication List - Protect others around you: Learn how to safely use, store and throw away your medicines at www.disposemymeds.org.          This list is accurate as of 6/1/18  8:03 AM.  Always use your most recent med  list.                   Brand Name Dispense Instructions for use Diagnosis    acetaminophen 500 MG tablet    TYLENOL     Take 1 tablet (500 mg) by mouth every 4 hours as needed    S/P DEMETRIO-BSO       ascorbic acid 250 MG tablet    VITAMIN C    90    250 mg    Routine general medical examination at a health care facility       calcium citrate-vitamin D 315-200 MG-UNIT Tabs per tablet    CITRACAL     Take 2 tablets by mouth daily        GLUCOSAMINE CHONDROITIN COMPLX Tabs      Take  by mouth. 1500 mg 1xqd.    Routine general medical examination at a health care facility       LORazepam 1 MG tablet    ATIVAN    30 tablet    Take 1 tablet (1 mg) by mouth every 6 hours as needed (nausea/vomiting, anxiety or sleep)    Endometrial cancer (H), Encounter for long-term (current) use of medications       prochlorperazine 10 MG tablet    COMPAZINE    30 tablet    Take 1 tablet (10 mg) by mouth every 6 hours as needed (nausea/vomiting)    Endometrial cancer (H), Encounter for long-term (current) use of medications       RANITIDINE HCL PO      Take 150 mg by mouth 2 times daily

## 2018-06-01 NOTE — NURSING NOTE
Port accessed by RN previously, pt tolerated well. Labs collected and sent, line flushed with NS and heparin. Vitals taken and pt checked in for next appt. Sadie Samuel

## 2018-06-01 NOTE — PROGRESS NOTES
Infusion Nursing Note:  Gerri Owens presents today for cycle 1 taxol, carboplatin-Cancelled   Patient seen by provider today: No   present during visit today: Not Applicable.    Note: Patient new to infusion, oriented to infusion suite and call light.  Patient confirms that she has received written chemotherapy teaching materials. Basic side effects of medications reviewed today.  Patient given a thermometer for home. Instructed to call with any fevers >100.4, shaking chills, other infectious symptoms, uncontrolled nausea, vomiting, diarrhea, constipation or any other new or concerning symptoms.  Provided with the phone numbers for triage and the on-call physician.      Intravenous Access:  Implanted Port.    Treatment Conditions:  Lab Results   Component Value Date    HGB 9.8 06/01/2018     Lab Results   Component Value Date    WBC 3.2 06/01/2018      Lab Results   Component Value Date    ANEU 1.3 06/01/2018     Lab Results   Component Value Date     06/01/2018      Lab Results   Component Value Date     06/01/2018                   Lab Results   Component Value Date    POTASSIUM 3.8 06/01/2018           Lab Results   Component Value Date    MAG 2.1 06/01/2018            Lab Results   Component Value Date    CR 0.85 06/01/2018                   Lab Results   Component Value Date    LAURENT 8.7 06/01/2018                Lab Results   Component Value Date    BILITOTAL 0.3 06/01/2018           Lab Results   Component Value Date    ALBUMIN 3.2 06/01/2018                    Lab Results   Component Value Date    ALT 12 06/01/2018           Lab Results   Component Value Date    AST 11 06/01/2018       Results reviewed, labs did NOT meet treatment parameters: ANC 1.3.    6/1/2018 0735 TORB Bradley Tierney MD/Ankita LawsonRN  -recheck CBC now  -if CBC results are consistent with first draw this am, cancel chemo and Joyce Gil will call patient with further instructions with how to proceed (Patient  will need work up for low WBC/ANC)    Repeat WBC was 3.5 with ANC 1.6.  Results reviewed with Dr Tierney who confirmed that we should cancel chemo today  RN reviewed lab results with patient including her recent downward trend in WBC.  She verbalized understanding of the plan and was given contact information for Joyce Gil in case of further questions.        Post Infusion Assessment:  Access discontinued per protocol.    Discharge Plan:   No prescriptions were filled today as patient was not treated. Pharmacy to profile the scripts for ativan and compazine.    Discharge instructions reviewed with: Patient.  Patient and/or family verbalized understanding of discharge instructions and all questions answered.  Copy of AVS reviewed with patient and/or family.  Patient's return visit is pending at this time. Joyce Gil inbasketed with an update and will follow up with the patient.   Patient discharged in stable condition accompanied by: self and .  Departure Mode: Ambulatory.  Face to Face time: 3 minutes.    Ankita Lawson RN

## 2018-06-01 NOTE — PROGRESS NOTES
Care Coordinator Note  Left message for patient that I was calling in follow up regarding her chemotherapy being held today.  Informed her I had talked with Dr. Tierney and he would like her to see hematology to have her WBC evaluated as it is low prior to starting chemotherapy and a  would be calling with the appointment information.      Joyce Gil MSN, RN  Care Coordinator  Gynecologic Cancer   Office:  [phone]  Pager: [phone] #[phone]

## 2018-06-01 NOTE — PATIENT INSTRUCTIONS
-Joyce Gil will call you with a plan with how to proceed.  You can reach her by calling the triage number below if you have further questions.      Contact Numbers    [facility] [place] [facility] Main Line: [phone]  [facility] Triage and after hours / weekends / holidays:  [phone]      Please call the triage or after hours line if you experience a temperature greater than or equal to 100.5, shaking chills, have uncontrolled nausea, vomiting and/or diarrhea, dizziness, shortness of breath, chest pain, bleeding, unexplained bruising, or if you have any other new/concerning symptoms, questions or concerns.      If you are having any concerning symptoms or wish to speak to a provider before your next infusion visit, please call your care coordinator or triage to notify them so we can adequately serve you.     If you need a refill on a narcotic prescription or other medication, please call before your infusion appointment.           June 2018 Sunday Monday Tuesday Wednesday Thursday Friday Saturday                            1     P [facility] LAB DRAW    6:30 AM   (15 min.)    [facility] LAB DRAW   [facility] Lab Draw     P ONC INFUSION 360    7:00 AM   (360 min.)    ONCOLOGY INFUSION   [facility] 2       3     4     5     6     7     8     9       10     11     12     13     14     15     16       17     18     19     20     21     22     [facility]    6:30 AM   (15 min.)    [facility] LAB DRAW   [facility] Lab Draw     [facility] RETURN    6:45 AM   (40 min.)   Irasema Gtz APRN CNP   [facility]    9:00 AM   (360 min.)    ONCOLOGY INFUSION   [facility] 23       24     25     26     27     28     29     30 July 2018 Sunday Monday Tuesday Wednesday Thursday Friday Saturday   1     2     3     4     5     6     7       8     9     10     11     12     13     [facility] D[facility]    8:30 AM   (15 min.)    [facility]  LAB DRAW   [facility] Lab Draw     [facility] RETURN    8:45 AM   (30 min.)   Lisette Klein APRN CNP   [facility] ONC INFUSION 360   10:00 AM   (360 min.)   UC ONCOLOGY INFUSION   [facility] 14       15     16     17     18     19     20     21       22     23     24     25     26     27     28       29     30     31                                     Recent Results (from the past 24 hour(s))   CBC with platelets differential    Collection Time: 06/01/18  6:40 AM   Result Value Ref Range    WBC 3.2 (L) 4.0 - 11.0 10e9/L    RBC Count 3.64 (L) 3.8 - 5.2 10e12/L    Hemoglobin 9.8 (L) 11.7 - 15.7 g/dL    Hematocrit 32.9 (L) 35.0 - 47.0 %    MCV 90 78 - 100 fl    MCH 26.9 26.5 - 33.0 pg    MCHC 29.8 (L) 31.5 - 36.5 g/dL    RDW 13.8 10.0 - 15.0 %    Platelet Count 212 150 - 450 10e9/L    Diff Method Automated Method     % Neutrophils 41.7 %    % Lymphocytes 32.9 %    % Monocytes 12.9 %    % Eosinophils 11.6 %    % Basophils 0.9 %    % Immature Granulocytes 0.0 %    Nucleated RBCs 0 0 /100    Absolute Neutrophil 1.3 (L) 1.6 - 8.3 10e9/L    Absolute Lymphocytes 1.1 0.8 - 5.3 10e9/L    Absolute Monocytes 0.4 0.0 - 1.3 10e9/L    Absolute Eosinophils 0.4 0.0 - 0.7 10e9/L    Absolute Basophils 0.0 0.0 - 0.2 10e9/L    Abs Immature Granulocytes 0.0 0 - 0.4 10e9/L    Absolute Nucleated RBC 0.0    Comprehensive metabolic panel    Collection Time: 06/01/18  6:40 AM   Result Value Ref Range    Sodium 142 133 - 144 mmol/L    Potassium 3.8 3.4 - 5.3 mmol/L    Chloride 110 (H) 94 - 109 mmol/L    Carbon Dioxide 25 20 - 32 mmol/L    Anion Gap 6 3 - 14 mmol/L    Glucose 81 70 - 99 mg/dL    Urea Nitrogen 12 7 - 30 mg/dL    Creatinine 0.85 0.52 - 1.04 mg/dL    GFR Estimate 67 >60 mL/min/1.7m2    GFR Estimate If Black 82 >60 mL/min/1.7m2    Calcium 8.7 8.5 - 10.1 mg/dL    Bilirubin Total 0.3 0.2 - 1.3 mg/dL    Albumin 3.2 (L) 3.4 - 5.0 g/dL    Protein Total 6.3 (L) 6.8 - 8.8 g/dL     Alkaline Phosphatase 81 40 - 150 U/L    ALT 12 0 - 50 U/L    AST 11 0 - 45 U/L   Magnesium    Collection Time: 06/01/18  6:40 AM   Result Value Ref Range    Magnesium 2.1 1.6 - 2.3 mg/dL   CBC with platelets differential    Collection Time: 06/01/18  7:40 AM   Result Value Ref Range    WBC 3.5 (L) 4.0 - 11.0 10e9/L    RBC Count 3.60 (L) 3.8 - 5.2 10e12/L    Hemoglobin 9.7 (L) 11.7 - 15.7 g/dL    Hematocrit 32.3 (L) 35.0 - 47.0 %    MCV 90 78 - 100 fl    MCH 26.9 26.5 - 33.0 pg    MCHC 30.0 (L) 31.5 - 36.5 g/dL    RDW 13.6 10.0 - 15.0 %    Platelet Count 210 150 - 450 10e9/L    Diff Method Automated Method     % Neutrophils 47.2 %    % Lymphocytes 29.6 %    % Monocytes 11.3 %    % Eosinophils 11.0 %    % Basophils 0.9 %    % Immature Granulocytes 0.0 %    Nucleated RBCs 0 0 /100    Absolute Neutrophil 1.6 1.6 - 8.3 10e9/L    Absolute Lymphocytes 1.0 0.8 - 5.3 10e9/L    Absolute Monocytes 0.4 0.0 - 1.3 10e9/L    Absolute Eosinophils 0.4 0.0 - 0.7 10e9/L    Absolute Basophils 0.0 0.0 - 0.2 10e9/L    Abs Immature Granulocytes 0.0 0 - 0.4 10e9/L    Absolute Nucleated RBC 0.0

## 2018-06-06 ENCOUNTER — OFFICE VISIT (OUTPATIENT)
Dept: RADIATION ONCOLOGY | Facility: CLINIC | Age: 63
End: 2018-06-06
Attending: RADIOLOGY
Payer: COMMERCIAL

## 2018-06-06 ENCOUNTER — ONCOLOGY VISIT (OUTPATIENT)
Dept: ONCOLOGY | Facility: CLINIC | Age: 63
End: 2018-06-06
Attending: INTERNAL MEDICINE
Payer: COMMERCIAL

## 2018-06-06 VITALS
DIASTOLIC BLOOD PRESSURE: 66 MMHG | BODY MASS INDEX: 29.27 KG/M2 | WEIGHT: 180 LBS | SYSTOLIC BLOOD PRESSURE: 105 MMHG | HEART RATE: 73 BPM

## 2018-06-06 VITALS
HEART RATE: 71 BPM | TEMPERATURE: 97.8 F | OXYGEN SATURATION: 99 % | BODY MASS INDEX: 29.33 KG/M2 | WEIGHT: 180.34 LBS | DIASTOLIC BLOOD PRESSURE: 61 MMHG | RESPIRATION RATE: 16 BRPM | SYSTOLIC BLOOD PRESSURE: 98 MMHG

## 2018-06-06 DIAGNOSIS — D64.9 ANEMIA, UNSPECIFIED TYPE: Primary | ICD-10-CM

## 2018-06-06 DIAGNOSIS — D72.819 LEUKOPENIA, UNSPECIFIED TYPE: ICD-10-CM

## 2018-06-06 DIAGNOSIS — Z79.899 ENCOUNTER FOR LONG-TERM (CURRENT) USE OF MEDICATIONS: ICD-10-CM

## 2018-06-06 DIAGNOSIS — D64.9 ANEMIA, UNSPECIFIED TYPE: ICD-10-CM

## 2018-06-06 DIAGNOSIS — C54.1 ENDOMETRIAL CANCER (H): ICD-10-CM

## 2018-06-06 DIAGNOSIS — C54.1 ENDOMETRIAL CANCER (H): Primary | ICD-10-CM

## 2018-06-06 LAB
ALBUMIN SERPL-MCNC: 3.6 G/DL (ref 3.4–5)
ALP SERPL-CCNC: 95 U/L (ref 40–150)
ALT SERPL W P-5'-P-CCNC: 12 U/L (ref 0–50)
ANION GAP SERPL CALCULATED.3IONS-SCNC: 6 MMOL/L (ref 3–14)
AST SERPL W P-5'-P-CCNC: 14 U/L (ref 0–45)
BASOPHILS # BLD AUTO: 0 10E9/L (ref 0–0.2)
BASOPHILS NFR BLD AUTO: 0.7 %
BILIRUB SERPL-MCNC: 0.5 MG/DL (ref 0.2–1.3)
BUN SERPL-MCNC: 12 MG/DL (ref 7–30)
CALCIUM SERPL-MCNC: 9.6 MG/DL (ref 8.5–10.1)
CHLORIDE SERPL-SCNC: 108 MMOL/L (ref 94–109)
CO2 SERPL-SCNC: 26 MMOL/L (ref 20–32)
CREAT SERPL-MCNC: 1 MG/DL (ref 0.52–1.04)
CRP SERPL-MCNC: <2.9 MG/L (ref 0–8)
DIFFERENTIAL METHOD BLD: ABNORMAL
EOSINOPHIL # BLD AUTO: 0.2 10E9/L (ref 0–0.7)
EOSINOPHIL NFR BLD AUTO: 4.2 %
ERYTHROCYTE [DISTWIDTH] IN BLOOD BY AUTOMATED COUNT: 13.6 % (ref 10–15)
FERRITIN SERPL-MCNC: 10 NG/ML (ref 8–252)
GFR SERPL CREATININE-BSD FRML MDRD: 56 ML/MIN/1.7M2
GLUCOSE SERPL-MCNC: 83 MG/DL (ref 70–99)
HCT VFR BLD AUTO: 34.9 % (ref 35–47)
HGB BLD-MCNC: 10.5 G/DL (ref 11.7–15.7)
IMM GRANULOCYTES # BLD: 0 10E9/L (ref 0–0.4)
IMM GRANULOCYTES NFR BLD: 0.2 %
IRON SATN MFR SERPL: 7 % (ref 15–46)
IRON SERPL-MCNC: 27 UG/DL (ref 35–180)
LYMPHOCYTES # BLD AUTO: 0.8 10E9/L (ref 0.8–5.3)
LYMPHOCYTES NFR BLD AUTO: 18.6 %
MAGNESIUM SERPL-MCNC: 2.2 MG/DL (ref 1.6–2.3)
MCH RBC QN AUTO: 26.9 PG (ref 26.5–33)
MCHC RBC AUTO-ENTMCNC: 30.1 G/DL (ref 31.5–36.5)
MCV RBC AUTO: 89 FL (ref 78–100)
MONOCYTES # BLD AUTO: 0.4 10E9/L (ref 0–1.3)
MONOCYTES NFR BLD AUTO: 9.1 %
NEUTROPHILS # BLD AUTO: 2.9 10E9/L (ref 1.6–8.3)
NEUTROPHILS NFR BLD AUTO: 67.2 %
NRBC # BLD AUTO: 0 10*3/UL
NRBC BLD AUTO-RTO: 0 /100
PLATELET # BLD AUTO: 234 10E9/L (ref 150–450)
POTASSIUM SERPL-SCNC: 4.1 MMOL/L (ref 3.4–5.3)
PROT SERPL-MCNC: 7 G/DL (ref 6.8–8.8)
RBC # BLD AUTO: 3.91 10E12/L (ref 3.8–5.2)
RETICS # AUTO: 38.3 10E9/L (ref 25–95)
RETICS/RBC NFR AUTO: 1 % (ref 0.5–2)
SODIUM SERPL-SCNC: 140 MMOL/L (ref 133–144)
TIBC SERPL-MCNC: 373 UG/DL (ref 240–430)
TSH SERPL DL<=0.005 MIU/L-ACNC: 1.58 MU/L (ref 0.4–4)
VIT B12 SERPL-MCNC: 335 PG/ML (ref 193–986)
WBC # BLD AUTO: 4.3 10E9/L (ref 4–11)

## 2018-06-06 PROCEDURE — G0463 HOSPITAL OUTPT CLINIC VISIT: HCPCS

## 2018-06-06 PROCEDURE — 36415 COLL VENOUS BLD VENIPUNCTURE: CPT | Performed by: INTERNAL MEDICINE

## 2018-06-06 PROCEDURE — 83540 ASSAY OF IRON: CPT | Performed by: INTERNAL MEDICINE

## 2018-06-06 PROCEDURE — 84443 ASSAY THYROID STIM HORMONE: CPT | Performed by: INTERNAL MEDICINE

## 2018-06-06 PROCEDURE — 83550 IRON BINDING TEST: CPT | Performed by: INTERNAL MEDICINE

## 2018-06-06 PROCEDURE — 82728 ASSAY OF FERRITIN: CPT | Performed by: INTERNAL MEDICINE

## 2018-06-06 PROCEDURE — 86140 C-REACTIVE PROTEIN: CPT | Performed by: INTERNAL MEDICINE

## 2018-06-06 PROCEDURE — G0463 HOSPITAL OUTPT CLINIC VISIT: HCPCS | Performed by: RADIOLOGY

## 2018-06-06 PROCEDURE — 85025 COMPLETE CBC W/AUTO DIFF WBC: CPT | Performed by: INTERNAL MEDICINE

## 2018-06-06 PROCEDURE — 85045 AUTOMATED RETICULOCYTE COUNT: CPT | Performed by: INTERNAL MEDICINE

## 2018-06-06 PROCEDURE — 99204 OFFICE O/P NEW MOD 45 MIN: CPT | Mod: ZP | Performed by: INTERNAL MEDICINE

## 2018-06-06 PROCEDURE — 82607 VITAMIN B-12: CPT | Performed by: INTERNAL MEDICINE

## 2018-06-06 ASSESSMENT — ENCOUNTER SYMPTOMS
BLURRED VISION: 0
DIZZINESS: 0
WEIGHT LOSS: 0
HEARTBURN: 1
COUGH: 0
CHILLS: 0
TINGLING: 0
DYSURIA: 0
FREQUENCY: 0
NAUSEA: 0
VOMITING: 0
NERVOUS/ANXIOUS: 0
SHORTNESS OF BREATH: 0
SEIZURES: 0
DEPRESSION: 0
SENSORY CHANGE: 0
FEVER: 0
WHEEZING: 0
BRUISES/BLEEDS EASILY: 0
CONSTIPATION: 0
HEADACHES: 0

## 2018-06-06 ASSESSMENT — PAIN SCALES - GENERAL: PAINLEVEL: NO PAIN (0)

## 2018-06-06 NOTE — PROGRESS NOTES
RADIATION ONCOLOGY CONSULT NOTE  Date of Visit: 2018    Gerri Owens  MRN: [id number]  : 1955    Gerri Owens is being seen today for initial consultation at the request of Dr. Bradley Tierney for consideration of radiation therapy for FIGO grade 1 endometrioid endometrial adenocarcinoma, FIGO stage IIIC1 (AJCC pT3a pN1 M0). All pertinent labs, imaging, and pathology findings have been reviewed.     HISTORY OF PRESENT ILLNESS:  Ms. Owens is a 63 year old woman with a recent diagnosis of stage IIIC1 endometrioid endometrial adenocarcinoma.  She first presented to gynecology clinic on 2018 for abnormal uterine bleeding.  She reports that approximately 2 years ago she developed what she believed to be urinary incontinence, with some mild tan colored vaginal discharge.  By her recollection, this very slowly changed in color to be slightly darker.  Finally, there seemed to be occasional vaginal bleeding, which prompted her to seek medical evaluation.  Pelvic exam on the clinic visit was essentially normal with atrophic vagina, atrophic cervix, no adnexal masses.  She was scheduled in April for biopsy in the operating room, as she experienced positional discomfort due to recent total hip replacement and pelvic discomfort on exam.    On 2018, pelvic ultrasound was significant for enlarged uterus with significantly thickened and vascular endometrium.  She underwent dilation and curettage by Dr. Roxie Martinez, which was significant for FIGO grade 2 (final pathologic specimen is later FIGO grade 1) endometrioid adenocarcinoma, with normal mismatch repair.  She met with Dr. Tierney on 2018, who recommended surgery.  His exam was consistent with a 10 week size uterus, and no adnexal masses.    On 2018, she underwent laparoscopic converted to open total hysterectomy, BSO, pelvic and alicia-aortic lymph node dissection (PPALND), tumor debulking, CUSA, and partial omentectomy.   Intraoperative findings were consistent for a normal cervix but sharply deviated to the patient's right.  Laparoscopy revealed a lesion suspicious for malignancy on anterior fundus and diaphragm.  Peritoneal surfaces including the pelvic, bladder peritoneum, and cul-de-sac were without nodularity.  There was some nodularity palpated on the diaphragm.  A cystic mass was noted in the right pelvis, and there was palpable nodularity in the omentum and mesentery.  Operative note indicates to prominent firm right pelvic lymph nodes.  Finally, there were a patch of tiny nodularities at the ileocecal junction and anterior rectal serosa, for which the CUSA was used.    Pathology revealed grade 1 endometrioid adenocarcinoma the endometrium.  This involved the lower uterine segment and invaded the cervical stroma, and involved right ovarian surface adhesions.  The left adnexa was also invaded by endometrial adenocarcinoma.  The anterior pericervical surgical margin was positive for malignancy.  The tumor size was 5.5 cm, and there was 15 of 15 mm myometrial invasion as well as uterine serosal involvement.  There was adenomyosis with carcinoma involving the left fallopian tube as well.  LVSI was present; peritoneal washings were negative for malignancy. Involved lymph nodes were as follows: 1 of 8 left pelvic (3mm, no extranodal extension), 1/6 R pelvic (21 mm, extranodal extension present), and 0/4 periaortic.  The initial anterior fundal biopsy was positive for carcinoma, and omentectomy was negative.    She met with Dr. Tierney on 5/14/2018, and he recommended sandwich chemo radiation therapy with carboplatin and Taxol.  The patient was initially hesitant, however ultimately agreed to proceed with therapy.  She had her port placement on 5/31/2018, with initial infusion planned for 6/1/2018.  However, this was delayed due to findings of leukopenia (baseline WBC between 4.0-9.8, then found to be 3.3 on 5/31/18, 3.2 and 3.5 on  18).  She met with Dr. Rapp of hematology this morning, who performed repeat WBC today which shows a rebound to 4.3.  Only one of her 2 CBC w Diff tests on 18 showed a low ANC (1.3); today, it was 2.9. Dr. Rapp is recommending proceeding with chemotherapy.    CHEMOTHERAPY HISTORY: None    PAST RADIATION THERAPY HISTORY: None    PAST MEDICAL HISTORY:  Osteoarthritis  History of DVT of the upper extremity  Endometrial cancer as per HPI  GERD  Dyslipidemia  Hyperthyroidism with right lobe nodule, FNA negative.    PAST SURGICAL HISTORY:  Left total hip arthroplasty  Dilation and curettage per HPI  DEMETRIO, BSO, PPALND as per HPI  Tonsillectomy    ALLERGIES:  Nickel    MEDICATIONS:  Calcium and vitamin D  Glucosamine/chondroitin  Ranitidine  Vitamin C    FAMILY HISTORY:  Her father has a history of skin cancer. No other pertinent oncologic family history.    SOCIAL HISTORY:  Gerri is ; she retired in April.  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  She is a lifelong never smoker, and rarely drinks alcohol.  She is .    REVIEW OF SYSTEMS: A 10 point review of systems was obtained. Pertinent findings are noted in the HPI and are otherwise unremarkable.     PHYSICAL EXAM:  VITALS: /66  Pulse 73  Wt 81.6 kg (180 lb)  LMP 2005  BMI 29.27 kg/m2  GEN: Appears well, alert, oriented, and in NAD  HEENT: EOMI, normal conjunctiva, MMM, no thrush  NECK: Supple, full ROM, no cervical or clavicular lymphadenopathy  CV: Warm and well-perfused  RESP: Breathing comfortably on room air  ABDOMEN: Soft, NT, ND, bowel sounds present. Vertical hysterectomy incision healing well.  SKIN: Normal color and turgor  PSYCH: Appropriate mood and affect    ECOG PERFORMANCE STATUS: 0    PACEMAKER: None    PREGNANCY STATUS: s/p hysterectomy    IMPRESSION:  Ms. Owens is a 63 year old woman with FIGO grade 1  endometrioid endometrial adenocarcinoma, FIGO stage IIIC1.  Pathology showed cervical stromal involvement, 100% myometrial invasion with uterine serosal involvement, bilateral ovarian and left fallopian tube involvement, positive anterior surface margin, 2 of 14 pelvic and 0 of 4 periaortic lymph nodes involved, one with extranodal extension.  She is now approximately 5 weeks status post laparoscopic converted to open total abdominal hysterectomy, BSO, PPALND and omentectomy. She was scheduled to start adjuvant carboplatin and paclitaxel chemotherapy last week, which was delayed due to low white blood cell count.    RECOMMENDATION:  We agree with the recommendation for adjuvant radiation therapy and chemotherapy.  She has significant risk factors for recurrence, many of which are listed above.  The endometrial cancer was locally quite advanced.  While initial biopsy showed grade 2 carcinoma, her final pathologic specimen was reported as grade 1 endometrioid histology.    We explained the recommendation for both pelvic external beam radiation therapy (EBRT), consisting of 5040 cGy in 28 daily fractions over 5 weeks, as well as HDR brachytherapy using a vaginal cylinder to boost the vaginal cuff, consisting of 2 treatments of 600 cGy prescribed to the vaginal surface.  We explained our rationale, as well as the logistics, risks, benefits, and alternatives to adjuvant radiation therapy.      We encouraged Ms. Owens to ask questions, which we answered to the best of our ability.  Informed consent form was signed.    To celebrate her recent senior care, her  and her are planning to take a trip to the [place] via [employer], leaving September 29, 2018.  If she can start her first cycle of chemotherapy prior to what is currently scheduled on 6/22/2018, she should be able to finish the 3 cycles of carboplatin and paclitaxel as well as both EBRT and vaginal cuff brachytherapy prior to leaving.  Otherwise,  if she continues to have issues with cytopenias, we could start her therapy with radiation therapy and proceed to chemotherapy at the completion of RT, given that she is less likely to have issues with blood counts while undergoing radiation therapy alone.    The patient was seen and discussed with staff, Dr. Billings. Thank you for involving us in the care of this patient.  Please feel free to contact us with questions or concerns at any time.    Vini Calzada MD PGY-4  Radiation Oncology Resident, [facility]  Phone: [phone]    I saw and examined the patient with the resident.  I have reviewed and agree with the resident's note and plan of care.      Darling Billings MD    CC  Patient Care Team:  Karo Doty MD as PCP - General (Family Practice)  Gege Gil, RN as Continuity Care Coordinator (Gyn-Onc)  ALEX FLOREZ

## 2018-06-06 NOTE — PROGRESS NOTES
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 No    Onset of menarche: about age 11  LMP: Patient's last menstrual period was 03/04/2005.  Onset of menopause:     Abnormal vaginal bleeding/discharge: No  Are you pregnant? No  Reproductive note:     Nurse face-to-face time: Level 4:  15 min face to face time    Review of Systems   Constitutional: Negative for chills, fever, malaise/fatigue and weight loss.   HENT: Negative for hearing loss.    Eyes: Negative for blurred vision.   Respiratory: Negative for cough, shortness of breath and wheezing.    Cardiovascular: Negative for chest pain.   Gastrointestinal: Positive for heartburn. Negative for constipation, nausea and vomiting.   Genitourinary: Negative for dysuria, frequency and urgency.   Musculoskeletal: Positive for joint pain (arthritis hips and hands).   Skin: Positive for rash (skin rash- lotion?).   Neurological: Negative for dizziness, tingling, sensory change, seizures and headaches.   Endo/Heme/Allergies: Does not bruise/bleed easily.   Psychiatric/Behavioral: Negative for depression. The patient is not nervous/anxious.

## 2018-06-06 NOTE — MR AVS SNAPSHOT
After Visit Summary   6/6/2018    Gerri Owens    MRN: [id number]           Patient Information     Date Of Birth          1955        Visit Information        Provider Department      6/6/2018 1:00 PM Darling Billings MD Radiation Oncology Clinic        Today's Diagnoses     Endometrial cancer (H)    -  1       Follow-ups after your visit        Your next 10 appointments already scheduled     Jun 13, 2018  8:30 AM CDT   [facility] Lab Draw with [facility] LAB DRAW   [facility]    [address]  [address]  [facility] [address]   [phone]            Jun 13, 2018  9:00 AM CDT   Infusion 360 with UC ONCOLOGY INFUSION, UC 31 ATC   [facility] ([facility])    [address] [place] [address]  [address]  [facility] [address]   [phone]            Jun 22, 2018  6:30 AM CDT   [facility] Lab Draw with UC MASONIC LAB DRAW   [facility] Lab Draw ([facility])    [address] [place] [address]  [address]  [facility] [address]   [phone]            Jun 22, 2018  7:00 AM CDT   (Arrive by 6:45 AM)   Return Visit with ALFONZO Weathers CNP   [facility] ([facility])    [address] [place] [address]  [address]  [facility] [address]   [phone]            Jun 22, 2018  9:00 AM CDT   Infusion 360 with UC ONCOLOGY INFUSION, UC 26 ATC   [facility] ([facility])    [address] [place] [address]  [address]  [facility] [address]   [phone]            Jul 13, 2018  8:30 AM CDT   [facility] Lab Draw with UC MASONIC LAB DRAW   [facility] Lab Draw ([facility])    [address] [place] [address]  [address]  [facility] [address]   [phone]            Jul 13, 2018  9:00 AM CDT   (Arrive by 8:45 AM)   Return Visit with ALFONZO Canales CNP   [facility]  ([facility])    [address]  [address]  [facility] [id number] [phone]            Jul 13, 2018 10:00 AM CDT   Infusion 360 with UC ONCOLOGY INFUSION   [facility] ([facility])    [address]  [address]  [facility] [id number] [phone]              Who to contact     Please call your clinic at [phone] to:    Ask questions about your health    Make or cancel appointments    Discuss your medicines    Learn about your test results    Speak to your doctor            Additional Information About Your Visit        [employer] Information     [employer] gives you secure access to your electronic health record. If you see a primary care provider, you can also send messages to your care team and make appointments. If you have questions, please call your primary care clinic.  If you do not have a primary care provider, please call [phone] and they will assist you.      [employer] is an electronic gateway that provides easy, online access to your medical records. With [employer], you can request a clinic appointment, read your test results, renew a prescription or communicate with your care team.     To access your existing account, please contact your [facility] or call [phone] for assistance.        Care EveryWhere ID     This is your Care EveryWhere ID. This could be used by other organizations to access your [facility] medical records  [id number]        Your Vitals Were     Pulse Last Period BMI (Body Mass Index)             73 03/04/2005 29.27 kg/m2          Blood Pressure from Last 3 Encounters:   06/06/18 105/66   06/06/18 98/61   06/01/18 115/78    Weight from Last 3 Encounters:   06/06/18 81.6 kg (180 lb)   06/06/18 81.8 kg (180 lb 5.4 oz)   06/01/18 82.3 kg (181 lb 6.4 oz)              Today, you had the following     No orders found for display       Primary Care Provider Office  Phone # Fax #    Karo Doty [phone] [address]  [facility] [address]        Equal Access to Services     JEREMÍAS BUCK : Hadii aad ku hadmohindersissy Yanygrace, argeliada mekajose armando, catracho kasherlyn benavides, carol hunt laNatblue roland. So [facility] [phone].    ATENCIÓN: Si habla español, tiene a graham disposición servicios gratuitos de asistencia lingüística. Llame al [phone].    We comply with applicable federal civil rights laws and [place] laws. We do not discriminate on the basis of race, color, national origin, age, disability, sex, sexual orientation, or gender identity.            Thank you!     Thank you for choosing [facility]  for your care. Our goal is always to provide you with excellent care. Hearing back from our patients is one way we can continue to improve our services. Please take a few minutes to complete the written survey that you may receive in the mail after your visit with us. Thank you!             Your Updated Medication List - Protect others around you: Learn how to safely use, store and throw away your medicines at www.disposemymeds.org.          This list is accurate as of 6/6/18 11:59 PM.  Always use your most recent med list.                   Brand Name Dispense Instructions for use Diagnosis    acetaminophen 500 MG tablet    TYLENOL     Take 1 tablet (500 mg) by mouth every 4 hours as needed    S/P DEMETRIO-BSO       ascorbic acid 250 MG tablet    VITAMIN C    90    250 mg    Routine general medical examination at a health care facility       calcium citrate-vitamin D 315-200 MG-UNIT Tabs per tablet    CITRACAL     Take 2 tablets by mouth daily        GLUCOSAMINE CHONDROITIN COMPLX Tabs      Take  by mouth. 1500 mg 1xqd.    Routine general medical examination at a health care facility       LORazepam 1 MG tablet    ATIVAN    30 tablet    Take 1 tablet (1 mg) by mouth every 6 hours as needed (nausea/vomiting, anxiety or sleep)     Endometrial cancer (H), Encounter for long-term (current) use of medications       prochlorperazine 10 MG tablet    COMPAZINE    30 tablet    Take 1 tablet (10 mg) by mouth every 6 hours as needed (nausea/vomiting)    Endometrial cancer (H), Encounter for long-term (current) use of medications       RANITIDINE HCL PO      Take 150 mg by mouth 2 times daily

## 2018-06-06 NOTE — PROGRESS NOTES
Hematology consult note    Reason for consult: Leukopenia, referred by Dr. Tierney    History of present illness: Ms. Owens is a 63-year-old woman with recently diagnosed endometrial carcinoma who was noted to have mild neutropenia when she came for cycle 1 of chemotherapy.  Prior to her surgery in April she had normal total white blood cell count, normal heme globin, and normal platelets.  She did not have differentials done in the several weeks prior to having surgery so it is not known for sure if the absolute neutrophil count was normal.  After her surgery she had a drop in her hemoglobin down to the 8 range which is improved modestly.  When she came for port placement on 5/31/18 she was noted to have a total white blood cell count of 3.3, no differential was done.  On 6/1/2018 at 640 in morning the total white count was 3.2 with an ANC of 1.3.  A repeat done an hour later had a white count of 3.5 with an ANC of 1.6.  However it was decided not to initiate chemotherapy with carboplatin and Taxol.  She is here for evaluation prior to starting chemotherapy.  She does not recall having any sort of illness in the few days prior to this visit to start her chemo.  No fevers, chills, sweats, as per upper respiratory infection symptoms, sinus pain.  She has noted over the past couple days that she has a little bit of a rash on her neck which she attributes to a new soap she was given.  She says she has quite sensitive skin.  She has had no new medications over the past several weeks.  She is not taking any new supplements or herbal remedies.  Overall today she is feeling quite well and feels that she is pretty much recovered from her hysterectomy surgery.    Past medical history:  -Endometrial cancer-status post hysterectomy bilateral salpingo-oophorectomy with lymph node dissection on 4/27/18.  Found to have positive lymph nodes.  -Status post right hip replacement 12/4/17.  -History of right upper extremity DVT March  2012, subclavian vein, no clear provoking factors such as a PICC line.  She postulates that it was due to carrying very heavy purse on her shoulder all the time.  Labs on 3/29/12 for antiphospholipid antibodies, lupus anticoagulant, factor V Leiden, prothrombin gene mutation, Antithrombin, protein C, protein S free, were all normal.  She was anticoagulated for 6 months.  -GERD-has been treated with multiple agents in the past including Pepcid and PPI, now just ranitidine  -Arthritis  -Hyperlipidemia  -Decreased renal function that improved after stopping NSAIDs    Medications:  Acetaminophen as needed  Calcium plus vitamin D 2 tablets daily  Glucosamine/chondroitin sulfate 1500 mg daily as needed  Ranitidine 100 mg twice daily  Vitamin C 250 mg as needed      Family history: 2 of her brothers have had DVT.  1 of them has factor V Leiden.    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  She does not smoke and was never smoker, she drinks alcohol about a month, no illicit drugs.    Review of Systems:  As in history above.  She occasionally drinks a protein powder that she misses it and with milk that she gets at the regular grocery store, she does know the brand name.  She has intentionally lost about 40 pounds over the past few years and would like to avoid regaining.  She had a port placed 5/31, incisions still healing with no complaints.  The rest of the >10 point ROS is negative.      PHYSICAL EXAMINATION:  BP 98/61  Pulse 71  Temp 97.8  F (36.6  C) (Oral)  Resp 16  Wt 81.8 kg (180 lb 5.4 oz)  LMP 03/04/2005  SpO2 99%  BMI 29.33 kg/m2    General appearance:  Patient is 62 yo woman in no acute distress.     HEENT:  No pallor, icterus, or mucositis.  No thyromegaly.   Lymph nodes:  No cervical, supraclavicular, axillary, or inguinal lymphadenopathy.   Lungs:  Clear to auscultation bilaterally.   Chest: Port R upper chest with two small incision  "sites covered with peeling \"glue\" no erythema or exudate; fading ecchymoses.   Heart:  Regular rate and rhythm; no S3 S4 or murmer.     Abdomen:  Well-healed midline surgical scar. Positive bowel sounds, soft and nontender, nondistended.  No hepatomegaly. No splenomegaly appreciated.    Extremities:  No joint swelling or tenderness.  No arm or  ankle edema.     Skin:  Mild erythema, no flaking of skin along anterior base of neck. No rash, no petechiae or ecchymoses.    Labs:  Results for MARILEE EDWARD (MRN [id number]) as of 6/6/2018 13:32   Ref. Range 4/30/2018 04:02 5/31/2018 06:49 6/1/2018 06:40 6/1/2018 07:40 6/6/2018 11:03   WBC Latest Ref Range: 4.0 - 11.0 10e9/L 4.9 3.3 (L) 3.2 (L) 3.5 (L) 4.3   Hemoglobin Latest Ref Range: 11.7 - 15.7 g/dL 8.1 (L) 9.7 (L) 9.8 (L) 9.7 (L) 10.5 (L)   Hematocrit Latest Ref Range: 35.0 - 47.0 % 26.7 (L) 31.9 (L) 32.9 (L) 32.3 (L) 34.9 (L)   Platelet Count Latest Ref Range: 150 - 450 10e9/L 193 207 212 210 234   RBC Count Latest Ref Range: 3.8 - 5.2 10e12/L 2.89 (L) 3.55 (L) 3.64 (L) 3.60 (L) 3.91   MCV Latest Ref Range: 78 - 100 fl 92 90 90 90 89   MCH Latest Ref Range: 26.5 - 33.0 pg 28.0 27.3 26.9 26.9 26.9   MCHC Latest Ref Range: 31.5 - 36.5 g/dL 30.3 (L) 30.4 (L) 29.8 (L) 30.0 (L) 30.1 (L)   RDW Latest Ref Range: 10.0 - 15.0 % 14.7 13.7 13.8 13.6 13.6   Diff Method Unknown   Automated Method Automated Method Automated Method   % Neutrophils Latest Units: %   41.7 47.2 67.2   % Lymphocytes Latest Units: %   32.9 29.6 18.6   % Monocytes Latest Units: %   12.9 11.3 9.1   % Eosinophils Latest Units: %   11.6 11.0 4.2   % Basophils Latest Units: %   0.9 0.9 0.7   % Immature Granulocytes Latest Units: %   0.0 0.0 0.2   Nucleated RBCs Latest Ref Range: 0 /100   0 0 0   Absolute Neutrophil Latest Ref Range: 1.6 - 8.3 10e9/L   1.3 (L) 1.6 2.9   Absolute Lymphocytes Latest Ref Range: 0.8 - 5.3 10e9/L   1.1 1.0 0.8   Absolute Monocytes Latest Ref Range: 0.0 - 1.3 10e9/L   0.4 0.4 " 0.4   Absolute Eosinophils Latest Ref Range: 0.0 - 0.7 10e9/L   0.4 0.4 0.2   Absolute Basophils Latest Ref Range: 0.0 - 0.2 10e9/L   0.0 0.0 0.0   Abs Immature Granulocytes Latest Ref Range: 0 - 0.4 10e9/L   0.0 0.0 0.0   Absolute Nucleated RBC Unknown   0.0 0.0 0.0   % Retic Latest Ref Range: 0.5 - 2.0 %     1.0   Absolute Retic Latest Ref Range: 25 - 95 10e9/L     38.3   Results for MARILEE EDWARD (MRN [id number]) as of 6/6/2018 13:32   Ref. Range 6/6/2018 11:03   Sodium Latest Ref Range: 133 - 144 mmol/L 140   Potassium Latest Ref Range: 3.4 - 5.3 mmol/L 4.1   Chloride Latest Ref Range: 94 - 109 mmol/L 108   Carbon Dioxide Latest Ref Range: 20 - 32 mmol/L 26   Urea Nitrogen Latest Ref Range: 7 - 30 mg/dL 12   Creatinine Latest Ref Range: 0.52 - 1.04 mg/dL 1.00   GFR Estimate Latest Ref Range: >60 mL/min/1.7m2 56 (L)   GFR Estimate If Black Latest Ref Range: >60 mL/min/1.7m2 68   Calcium Latest Ref Range: 8.5 - 10.1 mg/dL 9.6   Anion Gap Latest Ref Range: 3 - 14 mmol/L 6   Magnesium Latest Ref Range: 1.6 - 2.3 mg/dL 2.2   Albumin Latest Ref Range: 3.4 - 5.0 g/dL 3.6   Protein Total Latest Ref Range: 6.8 - 8.8 g/dL 7.0   Bilirubin Total Latest Ref Range: 0.2 - 1.3 mg/dL 0.5   Alkaline Phosphatase Latest Ref Range: 40 - 150 U/L 95   ALT Latest Ref Range: 0 - 50 U/L 12   AST Latest Ref Range: 0 - 45 U/L 14   CRP Inflammation Latest Ref Range: 0.0 - 8.0 mg/L <2.9   Ferritin Latest Ref Range: 8 - 252 ng/mL 10   Iron Latest Ref Range: 35 - 180 ug/dL 27 (L)   Iron Binding Cap Latest Ref Range: 240 - 430 ug/dL 373   Iron Saturation Index Latest Ref Range: 15 - 46 % 7 (L)   TSH Latest Ref Range: 0.40 - 4.00 mU/L 1.58   Vitamin B12 Latest Ref Range: 193 - 986 pg/mL 335     Assessment : 63-year-old woman with very mild neutropenia that has resolved.  Differential diagnosis of neutropenia includes nutritional deficiencies, suppression by medications, suppression by viral infection, autoimmune, hypothyroid, primary bone  marrow disorder.  She had normal total white count with normal CBC prior to her surgery a little over a month ago, which is reassuring that she does not have any primary bone marrow disorder and unlikely to have infiltration by endometrial cancer in her marrow, which would be unusual.  She did not really give any symptoms of having some sort of acute viral infection that may have suppressed the count for brief amount of time.  She is not on any medications that are very likely to cause leukopenia.  She does not have B12 deficiency.  The TSH is normal, so that is not a cause.   So in the end, I am not sure why she had mild decrease in her counts right at the time she was going to start her chemotherapy.  Regardless, the problem has resolved and this is unlikely to be a serious bone marrow disorder that would interfere with her being able to have chemotherapy.  I believe it is safe to go forward with chemotherapy without any other further evaluation.    She also has iron deficiency anemia that is probably related to the two surgical procedures she has had within the past 6 months.  Her ferritin is only 10 ng/ml, therefore she would benefit from iron supplementation.  I recommend ferrous gluconate 1 tablet p.o. daily with food.  I discussed her that it may cause some stomach upset or constipation, so she should let us know if that is an issue, and if necessary she could receive some intravenous iron.  However I prefer to try oral iron first.    Recommendations:  -Okay to go forward with chemotherapy   -Always check a differential with the complete blood count, so that if there is leukopenia, we know if it is due to neutropenia  -Start ferrous gluconate 325 mg p.o. daily with food intake for 3 months.  -Recheck ferritin in 3 months, if ferritin is still less than 20 ng/mL then continue ferrous gluconate for another 3 months    No routine f/u in hematology clinic, but can be referred back as needed  Lamar Rapp,  MD  Hematology

## 2018-06-06 NOTE — NURSING NOTE
"Oncology Rooming Note    June 6, 2018 9:25 AM   Gerri Owens is a 63 year old female who presents for:    Chief Complaint   Patient presents with     Oncology Clinic Visit     New for Low WBC     Initial Vitals: BP 98/61  Pulse 71  Temp 97.8  F (36.6  C) (Oral)  Resp 16  Wt 81.8 kg (180 lb 5.4 oz)  LMP 03/04/2005  SpO2 99%  BMI 29.33 kg/m2 Estimated body mass index is 29.33 kg/(m^2) as calculated from the following:    Height as of 5/31/18: 1.67 m (5' 5.75\").    Weight as of this encounter: 81.8 kg (180 lb 5.4 oz). Body surface area is 1.95 meters squared.  No Pain (0) Comment: Data Unavailable   Patient's last menstrual period was 03/04/2005.  Allergies reviewed: Yes  Medications reviewed: Yes    Medications: Medication refills not needed today.  Pharmacy name entered into [facility]:    [facility] [place], [place] - [address] [place] [address] OUTPATIENT PHARMACY  [facility] [place], [place] - [address] [facility]    Clinical concerns: refferrel Dr Niall Rapp was notified.    8 minutes for nursing intake (face to face time)     Fany Martinez MA              "

## 2018-06-06 NOTE — MR AVS SNAPSHOT
After Visit Summary   6/6/2018    Gerri Owens    MRN: [id number]           Patient Information     Date Of Birth          1955        Visit Information        Provider Department      6/6/2018 9:30 AM Lamar Rapp MD [facility]        Today's Diagnoses     Anemia, unspecified type    -  1    Leukopenia, unspecified type           Follow-ups after your visit        Follow-up notes from your care team     Return if symptoms worsen or fail to improve.      Your next 10 appointments already scheduled     Jun 22, 2018  6:30 AM CDT   [facility] Lab Draw with [facility] LAB DRAW   [facility] Draw ([facility])    [address]  [address]  [facility] [address]   [phone]            Jun 22, 2018  7:00 AM CDT   (Arrive by 6:45 AM)   Return Visit with ALFONZO Weathers CNP   [facility] ([facility])    [address]  [address]  [facility] [address]   [phone]            Jun 22, 2018  9:00 AM CDT   Infusion 360 with UC ONCOLOGY INFUSION, UC 26 ATC   [facility] ([facility])    [address]  [address]  [facility] [address]   [phone]            Jul 13, 2018  8:30 AM CDT   [facility] Lab Draw with [facility]ON[facility] LAB DRAW   [facility] Draw ([facility])    [address]  [address]  [facility] [address]   [phone]            Jul 13, 2018  9:00 AM CDT   (Arrive by 8:45 AM)   Return Visit with ALFONZO Canales CNP   [facility] ([facility])    [address]  [address]  [facility] [address]   [phone]            Jul 13, 2018 10:00 AM CDT   Infusion 360 with UC ONCOLOGY INFUSION, UC 12 ATC   [facility] ([facility])    [address] [place] [address]  [address]  [facility] [address] [phone]              Who to contact     If you have questions or need follow up information about today's clinic visit or your schedule please contact [facility] directly at [phone].  Normal or non-critical lab and imaging results will be communicated to you by MyChart, letter or phone within 4 business days after the clinic has received the results. If you do not hear from us within 7 days, please contact the clinic through [employer] or phone. If you have a critical or abnormal lab result, we will notify you by phone as soon as possible.  Submit refill requests through [email or web address] or call your pharmacy and they will forward the refill request to us. Please allow 3 business days for your refill to be completed.          Additional Information About Your Visit        [employer] Information     [email or web address] gives you secure access to your electronic health record. If you see a primary care provider, you can also send messages to your care team and make appointments. If you have questions, please call your primary care clinic.  If you do not have a primary care provider, please call [phone] and they will assist you.        Care EveryWhere ID     This is your Care EveryWhere ID. This could be used by other organizations to access your [place] medical records  [id number]        Your Vitals Were     Pulse Temperature Respirations Last Period Pulse Oximetry BMI (Body Mass Index)    71 97.8  F (36.6  C) (Oral) 16 03/04/2005 99% 29.33 kg/m2       Blood Pressure from Last 3 Encounters:   06/06/18 105/66   06/06/18 98/61   06/01/18 115/78    Weight from Last 3 Encounters:   06/06/18 81.6 kg (180 lb)   06/06/18 81.8 kg (180 lb 5.4 oz)   06/01/18 82.3 kg (181 lb 6.4 oz)               Primary Care Provider Office Phone # Fax #    Karo Doty [phone] [id number]  [facility] [address]        Equal Access to Services     JEREMÍAS LYNNE: Gopi vásquez  vickey Springer, waursulada luqadaha, qacoleenta kasherlyn benavides, carol tamarain hayaaroxane kirbycasi ghadanorberto lagermanroxane luan. So [facility] [phone].    ATENCIÓN: Si hilaryla mercedes, tiene a graham disposición servicios gratuitos de asistencia lingüística. Avril al [phone].    We comply with applicable federal civil rights laws and [place] laws. We do not discriminate on the basis of race, color, national origin, age, disability, sex, sexual orientation, or gender identity.            Thank you!     Thank you for choosing [facility]  for your care. Our goal is always to provide you with excellent care. Hearing back from our patients is one way we can continue to improve our services. Please take a few minutes to complete the written survey that you may receive in the mail after your visit with us. Thank you!             Your Updated Medication List - Protect others around you: Learn how to safely use, store and throw away your medicines at www.disposemymeds.org.          This list is accurate as of 6/6/18  4:01 PM.  Always use your most recent med list.                   Brand Name Dispense Instructions for use Diagnosis    acetaminophen 500 MG tablet    TYLENOL     Take 1 tablet (500 mg) by mouth every 4 hours as needed    S/P DEMETRIO-BSO       ascorbic acid 250 MG tablet    VITAMIN C    90    250 mg    Routine general medical examination at a health care facility       calcium citrate-vitamin D 315-200 MG-UNIT Tabs per tablet    CITRACAL     Take 2 tablets by mouth daily        GLUCOSAMINE CHONDROITIN COMPLX Tabs      Take  by mouth. 1500 mg 1xqd.    Routine general medical examination at a health care facility       LORazepam 1 MG tablet    ATIVAN    30 tablet    Take 1 tablet (1 mg) by mouth every 6 hours as needed (nausea/vomiting, anxiety or sleep)    Endometrial cancer (H), Encounter for long-term (current) use of medications       prochlorperazine 10 MG tablet    COMPAZINE    30 tablet    Take 1 tablet (10 mg)  by mouth every 6 hours as needed (nausea/vomiting)    Endometrial cancer (H), Encounter for long-term (current) use of medications       RANITIDINE HCL PO      Take 150 mg by mouth 2 times daily

## 2018-06-06 NOTE — LETTER
2018       RE: Gerri Owens  [address]  [facility] [address]     Dear Colleague,    Thank you for referring your patient, Gerri Owens, to the RADIATION ONCOLOGY CLINIC. Please see a copy of my visit note below.      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 No    Onset of menarche: about age 11  LMP: Patient's last menstrual period was 2005.  Onset of menopause:     Abnormal vaginal bleeding/discharge: No  Are you pregnant? No  Reproductive note:     Nurse face-to-face time: Level 4:  15 min face to face time    Review of Systems   Constitutional: Negative for chills, fever, malaise/fatigue and weight loss.   HENT: Negative for hearing loss.    Eyes: Negative for blurred vision.   Respiratory: Negative for cough, shortness of breath and wheezing.    Cardiovascular: Negative for chest pain.   Gastrointestinal: Positive for heartburn. Negative for constipation, nausea and vomiting.   Genitourinary: Negative for dysuria, frequency and urgency.   Musculoskeletal: Positive for joint pain (arthritis hips and hands).   Skin: Positive for rash (skin rash- lotion?).   Neurological: Negative for dizziness, tingling, sensory change, seizures and headaches.   Endo/Heme/Allergies: Does not bruise/bleed easily.   Psychiatric/Behavioral: Negative for depression. The patient is not nervous/anxious.                  RADIATION ONCOLOGY CONSULT NOTE  Date of Visit: 2018    Gerri Owens  MRN: [id number]  : 1955    Gerri Owens is being seen today for initial  consultation at the request of Dr. Bradley Tierney for consideration of radiation therapy for FIGO grade 1 endometrioid endometrial adenocarcinoma, FIGO stage IIIC1 (AJCC pT3a pN1 M0). All pertinent labs, imaging, and pathology findings have been reviewed.     HISTORY OF PRESENT ILLNESS:  Ms. Owens is a 63 year old woman with a recent diagnosis of stage IIIC1 endometrioid endometrial adenocarcinoma.  She first presented to gynecology clinic on 2/13/2018 for abnormal uterine bleeding.  She reports that approximately 2 years ago she developed what she believed to be urinary incontinence, with some mild tan colored vaginal discharge.  By her recollection, this very slowly changed in color to be slightly darker.  Finally, there seemed to be occasional vaginal bleeding, which prompted her to seek medical evaluation.  Pelvic exam on the clinic visit was essentially normal with atrophic vagina, atrophic cervix, no adnexal masses.  She was scheduled in April for biopsy in the operating room, as she experienced positional discomfort due to recent total hip replacement and pelvic discomfort on exam.    On 4/11/2018, pelvic ultrasound was significant for enlarged uterus with significantly thickened and vascular endometrium.  She underwent dilation and curettage by Dr. Roxie Martinez, which was significant for FIGO grade 2 (final pathologic specimen is later FIGO grade 1) endometrioid adenocarcinoma, with normal mismatch repair.  She met with Dr. Tierney on 4/23/2018, who recommended surgery.  His exam was consistent with a 10 week size uterus, and no adnexal masses.    On 4/27/2018, she underwent laparoscopic converted to open total hysterectomy, BSO, pelvic and alicia-aortic lymph node dissection (PPALND), tumor debulking, CUSA, and partial omentectomy.  Intraoperative findings were consistent for a normal cervix but sharply deviated to the patient's right.  Laparoscopy revealed a lesion suspicious for malignancy on  anterior fundus and diaphragm.  Peritoneal surfaces including the pelvic, bladder peritoneum, and cul-de-sac were without nodularity.  There was some nodularity palpated on the diaphragm.  A cystic mass was noted in the right pelvis, and there was palpable nodularity in the omentum and mesentery.  Operative note indicates to prominent firm right pelvic lymph nodes.  Finally, there were a patch of tiny nodularities at the ileocecal junction and anterior rectal serosa, for which the CUSA was used.    Pathology revealed grade 1 endometrioid adenocarcinoma the endometrium.  This involved the lower uterine segment and invaded the cervical stroma, and involved right ovarian surface adhesions.  The left adnexa was also invaded by endometrial adenocarcinoma.  The anterior pericervical surgical margin was positive for malignancy.  The tumor size was 5.5 cm, and there was 15 of 15 mm myometrial invasion as well as uterine serosal involvement.  There was adenomyosis with carcinoma involving the left fallopian tube as well.  LVSI was present; peritoneal washings were negative for malignancy. Involved lymph nodes were as follows: 1 of 8 left pelvic (3mm, no extranodal extension), 1/6 R pelvic (21 mm, extranodal extension present), and 0/4 periaortic.  The initial anterior fundal biopsy was positive for carcinoma, and omentectomy was negative.    She met with Dr. Tierney on 5/14/2018, and he recommended sandwich chemo radiation therapy with carboplatin and Taxol.  The patient was initially hesitant, however ultimately agreed to proceed with therapy.  She had her port placement on 5/31/2018, with initial infusion planned for 6/1/2018.  However, this was delayed due to findings of leukopenia (baseline WBC between 4.0-9.8, then found to be 3.3 on 5/31/18, 3.2 and 3.5 on 6/1/18).  She met with Dr. Rapp of hematology this morning, who performed repeat WBC today which shows a rebound to 4.3.  Only one of her 2 CBC w Diff tests on  18 showed a low ANC (1.3); today, it was 2.9. Dr. Rapp is recommending proceeding with chemotherapy.    CHEMOTHERAPY HISTORY: None    PAST RADIATION THERAPY HISTORY: None    PAST MEDICAL HISTORY:  Osteoarthritis  History of DVT of the upper extremity  Endometrial cancer as per HPI  GERD  Dyslipidemia  Hyperthyroidism with right lobe nodule, FNA negative.    PAST SURGICAL HISTORY:  Left total hip arthroplasty  Dilation and curettage per HPI  DEMETRIO, BSO, PPALND as per HPI  Tonsillectomy    ALLERGIES:  Nickel    MEDICATIONS:  Calcium and vitamin D  Glucosamine/chondroitin  Ranitidine  Vitamin C    FAMILY HISTORY:  Her father has a history of skin cancer. No other pertinent oncologic family history.    SOCIAL HISTORY:  Gerri is ; she retired in April.  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  She is a lifelong never smoker, and rarely drinks alcohol.  She is .    REVIEW OF SYSTEMS: A 10 point review of systems was obtained. Pertinent findings are noted in the HPI and are otherwise unremarkable.     PHYSICAL EXAM:  VITALS: /66  Pulse 73  Wt 81.6 kg (180 lb)  LMP 2005  BMI 29.27 kg/m2  GEN: Appears well, alert, oriented, and in NAD  HEENT: EOMI, normal conjunctiva, MMM, no thrush  NECK: Supple, full ROM, no cervical or clavicular lymphadenopathy  CV: Warm and well-perfused  RESP: Breathing comfortably on room air  ABDOMEN: Soft, NT, ND, bowel sounds present. Vertical hysterectomy incision healing well.  SKIN: Normal color and turgor  PSYCH: Appropriate mood and affect    ECOG PERFORMANCE STATUS: 0    PACEMAKER: None    PREGNANCY STATUS: s/p hysterectomy    IMPRESSION:  Ms. Owens is a 63 year old woman with FIGO grade 1 endometrioid endometrial adenocarcinoma, FIGO stage IIIC1.  Pathology showed cervical stromal involvement, 100% myometrial invasion with uterine serosal involvement,  bilateral ovarian and left fallopian tube involvement, positive anterior surface margin, 2 of 14 pelvic and 0 of 4 periaortic lymph nodes involved, one with extranodal extension.  She is now approximately 5 weeks status post laparoscopic converted to open total abdominal hysterectomy, BSO, PPALND and omentectomy. She was scheduled to start adjuvant carboplatin and paclitaxel chemotherapy last week, which was delayed due to low white blood cell count.    RECOMMENDATION:  We agree with the recommendation for adjuvant radiation therapy and chemotherapy.  She has significant risk factors for recurrence, many of which are listed above.  The endometrial cancer was locally quite advanced.  While initial biopsy showed grade 2 carcinoma, her final pathologic specimen was reported as grade 1 endometrioid histology.    We explained the recommendation for both pelvic external beam radiation therapy (EBRT), consisting of 5040 cGy in 28 daily fractions over 5 weeks, as well as HDR brachytherapy using a vaginal cylinder to boost the vaginal cuff, consisting of 2 treatments of 600 cGy prescribed to the vaginal surface.  We explained our rationale, as well as the logistics, risks, benefits, and alternatives to adjuvant radiation therapy.      We encouraged Ms. Owens to ask questions, which we answered to the best of our ability.  Informed consent form was signed.    To celebrate her recent USP, her  and her are planning to take a trip to the [place] via [place], leaving September 29, 2018.  If she can start her first cycle of chemotherapy prior to what is currently scheduled on 6/22/2018, she should be able to finish the 3 cycles of carboplatin and paclitaxel as well as both EBRT and vaginal cuff brachytherapy prior to leaving.  Otherwise, if she continues to have issues with cytopenias, we could start her therapy with radiation therapy and proceed to chemotherapy at the completion of RT, given that  she is less likely to have issues with blood counts while undergoing radiation therapy alone.    The patient was seen and discussed with staff, Dr. Billings. Thank you for involving us in the care of this patient.  Please feel free to contact us with questions or concerns at any time.    Vini Calzada MD PGY-4  Radiation Oncology Resident, [facility]  Phone: [phone]    I saw and examined the patient with the resident.  I have reviewed and agree with the resident's note and plan of care.      Darling Billings MD    CC  Patient Care Team:  Karo Doty MD as PCP - General (Family Practice)  Gege Gil, OTIS as Continuity Care Coordinator (Gyn-Onc)  ALEX FLOREZ

## 2018-06-06 NOTE — LETTER
6/6/2018       RE: Gerri Owens  [address]  [facility] [address]     Dear Colleague,    Thank you for referring your patient, Gerri Owens, to the [facility]. Please see a copy of my visit note below.    Hematology consult note    Reason for consult: Leukopenia, referred by Dr. Tierney    History of present illness: Ms. Owens is a 63-year-old woman with recently diagnosed endometrial carcinoma who was noted to have mild neutropenia when she came for cycle 1 of chemotherapy.  Prior to her surgery in April she had normal total white blood cell count, normal heme globin, and normal platelets.  She did not have differentials done in the several weeks prior to having surgery so it is not known for sure if the absolute neutrophil count was normal.  After her surgery she had a drop in her hemoglobin down to the 8 range which is improved modestly.  When she came for port placement on 5/31/18 she was noted to have a total white blood cell count of 3.3, no differential was done.  On 6/1/2018 at 640 in morning the total white count was 3.2 with an ANC of 1.3.  A repeat done an hour later had a white count of 3.5 with an ANC of 1.6.  However it was decided not to initiate chemotherapy with carboplatin and Taxol.  She is here for evaluation prior to starting chemotherapy.  She does not recall having any sort of illness in the few days prior to this visit to start her chemo.  No fevers, chills, sweats, as per upper respiratory infection symptoms, sinus pain.  She has noted over the past couple days that she has a little bit of a rash on her neck which she attributes to a new soap she was given.  She says she has quite sensitive skin.  She has had no new medications over the past several weeks.  She is not taking any new supplements or herbal remedies.  Overall today she is feeling quite well and feels that she is pretty much recovered from her hysterectomy surgery.    Past medical  history:  -Endometrial cancer-status post hysterectomy bilateral salpingo-oophorectomy with lymph node dissection on 4/27/18.  Found to have positive lymph nodes.  -Status post right hip replacement 12/4/17.  -History of right upper extremity DVT March 2012, subclavian vein, no clear provoking factors such as a PICC line.  She postulates that it was due to carrying very heavy purse on her shoulder all the time.  Labs on 3/29/12 for antiphospholipid antibodies, lupus anticoagulant, factor V Leiden, prothrombin gene mutation, Antithrombin, protein C, protein S free, were all normal.  She was anticoagulated for 6 months.  -GERD-has been treated with multiple agents in the past including Pepcid and PPI, now just ranitidine  -Arthritis  -Hyperlipidemia  -Decreased renal function that improved after stopping NSAIDs    Medications:  Acetaminophen as needed  Calcium plus vitamin D 2 tablets daily  Glucosamine/chondroitin sulfate 1500 mg daily as needed  Ranitidine 100 mg twice daily  Vitamin C 250 mg as needed      Family history: 2 of her brothers have had DVT.  1 of them has factor V Leiden.    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  She does not smoke and was never smoker, she drinks alcohol about a month, no illicit drugs.    Review of Systems:  As in history above.  She occasionally drinks a protein powder that she misses it and with milk that she gets at the regular grocery store, she does know the brand name.  She has intentionally lost about 40 pounds over the past few years and would like to avoid regaining.  She had a port placed 5/31, incisions still healing with no complaints.  The rest of the >10 point ROS is negative.      PHYSICAL EXAMINATION:  BP 98/61  Pulse 71  Temp 97.8  F (36.6  C) (Oral)  Resp 16  Wt 81.8 kg (180 lb 5.4 oz)  LMP 03/04/2005  SpO2 99%  BMI 29.33 kg/m2    General appearance:  Patient is 64 yo woman in no acute  "distress.     HEENT:  No pallor, icterus, or mucositis.  No thyromegaly.   Lymph nodes:  No cervical, supraclavicular, axillary, or inguinal lymphadenopathy.   Lungs:  Clear to auscultation bilaterally.   Chest: Port R upper chest with two small incision sites covered with peeling \"glue\" no erythema or exudate; fading ecchymoses.   Heart:  Regular rate and rhythm; no S3 S4 or murmer.     Abdomen:  Well-healed midline surgical scar. Positive bowel sounds, soft and nontender, nondistended.  No hepatomegaly. No splenomegaly appreciated.    Extremities:  No joint swelling or tenderness.  No arm or  ankle edema.     Skin:  Mild erythema, no flaking of skin along anterior base of neck. No rash, no petechiae or ecchymoses.    Labs:  Results for MARILEE EDWARD (MRN [id number]) as of 6/6/2018 13:32   Ref. Range 4/30/2018 04:02 5/31/2018 06:49 6/1/2018 06:40 6/1/2018 07:40 6/6/2018 11:03   WBC Latest Ref Range: 4.0 - 11.0 10e9/L 4.9 3.3 (L) 3.2 (L) 3.5 (L) 4.3   Hemoglobin Latest Ref Range: 11.7 - 15.7 g/dL 8.1 (L) 9.7 (L) 9.8 (L) 9.7 (L) 10.5 (L)   Hematocrit Latest Ref Range: 35.0 - 47.0 % 26.7 (L) 31.9 (L) 32.9 (L) 32.3 (L) 34.9 (L)   Platelet Count Latest Ref Range: 150 - 450 10e9/L 193 207 212 210 234   RBC Count Latest Ref Range: 3.8 - 5.2 10e12/L 2.89 (L) 3.55 (L) 3.64 (L) 3.60 (L) 3.91   MCV Latest Ref Range: 78 - 100 fl 92 90 90 90 89   MCH Latest Ref Range: 26.5 - 33.0 pg 28.0 27.3 26.9 26.9 26.9   MCHC Latest Ref Range: 31.5 - 36.5 g/dL 30.3 (L) 30.4 (L) 29.8 (L) 30.0 (L) 30.1 (L)   RDW Latest Ref Range: 10.0 - 15.0 % 14.7 13.7 13.8 13.6 13.6   Diff Method Unknown   Automated Method Automated Method Automated Method   % Neutrophils Latest Units: %   41.7 47.2 67.2   % Lymphocytes Latest Units: %   32.9 29.6 18.6   % Monocytes Latest Units: %   12.9 11.3 9.1   % Eosinophils Latest Units: %   11.6 11.0 4.2   % Basophils Latest Units: %   0.9 0.9 0.7   % Immature Granulocytes Latest Units: %   0.0 0.0 0.2 "   Nucleated RBCs Latest Ref Range: 0 /100   0 0 0   Absolute Neutrophil Latest Ref Range: 1.6 - 8.3 10e9/L   1.3 (L) 1.6 2.9   Absolute Lymphocytes Latest Ref Range: 0.8 - 5.3 10e9/L   1.1 1.0 0.8   Absolute Monocytes Latest Ref Range: 0.0 - 1.3 10e9/L   0.4 0.4 0.4   Absolute Eosinophils Latest Ref Range: 0.0 - 0.7 10e9/L   0.4 0.4 0.2   Absolute Basophils Latest Ref Range: 0.0 - 0.2 10e9/L   0.0 0.0 0.0   Abs Immature Granulocytes Latest Ref Range: 0 - 0.4 10e9/L   0.0 0.0 0.0   Absolute Nucleated RBC Unknown   0.0 0.0 0.0   % Retic Latest Ref Range: 0.5 - 2.0 %     1.0   Absolute Retic Latest Ref Range: 25 - 95 10e9/L     38.3   Results for SHAYNA EDWARDMANNY MONSALVE (MRN [id number]) as of 6/6/2018 13:32   Ref. Range 6/6/2018 11:03   Sodium Latest Ref Range: 133 - 144 mmol/L 140   Potassium Latest Ref Range: 3.4 - 5.3 mmol/L 4.1   Chloride Latest Ref Range: 94 - 109 mmol/L 108   Carbon Dioxide Latest Ref Range: 20 - 32 mmol/L 26   Urea Nitrogen Latest Ref Range: 7 - 30 mg/dL 12   Creatinine Latest Ref Range: 0.52 - 1.04 mg/dL 1.00   GFR Estimate Latest Ref Range: >60 mL/min/1.7m2 56 (L)   GFR Estimate If Black Latest Ref Range: >60 mL/min/1.7m2 68   Calcium Latest Ref Range: 8.5 - 10.1 mg/dL 9.6   Anion Gap Latest Ref Range: 3 - 14 mmol/L 6   Magnesium Latest Ref Range: 1.6 - 2.3 mg/dL 2.2   Albumin Latest Ref Range: 3.4 - 5.0 g/dL 3.6   Protein Total Latest Ref Range: 6.8 - 8.8 g/dL 7.0   Bilirubin Total Latest Ref Range: 0.2 - 1.3 mg/dL 0.5   Alkaline Phosphatase Latest Ref Range: 40 - 150 U/L 95   ALT Latest Ref Range: 0 - 50 U/L 12   AST Latest Ref Range: 0 - 45 U/L 14   CRP Inflammation Latest Ref Range: 0.0 - 8.0 mg/L <2.9   Ferritin Latest Ref Range: 8 - 252 ng/mL 10   Iron Latest Ref Range: 35 - 180 ug/dL 27 (L)   Iron Binding Cap Latest Ref Range: 240 - 430 ug/dL 373   Iron Saturation Index Latest Ref Range: 15 - 46 % 7 (L)   TSH Latest Ref Range: 0.40 - 4.00 mU/L 1.58   Vitamin B12 Latest Ref Range: 193 - 986  pg/mL 335     Assessment : 63-year-old woman with very mild neutropenia that has resolved.  Differential diagnosis of neutropenia includes nutritional deficiencies, suppression by medications, suppression by viral infection, autoimmune, hypothyroid, primary bone marrow disorder.  She had normal total white count with normal CBC prior to her surgery a little over a month ago, which is reassuring that she does not have any primary bone marrow disorder and unlikely to have infiltration by endometrial cancer in her marrow, which would be unusual.  She did not really give any symptoms of having some sort of acute viral infection that may have suppressed the count for brief amount of time.  She is not on any medications that are very likely to cause leukopenia.  She does not have B12 deficiency.  The TSH is normal, so that is not a cause.   So in the end, I am not sure why she had mild decrease in her counts right at the time she was going to start her chemotherapy.  Regardless, the problem has resolved and this is unlikely to be a serious bone marrow disorder that would interfere with her being able to have chemotherapy.  I believe it is safe to go forward with chemotherapy without any other further evaluation.    She also has iron deficiency anemia that is probably related to the two surgical procedures she has had within the past 6 months.  Her ferritin is only 10 ng/ml, therefore she would benefit from iron supplementation.  I recommend ferrous gluconate 1 tablet p.o. daily with food.  I discussed her that it may cause some stomach upset or constipation, so she should let us know if that is an issue, and if necessary she could receive some intravenous iron.  However I prefer to try oral iron first.    Recommendations:  -Okay to go forward with chemotherapy   -Always check a differential with the complete blood count, so that if there is leukopenia, we know if it is due to neutropenia  -Start ferrous gluconate 325 mg  p.o. daily with food intake for 3 months.  -Recheck ferritin in 3 months, if ferritin is still less than 20 ng/mL then continue ferrous gluconate for another 3 months    No routine f/u in hematology clinic, but can be referred back as needed  Lamar Rapp MD  Hematology

## 2018-06-08 ENCOUNTER — CARE COORDINATION (OUTPATIENT)
Dept: ONCOLOGY | Facility: CLINIC | Age: 63
End: 2018-06-08
Payer: OTHER GOVERNMENT

## 2018-06-08 DIAGNOSIS — C54.1 ENDOMETRIAL CANCER (H): Primary | ICD-10-CM

## 2018-06-08 DIAGNOSIS — Z79.899 ENCOUNTER FOR LONG-TERM (CURRENT) USE OF MEDICATIONS: ICD-10-CM

## 2018-06-08 NOTE — PROGRESS NOTES
Care Coordinator Note  Reviewed rescheduled chemotherapy appointment for 6/13/18 with patient.  She has met with radiation therapy and states a good understanding of plan.  Patient does have a vacation planned for 10 days at the end of September.  She is talking about potentially cancelling the vacation, as she will likely be finishing radiation therapy at that time.  States she has started a daily iron supplement.     Joyce MEDEROS, RN  Care Coordinator  Gynecologic Cancer   Office:  [phone]  Pager: [phone] #[phone]

## 2018-06-13 ENCOUNTER — INFUSION THERAPY VISIT (OUTPATIENT)
Dept: ONCOLOGY | Facility: CLINIC | Age: 63
End: 2018-06-13
Attending: NURSE PRACTITIONER
Payer: COMMERCIAL

## 2018-06-13 ENCOUNTER — DOCUMENTATION ONLY (OUTPATIENT)
Dept: ONCOLOGY | Facility: CLINIC | Age: 63
End: 2018-06-13

## 2018-06-13 ENCOUNTER — APPOINTMENT (OUTPATIENT)
Dept: LAB | Facility: CLINIC | Age: 63
End: 2018-06-13
Attending: OBSTETRICS & GYNECOLOGY
Payer: COMMERCIAL

## 2018-06-13 VITALS
SYSTOLIC BLOOD PRESSURE: 96 MMHG | RESPIRATION RATE: 36 BRPM | TEMPERATURE: 97.9 F | DIASTOLIC BLOOD PRESSURE: 60 MMHG | HEART RATE: 68 BPM | BODY MASS INDEX: 28.98 KG/M2 | WEIGHT: 178.2 LBS | OXYGEN SATURATION: 100 %

## 2018-06-13 DIAGNOSIS — Z79.899 ENCOUNTER FOR LONG-TERM (CURRENT) USE OF MEDICATIONS: ICD-10-CM

## 2018-06-13 DIAGNOSIS — C54.1 ENDOMETRIAL CANCER (H): Primary | ICD-10-CM

## 2018-06-13 LAB
ALBUMIN SERPL-MCNC: 3.4 G/DL (ref 3.4–5)
ALP SERPL-CCNC: 88 U/L (ref 40–150)
ALT SERPL W P-5'-P-CCNC: 13 U/L (ref 0–50)
ANION GAP SERPL CALCULATED.3IONS-SCNC: 7 MMOL/L (ref 3–14)
AST SERPL W P-5'-P-CCNC: 11 U/L (ref 0–45)
BASOPHILS # BLD AUTO: 0 10E9/L (ref 0–0.2)
BASOPHILS NFR BLD AUTO: 1.1 %
BILIRUB SERPL-MCNC: 0.6 MG/DL (ref 0.2–1.3)
BUN SERPL-MCNC: 17 MG/DL (ref 7–30)
CALCIUM SERPL-MCNC: 8.9 MG/DL (ref 8.5–10.1)
CHLORIDE SERPL-SCNC: 109 MMOL/L (ref 94–109)
CO2 SERPL-SCNC: 25 MMOL/L (ref 20–32)
CREAT SERPL-MCNC: 0.96 MG/DL (ref 0.52–1.04)
DIFFERENTIAL METHOD BLD: ABNORMAL
EOSINOPHIL # BLD AUTO: 0.1 10E9/L (ref 0–0.7)
EOSINOPHIL NFR BLD AUTO: 3.1 %
ERYTHROCYTE [DISTWIDTH] IN BLOOD BY AUTOMATED COUNT: 13.8 % (ref 10–15)
GFR SERPL CREATININE-BSD FRML MDRD: 59 ML/MIN/1.7M2
GLUCOSE SERPL-MCNC: 79 MG/DL (ref 70–99)
HCT VFR BLD AUTO: 34.1 % (ref 35–47)
HGB BLD-MCNC: 10.1 G/DL (ref 11.7–15.7)
IMM GRANULOCYTES # BLD: 0 10E9/L (ref 0–0.4)
IMM GRANULOCYTES NFR BLD: 0.3 %
LYMPHOCYTES # BLD AUTO: 0.9 10E9/L (ref 0.8–5.3)
LYMPHOCYTES NFR BLD AUTO: 25.6 %
MAGNESIUM SERPL-MCNC: 2.1 MG/DL (ref 1.6–2.3)
MCH RBC QN AUTO: 26.2 PG (ref 26.5–33)
MCHC RBC AUTO-ENTMCNC: 29.6 G/DL (ref 31.5–36.5)
MCV RBC AUTO: 88 FL (ref 78–100)
MONOCYTES # BLD AUTO: 0.4 10E9/L (ref 0–1.3)
MONOCYTES NFR BLD AUTO: 11.1 %
NEUTROPHILS # BLD AUTO: 2.1 10E9/L (ref 1.6–8.3)
NEUTROPHILS NFR BLD AUTO: 58.8 %
NRBC # BLD AUTO: 0 10*3/UL
NRBC BLD AUTO-RTO: 0 /100
PLATELET # BLD AUTO: 216 10E9/L (ref 150–450)
POTASSIUM SERPL-SCNC: 3.9 MMOL/L (ref 3.4–5.3)
PROT SERPL-MCNC: 6.8 G/DL (ref 6.8–8.8)
RBC # BLD AUTO: 3.86 10E12/L (ref 3.8–5.2)
SODIUM SERPL-SCNC: 140 MMOL/L (ref 133–144)
WBC # BLD AUTO: 3.5 10E9/L (ref 4–11)

## 2018-06-13 PROCEDURE — 96409 CHEMO IV PUSH SNGL DRUG: CPT

## 2018-06-13 PROCEDURE — 96375 TX/PRO/DX INJ NEW DRUG ADDON: CPT

## 2018-06-13 PROCEDURE — 85025 COMPLETE CBC W/AUTO DIFF WBC: CPT | Performed by: NURSE PRACTITIONER

## 2018-06-13 PROCEDURE — 25000128 H RX IP 250 OP 636: Mod: ZF | Performed by: NURSE PRACTITIONER

## 2018-06-13 PROCEDURE — 96376 TX/PRO/DX INJ SAME DRUG ADON: CPT

## 2018-06-13 PROCEDURE — 83735 ASSAY OF MAGNESIUM: CPT | Performed by: NURSE PRACTITIONER

## 2018-06-13 PROCEDURE — G0463 HOSPITAL OUTPT CLINIC VISIT: HCPCS | Mod: 25

## 2018-06-13 PROCEDURE — 25000125 ZZHC RX 250: Mod: ZF | Performed by: OBSTETRICS & GYNECOLOGY

## 2018-06-13 PROCEDURE — 25000128 H RX IP 250 OP 636: Mod: ZF | Performed by: OBSTETRICS & GYNECOLOGY

## 2018-06-13 PROCEDURE — 80053 COMPREHEN METABOLIC PANEL: CPT | Performed by: NURSE PRACTITIONER

## 2018-06-13 PROCEDURE — 96372 THER/PROPH/DIAG INJ SC/IM: CPT | Mod: 59

## 2018-06-13 RX ORDER — METHYLPREDNISOLONE SODIUM SUCCINATE 125 MG/2ML
125 INJECTION, POWDER, LYOPHILIZED, FOR SOLUTION INTRAMUSCULAR; INTRAVENOUS
Status: DISCONTINUED | OUTPATIENT
Start: 2018-06-13 | End: 2018-06-13 | Stop reason: HOSPADM

## 2018-06-13 RX ORDER — ALBUTEROL SULFATE 0.83 MG/ML
2.5 SOLUTION RESPIRATORY (INHALATION)
Status: DISCONTINUED | OUTPATIENT
Start: 2018-06-13 | End: 2018-06-13 | Stop reason: HOSPADM

## 2018-06-13 RX ORDER — HEPARIN SODIUM (PORCINE) LOCK FLUSH IV SOLN 100 UNIT/ML 100 UNIT/ML
500 SOLUTION INTRAVENOUS EVERY 8 HOURS
Status: DISCONTINUED | OUTPATIENT
Start: 2018-06-13 | End: 2018-06-13 | Stop reason: HOSPADM

## 2018-06-13 RX ORDER — EPINEPHRINE 1 MG/ML
0.3 INJECTION, SOLUTION INTRAMUSCULAR; SUBCUTANEOUS EVERY 5 MIN PRN
Status: DISCONTINUED | OUTPATIENT
Start: 2018-06-13 | End: 2018-06-13 | Stop reason: HOSPADM

## 2018-06-13 RX ORDER — SODIUM CHLORIDE 9 MG/ML
1000 INJECTION, SOLUTION INTRAVENOUS CONTINUOUS PRN
Status: DISCONTINUED | OUTPATIENT
Start: 2018-06-13 | End: 2018-06-13 | Stop reason: HOSPADM

## 2018-06-13 RX ORDER — DIPHENHYDRAMINE HYDROCHLORIDE 50 MG/ML
50 INJECTION INTRAMUSCULAR; INTRAVENOUS
Status: COMPLETED | OUTPATIENT
Start: 2018-06-13 | End: 2018-06-13

## 2018-06-13 RX ORDER — HEPARIN SODIUM (PORCINE) LOCK FLUSH IV SOLN 100 UNIT/ML 100 UNIT/ML
5 SOLUTION INTRAVENOUS ONCE
Status: COMPLETED | OUTPATIENT
Start: 2018-06-13 | End: 2018-06-13

## 2018-06-13 RX ORDER — ALBUTEROL SULFATE 90 UG/1
1-2 AEROSOL, METERED RESPIRATORY (INHALATION)
Status: DISCONTINUED | OUTPATIENT
Start: 2018-06-13 | End: 2018-06-13 | Stop reason: HOSPADM

## 2018-06-13 RX ORDER — MEPERIDINE HYDROCHLORIDE 25 MG/ML
25 INJECTION INTRAMUSCULAR; INTRAVENOUS; SUBCUTANEOUS EVERY 30 MIN PRN
Status: DISCONTINUED | OUTPATIENT
Start: 2018-06-13 | End: 2018-06-13 | Stop reason: HOSPADM

## 2018-06-13 RX ORDER — PALONOSETRON 0.05 MG/ML
0.25 INJECTION, SOLUTION INTRAVENOUS ONCE
Status: COMPLETED | OUTPATIENT
Start: 2018-06-13 | End: 2018-06-13

## 2018-06-13 RX ORDER — EPINEPHRINE 0.3 MG/.3ML
0.3 INJECTION SUBCUTANEOUS EVERY 5 MIN PRN
Status: DISCONTINUED | OUTPATIENT
Start: 2018-06-13 | End: 2018-06-13 | Stop reason: HOSPADM

## 2018-06-13 RX ADMIN — SODIUM CHLORIDE, PRESERVATIVE FREE 5 ML: 5 INJECTION INTRAVENOUS at 08:41

## 2018-06-13 RX ADMIN — EPINEPHRINE 0.3 MG: 1 INJECTION INTRAMUSCULAR; INTRAVENOUS; SUBCUTANEOUS at 10:52

## 2018-06-13 RX ADMIN — SODIUM CHLORIDE 250 ML: 9 INJECTION, SOLUTION INTRAVENOUS at 09:46

## 2018-06-13 RX ADMIN — PALONOSETRON HYDROCHLORIDE 0.25 MG: 0.25 INJECTION INTRAVENOUS at 09:47

## 2018-06-13 RX ADMIN — METHYLPREDNISOLONE SODIUM SUCCINATE 125 MG: 125 INJECTION, POWDER, FOR SOLUTION INTRAMUSCULAR; INTRAVENOUS at 10:52

## 2018-06-13 RX ADMIN — DIPHENHYDRAMINE HYDROCHLORIDE 50 MG: 50 INJECTION INTRAMUSCULAR; INTRAVENOUS at 09:55

## 2018-06-13 RX ADMIN — DEXAMETHASONE SODIUM PHOSPHATE 20 MG: 10 INJECTION, SOLUTION INTRAMUSCULAR; INTRAVENOUS at 10:12

## 2018-06-13 RX ADMIN — DIPHENHYDRAMINE HYDROCHLORIDE 50 MG: 50 INJECTION, SOLUTION INTRAMUSCULAR; INTRAVENOUS at 10:50

## 2018-06-13 RX ADMIN — FAMOTIDINE 40 MG: 10 INJECTION, SOLUTION INTRAVENOUS at 09:50

## 2018-06-13 RX ADMIN — PACLITAXEL 343 MG: 6 INJECTION, SOLUTION INTRAVENOUS at 10:44

## 2018-06-13 RX ADMIN — SODIUM CHLORIDE, PRESERVATIVE FREE 500 UNITS: 5 INJECTION INTRAVENOUS at 12:40

## 2018-06-13 RX ADMIN — SODIUM CHLORIDE 1000 ML: 9 INJECTION, SOLUTION INTRAVENOUS at 10:51

## 2018-06-13 ASSESSMENT — PAIN SCALES - GENERAL: PAINLEVEL: NO PAIN (0)

## 2018-06-13 NOTE — NURSING NOTE
Chief Complaint   Patient presents with     Port Draw     labs drawn via port by RN     /63 (BP Location: Right arm, Patient Position: Chair, Cuff Size: Adult Regular)  Pulse 68  Temp 97.9  F (36.6  C) (Oral)  Wt 80.8 kg (178 lb 3.2 oz)  LMP 03/04/2005  SpO2 98%  BMI 28.98 kg/m2    Vitals taken. Port accessed by RN. Labs collected and sent. Line flushed with NS & Heparin. Pt tolerated well. Pt checked in for next appointment.    Peyton Pond, RN

## 2018-06-13 NOTE — PROGRESS NOTES
Rapid Response Epic Documentation     Situation:  Pt was receiving first treatment of taxol and started to have a reaction.  The infusion was stopped and [phone] and [phone] were called    Objective: While receiving a chemo infusion the pt developed a sudden onset of chest tightness that worsened during deep breaths, the pt also complained on shortness of breath.  The pt. Did not appear to have any facial swelling or hives.  Upon RRT arrival the pt denied any complaints    Assessment:  Pt was A&Ox4 upon rapid response team pt had no complaints.  During reaction pt complained of chest tightness and shortness of breath.  Pts vitals were within normal limits    Plan:  Pts symptoms resolved and will be monitored in clinic and released.  Prior to RRT arrival the pt was given 0.3 mg of epi, 125 mg of solumedrol, and 50 mg of bendryl resolving symptoms.    Location: 2nd floor [facility] 2H    Disposition:      Stable (D/C home)    Protocol Used:     Anaphylaxis/Allergic Reaction  Antigen Removed Epi, Solumedrol, Benadryl

## 2018-06-13 NOTE — PROGRESS NOTES
Notified by Madison Dickey RN that Gerri developed flushing, chest pressure, and difficulty breathing roughly two minutes into infusion of paclitaxel. She desaturated into the upper 70's. Given Benadryl, Solumedrol, and ultimately symptoms improved upon receiving epinephrine. On exam, she is alert and oriented, in no acute distress. No upper airway stridor, lungs CTA, RRR. Discussed her reaction- given that her symptoms did not resolve until she received epinephrine and with her oxygen desaturation, I do not recommend rechallenging her in clinic today. Monitor in infusion for the next hour as she is clinically stable on exam. If she has a rebound reaction, administer emergency medications and transfer to ED. If stable at one hour, may discharge home. Discussed s/sxs rebound reaction and when to seek emergency care. Will discuss with Dr. Tierney and contact her with a plan for future treatment.  ALFONZO Weathers, FNP-C  Gynecologic Oncology  [facility]  Pager: [phone]

## 2018-06-13 NOTE — MR AVS SNAPSHOT
After Visit Summary   6/13/2018    Gerri Owens    MRN: [id number]           Patient Information     Date Of Birth          1955        Visit Information        Provider Department      6/13/2018 9:00 AM UC 31 ATC; [facility] ONCOL[facility]Y INFUS[facility]        Today's Diagnoses     Endometrial cancer (H)    -  1    Encounter for long-term (current) use of medications           Follow-ups after your visit        Your next 10 appointments already scheduled     Jul 06, 2018  8:30 AM CDT   [facility] with [facility]   [facility] ([facility])    [address]  [address]  [place] [address]   [phone]            Jul 06, 2018  9:00 AM CDT   (Arrive by 8:45 AM)   Return Visit with ALFONZO Weathers CNP   [facility] ([facility])    [address]  [address]  [place] [address]   [phone]            Jul 06, 2018  9:30 AM CDT   Infusion 360 with UC ONCOLOGY INFUSION, UC 27 ATC   [facility] ([facility])    [address]  [address]  [place] [address]   [phone]            Jul 27, 2018  7:45 AM CDT   [facility] Lab Draw with [facility] DRAW   [facility] ([facility])    [address]  [address]  [place] [address]   [phone]            Jul 27, 2018  8:20 AM CDT   (Arrive by 8:05 AM)   Return Visit with ALFONZO Weathers CNP   [facility] ([facility])    [address]  [address]  [place] [address]   [phone]            Jul 27, 2018  9:00 AM CDT   Infusion 360 with UC ONCOLOGY INFUSION, UC 23 ATC   [facility])    [address]  [address]  [place] [address]   [phone]              Future tests that  were ordered for you today     Open Standing Orders        Priority Remaining Interval Expires Ordered    Notify Physician Routine [id number] PRN  6/13/2018            Who to contact     If you have questions or need follow up information about today's clinic visit or your schedule please contact [facility] directly at [phone].  Normal or non-critical lab and imaging results will be communicated to you by MyChart, letter or phone within 4 business days after the clinic has received the results. If you do not hear from us within 7 days, please contact the clinic through [employer] or phone. If you have a critical or abnormal lab result, we will notify you by phone as soon as possible.  Submit refill requests through [employer] or call your pharmacy and they will forward the refill request to us. Please allow 3 business days for your refill to be completed.          Additional Information About Your Visit        [employer] Information     [employer] gives you secure access to your electronic health record. If you see a primary care provider, you can also send messages to your care team and make appointments. If you have questions, please call your primary care clinic.  If you do not have a primary care provider, please call [phone] and they will assist you.        Care EveryWhere ID     This is your Care EveryWhere ID. This could be used by other organizations to access your [facility] medical records  [id number]        Your Vitals Were     Pulse Temperature Respirations Last Period Pulse Oximetry BMI (Body Mass Index)    68 97.9  F (36.6  C) (Oral) 36 03/04/2005 100% 28.98 kg/m2       Blood Pressure from Last 3 Encounters:   06/13/18 96/60   06/06/18 105/66   06/06/18 98/61    Weight from Last 3 Encounters:   06/13/18 80.8 kg (178 lb 3.2 oz)   06/06/18 81.6 kg (180 lb)   06/06/18 81.8 kg (180 lb 5.4 oz)              We Performed the Following     CBC with platelets differential     Comprehensive  metabolic panel     Magnesium        Primary Care Provider Office Phone # Fax #    Karo Doty [phone] [id number]  [facility] [address]        Equal Access to Services     JEREMÍAS BUCK : Gopi hurd mike Springer, waursulada luqadaha, qaybta kaalmada makayla, carol hunt laNatblue roland. So [facility] [phone].    ATENCIÓN: Si habla español, tiene a graham disposición servicios gratuitos de asistencia lingüística. Llame al [phone].    We comply with applicable federal civil rights laws and [place] laws. We do not discriminate on the basis of race, color, national origin, age, disability, sex, sexual orientation, or gender identity.            Thank you!     Thank you for choosing [facility]  for your care. Our goal is always to provide you with excellent care. Hearing back from our patients is one way we can continue to improve our services. Please take a few minutes to complete the written survey that you may receive in the mail after your visit with us. Thank you!             Your Updated Medication List - Protect others around you: Learn how to safely use, store and throw away your medicines at www.disposemymeds.org.          This list is accurate as of 6/13/18  5:45 PM.  Always use your most recent med list.                   Brand Name Dispense Instructions for use Diagnosis    acetaminophen 500 MG tablet    TYLENOL     Take 1 tablet (500 mg) by mouth every 4 hours as needed    S/P DEMETRIO-BSO       ascorbic acid 250 MG tablet    VITAMIN C    90    250 mg    Routine general medical examination at a health care facility       calcium citrate-vitamin D 315-200 MG-UNIT Tabs per tablet    CITRACAL     Take 2 tablets by mouth daily        FERROUS GLUCONATE PO      Take 325 mg by mouth daily (with breakfast)        GLUCOSAMINE CHONDROITIN COMPLX Tabs      Take  by mouth. 1500 mg 1xqd.    Routine general medical examination at a health care facility        LORazepam 1 MG tablet    ATIVAN    30 tablet    Take 1 tablet (1 mg) by mouth every 6 hours as needed (nausea/vomiting, anxiety or sleep)    Endometrial cancer (H), Encounter for long-term (current) use of medications       prochlorperazine 10 MG tablet    COMPAZINE    30 tablet    Take 1 tablet (10 mg) by mouth every 6 hours as needed (nausea/vomiting)    Endometrial cancer (H), Encounter for long-term (current) use of medications       RANITIDINE HCL PO      Take 150 mg by mouth 2 times daily

## 2018-06-13 NOTE — PROGRESS NOTES
Infusion Nursing Note:  Gerri Owens presents today for Cycle 1 Day 1 Taxol (reaction), Carboplatin (Held).    Patient seen by provider today: No.   present during visit today: Not Applicable.    Note: Patient and patient's  oriented to infusion suite. Patient stated she received verbal and written teaching on Taxol and Carboplatin. Patient stated she was ready to proceed with chemo.      Within five minutes of starting Taxol, patient's face and chest became flushed and patient developed a cough. Taxol stopped. Patient said her chest felt heavy. Benadryl and Solu-medrol administered. O2 sats in 80s dropping to high 70s, low 80s. Patient felt SOB and stated she felt like she was gasping for air. Oxygen facemask positioned around patient's nose and mouth. One dose of Epi given. Soon after, patient's oxygen returned to the 90s and patient said it felt easier to breathe. Patient weaned off of oxygen mask to RA. Irasema Gtz NP paged and assessed patient. Once Irasema arrived, all of patient's symptoms subsided.     TORB 6/13/18 1110 Irasema Gtz NP/Madison Dickey RN:   -Do not rechallenge patient with Taxol today.   -Do not give Carboplatin.  -Observe patient for one hour before discharging her.   -I will notified Joyce Gil RN to follow up with patient via phone call with new plan.     Patient observed for one hour. Her BP dropped to 93/50 and recovered with IVF.     Patient verbalized understanding of plan and will call Joyce Gil RN on Friday, 6/15, if she hasn't been notified about a new treatment plan.     Intravenous Access:  Implanted Port.    Treatment Conditions:  Lab Results   Component Value Date    HGB 10.1 06/13/2018     Lab Results   Component Value Date    WBC 3.5 06/13/2018      Lab Results   Component Value Date    ANEU 2.1 06/13/2018     Lab Results   Component Value Date     06/13/2018      Lab Results   Component Value Date     06/13/2018                    Lab Results   Component Value Date    POTASSIUM 3.9 06/13/2018           Lab Results   Component Value Date    MAG 2.1 06/13/2018            Lab Results   Component Value Date    CR 0.96 06/13/2018                   Lab Results   Component Value Date    LAURENT 8.9 06/13/2018                Lab Results   Component Value Date    BILITOTAL 0.6 06/13/2018           Lab Results   Component Value Date    ALBUMIN 3.4 06/13/2018                    Lab Results   Component Value Date    ALT 13 06/13/2018           Lab Results   Component Value Date    AST 11 06/13/2018     Results reviewed, labs MET treatment parameters, ok to proceed with treatment.    Post Infusion Assessment:  Blood return noted pre and post infusion.  Site patent and intact, free from redness, edema or discomfort.  No evidence of extravasations.  Access discontinued per protocol.    Discharge Plan:   Prescription refills given for Compazine, Ativan.  Patient discharged in stable condition accompanied by: self and .  Departure Mode: Ambulatory.  Face to face time: 20    Madison Dickey RN

## 2018-06-14 ENCOUNTER — CARE COORDINATION (OUTPATIENT)
Dept: ONCOLOGY | Facility: CLINIC | Age: 63
End: 2018-06-14

## 2018-06-14 NOTE — PROGRESS NOTES
Care Coordinator Note  Informed patient that Dr. Tierney will call her on Friday to review options for treatment post her recent chemotherapy reaction.      Patient verbalized back understanding of the above information discussed.     Joyce Gil MSN, RN  Care Coordinator  Gynecologic Cancer   Office:  [phone]  Pager: [phone] #[phone]    
normal...

## 2018-06-22 ASSESSMENT — ENCOUNTER SYMPTOMS
NECK PAIN: 0
DYSPNEA ON EXERTION: 0
SHORTNESS OF BREATH: 0
SMELL DISTURBANCE: 0
JOINT SWELLING: 0
TASTE DISTURBANCE: 0
PANIC: 0
DEPRESSION: 0
NIGHT SWEATS: 0
NERVOUS/ANXIOUS: 1
ALTERED TEMPERATURE REGULATION: 1
CHILLS: 1
POLYPHAGIA: 0
INSOMNIA: 0
SNORES LOUDLY: 0
ARTHRALGIAS: 1
POSTURAL DYSPNEA: 0
COUGH DISTURBING SLEEP: 0
FATIGUE: 1
NECK MASS: 0
BACK PAIN: 0
DECREASED APPETITE: 1
HALLUCINATIONS: 0
WEIGHT GAIN: 0
HEMOPTYSIS: 0
HOARSE VOICE: 1
SINUS CONGESTION: 1
MUSCLE WEAKNESS: 0
TROUBLE SWALLOWING: 0
COUGH: 1
SORE THROAT: 0
POLYDIPSIA: 0
SINUS PAIN: 0
WHEEZING: 0
INCREASED ENERGY: 1
FEVER: 0
WEIGHT LOSS: 1
MUSCLE CRAMPS: 0
SPUTUM PRODUCTION: 0
STIFFNESS: 1
DECREASED CONCENTRATION: 0
MYALGIAS: 1

## 2018-06-27 ENCOUNTER — CARE COORDINATION (OUTPATIENT)
Dept: ONCOLOGY | Facility: CLINIC | Age: 63
End: 2018-06-27

## 2018-06-27 NOTE — PROGRESS NOTES
Care Coordinator Note  Reviewed Dr. Tierney's plan to continue treatment with single agent Carbo.  Reviewed scheduled appointments for 7/6/18 and 7/27/18.  Informed patient I will schedule a third cycle in August.      Patient verbalized back understanding of the above information discussed.     Joyce Gil MSN, RN  Care Coordinator  Gynecologic Cancer   Office:  [phone]  Pager: [phone] #[phone]

## 2018-06-29 RX ORDER — LORAZEPAM 2 MG/ML
1 INJECTION INTRAMUSCULAR EVERY 6 HOURS PRN
Status: CANCELLED
Start: 2018-07-06

## 2018-06-29 RX ORDER — MEPERIDINE HYDROCHLORIDE 25 MG/ML
25 INJECTION INTRAMUSCULAR; INTRAVENOUS; SUBCUTANEOUS EVERY 30 MIN PRN
Status: CANCELLED | OUTPATIENT
Start: 2018-07-06

## 2018-06-29 RX ORDER — SODIUM CHLORIDE 9 MG/ML
1000 INJECTION, SOLUTION INTRAVENOUS CONTINUOUS PRN
Status: CANCELLED
Start: 2018-07-06

## 2018-06-29 RX ORDER — ALBUTEROL SULFATE 0.83 MG/ML
2.5 SOLUTION RESPIRATORY (INHALATION)
Status: CANCELLED | OUTPATIENT
Start: 2018-07-06

## 2018-06-29 RX ORDER — METHYLPREDNISOLONE SODIUM SUCCINATE 125 MG/2ML
125 INJECTION, POWDER, LYOPHILIZED, FOR SOLUTION INTRAMUSCULAR; INTRAVENOUS
Status: CANCELLED
Start: 2018-07-06

## 2018-06-29 RX ORDER — DIPHENHYDRAMINE HYDROCHLORIDE 50 MG/ML
50 INJECTION INTRAMUSCULAR; INTRAVENOUS
Status: CANCELLED
Start: 2018-07-06

## 2018-06-29 RX ORDER — ALBUTEROL SULFATE 90 UG/1
1-2 AEROSOL, METERED RESPIRATORY (INHALATION)
Status: CANCELLED
Start: 2018-07-06

## 2018-06-29 RX ORDER — EPINEPHRINE 1 MG/ML
0.3 INJECTION, SOLUTION INTRAMUSCULAR; SUBCUTANEOUS EVERY 5 MIN PRN
Status: CANCELLED | OUTPATIENT
Start: 2018-07-06

## 2018-06-29 RX ORDER — EPINEPHRINE 0.3 MG/.3ML
0.3 INJECTION SUBCUTANEOUS EVERY 5 MIN PRN
Status: CANCELLED | OUTPATIENT
Start: 2018-07-06

## 2018-07-05 RX ORDER — ONDANSETRON 8 MG/1
8 TABLET, FILM COATED ORAL EVERY 8 HOURS PRN
Qty: 20 TABLET | Refills: 2 | Status: SHIPPED | OUTPATIENT
Start: 2018-07-05 | End: 2018-07-27

## 2018-07-05 NOTE — PROGRESS NOTES
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    HPI: Gerri comes to the clinic accompanied by her  Doug. She is feeling fair today but notes a persistent, dry cough that has been present since her Taxol reaction on 18. It improves slightly when she lies down. Denies chest pain, shortness of breath, dizziness, weakness, or sense of impending doom. Does have GERD but reports this has been well controlled, denies seasonal allergies, and denies URI symptoms. Non-smoker. History of an unprovoked DVT in , on anticoagulation x6 months at that time. Negative for Factor V Leiden but has a brother that is positive for this and another brother with an unprovoked DVT as well. No fevers but does note five days of chills the first week after her treatment. Has not taken anything for her cough. Has had a few small self limited epistaxes during the past three weeks- normally gets these during the winter time but not during the summer. Notes GI upset with nausea and diarrhea the first three days after her Taxol reaction and myalgias in her legs during this time. Reports her appetite has been low but she is taking in protein shakes and foods high in iron (spinach salads with clams and garbanzo beans), drinking at least two liters of water daily. Reports overall feeling well this past week but reports some anxiety about the unknown and with delaying her chemotherapy start. Feels ready to start treatment today.    Oncology History:  18- OBGYN visit with Dr. Tineo.  Complains of 1.5 years of post-menopausal bleeding.                           EMB performed and showed      18- EUA, hysteroscopy D&C under ultrasound guidance.  Diffuse proliferative  vascular endometrium noted.                         Pathology:                          FINAL DIAGNOSIS:                          ENDOMETRIAL CURETTINGS:                          - Endometrial endometrioid adenocarcinoma, FIGO grade 2                            COMMENT:                          While the tumor maintained a glandular morphology, the occasional high                          nuclear grade warranted upgrading to                          FIGO 2.      4/23/18- Patient reports that she continues to have some post-menopausal spotting      4/27/18 DEMETRIO/BSO and staging:  PATH:  A. ANTERIOR FUNDUS, BIOPSY:   - Positive for adenocarcinoma     B. UTERUS, CERVIX, BILATERAL TUBES AND OVARIES, HYSTERECTOMY WITH   BILATERAL SALPINGO-OOPHORECTOMY:   - Endometrial endometrioid adenocarcinoma, FIGO grade 1   - Adenomyosis   - Cervix invaded by endometrial adenocarcinoma   - Right ovarian surface adhesions involved by endometrial adenocarcinoma   - Right fallopian tube with endometriosis   - Left adnexa invaded by endometrial adenocarcinoma (5.5 cm)     C. LYMPH NODES, LEFT PELVIC, EXCISION:   - Metastatic adenocarcinoma to one of eight lymph nodes (1/8)   - The metastatic focus is 3 mm in greatest dimension with no extranodal   extension     D. LYMPH NODES, LEFT PARA-AORTIC, EXCISION:   - One reactive lymph node, negative for malignancy (0/1)     E. LYMPH NODES, RIGHT PELVIC, EXCISION:   - Metastatic adenocarcinoma to one of six lymph nodes (1/6)   - The metastatic focus is 21 mm in greatest dimension with positive   extranodal extension     F. LYMPH NODES, RIGHT PARA-AORTIC, EXCISION:   - Three reactive lymph nodes, negative for malignancy (0/3)     G. OMENTUM, RESECTION:   - Omental adipose tissue with focal inflammation and surface mesothelial   hyperplasia   - Negative for malignancy     COMMENT:   The final diagnosis confirms the interpretation provided intraoperatively.     Report Name: Endometrium - Hysterectomy         Status: Submitted Checklist Inst: 1      Last Updated By: Fernie Ramirez M.D., PhD, [facility],   05/06/2018 13:52:11   Part(s) Involved:   B: Uterus, cervix, bilateral tubes and ovaries     Synoptic Report:     SPECIMEN     Procedure:         - Simple hysterectomy         - Bilateral salpingo-oophorectomy         - Omentectomy         - Peritoneal washing     Specimen Integrity:         - Opened     TUMOR     Tumor Site:         - Endometrium         - Lower uterine segment     Histologic Type:         - Endometrioid carcinoma, NOS     Histologic Grade:         - FIGO grade 1     Tumor Size: 5.5 Centimeters (cm)     Tumor Extent       Myometrial Invasion:           - Present         Depth of Invasion: 15 Millimeters (mm)         Myometrial Thickness: 15 Millimeters (mm)         Percentage of Myometrial Invasion: 100%       Adenomyosis:           - Present, involved by carcinoma       Uterine Serosa Involvement:           - Present       Lower Uterine Segment Involvement:           - Present         Level of Tumor Involvement:             - Myoinvasive       Cervical Stromal Involvement:           - Present       Other Tissue / Organ Involvement:           - Right ovary           - Left ovary           - Left fallopian tube       Peritoneal Ascitic Fluid:           - Negative for malignancy (normal / benign)     Accessory Findings       Lymphovascular Invasion:           - Present     MARGINS     Ectocervical / Vaginal Cuff Margin:         - Uninvolved by carcinoma     LYMPH NODES     Number of Pelvic Nodes with Macrometastasis: 2     Number of Pelvic Nodes with Micrometastasis: 0     Laterality of Pelvic Node(s) with Tumor:         - Right         - Left     Total Number of Pelvic Node(s) Examined (sentinel and nonsentinel): 14     Number of Pelvic [place] Nodes Examined: 0     Laterality of Pelvic Node(s) Examined:         - Right         - Left     Number of Para-Aortic Nodes with Macrometastasis: 0      Number of Para-Aortic Nodes with Micrometastasis: 0     Total Number of Para-Aortic Node(s) Examined (sentinel and nonsentinel):    4     Number of Para-Aortic [place] Nodes Examined: 0     Laterality of Para-Aortic Node(s) Examined:         - Right         - Left     PATHOLOGIC STAGE CLASSIFICATION (PTNM, AJCC 8TH EDITION)     Primary Tumor (pT):         - pT3a     Regional Lymph Nodes (pN)       Category (pN):           - pN1a     FIGO STAGE     FIGO Stage:         - IIIC1      4/29/2018: CT:  IMPRESSION:   1. No pulmonary embolism. Postop atelectasis.  2. Fluid-filled loops of dilated small bowel in the upper abdomen,  favored to represent adynamic ileus although incompletely visualized.  3. Pneumoperitoneum and small volume ascites, expected given  postoperative day 2 following open surgery.  4. Partially visualized 10 mm right thyroid nodule.     6/13/18: C1D1 carboplatin/paclitaxel- anaphylactic reaction to paclitaxel, no carboplatin received.    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  7/17/2012     Thyrotoxicosis 2007    hyperthyroid, enlarged right thyroid on thyroid uptake, [facility] 12/2008, FNA neg of 1.9 cm right lobe dominat nodule     Whooping cough due to Bordetella pertussis (p. pertussis) infant    whooping cough       Past Surgical History:   Procedure Laterality Date     ARTHROPLASTY HIP Left 12/4/2017    Procedure:  ARTHROPLASTY HIP;  Left total hip arthroplasty;  Surgeon: Rajinder Goodwin MD;  Location: [facility] OR     BREAST BIOPSY, RT/LT  2004    left breast     DILATION AND CURETTAGE, OPERATIVE HYSTEROSCOPY WITH MORCELLATOR, COMBINED N/A 4/11/2018    Procedure: COMBINED DILATION AND CURETTAGE, OPERATIVE HYSTEROSCOPY WITH MORCELLATOR;  Hysteroscopy, Dilation and Curettage Under Ultrasound Guidance ;  Surgeon: Adry Tineo MD;  Location: [facility] OR     DILATION AND CURETTAGE, WITH ULTRASOUND GUIDANCE  4/11/2018    Procedure: DILATION AND CURETTAGE, WITH ULTRASOUND GUIDANCE;;  Surgeon: Adry Tineo MD;  Location[facility]     HYSTERECTOMY TOTAL ABDOMINAL, BILATERAL SALPINGO-OOPHORECTOMY, NODE DISSECTION, COMBINED Bilateral 4/27/2018    Procedure: COMBINED HYSTERECTOMY TOTAL ABDOMINAL, SALPINGO-OOPHORECTOMY, NODE DISSECTION;;  Surgeon: Bradley Tierney MD;  Location: [facility]     INSERT PORT VASCULAR ACCESS Right 5/31/2018    Procedure: INSERT PORT VASCULAR ACCESS;  Single Lumen Chest Power Port;  Surgeon: Jamila Major PA-C;  [facility]     LAPAROSCOPIC HYSTERECTOMY TOTAL, BILATERAL SALPINGO-OOPHORECTOMY, NODE DISSECTION, COMBINED N/A 4/27/2018    Procedure: COMBINED LAPAROSCOPIC HYSTERECTOMY TOTAL, SALPINGO-OOPHORECTOMY, NODE DISSECTION;  Laparoscopic converted to open Removal Of Uterus, Tubes, Ovaries, Cervix, Pelvic and Para Aortic Lymphnode Dissection, Tumor Debulking, CUSA, Partial Omentectomy, Anesthesia Block;  Surgeon: Bradley Tierney MD;  Location: [facility]     TONSILLECTOMY  1960    tonsillectomy       Current Outpatient Prescriptions   Medication     acetaminophen (TYLENOL) 500 MG tablet     calcium citrate-vitamin D (CITRACAL) 315-200 MG-UNIT TABS per tablet     FERROUS GLUCONATE PO     GLUCOSAMINE CHONDROITIN COMPLX OR TABS     LORazepam (ATIVAN) 1 MG tablet     ondansetron (ZOFRAN) 8 MG tablet     prochlorperazine (COMPAZINE) 10 MG tablet     RANITIDINE HCL PO     VITAMIN C 250 MG  OR TABS     No current facility-administered medications for this visit.        Allergies   Allergen Reactions     Nickel Rash       Family History   Problem Relation Age of Onset     Diabetes Mother      type 2     HEART DISEASE Mother      Hypertension Mother      C.A.D. Mother       after 2nd heart attack     Hypertension Father      C.A.D. Father      mild heart attack     Cancer Father      skin cancer     HEART DISEASE Father      Arthritis Sister      rheumatoid arthritis     HEART DISEASE Brother      hereditary heart murmer     Lipids Brother      Lipids Brother      KIDNEY DISEASE No family hx of        Social History     Social History     Marital status:      Spouse name: Doug     Number of children: 0     Years of education: N/A     Occupational History     rn [employer]     Social History Main Topics     Smoking status: Never Smoker     Smokeless tobacco: Never Used     Alcohol use Yes      Comment: rarely     Drug use: No     Sexual activity: Yes     Partners: Male     Other Topics Concern     Parent/Sibling W/ Cabg, Mi Or Angioplasty Before 65f 55m? No     Social History Narrative    Balanced Diet - Yes, in the last 6 months    Osteoporosis Prevention Measures - Dairy servings per day: 2 servings plus a supplement    Regular Exercise -  Yes Describe walks for 30 to 40 minutes 4 to 5 times a week    Dental Exam - YES - Date: 1 year ago, and is going today    Eye Exam - YES - Date: 4 months ago    Self Breast Exam - Yes    Abuse: Current or Past (Physical, Sexual or Emotional)- No    Do you feel safe in your environment - Yes    Guns stored in the home - Yes, locked    Sunscreen used - Yes    Seatbelts used - Yes    Lipids -  YES - Date: 10-02    Glucose -  YES - Date: 10-02    Colon Cancer Screening - No    Hemoccults - NO    Pap Test -  YES - Date: 10-02    Do you have any concerns about STD's -  No    Mammography - YES - Date:     DEXA - NO    Immunizations reviewed and up to  date - Yes, lst td 7-2000    10-23-07  MEL Mueller CMA           ROS  Answers for HPI/ROS submitted by the patient on 6/22/2018   General Symptoms: Yes  Skin Symptoms: No  HENT Symptoms: Yes  EYE SYMPTOMS: No  HEART SYMPTOMS: No  LUNG SYMPTOMS: Yes  INTESTINAL SYMPTOMS: No  URINARY SYMPTOMS: No  GYNECOLOGIC SYMPTOMS: No  BREAST SYMPTOMS: No  SKELETAL SYMPTOMS: Yes  BLOOD SYMPTOMS: No  NERVOUS SYSTEM SYMPTOMS: No  MENTAL HEALTH SYMPTOMS: Yes  Fever: No  Loss of appetite: Yes  Weight loss: Yes  Weight gain: No  Fatigue: Yes  Night sweats: No  Chills: Yes  Increased stress: Yes  Excessive hunger: No  Excessive thirst: No  Feeling hot or cold when others believe the temperature is normal: Yes  Loss of height: No  Post-operative complications: No  Surgical site pain: No  Hallucinations: No  Change in or Loss of Energy: Yes  Hyperactivity: No  Confusion: No  Ear pain: No  Ear discharge: No  Hearing loss: No  Tinnitus: No  Nosebleeds: Yes  Congestion: Yes  Sinus pain: No  Trouble swallowing: No   Voice hoarseness: Yes  Mouth sores: No  Sore throat: No  Tooth pain: No  Gum tenderness: No  Bleeding gums: No  Change in taste: No  Change in sense of smell: No  Dry mouth: No  Hearing aid used: No  Neck lump: No  Cough: Yes  Sputum or phlegm: No  Coughing up blood: No  Difficulty breating or shortness of breath: No  Snoring: No  Wheezing: No  Difficulty breathing on exertion: No  Nighttime Cough: No  Difficulty breathing when lying flat: No  Back pain: No  Muscle aches: Yes  Neck pain: No  Swollen joints: No  Joint pain: Yes  Bone pain: No  Muscle cramps: No  Muscle weakness: No  Joint stiffness: Yes  Bone fracture: No  Nervous or Anxious: Yes  Depression: No  Trouble sleeping: No  Trouble thinking or concentrating: No  Mood changes: No  Panic attacks: No            Physical Exam:  Vitals from 0839:  Temp 98.5F, , BP 95/61, RR 16, SpO2 100%    Vitals from 1617:  BP 92/62  Pulse 89  Temp 98.5  F (36.9  C) (Oral)  Resp 16   Wt 78 kg (171 lb 14.4 oz)  LMP 03/04/2005  SpO2 99%  BMI 27.96 kg/m2    CONSTITUTIONAL: Alert non-toxic appearing female in no acute distress  HEAD: Normocephalic, atraumatic  EYES: PERRLA; no scleral icterus  ENT: Oropharynx pink without lesions  NECK: Neck supple without lymphadenopathy  RESPIRATORY: Lungs clear to auscultation, no increased work of breathing noted, dry cough with each deep breath  CV: Tachycardic, regular rhythm, S1S2, no clicks, murmurs, rubs, or gallops; bilateral lower extremities without edema, dorsalis pedis pulses 2+ bilaterally  GASTROINTESTINAL: Normoactive bowel sounds x4 quadrants, abdomen soft, non-distended, and non-tender to palpation without masses or organomegaly, negative Boswell's sign  GENITOURINARY: Not indicated  LYMPHATIC: Cervical, supraclavicular, and inguinal nodes without lymphadenopathy  MUSCULOSKELETAL: Moves all extremities, no obvious muscle wasting  NEUROLOGIC: No gross deficits, normal gait  SKIN: Appropriate color for race, warm and dry, no rashes or lesions to unclothed skin  PSYCHIATRIC: Pleasant and interactive, affect bright, makes appropriate eye contact, thought process linear      Labs:    7/6/2018  Day 1   Hemoglobin 11.7 - 15.7 g/dL 9.1 (A)   Hematocrit 35.0 - 47.0 % 30.7 (A)   Platelet Count 150 - 450 10e9/L 494 (A)   Absolute Neutrophil 1.6 - 8.3 10e9/L 7.5   Sodium 133 - 144 mmol/L 137   Potassium 3.4 - 5.3 mmol/L 3.9   Chloride 94 - 109 mmol/L 105   Carbon Dioxide 20 - 32 mmol/L 22   Urea Nitrogen 7 - 30 mg/dL 13   Creatinine 0.52 - 1.04 mg/dL 1.04   Calcium 8.5 - 10.1 mg/dL 9.1   Magnesium 1.6 - 2.3 mg/dL 2.2   Bilirubin Total 0.2 - 1.3 mg/dL 0.4   ALT 0 - 50 U/L 56 (A)   AST 0 - 45 U/L 34   Alkaline Phosphatase 40 - 150 U/L 346 (A)   Albumin 3.4 - 5.0 g/dL 2.2 (A)   Protein Total 6.8 - 8.8 g/dL 6.9   WBC 4.0 - 11.0 10e9/L 9.5   Chest CT pending    Assessment/Plan:  1) Stage IIIC1 low grade endometrial cancer: Felt quite poorly after her Taxol  reaction but today she reports feeling well with the exception of a dry cough. No s/sxs infection, proceed with cycle one of single agent carboplatin AUC 6 as originally ordered by Dr. Tierney. To receive total of six cycles followed by treatment planning with Dr. Tierney. Reviewed signs and symptoms for when she should contact the clinic or seek additional care, including but not limited to fever, chills, inability to keep down food or fluids, nausea and vomiting not controlled with antiemetics, and diarrhea leading to dehydration. Patient to contact the clinic with any questions or concerns in the interim.  2) Cough: Unclear etiology, though no s/sxs infection. Potentially due to inflammatory reaction from her paclitaxel reaction, however, given her persistent cough, tachycardia, active cancer diagnosis, and history of an unprovoked DVT will obtain a CT PE to rule out pulmonary embolism. Hemodynamically stable, no SOB or chest pain. Tessalon perles 100mg PO TID PRN ordered should cough persist- reviewed potentially sedating effects and safety, may consider Robitussin first if cough is bothersome.  3) Abnormal LFTs: Elevated alkaline phosphatase and AST- asymptomatic, no jaundice or RUQ pain. Potentially related to paclitaxel, though the amount she received was minimal. Continue to monitor LFTs with each chemotherapy cycle, to call clinic if she becomes symptomatic. No dose reduction required for carboplatin.  4) Hypoalbuminemia: Reviewed importance of eating small, frequent meals high in protein and calories. Denies need for further intervention at this time.  5) Genetic counseling: MMR proteins with intact nuclear expression making Peterson syndrome unlikely.  6) Health maintenance issues discussed include to follow up with PCP for non-gynecologic concerns and co-morbid conditions  7) Patient verbalized understanding of and agreement with plan    ALFONZO Weathers, FNP-C  Division of Gynecologic Oncology  [facility]  Pager: [phone]      ADDENDUM:  Notified by Dr. Cruz of segmental RUL and right lingula pulmonary emboli. Gerri returned to clinic for assessment. BP, HR, and SpO2 stable. She is asymptomatic with the exception of a persistent cough. Discussed case with Dr. Tierney- if stable and able to have an ECHO outpatient that demonstrates no evidence of R heart strain, may be treated outpatient with Lovenox. Unable to have outpatient ECHO this evening or this weekend. Given her three emboli, persistent cough, soft BP (though baseline), and inability to obtain ECHO outpatient will admit to unit 7C for further evaluation and management of pulmonary emboli. Briefly reviewed Lovenox teaching and discussed PEs in detail. Report given to inpatient MD team, discussed with Dr. Tierney, and report given to OTIS Snowden on 7C. Gerri and her  were in agreement with plan and she states she feels safe being monitored inpatient. Ambulatory and stable at time of discharge from clinic, taking private car from clinic to hospital.  ALFONZO Weathers, TYRONEP-C  Gynecologic Oncology  [facility]  Pager: [phone]

## 2018-07-06 ENCOUNTER — APPOINTMENT (OUTPATIENT)
Dept: LAB | Facility: CLINIC | Age: 63
End: 2018-07-06
Attending: NURSE PRACTITIONER
Payer: COMMERCIAL

## 2018-07-06 ENCOUNTER — ONCOLOGY VISIT (OUTPATIENT)
Dept: ONCOLOGY | Facility: CLINIC | Age: 63
End: 2018-07-06
Attending: NURSE PRACTITIONER
Payer: COMMERCIAL

## 2018-07-06 ENCOUNTER — HOSPITAL ENCOUNTER (OUTPATIENT)
Facility: CLINIC | Age: 63
Setting detail: OBSERVATION
Discharge: HOME | End: 2018-07-07
Attending: OBSTETRICS & GYNECOLOGY | Admitting: OBSTETRICS & GYNECOLOGY
Payer: COMMERCIAL

## 2018-07-06 ENCOUNTER — INFUSION THERAPY VISIT (OUTPATIENT)
Dept: ONCOLOGY | Facility: CLINIC | Age: 63
End: 2018-07-06
Attending: OBSTETRICS & GYNECOLOGY
Payer: COMMERCIAL

## 2018-07-06 ENCOUNTER — RADIANT APPOINTMENT (OUTPATIENT)
Dept: CT IMAGING | Facility: CLINIC | Age: 63
End: 2018-07-06
Attending: NURSE PRACTITIONER
Payer: OTHER GOVERNMENT

## 2018-07-06 VITALS
BODY MASS INDEX: 27.96 KG/M2 | SYSTOLIC BLOOD PRESSURE: 92 MMHG | WEIGHT: 171.9 LBS | RESPIRATION RATE: 16 BRPM | DIASTOLIC BLOOD PRESSURE: 62 MMHG | OXYGEN SATURATION: 99 % | HEART RATE: 89 BPM | TEMPERATURE: 98.5 F

## 2018-07-06 DIAGNOSIS — C54.1 ENDOMETRIAL CANCER (H): Primary | ICD-10-CM

## 2018-07-06 DIAGNOSIS — R05.9 COUGH: ICD-10-CM

## 2018-07-06 DIAGNOSIS — I26.99 OTHER ACUTE PULMONARY EMBOLISM WITHOUT ACUTE COR PULMONALE (H): Primary | ICD-10-CM

## 2018-07-06 DIAGNOSIS — Z79.899 ENCOUNTER FOR LONG-TERM (CURRENT) USE OF MEDICATIONS: ICD-10-CM

## 2018-07-06 DIAGNOSIS — R79.89 ELEVATED LFTS: ICD-10-CM

## 2018-07-06 DIAGNOSIS — I26.99 OTHER ACUTE PULMONARY EMBOLISM WITHOUT ACUTE COR PULMONALE (H): ICD-10-CM

## 2018-07-06 DIAGNOSIS — Z51.11 ENCOUNTER FOR ANTINEOPLASTIC CHEMOTHERAPY: ICD-10-CM

## 2018-07-06 LAB
ALBUMIN SERPL-MCNC: 2.2 G/DL (ref 3.4–5)
ALP SERPL-CCNC: 346 U/L (ref 40–150)
ALT SERPL W P-5'-P-CCNC: 56 U/L (ref 0–50)
ANION GAP SERPL CALCULATED.3IONS-SCNC: 10 MMOL/L (ref 3–14)
AST SERPL W P-5'-P-CCNC: 34 U/L (ref 0–45)
BASOPHILS # BLD AUTO: 0.1 10E9/L (ref 0–0.2)
BASOPHILS NFR BLD AUTO: 0.5 %
BILIRUB SERPL-MCNC: 0.4 MG/DL (ref 0.2–1.3)
BUN SERPL-MCNC: 13 MG/DL (ref 7–30)
CALCIUM SERPL-MCNC: 9.1 MG/DL (ref 8.5–10.1)
CHLORIDE SERPL-SCNC: 105 MMOL/L (ref 94–109)
CO2 SERPL-SCNC: 22 MMOL/L (ref 20–32)
CREAT SERPL-MCNC: 1.04 MG/DL (ref 0.52–1.04)
DIFFERENTIAL METHOD BLD: ABNORMAL
EOSINOPHIL # BLD AUTO: 0 10E9/L (ref 0–0.7)
EOSINOPHIL NFR BLD AUTO: 0.3 %
ERYTHROCYTE [DISTWIDTH] IN BLOOD BY AUTOMATED COUNT: 15.8 % (ref 10–15)
GFR SERPL CREATININE-BSD FRML MDRD: 54 ML/MIN/1.7M2
GLUCOSE SERPL-MCNC: 101 MG/DL (ref 70–99)
HCT VFR BLD AUTO: 30.7 % (ref 35–47)
HGB BLD-MCNC: 9.1 G/DL (ref 11.7–15.7)
IMM GRANULOCYTES # BLD: 0.1 10E9/L (ref 0–0.4)
IMM GRANULOCYTES NFR BLD: 0.6 %
LYMPHOCYTES # BLD AUTO: 1 10E9/L (ref 0.8–5.3)
LYMPHOCYTES NFR BLD AUTO: 10.3 %
MAGNESIUM SERPL-MCNC: 2.2 MG/DL (ref 1.6–2.3)
MCH RBC QN AUTO: 24.9 PG (ref 26.5–33)
MCHC RBC AUTO-ENTMCNC: 29.6 G/DL (ref 31.5–36.5)
MCV RBC AUTO: 84 FL (ref 78–100)
MONOCYTES # BLD AUTO: 0.9 10E9/L (ref 0–1.3)
MONOCYTES NFR BLD AUTO: 9.4 %
NEUTROPHILS # BLD AUTO: 7.5 10E9/L (ref 1.6–8.3)
NEUTROPHILS NFR BLD AUTO: 78.9 %
NRBC # BLD AUTO: 0 10*3/UL
NRBC BLD AUTO-RTO: 0 /100
PLATELET # BLD AUTO: 494 10E9/L (ref 150–450)
POTASSIUM SERPL-SCNC: 3.9 MMOL/L (ref 3.4–5.3)
PROT SERPL-MCNC: 6.9 G/DL (ref 6.8–8.8)
RADIOLOGIST FLAGS: ABNORMAL
RBC # BLD AUTO: 3.66 10E12/L (ref 3.8–5.2)
SODIUM SERPL-SCNC: 137 MMOL/L (ref 133–144)
WBC # BLD AUTO: 9.5 10E9/L (ref 4–11)

## 2018-07-06 PROCEDURE — 40000141 ZZH STATISTIC PERIPHERAL IV START W/O US GUIDANCE

## 2018-07-06 PROCEDURE — 85025 COMPLETE CBC W/AUTO DIFF WBC: CPT | Performed by: OBSTETRICS & GYNECOLOGY

## 2018-07-06 PROCEDURE — 25000125 ZZHC RX 250: Performed by: STUDENT IN AN ORGANIZED HEALTH CARE EDUCATION/TRAINING PROGRAM

## 2018-07-06 PROCEDURE — 83735 ASSAY OF MAGNESIUM: CPT | Performed by: OBSTETRICS & GYNECOLOGY

## 2018-07-06 PROCEDURE — 99215 OFFICE O/P EST HI 40 MIN: CPT | Mod: ZP | Performed by: NURSE PRACTITIONER

## 2018-07-06 PROCEDURE — G0463 HOSPITAL OUTPT CLINIC VISIT: HCPCS | Mod: ZF

## 2018-07-06 PROCEDURE — 96375 TX/PRO/DX INJ NEW DRUG ADDON: CPT

## 2018-07-06 PROCEDURE — 25000128 H RX IP 250 OP 636: Mod: ZF | Performed by: NURSE PRACTITIONER

## 2018-07-06 PROCEDURE — G0378 HOSPITAL OBSERVATION PER HR: HCPCS

## 2018-07-06 PROCEDURE — 96413 CHEMO IV INFUSION 1 HR: CPT

## 2018-07-06 PROCEDURE — 25000132 ZZH RX MED GY IP 250 OP 250 PS 637: Performed by: STUDENT IN AN ORGANIZED HEALTH CARE EDUCATION/TRAINING PROGRAM

## 2018-07-06 PROCEDURE — 96372 THER/PROPH/DIAG INJ SC/IM: CPT

## 2018-07-06 PROCEDURE — 80053 COMPREHEN METABOLIC PANEL: CPT | Performed by: OBSTETRICS & GYNECOLOGY

## 2018-07-06 PROCEDURE — 25000128 H RX IP 250 OP 636: Performed by: STUDENT IN AN ORGANIZED HEALTH CARE EDUCATION/TRAINING PROGRAM

## 2018-07-06 PROCEDURE — 36591 DRAW BLOOD OFF VENOUS DEVICE: CPT

## 2018-07-06 PROCEDURE — 25000128 H RX IP 250 OP 636: Mod: ZF | Performed by: OBSTETRICS & GYNECOLOGY

## 2018-07-06 RX ORDER — LIDOCAINE 40 MG/G
CREAM TOPICAL
Status: DISCONTINUED | OUTPATIENT
Start: 2018-07-06 | End: 2018-07-07 | Stop reason: HOSPADM

## 2018-07-06 RX ORDER — HEPARIN SODIUM (PORCINE) LOCK FLUSH IV SOLN 100 UNIT/ML 100 UNIT/ML
500 SOLUTION INTRAVENOUS ONCE
Status: COMPLETED | OUTPATIENT
Start: 2018-07-06 | End: 2018-07-06

## 2018-07-06 RX ORDER — OXYCODONE HYDROCHLORIDE 5 MG/1
5-10 TABLET ORAL
Status: DISCONTINUED | OUTPATIENT
Start: 2018-07-06 | End: 2018-07-07 | Stop reason: HOSPADM

## 2018-07-06 RX ORDER — ONDANSETRON 2 MG/ML
4 INJECTION INTRAMUSCULAR; INTRAVENOUS EVERY 6 HOURS PRN
Status: DISCONTINUED | OUTPATIENT
Start: 2018-07-06 | End: 2018-07-07 | Stop reason: HOSPADM

## 2018-07-06 RX ORDER — ACETAMINOPHEN 650 MG/1
650 SUPPOSITORY RECTAL EVERY 4 HOURS PRN
Status: DISCONTINUED | OUTPATIENT
Start: 2018-07-06 | End: 2018-07-07 | Stop reason: HOSPADM

## 2018-07-06 RX ORDER — IOPAMIDOL 755 MG/ML
61 INJECTION, SOLUTION INTRAVASCULAR ONCE
Status: COMPLETED | OUTPATIENT
Start: 2018-07-06 | End: 2018-07-06

## 2018-07-06 RX ORDER — NALOXONE HYDROCHLORIDE 0.4 MG/ML
.1-.4 INJECTION, SOLUTION INTRAMUSCULAR; INTRAVENOUS; SUBCUTANEOUS
Status: DISCONTINUED | OUTPATIENT
Start: 2018-07-06 | End: 2018-07-07 | Stop reason: HOSPADM

## 2018-07-06 RX ORDER — BENZONATATE 100 MG/1
100 CAPSULE ORAL 3 TIMES DAILY PRN
Qty: 60 CAPSULE | Refills: 0 | Status: SHIPPED | OUTPATIENT
Start: 2018-07-06 | End: 2018-07-19

## 2018-07-06 RX ORDER — LORAZEPAM 1 MG/1
1 TABLET ORAL AT BEDTIME
Status: DISCONTINUED | OUTPATIENT
Start: 2018-07-06 | End: 2018-07-07 | Stop reason: HOSPADM

## 2018-07-06 RX ORDER — LIDOCAINE 40 MG/G
CREAM TOPICAL
Status: DISCONTINUED | OUTPATIENT
Start: 2018-07-06 | End: 2018-07-06

## 2018-07-06 RX ORDER — HEPARIN SODIUM (PORCINE) LOCK FLUSH IV SOLN 100 UNIT/ML 100 UNIT/ML
5 SOLUTION INTRAVENOUS ONCE
Status: COMPLETED | OUTPATIENT
Start: 2018-07-06 | End: 2018-07-06

## 2018-07-06 RX ORDER — ONDANSETRON 4 MG/1
4 TABLET, ORALLY DISINTEGRATING ORAL EVERY 6 HOURS PRN
Status: DISCONTINUED | OUTPATIENT
Start: 2018-07-06 | End: 2018-07-07 | Stop reason: HOSPADM

## 2018-07-06 RX ORDER — BENZONATATE 100 MG/1
100 CAPSULE ORAL 3 TIMES DAILY PRN
Status: DISCONTINUED | OUTPATIENT
Start: 2018-07-06 | End: 2018-07-07 | Stop reason: HOSPADM

## 2018-07-06 RX ORDER — ACETAMINOPHEN 325 MG/1
650 TABLET ORAL EVERY 4 HOURS PRN
Status: DISCONTINUED | OUTPATIENT
Start: 2018-07-06 | End: 2018-07-07 | Stop reason: HOSPADM

## 2018-07-06 RX ADMIN — IOPAMIDOL 61 ML: 755 INJECTION, SOLUTION INTRAVASCULAR at 14:52

## 2018-07-06 RX ADMIN — ENOXAPARIN SODIUM 80 MG: 80 INJECTION SUBCUTANEOUS at 21:22

## 2018-07-06 RX ADMIN — CARBOPLATIN 505 MG: 10 INJECTION, SOLUTION INTRAVENOUS at 10:38

## 2018-07-06 RX ADMIN — HEPARIN SODIUM (PORCINE) LOCK FLUSH IV SOLN 100 UNIT/ML 500 UNITS: 100 SOLUTION at 15:03

## 2018-07-06 RX ADMIN — ONDANSETRON 4 MG: 4 TABLET, ORALLY DISINTEGRATING ORAL at 18:17

## 2018-07-06 RX ADMIN — DEXAMETHASONE SODIUM PHOSPHATE: 10 INJECTION, SOLUTION INTRAMUSCULAR; INTRAVENOUS at 10:13

## 2018-07-06 RX ADMIN — RANITIDINE 150 MG: 150 TABLET, FILM COATED ORAL at 21:22

## 2018-07-06 RX ADMIN — SODIUM CHLORIDE 250 ML: 9 INJECTION, SOLUTION INTRAVENOUS at 10:11

## 2018-07-06 RX ADMIN — SODIUM CHLORIDE, PRESERVATIVE FREE 5 ML: 5 INJECTION INTRAVENOUS at 08:40

## 2018-07-06 ASSESSMENT — ACTIVITIES OF DAILY LIVING (ADL)
RETIRED_COMMUNICATION: 0-->UNDERSTANDS/COMMUNICATES WITHOUT DIFFICULTY
RETIRED_EATING: 0-->INDEPENDENT
TRANSFERRING: 0-->INDEPENDENT
DRESS: 0-->INDEPENDENT
AMBULATION: 0-->INDEPENDENT
BATHING: 0-->INDEPENDENT
SWALLOWING: 0-->SWALLOWS FOODS/LIQUIDS WITHOUT DIFFICULTY
FALL_HISTORY_WITHIN_LAST_SIX_MONTHS: NO
COGNITION: 0 - NO COGNITION ISSUES REPORTED
TOILETING: 0-->INDEPENDENT

## 2018-07-06 ASSESSMENT — PAIN SCALES - GENERAL: PAINLEVEL: NO PAIN (0)

## 2018-07-06 NOTE — NURSING NOTE
"Chief Complaint   Patient presents with     Port Draw     Labs drawn from port by RN. Line flushed with saline and heparin. Vs taken and pt checked in for appt     Port accessed with 20g 3/4\" gripper needle by RN, labs collected, line flushed with saline and heparin.  Vitals taken. Pt checked in for appointment(s).    Carissa Wen RN  "

## 2018-07-06 NOTE — NURSING NOTE
"Oncology Rooming Note    July 6, 2018 8:52 AM   Gerri Owens is a 63 year old female who presents for:    Chief Complaint   Patient presents with     Port Draw     Labs drawn from port by RN. Line flushed with saline and heparin. Vs taken and pt checked in for appt     Oncology Clinic Visit     Return-Chemo     Initial Vitals: BP 95/61 (BP Location: Right arm, Cuff Size: Adult Regular)  Pulse 111  Temp 98.5  F (36.9  C) (Oral)  Resp 16  Wt 78 kg (171 lb 14.4 oz)  LMP 03/04/2005  SpO2 100%  BMI 27.96 kg/m2 Estimated body mass index is 27.96 kg/(m^2) as calculated from the following:    Height as of 5/31/18: 1.67 m (5' 5.75\").    Weight as of this encounter: 78 kg (171 lb 14.4 oz). Body surface area is 1.9 meters squared.  No Pain (0) Comment: Data Unavailable   Patient's last menstrual period was 03/04/2005.  Allergies reviewed: Yes  Medications reviewed: Yes    Medications: Medication refills not needed today.  Pharmacy name entered into [facility]:    [facility] [place], [place] - [address] [place] [address] OUTPATIENT PHARMACY  [facility] - [place], [place] - [address] [facility]    Clinical concerns: Pt has a cough from her last chemo appt. Pt has been taking a caffeine pill-200 mg. Pt has been taking half of the caffeine pill.     6 minutes for nursing intake (face to face time)     Jeison Laws [facility]            "

## 2018-07-06 NOTE — PROGRESS NOTES
Infusion Nursing Note:  Gerri Owens presents today for C1D1 Carboplatin.    Patient seen by provider today: Yes: Irasema GABRIEL   present during visit today: Not Applicable.    Note: No complaints made. Otherwise well.    Kept needle for CT scan.    Intravenous Access:  Implanted Port.    Treatment Conditions:  Lab Results   Component Value Date    HGB 9.1 07/06/2018     Lab Results   Component Value Date    WBC 9.5 07/06/2018      Lab Results   Component Value Date    ANEU 7.5 07/06/2018     Lab Results   Component Value Date     07/06/2018      Lab Results   Component Value Date     07/06/2018                   Lab Results   Component Value Date    POTASSIUM 3.9 07/06/2018           Lab Results   Component Value Date    MAG 2.2 07/06/2018            Lab Results   Component Value Date    CR 1.04 07/06/2018                   Lab Results   Component Value Date    LAURENT 9.1 07/06/2018                Lab Results   Component Value Date    BILITOTAL 0.4 07/06/2018           Lab Results   Component Value Date    ALBUMIN 2.2 07/06/2018                    Lab Results   Component Value Date    ALT 56 07/06/2018           Lab Results   Component Value Date    AST 34 07/06/2018       Results reviewed, labs MET treatment parameters, ok to proceed with treatment.    7/6/18/0958H/ Irasema GABRIEL/Cristin Diana RN/ To proceed with treatment as planned. For chest xray once completed, no need to wait for the result. May send patient home.     7/6/18/0958H/ Irasema GABRIEL/Cristin Diana RN/ Change chest xray to chest CT instead, due to risk factors. No need to wait for the result.     Post Infusion Assessment:  Patient tolerated infusion without incident.  Blood return noted pre and post infusion.  Site patent and intact, free from redness, edema or discomfort.  No evidence of extravasations.  Access discontinued per protocol.    Discharge Plan:   Prescription refills given for  Zofran.  Discharge instructions reviewed with: Patient and Family.  Patient and/or family verbalized understanding of discharge instructions and all questions answered.  Copy of AVS reviewed with patient and/or family.  Patient will return 7/27/18 for next appointment.  Patient discharged in stable condition accompanied by: self and friend.  Departure Mode: Ambulatory.    VIOLETA FERREIRA RN

## 2018-07-06 NOTE — PATIENT INSTRUCTIONS
Contact Numbers  [facility]: [phone]    After Hours:  [phone]  Triage: [phone]    Please call the [facility] Triage line if you experience a temperature greater than or equal to 100.5, shaking chills, have uncontrolled nausea, vomiting and/or diarrhea, dizziness, shortness of breath, chest pain, bleeding, unexplained bruising, or if you have any other new/concerning symptoms, questions or concerns.     If it is after hours, weekends, or holidays, please call the main hospital  at  [phone] and ask to speak to the Oncology doctor on call.     If you are having any concerning symptoms or wish to speak to a provider before your next infusion visit, please call your care coordinator or triage to notify them so we can adequately serve you.     If you need a refill on a narcotic prescription or other medication, please call triage before your infusion appointment.             July 2018 Sunday Monday Tuesday Wednesday Thursday Friday Saturday   1     2     3     4     5     6     [facility] DRAW    8:30 AM   (15 min.)    [facility] DRAW   [facility] Lab Draw     [facility] RETURN    8:45 AM   (40 min.)   Irasema Gtz APRN CNP   [facility] ONC INFUSION 120    9:30 AM   (120 min.)    ONCOLOGY INFUSION   [facility]     CT CHEST PULMONARY EMBOLISM W    2:40 PM   (20 min.)   UCCT2   [facility] CT 7       8     9     10     11     12     13     14       15     16     17     18     19     20     21       22     23     24     25     26     27     [facility] DRAW    7:45 AM   (15 min.)    [facility] DRAW   [facility] Lab Draw     [facility] RETURN    8:05 AM   (40 min.)   Irasema Gtz APRN CNP   [facility] ONC INFUSION 120    9:00 AM   (120 min.)    ONCOLOGY INFUSION   [facility] 28 29 30 31 August 2018    Sunday Monday Tuesday Wednesday Thursday Friday Saturday                  1     2     3     4       5     6     7     8     9     10     11       12     13     14     15     16     17     [facility] DRAW    7:45 AM   (15 min.)    [facility] DRAW   [facility] Lab Draw     [facility] RETURN ACTIVE TREATMENT    8:05 AM   (40 min.)   Irasema Gtz APRN CNP   [facility] ONC INFUSION 120    9:00 AM   (120 min.)    ONCOLOGY INFUSION   [facility] 18       19     20     21     22     23     24     25       26     27     28     29     30     31                       Lab Results:  Recent Results (from the past 12 hour(s))   CBC with platelets differential    Collection Time: 07/06/18  8:43 AM   Result Value Ref Range    WBC 9.5 4.0 - 11.0 10e9/L    RBC Count 3.66 (L) 3.8 - 5.2 10e12/L    Hemoglobin 9.1 (L) 11.7 - 15.7 g/dL    Hematocrit 30.7 (L) 35.0 - 47.0 %    MCV 84 78 - 100 fl    MCH 24.9 (L) 26.5 - 33.0 pg    MCHC 29.6 (L) 31.5 - 36.5 g/dL    RDW 15.8 (H) 10.0 - 15.0 %    Platelet Count 494 (H) 150 - 450 10e9/L    Diff Method Automated Method     % Neutrophils 78.9 %    % Lymphocytes 10.3 %    % Monocytes 9.4 %    % Eosinophils 0.3 %    % Basophils 0.5 %    % Immature Granulocytes 0.6 %    Nucleated RBCs 0 0 /100    Absolute Neutrophil 7.5 1.6 - 8.3 10e9/L    Absolute Lymphocytes 1.0 0.8 - 5.3 10e9/L    Absolute Monocytes 0.9 0.0 - 1.3 10e9/L    Absolute Eosinophils 0.0 0.0 - 0.7 10e9/L    Absolute Basophils 0.1 0.0 - 0.2 10e9/L    Abs Immature Granulocytes 0.1 0 - 0.4 10e9/L    Absolute Nucleated RBC 0.0    Comprehensive metabolic panel    Collection Time: 07/06/18  8:43 AM   Result Value Ref Range    Sodium 137 133 - 144 mmol/L    Potassium 3.9 3.4 - 5.3 mmol/L    Chloride 105 94 - 109 mmol/L    Carbon Dioxide 22 20 - 32 mmol/L    Anion Gap 10 3 - 14 mmol/L    Glucose 101 (H) 70 - 99 mg/dL    Urea Nitrogen 13 7 - 30 mg/dL    Creatinine 1.04 0.52 - 1.04  mg/dL    GFR Estimate 54 (L) >60 mL/min/1.7m2    GFR Estimate If Black 65 >60 mL/min/1.7m2    Calcium 9.1 8.5 - 10.1 mg/dL    Bilirubin Total 0.4 0.2 - 1.3 mg/dL    Albumin 2.2 (L) 3.4 - 5.0 g/dL    Protein Total 6.9 6.8 - 8.8 g/dL    Alkaline Phosphatase 346 (H) 40 - 150 U/L    ALT 56 (H) 0 - 50 U/L    AST 34 0 - 45 U/L   Magnesium    Collection Time: 07/06/18  8:43 AM   Result Value Ref Range    Magnesium 2.2 1.6 - 2.3 mg/dL

## 2018-07-06 NOTE — LETTER
2018       RE: Gerri Owens  [address]  [facility] [address]     Dear Colleague,    Thank you for referring your patient, Gerri Owens, to the [facility]. Please see a copy of my visit note below.    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    HPI: Gerri comes to the clinic accompanied by her  Doug. She is feeling fair today but notes a persistent, dry cough that has been present since her Taxol reaction on 18. It improves slightly when she lies down. Denies chest pain, shortness of breath, dizziness, weakness, or sense of impending doom. Does have GERD but reports this has been well controlled, denies seasonal allergies, and denies URI symptoms. Non-smoker. History of an unprovoked DVT in , on anticoagulation x6 months at that time. Negative for Factor V Leiden but has a brother that is positive for this and another brother with an unprovoked DVT as well. No fevers but does note five days of chills the first week after her treatment. Has not taken anything for her cough. Has had a few small self limited epistaxes during the past three weeks- normally gets these during the winter time but not during the summer. Notes GI upset with nausea and diarrhea the first three days after her Taxol reaction and myalgias in her legs during this time. Reports her appetite has been low but she is taking in protein shakes and foods high in iron (spinach salads with clams and garbanzo beans), drinking at least two liters of water daily. Reports overall feeling well this past week but reports some anxiety about the unknown and with delaying her chemotherapy start. Feels ready to start treatment today.    Oncology  History:  2/13/18- OBGYN visit with Dr. iTneo.  Complains of 1.5 years of post-menopausal bleeding.                           EMB performed and showed      4/11/18- EUA, hysteroscopy D&C under ultrasound guidance.  Diffuse proliferative vascular endometrium noted.                         Pathology:                          FINAL DIAGNOSIS:                          ENDOMETRIAL CURETTINGS:                          - Endometrial endometrioid adenocarcinoma, FIGO grade 2                            COMMENT:                          While the tumor maintained a glandular morphology, the occasional high                          nuclear grade warranted upgrading to                          FIGO 2.      4/23/18- Patient reports that she continues to have some post-menopausal spotting      4/27/18 DEMETRIO/BSO and staging:  PATH:  A. ANTERIOR FUNDUS, BIOPSY:   - Positive for adenocarcinoma     B. UTERUS, CERVIX, BILATERAL TUBES AND OVARIES, HYSTERECTOMY WITH   BILATERAL SALPINGO-OOPHORECTOMY:   - Endometrial endometrioid adenocarcinoma, FIGO grade 1   - Adenomyosis   - Cervix invaded by endometrial adenocarcinoma   - Right ovarian surface adhesions involved by endometrial adenocarcinoma   - Right fallopian tube with endometriosis   - Left adnexa invaded by endometrial adenocarcinoma (5.5 cm)     C. LYMPH NODES, LEFT PELVIC, EXCISION:   - Metastatic adenocarcinoma to one of eight lymph nodes (1/8)   - The metastatic focus is 3 mm in greatest dimension with no extranodal   extension     D. LYMPH NODES, LEFT PARA-AORTIC, EXCISION:   - One reactive lymph node, negative for malignancy (0/1)     E. LYMPH NODES, RIGHT PELVIC, EXCISION:   - Metastatic adenocarcinoma to one of six lymph nodes (1/6)   - The metastatic focus is 21 mm in greatest dimension with positive   extranodal extension     F. LYMPH NODES, RIGHT PARA-AORTIC, EXCISION:   - Three reactive lymph nodes, negative for malignancy (0/3)     G. OMENTUM, RESECTION:   - Omental  adipose tissue with focal inflammation and surface mesothelial   hyperplasia   - Negative for malignancy     COMMENT:   The final diagnosis confirms the interpretation provided intraoperatively.     Report Name: Endometrium - Hysterectomy        Status: Submitted Checklist Inst: 1      Last Updated By: Fernie Ramirez M.D., PhD, [facility],   05/06/2018 13:52:11   Part(s) Involved:   B: Uterus, cervix, bilateral tubes and ovaries     Synoptic Report:     SPECIMEN     Procedure:         - Simple hysterectomy         - Bilateral salpingo-oophorectomy         - Omentectomy         - Peritoneal washing     Specimen Integrity:         - Opened     TUMOR     Tumor Site:         - Endometrium         - Lower uterine segment     Histologic Type:         - Endometrioid carcinoma, NOS     Histologic Grade:         - FIGO grade 1     Tumor Size: 5.5 Centimeters (cm)     Tumor Extent       Myometrial Invasion:           - Present         Depth of Invasion: 15 Millimeters (mm)         Myometrial Thickness: 15 Millimeters (mm)         Percentage of Myometrial Invasion: 100%       Adenomyosis:           - Present, involved by carcinoma       Uterine Serosa Involvement:           - Present       Lower Uterine Segment Involvement:           - Present         Level of Tumor Involvement:             - Myoinvasive       Cervical Stromal Involvement:           - Present       Other Tissue / Organ Involvement:           - Right ovary           - Left ovary           - Left fallopian tube       Peritoneal Ascitic Fluid:           - Negative for malignancy (normal / benign)     Accessory Findings       Lymphovascular Invasion:           - Present     MARGINS     Ectocervical / Vaginal Cuff Margin:         - Uninvolved by carcinoma     LYMPH NODES     Number of Pelvic Nodes with Macrometastasis: 2     Number of Pelvic Nodes with Micrometastasis: 0     Laterality of Pelvic Node(s) with Tumor:         - Right         - Left     Total  Number of Pelvic Node(s) Examined (sentinel and nonsentinel): 14     Number of Pelvic [place] Nodes Examined: 0     Laterality of Pelvic Node(s) Examined:         - Right         - Left     Number of Para-Aortic Nodes with Macrometastasis: 0     Number of Para-Aortic Nodes with Micrometastasis: 0     Total Number of Para-Aortic Node(s) Examined (sentinel and nonsentinel):    4     Number of Para-Aortic [place] Nodes Examined: 0     Laterality of Para-Aortic Node(s) Examined:         - Right         - Left     PATHOLOGIC STAGE CLASSIFICATION (PTNM, AJCC 8TH EDITION)     Primary Tumor (pT):         - pT3a     Regional Lymph Nodes (pN)       Category (pN):           - pN1a     FIGO STAGE     FIGO Stage:         - IIIC1      4/29/2018: CT:  IMPRESSION:   1. No pulmonary embolism. Postop atelectasis.  2. Fluid-filled loops of dilated small bowel in the upper abdomen,  favored to represent adynamic ileus although incompletely visualized.  3. Pneumoperitoneum and small volume ascites, expected given  postoperative day 2 following open surgery.  4. Partially visualized 10 mm right thyroid nodule.     6/13/18: C1D1 carboplatin/paclitaxel- anaphylactic reaction to paclitaxel, no carboplatin received.    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  7/17/2012     Thyrotoxicosis 2007    hyperthyroid, enlarged right thyroid on thyroid uptake, treate by endocrin [facility] with tapozole till 12/2008,  FNA neg of 1.9 cm right lobe dominat nodule     Whooping cough due to Bordetella pertussis (p. pertussis) infant    whooping cough       Past Surgical History:   Procedure Laterality Date     ARTHROPLASTY HIP Left 12/4/2017    Procedure: ARTHROPLASTY HIP;  Left total hip arthroplasty;  Surgeon: Rajinder Goodwin MD;  Location: [facility] OR     BREAST BIOPSY, RT/LT  2004    left breast     DILATION AND CURETTAGE, OPERATIVE HYSTEROSCOPY WITH MORCELLATOR, COMBINED N/A 4/11/2018    Procedure: COMBINED DILATION AND CURETTAGE, OPERATIVE HYSTEROSCOPY WITH MORCELLATOR;  Hysteroscopy, Dilation and Curettage Under Ultrasound Guidance ;  Surgeon: Adry Tineo MD;  Location: [facility] OR     DILATION AND CURETTAGE, WITH ULTRASOUND GUIDANCE  4/11/2018    Procedure: DILATION AND CURETTAGE, WITH ULTRASOUND GUIDANCE;;  Surgeon: Adry Tineo MD;  Location: [facility] OR     HYSTERECTOMY TOTAL ABDOMINAL, BILATERAL SALPINGO-OOPHORECTOMY, NODE DISSECTION, COMBINED Bilateral 4/27/2018    Procedure: COMBINED HYSTERECTOMY TOTAL ABDOMINAL, SALPINGO-OOPHORECTOMY, NODE DISSECTION;;  Surgeon: Bradley Tierney MD;  Location: [facility] OR     INSERT PORT VASCULAR ACCESS Right 5/31/2018    Procedure: INSERT PORT VASCULAR ACCESS;  Single Lumen Chest Power Port;  Surgeon: Jamila Major PA-C;  Location: [facility] OR     LAPAROSCOPIC HYSTERECTOMY TOTAL, BILATERAL SALPINGO-OOPHORECTOMY, NODE DISSECTION, COMBINED N/A 4/27/2018    Procedure: COMBINED LAPAROSCOPIC HYSTERECTOMY TOTAL, SALPINGO-OOPHORECTOMY, NODE DISSECTION;  Laparoscopic converted to open Removal Of Uterus, Tubes, Ovaries, Cervix, Pelvic and Para Aortic Lymphnode Dissection, Tumor Debulking, CUSA, Partial Omentectomy, Anesthesia Block;  Surgeon: Bradley Tierney MD;  Location: [facility] OR     TONSILLECTOMY  1960    tonsillectomy       Current Outpatient Prescriptions   Medication     acetaminophen (TYLENOL) 500 MG tablet     calcium citrate-vitamin D (CITRACAL) 315-200 MG-UNIT TABS per  tablet     FERROUS GLUCONATE PO     GLUCOSAMINE CHONDROITIN COMPLX OR TABS     LORazepam (ATIVAN) 1 MG tablet     ondansetron (ZOFRAN) 8 MG tablet     prochlorperazine (COMPAZINE) 10 MG tablet     RANITIDINE HCL PO     VITAMIN C 250 MG OR TABS     No current facility-administered medications for this visit.        Allergies   Allergen Reactions     Nickel Rash       Family History   Problem Relation Age of Onset     Diabetes Mother      type 2     HEART DISEASE Mother      Hypertension Mother      C.A.D. Mother       after 2nd heart attack     Hypertension Father      C.A.D. Father      mild heart attack     Cancer Father      skin cancer     HEART DISEASE Father      Arthritis Sister      rheumatoid arthritis     HEART DISEASE Brother      hereditary heart murmer     Lipids Brother      Lipids Brother      KIDNEY DISEASE No family hx of        Social History     Social History     Marital status:      Spouse name: Doug     Number of children: 0     Years of education: N/A     Occupational History     rn [employer]     Social History Main Topics     Smoking status: Never Smoker     Smokeless tobacco: Never Used     Alcohol use Yes      Comment: rarely     Drug use: No     Sexual activity: Yes     Partners: Male     Other Topics Concern     Parent/Sibling W/ Cabg, Mi Or Angioplasty Before 65f 55m? No     Social History Narrative    Balanced Diet - Yes, in the last 6 months    Osteoporosis Prevention Measures - Dairy servings per day: 2 servings plus a supplement    Regular Exercise -  Yes Describe walks for 30 to 40 minutes 4 to 5 times a week    Dental Exam - YES - Date: 1 year ago, and is going today    Eye Exam - YES - Date: 4 months ago    Self Breast Exam - Yes    Abuse: Current or Past (Physical, Sexual or Emotional)- No    Do you feel safe in your environment - Yes    Guns stored in the home - Yes, locked    Sunscreen used - Yes    Seatbelts used - Yes    Lipids -  YES - Date: 10-02     Glucose -  YES - Date: 10-02    Colon Cancer Screening - No    Hemoccults - NO    Pap Test -  YES - Date: 10-02    Do you have any concerns about STD's -  No    Mammography - YES - Date: 8-06    DEXA - NO    Immunizations reviewed and up to date - Yes, lst td 7-2000    10-23-07  MEL Mueller CMA           ROS  Answers for HPI/ROS submitted by the patient on 6/22/2018   General Symptoms: Yes  Skin Symptoms: No  HENT Symptoms: Yes  EYE SYMPTOMS: No  HEART SYMPTOMS: No  LUNG SYMPTOMS: Yes  INTESTINAL SYMPTOMS: No  URINARY SYMPTOMS: No  GYNECOLOGIC SYMPTOMS: No  BREAST SYMPTOMS: No  SKELETAL SYMPTOMS: Yes  BLOOD SYMPTOMS: No  NERVOUS SYSTEM SYMPTOMS: No  MENTAL HEALTH SYMPTOMS: Yes  Fever: No  Loss of appetite: Yes  Weight loss: Yes  Weight gain: No  Fatigue: Yes  Night sweats: No  Chills: Yes  Increased stress: Yes  Excessive hunger: No  Excessive thirst: No  Feeling hot or cold when others believe the temperature is normal: Yes  Loss of height: No  Post-operative complications: No  Surgical site pain: No  Hallucinations: No  Change in or Loss of Energy: Yes  Hyperactivity: No  Confusion: No  Ear pain: No  Ear discharge: No  Hearing loss: No  Tinnitus: No  Nosebleeds: Yes  Congestion: Yes  Sinus pain: No  Trouble swallowing: No   Voice hoarseness: Yes  Mouth sores: No  Sore throat: No  Tooth pain: No  Gum tenderness: No  Bleeding gums: No  Change in taste: No  Change in sense of smell: No  Dry mouth: No  Hearing aid used: No  Neck lump: No  Cough: Yes  Sputum or phlegm: No  Coughing up blood: No  Difficulty breating or shortness of breath: No  Snoring: No  Wheezing: No  Difficulty breathing on exertion: No  Nighttime Cough: No  Difficulty breathing when lying flat: No  Back pain: No  Muscle aches: Yes  Neck pain: No  Swollen joints: No  Joint pain: Yes  Bone pain: No  Muscle cramps: No  Muscle weakness: No  Joint stiffness: Yes  Bone fracture: No  Nervous or Anxious: Yes  Depression: No  Trouble sleeping: No  Trouble  thinking or concentrating: No  Mood changes: No  Panic attacks: No            Physical Exam:  Vitals from 0839:  Temp 98.5F, , BP 95/61, RR 16, SpO2 100%    Vitals from 1617:  BP 92/62  Pulse 89  Temp 98.5  F (36.9  C) (Oral)  Resp 16  Wt 78 kg (171 lb 14.4 oz)  LMP 03/04/2005  SpO2 99%  BMI 27.96 kg/m2    CONSTITUTIONAL: Alert non-toxic appearing female in no acute distress  HEAD: Normocephalic, atraumatic  EYES: PERRLA; no scleral icterus  ENT: Oropharynx pink without lesions  NECK: Neck supple without lymphadenopathy  RESPIRATORY: Lungs clear to auscultation, no increased work of breathing noted, dry cough with each deep breath  CV: Tachycardic, regular rhythm, S1S2, no clicks, murmurs, rubs, or gallops; bilateral lower extremities without edema, dorsalis pedis pulses 2+ bilaterally  GASTROINTESTINAL: Normoactive bowel sounds x4 quadrants, abdomen soft, non-distended, and non-tender to palpation without masses or organomegaly, negative Boswell's sign  GENITOURINARY: Not indicated  LYMPHATIC: Cervical, supraclavicular, and inguinal nodes without lymphadenopathy  MUSCULOSKELETAL: Moves all extremities, no obvious muscle wasting  NEUROLOGIC: No gross deficits, normal gait  SKIN: Appropriate color for race, warm and dry, no rashes or lesions to unclothed skin  PSYCHIATRIC: Pleasant and interactive, affect bright, makes appropriate eye contact, thought process linear      Labs:    7/6/2018  Day 1   Hemoglobin 11.7 - 15.7 g/dL 9.1 (A)   Hematocrit 35.0 - 47.0 % 30.7 (A)   Platelet Count 150 - 450 10e9/L 494 (A)   Absolute Neutrophil 1.6 - 8.3 10e9/L 7.5   Sodium 133 - 144 mmol/L 137   Potassium 3.4 - 5.3 mmol/L 3.9   Chloride 94 - 109 mmol/L 105   Carbon Dioxide 20 - 32 mmol/L 22   Urea Nitrogen 7 - 30 mg/dL 13   Creatinine 0.52 - 1.04 mg/dL 1.04   Calcium 8.5 - 10.1 mg/dL 9.1   Magnesium 1.6 - 2.3 mg/dL 2.2   Bilirubin Total 0.2 - 1.3 mg/dL 0.4   ALT 0 - 50 U/L 56 (A)   AST 0 - 45 U/L 34   Alkaline  Phosphatase 40 - 150 U/L 346 (A)   Albumin 3.4 - 5.0 g/dL 2.2 (A)   Protein Total 6.8 - 8.8 g/dL 6.9   WBC 4.0 - 11.0 10e9/L 9.5   Chest CT pending    Assessment/Plan:  1) Stage IIIC1 low grade endometrial cancer: Felt quite poorly after her Taxol reaction but today she reports feeling well with the exception of a dry cough. No s/sxs infection, proceed with cycle one of single agent carboplatin AUC 6 as originally ordered by Dr. Tierney. To receive total of six cycles followed by treatment planning with Dr. Tierney. Reviewed signs and symptoms for when she should contact the clinic or seek additional care, including but not limited to fever, chills, inability to keep down food or fluids, nausea and vomiting not controlled with antiemetics, and diarrhea leading to dehydration. Patient to contact the clinic with any questions or concerns in the interim.  2) Cough: Unclear etiology, though no s/sxs infection. Potentially due to inflammatory reaction from her paclitaxel reaction, however, given her persistent cough, tachycardia, active cancer diagnosis, and history of an unprovoked DVT will obtain a CT PE to rule out pulmonary embolism. Hemodynamically stable, no SOB or chest pain. Tessalon perles 100mg PO TID PRN ordered should cough persist- reviewed potentially sedating effects and safety, may consider Robitussin first if cough is bothersome.  3) Abnormal LFTs: Elevated alkaline phosphatase and AST- asymptomatic, no jaundice or RUQ pain. Potentially related to paclitaxel, though the amount she received was minimal. Continue to monitor LFTs with each chemotherapy cycle, to call clinic if she becomes symptomatic. No dose reduction required for carboplatin.  4) Hypoalbuminemia: Reviewed importance of eating small, frequent meals high in protein and calories. Denies need for further intervention at this time.  5) Genetic counseling: MMR proteins with intact nuclear expression making Peterson syndrome unlikely.  6) Health  maintenance issues discussed include to follow up with PCP for non-gynecologic concerns and co-morbid conditions  7) Patient verbalized understanding of and agreement with plan    ALFONZO Weathers FNP-C  Division of Gynecologic Oncology  [facility]  Pager: [phone]      ADDENDUM:  Notified by Dr. Cruz of segmental RUL and right lingula pulmonary emboli. Gerri returned to clinic for assessment. BP, HR, and SpO2 stable. She is asymptomatic with the exception of a persistent cough. Discussed case with Dr. Tierney- if stable and able to have an ECHO outpatient that demonstrates no evidence of R heart strain, may be treated outpatient with Lovenox. Unable to have outpatient ECHO this evening or this weekend. Given her three emboli, persistent cough, soft BP (though baseline), and inability to obtain ECHO outpatient will admit to unit 7C for further evaluation and management of pulmonary emboli. Briefly reviewed Lovenox teaching and discussed PEs in detail. Report given to inpatient MD team, discussed with Dr. Tierney, and report given to OTIS Snowden on 7C. Gerri and her  were in agreement with plan and she states she feels safe being monitored inpatient. Ambulatory and stable at time of discharge from clinic, taking private car from clinic to hospital.  ALFONZO Weathers FNP-C  Gynecologic Oncology  [facility]  Pager: [phone]

## 2018-07-06 NOTE — DISCHARGE INSTRUCTIONS

## 2018-07-06 NOTE — IP AVS SNAPSHOT
[address] [address]    Phone:  [phone]                                       After Visit Summary   7/6/2018    Gerri Owens    MRN: [id number]           After Visit Summary Signature Page     I have received my discharge instructions, and my questions have been answered. I have discussed any challenges I see with this plan with the nurse or doctor.    ..........................................................................................................................................  Patient/Patient Representative Signature      ..........................................................................................................................................  Patient Representative Print Name and Relationship to Patient    ..................................................               ................................................  Date                                            Time    ..........................................................................................................................................  Reviewed by Signature/Title    ...................................................              ..............................................  Date                                                            Time

## 2018-07-06 NOTE — H&P
Gynecology/Oncology H&P    CC: Dry cough    HPI: Gerri Owens is a 63 year old with Stage IIIC1 low grade endometrial cancer who is being admitted from the [facility] infusion center for pulmonary embolism. She was seen in clinic for her dose of carboplatin. She reported a 3 week history of a dry cough, since her previous infusion reaction. At this time in clinic she was tachy to ~110 with a BP of ~90/60. Chest CT showed segmental pulmonary emboli in the right upper lobe and lingula, no evidence of heart strain. Unable to obtain an echocardiogram in clinic and so was admitted to the hospital for further observation, therapeutic lovenox administration, and echocardiogram.    Gerri denies any other symptoms including headache, chest pain, dyspnea, racing heart, coughing anything up. She has GERD at baseline, denies n/v. Has had a few recent episodes of stool incontinence since she started her carboplatin infusions. She denies noticing any LE edema. She wears rosemary hose at baseline because of her history of DVT and her family history.    Oncology History  2/13/2018 Presented to clinic for1.5 year history of abnormal uterine bleeding.     4/11/2018 Pelvic ultrasound was significant for enlarged uterus with significantly thickened and vascular endometrium.  She underwent dilation and curettage by Dr. Roxie Martinez, which was significant for FIGO grade 2 (final pathologic specimen is later FIGO grade 1) endometrioid adenocarcinoma, with normal mismatch repair.     4/27/2018 laparoscopic converted to open total hysterectomy, BSO, pelvic and alicia-aortic lymph node dissection (PPALND), tumor debulking, CUSA, and partial omentectomy   Pathology revealed grade 1 endometrioid adenocarcinoma the endometrium.  This involved the lower uterine segment and invaded the cervical stroma, and involved right ovarian surface adhesions.  The left adnexa was also invaded by endometrial adenocarcinoma.  The anterior pericervical surgical margin  was positive for malignancy.  The tumor size was 5.5 cm, and there was 15 of 15 mm myometrial invasion as well as uterine serosal involvement.  There was adenomyosis with carcinoma involving the left fallopian tube as well.  LVSI was present; peritoneal washings were negative for malignancy. Involved lymph nodes were as follows: 1 of 8 left pelvic (3mm, no extranodal extension), 1/6 R pelvic (21 mm, extranodal extension present), and 0/4 periaortic.  The initial anterior fundal biopsy was positive for carcinoma, and omentectomy was negative.  5/31/2018 Port placement, with initial infusion planned for 6/1/2018 However, this was delayed due to findings of leukopenia (baseline WBC between 4.0-9.8, then found to be 3.3 on 5/31/18, 3.2 and 3.5 on 6/1/18).   6/6/2018 WBC spontaneously rebounded to 4.3/ Was seen by Dr. Rapp of Hematology who recommended proceeding with chemotherapy.  6/13/18: C1D1 carboplatin/paclitaxel- anaphylactic reaction to paclitaxel, no carboplatin received.  7/6/18: C1 single agent carboplatin AUC 6     ROS:  Gen: No fevers/chills  HEENT: no changes in vision  Neuro: No dizziness.  CV: No CP, no palpitation  Pulm: No SOB. Has a nonproductive cough x3wk  GI: No N/V. Has diarrhea  : No dysuria, frequency, urgency.  No bothersome vaginal discharge  Skin: No rashes or itching  MSK: No joint problems.  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  7/17/2012     Thyrotoxicosis 2007    hyperthyroid, enlarged right thyroid on thyroid uptake, treate by endocrin [facility] with tapozole till 12/2008, FNA neg of 1.9 cm right lobe dominat nodule     Whooping cough due to Bordetella pertussis (p. pertussis) infant    whooping cough       PSHx:  Past Surgical History:   Procedure Laterality Date     ARTHROPLASTY HIP Left 12/4/2017    Procedure: ARTHROPLASTY HIP;  Left total hip arthroplasty;  Surgeon: Rajinder Goodwin MD;  Location: [facility] OR     BREAST BIOPSY, RT/LT  2004    left breast     DILATION AND CURETTAGE, OPERATIVE HYSTEROSCOPY WITH MORCELLATOR, COMBINED N/A 4/11/2018    Procedure: COMBINED DILATION AND CURETTAGE, OPERATIVE HYSTEROSCOPY WITH MORCELLATOR;  Hysteroscopy, Dilation and Curettage Under Ultrasound Guidance ;  Surgeon: Adry Tineo MD;  Location: [facility] OR     DILATION AND CURETTAGE, WITH ULTRASOUND GUIDANCE  4/11/2018    Procedure: DILATION AND CURETTAGE, WITH ULTRASOUND GUIDANCE;;  Surgeon: Adry Tineo MD;  Location: [facility] OR     HYSTERECTOMY TOTAL ABDOMINAL, BILATERAL SALPINGO-OOPHORECTOMY, NODE DISSECTION, COMBINED Bilateral 4/27/2018    Procedure: COMBINED HYSTERECTOMY TOTAL ABDOMINAL, SALPINGO-OOPHORECTOMY, NODE DISSECTION;;  Surgeon: Bradley Tierney MD;  Location: [facility] OR     INSERT PORT VASCULAR ACCESS Right 5/31/2018    Procedure: INSERT PORT VASCULAR ACCESS;  Single Lumen Chest Power Port;  Surgeon: Jamila Major PA-C;  Location: [facility]     LAPAROSCOPIC HYSTERECTOMY TOTAL, BILATERAL SALPINGO-OOPHORECTOMY, NODE DISSECTION, COMBINED N/A 4/27/2018    Procedure: COMBINED LAPAROSCOPIC HYSTERECTOMY TOTAL, SALPINGO-OOPHORECTOMY, NODE DISSECTION;  Laparoscopic converted to open Removal Of Uterus, Tubes, Ovaries, Cervix, Pelvic and Para Aortic Lymphnode Dissection, Tumor Debulking, CUSA, Partial Omentectomy, Anesthesia Block;  Surgeon: Bradley Tierney MD;  Location: [facility] OR      TONSILLECTOMY  1960    tonsillectomy       Meds:    Current Facility-Administered Medications on File Prior to Encounter:  [COMPLETED] 0.9% sodium chloride BOLUS   [COMPLETED] CARBOplatin 505 mg in sodium chloride 0.9 % 326 mL CHEMOTHERAPY   [COMPLETED] heparin 100 UNIT/ML injection 5 mL   [COMPLETED] heparin 100 UNIT/ML injection 500 Units   [COMPLETED] iopamidol (ISOVUE-370) solution 61 mL   [COMPLETED] ondansetron (ZOFRAN) 8 mg, dexamethasone (DECADRON) 20 mg in sodium chloride 0.9 % 50 mL intermittent infusion     Current Outpatient Prescriptions on File Prior to Encounter:  acetaminophen (TYLENOL) 500 MG tablet Take 1 tablet (500 mg) by mouth every 4 hours as needed   calcium citrate-vitamin D (CITRACAL) 315-200 MG-UNIT TABS per tablet Take 2 tablets by mouth daily   GLUCOSAMINE CHONDROITIN COMPLX OR TABS Take  by mouth. 1500 mg 1xqd.    LORazepam (ATIVAN) 1 MG tablet Take 1 tablet (1 mg) by mouth every 6 hours as needed (nausea/vomiting, anxiety or sleep)   ondansetron (ZOFRAN) 8 MG tablet Take 1 tablet (8 mg) by mouth every 8 hours as needed for nausea (vomiting)   prochlorperazine (COMPAZINE) 10 MG tablet Take 1 tablet (10 mg) by mouth every 6 hours as needed (nausea/vomiting)   RANITIDINE HCL PO Take 150 mg by mouth 2 times daily    VITAMIN C 250 MG OR TABS 250 mg   benzonatate (TESSALON) 100 MG capsule Take 1 capsule (100 mg) by mouth 3 times daily as needed for cough   enoxaparin (LOVENOX) 80 MG/0.8ML injection Inject 0.8 mLs (80 mg) Subcutaneous 2 times daily   FERROUS GLUCONATE PO Take 325 mg by mouth daily (with breakfast)         Allergies:     Allergies   Allergen Reactions     Nickel Rash        FamHx:  Family History   Problem Relation Age of Onset     Diabetes Mother      type 2     HEART DISEASE Mother      Hypertension Mother      C.A.D. Mother       after 2nd heart attack     Hypertension Father      C.A.D. Father      mild heart attack     Cancer Father      skin cancer     HEART DISEASE  "Father      Arthritis Sister      rheumatoid arthritis     HEART DISEASE Brother      hereditary heart murmer     Lipids Brother      Lipids Brother      KIDNEY DISEASE No family hx of    Family history of Factor V Leiden. She was tested after first DVT and she was negative. Has two brothers, both with DVT history, one is FVL positive and this other is negative.    SocialHx:  Social History     Social History     Marital status:      Spouse name: Doug     Number of children: 0     Years of education: N/A     Occupational History     [employer]     Social History Main Topics     Smoking status: Never Smoker     Smokeless tobacco: Never Used     Alcohol use Yes      Comment: rarely     Drug use: No     Sexual activity: Yes     Partners: Male     Other Topics Concern     Parent/Sibling W/ Cabg, Mi Or Angioplasty Before 65f 55m? No       Vitals:  Vitals:    07/06/18 1732 07/06/18 1733   BP:  107/65   BP Location:  Left arm   Resp:  16   Temp:  95.5  F (35.3  C)   TempSrc:  Oral   SpO2:  98%   Weight: 78.9 kg (173 lb 14.4 oz)    Height: 1.651 m (5' 5\")        PEx:  Gen: No distress, comfortable; lines present include IV in right hand  HEENT: Normocephalic, atraumatic; supple neck  Neuro: PERRL, EOMI; grossly normal motor movement bilaterally  CV: regular rate and rhythm, no murmurs appreciated. Strong pulses in bilateral UEs  Pulm: clear to auscultation bilaterally, decreased breath sounds in lower lobes. non-labored breathing  Abd: non-tender, non-distended, bilateral lower quadrants hard and immobile.  Pelvic: deferred  Ext: Non-tender, no erythema; trace edema in bilateral feet. No calf tenderness bilaterally  Skin: No rashes appreciated  Psych: Appropriate insight/judgment    Labs:  Results for orders placed or performed in visit on 07/06/18 (from the past 24 hour(s))   CT Chest Pulmonary Embolism w Contrast   Result Value Ref Range    Radiologist flags Pulmonary embolism (AA)     Narrative    CT " CHEST PULMONARY EMBOLISM W CONTRAST, 7/6/2018 2:59 PM    History: persistent cough x3 weeks with tachycardia, active  malignancy, and hx of unprovoked DVT in 2012- rule out PE please;  Cough    Comparison: None.    Technique: Helical acquisition of CT images of the chest from the lung  apices to the kidneys were acquired after the administration of  intravenous contrast according to the CT pulmonary angiogram protocol.  Axial images were reconstructed in 1 and 3 mm slice thickness. Coronal  reconstructions performed. Three-dimensional (3D) post-processed  angiographic images were reconstructed, archived to PACS and used in  the interpretation of this study.    Contrast dose: Isovue 370 61cc    Findings:   The contrast bolus is critical. Pulmonary emboli in the posterior  segment of the right upper lobe as well as the lateral and medial  lingular pulmonary arteries.    The central tracheobronchial tree is patent. Diffuse mosaic  attenuation. No pneumothorax or pleural effusion. No suspicious  pulmonary nodules or masses.    Heart size is normal. No pericardial effusion. Normal caliber and  configuration of the thoracic great vessels. No thoracic adenopathy.  Unchanged 1.0 cm right thyroid nodule. Patulous esophagus with  layering fluid.    Unchanged prominent periaortic lymph nodes in the upper abdomen. Early  arterial phase images of the upper abdomen are otherwise unremarkable.  T12 vertebral body hemangioma. No acute osseous abnormalities.      Impression    Impression:    1. Segmental pulmonary emboli in the right upper lobe and lingula. No  evidence for right heart strain.  2. Diffuse mosaic attenuation likely secondary to small vessel disease  including PE.  3. Patulous esophagus with layering fluid, correlate for esophageal  dysmotility.  [Critical Result: Pulmonary embolism]    Finding was identified on 7/6/2018 3:06 PM.     Irasema MEJÍA was contacted by Dr. Cruz at 7/6/2018 3:08 PM  and  verbalized understanding of the critical finding.     I have personally reviewed the examination and initial interpretation  and I agree with the findings.    EFREN SILVA MD       Imaging:  CT Chest PE  Impression:  1. Segmental pulmonary emboli in the right upper lobe and lingula. No  evidence for right heart strain.  2. Diffuse mosaic attenuation likely secondary to small vessel disease  including PE.  3. Patulous esophagus with layering fluid, correlate for esophageal  dysmotility.    Assessment: Gerri Owens is a 63 year old Stage IIIC1 low grade endometrial cancer with hx of UE DVT, now with new diagnosis of pulmonary emboli    Active Problem list:  Pulmonary emboli  Stage IIIC1 endometrial cancer  GERD  HLD  Hyperthyroidism  arthritis    Plan:  Admit to inpatient for medical management of pulmonary emboli. Recommend therapeutic lovenox and obtaining an echocardiogram to assess for heart strain.    Disease: Stage IIIC1 low grade endometrial cancer s/p DEMETRIO-BSO geoffrey dissection (4/18) and received carboplatin today. Previous anaphylactic reaction to paclitaxel  FEN: Regular diet.   Pain: PRN tylenol, oxycodone. No NSAIDS (hx of abnl renal fxn after NSAIDs)  Heme: Therapeutic lovenox - 80mg BID  CV: Cardiac echo to evaluate for heart strain ordered and pending exam. HLD.  Resp: Pulmonary emboli, on therapeutic lovenox. O2 as necessary. Saturations stable  GI: GERD, home ranitidine. No issues  : No issues  MSK: arthroplasty of hip. No issues  Neuro/Psych: No issues  Endocrine: Hyperthyroidism FNA negative  ID: No issues  PPx: SCDs  Disposition: Admitting to observation for medical management of pulmonary embolus. May discharge home after echo    Held home meds: Vit D, ferrous gluconate, glucosamine chondritin, ativan, zofran, compazine, Vit C    Patient seen and discussed with Dr. Camila Alford MD  Obstetrics and Gyncology, PGY-2  July 6, 2018 , 11:18 PM

## 2018-07-06 NOTE — PLAN OF CARE
Problem: Patient Care Overview  Goal: Plan of Care/Patient Progress Review  Outcome: No Change  Pt arrived to  from clinic at approx 1730pm. Hx hysterectomy in April of 2018, HLD, GERD and coag disorder. Pt received a dose of chemo in clinic today for grade 3 endometrial cancer and tolerated well but has had frequent dry cough for over three weeks. Imaging showed three PE's. Pt here under obs status for anticoagulation therapy. Pt will also be getting echocardiogram.  Admission doc's and med rec done. PCD's, IV and pole/pump ordered. Tolerating regular diet. No pain. Zofran given for nausea. Patient would like bedside report at shift change.     Pt stated she already knows how to give her own lovenox since she did this on her own after her surgery in April.    OBS GOALS:  -diagnostic tests and consults completed and resulted : not met, still waiting to do echo and initiate anticoag therapy  -vital signs normal or at patient baseline : met, all vitals stable and within normal limits  -dyspnea improved and O2 sats greater than 88% on room air or prior home oxygen levels: not met, pt currently satting 98% room air but still dyspneic on exertion and frequent dry cough  -safe disposition plan has been identified:met, pt will be discharging home after anticoagulation treatment initiated and stable    Nurse to notify provider when observation goals have been met and patient is ready for discharge.

## 2018-07-06 NOTE — PATIENT INSTRUCTIONS
Discharge Instructions for Pulmonary Embolism  A deep vein thrombosis (DVT) is a blood clot in a large vein deep in a leg, arm, or elsewhere in the body. The clot can separate from the vein, travel to the lungs and cut off blood flow. This is a pulmonary embolism (PE). Pulmonary embolism is very serious and may cause death.   Healthcare providers use the term venous thromboembolism (VTE) to describe both DVT and PE. They use the term VTE because the two conditions are very closely related. And, because their prevention and treatment are closely related.   Home care   Taking care of yourself is very important. To help prevent more blood clots from forming, follow your healthcare provider's instructions. Do the following:    Take your medicines exactly as instructed. Don t skip doses. If you miss a dose, call your healthcare provider and ask what you should do.      Have all lab tests as recommended. This is very important when you take medicines to prevent blood clots.     If your healthcare provider has instructed you to do so, wear elastic (compression stockings).    Get up and get moving.    While sitting for long periods of time, move your knees, ankles, feet, and toes.  Lifestyle changes  To help prevent problems with your heart and blood vessels, do the following:     If you smoke, get help to quit. Talk with your healthcare provider about medicines and programs that can help.    Stay at a healthy weight. Talk to your healthcare provider about losing weight, if you are overweight    Try to exercise at least 30 minutes on most days. Before starting an exercise program, talk with your healthcare provider.     When traveling by car, make frequent stops to get up and move around.    On long airplane rides, get up and move around when possible. If you can t get up, wiggle your toes, move your ankles and tighten your calves to keep your blood moving.  Follow-up care  Make a follow-up appointment as directed. Have  your lab work done as directed.     When to call your healthcare provider  Call your healthcare provider right away if you have:    Pain, swelling, and redness in your leg, arm, or other body area. These symptoms may mean another blood clot.    Blood in your urine    Bleeding with bowel movements    Bleeding from the nose, gums, a cut, or vagina  Call 911  Call 911 if you have symptoms of a blood clot in the lungs:     Chest pain    Trouble breathing    Coughing (may cough up blood)    Fast heartbeat    Sweating    Fainting  Also call 911 if you have:    Heavy or uncontrolled bleeding. If you are taking a blood thinner, you have an increased chance of bleeding.   Date Last Reviewed: 2/1/2017 2000-2017 [employer]. [address] [place] [address], [place] [address]. All rights reserved. This information is not intended as a substitute for professional medical care. Always follow your healthcare professional's instructions.      Understanding Venous Thromboembolism   Venous thromboembolism is when a blood clot forms in a vein. The term refers to two linked conditions: deep vein thrombosis (DVT) and pulmonary embolism. If you have DVT, a blood clot has formed in a deep vein. It often happens in a leg. Sometimes this clot can break free and travel to your lungs. Once there, it can block blood flow. This is called a pulmonary embolism. It is a health emergency.     How to say it  [facility]   What causes venous thromboembolism?   A blood clot can form in a vein for many reasons. The cause may be partly your genes. For example, you may have a protein deficiency or a blood-clotting disorder. Your family history may also make you more prone to the problem.   Other things can also raise your chance for a blood clot in a vein. These include:    Major surgery, such as a joint replacement    Injury, such as a broken leg or damaged spinal cord    Cancer    Heart failure    Older  age    Obesity    Smoking    Some medicines, such as birth control pills    Pregnancy    Long periods of not moving enough, such as after a surgery or on a long flight   Symptoms of venous thromboembolism   You may have no symptoms. But if you do, they can start quickly. Symptoms of DVT are:    Redness near the vein    Swelling    Pain    Changes in the color of the skin  A pulmonary embolism is a health emergency. It can cause trouble breathing, chest pain, coughing up of blood, and an irregular heartbeat. It may also cause sudden death.  Treatment for venous thromboembolism   The goal of treatment is to break up any blood clots and stop new ones from forming. A pulmonary embolism is then less likely to happen. Treatment includes:     Blood thinners. These medicines may be given as a shot, through an IV, or in pill form. You may need to take them for a few months or longer. It depends on the cause of the blood clot.      Walking. Once your symptoms are under control,your healthcare provider may recommend that you walk. You may not be able to walk too far at first, such as after a surgery. But it will stop more blood clots from forming and help you feel better.    Compression socks. These socks or other similar devices can lower your risk for blood clots. They gently put pressure on the lower leg.    Inferior vena cava filter. This small metal deviceis put into your inferior vena cava. That s the main vein that runs from your legs to your heart and lungs. It catches large clots before they reach these organs. It can stop a pulmonary embolism. You may need this treatment if you aren t able to take blood thinners.    Surgery. In some cases, you may need surgery to remove a clot, especially if it has already reached the lungs.    Possible complications of venous thromboembolism    New clots    High blood pressure in the arteries to the lungs (pulmonary hypertension)    Weak valves in a vein (post-thrombotic syndrome).  This can cause cramping, pain, and swelling.    Bleeding    Death  When to call your healthcare provider   Call your healthcare provider right away if you have any of these:    Fever of 100.4 F (38 C) or higher, or as directed by your healthcare provider    Redness, swelling, or fluid leaking from your incision that gets worse    Pain that gets worse    Symptoms that don t get better, or get worse    New symptoms   Date Last Reviewed: 10/1/2017    3380-7467 [employer] [address] [place] [address]. All rights reserved. This information is not intended as a substitute for professional medical care. Always follow your healthcare professional's instructions.        Enoxaparin Sodium Solution for injection  What is this medicine?  ENOXAPARIN (ee nox a PA rin) is used after knee, hip, or abdominal surgeries to prevent blood clotting. It is also used to treat existing blood clots in the lungs or in the veins.  This medicine may be used for other purposes; ask your health care provider or pharmacist if you have questions.  What should I tell my health care provider before I take this medicine?  They need to know if you have any of these conditions:    bleeding disorders, hemorrhage, or hemophilia    infection of the heart or heart valves    kidney or liver disease    previous stroke    prosthetic heart valve    recent surgery or delivery of a baby    ulcer in the stomach or intestine, diverticulitis, or other bowel disease    an unusual or allergic reaction to enoxaparin, heparin, pork or pork products, other medicines, foods, dyes, or preservatives    pregnant or trying to get pregnant    breast-feeding  How should I use this medicine?  This medicine is for injection under the skin. It is usually given by a health-care professional. You or a family member may be trained on how to give the injections. If you are to give yourself injections, make sure you understand how to use the syringe,  measure the dose if necessary, and give the injection. To avoid bruising, do not rub the site where this medicine has been injected. Do not take your medicine more often than directed. Do not stop taking except on the advice of your doctor or health care professional.  Make sure you receive a puncture-resistant container to dispose of the needles and syringes once you have finished with them. Do not reuse these items. Return the container to your doctor or health care professional for proper disposal.  Talk to your pediatrician regarding the use of this medicine in children. Special care may be needed.  Overdosage: If you think you have taken too much of this medicine contact a poison control center or emergency room at once.  NOTE: This medicine is only for you. Do not share this medicine with others.  What if I miss a dose?  If you miss a dose, take it as soon as you can. If it is almost time for your next dose, take only that dose. Do not take double or extra doses.  What may interact with this medicine?    aspirin and aspirin-like medicines    certain medicines that treat or prevent blood clots    dipyridamole    NSAIDs, medicines for pain and inflammation, like ibuprofen or naproxen  This list may not describe all possible interactions. Give your health care provider a list of all the medicines, herbs, non-prescription drugs, or dietary supplements you use. Also tell them if you smoke, drink alcohol, or use illegal drugs. Some items may interact with your medicine.  What should I watch for while using this medicine?  Visit your doctor or health care professional for regular checks on your progress. Your condition will be monitored carefully while you are receiving this medicine.  Notify your doctor or health care professional and seek emergency treatment if you develop breathing problems; changes in vision; chest pain; severe, sudden headache; pain, swelling, warmth in the leg; trouble speaking; sudden numbness  or weakness of the face, arm, or leg. These can be signs that your condition has gotten worse.  If you are going to have surgery, tell your doctor or health care professional that you are taking this medicine.  Do not stop taking this medicine without first talking to your doctor. Be sure to refill your prescription before you run out of medicine.  Avoid sports and activities that might cause injury while you are using this medicine. Severe falls or injuries can cause unseen bleeding. Be careful when using sharp tools or knives. Consider using an electric razor. Take special care brushing or flossing your teeth. Report any injuries, bruising, or red spots on the skin to your doctor or health care professional.  What side effects may I notice from receiving this medicine?  Side effects that you should report to your doctor or health care professional as soon as possible:    allergic reactions like skin rash, itching or hives, swelling of the face, lips, or tongue    feeling faint or lightheaded, falls    signs and symptoms of bleeding such as bloody or black, tarry stools; red or dark-brown urine; spitting up blood or brown material that looks like coffee grounds; red spots on the skin; unusual bruising or bleeding from the eye, gums, or nose  Side effects that usually do not require medical attention (report to your doctor or health care professional if they continue or are bothersome):    pain, redness, or irritation at site where injected  This list may not describe all possible side effects. Call your doctor for medical advice about side effects. You may report side effects to FDA at 3-428-FDA-6491.  Where should I keep my medicine?  Keep out of the reach of children.  Store at room temperature between 15 and 30 degrees C (59 and 86 degrees F). Do not freeze. If your injections have been specially prepared, you may need to store them in the refrigerator. Ask your pharmacist. Throw away any unused medicine after  the expiration date.  NOTE:This sheet is a summary. It may not cover all possible information. If you have questions about this medicine, talk to your doctor, pharmacist, or health care provider. Copyright  2016 [employer]        Enoxaparin injection  Brand Name: Lovenox  What is this medicine?  ENOXAPARIN (ee nox a PA rin) is used after knee, hip, or abdominal surgeries to prevent blood clotting. It is also used to treat existing blood clots in the lungs or in the veins.  How should I use this medicine?  This medicine is for injection under the skin. It is usually given by a health-care professional. You or a family member may be trained on how to give the injections. If you are to give yourself injections, make sure you understand how to use the syringe, measure the dose if necessary, and give the injection. To avoid bruising, do not rub the site where this medicine has been injected. Do not take your medicine more often than directed. Do not stop taking except on the advice of your doctor or health care professional.  Make sure you receive a puncture-resistant container to dispose of the needles and syringes once you have finished with them. Do not reuse these items. Return the container to your doctor or health care professional for proper disposal.  Talk to your pediatrician regarding the use of this medicine in children. Special care may be needed.  What side effects may I notice from receiving this medicine?  Side effects that you should report to your doctor or health care professional as soon as possible:    allergic reactions like skin rash, itching or hives, swelling of the face, lips, or tongue    feeling faint or lightheaded, falls    signs and symptoms of bleeding such as bloody or black, tarry stools; red or dark-brown urine; spitting up blood or brown material that looks like coffee grounds; red spots on the skin; unusual bruising or bleeding from the eye, gums, or nose  Side effects that usually  do not require medical attention (report to your doctor or health care professional if they continue or are bothersome):    pain, redness, or irritation at site where injected  What may interact with this medicine?      aspirin and aspirin-like medicines    certain medicines that treat or prevent blood clots    dipyridamole    NSAIDs, medicines for pain and inflammation, like ibuprofen or naproxen  What if I miss a dose?  If you miss a dose, take it as soon as you can. If it is almost time for your next dose, take only that dose. Do not take double or extra doses.  Where should I keep my medicine?  Keep out of the reach of children.  Store at room temperature between 15 and 30 degrees C (59 and 86 degrees F). Do not freeze. If your injections have been specially prepared, you may need to store them in the refrigerator. Ask your pharmacist. Throw away any unused medicine after the expiration date.  What should I tell my health care provider before I take this medicine?  They need to know if you have any of these conditions:    bleeding disorders, hemorrhage, or hemophilia    infection of the heart or heart valves    kidney or liver disease    previous stroke    prosthetic heart valve    recent surgery or delivery of a baby    ulcer in the stomach or intestine, diverticulitis, or other bowel disease    an unusual or allergic reaction to enoxaparin, heparin, pork or pork products, other medicines, foods, dyes, or preservatives    pregnant or trying to get pregnant    breast-feeding  What should I watch for while using this medicine?  Visit your doctor or health care professional for regular checks on your progress. Your condition will be monitored carefully while you are receiving this medicine.  Notify your doctor or health care professional and seek emergency treatment if you develop breathing problems; changes in vision; chest pain; severe, sudden headache; pain, swelling, warmth in the leg; trouble speaking; sudden  numbness or weakness of the face, arm, or leg. These can be signs that your condition has gotten worse.  If you are going to have surgery, tell your doctor or health care professional that you are taking this medicine.  Do not stop taking this medicine without first talking to your doctor. Be sure to refill your prescription before you run out of medicine.  Avoid sports and activities that might cause injury while you are using this medicine. Severe falls or injuries can cause unseen bleeding. Be careful when using sharp tools or knives. Consider using an electric razor. Take special care brushing or flossing your teeth. Report any injuries, bruising, or red spots on the skin to your doctor or health care professional.  NOTE:This sheet is a summary. It may not cover all possible information. If you have questions about this medicine, talk to your doctor, pharmacist, or health care provider. Copyright  2018 [employer]

## 2018-07-06 NOTE — MR AVS SNAPSHOT
After Visit Summary   7/6/2018    Gerri Owens    MRN: [id number]           Patient Information     Date Of Birth          1955        Visit Information        Provider Department      7/6/2018 9:30 AM  27 ATC;  ONCOLOGY INFUSION [facility]        Today's Diagnoses     Endometrial cancer (H)    -  1    Encounter for long-term (current) use of medications          Care Instructions    Contact Numbers  [facility]: [phone]    After Hours:  [phone]  Triage: [phone]    Please call the [facility] Triage line if you experience a temperature greater than or equal to 100.5, shaking chills, have uncontrolled nausea, vomiting and/or diarrhea, dizziness, shortness of breath, chest pain, bleeding, unexplained bruising, or if you have any other new/concerning symptoms, questions or concerns.     If it is after hours, weekends, or holidays, please call the main hospital  at  [phone] and ask to speak to the Oncology doctor on call.     If you are having any concerning symptoms or wish to speak to a provider before your next infusion visit, please call your care coordinator or triage to notify them so we can adequately serve you.     If you need a refill on a narcotic prescription or other medication, please call triage before your infusion appointment.             July 2018 Sunday Monday Tuesday Wednesday Thursday Friday Saturday   1     2     3     4     5     6     [facility] LAB DRAW    8:30 AM   (15 min.)   [facility] LAB DRAW   [facility] Lab Draw     [facility] RETURN    8:45 AM   (40 min.)   Irasema Gtz APRN CNP   [facility] ONC INFUSION 120    9:30 AM   (120 min.)    ONCOLOGY INFUSION   [facility]     CT CHEST PULMONARY EMBOLISM W    2:40 PM   (20 min.)   UCCT2   [facility] CT 7       8     9     10     11     12     13     14       15     16     17     18     19      20     21       22     23     24     25     26     27     [facility] DRAW    7:45 AM   (15 min.)    [facility]RAW   [facility]     [facility] RETURN    8:05 AM   (40 min.)   Irasema Gtz APRN CNP   [facility]     [facility] ONC INFUSION 120    9:00 AM   (120 min.)   UC ONCOLOGY INFUSION   [facility] 28       29     30     31 August 2018 Sunday Monday Tuesday Wednesday Thursday Friday Saturday                  1     2     3     4       5     6     7     8     9     10     11       12     13     14     15     16     17     [facility] DRAW    7:45 AM   (15 min.)    [facility] DRAW   [facility] Lab Draw     [facility] RETURN ACTIVE TREATMENT    8:05 AM   (40 min.)   Irasema Gtz APRN CNP   [facility]     [facility] ONC INFUSION 120    9:00 AM   (120 min.)   [facility] ONCOLOGY INFUSION   [facility] 18       19     20     21     22     23     24     25       26     27     28     29     30     31                       Lab Results:  Recent Results (from the past 12 hour(s))   CBC with platelets differential    Collection Time: 07/06/18  8:43 AM   Result Value Ref Range    WBC 9.5 4.0 - 11.0 10e9/L    RBC Count 3.66 (L) 3.8 - 5.2 10e12/L    Hemoglobin 9.1 (L) 11.7 - 15.7 g/dL    Hematocrit 30.7 (L) 35.0 - 47.0 %    MCV 84 78 - 100 fl    MCH 24.9 (L) 26.5 - 33.0 pg    MCHC 29.6 (L) 31.5 - 36.5 g/dL    RDW 15.8 (H) 10.0 - 15.0 %    Platelet Count 494 (H) 150 - 450 10e9/L    Diff Method Automated Method     % Neutrophils 78.9 %    % Lymphocytes 10.3 %    % Monocytes 9.4 %    % Eosinophils 0.3 %    % Basophils 0.5 %    % Immature Granulocytes 0.6 %    Nucleated RBCs 0 0 /100    Absolute Neutrophil 7.5 1.6 - 8.3 10e9/L    Absolute Lymphocytes 1.0 0.8 - 5.3 10e9/L    Absolute Monocytes 0.9 0.0 - 1.3 10e9/L    Absolute Eosinophils 0.0 0.0 - 0.7 10e9/L    Absolute Basophils 0.1 0.0 - 0.2 10e9/L    Abs  Immature Granulocytes 0.1 0 - 0.4 10e9/L    Absolute Nucleated RBC 0.0    Comprehensive metabolic panel    Collection Time: 07/06/18  8:43 AM   Result Value Ref Range    Sodium 137 133 - 144 mmol/L    Potassium 3.9 3.4 - 5.3 mmol/L    Chloride 105 94 - 109 mmol/L    Carbon Dioxide 22 20 - 32 mmol/L    Anion Gap 10 3 - 14 mmol/L    Glucose 101 (H) 70 - 99 mg/dL    Urea Nitrogen 13 7 - 30 mg/dL    Creatinine 1.04 0.52 - 1.04 mg/dL    GFR Estimate 54 (L) >60 mL/min/1.7m2    GFR Estimate If Black 65 >60 mL/min/1.7m2    Calcium 9.1 8.5 - 10.1 mg/dL    Bilirubin Total 0.4 0.2 - 1.3 mg/dL    Albumin 2.2 (L) 3.4 - 5.0 g/dL    Protein Total 6.9 6.8 - 8.8 g/dL    Alkaline Phosphatase 346 (H) 40 - 150 U/L    ALT 56 (H) 0 - 50 U/L    AST 34 0 - 45 U/L   Magnesium    Collection Time: 07/06/18  8:43 AM   Result Value Ref Range    Magnesium 2.2 1.6 - 2.3 mg/dL               Follow-ups after your visit        Your next 10 appointments already scheduled     Jul 06, 2018  2:40 PM CDT   CT CHEST PULMONARY EMBOLISM W CONTRAST with UCCT2   [facility] ([facility])    [address] [address] [phone]           Please bring any scans or X-rays taken at other hospitals, if similar tests were done. Also bring a list of your medicines, including vitamins, minerals and over-the-counter drugs. It is safest to leave personal items at home.  Be sure to tell your doctor:   If you have any allergies.   If there s any chance you are pregnant.   If you are breastfeeding.    If you have diabetes as your medication may need to be adjusted for this exam.  You will have contrast for this exam. To prepare:   Do not eat or drink for 2 hours before your exam. If you need to take medicine, you may take it with small sips of water. (We may ask you to take liquid medicine as well.)   The day before your exam, drink extra fluids at least six 8-ounce glasses (unless your doctor  tells you to restrict your fluids).  Patients over 70 or patients with diabetes or kidney problems:   If you haven t had a blood test (creatinine test) within the last 30 days, the Cardiologist/Radiologist may require you to get this test prior to your exam.  Please wear loose clothing, such as a sweat suit or jogging clothes. Avoid snaps, zippers and other metal. We may ask you to undress and put on a hospital gown.  If you have any questions, please call the Imaging Department where you will have your exam.            Jul 27, 2018  7:45 AM CDT   [facility] Draw with [facility] DRAW   [facility] ([facility])    [address]  [address]  [facility] [place] [address] [phone]            Jul 27, 2018  8:20 AM CDT   (Arrive by 8:05 AM)   Return Visit with ALFONZO Weathers CNP   [facility] ([facility])    [address]  [address]  [place] [address]   [phone]            Jul 27, 2018  9:00 AM CDT   Infusion 120 with UC ONCOLOGY INFUSION, UC 23 ATC   [facility] ([facility])    [address]  [address]  [place] [address] [phone]            Aug 17, 2018  7:45 AM CDT   [facility] Draw with [facility] DRAW   [facility] ([facility])    [address]  [address]  [place] [address]   [phone]            Aug 17, 2018  8:20 AM CDT   (Arrive by 8:05 AM)   Return Active Treatment with ALFONZO Weathers CNP   [facility] ([facility])    [address]  [address]  [place] [address]   [phone]            Aug 17, 2018  9:00 AM CDT   Infusion 120 with UC ONCOLOGY INFUSION, UC 23 ATC   [facility] ([facility])    [address]  [address]  [place] [address]    [phone]              Future tests that were ordered for you today     Open Future Orders        Priority Expected Expires Ordered    CT Chest Pulmonary Embolism w Contrast Routine  2/1/2019 7/6/2018    X-ray Chest 2 vws* Routine 7/6/2018 7/6/2019 7/6/2018            Who to contact     If you have questions or need follow up information about today's clinic visit or your schedule please contact [facility] directly at [phone].  Normal or non-critical lab and imaging results will be communicated to you by [employer], letter or phone within 4 business days after the clinic has received the results. If you do not hear from us within 7 days, please contact the clinic through [employer] or phone. If you have a critical or abnormal lab result, we will notify you by phone as soon as possible.  Submit refill requests through [employer] or call your pharmacy and they will forward the refill request to us. Please allow 3 business days for your refill to be completed.          Additional Information About Your Visit        [employer] Information     [employer] gives you secure access to your electronic health record. If you see a primary care provider, you can also send messages to your care team and make appointments. If you have questions, please call your primary care clinic.  If you do not have a primary care provider, please call [phone] and they will assist you.        Care EveryWhere ID     This is your Care EveryWhere ID. This could be used by other organizations to access your [place] medical records  [id number]        Your Vitals Were     Last Period                   03/04/2005            Blood Pressure from Last 3 Encounters:   07/06/18 95/61   06/13/18 96/60   06/06/18 105/66    Weight from Last 3 Encounters:   07/06/18 78 kg (171 lb 14.4 oz)   06/13/18 80.8 kg (178 lb 3.2 oz)   06/06/18 81.6 kg (180 lb)              We Performed the Following     CBC with platelets differential      Comprehensive metabolic panel     Magnesium          Today's Medication Changes          These changes are accurate as of 7/6/18 11:06 AM.  If you have any questions, ask your nurse or doctor.               Start taking these medicines.        Dose/Directions    benzonatate 100 MG capsule   Commonly known as:  TESSALON   Used for:  Cough   Started by:  Irasema Gtz APRN CNP        Dose:  100 mg   Take 1 capsule (100 mg) by mouth 3 times daily as needed for cough   Quantity:  60 capsule   Refills:  0       ondansetron 8 MG tablet   Commonly known as:  ZOFRAN   Used for:  Endometrial cancer (H), Encounter for long-term (current) use of medications        Dose:  8 mg   Take 1 tablet (8 mg) by mouth every 8 hours as needed for nausea (vomiting)   Quantity:  20 tablet   Refills:  2            Where to get your medicines      These medications were sent to [facility] [address]  [address] [place] [address]    Hours:  TRANSPLANT PHONE NUMBER [phone] Phone:  [phone]     benzonatate 100 MG capsule    ondansetron 8 MG tablet                Primary Care Provider Office Phone # Fax #    Karo Doty [phone]       [address]  [facility] [address]        Equal Access to Services     JEREMÍAS BUCK AH: Hadii aad ku hadasho Soisaiahali, waaxda luqadaha, qaybta kaalmada adeegyada, carol roland. So [facility] [phone].    ATENCIÓN: Si habla español, tiene a graham disposición servicios gratuitos de asistencia lingüística. Llame al [phone].    We comply with applicable federal civil rights laws and [place] laws. We do not discriminate on the basis of race, color, national origin, age, disability, sex, sexual orientation, or gender identity.            Thank you!     Thank you for choosing [facility]  for your care. Our goal is always to provide you with excellent care.  Hearing back from our patients is one way we can continue to improve our services. Please take a few minutes to complete the written survey that you may receive in the mail after your visit with us. Thank you!             Your Updated Medication List - Protect others around you: Learn how to safely use, store and throw away your medicines at www.disposemymeds.org.          This list is accurate as of 7/6/18 11:06 AM.  Always use your most recent med list.                   Brand Name Dispense Instructions for use Diagnosis    acetaminophen 500 MG tablet    TYLENOL     Take 1 tablet (500 mg) by mouth every 4 hours as needed    S/P DEMETRIO-BSO       ascorbic acid 250 MG tablet    VITAMIN C    90    250 mg    Routine general medical examination at a health care facility       benzonatate 100 MG capsule    TESSALON    60 capsule    Take 1 capsule (100 mg) by mouth 3 times daily as needed for cough    Cough       calcium citrate-vitamin D 315-200 MG-UNIT Tabs per tablet    CITRACAL     Take 2 tablets by mouth daily        FERROUS GLUCONATE PO      Take 325 mg by mouth daily (with breakfast)        GLUCOSAMINE CHONDROITIN COMPLX Tabs      Take  by mouth. 1500 mg 1xqd.    Routine general medical examination at a health care facility       LORazepam 1 MG tablet    ATIVAN    30 tablet    Take 1 tablet (1 mg) by mouth every 6 hours as needed (nausea/vomiting, anxiety or sleep)    Endometrial cancer (H), Encounter for long-term (current) use of medications       ondansetron 8 MG tablet    ZOFRAN    20 tablet    Take 1 tablet (8 mg) by mouth every 8 hours as needed for nausea (vomiting)    Endometrial cancer (H), Encounter for long-term (current) use of medications       prochlorperazine 10 MG tablet    COMPAZINE    30 tablet    Take 1 tablet (10 mg) by mouth every 6 hours as needed (nausea/vomiting)    Endometrial cancer (H), Encounter for long-term (current) use of medications       RANITIDINE HCL PO      Take 150 mg by mouth 2  times daily

## 2018-07-06 NOTE — IP AVS SNAPSHOT
MRN:[id number]                      After Visit Summary   7/6/2018    Gerri Owens    MRN: [id number]           Thank you!     Thank you for choosing [facility] for your care. Our goal is always to provide you with excellent care. Hearing back from our patients is one way we can continue to improve our services. Please take a few minutes to complete the written survey that you may receive in the mail after you visit with us. Thank you!        Patient Information     Date Of Birth          1955        Designated Caregiver       Most Recent Value    Caregiver    Will someone help with your care after discharge? yes    Name of designated caregiver Doug- spouse    Phone number of caregiver [phone]    Caregiver address same as pt, live together      About your hospital stay     You were admitted on:  July 6, 2018 You last received care in the:  [address] [facility]    You were discharged on:  July 7, 2018        Reason for your hospital stay       You were hospitalized for pulmonary embolism                  Who to Call     For medical emergencies, please call 911.  For non-urgent questions about your medical care, please call your primary care provider or clinic, [phone]          Attending Provider     Provider Specialty    Bradley Tierney MD Oncology       Primary Care Provider Office Phone # Fax #    Karo Doty [phone]       When to contact your care team       Call your primary doctor if you have any of the following: temperature greater than 100.4,  increased shortness of breath or increased pain.                  After Care Instructions     Activity       Your activity upon discharge: activity as tolerated            Diet       Follow this diet upon discharge: Orders Placed This Encounter      Regular Diet Adult                  Follow-up Appointments     Adult [facility] Follow-up and recommended labs and tests       Follow up at Dr. Tierney's  clinic for a Heparin 10A level , within 1 week     Appointments on [facility] and/or [facility] (with [facility] or [facility] provider or service). Call [phone] if you haven't heard regarding these appointments within 7 days of discharge.                  Your next 10 appointments already scheduled     Jul 27, 2018  7:45 AM CDT   [facility] with [facility]   [facility] ([facility])    [address]  [address]  [place] [address]   [phone]            Jul 27, 2018  8:20 AM CDT   (Arrive by 8:05 AM)   Return Visit with ALFONZO Weathers CNP   [facility] ([facility])    [address]  [address]  [place] [address]   [phone]            Jul 27, 2018  9:00 AM CDT   Infusion 120 with [facility] ONCOLOGY INFUSION, UC 23 ATC   [facility] ([facility])    [address]  [address]  [place] [address]   [phone]            Aug 17, 2018  7:45 AM CDT   [facility] Draw with [facility] DRAW   [facility] ([facility])    [address]  [address]  [place] [address]   [phone]            Aug 17, 2018  8:20 AM CDT   (Arrive by 8:05 AM)   Return Active Treatment with ALFONZO Weathers CNP   [facility] ([facility])    [address]  [address]  [place] [address]   [phone]            Aug 17, 2018  9:00 AM CDT   Infusion 120 with [facility] ONCOLOGY INFUSION, UC 23 ATC   [facility] ([facility])    [address]  [address]  [place] [address]   [phone]              Pending Results     No orders found for last 3 day(s).            Admission Information     Date & Time Provider Department Dept. Phone    7/6/2018 Bradley Tierney MD [address] [facility]  "[phone]      Your Vitals Were     Blood Pressure Pulse Temperature Respirations Height Weight    94/57 (BP Location: Left arm) 68 97.4  F (36.3  C) (Oral) 16 1.651 m (5' 5\") 78.9 kg (173 lb 14.4 oz)    Last Period Pulse Oximetry BMI (Body Mass Index)             03/04/2005 99% 28.94 kg/m2         [employer] Information     [employer] gives you secure access to your electronic health record. If you see a primary care provider, you can also send messages to your care team and make appointments. If you have questions, please call your primary care clinic.  If you do not have a primary care provider, please call [phone] and they will assist you.        Care EveryWhere ID     This is your Care EveryWhere ID. This could be used by other organizations to access your [facility] medical records  [id number]        Equal Access to Services     JEREMÍAS BUCK : Gopi Springer, kiko colbert, catracho benavides, carol escobar . So [facility] [phone].    ATENCIÓN: Si habla español, tiene a graham disposición servicios gratuitos de asistencia lingüística. Avril al [phone].    We comply with applicable federal civil rights laws and [place] laws. We do not discriminate on the basis of race, color, national origin, age, disability, sex, sexual orientation, or gender identity.               Review of your medicines      CONTINUE these medicines which may have CHANGED, or have new prescriptions. If we are uncertain of the size of tablets/capsules you have at home, strength may be listed as something that might have changed.        Dose / Directions    * enoxaparin 80 MG/0.8ML injection   Commonly known as:  LOVENOX   This may have changed:  Another medication with the same name was added. Make sure you understand how and when to take each.        Dose:  80 mg   Inject 0.8 mLs (80 mg) Subcutaneous 2 times daily   Quantity:  60 Syringe   Refills:  5       * enoxaparin 80 MG/0.8ML " injection   Commonly known as:  LOVENOX   This may have changed:  You were already taking a medication with the same name, and this prescription was added. Make sure you understand how and when to take each.        Dose:  80 mg   Inject 0.8 mLs (80 mg) Subcutaneous 2 times daily   Quantity:  60 Syringe   Refills:  3       * Notice:  This list has 2 medication(s) that are the same as other medications prescribed for you. Read the directions carefully, and ask your doctor or other care provider to review them with you.      CONTINUE these medicines which have NOT CHANGED        Dose / Directions    acetaminophen 500 MG tablet   Commonly known as:  TYLENOL   Used for:  S/P DEMETRIO-BSO        Dose:  500 mg   Take 1 tablet (500 mg) by mouth every 4 hours as needed   Refills:  0       ascorbic acid 250 MG tablet   Commonly known as:  VITAMIN C   Used for:  Routine general medical examination at a health care facility        250 mg   Quantity:  90   Refills:  0       benzonatate 100 MG capsule   Commonly known as:  TESSALON   Used for:  Cough        Dose:  100 mg   Take 1 capsule (100 mg) by mouth 3 times daily as needed for cough   Quantity:  60 capsule   Refills:  0       calcium citrate-vitamin D 315-200 MG-UNIT Tabs per tablet   Commonly known as:  CITRACAL   Indication:  630/500 mg        Dose:  2 tablet   Take 2 tablets by mouth daily   Refills:  0       FERROUS GLUCONATE PO        Dose:  325 mg   Take 325 mg by mouth daily (with breakfast)   Refills:  0       GLUCOSAMINE CHONDROITIN COMPLX Tabs   Used for:  Routine general medical examination at a health care facility        Take  by mouth. 1500 mg 1xqd.   Refills:  0       LORazepam 1 MG tablet   Commonly known as:  ATIVAN   Used for:  Endometrial cancer (H), Encounter for long-term (current) use of medications        Dose:  1 mg   Take 1 tablet (1 mg) by mouth every 6 hours as needed (nausea/vomiting, anxiety or sleep)   Quantity:  30 tablet   Refills:  1        ondansetron 8 MG tablet   Commonly known as:  ZOFRAN   Used for:  Endometrial cancer (H), Encounter for long-term (current) use of medications        Dose:  8 mg   Take 1 tablet (8 mg) by mouth every 8 hours as needed for nausea (vomiting)   Quantity:  20 tablet   Refills:  2       prochlorperazine 10 MG tablet   Commonly known as:  COMPAZINE   Used for:  Endometrial cancer (H), Encounter for long-term (current) use of medications        Dose:  10 mg   Take 1 tablet (10 mg) by mouth every 6 hours as needed (nausea/vomiting)   Quantity:  30 tablet   Refills:  2       RANITIDINE HCL PO   Indication:  8am and 8pm        Dose:  150 mg   Take 150 mg by mouth 2 times daily   Refills:  0            Where to get your medicines      These medications were sent to [facility] [place], [place] - [address]  [address] [address]     Phone:  [phone]     enoxaparin 80 MG/0.8ML injection                Protect others around you: Learn how to safely use, store and throw away your medicines at www.disposemymeds.org.             Medication List: This is a list of all your medications and when to take them. Check marks below indicate your daily home schedule. Keep this list as a reference.      Medications           Morning Afternoon Evening Bedtime As Needed    acetaminophen 500 MG tablet   Commonly known as:  TYLENOL   Take 1 tablet (500 mg) by mouth every 4 hours as needed                                ascorbic acid 250 MG tablet   Commonly known as:  VITAMIN C   250 mg                                benzonatate 100 MG capsule   Commonly known as:  TESSALON   Take 1 capsule (100 mg) by mouth 3 times daily as needed for cough                                calcium citrate-vitamin D 315-200 MG-UNIT Tabs per tablet   Commonly known as:  CITRACAL   Take 2 tablets by mouth daily                                * enoxaparin 80 MG/0.8ML injection   Commonly known as:  LOVENOX   Inject  0.8 mLs (80 mg) Subcutaneous 2 times daily   Last time this was given:  80 mg on 7/7/2018  8:27 AM                                * enoxaparin 80 MG/0.8ML injection   Commonly known as:  LOVENOX   Inject 0.8 mLs (80 mg) Subcutaneous 2 times daily   Last time this was given:  80 mg on 7/7/2018  8:27 AM                                FERROUS GLUCONATE PO   Take 325 mg by mouth daily (with breakfast)                                GLUCOSAMINE CHONDROITIN COMPLX Tabs   Take  by mouth. 1500 mg 1xqd.                                LORazepam 1 MG tablet   Commonly known as:  ATIVAN   Take 1 tablet (1 mg) by mouth every 6 hours as needed (nausea/vomiting, anxiety or sleep)                                ondansetron 8 MG tablet   Commonly known as:  ZOFRAN   Take 1 tablet (8 mg) by mouth every 8 hours as needed for nausea (vomiting)                                prochlorperazine 10 MG tablet   Commonly known as:  COMPAZINE   Take 1 tablet (10 mg) by mouth every 6 hours as needed (nausea/vomiting)                                RANITIDINE HCL PO   Take 150 mg by mouth 2 times daily   Last time this was given:  150 mg on 7/7/2018  8:27 AM                                * Notice:  This list has 2 medication(s) that are the same as other medications prescribed for you. Read the directions carefully, and ask your doctor or other care provider to review them with you.

## 2018-07-06 NOTE — MR AVS SNAPSHOT
After Visit Summary   7/6/2018    Gerri Owens    MRN: [id number]           Patient Information     Date Of Birth          1955        Visit Information        Provider Department      7/6/2018 9:00 AM Irasema Gtz APRN [facility]        Today's Diagnoses     Endometrial cancer (H)    -  1    Encounter for antineoplastic chemotherapy        Other acute pulmonary embolism without acute cor pulmonale (H)        Cough        Elevated LFTs          Care Instructions      Discharge Instructions for Pulmonary Embolism  A deep vein thrombosis (DVT) is a blood clot in a large vein deep in a leg, arm, or elsewhere in the body. The clot can separate from the vein, travel to the lungs and cut off blood flow. This is a pulmonary embolism (PE). Pulmonary embolism is very serious and may cause death.   Healthcare providers use the term venous thromboembolism (VTE) to describe both DVT and PE. They use the term VTE because the two conditions are very closely related. And, because their prevention and treatment are closely related.   Home care   Taking care of yourself is very important. To help prevent more blood clots from forming, follow your healthcare provider's instructions. Do the following:    Take your medicines exactly as instructed. Don t skip doses. If you miss a dose, call your healthcare provider and ask what you should do.      Have all lab tests as recommended. This is very important when you take medicines to prevent blood clots.     If your healthcare provider has instructed you to do so, wear elastic (compression stockings).    Get up and get moving.    While sitting for long periods of time, move your knees, ankles, feet, and toes.  Lifestyle changes  To help prevent problems with your heart and blood vessels, do the following:     If you smoke, get help to quit. Talk with your healthcare provider about medicines and programs that can help.    Stay at a healthy  weight. Talk to your healthcare provider about losing weight, if you are overweight    Try to exercise at least 30 minutes on most days. Before starting an exercise program, talk with your healthcare provider.     When traveling by car, make frequent stops to get up and move around.    On long airplane rides, get up and move around when possible. If you can t get up, wiggle your toes, move your ankles and tighten your calves to keep your blood moving.  Follow-up care  Make a follow-up appointment as directed. Have your lab work done as directed.     When to call your healthcare provider  Call your healthcare provider right away if you have:    Pain, swelling, and redness in your leg, arm, or other body area. These symptoms may mean another blood clot.    Blood in your urine    Bleeding with bowel movements    Bleeding from the nose, gums, a cut, or vagina  Call 911  Call 911 if you have symptoms of a blood clot in the lungs:     Chest pain    Trouble breathing    Coughing (may cough up blood)    Fast heartbeat    Sweating    Fainting  Also call 911 if you have:    Heavy or uncontrolled bleeding. If you are taking a blood thinner, you have an increased chance of bleeding.   Date Last Reviewed: 2/1/2017 2000-2017 [employer]. [address] [place] [address]. All rights reserved. This information is not intended as a substitute for professional medical care. Always follow your healthcare professional's instructions.      Understanding Venous Thromboembolism   Venous thromboembolism is when a blood clot forms in a vein. The term refers to two linked conditions: deep vein thrombosis (DVT) and pulmonary embolism. If you have DVT, a blood clot has formed in a deep vein. It often happens in a leg. Sometimes this clot can break free and travel to your lungs. Once there, it can block blood flow. This is called a pulmonary embolism. It is a health emergency.     How to say it  VEE-nuhs  [id number]   What causes venous thromboembolism?   A blood clot can form in a vein for many reasons. The cause may be partly your genes. For example, you may have a protein deficiency or a blood-clotting disorder. Your family history may also make you more prone to the problem.   Other things can also raise your chance for a blood clot in a vein. These include:    Major surgery, such as a joint replacement    Injury, such as a broken leg or damaged spinal cord    Cancer    Heart failure    Older age    Obesity    Smoking    Some medicines, such as birth control pills    Pregnancy    Long periods of not moving enough, such as after a surgery or on a long flight   Symptoms of venous thromboembolism   You may have no symptoms. But if you do, they can start quickly. Symptoms of DVT are:    Redness near the vein    Swelling    Pain    Changes in the color of the skin  A pulmonary embolism is a health emergency. It can cause trouble breathing, chest pain, coughing up of blood, and an irregular heartbeat. It may also cause sudden death.  Treatment for venous thromboembolism   The goal of treatment is to break up any blood clots and stop new ones from forming. A pulmonary embolism is then less likely to happen. Treatment includes:     Blood thinners. These medicines may be given as a shot, through an IV, or in pill form. You may need to take them for a few months or longer. It depends on the cause of the blood clot.      Walking. Once your symptoms are under control,your healthcare provider may recommend that you walk. You may not be able to walk too far at first, such as after a surgery. But it will stop more blood clots from forming and help you feel better.    Compression socks. These socks or other similar devices can lower your risk for blood clots. They gently put pressure on the lower leg.    Inferior vena cava filter. This small metal deviceis put into your inferior vena cava. That s the main vein that  runs from your legs to your heart and lungs. It catches large clots before they reach these organs. It can stop a pulmonary embolism. You may need this treatment if you aren t able to take blood thinners.    Surgery. In some cases, you may need surgery to remove a clot, especially if it has already reached the lungs.    Possible complications of venous thromboembolism    New clots    High blood pressure in the arteries to the lungs (pulmonary hypertension)    Weak valves in a vein (post-thrombotic syndrome). This can cause cramping, pain, and swelling.    Bleeding    Death  When to call your healthcare provider   Call your healthcare provider right away if you have any of these:    Fever of 100.4 F (38 C) or higher, or as directed by your healthcare provider    Redness, swelling, or fluid leaking from your incision that gets worse    Pain that gets worse    Symptoms that don t get better, or get worse    New symptoms   Date Last Reviewed: 10/1/2017    8882-0309 [employer]. [address] [place] [address]. All rights reserved. This information is not intended as a substitute for professional medical care. Always follow your healthcare professional's instructions.        Enoxaparin Sodium Solution for injection  What is this medicine?  ENOXAPARIN (ee nox a PA rin) is used after knee, hip, or abdominal surgeries to prevent blood clotting. It is also used to treat existing blood clots in the lungs or in the veins.  This medicine may be used for other purposes; ask your health care provider or pharmacist if you have questions.  What should I tell my health care provider before I take this medicine?  They need to know if you have any of these conditions:    bleeding disorders, hemorrhage, or hemophilia    infection of the heart or heart valves    kidney or liver disease    previous stroke    prosthetic heart valve    recent surgery or delivery of a baby    ulcer in the stomach or intestine,  diverticulitis, or other bowel disease    an unusual or allergic reaction to enoxaparin, heparin, pork or pork products, other medicines, foods, dyes, or preservatives    pregnant or trying to get pregnant    breast-feeding  How should I use this medicine?  This medicine is for injection under the skin. It is usually given by a health-care professional. You or a family member may be trained on how to give the injections. If you are to give yourself injections, make sure you understand how to use the syringe, measure the dose if necessary, and give the injection. To avoid bruising, do not rub the site where this medicine has been injected. Do not take your medicine more often than directed. Do not stop taking except on the advice of your doctor or health care professional.  Make sure you receive a puncture-resistant container to dispose of the needles and syringes once you have finished with them. Do not reuse these items. Return the container to your doctor or health care professional for proper disposal.  Talk to your pediatrician regarding the use of this medicine in children. Special care may be needed.  Overdosage: If you think you have taken too much of this medicine contact a poison control center or emergency room at once.  NOTE: This medicine is only for you. Do not share this medicine with others.  What if I miss a dose?  If you miss a dose, take it as soon as you can. If it is almost time for your next dose, take only that dose. Do not take double or extra doses.  What may interact with this medicine?    aspirin and aspirin-like medicines    certain medicines that treat or prevent blood clots    dipyridamole    NSAIDs, medicines for pain and inflammation, like ibuprofen or naproxen  This list may not describe all possible interactions. Give your health care provider a list of all the medicines, herbs, non-prescription drugs, or dietary supplements you use. Also tell them if you smoke, drink alcohol, or use  illegal drugs. Some items may interact with your medicine.  What should I watch for while using this medicine?  Visit your doctor or health care professional for regular checks on your progress. Your condition will be monitored carefully while you are receiving this medicine.  Notify your doctor or health care professional and seek emergency treatment if you develop breathing problems; changes in vision; chest pain; severe, sudden headache; pain, swelling, warmth in the leg; trouble speaking; sudden numbness or weakness of the face, arm, or leg. These can be signs that your condition has gotten worse.  If you are going to have surgery, tell your doctor or health care professional that you are taking this medicine.  Do not stop taking this medicine without first talking to your doctor. Be sure to refill your prescription before you run out of medicine.  Avoid sports and activities that might cause injury while you are using this medicine. Severe falls or injuries can cause unseen bleeding. Be careful when using sharp tools or knives. Consider using an electric razor. Take special care brushing or flossing your teeth. Report any injuries, bruising, or red spots on the skin to your doctor or health care professional.  What side effects may I notice from receiving this medicine?  Side effects that you should report to your doctor or health care professional as soon as possible:    allergic reactions like skin rash, itching or hives, swelling of the face, lips, or tongue    feeling faint or lightheaded, falls    signs and symptoms of bleeding such as bloody or black, tarry stools; red or dark-brown urine; spitting up blood or brown material that looks like coffee grounds; red spots on the skin; unusual bruising or bleeding from the eye, gums, or nose  Side effects that usually do not require medical attention (report to your doctor or health care professional if they continue or are bothersome):    pain, redness, or  irritation at site where injected  This list may not describe all possible side effects. Call your doctor for medical advice about side effects. You may report side effects to FDA at 1-721-ZBN-2695.  Where should I keep my medicine?  Keep out of the reach of children.  Store at room temperature between 15 and 30 degrees C (59 and 86 degrees F). Do not freeze. If your injections have been specially prepared, you may need to store them in the refrigerator. Ask your pharmacist. Throw away any unused medicine after the expiration date.  NOTE:This sheet is a summary. It may not cover all possible information. If you have questions about this medicine, talk to your doctor, pharmacist, or health care provider. Copyright  2016 [employer]        Enoxaparin injection  Brand Name: Lovenox  What is this medicine?  ENOXAPARIN (ee nox a PA rin) is used after knee, hip, or abdominal surgeries to prevent blood clotting. It is also used to treat existing blood clots in the lungs or in the veins.  How should I use this medicine?  This medicine is for injection under the skin. It is usually given by a health-care professional. You or a family member may be trained on how to give the injections. If you are to give yourself injections, make sure you understand how to use the syringe, measure the dose if necessary, and give the injection. To avoid bruising, do not rub the site where this medicine has been injected. Do not take your medicine more often than directed. Do not stop taking except on the advice of your doctor or health care professional.  Make sure you receive a puncture-resistant container to dispose of the needles and syringes once you have finished with them. Do not reuse these items. Return the container to your doctor or health care professional for proper disposal.  Talk to your pediatrician regarding the use of this medicine in children. Special care may be needed.  What side effects may I notice from receiving this  medicine?  Side effects that you should report to your doctor or health care professional as soon as possible:    allergic reactions like skin rash, itching or hives, swelling of the face, lips, or tongue    feeling faint or lightheaded, falls    signs and symptoms of bleeding such as bloody or black, tarry stools; red or dark-brown urine; spitting up blood or brown material that looks like coffee grounds; red spots on the skin; unusual bruising or bleeding from the eye, gums, or nose  Side effects that usually do not require medical attention (report to your doctor or health care professional if they continue or are bothersome):    pain, redness, or irritation at site where injected  What may interact with this medicine?      aspirin and aspirin-like medicines    certain medicines that treat or prevent blood clots    dipyridamole    NSAIDs, medicines for pain and inflammation, like ibuprofen or naproxen  What if I miss a dose?  If you miss a dose, take it as soon as you can. If it is almost time for your next dose, take only that dose. Do not take double or extra doses.  Where should I keep my medicine?  Keep out of the reach of children.  Store at room temperature between 15 and 30 degrees C (59 and 86 degrees F). Do not freeze. If your injections have been specially prepared, you may need to store them in the refrigerator. Ask your pharmacist. Throw away any unused medicine after the expiration date.  What should I tell my health care provider before I take this medicine?  They need to know if you have any of these conditions:    bleeding disorders, hemorrhage, or hemophilia    infection of the heart or heart valves    kidney or liver disease    previous stroke    prosthetic heart valve    recent surgery or delivery of a baby    ulcer in the stomach or intestine, diverticulitis, or other bowel disease    an unusual or allergic reaction to enoxaparin, heparin, pork or pork products, other medicines, foods, dyes,  or preservatives    pregnant or trying to get pregnant    breast-feeding  What should I watch for while using this medicine?  Visit your doctor or health care professional for regular checks on your progress. Your condition will be monitored carefully while you are receiving this medicine.  Notify your doctor or health care professional and seek emergency treatment if you develop breathing problems; changes in vision; chest pain; severe, sudden headache; pain, swelling, warmth in the leg; trouble speaking; sudden numbness or weakness of the face, arm, or leg. These can be signs that your condition has gotten worse.  If you are going to have surgery, tell your doctor or health care professional that you are taking this medicine.  Do not stop taking this medicine without first talking to your doctor. Be sure to refill your prescription before you run out of medicine.  Avoid sports and activities that might cause injury while you are using this medicine. Severe falls or injuries can cause unseen bleeding. Be careful when using sharp tools or knives. Consider using an electric razor. Take special care brushing or flossing your teeth. Report any injuries, bruising, or red spots on the skin to your doctor or health care professional.  NOTE:This sheet is a summary. It may not cover all possible information. If you have questions about this medicine, talk to your doctor, pharmacist, or health care provider. Copyright  2018 [employer]                  Follow-ups after your visit        Your next 10 appointments already scheduled     Jul 27, 2018  7:45 AM CDT   [facility] Draw with  [facility]   [facility] ([facility])    [address] [place] [address]  [address] [place] [id number] [phone]            Jul 27, 2018  8:20 AM CDT   (Arrive by 8:05 AM)   Return Visit with ALFONZO Weathers CNP   [facility] ([facility])    [address]  [address]  [facility] [address]   [phone]            Jul 27, 2018  9:00 AM CDT   Infusion 120 with UC ONCOLOGY INFUSION, UC 23 ATC   [facility] ([facility])    [address]  [address]  [facility] [address]   [phone]            Aug 17, 2018  7:45 AM CDT   [facility] Lab Draw with  [facility] DRAW   [facility] ([facility])    [address]  [address]  [facility] [address]   [phone]            Aug 17, 2018  8:20 AM CDT   (Arrive by 8:05 AM)   Return Active Treatment with ALFONZO Weathers CNP   [facility] ([facility])    [address] [place] [address]  [address]  [facility] [address]   [phone]            Aug 17, 2018  9:00 AM CDT   Infusion 120 with UC ONCOLOGY INFUSION, UC 23 ATC   [facility] ([facility])    [address]  [address]  [facility] [address]   [phone]              Future tests that were ordered for you today     Open Future Orders        Priority Expected Expires Ordered    Echocardiogram Complete Routine  7/6/2019 7/6/2018    X-ray Chest 2 vws* Routine 7/6/2018 7/6/2019 7/6/2018            Who to contact     If you have questions or need follow up information about today's clinic visit or your schedule please contact [facility] directly at [phone].  Normal or non-critical lab and imaging results will be communicated to you by MyChart, letter or phone within 4 business days after the clinic has received the results. If you do not hear from us within 7 days, please contact the clinic through MyChart or phone. If you have a critical or abnormal lab result, we will notify you by phone as soon as possible.  Submit refill requests through [employer] or call your pharmacy and they will forward the refill request to us. Please allow 3  business days for your refill to be completed.          Additional Information About Your Visit        uTesthart Information     [employer] gives you secure access to your electronic health record. If you see a primary care provider, you can also send messages to your care team and make appointments. If you have questions, please call your primary care clinic.  If you do not have a primary care provider, please call [phone] and they will assist you.        Care EveryWhere ID     This is your Care EveryWhere ID. This could be used by other organizations to access your Lead medical records  [id number]        Your Vitals Were     Pulse Temperature Respirations Last Period Pulse Oximetry BMI (Body Mass Index)    89 98.5  F (36.9  C) (Oral) 16 03/04/2005 99% 27.96 kg/m2       Blood Pressure from Last 3 Encounters:   07/06/18 92/62   06/13/18 96/60   06/06/18 105/66    Weight from Last 3 Encounters:   07/06/18 78 kg (171 lb 14.4 oz)   06/13/18 80.8 kg (178 lb 3.2 oz)   06/06/18 81.6 kg (180 lb)                 Today's Medication Changes          These changes are accurate as of 7/6/18  5:22 PM.  If you have any questions, ask your nurse or doctor.               Start taking these medicines.        Dose/Directions    benzonatate 100 MG capsule   Commonly known as:  TESSALON   Used for:  Cough   Started by:  Irasema Gtz APRN CNP        Dose:  100 mg   Take 1 capsule (100 mg) by mouth 3 times daily as needed for cough   Quantity:  60 capsule   Refills:  0       enoxaparin 80 MG/0.8ML injection   Commonly known as:  LOVENOX   Used for:  Other acute pulmonary embolism without acute cor pulmonale (H)   Started by:  Irasema Gtz APRN CNP        Dose:  80 mg   Inject 0.8 mLs (80 mg) Subcutaneous 2 times daily   Quantity:  60 Syringe   Refills:  5       ondansetron 8 MG tablet   Commonly known as:  ZOFRAN   Used for:  Endometrial cancer (H), Encounter for long-term (current) use of medications        Dose:  8 mg    Take 1 tablet (8 mg) by mouth every 8 hours as needed for nausea (vomiting)   Quantity:  20 tablet   Refills:  2            Where to get your medicines      These medications were sent to [place], [place] - [address]  [address], [facility] [address]    Hours:  TRANSPLANT PHONE NUMBER [phone] Phone:  [phone]     benzonatate 100 MG capsule    enoxaparin 80 MG/0.8ML injection    ondansetron 8 MG tablet                Primary Care Provider Office Phone # Fax #    Karo Doty [phone] [address]  [facility] [address]        Equal Access to Services     JEREMÍAS BUCK : Gopi mckeon Sograce, waursulada mekaadaha, qaybta kaalmada catiayanelly, carol roland. So [facility] [place] [phone].    ATENCIÓN: Si habla español, tiene a graham disposición servicios gratuitos de asistencia lingüística. Llame al [phone].    We comply with applicable federal civil rights laws and [place] laws. We do not discriminate on the basis of race, color, national origin, age, disability, sex, sexual orientation, or gender identity.            Thank you!     Thank you for choosing [facility]  for your care. Our goal is always to provide you with excellent care. Hearing back from our patients is one way we can continue to improve our services. Please take a few minutes to complete the written survey that you may receive in the mail after your visit with us. Thank you!             Your Updated Medication List - Protect others around you: Learn how to safely use, store and throw away your medicines at www.disposemymeds.org.          This list is accurate as of 7/6/18  5:22 PM.  Always use your most recent med list.                   Brand Name Dispense Instructions for use Diagnosis    acetaminophen 500 MG tablet    TYLENOL     Take 1 tablet (500 mg) by mouth every 4 hours as needed    S/P DEMETRIO-BSO        ascorbic acid 250 MG tablet    VITAMIN C    90    250 mg    Routine general medical examination at a health care facility       benzonatate 100 MG capsule    TESSALON    60 capsule    Take 1 capsule (100 mg) by mouth 3 times daily as needed for cough    Cough       calcium citrate-vitamin D 315-200 MG-UNIT Tabs per tablet    CITRACAL     Take 2 tablets by mouth daily        enoxaparin 80 MG/0.8ML injection    LOVENOX    60 Syringe    Inject 0.8 mLs (80 mg) Subcutaneous 2 times daily    Other acute pulmonary embolism without acute cor pulmonale (H)       FERROUS GLUCONATE PO      Take 325 mg by mouth daily (with breakfast)        GLUCOSAMINE CHONDROITIN COMPLX Tabs      Take  by mouth. 1500 mg 1xqd.    Routine general medical examination at a health care facility       LORazepam 1 MG tablet    ATIVAN    30 tablet    Take 1 tablet (1 mg) by mouth every 6 hours as needed (nausea/vomiting, anxiety or sleep)    Endometrial cancer (H), Encounter for long-term (current) use of medications       ondansetron 8 MG tablet    ZOFRAN    20 tablet    Take 1 tablet (8 mg) by mouth every 8 hours as needed for nausea (vomiting)    Endometrial cancer (H), Encounter for long-term (current) use of medications       prochlorperazine 10 MG tablet    COMPAZINE    30 tablet    Take 1 tablet (10 mg) by mouth every 6 hours as needed (nausea/vomiting)    Endometrial cancer (H), Encounter for long-term (current) use of medications       RANITIDINE HCL PO      Take 150 mg by mouth 2 times daily

## 2018-07-07 ENCOUNTER — APPOINTMENT (OUTPATIENT)
Dept: CARDIOLOGY | Facility: CLINIC | Age: 63
End: 2018-07-07
Attending: OBSTETRICS & GYNECOLOGY
Payer: COMMERCIAL

## 2018-07-07 VITALS
BODY MASS INDEX: 28.97 KG/M2 | WEIGHT: 173.9 LBS | DIASTOLIC BLOOD PRESSURE: 57 MMHG | HEART RATE: 68 BPM | TEMPERATURE: 97.4 F | HEIGHT: 65 IN | RESPIRATION RATE: 16 BRPM | SYSTOLIC BLOOD PRESSURE: 94 MMHG | OXYGEN SATURATION: 99 %

## 2018-07-07 LAB
ALBUMIN SERPL-MCNC: 2 G/DL (ref 3.4–5)
ALP SERPL-CCNC: 317 U/L (ref 40–150)
ALT SERPL W P-5'-P-CCNC: 45 U/L (ref 0–50)
ANION GAP SERPL CALCULATED.3IONS-SCNC: 10 MMOL/L (ref 3–14)
AST SERPL W P-5'-P-CCNC: 20 U/L (ref 0–45)
BILIRUB SERPL-MCNC: 0.3 MG/DL (ref 0.2–1.3)
BUN SERPL-MCNC: 19 MG/DL (ref 7–30)
CALCIUM SERPL-MCNC: 9 MG/DL (ref 8.5–10.1)
CHLORIDE SERPL-SCNC: 108 MMOL/L (ref 94–109)
CO2 SERPL-SCNC: 22 MMOL/L (ref 20–32)
CREAT SERPL-MCNC: 0.87 MG/DL (ref 0.52–1.04)
GFR SERPL CREATININE-BSD FRML MDRD: 66 ML/MIN/1.7M2
GLUCOSE SERPL-MCNC: 146 MG/DL (ref 70–99)
POTASSIUM SERPL-SCNC: 4.4 MMOL/L (ref 3.4–5.3)
PROT SERPL-MCNC: 6.5 G/DL (ref 6.8–8.8)
SODIUM SERPL-SCNC: 139 MMOL/L (ref 133–144)

## 2018-07-07 PROCEDURE — G0378 HOSPITAL OBSERVATION PER HR: HCPCS

## 2018-07-07 PROCEDURE — 25000132 ZZH RX MED GY IP 250 OP 250 PS 637: Performed by: STUDENT IN AN ORGANIZED HEALTH CARE EDUCATION/TRAINING PROGRAM

## 2018-07-07 PROCEDURE — 96372 THER/PROPH/DIAG INJ SC/IM: CPT

## 2018-07-07 PROCEDURE — 25000125 ZZHC RX 250: Performed by: STUDENT IN AN ORGANIZED HEALTH CARE EDUCATION/TRAINING PROGRAM

## 2018-07-07 PROCEDURE — 25000128 H RX IP 250 OP 636: Performed by: STUDENT IN AN ORGANIZED HEALTH CARE EDUCATION/TRAINING PROGRAM

## 2018-07-07 PROCEDURE — 93306 TTE W/DOPPLER COMPLETE: CPT | Mod: 26 | Performed by: INTERNAL MEDICINE

## 2018-07-07 PROCEDURE — 36415 COLL VENOUS BLD VENIPUNCTURE: CPT | Performed by: STUDENT IN AN ORGANIZED HEALTH CARE EDUCATION/TRAINING PROGRAM

## 2018-07-07 PROCEDURE — 80053 COMPREHEN METABOLIC PANEL: CPT | Performed by: STUDENT IN AN ORGANIZED HEALTH CARE EDUCATION/TRAINING PROGRAM

## 2018-07-07 PROCEDURE — 93306 TTE W/DOPPLER COMPLETE: CPT

## 2018-07-07 RX ADMIN — ONDANSETRON 4 MG: 4 TABLET, ORALLY DISINTEGRATING ORAL at 10:14

## 2018-07-07 RX ADMIN — RANITIDINE 150 MG: 150 TABLET, FILM COATED ORAL at 08:27

## 2018-07-07 RX ADMIN — ONDANSETRON 4 MG: 4 TABLET, ORALLY DISINTEGRATING ORAL at 04:47

## 2018-07-07 RX ADMIN — ENOXAPARIN SODIUM 80 MG: 80 INJECTION SUBCUTANEOUS at 08:27

## 2018-07-07 NOTE — PROGRESS NOTES
OBS GOALS:  -diagnostic tests and consults completed and resulted: goal in progress, still waiting to do echo but starting anticoagulant therapy (lovenox 2x/day for now)  -vital signs normal or at patient baseline : met, all vitals stable and within normal limits  -dyspnea improved and O2 sats greater than 88% on room air or prior home oxygen levels: goal met  -safe disposition plan has been identified: met, pt will be discharging home after anticoagulation treatment initiated and stable

## 2018-07-07 NOTE — PROGRESS NOTES
Gynecologic Oncology Progress Note  7/7/2018     HD#:2  Disease: Stage IIIC1 low grade endometrial cancer s/p DEMETRIO, BSO, PLND on 4/18. Now with new dx of right PE x3    24 hour events:   - Admitted for new PE (3 in right upper lobe lingula)  - Vitals stable  - Cardiac echo ordered  - Started therapeutic lovenox    Subjective: Patient is feeling well this morning. Denies chest pain or dyspnea. No palpitations, lightheadedness or dizzinss. She does not have any pain. She is tolerating PO and is ambulating without difficulty    Objective:   Vitals:    07/06/18 2353 07/07/18 0325 07/07/18 0447 07/07/18 0704   BP: 97/57 92/54 103/59 96/60   BP Location: Left arm Left arm Right arm Left arm   Pulse: 62 50  55   Resp: 16 16  14   Temp: 94.6  F (34.8  C) 94.6  F (34.8  C)  97.6  F (36.4  C)   TempSrc: Axillary Axillary  Oral   SpO2: 97% 97% 98% 99%   Weight:       Height:           GEN - NAD, sitting comfortably in bed  CV - Regular rate and rhythm, no murmurs appreciated  PULM - bilaterally clear  ABD - soft, non distended, non tender to palpation. Bilateral Lower quadrants firm to palpation. Vertical midline incision well-healed  Ext - warm and well perfused, no edema bilaterally, non-tender, SCDs on    Lines/drains:   PIV    I/Os  250 in, no output charted    New Labs/Imaging-   Results for orders placed or performed during the hospital encounter of 07/06/18 (from the past 24 hour(s))   Comprehensive metabolic panel   Result Value Ref Range    Sodium 139 133 - 144 mmol/L    Potassium 4.4 3.4 - 5.3 mmol/L    Chloride 108 94 - 109 mmol/L    Carbon Dioxide 22 20 - 32 mmol/L    Anion Gap 10 3 - 14 mmol/L    Glucose 146 (H) 70 - 99 mg/dL    Urea Nitrogen 19 7 - 30 mg/dL    Creatinine 0.87 0.52 - 1.04 mg/dL    GFR Estimate 66 >60 mL/min/1.7m2    GFR Estimate If Black 79 >60 mL/min/1.7m2    Calcium 9.0 8.5 - 10.1 mg/dL    Bilirubin Total 0.3 0.2 - 1.3 mg/dL    Albumin 2.0 (L) 3.4 - 5.0 g/dL    Protein Total 6.5 (L) 6.8 - 8.8  g/dL    Alkaline Phosphatase 317 (H) 40 - 150 U/L    ALT 45 0 - 50 U/L    AST 20 0 - 45 U/L         Assessment:  63 year old female with Stage IIIC1 low grade endometrial cancer s/p DEMETRIO-BSO and LND in April. On single agent Carboplatin due to paclitaxel anaphylaxis. Hx of UE DVT in 2012 s/p lovenox therapy. Now with new diagnosis of Pulmonary embolism. She is HD#2     Active Problem list:    1. Pulmonary Emboli  2. Stage IIIC1 endometrial cancer  3. GERD  4. HLD  5. Hyperthyroidism  6. Arthritis      Plan:  Therapeutic lovenox was started last night.   Plan for cardiac echo today to look for heart strain.  Dispo: Likely home after echo. If unable to have echo today as inpatient, will follow up so she can have it outpatient. Will discharge home on Therapeutic lovenox of 80mg BID. She is s/p lovenox teaching and feels comfortable with injections    # Pain Assessment:  Current Pain Score 7/6/2018   Patient currently in pain? denies   Pain score (0-10) -   Pain location -   Pain descriptors -   Gerri mancera pain level was assessed and she currently denies pain.        Jayne Alford MD  Obstetrics and Gyncology, PGY-1  July 7, 2018 , 7:59 AM

## 2018-07-07 NOTE — DISCHARGE SUMMARY
Gynecologic Oncology Discharge Summary    Gerri Owens  [id number]    Admit Date: 7/6/2018  Discharge Date: 07/07/18  Admitting Provider: Bradley Tierney MD  Discharge Provider: Bradley Tierney MD    Admission Dx:   -Stage IIIC1 low grade endometrial cancer  -Pulmonary emboli  -hx DVT  -GERD, on ranitidine  -Hyperthyroid, nl FNA  -Hyperlipidemia    Discharge Dx:  -Stage IIIC1 low grade endometrial cancer  -Pulmonary emboli, on therapeutic lovenox  -hx DVT  -GERD, on ranitidine  -Hyperthyroid, nl FNA  -Hyperlipidemia    Patient Active Problem List   Diagnosis     Advanced directives, counseling/discussion     Primary osteoarthritis of both hips     History of deep venous thrombosis     Hyperlipidemia, unspecified hyperlipidemia type     BMI 35.0-35.9,adult     Presence of left hip implant     CKD (chronic kidney disease) stage 2, GFR 60-89 ml/min     Microscopic hematuria     Calculus of left kidney     S/P DEMETRIO-BSO     Endometrial cancer (H)     Encounter for long-term (current) use of medications     Encounter for antineoplastic chemotherapy     Pulmonary embolus (H)       Procedures: Cardiac echocardiogram    Prior to Admission Medications:  Prescriptions Prior to Admission   Medication Sig Dispense Refill Last Dose     acetaminophen (TYLENOL) 500 MG tablet Take 1 tablet (500 mg) by mouth every 4 hours as needed   Past Month at Unknown time     calcium citrate-vitamin D (CITRACAL) 315-200 MG-UNIT TABS per tablet Take 2 tablets by mouth daily   7/6/2018 at Unknown time     GLUCOSAMINE CHONDROITIN COMPLX OR TABS Take  by mouth. 1500 mg 1xqd.   0 7/6/2018 at Unknown time     LORazepam (ATIVAN) 1 MG tablet Take 1 tablet (1 mg) by mouth every 6 hours as needed (nausea/vomiting, anxiety or sleep) 30 tablet 1 7/5/2018 at Unknown time     ondansetron (ZOFRAN) 8 MG tablet Take 1 tablet (8 mg) by mouth every 8 hours as needed for nausea (vomiting) 20 tablet 2 7/6/2018 at Unknown time     prochlorperazine (COMPAZINE) 10 MG  tablet Take 1 tablet (10 mg) by mouth every 6 hours as needed (nausea/vomiting) 30 tablet 2 Past Week at Unknown time     RANITIDINE HCL PO Take 150 mg by mouth 2 times daily    7/6/2018 at Unknown time     VITAMIN C 250 MG OR TABS 250 mg 90 0 7/6/2018 at Unknown time     benzonatate (TESSALON) 100 MG capsule Take 1 capsule (100 mg) by mouth 3 times daily as needed for cough 60 capsule 0 Unknown at Unknown time     enoxaparin (LOVENOX) 80 MG/0.8ML injection Inject 0.8 mLs (80 mg) Subcutaneous 2 times daily 60 Syringe 5 Unknown at Unknown time     FERROUS GLUCONATE PO Take 325 mg by mouth daily (with breakfast)    Unknown at Unknown time       Discharge Medications:     Review of your medicines      CONTINUE these medicines which may have CHANGED, or have new prescriptions. If we are uncertain of the size of tablets/capsules you have at home, strength may be listed as something that might have changed.       Dose / Directions    * enoxaparin 80 MG/0.8ML injection   Commonly known as:  LOVENOX   This may have changed:  Another medication with the same name was added. Make sure you understand how and when to take each.        Dose:  80 mg   Inject 0.8 mLs (80 mg) Subcutaneous 2 times daily   Quantity:  60 Syringe   Refills:  5       * enoxaparin 80 MG/0.8ML injection   Commonly known as:  LOVENOX   This may have changed:  You were already taking a medication with the same name, and this prescription was added. Make sure you understand how and when to take each.        Dose:  80 mg   Inject 0.8 mLs (80 mg) Subcutaneous 2 times daily   Quantity:  60 Syringe   Refills:  3       * Notice:  This list has 2 medication(s) that are the same as other medications prescribed for you. Read the directions carefully, and ask your doctor or other care provider to review them with you.      CONTINUE these medicines which have NOT CHANGED       Dose / Directions    acetaminophen 500 MG tablet   Commonly known as:  TYLENOL   Used for:   S/P DEMETRIO-BSO        Dose:  500 mg   Take 1 tablet (500 mg) by mouth every 4 hours as needed   Refills:  0       ascorbic acid 250 MG tablet   Commonly known as:  VITAMIN C   Used for:  Routine general medical examination at a health care facility        250 mg   Quantity:  90   Refills:  0       benzonatate 100 MG capsule   Commonly known as:  TESSALON   Used for:  Cough        Dose:  100 mg   Take 1 capsule (100 mg) by mouth 3 times daily as needed for cough   Quantity:  60 capsule   Refills:  0       calcium citrate-vitamin D 315-200 MG-UNIT Tabs per tablet   Commonly known as:  CITRACAL   Indication:  630/500 mg        Dose:  2 tablet   Take 2 tablets by mouth daily   Refills:  0       FERROUS GLUCONATE PO        Dose:  325 mg   Take 325 mg by mouth daily (with breakfast)   Refills:  0       GLUCOSAMINE CHONDROITIN COMPLX Tabs   Used for:  Routine general medical examination at a health care facility        Take  by mouth. 1500 mg 1xqd.   Refills:  0       LORazepam 1 MG tablet   Commonly known as:  ATIVAN   Used for:  Endometrial cancer (H), Encounter for long-term (current) use of medications        Dose:  1 mg   Take 1 tablet (1 mg) by mouth every 6 hours as needed (nausea/vomiting, anxiety or sleep)   Quantity:  30 tablet   Refills:  1       ondansetron 8 MG tablet   Commonly known as:  ZOFRAN   Used for:  Endometrial cancer (H), Encounter for long-term (current) use of medications        Dose:  8 mg   Take 1 tablet (8 mg) by mouth every 8 hours as needed for nausea (vomiting)   Quantity:  20 tablet   Refills:  2       prochlorperazine 10 MG tablet   Commonly known as:  COMPAZINE   Used for:  Endometrial cancer (H), Encounter for long-term (current) use of medications        Dose:  10 mg   Take 1 tablet (10 mg) by mouth every 6 hours as needed (nausea/vomiting)   Quantity:  30 tablet   Refills:  2       RANITIDINE HCL PO   Indication:  8am and 8pm        Dose:  150 mg   Take 150 mg by mouth 2 times daily    Refills:  0            Where to get your medicines      These medications were sent to [facility] - [place], [place] - [address]   [address] [facility], [facility] [address]     Phone:  [phone]      enoxaparin 80 MG/0.8ML injection             Consultations:  No consultations required    Brief History of Illness:  Gerri Owens is a 63 year old with Stage IIIC1 low grade endometrial cancer who is being admitted from the [facility] infusion center for pulmonary embolism. She was seen in clinic for her dose of carboplatin. She reported a 3 week history of a dry cough, since her previous infusion reaction. At this time in clinic she was tachy to ~110 with a BP of ~90/60. Chest CT showed segmental pulmonary emboli in the right upper lobe and lingula, no evidence of heart strain. Unable to obtain an echocardiogram in clinic and so was admitted to the hospital for further observation, therapeutic lovenox administration, and echocardiogram.     Gerri denies any other symptoms including headache, chest pain, dyspnea, racing heart, coughing anything up. She has GERD at baseline, denies n/v. Has had a few recent episodes of stool incontinence since she started her carboplatin infusions. She denies noticing any LE edema. She wears rosemary hose at baseline because of her history of DVT and her family history.    Hospital Course:  Dz:   - Stage IIIC1 low grade endometrial cancer currently being treated with single dose carboplatin. Previously treated with carbo/taxol however had an anaphylactive reaction to paclitaxel  FEN:   - She had a regular diet while inpatient.   Pain:   - Her pain was controlled with her home tylenol and oxycodone  CV:   -Due to new dx of pulmonary embolus and tachycardia and hypotension on admission a cardiac echo was ordered, which showed normal right and left ventricular function with EF of 60-65%. All valves normal appearing with trace mitral insufficiency present. IVC and  aortic root are normal appearing.   PULM:   -Diagnosed with multiple lingular emboli, she was started on therapeutic lovenox. She continued to have stable vitals with oxygen saturation >95% during her hospitalization.   HEME:   - Currently on therapeutic lovenox, Lab on admission showed anemia with Hgb to 9.1 and normal WBC of 9.5  GI:   - She was given an regula  diet with PRN stool softeners.  :    - no issues while inpatient  ID:   - The patient was AF during her hospitalization.  WBC was 9.5 on admission with no s/s of infection  ENDO:   - no issues  PSYCH/NEURO:   - no issues  PPX:    - as above, treated with lovenox    Discharge Instructions and Follow up:  Ms. Gerri Owens was discharged from the hospital with follow up for Heparin 10A level.    Discharge Diet: Regular  Discharge Activity: Activity as tolerated  Discharge Follow up: Heparin 10A level to be evaluated following initiation of therapeutic Lovenox. Outpatient lab ordered    Discharge Disposition:  Discharged to home in good condition.     Discharge Staff: Peter Argenta, MD Alicia Myhre,   OB/GYN, PGY-2

## 2018-07-07 NOTE — PLAN OF CARE
Problem: Patient Care Overview  Goal: Plan of Care/Patient Progress Review  Outcome: Improving  [id number]:   Jim to 50s - MD notified without response, soft BP (team aware), AOVSS on RA. Denies SOB or dizziness this shift. Denies pain. UAL, reinforced to call for assistance when needed. Pulse ox on for safety overnight, did not de-sat. Started lovenox 2x/day. Had chemo yesterday 7/6, taking zofran for nausea prevention, otherwise regular diet tolerated. Right PIV SL. Possible discharge home today pending obs goals being met - still needs echo done etc.     OBS GOALS:  -diagnostic tests and consults completed and resulted: goal in progress, still waiting to do echo but starting anticoagulant therapy (lovenox 2x/day for now)  -vital signs normal or at patient baseline : met, all vitals stable and within normal limits  -dyspnea improved and O2 sats greater than 88% on room air or prior home oxygen levels: goal met  -safe disposition plan has been identified: met, pt will be discharging home after anticoagulation treatment initiated and stable

## 2018-07-09 ENCOUNTER — DOCUMENTATION ONLY (OUTPATIENT)
Dept: PHARMACY | Facility: CLINIC | Age: 63
End: 2018-07-09

## 2018-07-09 NOTE — PROGRESS NOTES
Prior Authorization Approval    enoxaparin 80mg/0.8ml syr  Date Initiated: 07/09/2018  Date Completed: 07/10/2018  Prior Auth Type: Quantity Limit     Status: Approved    Effective Date: 07/07/2018 - 07/07/2019  Copay: 0.00  [place] Filled: Yes    Insurance: [employer]  Ph: [phone] opt. 2  ID: [id number]  Case Number: n/a  Submitted Via: [facility]am[facility] te[facility] Alexander  Pharmacy Liaison  Ph: [phone] Page: [phone]

## 2018-07-09 NOTE — PROGRESS NOTES
OBS GOALS:  -diagnostic tests and consults completed and resulted: Goall met. Echo completed. Starting Lovenox BID.   -vital signs normal or at patient baseline : Goal Met.  All vitals stable and within normal limits  -dyspnea improved and O2 sats greater than 88% on room air or prior home oxygen levels: Goal Met.  -safe disposition plan has been identified: Goal Met. Pt is discharging to home.

## 2018-07-10 ENCOUNTER — TELEPHONE (OUTPATIENT)
Dept: FAMILY MEDICINE | Facility: CLINIC | Age: 63
End: 2018-07-10

## 2018-07-10 DIAGNOSIS — C54.1 ENDOMETRIAL CANCER (H): Primary | ICD-10-CM

## 2018-07-10 NOTE — TELEPHONE ENCOUNTER
Discussed with Dr Doty  Would like to see patient for follow-up   My chart message sent.  Isabella Jaramillo RN

## 2018-07-10 NOTE — TELEPHONE ENCOUNTER
ED / Discharge Outreach Protocol    Patient Contact    Attempt # 1    Was call answered?  No.  Left message on voicemail with information to call me back.    *pt should be f/u with P clinic to check hep10a level within 7 days of hospitalization*    Kasey Alcantar RN, BSN

## 2018-07-10 NOTE — TELEPHONE ENCOUNTER
"ED/Discharge Protocol    \"Hi, my name is DAVIDSON Jaramillo RN, a registered nurse, and I am calling on behalf of Dr. Doty's office at [place].  I am calling to follow up and see how things are going for you after your recent visit.\"    \"I see that you were in the hospital and discharged on 7/7/18.    How are you doing now that you are home?\" OK    Is patient experiencing symptoms that may require a hospital visit?  no    Discharge Instructions    \"Let's review your discharge instructions.  What is/are the follow-up recommendations?  Pt. Response: Follow-up with oncology regarding blood thinners and PE    \"Were you instructed to make a follow-up appointment?\"  Pt. Response: Yes.  Has appointment been made?   No.  \"Can I help you schedule that appointment?\" Sees Dr Tierney,  Oncology.  They will be managing her blood thinners for now.  Would rather not come in to see PCP unless she really needs     \"When you see the provider, I would recommend that you bring your discharge instructions with you.    Medications    \"How many new medications are you on since your hospitalization/ED visit?\"    0-1, Lovenox bridging, blood thinner  \"How many of your current medicines changed (dose, timing, name, etc.) while you were in the hospital/ED visit?\"   0-1  \"Do you have questions about your medications?\"   No  \"Were you newly diagnosed with heart failure, COPD, diabetes or did you have a heart attack?\"   No  For patients on insulin: \"Did you start on insulin in the hospital or did you have your insulin dose changed?\"   No    Medication reconciliation completed? Yes    Was MTM referral placed (*Make sure to put transitions as reason for referral)?   No    Call Summary    \"Do you have any questions or concerns about your condition or care plan at the moment?\"    Yes , Does she really need to see PCP?    Triage nurse advice given: watch for My Chart message regarding if she needs to be seen here.    Patient was in ER  in the past year " "(assess appropriateness of ER visits.)      \"If you have questions or things don't continue to improve, we encourage you contact us through the main clinic number,  [phone].  Even if the clinic is not open, triage nurses are available 24/7 to help you.     We would like you to know that our clinic has extended hours (provide information).  We also have urgent care (provide details on closest location and hours/contact info)\"      \"Thank you for your time and take care!\"      "

## 2018-07-11 DIAGNOSIS — I26.99 OTHER ACUTE PULMONARY EMBOLISM WITHOUT ACUTE COR PULMONALE (H): ICD-10-CM

## 2018-07-11 LAB — LMWH PPP CHRO-ACNC: 1.58 IU/ML

## 2018-07-11 PROCEDURE — 36415 COLL VENOUS BLD VENIPUNCTURE: CPT | Performed by: FAMILY MEDICINE

## 2018-07-11 PROCEDURE — 85520 HEPARIN ASSAY: CPT | Performed by: FAMILY MEDICINE

## 2018-07-13 ASSESSMENT — ENCOUNTER SYMPTOMS
EXERCISE INTOLERANCE: 1
INSOMNIA: 0
FEVER: 0
HOARSE VOICE: 0
DECREASED APPETITE: 1
SORE THROAT: 0
POLYPHAGIA: 0
ALTERED TEMPERATURE REGULATION: 0
NIGHT SWEATS: 0
SINUS PAIN: 0
TROUBLE SWALLOWING: 0
POLYDIPSIA: 0
DEPRESSION: 0
INCREASED ENERGY: 1
ORTHOPNEA: 0
SMELL DISTURBANCE: 0
HYPOTENSION: 0
TASTE DISTURBANCE: 0
FATIGUE: 1
LIGHT-HEADEDNESS: 1
PANIC: 0
SINUS CONGESTION: 0
SLEEP DISTURBANCES DUE TO BREATHING: 0
NERVOUS/ANXIOUS: 1
PALPITATIONS: 0
HALLUCINATIONS: 0
HYPERTENSION: 0
DECREASED CONCENTRATION: 0
WEIGHT GAIN: 0
WEIGHT LOSS: 1
CHILLS: 0
LEG PAIN: 0
NECK MASS: 0
SYNCOPE: 0

## 2018-07-18 PROBLEM — M19.042 OSTEOARTHRITIS OF BOTH HANDS: Status: ACTIVE | Noted: 2018-07-18

## 2018-07-18 PROBLEM — M19.041 OSTEOARTHRITIS OF BOTH HANDS: Status: ACTIVE | Noted: 2018-07-18

## 2018-07-19 ENCOUNTER — OFFICE VISIT (OUTPATIENT)
Dept: FAMILY MEDICINE | Facility: CLINIC | Age: 63
End: 2018-07-19
Payer: COMMERCIAL

## 2018-07-19 VITALS
RESPIRATION RATE: 20 BRPM | BODY MASS INDEX: 28.12 KG/M2 | HEART RATE: 81 BPM | TEMPERATURE: 99.1 F | DIASTOLIC BLOOD PRESSURE: 61 MMHG | SYSTOLIC BLOOD PRESSURE: 100 MMHG | WEIGHT: 169 LBS | OXYGEN SATURATION: 100 %

## 2018-07-19 DIAGNOSIS — C54.1 ENDOMETRIAL CANCER (H): ICD-10-CM

## 2018-07-19 DIAGNOSIS — R31.29 MICROSCOPIC HEMATURIA: ICD-10-CM

## 2018-07-19 DIAGNOSIS — Z11.4 ENCOUNTER FOR SCREENING FOR HIV: ICD-10-CM

## 2018-07-19 DIAGNOSIS — N20.0 CALCULUS OF LEFT KIDNEY: ICD-10-CM

## 2018-07-19 DIAGNOSIS — Z96.642 PRESENCE OF LEFT HIP IMPLANT: ICD-10-CM

## 2018-07-19 DIAGNOSIS — I26.99 OTHER ACUTE PULMONARY EMBOLISM WITHOUT ACUTE COR PULMONALE (H): Primary | ICD-10-CM

## 2018-07-19 DIAGNOSIS — Z09 HOSPITAL DISCHARGE FOLLOW-UP: ICD-10-CM

## 2018-07-19 DIAGNOSIS — Z86.718 HISTORY OF DEEP VENOUS THROMBOSIS: ICD-10-CM

## 2018-07-19 DIAGNOSIS — R79.0 LOW FERRITIN: ICD-10-CM

## 2018-07-19 DIAGNOSIS — N18.2 CKD (CHRONIC KIDNEY DISEASE) STAGE 2, GFR 60-89 ML/MIN: ICD-10-CM

## 2018-07-19 DIAGNOSIS — E78.5 HYPERLIPIDEMIA, UNSPECIFIED HYPERLIPIDEMIA TYPE: ICD-10-CM

## 2018-07-19 LAB
ALBUMIN SERPL-MCNC: 2.8 G/DL (ref 3.4–5)
ALBUMIN UR-MCNC: ABNORMAL MG/DL
ALP SERPL-CCNC: 180 U/L (ref 40–150)
ALT SERPL W P-5'-P-CCNC: 22 U/L (ref 0–50)
ANION GAP SERPL CALCULATED.3IONS-SCNC: 5 MMOL/L (ref 3–14)
APPEARANCE UR: CLEAR
AST SERPL W P-5'-P-CCNC: 18 U/L (ref 0–45)
BACTERIA #/AREA URNS HPF: ABNORMAL /HPF
BASOPHILS # BLD AUTO: 0 10E9/L (ref 0–0.2)
BASOPHILS NFR BLD AUTO: 0.2 %
BILIRUB SERPL-MCNC: 0.6 MG/DL (ref 0.2–1.3)
BILIRUB UR QL STRIP: NEGATIVE
BUN SERPL-MCNC: 11 MG/DL (ref 7–30)
CALCIUM SERPL-MCNC: 9 MG/DL (ref 8.5–10.1)
CHLORIDE SERPL-SCNC: 104 MMOL/L (ref 94–109)
CHOLEST SERPL-MCNC: 173 MG/DL
CO2 SERPL-SCNC: 28 MMOL/L (ref 20–32)
COLOR UR AUTO: YELLOW
CREAT SERPL-MCNC: 1.08 MG/DL (ref 0.52–1.04)
DIFFERENTIAL METHOD BLD: ABNORMAL
EOSINOPHIL # BLD AUTO: 0 10E9/L (ref 0–0.7)
EOSINOPHIL NFR BLD AUTO: 0.6 %
ERYTHROCYTE [DISTWIDTH] IN BLOOD BY AUTOMATED COUNT: 18.7 % (ref 10–15)
FERRITIN SERPL-MCNC: 73 NG/ML (ref 8–252)
GFR SERPL CREATININE-BSD FRML MDRD: 51 ML/MIN/1.7M2
GLUCOSE SERPL-MCNC: 88 MG/DL (ref 70–99)
GLUCOSE UR STRIP-MCNC: NEGATIVE MG/DL
HCT VFR BLD AUTO: 30.5 % (ref 35–47)
HDLC SERPL-MCNC: 51 MG/DL
HGB BLD-MCNC: 9.1 G/DL (ref 11.7–15.7)
HGB UR QL STRIP: NEGATIVE
HYALINE CASTS #/AREA URNS LPF: ABNORMAL /LPF
KETONES UR STRIP-MCNC: NEGATIVE MG/DL
LDLC SERPL CALC-MCNC: 104 MG/DL
LEUKOCYTE ESTERASE UR QL STRIP: ABNORMAL
LYMPHOCYTES # BLD AUTO: 0.9 10E9/L (ref 0.8–5.3)
LYMPHOCYTES NFR BLD AUTO: 18 %
MCH RBC QN AUTO: 25.1 PG (ref 26.5–33)
MCHC RBC AUTO-ENTMCNC: 29.8 G/DL (ref 31.5–36.5)
MCV RBC AUTO: 84 FL (ref 78–100)
MONOCYTES # BLD AUTO: 0.6 10E9/L (ref 0–1.3)
MONOCYTES NFR BLD AUTO: 11.4 %
NEUTROPHILS # BLD AUTO: 3.4 10E9/L (ref 1.6–8.3)
NEUTROPHILS NFR BLD AUTO: 69.8 %
NITRATE UR QL: NEGATIVE
NON-SQ EPI CELLS #/AREA URNS LPF: ABNORMAL /LPF
NONHDLC SERPL-MCNC: 122 MG/DL
PH UR STRIP: 6.5 PH (ref 5–7)
PLATELET # BLD AUTO: 121 10E9/L (ref 150–450)
POTASSIUM SERPL-SCNC: 4.2 MMOL/L (ref 3.4–5.3)
PROT SERPL-MCNC: 6.6 G/DL (ref 6.8–8.8)
RBC # BLD AUTO: 3.62 10E12/L (ref 3.8–5.2)
RBC #/AREA URNS AUTO: ABNORMAL /HPF
SODIUM SERPL-SCNC: 137 MMOL/L (ref 133–144)
SOURCE: ABNORMAL
SP GR UR STRIP: 1.01 (ref 1–1.03)
TRIGL SERPL-MCNC: 89 MG/DL
UROBILINOGEN UR STRIP-ACNC: 0.2 EU/DL (ref 0.2–1)
WBC # BLD AUTO: 4.9 10E9/L (ref 4–11)
WBC #/AREA URNS AUTO: ABNORMAL /HPF

## 2018-07-19 PROCEDURE — 87389 HIV-1 AG W/HIV-1&-2 AB AG IA: CPT | Performed by: FAMILY MEDICINE

## 2018-07-19 PROCEDURE — 85025 COMPLETE CBC W/AUTO DIFF WBC: CPT | Performed by: FAMILY MEDICINE

## 2018-07-19 PROCEDURE — 80061 LIPID PANEL: CPT | Performed by: FAMILY MEDICINE

## 2018-07-19 PROCEDURE — 81001 URINALYSIS AUTO W/SCOPE: CPT | Performed by: FAMILY MEDICINE

## 2018-07-19 PROCEDURE — 99495 TRANSJ CARE MGMT MOD F2F 14D: CPT | Performed by: FAMILY MEDICINE

## 2018-07-19 PROCEDURE — 82728 ASSAY OF FERRITIN: CPT | Performed by: FAMILY MEDICINE

## 2018-07-19 PROCEDURE — 36415 COLL VENOUS BLD VENIPUNCTURE: CPT | Performed by: FAMILY MEDICINE

## 2018-07-19 PROCEDURE — 80053 COMPREHEN METABOLIC PANEL: CPT | Performed by: FAMILY MEDICINE

## 2018-07-19 NOTE — PROGRESS NOTES
Flor Ms. Owens,  Your results came back as follows -Liver and gallbladder tests (ALT,AST, bilirubin) are normal. Alk phos improved, protein is low but stable to before.   -Kidney function (GFR) is mildly decreased.  ADVISE: keeping up with fluids.   -Sodium is normal.  -Potassium is normal.  -Glucose is normal.  -Cholesterol levels (LDL,HDL, Triglycerides) are normal.  ADVISE: rechecking in 1 year.. If you have any further concerns please do not hesitate to contact us by message, phone or making an appointment.  Have a good day   Dr Soren MATHIS

## 2018-07-19 NOTE — PROGRESS NOTES
Flor Ms. Owens,  Some of your results came back as follows  - mildly low red blood cell (hgb) levels stable to prior , normal white blood cell count and low platelet levels.  ADVISE: platelets mildly low likely related to meds. Discuss with your oncologist.   -Urine is normal. Rest not back  . If you have any further concerns please do not hesitate to contact us by message, phone or making an appointment.  Have a good day   Dr Soren MATHIS

## 2018-07-19 NOTE — MR AVS SNAPSHOT
After Visit Summary   7/19/2018    Gerri Owens    MRN: [id number]           Patient Information     Date Of Birth          1955        Visit Information        Provider Department      7/19/2018 8:40 AM Karo Doty MD [facility]        Today's Diagnoses     Other acute pulmonary embolism without acute cor pulmonale (H)    -  1    Hospital discharge follow-up        Endometrial cancer (H)        History of deep venous thrombosis        Hyperlipidemia, unspecified hyperlipidemia type        Presence of left hip implant        CKD (chronic kidney disease) stage 2, GFR 60-89 ml/min        Microscopic hematuria        Calculus of left kidney        Encounter for screening for HIV        Low ferritin          Care Instructions    Labs today including ferritin  Consider HIV  Do FIT  Continue care with hematology, gyn Onc, radiation oncology   Follow up ortho yearly for hip and kidney doctor as needed  Use lorazepam cautiously  Recheck ferritin in 3 months with hematology , if ferritin is still less than 20 ng/mL then continue ferrous gluconate for another 3 months          Follow-ups after your visit        Your next 10 appointments already scheduled     Jul 27, 2018  7:45 AM CDT   [facility] Draw with  [facility]   [facility] ([facility])    [address] [place] [address]  [address]  [facility] [address]   [phone]            Jul 27, 2018  8:20 AM CDT   (Arrive by 8:05 AM)   Return Visit with ALFONZO Weathers CNP   [facility] ([facility])    [address] [place] [address]  [address]  [facility] [address]   [phone]            Jul 27, 2018  9:00 AM CDT   Infusion 120 with MALL[facility] ONCOLOGY INFUSION, UC 23 ATC   [facility] ([facility])    [address] [place] [address]  [address]  [facility] [address]   [phone]            Aug 17, 2018  7:45 AM CDT    [facility]   [facility] ([facility])    [address]  [address]  [facility] [address]   [phone]            Aug 17, 2018  8:20 AM CDT   (Arrive by 8:05 AM)   Return Active Treatment with ALFONZO Weathers CNP   [facility] ([facility])    [address]  [address]  [facility] [address]   [phone]            Aug 17, 2018  9:00 AM CDT   Infusion 120 with UC ONCOLOGY INFUSION, UC 23 ATC   [facility] ([facility])    [address]  [address]  [facility] [address]   [phone]            Aug 27, 2018 10:30 AM CDT   Return Visit with Darling Billings MD   [facility] ([facility])    [facility]  1st Floor  [address] [address] [phone]            Aug 27, 2018 11:00 AM CDT   TCT/SIM Suite Visit with Darling Billings MD   Rad[facility] Oncology Clinic ([facility])    [facility]  1st Floor  [address] [address] [phone]              Who to contact     If you have questions or need follow up information about today's clinic visit or your schedule please contact [facility] directly at [phone].  Normal or non-critical lab and imaging results will be communicated to you by MyChart, letter or phone within 4 business days after the clinic has received the results. If you do not hear from us within 7 days, please contact the clinic through MyChart or phone. If you have a critical or abnormal lab result, we will notify you by phone as soon as possible.  Submit refill requests through [employer] or call your pharmacy and they will forward the refill request to us. Please allow 3 business days for your refill to be completed.          Additional Information About Your Visit         [employer] Information     [employer] gives you secure access to your electronic health record. If you see a primary care provider, you can also send messages to your care team and make appointments. If you have questions, please call your primary care clinic.  If you do not have a primary care provider, please call [phone] and they will assist you.        Care EveryWhere ID     This is your Care EveryWhere ID. This could be used by other organizations to access your [facility] medical records  [id number]        Your Vitals Were     Pulse Temperature Respirations Last Period Pulse Oximetry BMI (Body Mass Index)    81 99.1  F (37.3  C) (Oral) 20 03/04/2005 100% 28.12 kg/m2       Blood Pressure from Last 3 Encounters:   07/19/18 100/61   07/07/18 94/57   07/06/18 92/62    Weight from Last 3 Encounters:   07/19/18 169 lb (76.7 kg)   07/06/18 173 lb 14.4 oz (78.9 kg)   07/06/18 171 lb 14.4 oz (78 kg)              We Performed the Following     CBC with platelets differential     Comprehensive metabolic panel     Ferritin     HIV Antigen Antibody Combo     Lipid panel reflex to direct LDL Fasting     UA reflex to Microscopic and Culture        Primary Care Provider Office Phone # Fax #    Karo Doty [phone] [address] [facility] [address]        Equal Access to Services     JEREMÍAS BUCK : Hadii aad ku hadasho Soomaali, waaxda luqadaha, qaybta kaalmada adeegyada, carol roland. So [facility] [phone].    ATENCIÓN: Si habla español, tiene a graham disposición servicios gratuitos de asistencia lingüística. Llame al [phone].    We comply with applicable federal civil rights laws and [place] laws. We do not discriminate on the basis of race, color, national origin, age, disability, sex, sexual orientation, or gender identity.            Thank you!     Thank you for choosing [facility]  for your care. Our goal is always to provide you with excellent  care. Hearing back from our patients is one way we can continue to improve our services. Please take a few minutes to complete the written survey that you may receive in the mail after your visit with us. Thank you!             Your Updated Medication List - Protect others around you: Learn how to safely use, store and throw away your medicines at www.disposemymeds.org.          This list is accurate as of 7/19/18  9:20 AM.  Always use your most recent med list.                   Brand Name Dispense Instructions for use Diagnosis    acetaminophen 500 MG tablet    TYLENOL     Take 1 tablet (500 mg) by mouth every 4 hours as needed    S/P DEMETRIO-BSO       ascorbic acid 250 MG tablet    VITAMIN C    90    250 mg    Routine general medical examination at a health care facility       calcium citrate-vitamin D 315-200 MG-UNIT Tabs per tablet    CITRACAL     Take 2 tablets by mouth daily        enoxaparin 80 MG/0.8ML injection    LOVENOX    60 Syringe    Inject 0.8 mLs (80 mg) Subcutaneous 2 times daily    Other acute pulmonary embolism without acute cor pulmonale (H)       FERROUS GLUCONATE PO      Take 325 mg by mouth daily (with breakfast)        GLUCOSAMINE CHONDROITIN COMPLX Tabs      Take  by mouth. 1500 mg 1xqd.    Routine general medical examination at a health care facility       LORazepam 1 MG tablet    ATIVAN    30 tablet    Take 1 tablet (1 mg) by mouth every 6 hours as needed (nausea/vomiting, anxiety or sleep)    Endometrial cancer (H), Encounter for long-term (current) use of medications       ondansetron 8 MG tablet    ZOFRAN    20 tablet    Take 1 tablet (8 mg) by mouth every 8 hours as needed for nausea (vomiting)    Endometrial cancer (H), Encounter for long-term (current) use of medications       prochlorperazine 10 MG tablet    COMPAZINE    30 tablet    Take 1 tablet (10 mg) by mouth every 6 hours as needed (nausea/vomiting)    Endometrial cancer (H), Encounter for long-term (current) use of medications        RANITIDINE HCL PO      Take 150 mg by mouth 2 times daily

## 2018-07-19 NOTE — PATIENT INSTRUCTIONS
Labs today including ferritin  Consider HIV  Do FIT  Continue care with hematology, gyn Onc, radiation oncology   Follow up ortho yearly for hip and kidney doctor as needed  Use lorazepam cautiously  Recheck ferritin in 3 months with hematology , if ferritin is still less than 20 ng/mL then continue ferrous gluconate for another 3 months

## 2018-07-19 NOTE — PROGRESS NOTES
SUBJECTIVE:   Gerri Owens is a 63 year old female who presents to clinic today for the following health issues:    Hospital Follow-up Visit:    Hospital/Nursing Home/IP Rehab Facility: [facility]  Date of Admission: 7/6/2018  Date of Discharge: 7/7/2018  Reason(S) for Admission:   -Stage IIIC1 low grade endometrial cancer  -Pulmonary emboli  -hx DVT  -GERD, on ranitidine  -Hyperthyroid, nl FNA  -Hyperlipidemia            Problems taking medications regularly:  None       Medication changes since discharge: Lovenox and Ferrous gluconate        Problems adhering to non-medication therapy:  None    Summary of hospitalization:  [facility] discharge summary reviewed  Diagnostic Tests/Treatments reviewed.  Follow up needed: PCP. oncology  Other Healthcare Providers Involved in Patient S Care:         None  Update since discharge: improved.     Post Discharge Medication Reconciliation: discharge medications reconciled, continue medications without change.  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 One had a factor problem but reports she was checked and didn't have it. One brother remains on life long coumadin. History of right upper extremity DVT March 2012, subclavian vein, no clear provoking factors such as a PICC line.  She postulated that it was due to carrying a very heavy purse on her shoulder all the time. Labs on 3/29/12 for antiphospholipid antibodies, lupus anticoagulant, factor V  Leiden, prothrombin gene mutation, Antithrombin, protein C, protein S free, were all normal.  She was anticoagulated for 6 months. notes then had maybe phlebitis in both legs 1 yr out  after that but never seen for it. Since then wears compression socks which helped with post phlebitic syndrome. Along time ago she had hyperthyroidism, thyrotoxicosis, treated with methimazole, it cleared up and not had since. Is euthyroid clinically and chemically. Hx of prior tonsillectomy, left breast biopsy, hx of back spasm , allergic to nickel and taxol, occasional GERD with prn use of OTC PPI in the past now on ranitidine, seen in 6/2016 and given flexeril and did PT with good resolution. Hx of CKD2 and left kidney stone, OA B/l hands, OA B/l hips, S/p Left PRESLEY 2017, endometrial cancer S/p lap hysterectomy converted to open DEMETRIO, BSO and node dissection 4/2018, on antineoplastic treatment , anaphylaxis with taxol on single carboplatin sandwich protocol , to get radiation, with newly diagnosed HI on anticoagulation with Lovenox and on iron for low ferritin, under care of gynonc, hematology and radiation oncology.    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 No sign of stroke, referred to PT and ortho, seen by PT 8/817, after PT felt pain mostly in her hips, seen by ortho 8/15/17 had moderate OA on xray. Had left interarticular injection 8/24/17 with significant pain improvement, made good progress with PT completed 10/10/17, seen by ortho 11/2 discussed arthroplasty for primary OA hips, scheduled for Left PRESLEY 12/4/17  and right PRESLEY 1/15/18.  Seen November 14, 2017 for pre op.  CBC was normal.  Creatinine was elevated at 1.6 with GFR down at 40%.  Hepatitis C was negative.  Was recommended to stop NSAID'S, PPI's and increase fluid intake.  Recheck GFR 11/19 was 49% and creatinine had improved and recheck again 11/22 showed further improvement with GFR 60%.  Underwent left hip  arthroplasty 12/4/2017 and discharged on postop day 2 with good progress and received OT, PT and social work consult in house.  Seen 12/14 for staple removal.  Seen 1/30/2018 postop doing well on physical therapy.  Was on Lovenox post op empirically.    Seen by Gyn 2/13 for abnormal uterine bleeding of 1-1/2 year Pap was normal opted, to get her endometrial biopsy done under anesthesia.  Mammogram done was slightly abnormal but diagnostic studies were done and those were normal.  Seen by nephrology 3/13 /18. noted had Stage II CKD-baseline serum creatinine of 0.8 mg/dL and GFR of 69 ml/min/1.73m2 BSA likely from chronic NSAID'S use. Low range proteinuria, protein-to-creatinine ratio was 0.22 G/gCr. Microscopic hematuria( hpf) was likely secondary to ongoing uterine bleeding. Recommended a repeat urine analysis after her uterine bleeding had resolved and further evaluation by urologist if her hematuria persisted, was advised to avoid NSAID'S, follow up with nephrology clinic S needed, Electrolytes/ Acid/Base status were within acceptable limits. Her blood pressure was stable & controlled. She was not on antihypertensive medications at home. Euvolemic on exam. Anemia of acute blood loss secondary to blood loss during Left PRESLEY in 12/2017 had resolved and Hgb was within normal limits of 13.8 G/dl and iron repleted.  Had a normal serum calcium and phosphorus & Normal PTH level. She did have elevated vitamin D level of 78 ug/L and was advised to discontinue vitamin D supplements. For her GERD was advised to continue ranitidine & avoid PPI's. Renal ultrasound showed a stone of 7 mm nonobstructive in left mid kidney.  Noted occasional cramps relieved with Tylenol.  Managed her hip pain with ice and rest.  Planned  to retire by the summer 2018   Post hip surgery was on Lovenox 3 weeks then discontinued. Continued with her compression socks daily.    Seen 3/22/18 for preop for hysteroscopy, LDL was elevated and ASCVD  was 3.7 %. Seen by ortho 4/3 and doing wlel. Deferred right hip surgery. Seen 4/11 for EUA, hysteroscopy and D & C under u/S guidance and biopsy showed endometrial cancer and referred to gyn onc. Seen by gyn onc and on  4/27 underwent Laparoscopic converted to open hysterectomy, bilateral salpingo-oophorectomy, pelvic and para-aortic lymph node dissection, tumor debulking, CUSA, partial omentectomy. with FIGO grade 1 endometrioid endometrial adenocarcinoma, FIGO stage IIIC1.  Pathology showed cervical stromal involvement, 100% myometrial invasion with uterine serosal involvement, bilateral ovarian and left fallopian tube involvement, positive anterior surface margin, 2 of 14 pelvic and 0 of 4 periaortic lymph nodes involved, one with extranodal extension. FIGO grade 1 with cervical and left adnexa invasion.  & Right fallopian tube endometriosis. Post op noted some tachycardia and hypoxia but CT was neg for PE and thought due to post op anemia. Discharged 5/2 and declined sleep eval.  Sutures removed 5/7. Seen by gynonc 5/14 approximately 5 weeks status post laparoscopic converted to open total abdominal hysterectomy, BSO, PPALND and omentectomy. On 5/30 completed 28 days of Lovenox and on 5/31 port placed by IR.  On 6/1 she was scheduled to start adjuvant carboplatin and paclitaxel chemotherapy sandwich protocol chemoradiation which was delayed due to low white blood cell count and referred to hematology. Seen 6/6 by hematology Dr Rapp for mild neutropenia that resolved with normal B12, TSH and low ferritin, was advised daily ferrous gluconate and approved to start chemo. Seen by radiation oncology 6/6. On 6/13 within 5 min of receiving first time taxol had flushing, cough, SATs dropped to 70 %, was short of breath and had to get oxygen, epi, iv solumedrol and Benadryl and finally came to baseline and chemo since changed to single agent carboplatin. Seen 7/6 by gyn onc and noted persistent cough, tachycardia,  elevated LFT S and low albumin. CT chest done showed RLL and right inguinal PE.  Given her three emboli, persistent cough, soft BP (though baseline), and inability to obtain ECHO outpatient was admitted to unit 7C for further evaluation and management of pulmonary emboli.      She was seen in clinic for her dose of carboplatin. She reported a 3 week history of a dry cough, since her previous infusion reaction. In the clinic she was tachy to ~110 with a BP of ~90/60. Chest CT showed segmental pulmonary emboli in the right upper lobe and lingula, no evidence of heart strain. Unable to obtain an echocardiogram in clinic and so was admitted to the hospital for further observation, therapeutic Lovenox administration, and echocardiogram. She denied any other symptoms including headache, chest pain, dyspnea, racing heart, coughing anything up. She had GERD at baseline, denied n/v. Had had a few recent episodes of stool incontinence since she started her carboplatin infusion. She denied noticing any LE edema. She wore rosemary hose at baseline because of her history of DVT and her family history. Echo showed normal right and left ventricular function with EF of 60-65%. All valves normal appearing with trace mitral insufficiency present. IVC and aortic root are normal appearing. Was discharged on 7/7 on Lovenox. Heparin 10a on 7/13 was at expected level.   MN  shows received hydromorphone 2 mg # 40 on 12/6/17, oxycodone 5 mg # 25 on 4/30/18 & lorazepam 2 mg # 15 on 6/1/18    Here for post hospital discharge follow up.      Weight loss initially intentional now from nausea from chemo.     More ROM  Left hip, no pain  Tired form chemo so for now not noting right hip pain  Only took ferrous gluconate one time  Constipates her.  Had one of 3 carboplatin, due next 7/27. Then aug After that will do radiation after simulation completed.  Was quite sick after taxol. Sedentary.   Started Lovenox. Bid. Dr Tierney oncology told better to  be on Lovenox all of treatment. Told since second time better to be on it for life.  GERd on ranitidine  Trying to keep up with fluids ( triggers gerdy)  Asymptomatic kidney stone  nausea from chemo. compazine makes her dizzy , so will not take when has to drive.taking scheduled almost so can eat.     Rare use of ativan, used once after infusion first two nights    Not had to use tylenol prn    No fever or chills, no headache or dizziness, no double or blurry vision, no facial pain, earache, sore throat, runny nose, post nasal drip, no trouble hearing, smelling, tasting or swallowing, chronic dry cough , no chest pain, trouble breathing or palpitations, No abdominal pain, no  Vomiting, some diarrhea alternating  With constipation, uses prune juice, not needed left over senna, no blood in stools or black stools, no weight loss or night sweats. No dysuria, hematuria, frequency, urgency, hesitancy, incontinence, No pelvic complaints. No leg swelling or joint pain. No rash.    Problem list and histories reviewed & adjusted, as indicated.  Additional history: as documented    Patient Active Problem List   Diagnosis     Advanced directives, counseling/discussion     Primary osteoarthritis of both hips     History of deep venous thrombosis     Hyperlipidemia, unspecified hyperlipidemia type     Presence of left hip implant     CKD (chronic kidney disease) stage 2, GFR 60-89 ml/min     Microscopic hematuria     Calculus of left kidney     S/P DEMETRIO-BSO     Endometrial cancer (H)     Encounter for long-term (current) use of medications     Encounter for antineoplastic chemotherapy     Pulmonary embolus (H)     Osteoarthritis of both hands     Low ferritin     Past Surgical History:   Procedure Laterality Date     ARTHROPLASTY HIP Left 12/4/2017    Procedure: ARTHROPLASTY HIP;  Left total hip arthroplasty;  Surgeon: Rajinder Goodwin MD;  Location: [facility] OR     BREAST BIOPSY, RT/LT  2004    left breast     DILATION AND  CURETTAGE, OPERATIVE HYSTEROSCOPY WITH MORCELLATOR, COMBINED N/A 2018    Procedure: COMBINED DILATION AND CURETTAGE, OPERATIVE HYSTEROSCOPY WITH MORCELLATOR;  Hysteroscopy, Dilation and Curettage Under Ultrasound Guidance ;  Surgeon: Adry Tineo MD;  Location: [facility] OR     DILATION AND CURETTAGE, WITH ULTRASOUND GUIDANCE  2018    Procedure: DILATION AND CURETTAGE, WITH ULTRASOUND GUIDANCE;;  Surgeon: Adry Tineo MD;  Location: [facility]     HYSTERECTOMY TOTAL ABDOMINAL, BILATERAL SALPINGO-OOPHORECTOMY, NODE DISSECTION, COMBINED Bilateral 2018    Procedure: COMBINED HYSTERECTOMY TOTAL ABDOMINAL, SALPINGO-OOPHORECTOMY, NODE DISSECTION;;  Surgeon: Bradley Tierney MD;  Location: [facility] OR     INSERT PORT VASCULAR ACCESS Right 2018    Procedure: INSERT PORT VASCULAR ACCESS;  Single Lumen Chest Power Port;  Surgeon: Jamila Major PA-C;  Location: [facility]     LAPAROSCOPIC HYSTERECTOMY TOTAL, BILATERAL SALPINGO-OOPHORECTOMY, NODE DISSECTION, COMBINED N/A 2018    Procedure: COMBINED LAPAROSCOPIC HYSTERECTOMY TOTAL, SALPINGO-OOPHORECTOMY, NODE DISSECTION;  Laparoscopic converted to open Removal Of Uterus, Tubes, Ovaries, Cervix, Pelvic and Para Aortic Lymphnode Dissection, Tumor Debulking, CUSA, Partial Omentectomy, Anesthesia Block;  Surgeon: Bradley Tierney MD;  Location: [facility] OR     TONSILLECTOMY  1960    tonsillectomy       Social History   Substance Use Topics     Smoking status: Never Smoker     Smokeless tobacco: Never Used     Alcohol use Yes      Comment: rarely     Family History   Problem Relation Age of Onset     Diabetes Mother      type 2     HEART DISEASE Mother      Hypertension Mother      C.A.D. Mother       after 2nd heart attack     Hypertension Father      C.A.D. Father      mild heart attack     Cancer Father      skin cancer     HEART DISEASE Father      Arthritis Sister      rheumatoid arthritis     HEART DISEASE Brother      hereditary heart  radha     Lipids Brother      Lipids Brother      KIDNEY DISEASE No family hx of          Current Outpatient Prescriptions   Medication Sig Dispense Refill     acetaminophen (TYLENOL) 500 MG tablet Take 1 tablet (500 mg) by mouth every 4 hours as needed       calcium citrate-vitamin D (CITRACAL) 315-200 MG-UNIT TABS per tablet Take 2 tablets by mouth daily       enoxaparin (LOVENOX) 80 MG/0.8ML injection Inject 0.8 mLs (80 mg) Subcutaneous 2 times daily 60 Syringe 3     GLUCOSAMINE CHONDROITIN COMPLX OR TABS Take  by mouth. 1500 mg 1xqd.   0     LORazepam (ATIVAN) 1 MG tablet Take 1 tablet (1 mg) by mouth every 6 hours as needed (nausea/vomiting, anxiety or sleep) 30 tablet 1     ondansetron (ZOFRAN) 8 MG tablet Take 1 tablet (8 mg) by mouth every 8 hours as needed for nausea (vomiting) 20 tablet 2     prochlorperazine (COMPAZINE) 10 MG tablet Take 1 tablet (10 mg) by mouth every 6 hours as needed (nausea/vomiting) 30 tablet 2     RANITIDINE HCL PO Take 150 mg by mouth 2 times daily        VITAMIN C 250 MG OR TABS 250 mg 90 0     FERROUS GLUCONATE PO Take 325 mg by mouth daily (with breakfast)        Allergies   Allergen Reactions     Paclitaxel      reportedTaxol= anaphylaxsis     Nickel Rash     Recent Labs   Lab Test  07/07/18   0537  07/06/18   0843  06/13/18   0851  06/06/18   1103   03/22/18   1706   11/14/17   0923  09/11/12   1108   LDL   --    --    --    --    --   149*   --    --   175*   HDL   --    --    --    --    --   47*   --    --   45*   TRIG   --    --    --    --    --   143   --    --   197*   ALT  45  56*  13  12   < >   --    --   22  27   CR  0.87  1.04  0.96  1.00   < >   --    < >  1.60*  0.85   GFRESTIMATED  66  54*  59*  56*   < >   --    < >  33*  69   GFRESTBLACK  79  65  72  68   < >   --    < >  40*  84   POTASSIUM  4.4  3.9  3.9  4.1   < >   --    < >  5.1  4.1   TSH   --    --    --   1.58   --    --    --   2.02  1.02    < > = values in this interval not displayed.      BP  Readings from Last 3 Encounters:   07/19/18 100/61   07/07/18 94/57   07/06/18 92/62    Wt Readings from Last 3 Encounters:   07/19/18 169 lb (76.7 kg)   07/06/18 173 lb 14.4 oz (78.9 kg)   07/06/18 171 lb 14.4 oz (78 kg)                  Labs reviewed in EPIC    Reviewed and updated as needed this visit by clinical staff  Tobacco  Allergies  Meds  Med Hx  Surg Hx  Fam Hx  Soc Hx      Reviewed and updated as needed this visit by Provider         ROS:  Constitutional, HEENT, cardiovascular, pulmonary, GI, , musculoskeletal, neuro, skin, endocrine and psych systems are negative, except as otherwise noted.    OBJECTIVE:     /61 (BP Location: Right arm, Patient Position: Chair, Cuff Size: Adult Regular)  Pulse 81  Temp 99.1  F (37.3  C) (Oral)  Resp 20  Wt 169 lb (76.7 kg)  LMP 03/04/2005  SpO2 100%  BMI 28.12 kg/m2  Body mass index is 28.12 kg/(m^2).  GENERAL: alert, no distress, over weight and fatigued  EYES: Eyes grossly normal to inspection, PERRL and conjunctivae and sclerae normal, wears glasses  HENT: ear canals and TM's normal, nose and mouth without ulcers or lesions  NECK: no adenopathy, no asymmetry, masses, or scars and thyroid normal to palpation  RESP: lungs clear to auscultation - no rales, rhonchi or wheezes  CV: regular rate and rhythm, normal S1 S2, no S3 or S4, no murmur, click or rub, no peripheral edema and peripheral pulses strong, port present right upper chest   ABDOMEN: soft, non tender, without hepatosplenomegaly or masses, bowel sounds normal and well-healed incision midline abdomen  MS: no gross musculoskeletal defects noted, no edema, compression socks with cut out toes present B/l   SKIN: no suspicious lesions or rashes  NEURO: Normal strength and tone, mentation intact and speech normal  PSYCH: mentation appears normal, affect normal/bright, anxious, fatigued, judgement and insight intact and appearance well groomed    Diagnostic Test Results:  Results for orders  placed or performed in visit on 07/19/18 (from the past 24 hour(s))   CBC with platelets differential   Result Value Ref Range    WBC 4.9 4.0 - 11.0 10e9/L    RBC Count 3.62 (L) 3.8 - 5.2 10e12/L    Hemoglobin 9.1 (L) 11.7 - 15.7 g/dL    Hematocrit 30.5 (L) 35.0 - 47.0 %    MCV 84 78 - 100 fl    MCH 25.1 (L) 26.5 - 33.0 pg    MCHC 29.8 (L) 31.5 - 36.5 g/dL    RDW 18.7 (H) 10.0 - 15.0 %    Platelet Count 121 (L) 150 - 450 10e9/L    Diff Method Automated Method     % Neutrophils 69.8 %    % Lymphocytes 18.0 %    % Monocytes 11.4 %    % Eosinophils 0.6 %    % Basophils 0.2 %    Absolute Neutrophil 3.4 1.6 - 8.3 10e9/L    Absolute Lymphocytes 0.9 0.8 - 5.3 10e9/L    Absolute Monocytes 0.6 0.0 - 1.3 10e9/L    Absolute Eosinophils 0.0 0.0 - 0.7 10e9/L    Absolute Basophils 0.0 0.0 - 0.2 10e9/L       ASSESSMENT/PLAN:     1. Other acute pulmonary embolism without acute cor pulmonale (H)  On Lovenox likely for life given hx under care of oncology. Hematology. Labs today including ferritin as not taking iron pill. Consider HIV test. Do FIT. Continue care with hematology, gyn Onc, radiation oncology. Follow up ortho yearly for hip and kidney doctor as needed. Use lorazepam cautiously. Recheck ferritin in 3 months with hematology , if ferritin is still less than 20 ng/mL then continue ferrous gluconate for another 3 months. Reminded to do FIT.   - CBC with platelets differential  - Comprehensive metabolic panel    2. Hospital discharge follow-up  Doing better  - CBC with platelets differential  - Comprehensive metabolic panel    3. Endometrial cancer (H)  S/p DEMETRIO BSO, node dissection now on sandwich chemoradiation. Had anaphylaxis with taxol so just received carboplatin one infusion next is due next week. Continue care under gynonc, oncology, hematology and radiation oncology as planned.   - CBC with platelets differential  - Comprehensive metabolic panel    4. History of deep venous thrombosis  RUE in past  - CBC with  platelets differential  - Comprehensive metabolic panel    5. Hyperlipidemia, unspecified hyperlipidemia type  - Lipid panel reflex to direct LDL Fasting  - CBC with platelets differential  - Comprehensive metabolic panel    6. Presence of left hip implant  - CBC with platelets differential  - Comprehensive metabolic panel    7. CKD (chronic kidney disease) stage 2, GFR 60-89 ml/min  Avoid NSAID's and PPI  - CBC with platelets differential  - Comprehensive metabolic panel    8. Microscopic hematuria  UA negative for RBC's suspect from recently dx endometrial cancer now removed.  - UA reflex to Microscopic and Culture  - CBC with platelets differential  - Comprehensive metabolic panel    9. Calculus of left kidney  Asymptomatic.   - CBC with platelets differential  - Comprehensive metabolic panel    10. Encounter for screening for HIV  - HIV Antigen Antibody Combo    11. Low ferritin  Not taking iron as directed by hematology. Has at home. Trying to get via diet as upsets her stomach. Check ferritin today and then determine if needs iron and then Recheck ferritin in 3 months with hematology , if ferritin is still less than 20 ng/mL then would continue ferrous gluconate for another 3 months.  - Ferritin    See Patient Instructions    Karo Doty MD  [facility]

## 2018-07-20 LAB — HIV 1+2 AB+HIV1 P24 AG SERPL QL IA: NONREACTIVE

## 2018-07-20 NOTE — PROGRESS NOTES
Flor Ms. Owens,  Your results came back negative for HIV. If you have any further concerns please do not hesitate to contact us by message, phone or making an appointment.  Have a good day   Dr Soren MATHIS

## 2018-07-27 ENCOUNTER — ONCOLOGY VISIT (OUTPATIENT)
Dept: ONCOLOGY | Facility: CLINIC | Age: 63
End: 2018-07-27
Attending: NURSE PRACTITIONER
Payer: COMMERCIAL

## 2018-07-27 ENCOUNTER — INFUSION THERAPY VISIT (OUTPATIENT)
Dept: ONCOLOGY | Facility: CLINIC | Age: 63
End: 2018-07-27
Attending: OBSTETRICS & GYNECOLOGY
Payer: COMMERCIAL

## 2018-07-27 ENCOUNTER — TELEPHONE (OUTPATIENT)
Dept: ONCOLOGY | Facility: CLINIC | Age: 63
End: 2018-07-27

## 2018-07-27 VITALS
WEIGHT: 170 LBS | SYSTOLIC BLOOD PRESSURE: 94 MMHG | RESPIRATION RATE: 18 BRPM | DIASTOLIC BLOOD PRESSURE: 54 MMHG | HEART RATE: 82 BPM | TEMPERATURE: 97.9 F | BODY MASS INDEX: 28.29 KG/M2 | OXYGEN SATURATION: 99 %

## 2018-07-27 DIAGNOSIS — C54.1 ENDOMETRIAL CANCER (H): Primary | ICD-10-CM

## 2018-07-27 DIAGNOSIS — I26.99 OTHER ACUTE PULMONARY EMBOLISM WITHOUT ACUTE COR PULMONALE (H): ICD-10-CM

## 2018-07-27 DIAGNOSIS — Z51.11 ENCOUNTER FOR ANTINEOPLASTIC CHEMOTHERAPY: ICD-10-CM

## 2018-07-27 DIAGNOSIS — D64.81 ANTINEOPLASTIC CHEMOTHERAPY INDUCED ANEMIA: ICD-10-CM

## 2018-07-27 DIAGNOSIS — T45.1X5A ANTINEOPLASTIC CHEMOTHERAPY INDUCED ANEMIA: ICD-10-CM

## 2018-07-27 DIAGNOSIS — Z79.899 ENCOUNTER FOR LONG-TERM (CURRENT) USE OF MEDICATIONS: ICD-10-CM

## 2018-07-27 LAB
ALBUMIN SERPL-MCNC: 2.3 G/DL (ref 3.4–5)
ALP SERPL-CCNC: 119 U/L (ref 40–150)
ALT SERPL W P-5'-P-CCNC: 13 U/L (ref 0–50)
ANION GAP SERPL CALCULATED.3IONS-SCNC: 7 MMOL/L (ref 3–14)
AST SERPL W P-5'-P-CCNC: 11 U/L (ref 0–45)
BASOPHILS # BLD AUTO: 0 10E9/L (ref 0–0.2)
BASOPHILS NFR BLD AUTO: 0.3 %
BILIRUB SERPL-MCNC: 0.2 MG/DL (ref 0.2–1.3)
BUN SERPL-MCNC: 12 MG/DL (ref 7–30)
CALCIUM SERPL-MCNC: 8.6 MG/DL (ref 8.5–10.1)
CHLORIDE SERPL-SCNC: 108 MMOL/L (ref 94–109)
CO2 SERPL-SCNC: 26 MMOL/L (ref 20–32)
CREAT SERPL-MCNC: 0.89 MG/DL (ref 0.52–1.04)
DIFFERENTIAL METHOD BLD: ABNORMAL
EOSINOPHIL # BLD AUTO: 0 10E9/L (ref 0–0.7)
EOSINOPHIL NFR BLD AUTO: 0.6 %
ERYTHROCYTE [DISTWIDTH] IN BLOOD BY AUTOMATED COUNT: 20.4 % (ref 10–15)
GFR SERPL CREATININE-BSD FRML MDRD: 64 ML/MIN/1.7M2
GLUCOSE SERPL-MCNC: 92 MG/DL (ref 70–99)
HCT VFR BLD AUTO: 27.5 % (ref 35–47)
HGB BLD-MCNC: 8.1 G/DL (ref 11.7–15.7)
IMM GRANULOCYTES # BLD: 0 10E9/L (ref 0–0.4)
IMM GRANULOCYTES NFR BLD: 0 %
LMWH PPP CHRO-ACNC: 1.03 IU/ML
LYMPHOCYTES # BLD AUTO: 0.7 10E9/L (ref 0.8–5.3)
LYMPHOCYTES NFR BLD AUTO: 19.1 %
MAGNESIUM SERPL-MCNC: 2 MG/DL (ref 1.6–2.3)
MCH RBC QN AUTO: 25.2 PG (ref 26.5–33)
MCHC RBC AUTO-ENTMCNC: 29.5 G/DL (ref 31.5–36.5)
MCV RBC AUTO: 85 FL (ref 78–100)
MONOCYTES # BLD AUTO: 0.4 10E9/L (ref 0–1.3)
MONOCYTES NFR BLD AUTO: 11.5 %
NEUTROPHILS # BLD AUTO: 2.3 10E9/L (ref 1.6–8.3)
NEUTROPHILS NFR BLD AUTO: 68.5 %
NRBC # BLD AUTO: 0 10*3/UL
NRBC BLD AUTO-RTO: 0 /100
PLATELET # BLD AUTO: 182 10E9/L (ref 150–450)
POTASSIUM SERPL-SCNC: 3.7 MMOL/L (ref 3.4–5.3)
PROT SERPL-MCNC: 6 G/DL (ref 6.8–8.8)
RBC # BLD AUTO: 3.22 10E12/L (ref 3.8–5.2)
SODIUM SERPL-SCNC: 141 MMOL/L (ref 133–144)
WBC # BLD AUTO: 3.4 10E9/L (ref 4–11)

## 2018-07-27 PROCEDURE — 25000128 H RX IP 250 OP 636: Mod: ZF | Performed by: NURSE PRACTITIONER

## 2018-07-27 PROCEDURE — 83735 ASSAY OF MAGNESIUM: CPT | Performed by: NURSE PRACTITIONER

## 2018-07-27 PROCEDURE — 96413 CHEMO IV INFUSION 1 HR: CPT

## 2018-07-27 PROCEDURE — 85520 HEPARIN ASSAY: CPT | Performed by: NURSE PRACTITIONER

## 2018-07-27 PROCEDURE — 99214 OFFICE O/P EST MOD 30 MIN: CPT | Mod: ZP | Performed by: NURSE PRACTITIONER

## 2018-07-27 PROCEDURE — 96367 TX/PROPH/DG ADDL SEQ IV INF: CPT

## 2018-07-27 PROCEDURE — 85025 COMPLETE CBC W/AUTO DIFF WBC: CPT | Performed by: NURSE PRACTITIONER

## 2018-07-27 PROCEDURE — 80053 COMPREHEN METABOLIC PANEL: CPT | Performed by: NURSE PRACTITIONER

## 2018-07-27 PROCEDURE — G0463 HOSPITAL OUTPT CLINIC VISIT: HCPCS | Mod: ZF

## 2018-07-27 RX ORDER — ALBUTEROL SULFATE 0.83 MG/ML
2.5 SOLUTION RESPIRATORY (INHALATION)
Status: CANCELLED | OUTPATIENT
Start: 2018-07-27

## 2018-07-27 RX ORDER — METHYLPREDNISOLONE SODIUM SUCCINATE 125 MG/2ML
125 INJECTION, POWDER, LYOPHILIZED, FOR SOLUTION INTRAMUSCULAR; INTRAVENOUS
Status: CANCELLED
Start: 2018-07-27

## 2018-07-27 RX ORDER — LORAZEPAM 2 MG/ML
1 INJECTION INTRAMUSCULAR EVERY 6 HOURS PRN
Status: CANCELLED
Start: 2018-07-27

## 2018-07-27 RX ORDER — DIPHENHYDRAMINE HYDROCHLORIDE 50 MG/ML
50 INJECTION INTRAMUSCULAR; INTRAVENOUS
Status: CANCELLED
Start: 2018-07-27

## 2018-07-27 RX ORDER — SODIUM CHLORIDE 9 MG/ML
1000 INJECTION, SOLUTION INTRAVENOUS CONTINUOUS PRN
Status: CANCELLED
Start: 2018-07-27

## 2018-07-27 RX ORDER — MEPERIDINE HYDROCHLORIDE 25 MG/ML
25 INJECTION INTRAMUSCULAR; INTRAVENOUS; SUBCUTANEOUS EVERY 30 MIN PRN
Status: CANCELLED | OUTPATIENT
Start: 2018-07-27

## 2018-07-27 RX ORDER — HEPARIN SODIUM (PORCINE) LOCK FLUSH IV SOLN 100 UNIT/ML 100 UNIT/ML
5 SOLUTION INTRAVENOUS EVERY 8 HOURS PRN
Status: DISCONTINUED | OUTPATIENT
Start: 2018-07-27 | End: 2018-07-27 | Stop reason: HOSPADM

## 2018-07-27 RX ORDER — ALBUTEROL SULFATE 90 UG/1
1-2 AEROSOL, METERED RESPIRATORY (INHALATION)
Status: CANCELLED
Start: 2018-07-27

## 2018-07-27 RX ORDER — EPINEPHRINE 0.3 MG/.3ML
0.3 INJECTION SUBCUTANEOUS EVERY 5 MIN PRN
Status: CANCELLED | OUTPATIENT
Start: 2018-07-27

## 2018-07-27 RX ORDER — EPINEPHRINE 1 MG/ML
0.3 INJECTION, SOLUTION INTRAMUSCULAR; SUBCUTANEOUS EVERY 5 MIN PRN
Status: CANCELLED | OUTPATIENT
Start: 2018-07-27

## 2018-07-27 RX ORDER — ONDANSETRON 8 MG/1
8 TABLET, FILM COATED ORAL EVERY 8 HOURS PRN
Qty: 60 TABLET | Refills: 5 | Status: SHIPPED | OUTPATIENT
Start: 2018-07-27 | End: 2018-11-19

## 2018-07-27 RX ADMIN — SODIUM CHLORIDE, PRESERVATIVE FREE 5 ML: 5 INJECTION INTRAVENOUS at 08:14

## 2018-07-27 RX ADMIN — SODIUM CHLORIDE 250 ML: 9 INJECTION, SOLUTION INTRAVENOUS at 09:41

## 2018-07-27 RX ADMIN — SODIUM CHLORIDE, PRESERVATIVE FREE 5 ML: 5 INJECTION INTRAVENOUS at 11:20

## 2018-07-27 RX ADMIN — CARBOPLATIN 555 MG: 10 INJECTION, SOLUTION INTRAVENOUS at 10:09

## 2018-07-27 RX ADMIN — DEXAMETHASONE SODIUM PHOSPHATE: 10 INJECTION, SOLUTION INTRAMUSCULAR; INTRAVENOUS at 09:44

## 2018-07-27 ASSESSMENT — PAIN SCALES - GENERAL: PAINLEVEL: NO PAIN (0)

## 2018-07-27 NOTE — NURSING NOTE
Chief Complaint   Patient presents with     Port Draw     Labs drawn via port by RN. Line flushed and hep locked. VS taken.     Lexi Contreras RN

## 2018-07-27 NOTE — PROGRESS NOTES
Infusion Nursing Note:  Gerri Owens presents for D1C2 Carboplatin  Met with Irasema Valencia NP before infusion.    Note: Patient is feeling good today. Per Irasema Valencia NP the patient will need a heparin 10A level collected peripherally after her chemo. Transfusion consent signed today for future appointments.     Treatment Conditions:  Lab Results   Component Value Date    HGB 8.1 07/27/2018     Lab Results   Component Value Date    WBC 3.4 07/27/2018      Lab Results   Component Value Date    ANEU 2.3 07/27/2018     Lab Results   Component Value Date     07/27/2018      Lab Results   Component Value Date     07/27/2018                   Lab Results   Component Value Date    POTASSIUM 3.7 07/27/2018           Lab Results   Component Value Date    MAG 2.0 07/27/2018            Lab Results   Component Value Date    CR 0.89 07/27/2018                   Lab Results   Component Value Date    LAURENT 8.6 07/27/2018                Lab Results   Component Value Date    BILITOTAL 0.2 07/27/2018           Lab Results   Component Value Date    ALBUMIN 2.3 07/27/2018                    Lab Results   Component Value Date    ALT 13 07/27/2018           Lab Results   Component Value Date    AST 11 07/27/2018       Results reviewed, labs MET treatment parameters, ok to proceed with treatment.     Intravenous Access:  Implanted Port.    Post Infusion Assessment:  Patient tolerated infusion without incident.  Blood return noted pre and post infusion.  Site patent and intact, free from redness, edema or discomfort.  No evidence of extravasations.  Access discontinued per protocol.    Discharge Plan:   Prescription refills given for Zofran.  Copy of AVS reviewed with patient and/or family.  Patient will return 8/17 for next appointment.  Patient discharged in stable condition accompanied by: .    Clarisa Hirsch RN

## 2018-07-27 NOTE — PATIENT INSTRUCTIONS
Contact Numbers  [facility] Nurse Triage: [phone]  After Hours Nurse Line:  [phone]     Please call the [facility] Triage line if you experience a temperature greater than or equal to 100.5, shaking chills, have uncontrolled nausea, vomiting and/or diarrhea, dizziness, shortness of breath, chest pain, bleeding, unexplained bruising, or if you have any other new/concerning symptoms, questions or concerns.      If it is after hours, weekends, or holidays, please call either the after hours nurse line listed above.      If you are having any concerning symptoms or wish to speak to a provider before your next infusion visit, please call your care coordinator or triage to notify them so we can adequately serve you.      If you need a refill on a narcotic prescription or other medication, please call triage before your infusion appointment.         July 2018 Sunday Monday Tuesday Wednesday Thursday Friday Saturday   1     2     3     4     5     6     [facility]    8:30 AM   (15 min.)   [facility]   [facility] Lab Draw     [facility] RETURN    8:45 AM   (40 min.)   Irasema Gtz APRN CNP   [facility] ONC INFUSION 120    9:30 AM   (120 min.)    ONCOLOGY INFUSION   [facility]     CT CHEST PULMONARY EMBOLISM W    2:40 PM   (20 min.)   UCCT2   [facility] CT     Admission    5:25 PM   Bradley Tierney MD   [address] [facility]   (Discharge: 7/7/2018) 7     ECH COMPLETE    8:10 AM   (60 min.)   UUECHIPR1   [facility],  Echocardiography   8     9     10     11     LAB   12:00 PM   (15 min.)    LAB   [facility] 12     13     14       15     16     17     18     19     LONG    8:40 AM   (20 min.)   Karo Doty MD   [facility] 20     21       22     23     24     25     26     27     [facility] LAB DRAW    7:45 AM   (15 min.)    [facility] DRAW   [facility] Lab Draw      [facility] RETURN    8:05 AM   (40 min.)   Irasema Gtz APRN CNP   [facility]     [facility] ONC INFUSION 120    9:00 AM   (120 min.)   UC ONCOLOGY INFUSION   [facility] 28 29 30 31 August 2018 Sunday Monday Tuesday Wednesday Thursday Friday Saturday                  1     2     3     4       5     6     7     8     9     10     11       12     13     14     15     16     17     [facility] LAB DRAW    7:45 AM   (15 min.)    [facility] LAB DRAW   [facility] Lab Draw     [facility] RETURN ACTIVE TREATMENT    8:05 AM   (40 min.)   Irasema Gtz APRN CNP   [facility]     [facility] ONC INFUSION 120    9:00 AM   (120 min.)   UC ONCOLOGY INFUSION   [facility] 18       19     20     21     22     23     24     25       26     27     [facility] RETURN   10:30 AM   (30 min.)   Darling Billings MD   [facility]     P TCT/SIM SUITE   11:00 AM   (60 min.)   Darling Billings MD   [facility]     CT CHEST/ABDOMEN/PELVIS W   11:50 AM   (20 min.)   UCCT1   [facility] CT 28     29     30     31                       Lab Results:  Recent Results (from the past 12 hour(s))   CBC with platelets differential    Collection Time: 07/27/18  8:20 AM   Result Value Ref Range    WBC 3.4 (L) 4.0 - 11.0 10e9/L    RBC Count 3.22 (L) 3.8 - 5.2 10e12/L    Hemoglobin 8.1 (L) 11.7 - 15.7 g/dL    Hematocrit 27.5 (L) 35.0 - 47.0 %    MCV 85 78 - 100 fl    MCH 25.2 (L) 26.5 - 33.0 pg    MCHC 29.5 (L) 31.5 - 36.5 g/dL    RDW 20.4 (H) 10.0 - 15.0 %    Platelet Count 182 150 - 450 10e9/L    Diff Method Automated Method     % Neutrophils 68.5 %    % Lymphocytes 19.1 %    % Monocytes 11.5 %    % Eosinophils 0.6 %    % Basophils 0.3 %    % Immature Granulocytes 0.0 %    Nucleated RBCs 0 0 /100    Absolute Neutrophil 2.3 1.6 - 8.3 10e9/L    Absolute Lymphocytes 0.7 (L) 0.8 - 5.3 10e9/L    Absolute Monocytes  0.4 0.0 - 1.3 10e9/L    Absolute Eosinophils 0.0 0.0 - 0.7 10e9/L    Absolute Basophils 0.0 0.0 - 0.2 10e9/L    Abs Immature Granulocytes 0.0 0 - 0.4 10e9/L    Absolute Nucleated RBC 0.0    Comprehensive metabolic panel    Collection Time: 07/27/18  8:20 AM   Result Value Ref Range    Sodium 141 133 - 144 mmol/L    Potassium 3.7 3.4 - 5.3 mmol/L    Chloride 108 94 - 109 mmol/L    Carbon Dioxide 26 20 - 32 mmol/L    Anion Gap 7 3 - 14 mmol/L    Glucose 92 70 - 99 mg/dL    Urea Nitrogen 12 7 - 30 mg/dL    Creatinine 0.89 0.52 - 1.04 mg/dL    GFR Estimate 64 >60 mL/min/1.7m2    GFR Estimate If Black 78 >60 mL/min/1.7m2    Calcium 8.6 8.5 - 10.1 mg/dL    Bilirubin Total 0.2 0.2 - 1.3 mg/dL    Albumin 2.3 (L) 3.4 - 5.0 g/dL    Protein Total 6.0 (L) 6.8 - 8.8 g/dL    Alkaline Phosphatase 119 40 - 150 U/L    ALT 13 0 - 50 U/L    AST 11 0 - 45 U/L   Magnesium    Collection Time: 07/27/18  8:20 AM   Result Value Ref Range    Magnesium 2.0 1.6 - 2.3 mg/dL

## 2018-07-27 NOTE — PROGRESS NOTES
"Discussed with medication monitoring- no dose adjustment needed at this time, which is in agreement with the \"enoxaparin pearls\" document per [facility]. No dose adjustment will be made at this time, no repeat heparin Xa level required.  ALFONZO Weathers, FNP-C  Gynecologic Oncology  [facility]  Pager: [phone]  "

## 2018-07-27 NOTE — Clinical Note
Going to chemo. Will need repeat CBC and possible blood transfusion (2 units) in 1-2 weeks where possible.

## 2018-07-27 NOTE — MR AVS SNAPSHOT
After Visit Summary   7/27/2018    Gerri Owens    MRN: [id number]           Patient Information     Date Of Birth          1955        Visit Information        Provider Department      7/27/2018 8:20 AM Irasema Gtz APRN CNP [facility]        Today's Diagnoses     Endometrial cancer (H)    -  1    Encounter for antineoplastic chemotherapy        Antineoplastic chemotherapy induced anemia        Other acute pulmonary embolism without acute cor pulmonale (H)        Encounter for long-term (current) use of medications           Follow-ups after your visit        Your next 10 appointments already scheduled     Aug 17, 2018  7:45 AM CDT   [facility] Draw with  [facility] LAB DRAW   [facility] Lab Draw ([facility])    [address]  [address]  [place] [address]   [phone]            Aug 17, 2018  8:20 AM CDT   (Arrive by 8:05 AM)   Return Active Treatment with ALFONZO Weathers CNP   [facility] ([facility])    [address]  [address]  [place] [address]   [phone]            Aug 17, 2018  9:00 AM CDT   Infusion 120 with [facility] ONCOLOGY INFUSION, UC 23 ATC   [facility] ([facility])    [address]  [address]  [place] [address]   [phone]            Aug 27, 2018 10:30 AM CDT   Return Visit with Darling Billings MD   Radiation Oncology Clinic ([facility])    [facility]  1st Floor  [address]  [place] [address]   [phone]            Aug 27, 2018 11:00 AM CDT   TCT/SIM Suite Visit with Darling Billings MD   Radiation Oncology Clinic ([facility])    [facility]  1st Floor  [address]  [place] [address]   [phone]            Aug 27, 2018 12:20 PM CDT   (Arrive by 11:50 AM)   CT CHEST/ABDOMEN/PELVIS W  CONTRAST with UCCT1   [facility] CT ([facility])    [address]  1st Floor  [place] [address] [phone]           Please bring any scans or X-rays taken at other hospitals, if similar tests were done. Also bring a list of your medicines, including vitamins, minerals and over-the-counter drugs. It is safest to leave personal items at home.  Be sure to tell your doctor:   If you have any allergies.   If there s any chance you are pregnant.   If you are breastfeeding.  How to prepare:   Do not eat or drink for 2 hours before your exam. If you need to take medicine, you may take it with small sips of water. (We may ask you to take liquid medicine as well.)   Please wear loose clothing, such as a sweat suit or jogging clothes. Avoid snaps, zippers and other metal. We may ask you to undress and put on a hospital gown.  Please arrive 30 minutes early for your CT. Once in the department you might be asked to drink water 15-20 minutes prior to your exam.  If indicated you may be asked to drink an oral contrast in advance of your CT.  If this is the case, the imaging team will let you know or be in contact with you prior to your appointment  Patients over 70 or patients with diabetes or kidney problems:   If you haven t had a blood test (creatinine test) within the last 30 days, the Cardiologist/Radiologist may require you to get this test prior to your exam.  If you have diabetes:   Continue to take your metformin medication on the day of your exam  If you have any questions, please call the Imaging Department where you will have your exam.              Who to contact     If you have questions or need follow up information about today's clinic visit or your schedule please contact [facility] directly at [phone].  Normal or non-critical lab and imaging results will be communicated to you by MyChart, letter or phone within 4 business days  after the clinic has received the results. If you do not hear from us within 7 days, please contact the clinic through [employer] or phone. If you have a critical or abnormal lab result, we will notify you by phone as soon as possible.  Submit refill requests through [employer] or call your pharmacy and they will forward the refill request to us. Please allow 3 business days for your refill to be completed.          Additional Information About Your Visit        [employer] Information     [employer] gives you secure access to your electronic health record. If you see a primary care provider, you can also send messages to your care team and make appointments. If you have questions, please call your primary care clinic.  If you do not have a primary care provider, please call [phone] and they will assist you.        Care EveryWhere ID     This is your Care EveryWhere ID. This could be used by other organizations to access your [facility] medical records  [id number]        Your Vitals Were     Pulse Temperature Respirations Last Period Pulse Oximetry BMI (Body Mass Index)    82 97.9  F (36.6  C) (Oral) 18 03/04/2005 99% 28.29 kg/m2       Blood Pressure from Last 3 Encounters:   07/27/18 94/54   07/19/18 100/61   07/07/18 94/57    Weight from Last 3 Encounters:   07/27/18 77.1 kg (170 lb)   07/19/18 76.7 kg (169 lb)   07/06/18 78.9 kg (173 lb 14.4 oz)                 Where to get your medicines      These medications were sent to [facility] [address] [address]    Hours:  TRANSPLANT PHONE NUMBER [phone] Phone:  [phone]     ondansetron 8 MG tablet          Primary Care Provider Office Phone # Fax #    Karo Doty [phone] [id number]  [place] [address]        Equal Access to Services     JEREMÍAS BUCK AH: Gopi Springer, kiko colbert, carol dai  rafaela urrutianorberto la'aaroxane ah. So [facility] [phone].    ATENCIÓN: Si hilaryla mercedes, tiene a graham disposición servicios gratuitos de asistencia lingüística. Avril jimenez [phone].    We comply with applicable federal civil rights laws and [place] laws. We do not discriminate on the basis of race, color, national origin, age, disability, sex, sexual orientation, or gender identity.            Thank you!     Thank you for choosing [facility]  for your care. Our goal is always to provide you with excellent care. Hearing back from our patients is one way we can continue to improve our services. Please take a few minutes to complete the written survey that you may receive in the mail after your visit with us. Thank you!             Your Updated Medication List - Protect others around you: Learn how to safely use, store and throw away your medicines at www.disposemymeds.org.          This list is accurate as of 7/27/18 12:50 PM.  Always use your most recent med list.                   Brand Name Dispense Instructions for use Diagnosis    acetaminophen 500 MG tablet    TYLENOL     Take 1 tablet (500 mg) by mouth every 4 hours as needed    S/P DEMETRIO-BSO       ascorbic acid 250 MG tablet    VITAMIN C    90    250 mg    Routine general medical examination at a health care facility       calcium citrate-vitamin D 315-200 MG-UNIT Tabs per tablet    CITRACAL     Take 2 tablets by mouth daily        enoxaparin 80 MG/0.8ML injection    LOVENOX    60 Syringe    Inject 0.8 mLs (80 mg) Subcutaneous 2 times daily    Other acute pulmonary embolism without acute cor pulmonale (H)       FERROUS GLUCONATE PO      Take 325 mg by mouth daily (with breakfast)        GLUCOSAMINE CHONDROITIN COMPLX Tabs      Take  by mouth. 1500 mg 1xqd.    Routine general medical examination at a health care facility       LORazepam 1 MG tablet    ATIVAN    30 tablet    Take 1 tablet (1 mg) by mouth every 6 hours as needed (nausea/vomiting,  anxiety or sleep)    Endometrial cancer (H), Encounter for long-term (current) use of medications       ondansetron 8 MG tablet    ZOFRAN    60 tablet    Take 1 tablet (8 mg) by mouth every 8 hours as needed for nausea (vomiting)    Endometrial cancer (H), Encounter for long-term (current) use of medications       prochlorperazine 10 MG tablet    COMPAZINE    30 tablet    Take 1 tablet (10 mg) by mouth every 6 hours as needed (nausea/vomiting)    Endometrial cancer (H), Encounter for long-term (current) use of medications       RANITIDINE HCL PO      Take 150 mg by mouth 2 times daily

## 2018-07-27 NOTE — PROGRESS NOTES
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    HPI: Gerri comes to the clinic accompanied by her  Doug. She is feeling fairly well today and reports tolerating chemotherapy much better this past cycle. She has been taking Lovenox twice daily as prescribed for PE without incident- upon chart review, had a heparin Xa level drawn on  which was supratherapeutic at 1.58 international units/mL but denies any dose adjustments with this level. She had one small self-limited epistaxis lasting 1-2 minutes but denies any other bleeding concerns. Continues to have a dry cough but reports this is decreasing. Reports feeling fatigued and cold, occasionally with the chills. Tmax 100.2F, denies infectious symptoms other than her chronic dry cough as discussed above. Nausea well controlled with scheduled Zofran and Compazine. Constipation controlled with prune juice. Previously had been taking iron supplement and had been trying to take in foods high in iron, however, this has become more difficult with nausea and appetite changes. Eating small frequent meals but notes protein has been causing nausea- able to tolerate cottage cheese. Drinking 56-65oz non-caffeinated fluids daily. Denies anxiety, states she becomes nervous at times thinking about the length of her treatment. Denies need for further emotional/mental health support. Feels ready to continue with treatment today.      Oncology History:  18- OBGYN visit with Dr. Tineo.  Complains of 1.5 years of post-menopausal bleeding.                           EMB performed and showed      18- EUA, hysteroscopy D&C under ultrasound guidance.  Diffuse proliferative vascular endometrium noted.                          Pathology:                          FINAL DIAGNOSIS:                          ENDOMETRIAL CURETTINGS:                          - Endometrial endometrioid adenocarcinoma, FIGO grade 2                            COMMENT:                          While the tumor maintained a glandular morphology, the occasional high                          nuclear grade warranted upgrading to                          FIGO 2.      4/23/18- Patient reports that she continues to have some post-menopausal spotting      4/27/18 DEMETRIO/BSO and staging:  PATH:  A. ANTERIOR FUNDUS, BIOPSY:   - Positive for adenocarcinoma     B. UTERUS, CERVIX, BILATERAL TUBES AND OVARIES, HYSTERECTOMY WITH   BILATERAL SALPINGO-OOPHORECTOMY:   - Endometrial endometrioid adenocarcinoma, FIGO grade 1   - Adenomyosis   - Cervix invaded by endometrial adenocarcinoma   - Right ovarian surface adhesions involved by endometrial adenocarcinoma   - Right fallopian tube with endometriosis   - Left adnexa invaded by endometrial adenocarcinoma (5.5 cm)     C. LYMPH NODES, LEFT PELVIC, EXCISION:   - Metastatic adenocarcinoma to one of eight lymph nodes (1/8)   - The metastatic focus is 3 mm in greatest dimension with no extranodal   extension     D. LYMPH NODES, LEFT PARA-AORTIC, EXCISION:   - One reactive lymph node, negative for malignancy (0/1)     E. LYMPH NODES, RIGHT PELVIC, EXCISION:   - Metastatic adenocarcinoma to one of six lymph nodes (1/6)   - The metastatic focus is 21 mm in greatest dimension with positive   extranodal extension     F. LYMPH NODES, RIGHT PARA-AORTIC, EXCISION:   - Three reactive lymph nodes, negative for malignancy (0/3)     G. OMENTUM, RESECTION:   - Omental adipose tissue with focal inflammation and surface mesothelial   hyperplasia   - Negative for malignancy     COMMENT:   The final diagnosis confirms the interpretation provided intraoperatively.     Report Name: Endometrium - Hysterectomy        Status: Submitted Checklist Inst:  1      Last Updated By: Fernie Ramirez M.D., PhD, [facility],   05/06/2018 13:52:11   Part(s) Involved:   B: Uterus, cervix, bilateral tubes and ovaries     Synoptic Report:     SPECIMEN     Procedure:         - Simple hysterectomy         - Bilateral salpingo-oophorectomy         - Omentectomy         - Peritoneal washing     Specimen Integrity:         - Opened     TUMOR     Tumor Site:         - Endometrium         - Lower uterine segment     Histologic Type:         - Endometrioid carcinoma, NOS     Histologic Grade:         - FIGO grade 1     Tumor Size: 5.5 Centimeters (cm)     Tumor Extent       Myometrial Invasion:           - Present         Depth of Invasion: 15 Millimeters (mm)         Myometrial Thickness: 15 Millimeters (mm)         Percentage of Myometrial Invasion: 100%       Adenomyosis:           - Present, involved by carcinoma       Uterine Serosa Involvement:           - Present       Lower Uterine Segment Involvement:           - Present         Level of Tumor Involvement:             - Myoinvasive       Cervical Stromal Involvement:           - Present       Other Tissue / Organ Involvement:           - Right ovary           - Left ovary           - Left fallopian tube       Peritoneal Ascitic Fluid:           - Negative for malignancy (normal / benign)     Accessory Findings       Lymphovascular Invasion:           - Present     MARGINS     Ectocervical / Vaginal Cuff Margin:         - Uninvolved by carcinoma     LYMPH NODES     Number of Pelvic Nodes with Macrometastasis: 2     Number of Pelvic Nodes with Micrometastasis: 0     Laterality of Pelvic Node(s) with Tumor:         - Right         - Left     Total Number of Pelvic Node(s) Examined (sentinel and nonsentinel): 14     Number of Pelvic [place] Nodes Examined: 0     Laterality of Pelvic Node(s) Examined:         - Right         - Left     Number of Para-Aortic Nodes with Macrometastasis: 0     Number of Para-Aortic Nodes with  Micrometastasis: 0     Total Number of Para-Aortic Node(s) Examined (sentinel and nonsentinel):    4     Number of Para-Aortic [place] Nodes Examined: 0     Laterality of Para-Aortic Node(s) Examined:         - Right         - Left     PATHOLOGIC STAGE CLASSIFICATION (PTNM, AJCC 8TH EDITION)     Primary Tumor (pT):         - pT3a     Regional Lymph Nodes (pN)       Category (pN):           - pN1a     FIGO STAGE     FIGO Stage:         - IIIC1      4/29/2018: CT:  IMPRESSION:   1. No pulmonary embolism. Postop atelectasis.  2. Fluid-filled loops of dilated small bowel in the upper abdomen,  favored to represent adynamic ileus although incompletely visualized.  3. Pneumoperitoneum and small volume ascites, expected given  postoperative day 2 following open surgery.  4. Partially visualized 10 mm right thyroid nodule.     6/13/18: C1D1 carboplatin/paclitaxel- anaphylactic reaction to paclitaxel, no carboplatin received.    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  7/17/2012     Thyrotoxicosis 2007    hyperthyroid, enlarged right thyroid on thyroid uptake, treate [facility] with tapozole till 12/2008, FNA neg of 1.9 cm right lobe dominat nodule     Whooping cough due to Bordetella pertussis (p. pertussis) infant    whooping cough       Past Surgical History:   Procedure Laterality Date     ARTHROPLASTY HIP  Left 12/4/2017    Procedure: ARTHROPLASTY HIP;  Left total hip arthroplasty;  Surgeon: Rajinder Goodwin MD;  Location: [facility] OR     BREAST BIOPSY, RT/LT  2004    left breast     DILATION AND CURETTAGE, OPERATIVE HYSTEROSCOPY WITH MORCELLATOR, COMBINED N/A 4/11/2018    Procedure: COMBINED DILATION AND CURETTAGE, OPERATIVE HYSTEROSCOPY WITH MORCELLATOR;  Hysteroscopy, Dilation and Curettage Under Ultrasound Guidance ;  Surgeon: Adry Tineo MD;  Location: [facility]     DILATION AND CURETTAGE, WITH ULTRASOUND GUIDANCE  4/11/2018    Procedure: DILATION AND CURETTAGE, WITH ULTRASOUND GUIDANCE;;  Surgeon: Adry Tineo MD;  Location: [facility]     HYSTERECTOMY TOTAL ABDOMINAL, BILATERAL SALPINGO-OOPHORECTOMY, NODE DISSECTION, COMBINED Bilateral 4/27/2018    Procedure: COMBINED HYSTERECTOMY TOTAL ABDOMINAL, SALPINGO-OOPHORECTOMY, NODE DISSECTION;;  Surgeon: Bradley Tierney MD;  Location: [facility]     INSERT PORT VASCULAR ACCESS Right 5/31/2018    Procedure: INSERT PORT VASCULAR ACCESS;  Single Lumen Chest Power Port;  Surgeon: Jamila Major PA-C;  [facility]     LAPAROSCOPIC HYSTERECTOMY TOTAL, BILATERAL SALPINGO-OOPHORECTOMY, NODE DISSECTION, COMBINED N/A 4/27/2018    Procedure: COMBINED LAPAROSCOPIC HYSTERECTOMY TOTAL, SALPINGO-OOPHORECTOMY, NODE DISSECTION;  Laparoscopic converted to open Removal Of Uterus, Tubes, Ovaries, Cervix, Pelvic and Para Aortic Lymphnode Dissection, Tumor Debulking, CUSA, Partial Omentectomy, Anesthesia Block;  Surgeon: Bradley Tierney MD;  Location: [facility]     TONSILLECTOMY  1960    tonsillectomy       Current Outpatient Prescriptions   Medication     acetaminophen (TYLENOL) 500 MG tablet     calcium citrate-vitamin D (CITRACAL) 315-200 MG-UNIT TABS per tablet     enoxaparin (LOVENOX) 80 MG/0.8ML injection     FERROUS GLUCONATE PO     GLUCOSAMINE CHONDROITIN COMPLX OR TABS     LORazepam (ATIVAN) 1 MG tablet     ondansetron (ZOFRAN) 8 MG tablet      prochlorperazine (COMPAZINE) 10 MG tablet     RANITIDINE HCL PO     VITAMIN C 250 MG OR TABS     Current Facility-Administered Medications   Medication     heparin 100 UNIT/ML injection 5 mL     sodium chloride (PF) 0.9% PF flush 20 mL       Allergies   Allergen Reactions     Paclitaxel      reportedTaxol= anaphylaxsis     Nickel Rash       Family History   Problem Relation Age of Onset     Diabetes Mother      type 2     HEART DISEASE Mother      Hypertension Mother      C.A.D. Mother       after 2nd heart attack     Hypertension Father      C.A.D. Father      mild heart attack     Cancer Father      skin cancer     HEART DISEASE Father      Arthritis Sister      rheumatoid arthritis     HEART DISEASE Brother      hereditary heart murmer     Lipids Brother      Lipids Brother      KIDNEY DISEASE No family hx of        Social History     Social History     Marital status:      Spouse name: Doug     Number of children: 0     Years of education: N/A     Occupational History     rn [employer]     Social History Main Topics     Smoking status: Never Smoker     Smokeless tobacco: Never Used     Alcohol use Yes      Comment: rarely     Drug use: No     Sexual activity: Yes     Partners: Male     Other Topics Concern     Parent/Sibling W/ Cabg, Mi Or Angioplasty Before 65f 55m? 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  She does not smoke and was never smoker, she drinks alcohol about a month, no illicit drugs            Balanced Diet - Yes, in the last 6 months    Osteoporosis Prevention Measures - Dairy servings per day: 2 servings plus a supplement    Regular Exercise -  Yes Describe walks for 30 to 40 minutes 4 to 5 times a week    Dental Exam - YES - Date: 1 year ago, and is going today    Eye Exam - YES - Date: 4 months ago    Self Breast Exam - Yes    Abuse: Current or Past (Physical, Sexual or  Emotional)- No    Do you feel safe in your environment - Yes    Guns stored in the home - Yes, locked    Sunscreen used - Yes    Seatbelts used - Yes    Lipids -  YES - Date: 10-02    Glucose -  YES - Date: 10-02    Colon Cancer Screening - No    Hemoccults - NO    Pap Test -  YES - Date: 10-02    Do you have any concerns about STD's -  No    Mammography - YES - Date: 8-06    DEXA - NO    Immunizations reviewed and up to date - Yes, lst td 7-2000    10-23-07  MEL Mueller CMA           ROS  Answers for HPI/ROS submitted by the patient on 7/13/2018   General Symptoms: Yes  Skin Symptoms: No  HENT Symptoms: Yes  EYE SYMPTOMS: No  HEART SYMPTOMS: Yes  LUNG SYMPTOMS: No  INTESTINAL SYMPTOMS: No  URINARY SYMPTOMS: No  GYNECOLOGIC SYMPTOMS: No  BREAST SYMPTOMS: No  SKELETAL SYMPTOMS: No  BLOOD SYMPTOMS: No  NERVOUS SYSTEM SYMPTOMS: No  MENTAL HEALTH SYMPTOMS: Yes  Fever: No  Loss of appetite: Yes  Weight loss: Yes  Weight gain: No  Fatigue: Yes  Night sweats: No  Chills: No  Increased stress: No  Excessive hunger: No  Excessive thirst: No  Feeling hot or cold when others believe the temperature is normal: No  Loss of height: No  Post-operative complications: No  Surgical site pain: No  Hallucinations: No  Change in or Loss of Energy: Yes  Hyperactivity: No  Confusion: No  Ear pain: No  Ear discharge: No  Hearing loss: No  Tinnitus: No  Nosebleeds: Yes  Congestion: No  Sinus pain: No  Trouble swallowing: No   Voice hoarseness: No  Mouth sores: No  Sore throat: No  Tooth pain: No  Gum tenderness: No  Bleeding gums: No  Change in taste: No  Change in sense of smell: No  Dry mouth: No  Hearing aid used: No  Neck lump: No  Chest pain or pressure: No  Fast or irregular heartbeat: No  Pain in legs with walking: No  Trouble breathing while lying down: No  Fingers or toes appear blue: No  High blood pressure: No  Low blood pressure: No  Fainting: No  Murmurs: No  Pacemaker: No  Varicose veins: No  Edema or swelling: No  Wake up at  night with shortness of breath: No  Light-headedness: Yes  Exercise intolerance: Yes  Nervous or Anxious: Yes  Depression: No  Trouble sleeping: No  Trouble thinking or concentrating: No  Mood changes: No  Panic attacks: No    I have reviewed the patient's ROS and discussed all pertinent information as noted in the HPI.      Physical Exam:  BP 94/54 (BP Location: Right arm, Patient Position: Sitting, Cuff Size: Adult Regular)  Pulse 82  Temp 97.9  F (36.6  C) (Oral)  Resp 18  Wt 77.1 kg (170 lb)  LMP 03/04/2005  SpO2 99%  BMI 28.29 kg/m2    CONSTITUTIONAL: Alert non-toxic appearing female in no acute distress  HEAD: Normocephalic, atraumatic  EYES: PERRLA; no scleral icterus  ENT: Oropharynx pink without lesions  NECK: Neck supple without lymphadenopathy  RESPIRATORY: Lungs clear to auscultation, no increased work of breathing noted, dry cough with deep breathing x2  CV: Regular rate and rhythm, S1S2, no clicks, murmurs, rubs, or gallops; bilateral lower extremities without edema, dorsalis pedis pulses 2+ bilaterally  GASTROINTESTINAL: Normoactive bowel sounds x4 quadrants, abdomen soft, non-distended, and non-tender to palpation without masses or organomegaly  GENITOURINARY: Not indicated  LYMPHATIC: Cervical, supraclavicular, and inguinal nodes without lymphadenopathy  MUSCULOSKELETAL: Moves all extremities, no obvious muscle wasting  NEUROLOGIC: No gross deficits, normal gait  SKIN: Appropriate color for race, warm and dry, no rashes or lesions to unclothed skin  PSYCHIATRIC: Pleasant and interactive, affect bright, makes appropriate eye contact, thought process linear      Labs:       7/27/2018  Day 1   Hemoglobin 11.7 - 15.7 g/dL 8.1 (A)   Hematocrit 35.0 - 47.0 % 27.5 (A)   Platelet Count 150 - 450 10e9/L 182   Absolute Neutrophil 1.6 - 8.3 10e9/L 2.3   Sodium 133 - 144 mmol/L 141   Potassium 3.4 - 5.3 mmol/L 3.7   Chloride 94 - 109 mmol/L 108   Carbon Dioxide 20 - 32 mmol/L 26   Urea Nitrogen 7 - 30  mg/dL 12   Creatinine 0.52 - 1.04 mg/dL 0.89   Calcium 8.5 - 10.1 mg/dL 8.6   Magnesium 1.6 - 2.3 mg/dL 2.0   Bilirubin Total 0.2 - 1.3 mg/dL 0.2   ALT 0 - 50 U/L 13   AST 0 - 45 U/L 11   Alkaline Phosphatase 40 - 150 U/L 119   Albumin 3.4 - 5.0 g/dL 2.3 (A)   Protein Total 6.8 - 8.8 g/dL 6.0 (A)   WBC 4.0 - 11.0 10e9/L 3.4 (A)   Heparin Xa pending    Assessment/Plan:  1) Stage IIIC1 low grade endometrial cancer: Currently feeling well without s/sxs infection, tolerated last cycle of carboplatin without dose limiting toxicity. Proceed with cycle two of single agent carboplatin AUC 6 as originally ordered by Dr. Tierney. To receive total of six cycles followed by treatment planning with Dr. Tierney. Reviewed signs and symptoms for when she should contact the clinic or seek additional care, including but not limited to fever, chills, inability to keep down food or fluids, nausea and vomiting not controlled with antiemetics, and diarrhea leading to dehydration. Patient to contact the clinic with any questions or concerns in the interim. Reinforced importance of seeking emergency care for T>100.4F.  2) Chemotherapy symptoms:   Fatigue: Likely due to chemotherapy induced anemia as well as her chemotherapy. To restart her iron supplement daily. Recheck CBC in 1-2 weeks, if hgb <8 and she is symptomatic, to receive 2 units PRBCs- reviewed risks, benefits, and alternatives to blood transfusions- consent form signed for blood transfusion. Continue physical activity with periods of rest.   Nutrition: Decreased albumin and protein, admits her protein intake is low. Denies need for dietitian referral. Reviewed importance of small frequent meals and ways to increase her protein intake.   Nausea: Well controlled with Zofran and Compazine- continue this regimen. Zofran refilled.  3) PE: Improving from a symptomatic standpoint. Her heparin Xa on 7/11/18 was supratherapeutic- will recheck another heparin Xa level today and adjust  accordingly. Tolerating Lovenox injections well.   4) Genetic counseling: MMR proteins with intact nuclear expression making Peterson syndrome unlikely.  5) Health maintenance issues discussed include to follow up with PCP for non-gynecologic concerns and co-morbid conditions  6) Patient verbalized understanding of and agreement with plan    ALFONZO Weathers, FNP-C  Division of Gynecologic Oncology  [facility]  Pager: [phone]

## 2018-07-27 NOTE — NURSING NOTE
"Oncology Rooming Note    July 27, 2018 8:32 AM   Gerri Owens is a 63 year old female who presents for:    Chief Complaint   Patient presents with     Port Draw     Labs drawn via port by RN. Line flushed and hep locked. VS taken.     Oncology Clinic Visit     rETURN FOR eNDOMETRIAL cA, LAB, tX     Initial Vitals: BP 94/54 (BP Location: Right arm, Patient Position: Sitting, Cuff Size: Adult Regular)  Pulse 82  Temp 97.9  F (36.6  C) (Oral)  Resp 18  Wt 77.1 kg (170 lb)  LMP 03/04/2005  SpO2 99%  BMI 28.29 kg/m2 Estimated body mass index is 28.29 kg/(m^2) as calculated from the following:    Height as of 7/6/18: 1.651 m (5' 5\").    Weight as of this encounter: 77.1 kg (170 lb). Body surface area is 1.88 meters squared.  No Pain (0) Comment: Data Unavailable   Patient's last menstrual period was 03/04/2005.  Allergies reviewed: Yes  Medications reviewed: Yes    Medications: MEDICATION REFILLS NEEDED TODAY. Provider was notified.  Pharmacy name entered into [facility]:    [facility] [place], [place] - [address] [place] [address] OUTPATIENT PHARMACY  [facility] - [place], [place] - [address] [facility]    Clinical concerns: Refill Thu was notified.    6 minutes for nursing intake (face to face time)     Fany Martinez MA              "

## 2018-07-27 NOTE — LETTER
2018       RE: Gerri Owens  [address]  [facility] [address]     Dear Colleague,    Thank you for referring your patient, Gerri Owens, to the [facility]. Please see a copy of my visit note below.    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    HPI: Gerri comes to the clinic accompanied by her  Doug. She is feeling fairly well today and reports tolerating chemotherapy much better this past cycle. She has been taking Lovenox twice daily as prescribed for PE without incident- upon chart review, had a heparin Xa level drawn on  which was supratherapeutic at 1.58 international units/mL but denies any dose adjustments with this level. She had one small self-limited epistaxis lasting 1-2 minutes but denies any other bleeding concerns. Continues to have a dry cough but reports this is decreasing. Reports feeling fatigued and cold, occasionally with the chills. Tmax 100.2F, denies infectious symptoms other than her chronic dry cough as discussed above. Nausea well controlled with scheduled Zofran and Compazine. Constipation controlled with prune juice. Previously had been taking iron supplement and had been trying to take in foods high in iron, however, this has become more difficult with nausea and appetite changes. Eating small frequent meals but notes protein has been causing nausea- able to tolerate cottage cheese. Drinking 56-65oz non-caffeinated fluids daily. Denies anxiety, states she becomes nervous at times thinking about the length of her treatment. Denies need for further emotional/mental health support. Feels ready to continue with treatment today.      Oncology History:  18- OBGYN visit  with Dr. Tineo.  Complains of 1.5 years of post-menopausal bleeding.                           EMB performed and showed      4/11/18- EUA, hysteroscopy D&C under ultrasound guidance.  Diffuse proliferative vascular endometrium noted.                         Pathology:                          FINAL DIAGNOSIS:                          ENDOMETRIAL CURETTINGS:                          - Endometrial endometrioid adenocarcinoma, FIGO grade 2                            COMMENT:                          While the tumor maintained a glandular morphology, the occasional high                          nuclear grade warranted upgrading to                          FIGO 2.      4/23/18- Patient reports that she continues to have some post-menopausal spotting      4/27/18 DEMETRIO/BSO and staging:  PATH:  A. ANTERIOR FUNDUS, BIOPSY:   - Positive for adenocarcinoma     B. UTERUS, CERVIX, BILATERAL TUBES AND OVARIES, HYSTERECTOMY WITH   BILATERAL SALPINGO-OOPHORECTOMY:   - Endometrial endometrioid adenocarcinoma, FIGO grade 1   - Adenomyosis   - Cervix invaded by endometrial adenocarcinoma   - Right ovarian surface adhesions involved by endometrial adenocarcinoma   - Right fallopian tube with endometriosis   - Left adnexa invaded by endometrial adenocarcinoma (5.5 cm)     C. LYMPH NODES, LEFT PELVIC, EXCISION:   - Metastatic adenocarcinoma to one of eight lymph nodes (1/8)   - The metastatic focus is 3 mm in greatest dimension with no extranodal   extension     D. LYMPH NODES, LEFT PARA-AORTIC, EXCISION:   - One reactive lymph node, negative for malignancy (0/1)     E. LYMPH NODES, RIGHT PELVIC, EXCISION:   - Metastatic adenocarcinoma to one of six lymph nodes (1/6)   - The metastatic focus is 21 mm in greatest dimension with positive   extranodal extension     F. LYMPH NODES, RIGHT PARA-AORTIC, EXCISION:   - Three reactive lymph nodes, negative for malignancy (0/3)     G. OMENTUM, RESECTION:   - Omental adipose tissue with focal  inflammation and surface mesothelial   hyperplasia   - Negative for malignancy     COMMENT:   The final diagnosis confirms the interpretation provided intraoperatively.     Report Name: Endometrium - Hysterectomy        Status: Submitted Checklist Inst: 1      Last Updated By: Fernie Ramirez M.D., PhD, [facility],   05/06/2018 13:52:11   Part(s) Involved:   B: Uterus, cervix, bilateral tubes and ovaries     Synoptic Report:     SPECIMEN     Procedure:         - Simple hysterectomy         - Bilateral salpingo-oophorectomy         - Omentectomy         - Peritoneal washing     Specimen Integrity:         - Opened     TUMOR     Tumor Site:         - Endometrium         - Lower uterine segment     Histologic Type:         - Endometrioid carcinoma, NOS     Histologic Grade:         - FIGO grade 1     Tumor Size: 5.5 Centimeters (cm)     Tumor Extent       Myometrial Invasion:           - Present         Depth of Invasion: 15 Millimeters (mm)         Myometrial Thickness: 15 Millimeters (mm)         Percentage of Myometrial Invasion: 100%       Adenomyosis:           - Present, involved by carcinoma       Uterine Serosa Involvement:           - Present       Lower Uterine Segment Involvement:           - Present         Level of Tumor Involvement:             - Myoinvasive       Cervical Stromal Involvement:           - Present       Other Tissue / Organ Involvement:           - Right ovary           - Left ovary           - Left fallopian tube       Peritoneal Ascitic Fluid:           - Negative for malignancy (normal / benign)     Accessory Findings       Lymphovascular Invasion:           - Present     MARGINS     Ectocervical / Vaginal Cuff Margin:         - Uninvolved by carcinoma     LYMPH NODES     Number of Pelvic Nodes with Macrometastasis: 2     Number of Pelvic Nodes with Micrometastasis: 0     Laterality of Pelvic Node(s) with Tumor:         - Right         - Left     Total Number of Pelvic Node(s)  Examined (sentinel and nonsentinel): 14     Number of Pelvic [place] Nodes Examined: 0     Laterality of Pelvic Node(s) Examined:         - Right         - Left     Number of Para-Aortic Nodes with Macrometastasis: 0     Number of Para-Aortic Nodes with Micrometastasis: 0     Total Number of Para-Aortic Node(s) Examined (sentinel and nonsentinel):    4     Number of Para-Aortic [place] Nodes Examined: 0     Laterality of Para-Aortic Node(s) Examined:         - Right         - Left     PATHOLOGIC STAGE CLASSIFICATION (PTNM, AJCC 8TH EDITION)     Primary Tumor (pT):         - pT3a     Regional Lymph Nodes (pN)       Category (pN):           - pN1a     FIGO STAGE     FIGO Stage:         - IIIC1      4/29/2018: CT:  IMPRESSION:   1. No pulmonary embolism. Postop atelectasis.  2. Fluid-filled loops of dilated small bowel in the upper abdomen,  favored to represent adynamic ileus although incompletely visualized.  3. Pneumoperitoneum and small volume ascites, expected given  postoperative day 2 following open surgery.  4. Partially visualized 10 mm right thyroid nodule.     6/13/18: C1D1 carboplatin/paclitaxel- anaphylactic reaction to paclitaxel, no carboplatin received.    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  7/17/2012     Thyrotoxicosis 2007    hyperthyroid, enlarged right thyroid on thyroid uptake, treate by [facility] with tapozole till 12/2008, FNA neg of 1.9 cm right lobe dominat nodule     Whooping cough due to Bordetella pertussis (p. pertussis) infant    whooping cough       Past Surgical History:   Procedure Laterality Date     ARTHROPLASTY HIP Left 12/4/2017    Procedure: ARTHROPLASTY HIP;  Left total hip arthroplasty;  Surgeon: Rajinder Goodwin MD;  Location: [facility] OR     BREAST BIOPSY, RT/LT  2004    left breast     DILATION AND CURETTAGE, OPERATIVE HYSTEROSCOPY WITH MORCELLATOR, COMBINED N/A 4/11/2018    Procedure: COMBINED DILATION AND CURETTAGE, OPERATIVE HYSTEROSCOPY WITH MORCELLATOR;  Hysteroscopy, Dilation and Curettage Under Ultrasound Guidance ;  Surgeon: Adry Tineo MD;  Location: [facility] OR     DILATION AND CURETTAGE, WITH ULTRASOUND GUIDANCE  4/11/2018    Procedure: DILATION AND CURETTAGE, WITH ULTRASOUND GUIDANCE;;  Surgeon: Adry Tineo MD;  Location: [facility]     HYSTERECTOMY TOTAL ABDOMINAL, BILATERAL SALPINGO-OOPHORECTOMY, NODE DISSECTION, COMBINED Bilateral 4/27/2018    Procedure: COMBINED HYSTERECTOMY TOTAL ABDOMINAL, SALPINGO-OOPHORECTOMY, NODE DISSECTION;;  Surgeon: Bradley Tierney MD;  Location: [facility]     INSERT PORT VASCULAR ACCESS Right 5/31/2018    Procedure: INSERT PORT VASCULAR ACCESS;  Single Lumen Chest Power Port;  Surgeon: Jamila Major PA-C;  [facility]     LAPAROSCOPIC HYSTERECTOMY TOTAL, BILATERAL SALPINGO-OOPHORECTOMY, NODE DISSECTION, COMBINED N/A 4/27/2018    Procedure: COMBINED LAPAROSCOPIC HYSTERECTOMY TOTAL, SALPINGO-OOPHORECTOMY, NODE DISSECTION;  Laparoscopic converted to open Removal Of Uterus, Tubes, Ovaries, Cervix, Pelvic and Para Aortic Lymphnode Dissection, Tumor Debulking, CUSA, Partial Omentectomy, Anesthesia Block;  Surgeon: Bradley Tierney MD;  Location: [facility] OR     TONSILLECTOMY  1960    tonsillectomy       Current Outpatient Prescriptions   Medication     acetaminophen (TYLENOL) 500 MG tablet     calcium  citrate-vitamin D (CITRACAL) 315-200 MG-UNIT TABS per tablet     enoxaparin (LOVENOX) 80 MG/0.8ML injection     FERROUS GLUCONATE PO     GLUCOSAMINE CHONDROITIN COMPLX OR TABS     LORazepam (ATIVAN) 1 MG tablet     ondansetron (ZOFRAN) 8 MG tablet     prochlorperazine (COMPAZINE) 10 MG tablet     RANITIDINE HCL PO     VITAMIN C 250 MG OR TABS     Current Facility-Administered Medications   Medication     heparin 100 UNIT/ML injection 5 mL     sodium chloride (PF) 0.9% PF flush 20 mL       Allergies   Allergen Reactions     Paclitaxel      reportedTaxol= anaphylaxsis     Nickel Rash       Family History   Problem Relation Age of Onset     Diabetes Mother      type 2     HEART DISEASE Mother      Hypertension Mother      C.A.D. Mother       after 2nd heart attack     Hypertension Father      C.A.D. Father      mild heart attack     Cancer Father      skin cancer     HEART DISEASE Father      Arthritis Sister      rheumatoid arthritis     HEART DISEASE Brother      hereditary heart murmer     Lipids Brother      Lipids Brother      KIDNEY DISEASE No family hx of        Social History     Social History     Marital status:      Spouse name: Doug     Number of children: 0     Years of education: N/A     Occupational History     rn [employer]     Social History Main Topics     Smoking status: Never Smoker     Smokeless tobacco: Never Used     Alcohol use Yes      Comment: rarely     Drug use: No     Sexual activity: Yes     Partners: Male     Other Topics Concern     Parent/Sibling W/ Cabg, Mi Or Angioplasty Before 65f 55m? 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  She does not smoke and was never smoker, she drinks alcohol about a month, no illicit drugs            Balanced Diet - Yes, in the last 6 months    Osteoporosis Prevention Measures - Dairy servings per day: 2 servings plus a supplement     Regular Exercise -  Yes Describe walks for 30 to 40 minutes 4 to 5 times a week    Dental Exam - YES - Date: 1 year ago, and is going today    Eye Exam - YES - Date: 4 months ago    Self Breast Exam - Yes    Abuse: Current or Past (Physical, Sexual or Emotional)- No    Do you feel safe in your environment - Yes    Guns stored in the home - Yes, locked    Sunscreen used - Yes    Seatbelts used - Yes    Lipids -  YES - Date: 10-02    Glucose -  YES - Date: 10-02    Colon Cancer Screening - No    Hemoccults - NO    Pap Test -  YES - Date: 10-02    Do you have any concerns about STD's -  No    Mammography - YES - Date: 8-06    DEXA - NO    Immunizations reviewed and up to date - Yes, lst td 7-2000    10-23-07  MEL Mueller CMA           ROS  Answers for HPI/ROS submitted by the patient on 7/13/2018   General Symptoms: Yes  Skin Symptoms: No  HENT Symptoms: Yes  EYE SYMPTOMS: No  HEART SYMPTOMS: Yes  LUNG SYMPTOMS: No  INTESTINAL SYMPTOMS: No  URINARY SYMPTOMS: No  GYNECOLOGIC SYMPTOMS: No  BREAST SYMPTOMS: No  SKELETAL SYMPTOMS: No  BLOOD SYMPTOMS: No  NERVOUS SYSTEM SYMPTOMS: No  MENTAL HEALTH SYMPTOMS: Yes  Fever: No  Loss of appetite: Yes  Weight loss: Yes  Weight gain: No  Fatigue: Yes  Night sweats: No  Chills: No  Increased stress: No  Excessive hunger: No  Excessive thirst: No  Feeling hot or cold when others believe the temperature is normal: No  Loss of height: No  Post-operative complications: No  Surgical site pain: No  Hallucinations: No  Change in or Loss of Energy: Yes  Hyperactivity: No  Confusion: No  Ear pain: No  Ear discharge: No  Hearing loss: No  Tinnitus: No  Nosebleeds: Yes  Congestion: No  Sinus pain: No  Trouble swallowing: No   Voice hoarseness: No  Mouth sores: No  Sore throat: No  Tooth pain: No  Gum tenderness: No  Bleeding gums: No  Change in taste: No  Change in sense of smell: No  Dry mouth: No  Hearing aid used: No  Neck lump: No  Chest pain or pressure: No  Fast or irregular heartbeat:  No  Pain in legs with walking: No  Trouble breathing while lying down: No  Fingers or toes appear blue: No  High blood pressure: No  Low blood pressure: No  Fainting: No  Murmurs: No  Pacemaker: No  Varicose veins: No  Edema or swelling: No  Wake up at night with shortness of breath: No  Light-headedness: Yes  Exercise intolerance: Yes  Nervous or Anxious: Yes  Depression: No  Trouble sleeping: No  Trouble thinking or concentrating: No  Mood changes: No  Panic attacks: No    I have reviewed the patient's ROS and discussed all pertinent information as noted in the HPI.      Physical Exam:  BP 94/54 (BP Location: Right arm, Patient Position: Sitting, Cuff Size: Adult Regular)  Pulse 82  Temp 97.9  F (36.6  C) (Oral)  Resp 18  Wt 77.1 kg (170 lb)  LMP 03/04/2005  SpO2 99%  BMI 28.29 kg/m2    CONSTITUTIONAL: Alert non-toxic appearing female in no acute distress  HEAD: Normocephalic, atraumatic  EYES: PERRLA; no scleral icterus  ENT: Oropharynx pink without lesions  NECK: Neck supple without lymphadenopathy  RESPIRATORY: Lungs clear to auscultation, no increased work of breathing noted, dry cough with deep breathing x2  CV: Regular rate and rhythm, S1S2, no clicks, murmurs, rubs, or gallops; bilateral lower extremities without edema, dorsalis pedis pulses 2+ bilaterally  GASTROINTESTINAL: Normoactive bowel sounds x4 quadrants, abdomen soft, non-distended, and non-tender to palpation without masses or organomegaly  GENITOURINARY: Not indicated  LYMPHATIC: Cervical, supraclavicular, and inguinal nodes without lymphadenopathy  MUSCULOSKELETAL: Moves all extremities, no obvious muscle wasting  NEUROLOGIC: No gross deficits, normal gait  SKIN: Appropriate color for race, warm and dry, no rashes or lesions to unclothed skin  PSYCHIATRIC: Pleasant and interactive, affect bright, makes appropriate eye contact, thought process linear      Labs:       7/27/2018  Day 1   Hemoglobin 11.7 - 15.7 g/dL 8.1 (A)   Hematocrit 35.0  - 47.0 % 27.5 (A)   Platelet Count 150 - 450 10e9/L 182   Absolute Neutrophil 1.6 - 8.3 10e9/L 2.3   Sodium 133 - 144 mmol/L 141   Potassium 3.4 - 5.3 mmol/L 3.7   Chloride 94 - 109 mmol/L 108   Carbon Dioxide 20 - 32 mmol/L 26   Urea Nitrogen 7 - 30 mg/dL 12   Creatinine 0.52 - 1.04 mg/dL 0.89   Calcium 8.5 - 10.1 mg/dL 8.6   Magnesium 1.6 - 2.3 mg/dL 2.0   Bilirubin Total 0.2 - 1.3 mg/dL 0.2   ALT 0 - 50 U/L 13   AST 0 - 45 U/L 11   Alkaline Phosphatase 40 - 150 U/L 119   Albumin 3.4 - 5.0 g/dL 2.3 (A)   Protein Total 6.8 - 8.8 g/dL 6.0 (A)   WBC 4.0 - 11.0 10e9/L 3.4 (A)   Heparin Xa pending    Assessment/Plan:  1) Stage IIIC1 low grade endometrial cancer: Currently feeling well without s/sxs infection, tolerated last cycle of carboplatin without dose limiting toxicity. Proceed with cycle two of single agent carboplatin AUC 6 as originally ordered by Dr. Tierney. To receive total of six cycles followed by treatment planning with Dr. Tierney. Reviewed signs and symptoms for when she should contact the clinic or seek additional care, including but not limited to fever, chills, inability to keep down food or fluids, nausea and vomiting not controlled with antiemetics, and diarrhea leading to dehydration. Patient to contact the clinic with any questions or concerns in the interim. Reinforced importance of seeking emergency care for T>100.4F.  2) Chemotherapy symptoms:   Fatigue: Likely due to chemotherapy induced anemia as well as her chemotherapy. To restart her iron supplement daily. Recheck CBC in 1-2 weeks, if hgb <8 and she is symptomatic, to receive 2 units PRBCs- reviewed risks, benefits, and alternatives to blood transfusions- consent form signed for blood transfusion. Continue physical activity with periods of rest.   Nutrition: Decreased albumin and protein, admits her protein intake is low. Denies need for dietitian referral. Reviewed importance of small frequent meals and ways to increase her protein  intake.   Nausea: Well controlled with Zofran and Compazine- continue this regimen. Zofran refilled.  3) PE: Improving from a symptomatic standpoint. Her heparin Xa on 7/11/18 was supratherapeutic- will recheck another heparin Xa level today and adjust accordingly. Tolerating Lovenox injections well.   4) Genetic counseling: MMR proteins with intact nuclear expression making Peterson syndrome unlikely.  5) Health maintenance issues discussed include to follow up with PCP for non-gynecologic concerns and co-morbid conditions  6) Patient verbalized understanding of and agreement with plan    ALFONZO Weathers, FNP-C  Division of Gynecologic Oncology  [facility]  Pager: [phone]

## 2018-08-03 ASSESSMENT — ENCOUNTER SYMPTOMS
POLYDIPSIA: 0
INCREASED ENERGY: 1
HALLUCINATIONS: 0
FATIGUE: 1
ALTERED TEMPERATURE REGULATION: 0
DECREASED APPETITE: 1
POLYPHAGIA: 0
WEIGHT LOSS: 1
CHILLS: 0
FEVER: 0
WEIGHT GAIN: 0
NIGHT SWEATS: 0

## 2018-08-06 PROBLEM — R31.29 MICROSCOPIC HEMATURIA: Status: RESOLVED | Noted: 2018-03-22 | Resolved: 2018-08-06

## 2018-08-07 ENCOUNTER — OFFICE VISIT (OUTPATIENT)
Dept: FAMILY MEDICINE | Facility: CLINIC | Age: 63
End: 2018-08-07
Payer: COMMERCIAL

## 2018-08-07 VITALS
TEMPERATURE: 99.1 F | DIASTOLIC BLOOD PRESSURE: 67 MMHG | WEIGHT: 164.25 LBS | SYSTOLIC BLOOD PRESSURE: 104 MMHG | HEART RATE: 109 BPM | BODY MASS INDEX: 27.33 KG/M2 | RESPIRATION RATE: 15 BRPM | OXYGEN SATURATION: 100 %

## 2018-08-07 DIAGNOSIS — N18.2 CKD (CHRONIC KIDNEY DISEASE) STAGE 2, GFR 60-89 ML/MIN: ICD-10-CM

## 2018-08-07 DIAGNOSIS — R39.15 URINARY URGENCY: Primary | ICD-10-CM

## 2018-08-07 DIAGNOSIS — R35.0 URINARY FREQUENCY: ICD-10-CM

## 2018-08-07 DIAGNOSIS — R82.90 CLOUDY URINE: ICD-10-CM

## 2018-08-07 DIAGNOSIS — R82.90 NONSPECIFIC FINDING ON EXAMINATION OF URINE: ICD-10-CM

## 2018-08-07 LAB
ALBUMIN UR-MCNC: 100 MG/DL
APPEARANCE UR: CLEAR
BACTERIA #/AREA URNS HPF: ABNORMAL /HPF
BILIRUB UR QL STRIP: NEGATIVE
COLOR UR AUTO: YELLOW
GLUCOSE UR STRIP-MCNC: NEGATIVE MG/DL
HGB UR QL STRIP: ABNORMAL
KETONES UR STRIP-MCNC: NEGATIVE MG/DL
LEUKOCYTE ESTERASE UR QL STRIP: ABNORMAL
NITRATE UR QL: NEGATIVE
PH UR STRIP: 7 PH (ref 5–7)
RBC #/AREA URNS AUTO: ABNORMAL /HPF
SOURCE: ABNORMAL
SP GR UR STRIP: 1.01 (ref 1–1.03)
UROBILINOGEN UR STRIP-ACNC: 0.2 EU/DL (ref 0.2–1)
WBC #/AREA URNS AUTO: >100 /HPF

## 2018-08-07 PROCEDURE — 87088 URINE BACTERIA CULTURE: CPT | Performed by: FAMILY MEDICINE

## 2018-08-07 PROCEDURE — 87186 SC STD MICRODIL/AGAR DIL: CPT | Performed by: FAMILY MEDICINE

## 2018-08-07 PROCEDURE — 81001 URINALYSIS AUTO W/SCOPE: CPT | Performed by: FAMILY MEDICINE

## 2018-08-07 PROCEDURE — 99214 OFFICE O/P EST MOD 30 MIN: CPT | Performed by: FAMILY MEDICINE

## 2018-08-07 PROCEDURE — 87086 URINE CULTURE/COLONY COUNT: CPT | Performed by: FAMILY MEDICINE

## 2018-08-07 RX ORDER — SULFAMETHOXAZOLE AND TRIMETHOPRIM 400; 80 MG/1; MG/1
1 TABLET ORAL 2 TIMES DAILY
Qty: 14 TABLET | Refills: 0 | Status: SHIPPED | OUTPATIENT
Start: 2018-08-07 | End: 2018-08-17

## 2018-08-07 NOTE — MR AVS SNAPSHOT
After Visit Summary   8/7/2018    Gerri Owens    MRN: [id number]           Patient Information     Date Of Birth          1955        Visit Information        Provider Department      8/7/2018 9:20 AM Karo Doty MD [facility]        Today's Diagnoses     Urinary urgency    -  1    Urinary frequency        Cloudy urine        CKD (chronic kidney disease) stage 2, GFR 60-89 ml/min        Nonspecific finding on examination of urine          Care Instructions    Urine suggestive of infection  Bactrim single strength one pill twice a day for 7 days  Push fluids  Will see what culture show sand call you if need to change meds  If condition worsens or has fever go to the ER               Follow-ups after your visit        Your next 10 appointments already scheduled     Aug 17, 2018  7:45 AM CDT   [facility] Draw with [facility] DRAW   [facility] Lab Draw ([facility])    [address] [place] [address]  [address]  [facility] [address]   [phone]            Aug 17, 2018  8:20 AM CDT   (Arrive by 8:05 AM)   Return Active Treatment with ALFONZO Weathers CNP   [facility] ([facility])    [address]  [address]  [facility] [address]   [phone]            Aug 17, 2018  9:00 AM CDT   Infusion 120 with UC ONCOLOGY INFUSION, UC 23 ATC   [facility] ([facility])    [address]  [address]  [facility] [address]   [phone]            Aug 27, 2018 10:30 AM CDT   Return Visit with Darling Billings MD   Radiation Oncology Clinic ([facility])    [facility]  1st Floor  [address] [address]   [phone]            Aug 27, 2018 11:00 AM CDT   TCT/SIM Suite Visit with Darling Billings MD   Radiation Oncology Clinic ([facility])    [facility]  1st Floor  [address]  [facility] [address]   [phone]            Aug 27, 2018 12:20 PM CDT   (Arrive by 11:50 AM)   CT CHEST/ABDOMEN/PELVIS W CONTRAST with UCCT1   [facility] CT ([facility])    [address]  1st Floor  [facility] [address]   [phone]           Please bring any scans or X-rays taken at other hospitals, if similar tests were done. Also bring a list of your medicines, including vitamins, minerals and over-the-counter drugs. It is safest to leave personal items at home.  Be sure to tell your doctor:   If you have any allergies.   If there s any chance you are pregnant.   If you are breastfeeding.  How to prepare:   Do not eat or drink for 2 hours before your exam. If you need to take medicine, you may take it with small sips of water. (We may ask you to take liquid medicine as well.)   Please wear loose clothing, such as a sweat suit or jogging clothes. Avoid snaps, zippers and other metal. We may ask you to undress and put on a hospital gown.  Please arrive 30 minutes early for your CT. Once in the department you might be asked to drink water 15-20 minutes prior to your exam.  If indicated you may be asked to drink an oral contrast in advance of your CT.  If this is the case, the imaging team will let you know or be in contact with you prior to your appointment  Patients over 70 or patients with diabetes or kidney problems:   If you haven t had a blood test (creatinine test) within the last 30 days, the Cardiologist/Radiologist may require you to get this test prior to your exam.  If you have diabetes:   Continue to take your metformin medication on the day of your exam  If you have any questions, please call the Imaging Department where you will have your exam.              Who to contact     If you have questions or need follow up information about today's clinic visit or your schedule please contact [facility]] SHYANNE directly at  [phone].  Normal or non-critical lab and imaging results will be communicated to you by [employer], letter or phone within 4 business days after the clinic has received the results. If you do not hear from us within 7 days, please contact the clinic through [employer] or phone. If you have a critical or abnormal lab result, we will notify you by phone as soon as possible.  Submit refill requests through [employer] or call your pharmacy and they will forward the refill request to us. Please allow 3 business days for your refill to be completed.          Additional Information About Your Visit        [employer] Information     [employer] gives you secure access to your electronic health record. If you see a primary care provider, you can also send messages to your care team and make appointments. If you have questions, please call your primary care clinic.  If you do not have a primary care provider, please call [phone] and they will assist you.        Care EveryWhere ID     This is your Care EveryWhere ID. This could be used by other organizations to access your [facility] medical records  [id number]        Your Vitals Were     Pulse Temperature Respirations Last Period Pulse Oximetry BMI (Body Mass Index)    109 99.1  F (37.3  C) (Tympanic) 15 03/04/2005 100% 27.33 kg/m2       Blood Pressure from Last 3 Encounters:   08/07/18 104/67   07/27/18 94/54   07/19/18 100/61    Weight from Last 3 Encounters:   08/07/18 164 lb 4 oz (74.5 kg)   07/27/18 170 lb (77.1 kg)   07/19/18 169 lb (76.7 kg)              We Performed the Following     UA reflex to Microscopic and Culture     Urine Culture Aerobic Bacterial     Urine Microscopic          Today's Medication Changes          These changes are accurate as of 8/7/18  9:48 AM.  If you have any questions, ask your nurse or doctor.               Start taking these medicines.        Dose/Directions    sulfamethoxazole-trimethoprim 400-80 MG per tablet   Commonly known as:   BACTRIM/SEPTRA   Used for:  Urinary urgency, Urinary frequency, Cloudy urine   Started by:  Karo Doty MD        Dose:  1 tablet   Take 1 tablet by mouth 2 times daily   Quantity:  14 tablet   Refills:  0            Where to get your medicines      These medications were sent to [facility] [place], [place] - [address]  [address], [facility] [address]     Phone:  [phone]     sulfamethoxazole-trimethoprim 400-80 MG per tablet                Primary Care Provider Office Phone # Fax #    Karo Doty [phone]       [address]  [facility] [address]        Equal Access to Services     [facility]: Hadii thalia hurd hadasho Soomaali, waaxda luqadaha, qaybta kaalmada adeegyada, carol escobar . So [facility] [phone].    ATENCIÓN: Si habla español, tiene a graham disposición servicios gratuitos de asistencia lingüística. [facility] [phone].    We comply with applicable federal civil rights laws and [place] laws. We do not discriminate on the basis of race, color, national origin, age, disability, sex, sexual orientation, or gender identity.            Thank you!     Thank you for choosing [facility]  for your care. Our goal is always to provide you with excellent care. Hearing back from our patients is one way we can continue to improve our services. Please take a few minutes to complete the written survey that you may receive in the mail after your visit with us. Thank you!             Your Updated Medication List - Protect others around you: Learn how to safely use, store and throw away your medicines at www.disposemymeds.org.          This list is accurate as of 8/7/18  9:48 AM.  Always use your most recent med list.                   Brand Name Dispense Instructions for use Diagnosis    acetaminophen 500 MG tablet    TYLENOL     Take 1 tablet (500 mg) by mouth every 4 hours as needed    S/P DEMETRIO-BSO       ascorbic acid 250 MG  tablet    VITAMIN C    90    250 mg    Routine general medical examination at a health care facility       calcium citrate-vitamin D 315-200 MG-UNIT Tabs per tablet    CITRACAL     Take 2 tablets by mouth daily        enoxaparin 80 MG/0.8ML injection    LOVENOX    60 Syringe    Inject 0.8 mLs (80 mg) Subcutaneous 2 times daily    Other acute pulmonary embolism without acute cor pulmonale (H)       FERROUS GLUCONATE PO      Take 325 mg by mouth daily (with breakfast)        GLUCOSAMINE CHONDROITIN COMPLX Tabs      Take  by mouth. 1500 mg 1xqd.    Routine general medical examination at a health care facility       LORazepam 1 MG tablet    ATIVAN    30 tablet    Take 1 tablet (1 mg) by mouth every 6 hours as needed (nausea/vomiting, anxiety or sleep)    Endometrial cancer (H), Encounter for long-term (current) use of medications       ondansetron 8 MG tablet    ZOFRAN    60 tablet    Take 1 tablet (8 mg) by mouth every 8 hours as needed for nausea (vomiting)    Endometrial cancer (H), Encounter for long-term (current) use of medications       prochlorperazine 10 MG tablet    COMPAZINE    30 tablet    Take 1 tablet (10 mg) by mouth every 6 hours as needed (nausea/vomiting)    Endometrial cancer (H), Encounter for long-term (current) use of medications       RANITIDINE HCL PO      Take 150 mg by mouth 2 times daily        sulfamethoxazole-trimethoprim 400-80 MG per tablet    BACTRIM/SEPTRA    14 tablet    Take 1 tablet by mouth 2 times daily    Urinary urgency, Urinary frequency, Cloudy urine

## 2018-08-07 NOTE — PATIENT INSTRUCTIONS
Urine suggestive of infection  Bactrim single strength one pill twice a day for 7 days  Push fluids  Will see what culture show sand call you if need to change meds  If condition worsens or has fever go to the ER

## 2018-08-07 NOTE — PROGRESS NOTES
SUBJECTIVE:   Gerri Owens is a 63 year old female who presents to clinic today for the following health issues:    URINARY TRACT SYMPTOMS  Onset: 1 week    Description:   Painful urination (Dysuria): no   Blood in urine (Hematuria): no   Delay in urine (Hesitancy): no     Intensity: mild, moderate    Progression of Symptoms:  same    Accompanying Signs & Symptoms: unable to empty bladder. Cloudy urine  Fever/chills: no   Flank pain no   Nausea and vomiting: no   Any vaginal symptoms: none and vaginal odor  Abdominal/Pelvic Pain: no     History:   History of frequent UTI's: YES in April hx since Highschool  History of kidney stones: YES  Sexually Active: YES  Possibility of pregnancy: No    Precipitating factors:   Intake water  Some odour, no vaginal discharge  Therapies Tried and outcome: cranberry juice    Started with urgency, frequency, thought from drinking more water then noted urine was cloudy  Feels going on 1 week, no fever  Feels not emptying bladder completely    Hb stable, given no transfusion, back on iron hopes that will bring it up.     No fever or chills, no headache or dizziness, no double or blurry vision, no facial pain, earache, sore throat, runny nose, post nasal drip, no trouble hearing, smelling, tasting or swallowing, residual cough, no chest pain, trouble breathing or palpitations, No abdominal pain, heart burn, reflux controlled on ranitidine, no nausea or vomiting except in relation to chemo, no diarrhea, struggling with constipation, constipation, doing prune juice, no blood in stools or black stools, no night sweats. . No leg swelling or joint pain. No rash.      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 One had a factor problem but reports she was checked and didn't  have it. One brother remains on life long coumadin. History of right upper extremity DVT March 2012, subclavian vein, no clear provoking factors such as a PICC line.  She postulated that it was due to carrying a very heavy purse on her shoulder all the time. Labs on 3/29/12 for antiphospholipid antibodies, lupus anticoagulant, factor V Leiden, prothrombin gene mutation, Antithrombin, protein C, protein S free, were all normal.  She was anticoagulated for 6 months. notes then had maybe phlebitis in both legs 1 yr out  after that but never seen for it. Since then wears compression socks which helped with post phlebitic syndrome. Along time ago she had hyperthyroidism, thyrotoxicosis, treated with methimazole, it cleared up and not had since. Is euthyroid clinically and chemically. Hx of prior tonsillectomy, left breast biopsy, hx of back spasm , allergic to nickel and taxol, occasional GERD with prn use of OTC PPI in the past now on ranitidine, seen in 6/2016 and given flexeril and did PT with good resolution. Hx of CKD2 and left kidney stone, OA B/l hands, OA B/l hips, S/p Left PRESLEY 2017, endometrial cancer S/p lap hysterectomy converted to open DEMETRIO, BSO and node dissection 4/2018, on antineoplastic treatment , anaphylaxis with taxol on single carboplatin sandwich protocol , to get radiation, with newly diagnosed WA on anticoagulation with Lovenox and on iron for low ferritin, under care of gynonc, hematology and radiation oncology.     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 No sign of stroke, referred to PT and ortho, seen by PT 8/817, after PT felt pain mostly in her hips, seen by ortho 8/15/17 had moderate OA on xray. Had left interarticular injection 8/24/17 with significant pain improvement, made good progress with PT completed 10/10/17, seen by ortho 11/2 discussed arthroplasty for primary OA hips, scheduled for Left PRESLEY 12/4/17  and right PRESLEY 1/15/18.  Seen  November 14, 2017 for pre op.  CBC was normal.  Creatinine was elevated at 1.6 with GFR down at 40%.  Hepatitis C was negative.  Was recommended to stop NSAID'S, PPI's and increase fluid intake.  Recheck GFR 11/19 was 49% and creatinine had improved and recheck again 11/22 showed further improvement with GFR 60%.  Underwent left hip arthroplasty 12/4/2017 and discharged on postop day 2 with good progress and received OT, PT and social work consult in house.  Seen 12/14 for staple removal.  Seen 1/30/2018 postop doing well on physical therapy.  Was on Lovenox post op empirically.     Seen by Gyn 2/13 for abnormal uterine bleeding of 1-1/2 year Pap was normal opted, to get her endometrial biopsy done under anesthesia.  Mammogram done was slightly abnormal but diagnostic studies were done and those were normal.  Seen by nephrology 3/13 /18. noted had Stage II CKD-baseline serum creatinine of 0.8 mg/dL and GFR of 69 ml/min/1.73m2 BSA likely from chronic NSAID'S use. Low range proteinuria, protein-to-creatinine ratio was 0.22 G/gCr. Microscopic hematuria( hpf) was likely secondary to ongoing uterine bleeding. Recommended a repeat urine analysis after her uterine bleeding had resolved and further evaluation by urologist if her hematuria persisted, was advised to avoid NSAID'S, follow up with nephrology clinic S needed, Electrolytes/ Acid/Base status were within acceptable limits. Her blood pressure was stable & controlled. She was not on antihypertensive medications at home. Euvolemic on exam. Anemia of acute blood loss secondary to blood loss during Left PRESLEY in 12/2017 had resolved and Hgb was within normal limits of 13.8 G/dl and iron repleted.  Had a normal serum calcium and phosphorus & Normal PTH level. She did have elevated vitamin D level of 78 ug/L and was advised to discontinue vitamin D supplements. For her GERD was advised to continue ranitidine & avoid PPI's. Renal ultrasound showed a stone of 7 mm  nonobstructive in left mid kidney.  Noted occasional cramps relieved with Tylenol.  Managed her hip pain with ice and rest.  Planned  to retire by the summer 2018   Post hip surgery was on Lovenox 3 weeks then discontinued. Continued with her compression socks daily.     Seen 3/22/18 for preop for hysteroscopy, LDL was elevated and ASCVD was 3.7 %. Seen by ortho 4/3 and doing wlel. Deferred right hip surgery. Seen 4/11 for EUA, hysteroscopy and D & C under u/S guidance and biopsy showed endometrial cancer and referred to gyn onc. Seen by gyn onc and on  4/27 underwent Laparoscopic converted to open hysterectomy, bilateral salpingo-oophorectomy, pelvic and para-aortic lymph node dissection, tumor debulking, CUSA, partial omentectomy. with FIGO grade 1 endometrioid endometrial adenocarcinoma, FIGO stage IIIC1.  Pathology showed cervical stromal involvement, 100% myometrial invasion with uterine serosal involvement, bilateral ovarian and left fallopian tube involvement, positive anterior surface margin, 2 of 14 pelvic and 0 of 4 periaortic lymph nodes involved, one with extranodal extension. FIGO grade 1 with cervical and left adnexa invasion.  & Right fallopian tube endometriosis. Post op noted some tachycardia and hypoxia but CT was neg for PE and thought due to post op anemia. Discharged 5/2 and declined sleep eval.  Sutures removed 5/7. Seen by gynonc 5/14 approximately 5 weeks status post laparoscopic converted to open total abdominal hysterectomy, BSO, PPALND and omentectomy. On 5/30 completed 28 days of Lovenox and on 5/31 port placed by IR.  On 6/1 she was scheduled to start adjuvant carboplatin and paclitaxel chemotherapy sandwich protocol chemoradiation which was delayed due to low white blood cell count and referred to hematology. Seen 6/6 by hematology Dr Rapp for mild neutropenia that resolved with normal B12, TSH and low ferritin, was advised daily ferrous gluconate and approved to start chemo. Seen by  radiation oncology 6/6. On 6/13 within 5 min of receiving first time taxol had flushing, cough, SATs dropped to 70 %, was short of breath and had to get oxygen, epi, iv solumedrol and Benadryl and finally came to baseline and chemo since changed to single agent carboplatin. Seen 7/6 by gyn onc and noted persistent cough, tachycardia, elevated LFT S and low albumin. CT chest done showed RLL and right inguinal PE.  Given her three emboli, persistent cough, soft BP (though baseline), and inability to obtain ECHO outpatient was admitted to unit 7C for further evaluation and management of pulmonary emboli.       She was seen in clinic for her dose of carboplatin. She reported a 3 week history of a dry cough, since her previous infusion reaction. In the clinic she was tachy to ~110 with a BP of ~90/60. Chest CT showed segmental pulmonary emboli in the right upper lobe and lingula, no evidence of heart strain. Unable to obtain an echocardiogram in clinic and so was admitted to the hospital for further observation, therapeutic Lovenox administration, and echocardiogram. She denied any other symptoms including headache, chest pain, dyspnea, racing heart, coughing anything up. She had GERD at baseline, denied n/v. Had had a few recent episodes of stool incontinence since she started her carboplatin infusion. She denied noticing any LE edema. She wore rosemary hose at baseline because of her history of DVT and her family history. Echo showed normal right and left ventricular function with EF of 60-65%. All valves normal appearing with trace mitral insufficiency present. IVC and aortic root are normal appearing. Was discharged on 7/7 on Lovenox. Heparin 10a on 7/13 was at expected level.     Seen 7/19 hospital d/c follow up, S/p DEMETRIO BSO, node dissection now on sandwich chemoradiation. Had anaphylaxis with taxol so just received carboplatin one infusion. On Lovenox likely for life for PE, prior DVT. Not taking iron as directed by  hematology. Has at home. Trying to get via diet as upsets her stomach.  Noted weight loss related to nausea from chemo denies any significant anxiety has not used lorazepam given by oncologist feels good when she is not getting the chemo.  UA was negative for RBCs at that time.  Had asymptomatic kidney stones.  Mild low hemoglobin, resolved to restart iron. CMP showed decreased GFR improved fluid intake and it improved on recheck with oncology, lipids were normal, HIV test was negative. FIT ordered but not sent in yet.  Seen by Gyn on the 27th for stage III C1 low-grade endometrial cancer to continue cycle 2 of carboplatin which was given that same day, noted fatigue secondary to chemo induced anemia as well as chemotherapy itself, noted had restarted iron and to consider transfusion if hemoglobin dropped further & advised to increase protein in diet. Hemoglobin was 8.1. CMP was normal. magnesium was 2. Heparin x-ray was normal at 1.03 and no dose adjustments made.    MN  shows received hydromorphone 2 mg # 40 on 12/6/17, oxycodone 5 mg # 25 on 4/30/18 & lorazepam 2 mg # 15 on 6/1/18    Last infusion 7/27 and next one aug 17th . Weight down form 7/19 when was 169 here to 164 today . In between weight at oncology office was 170 ( different scale)    Problem list and histories reviewed & adjusted, as indicated.  Additional history: as documented    Patient Active Problem List   Diagnosis     Advanced directives, counseling/discussion     Primary osteoarthritis of both hips     History of deep venous thrombosis     Hyperlipidemia, unspecified hyperlipidemia type     Presence of left hip implant     CKD (chronic kidney disease) stage 2, GFR 60-89 ml/min     Calculus of left kidney     S/P DEMETRIO-BSO     Endometrial cancer (H)     Encounter for long-term (current) use of medications     Encounter for antineoplastic chemotherapy     Pulmonary embolus (H)     Osteoarthritis of both hands     Low ferritin     Antineoplastic  chemotherapy induced anemia     Past Surgical History:   Procedure Laterality Date     ARTHROPLASTY HIP Left 12/4/2017    Procedure: ARTHROPLASTY HIP;  Left total hip arthroplasty;  Surgeon: Rajinder Goodwin MD;  Location: [facility] OR     BREAST BIOPSY, RT/LT  2004    left breast     DILATION AND CURETTAGE, OPERATIVE HYSTEROSCOPY WITH MORCELLATOR, COMBINED N/A 4/11/2018    Procedure: COMBINED DILATION AND CURETTAGE, OPERATIVE HYSTEROSCOPY WITH MORCELLATOR;  Hysteroscopy, Dilation and Curettage Under Ultrasound Guidance ;  Surgeon: Adry Tineo MD;  Location: [facility] OR     DILATION AND CURETTAGE, WITH ULTRASOUND GUIDANCE  4/11/2018    Procedure: DILATION AND CURETTAGE, WITH ULTRASOUND GUIDANCE;;  Surgeon: Adry Tineo MD;  Location: [facility]     HYSTERECTOMY TOTAL ABDOMINAL, BILATERAL SALPINGO-OOPHORECTOMY, NODE DISSECTION, COMBINED Bilateral 4/27/2018    Procedure: COMBINED HYSTERECTOMY TOTAL ABDOMINAL, SALPINGO-OOPHORECTOMY, NODE DISSECTION;;  Surgeon: Bradley Tierney MD;  Location: [facility] OR     INSERT PORT VASCULAR ACCESS Right 5/31/2018    Procedure: INSERT PORT VASCULAR ACCESS;  Single Lumen Chest Power Port;  Surgeon: Jamila Major PA-C;  Location: [facility]     LAPAROSCOPIC HYSTERECTOMY TOTAL, BILATERAL SALPINGO-OOPHORECTOMY, NODE DISSECTION, COMBINED N/A 4/27/2018    Procedure: COMBINED LAPAROSCOPIC HYSTERECTOMY TOTAL, SALPINGO-OOPHORECTOMY, NODE DISSECTION;  Laparoscopic converted to open Removal Of Uterus, Tubes, Ovaries, Cervix, Pelvic and Para Aortic Lymphnode Dissection, Tumor Debulking, CUSA, Partial Omentectomy, Anesthesia Block;  Surgeon: Bradley Tierney MD;  Location: [facility] OR     TONSILLECTOMY  1960    tonsillectomy       Social History   Substance Use Topics     Smoking status: Never Smoker     Smokeless tobacco: Never Used     Alcohol use Yes      Comment: rarely     Family History   Problem Relation Age of Onset     Diabetes Mother      type 2     HEART DISEASE Mother       Hypertension Mother      C.A.D. Mother       after 2nd heart attack     Hypertension Father      C.A.D. Father      mild heart attack     Cancer Father      skin cancer     HEART DISEASE Father      Arthritis Sister      rheumatoid arthritis     HEART DISEASE Brother      hereditary heart murmer     Lipids Brother      Lipids Brother      KIDNEY DISEASE No family hx of          Current Outpatient Prescriptions   Medication Sig Dispense Refill     acetaminophen (TYLENOL) 500 MG tablet Take 1 tablet (500 mg) by mouth every 4 hours as needed       calcium citrate-vitamin D (CITRACAL) 315-200 MG-UNIT TABS per tablet Take 2 tablets by mouth daily       enoxaparin (LOVENOX) 80 MG/0.8ML injection Inject 0.8 mLs (80 mg) Subcutaneous 2 times daily 60 Syringe 3     FERROUS GLUCONATE PO Take 325 mg by mouth daily (with breakfast)        GLUCOSAMINE CHONDROITIN COMPLX OR TABS Take  by mouth. 1500 mg 1xqd.   0     LORazepam (ATIVAN) 1 MG tablet Take 1 tablet (1 mg) by mouth every 6 hours as needed (nausea/vomiting, anxiety or sleep) 30 tablet 1     ondansetron (ZOFRAN) 8 MG tablet Take 1 tablet (8 mg) by mouth every 8 hours as needed for nausea (vomiting) 60 tablet 5     prochlorperazine (COMPAZINE) 10 MG tablet Take 1 tablet (10 mg) by mouth every 6 hours as needed (nausea/vomiting) 30 tablet 2     RANITIDINE HCL PO Take 150 mg by mouth 2 times daily        sulfamethoxazole-trimethoprim (BACTRIM/SEPTRA) 400-80 MG per tablet Take 1 tablet by mouth 2 times daily 14 tablet 0     VITAMIN C 250 MG OR TABS 250 mg 90 0     Allergies   Allergen Reactions     Paclitaxel      reportedTaxol= anaphylaxsis     Nickel Rash     Recent Labs   Lab Test  18   0820  18   0930  18   0537   18   1103   18   1706   17   0923  12   1108   LDL   --   104*   --    --    --    --   149*   --    --   175*   HDL   --   51   --    --    --    --   47*   --    --   45*   TRIG   --   89   --    --    --    --    143   --    --   197*   ALT  13  22  45   < >  12   < >   --    --   22  27   CR  0.89  1.08*  0.87   < >  1.00   < >   --    < >  1.60*  0.85   GFRESTIMATED  64  51*  66   < >  56*   < >   --    < >  33*  69   GFRESTBLACK  78  62  79   < >  68   < >   --    < >  40*  84   POTASSIUM  3.7  4.2  4.4   < >  4.1   < >   --    < >  5.1  4.1   TSH   --    --    --    --   1.58   --    --    --   2.02  1.02    < > = values in this interval not displayed.      BP Readings from Last 3 Encounters:   08/07/18 104/67   07/27/18 94/54   07/19/18 100/61    Wt Readings from Last 3 Encounters:   08/07/18 164 lb 4 oz (74.5 kg)   07/27/18 170 lb (77.1 kg)   07/19/18 169 lb (76.7 kg)                  Labs reviewed in EPIC    Reviewed and updated as needed this visit by clinical staff       Reviewed and updated as needed this visit by Provider         ROS:  Constitutional, HEENT, cardiovascular, pulmonary, GI, , musculoskeletal, neuro, skin, endocrine and psych systems are negative, except as otherwise noted.    OBJECTIVE:     /67 (BP Location: Left arm, Patient Position: Chair, Cuff Size: Adult Regular)  Pulse 109  Temp 99.1  F (37.3  C) (Tympanic)  Resp 15  Wt 164 lb 4 oz (74.5 kg)  LMP 03/04/2005  SpO2 100%  BMI 27.33 kg/m2  Body mass index is 27.33 kg/(m^2).  GENERAL: healthy, alert and no distress, frail  EYES: Eyes grossly normal to inspection, PERRL and conjunctivae and sclerae normal  RESP: lungs clear to auscultation - no rales, rhonchi or wheezes  CV: regular rate and rhythm, normal S1 S2, no S3 or S4, no murmur, click or rub, no peripheral edema and peripheral pulses strong  ABDOMEN: soft, non tender, no hepatosplenomegaly, no masses and bowel sounds normal, no CVA tenderness  MS: no gross musculoskeletal defects noted, no edema, wearing compression socks  SKIN: no suspicious lesions or rashes  NEURO: Normal strength and tone, mentation intact and speech normal  PSYCH: mentation appears normal, affect  normal/bright    Diagnostic Test Results:  Results for orders placed or performed in visit on 08/07/18 (from the past 24 hour(s))   UA reflex to Microscopic and Culture   Result Value Ref Range    Color Urine Yellow     Appearance Urine Clear     Glucose Urine Negative NEG^Negative mg/dL    Bilirubin Urine Negative NEG^Negative    Ketones Urine Negative NEG^Negative mg/dL    Specific Gravity Urine 1.015 1.003 - 1.035    Blood Urine Moderate (A) NEG^Negative    pH Urine 7.0 5.0 - 7.0 pH    Protein Albumin Urine 100 (A) NEG^Negative mg/dL    Urobilinogen Urine 0.2 0.2 - 1.0 EU/dL    Nitrite Urine Negative NEG^Negative    Leukocyte Esterase Urine Large (A) NEG^Negative    Source Midstream Urine    Urine Microscopic   Result Value Ref Range    WBC Urine >100 (A) OTO5^0 - 5 /HPF    RBC Urine 5-10 (A) OTO2^O - 2 /HPF    Bacteria Urine Many (A) NEG^Negative /HPF       ASSESSMENT/PLAN:       ICD-10-CM    1. Urinary urgency R39.15 UA reflex to Microscopic and Culture     Urine Microscopic     sulfamethoxazole-trimethoprim (BACTRIM/SEPTRA) 400-80 MG per tablet   2. Urinary frequency R35.0 UA reflex to Microscopic and Culture     sulfamethoxazole-trimethoprim (BACTRIM/SEPTRA) 400-80 MG per tablet   3. Cloudy urine R82.90 UA reflex to Microscopic and Culture     sulfamethoxazole-trimethoprim (BACTRIM/SEPTRA) 400-80 MG per tablet   4. CKD (chronic kidney disease) stage 2, GFR 60-89 ml/min N18.2    5. Nonspecific finding on examination of urine R82.90 Urine Culture Aerobic Bacterial     Urine suggestive of infection. Bactrim single strength one pill twice a day for 7 days given CKD , wt loss and nausea with chemo recently. Push fluids. Will see what culture show a nd call her  if need to change meds. If condition worsens or has fever to go to the ER. Continue care with oncology , next chemo infusion in on 8/17. continue care with nephrology , ortho and gyn as needed. Reminded to send in FIT.continue to monitor weight, take iron,  eat more protein. Recheck ferritin level with hematology as previously recommended by them.   See Patient Instructions    Karo Doty MD  [facility]

## 2018-08-09 LAB
BACTERIA SPEC CULT: ABNORMAL
BACTERIA SPEC CULT: ABNORMAL
SPECIMEN SOURCE: ABNORMAL

## 2018-08-13 NOTE — PROGRESS NOTES
Flor VillaAarti Owens,  Your results came back with ecoli in urine sensitive to the bactrim given. Hope you are feeling better. If you have any further concerns please do not hesitate to contact us by message, phone or making an appointment.  Have a good day   Dr Soren MATHIS

## 2018-08-17 ENCOUNTER — APPOINTMENT (OUTPATIENT)
Dept: LAB | Facility: CLINIC | Age: 63
End: 2018-08-17
Attending: NURSE PRACTITIONER
Payer: COMMERCIAL

## 2018-08-17 ENCOUNTER — ONCOLOGY VISIT (OUTPATIENT)
Dept: ONCOLOGY | Facility: CLINIC | Age: 63
End: 2018-08-17
Attending: NURSE PRACTITIONER
Payer: COMMERCIAL

## 2018-08-17 VITALS
SYSTOLIC BLOOD PRESSURE: 99 MMHG | OXYGEN SATURATION: 98 % | WEIGHT: 162.2 LBS | DIASTOLIC BLOOD PRESSURE: 69 MMHG | HEART RATE: 87 BPM | TEMPERATURE: 97.1 F | RESPIRATION RATE: 18 BRPM | BODY MASS INDEX: 26.99 KG/M2

## 2018-08-17 DIAGNOSIS — D64.81 ANTINEOPLASTIC CHEMOTHERAPY INDUCED ANEMIA: ICD-10-CM

## 2018-08-17 DIAGNOSIS — T45.1X5A ANTINEOPLASTIC CHEMOTHERAPY INDUCED ANEMIA: ICD-10-CM

## 2018-08-17 DIAGNOSIS — Z79.899 ENCOUNTER FOR LONG-TERM (CURRENT) USE OF MEDICATIONS: ICD-10-CM

## 2018-08-17 DIAGNOSIS — Z51.11 ENCOUNTER FOR ANTINEOPLASTIC CHEMOTHERAPY: ICD-10-CM

## 2018-08-17 DIAGNOSIS — C54.1 ENDOMETRIAL CANCER (H): Primary | ICD-10-CM

## 2018-08-17 DIAGNOSIS — R11.2 CINV (CHEMOTHERAPY-INDUCED NAUSEA AND VOMITING): ICD-10-CM

## 2018-08-17 DIAGNOSIS — T45.1X5A CINV (CHEMOTHERAPY-INDUCED NAUSEA AND VOMITING): ICD-10-CM

## 2018-08-17 DIAGNOSIS — D69.59 CHEMOTHERAPY-INDUCED THROMBOCYTOPENIA: ICD-10-CM

## 2018-08-17 DIAGNOSIS — T45.1X5A CHEMOTHERAPY-INDUCED THROMBOCYTOPENIA: ICD-10-CM

## 2018-08-17 PROCEDURE — 99215 OFFICE O/P EST HI 40 MIN: CPT | Mod: ZP | Performed by: NURSE PRACTITIONER

## 2018-08-17 PROCEDURE — 25000128 H RX IP 250 OP 636: Mod: ZF | Performed by: NURSE PRACTITIONER

## 2018-08-17 PROCEDURE — G0463 HOSPITAL OUTPT CLINIC VISIT: HCPCS | Mod: ZF

## 2018-08-17 PROCEDURE — 36591 DRAW BLOOD OFF VENOUS DEVICE: CPT

## 2018-08-17 RX ORDER — HEPARIN SODIUM (PORCINE) LOCK FLUSH IV SOLN 100 UNIT/ML 100 UNIT/ML
5 SOLUTION INTRAVENOUS EVERY 8 HOURS
Status: DISCONTINUED | OUTPATIENT
Start: 2018-08-17 | End: 2018-08-17 | Stop reason: HOSPADM

## 2018-08-17 RX ORDER — PROCHLORPERAZINE MALEATE 10 MG
10 TABLET ORAL EVERY 6 HOURS PRN
Qty: 30 TABLET | Refills: 3 | Status: SHIPPED | OUTPATIENT
Start: 2018-08-17 | End: 2018-11-19

## 2018-08-17 RX ADMIN — SODIUM CHLORIDE, PRESERVATIVE FREE 5 ML: 5 INJECTION INTRAVENOUS at 08:12

## 2018-08-17 ASSESSMENT — PAIN SCALES - GENERAL: PAINLEVEL: NO PAIN (0)

## 2018-08-17 NOTE — LETTER
2018     RE: Gerri Owens  [address]  [facility] [address]     Dear Colleague,    Thank you for referring your patient, Gerri Owens, to the [facility]. Please see a copy of my visit note below.    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    HPI: Gerri comes to the clinic accompanied by her  Doug. She is feeling well today and states she tolerated single agent carboplatin well with the exception of nausea the first week. She had no emesis but had decreased oral intake and lost about eight pounds the first week of treatment. She took Zofran and Compazine with some relief. Now eating and drinking well, focusing on foods high in protein and iron. Restarted a daily iron supplement. Fatigue is stable. Continues to take Lovenox twice daily as prescribed for PE without incident- no bleeding, fevers, cough, or SOB. Constipation controlled with prune juice. Denies anxiety, states she becomes nervous at times thinking about the length of her treatment. Denies need for further emotional/mental health support. Feels ready to continue with treatment today.      Oncology History:  18- OBGYN visit with Dr. Tineo.  Complains of 1.5 years of post-menopausal bleeding.                           EMB performed and showed      18- EUA, hysteroscopy D&C under ultrasound guidance.  Diffuse proliferative vascular endometrium noted.                         Pathology:                          FINAL DIAGNOSIS:                          ENDOMETRIAL CURETTINGS:                          - Endometrial endometrioid adenocarcinoma, FIGO grade 2                            COMMENT:                          While the tumor  maintained a glandular morphology, the occasional high                          nuclear grade warranted upgrading to                          FIGO 2.      4/23/18- Patient reports that she continues to have some post-menopausal spotting      4/27/18 DEMETRIO/BSO and staging:  PATH:  A. ANTERIOR FUNDUS, BIOPSY:   - Positive for adenocarcinoma     B. UTERUS, CERVIX, BILATERAL TUBES AND OVARIES, HYSTERECTOMY WITH   BILATERAL SALPINGO-OOPHORECTOMY:   - Endometrial endometrioid adenocarcinoma, FIGO grade 1   - Adenomyosis   - Cervix invaded by endometrial adenocarcinoma   - Right ovarian surface adhesions involved by endometrial adenocarcinoma   - Right fallopian tube with endometriosis   - Left adnexa invaded by endometrial adenocarcinoma (5.5 cm)     C. LYMPH NODES, LEFT PELVIC, EXCISION:   - Metastatic adenocarcinoma to one of eight lymph nodes (1/8)   - The metastatic focus is 3 mm in greatest dimension with no extranodal   extension     D. LYMPH NODES, LEFT PARA-AORTIC, EXCISION:   - One reactive lymph node, negative for malignancy (0/1)     E. LYMPH NODES, RIGHT PELVIC, EXCISION:   - Metastatic adenocarcinoma to one of six lymph nodes (1/6)   - The metastatic focus is 21 mm in greatest dimension with positive   extranodal extension     F. LYMPH NODES, RIGHT PARA-AORTIC, EXCISION:   - Three reactive lymph nodes, negative for malignancy (0/3)     G. OMENTUM, RESECTION:   - Omental adipose tissue with focal inflammation and surface mesothelial   hyperplasia   - Negative for malignancy     COMMENT:   The final diagnosis confirms the interpretation provided intraoperatively.     Report Name: Endometrium - Hysterectomy        Status: Submitted Checklist Inst: 1      Last Updated By: Fernie Ramirez M.D., PhD, [facility],   05/06/2018 13:52:11   Part(s) Involved:   B: Uterus, cervix, bilateral tubes and ovaries     Synoptic Report:     SPECIMEN     Procedure:         - Simple hysterectomy         - Bilateral  salpingo-oophorectomy         - Omentectomy         - Peritoneal washing     Specimen Integrity:         - Opened     TUMOR     Tumor Site:         - Endometrium         - Lower uterine segment     Histologic Type:         - Endometrioid carcinoma, NOS     Histologic Grade:         - FIGO grade 1     Tumor Size: 5.5 Centimeters (cm)     Tumor Extent       Myometrial Invasion:           - Present         Depth of Invasion: 15 Millimeters (mm)         Myometrial Thickness: 15 Millimeters (mm)         Percentage of Myometrial Invasion: 100%       Adenomyosis:           - Present, involved by carcinoma       Uterine Serosa Involvement:           - Present       Lower Uterine Segment Involvement:           - Present         Level of Tumor Involvement:             - Myoinvasive       Cervical Stromal Involvement:           - Present       Other Tissue / Organ Involvement:           - Right ovary           - Left ovary           - Left fallopian tube       Peritoneal Ascitic Fluid:           - Negative for malignancy (normal / benign)     Accessory Findings       Lymphovascular Invasion:           - Present     MARGINS     Ectocervical / Vaginal Cuff Margin:         - Uninvolved by carcinoma     LYMPH NODES     Number of Pelvic Nodes with Macrometastasis: 2     Number of Pelvic Nodes with Micrometastasis: 0     Laterality of Pelvic Node(s) with Tumor:         - Right         - Left     Total Number of Pelvic Node(s) Examined (sentinel and nonsentinel): 14     Number of Pelvic [place] Nodes Examined: 0     Laterality of Pelvic Node(s) Examined:         - Right         - Left     Number of Para-Aortic Nodes with Macrometastasis: 0     Number of Para-Aortic Nodes with Micrometastasis: 0     Total Number of Para-Aortic Node(s) Examined (sentinel and nonsentinel):    4     Number of Para-Aortic [place] Nodes Examined: 0     Laterality of Para-Aortic Node(s) Examined:         - Right         - Left     PATHOLOGIC STAGE  CLASSIFICATION (PTNM, AJCC 8TH EDITION)     Primary Tumor (pT):         - pT3a     Regional Lymph Nodes (pN)       Category (pN):           - pN1a     FIGO STAGE     FIGO Stage:         - IIIC1      4/29/2018: CT:  IMPRESSION:   1. No pulmonary embolism. Postop atelectasis.  2. Fluid-filled loops of dilated small bowel in the upper abdomen,  favored to represent adynamic ileus although incompletely visualized.  3. Pneumoperitoneum and small volume ascites, expected given  postoperative day 2 following open surgery.  4. Partially visualized 10 mm right thyroid nodule.     6/13/18: C1D1 carboplatin/paclitaxel- anaphylactic reaction to paclitaxel, no carboplatin received.    7/6/18: C1 single agent carboplatin AUC 6     7/27/18: C2 SA carboplatin AUC 6    8/17/18: C3 SA carboplatin AUC 6, deferred due to thrombocytopenia    Past Medical History:   Diagnosis Date     Abnormal renal function test 12/04/2017    due to NSAIDS, normal after stopping taking them     Advanced directives, counseling/discussion      Arthritis      BMI 35.0-35.9,adult 11/12/2017     Coagulation disorder (H) 2012    Had DVT     DVT of upper extremity (deep vein thrombosis) (H) 3/27/2012     Endometrial cancer (H) 04/2018     Gastroesophageal reflux disease      Hyperlipidemia LDL goal < 160      MEDICAL HISTORY OF - 1997    left breast density     Microscopic hematuria 3/22/2018     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  7/17/2012     Thyrotoxicosis 2007    hyperthyroid, enlarged right thyroid on thyroid uptake, treate by endocrin eclinic [facility] with tapozole till 12/2008, FNA neg of 1.9 cm right lobe dominat nodule     Whooping cough due to Bordetella pertussis (p. pertussis) infant    whooping cough       Past Surgical History:   Procedure Laterality Date     ARTHROPLASTY HIP Left 12/4/2017    Procedure: ARTHROPLASTY HIP;  Left total hip arthroplasty;  Surgeon: Rajinder Goodwin MD;  Location: [facility] OR     BREAST  BIOPSY, RT/LT  2004    left breast     DILATION AND CURETTAGE, OPERATIVE HYSTEROSCOPY WITH MORCELLATOR, COMBINED N/A 4/11/2018    Procedure: COMBINED DILATION AND CURETTAGE, OPERATIVE HYSTEROSCOPY WITH MORCELLATOR;  Hysteroscopy, Dilation and Curettage Under Ultrasound Guidance ;  Surgeon: Adry Tineo MD;  Location: [facility] OR     DILATION AND CURETTAGE, WITH ULTRASOUND GUIDANCE  4/11/2018    Procedure: DILATION AND CURETTAGE, WITH ULTRASOUND GUIDANCE;;  Surgeon: Adry Tineo MD;  Location: [facility] OR     HYSTERECTOMY TOTAL ABDOMINAL, BILATERAL SALPINGO-OOPHORECTOMY, NODE DISSECTION, COMBINED Bilateral 4/27/2018    Procedure: COMBINED HYSTERECTOMY TOTAL ABDOMINAL, SALPINGO-OOPHORECTOMY, NODE DISSECTION;;  Surgeon: Bradley Tierney MD;  Location: [facility] OR     INSERT PORT VASCULAR ACCESS Right 5/31/2018    Procedure: INSERT PORT VASCULAR ACCESS;  Single Lumen Chest Power Port;  Surgeon: Jamila Major PA-C;  [facility]     LAPAROSCOPIC HYSTERECTOMY TOTAL, BILATERAL SALPINGO-OOPHORECTOMY, NODE DISSECTION, COMBINED N/A 4/27/2018    Procedure: COMBINED LAPAROSCOPIC HYSTERECTOMY TOTAL, SALPINGO-OOPHORECTOMY, NODE DISSECTION;  Laparoscopic converted to open Removal Of Uterus, Tubes, Ovaries, Cervix, Pelvic and Para Aortic Lymphnode Dissection, Tumor Debulking, CUSA, Partial Omentectomy, Anesthesia Block;  Surgeon: Bradley Tierney MD;  Location: [facility] OR     TONSILLECTOMY  1960    tonsillectomy       Current Outpatient Prescriptions   Medication     acetaminophen (TYLENOL) 500 MG tablet     calcium citrate-vitamin D (CITRACAL) 315-200 MG-UNIT TABS per tablet     enoxaparin (LOVENOX) 80 MG/0.8ML injection     FERROUS GLUCONATE PO     GLUCOSAMINE CHONDROITIN COMPLX OR TABS     LORazepam (ATIVAN) 1 MG tablet     ondansetron (ZOFRAN) 8 MG tablet     prochlorperazine (COMPAZINE) 10 MG tablet     RANITIDINE HCL PO     sulfamethoxazole-trimethoprim (BACTRIM/SEPTRA) 400-80 MG per tablet     VITAMIN C 250  MG OR TABS     Current Facility-Administered Medications   Medication     heparin 100 UNIT/ML injection 5 mL     sodium chloride (PF) 0.9% PF flush 20 mL       Allergies   Allergen Reactions     Paclitaxel      reportedTaxol= anaphylaxsis     Nickel Rash       Family History   Problem Relation Age of Onset     Diabetes Mother      type 2     HEART DISEASE Mother      Hypertension Mother      C.A.D. Mother       after 2nd heart attack     Hypertension Father      C.A.D. Father      mild heart attack     Cancer Father      skin cancer     HEART DISEASE Father      Arthritis Sister      rheumatoid arthritis     HEART DISEASE Brother      hereditary heart murmer     Lipids Brother      Lipids Brother      KIDNEY DISEASE No family hx of        Social History     Social History     Marital status:      Spouse name: Doug     Number of children: 0     Years of education: N/A     Occupational History     [employer]     Social History Main Topics     Smoking status: Never Smoker     Smokeless tobacco: Never Used     Alcohol use Yes      Comment: rarely     Drug use: No     Sexual activity: Yes     Partners: Male     Other Topics Concern     Parent/Sibling W/ Cabg, Mi Or Angioplasty Before 65f 55m? 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  She does not smoke and was never smoker, she drinks alcohol about a month, no illicit drugs            Balanced Diet - Yes, in the last 6 months    Osteoporosis Prevention Measures - Dairy servings per day: 2 servings plus a supplement    Regular Exercise -  Yes Describe walks for 30 to 40 minutes 4 to 5 times a week    Dental Exam - YES - Date: 1 year ago, and is going today    Eye Exam - YES - Date: 4 months ago    Self Breast Exam - Yes    Abuse: Current or Past (Physical, Sexual or Emotional)- No    Do you feel safe in your environment - Yes    Guns stored in the  home - Yes, locked    Sunscreen used - Yes    Seatbelts used - Yes    Lipids -  YES - Date: 10-02    Glucose -  YES - Date: 10-02    Colon Cancer Screening - No    Hemoccults - NO    Pap Test -  YES - Date: 10-02    Do you have any concerns about STD's -  No    Mammography - YES - Date: 8-06    DEXA - NO    Immunizations reviewed and up to date - Yes, lst td 7-2000    10-23-07  [facility]           ROS  Answers for HPI/ROS submitted by the patient on 8/3/2018   General Symptoms: Yes  Skin Symptoms: No  HENT Symptoms: No  EYE SYMPTOMS: No  HEART SYMPTOMS: No  LUNG SYMPTOMS: No  INTESTINAL SYMPTOMS: No  URINARY SYMPTOMS: No  GYNECOLOGIC SYMPTOMS: No  BREAST SYMPTOMS: No  SKELETAL SYMPTOMS: No  BLOOD SYMPTOMS: No  NERVOUS SYSTEM SYMPTOMS: No  MENTAL HEALTH SYMPTOMS: No  Fever: No  Loss of appetite: Yes  Weight loss: Yes  Weight gain: No  Fatigue: Yes  Night sweats: No  Chills: No  Increased stress: No  Excessive hunger: No  Excessive thirst: No  Feeling hot or cold when others believe the temperature is normal: No  Loss of height: No  Post-operative complications: No  Surgical site pain: No  Hallucinations: No  Change in or Loss of Energy: Yes  Hyperactivity: No  Confusion: No      I have reviewed the patient's ROS and discussed all pertinent information as noted in the HPI.      Physical Exam:  BP 99/69 (BP Location: Right arm, Patient Position: Sitting, Cuff Size: Adult Regular)  Pulse 87  Temp 97.1  F (36.2  C) (Oral)  Resp 18  Wt 73.6 kg (162 lb 3.2 oz)  LMP 03/04/2005  SpO2 98%  BMI 26.99 kg/m2    Weight down eight pounds from last visit    CONSTITUTIONAL: Alert non-toxic appearing female in no acute distress  HEAD: Normocephalic, atraumatic  EYES: PERRLA; no scleral icterus  ENT: Oropharynx pink without lesions  NECK: Neck supple without lymphadenopathy  RESPIRATORY: Lungs clear to auscultation, no increased work of breathing noted, dry cough with deep breathing x2  CV: Regular rate and rhythm, S1S2, no  clicks, murmurs, rubs, or gallops; bilateral lower extremities without edema, dorsalis pedis pulses 2+ bilaterally  GASTROINTESTINAL: Normoactive bowel sounds x4 quadrants, abdomen soft, non-distended, and non-tender to palpation without masses or organomegaly  GENITOURINARY: Not indicated  LYMPHATIC: Cervical, supraclavicular, and inguinal nodes without lymphadenopathy  MUSCULOSKELETAL: Moves all extremities, no obvious muscle wasting  NEUROLOGIC: No gross deficits, normal gait  SKIN: Appropriate color for race, warm and dry, no rashes or lesions to unclothed skin  PSYCHIATRIC: Pleasant and interactive, affect bright, makes appropriate eye contact, thought process linear      Labs:       8/17/2018  Day 1 (Planned)   Hemoglobin 11.7 - 15.7 g/dL 8.5 (A)   Hematocrit 35.0 - 47.0 % 28.4 (A)   Platelet Count 150 - 450 10e9/L 88 (A)   Absolute Neutrophil 1.6 - 8.3 10e9/L 1.7   Sodium 133 - 144 mmol/L 138   Potassium 3.4 - 5.3 mmol/L 3.6   Chloride 94 - 109 mmol/L 104   Carbon Dioxide 20 - 32 mmol/L 25   Urea Nitrogen 7 - 30 mg/dL 13   Creatinine 0.52 - 1.04 mg/dL 0.99   Calcium 8.5 - 10.1 mg/dL 9.2   Magnesium 1.6 - 2.3 mg/dL 2.1   Bilirubin Total 0.2 - 1.3 mg/dL 0.4   ALT 0 - 50 U/L 15   AST 0 - 45 U/L 13   Alkaline Phosphatase 40 - 150 U/L 110   Albumin 3.4 - 5.0 g/dL 2.9 (A)   Protein Total 6.8 - 8.8 g/dL 6.8   WBC 4.0 - 11.0 10e9/L 2.4 (A)     Assessment/Plan:  1) Stage IIIC1 low grade endometrial cancer: Currently feeling well, however, platelets 88K- no bleeding. Defer chemotherapy x1 week until plts 100K or greater. To receive three cycles of single agent carboplatin followed by radiation- will return upon completion of radiation therapy for three more cycles of carboplatin. Will add in Aloxi and Emend given her significant nausea and weight loss the first week of treatment- reviewed not to take Zofran for 72h post-Aloxi. Reviewed signs and symptoms for when she should contact the clinic or seek additional care,  including but not limited to fever, chills, inability to keep down food or fluids, nausea and vomiting not controlled with antiemetics, and diarrhea leading to dehydration. Patient to contact the clinic with any questions or concerns in the interim. Reinforced importance of seeking emergency care for T>100.4F. Port deaccessed prior to discharge without incident.  2) Chemotherapy symptoms:   Fatigue: Not impacting ADLs. Likely due to chemotherapy induced anemia as well as her chemotherapy. Continue iron supplement daily. Continue physical activity with periods of rest.   Nutrition: 8lb weight loss, though albumin and protein are increasing. Reviewed importance of small frequent meals and ways to increase her protein intake. Recommend that she start a supplement such as Boost or Ensure.   Nausea: Adding in Aloxi and Emend. See #1.  3) PE: Asymptomatic, tolerating Lovenox injections. Continue with therapeutic Lovenox.  4) Genetic counseling: MMR proteins with intact nuclear expression making Peterson syndrome unlikely.  5) Health maintenance issues discussed include to follow up with PCP for non-gynecologic concerns and co-morbid conditions  6) Patient verbalized understanding of and agreement with plan    25 minutes spent with patient, over 50% of which was spent on counseling and coordination of care.    ALFONZO Weathers, FNP-C  Division of Gynecologic Oncology  [facility]  Pager: [phone]

## 2018-08-17 NOTE — MR AVS SNAPSHOT
After Visit Summary   8/17/2018    Gerri Owens    MRN: [id number]           Patient Information     Date Of Birth          1955        Visit Information        Provider Department      8/17/2018 8:20 AM Irasema Gtz APRN [facility]        Today's Diagnoses     Endometrial cancer (H)    -  1    Encounter for antineoplastic chemotherapy        Encounter for long-term (current) use of medications        Chemotherapy-induced thrombocytopenia        Antineoplastic chemotherapy induced anemia        CINV (chemotherapy-induced nausea and vomiting)           Follow-ups after your visit        Your next 10 appointments already scheduled     Aug 27, 2018 10:30 AM CDT   Return Visit with Darling Billings MD   Radiation Oncology Clinic ([facility])    [facility]  1st Floor  [address] [address]   [phone]            Aug 27, 2018 11:00 AM CDT   TCT/SIM Suite Visit with Darling Billings MD   Radiation Oncology Clinic ([facility])    [facility]  1st Floor  [address] [address]   [phone]            Aug 27, 2018 12:20 PM CDT   (Arrive by 11:50 AM)   CT CHEST/ABDOMEN/PELVIS W CONTRAST with UCCT1   [facility] CT ([facility])    [address]  [address] [place] [address] [address] [phone]           Please bring any scans or X-rays taken at other hospitals, if similar tests were done. Also bring a list of your medicines, including vitamins, minerals and over-the-counter drugs. It is safest to leave personal items at home.  Be sure to tell your doctor:   If you have any allergies.   If there s any chance you are pregnant.   If you are breastfeeding.  How to prepare:   Do not eat or drink for 2 hours before your exam. If you need to take medicine, you may take it with small sips of water. (We may ask you to take  liquid medicine as well.)   Please wear loose clothing, such as a sweat suit or jogging clothes. Avoid snaps, zippers and other metal. We may ask you to undress and put on a hospital gown.  Please arrive 30 minutes early for your CT. Once in the department you might be asked to drink water 15-20 minutes prior to your exam.  If indicated you may be asked to drink an oral contrast in advance of your CT.  If this is the case, the imaging team will let you know or be in contact with you prior to your appointment  Patients over 70 or patients with diabetes or kidney problems:   If you haven t had a blood test (creatinine test) within the last 30 days, the Cardiologist/Radiologist may require you to get this test prior to your exam.  If you have diabetes:   Continue to take your metformin medication on the day of your exam  If you have any questions, please call the Imaging Department where you will have your exam.              Who to contact     If you have questions or need follow up information about today's clinic visit or your schedule please contact [facility] directly at [phone].  Normal or non-critical lab and imaging results will be communicated to you by [employer], letter or phone within 4 business days after the clinic has received the results. If you do not hear from us within 7 days, please contact the clinic through [employer] or phone. If you have a critical or abnormal lab result, we will notify you by phone as soon as possible.  Submit refill requests through [employer] or call your pharmacy and they will forward the refill request to us. Please allow 3 business days for your refill to be completed.          Additional Information About Your Visit        [employer] Information     [employer] gives you secure access to your electronic health record. If you see a primary care provider, you can also send messages to your care team and make appointments. If you have questions, please call your  primary care clinic.  If you do not have a primary care provider, please call [phone] and they will assist you.        Care EveryWhere ID     This is your Care EveryWhere ID. This could be used by other organizations to access your [facility] medical records  [id number]        Your Vitals Were     Pulse Temperature Respirations Last Period Pulse Oximetry BMI (Body Mass Index)    87 97.1  F (36.2  C) (Oral) 18 03/04/2005 98% 26.99 kg/m2       Blood Pressure from Last 3 Encounters:   08/17/18 99/69   08/07/18 104/67   07/27/18 94/54    Weight from Last 3 Encounters:   08/17/18 73.6 kg (162 lb 3.2 oz)   08/07/18 74.5 kg (164 lb 4 oz)   07/27/18 77.1 kg (170 lb)              Today, you had the following     No orders found for display         Where to get your medicines      These medications were sent to [facility] [address]  [address] [facility] [address]     Phone:  [phone]     prochlorperazine 10 MG tablet          Primary Care Provider Office Phone # Fax #    Karo Doty [phone]       [address]  [facility] [address]        Equal Access to Services     JEREMÍAS BUCK : Hadii thalia lopezo Sograce, waaxda luqadaha, qaybta kaalmada adeegyada, carol roland. So [facility] [phone].    ATENCIÓN: Si habla español, tiene a graham disposición servicios gratuitos de asistencia lingüística. Llame al [phone].    We comply with applicable federal civil rights laws and [place] laws. We do not discriminate on the basis of race, color, national origin, age, disability, sex, sexual orientation, or gender identity.            Thank you!     Thank you for choosing [facility]  for your care. Our goal is always to provide you with excellent care. Hearing back from our patients is one way we can continue to improve our services. Please take a few minutes to complete the written survey that you may  receive in the mail after your visit with us. Thank you!             Your Updated Medication List - Protect others around you: Learn how to safely use, store and throw away your medicines at www.disposemymeds.org.          This list is accurate as of 8/17/18  1:18 PM.  Always use your most recent med list.                   Brand Name Dispense Instructions for use Diagnosis    acetaminophen 500 MG tablet    TYLENOL     Take 1 tablet (500 mg) by mouth every 4 hours as needed    S/P DEMETRIO-BSO       ascorbic acid 250 MG tablet    VITAMIN C    90    250 mg    Routine general medical examination at a health care facility       calcium citrate-vitamin D 315-200 MG-UNIT Tabs per tablet    CITRACAL     Take 2 tablets by mouth daily        enoxaparin 80 MG/0.8ML injection    LOVENOX    60 Syringe    Inject 0.8 mLs (80 mg) Subcutaneous 2 times daily    Other acute pulmonary embolism without acute cor pulmonale (H)       FERROUS GLUCONATE PO      Take 325 mg by mouth daily (with breakfast)        GLUCOSAMINE CHONDROITIN COMPLX Tabs      Take  by mouth. 1500 mg 1xqd.    Routine general medical examination at a health care facility       LORazepam 1 MG tablet    ATIVAN    30 tablet    Take 1 tablet (1 mg) by mouth every 6 hours as needed (nausea/vomiting, anxiety or sleep)    Endometrial cancer (H), Encounter for long-term (current) use of medications       ondansetron 8 MG tablet    ZOFRAN    60 tablet    Take 1 tablet (8 mg) by mouth every 8 hours as needed for nausea (vomiting)    Endometrial cancer (H), Encounter for long-term (current) use of medications       prochlorperazine 10 MG tablet    COMPAZINE    30 tablet    Take 1 tablet (10 mg) by mouth every 6 hours as needed (nausea/vomiting)    Endometrial cancer (H), Encounter for long-term (current) use of medications       RANITIDINE HCL PO      Take 150 mg by mouth 2 times daily

## 2018-08-17 NOTE — NURSING NOTE
"Oncology Rooming Note    August 17, 2018 8:25 AM   Gerri Owens is a 63 year old female who presents for:    Chief Complaint   Patient presents with     Port Draw     Right port accessed with a gripper needle, labs drawn and sent, flushed with saline and heparin, vitals completed, checked into next appointment.     Oncology Clinic Visit     Endometrial Ca; Active Tx     Initial Vitals: BP 99/69 (BP Location: Right arm, Patient Position: Sitting, Cuff Size: Adult Regular)  Pulse 87  Temp 97.1  F (36.2  C) (Oral)  Resp 18  Wt 73.6 kg (162 lb 3.2 oz)  LMP 03/04/2005  SpO2 98%  BMI 26.99 kg/m2 Estimated body mass index is 26.99 kg/(m^2) as calculated from the following:    Height as of 7/6/18: 1.651 m (5' 5\").    Weight as of this encounter: 73.6 kg (162 lb 3.2 oz). Body surface area is 1.84 meters squared.  No Pain (0) Comment: Data Unavailable   Patient's last menstrual period was 03/04/2005.  Allergies reviewed: Yes  Medications reviewed: Yes    Medications: MEDICATION REFILLS NEEDED TODAY. Provider was notified.  Pharmacy name entered into [facility]:    [facility] [place], [place] - [address] [place] [address] OUTPATIENT PHARMACY  [facility] [place], [place] - [address] [facility]    Clinical concerns: Pt needs refill of compazine.    6 minutes for nursing intake (face to face time)     Sujata Sainz CMA              "

## 2018-08-17 NOTE — NURSING NOTE
Chief Complaint   Patient presents with     Port Draw     Right port accessed with a gripper needle, labs drawn and sent, flushed with saline and heparin, vitals completed, checked into next appointment.   Kaity Wu,RN

## 2018-08-17 NOTE — PROGRESS NOTES
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    HPI: Gerri comes to the clinic accompanied by her  Doug. She is feeling well today and states she tolerated single agent carboplatin well with the exception of nausea the first week. She had no emesis but had decreased oral intake and lost about eight pounds the first week of treatment. She took Zofran and Compazine with some relief. Now eating and drinking well, focusing on foods high in protein and iron. Restarted a daily iron supplement. Fatigue is stable. Continues to take Lovenox twice daily as prescribed for PE without incident- no bleeding, fevers, cough, or SOB. Constipation controlled with prune juice. Denies anxiety, states she becomes nervous at times thinking about the length of her treatment. Denies need for further emotional/mental health support. Feels ready to continue with treatment today.      Oncology History:  18- OBGYN visit with Dr. Tineo.  Complains of 1.5 years of post-menopausal bleeding.                           EMB performed and showed      18- EUA, hysteroscopy D&C under ultrasound guidance.  Diffuse proliferative vascular endometrium noted.                         Pathology:                          FINAL DIAGNOSIS:                          ENDOMETRIAL CURETTINGS:                          - Endometrial endometrioid adenocarcinoma, FIGO grade 2                            COMMENT:                          While the tumor maintained a glandular morphology, the occasional high                          nuclear grade warranted upgrading to                          FIGO 2.      18- Patient reports that she continues to have some post-menopausal spotting       4/27/18 DEMETRIO/BSO and staging:  PATH:  A. ANTERIOR FUNDUS, BIOPSY:   - Positive for adenocarcinoma     B. UTERUS, CERVIX, BILATERAL TUBES AND OVARIES, HYSTERECTOMY WITH   BILATERAL SALPINGO-OOPHORECTOMY:   - Endometrial endometrioid adenocarcinoma, FIGO grade 1   - Adenomyosis   - Cervix invaded by endometrial adenocarcinoma   - Right ovarian surface adhesions involved by endometrial adenocarcinoma   - Right fallopian tube with endometriosis   - Left adnexa invaded by endometrial adenocarcinoma (5.5 cm)     C. LYMPH NODES, LEFT PELVIC, EXCISION:   - Metastatic adenocarcinoma to one of eight lymph nodes (1/8)   - The metastatic focus is 3 mm in greatest dimension with no extranodal   extension     D. LYMPH NODES, LEFT PARA-AORTIC, EXCISION:   - One reactive lymph node, negative for malignancy (0/1)     E. LYMPH NODES, RIGHT PELVIC, EXCISION:   - Metastatic adenocarcinoma to one of six lymph nodes (1/6)   - The metastatic focus is 21 mm in greatest dimension with positive   extranodal extension     F. LYMPH NODES, RIGHT PARA-AORTIC, EXCISION:   - Three reactive lymph nodes, negative for malignancy (0/3)     G. OMENTUM, RESECTION:   - Omental adipose tissue with focal inflammation and surface mesothelial   hyperplasia   - Negative for malignancy     COMMENT:   The final diagnosis confirms the interpretation provided intraoperatively.     Report Name: Endometrium - Hysterectomy        Status: Submitted Checklist Inst: 1      Last Updated By: Fernie Ramirez M.D., PhD, Physicians,   05/06/2018 13:52:11   Part(s) Involved:   B: Uterus, cervix, bilateral tubes and ovaries     Synoptic Report:     SPECIMEN     Procedure:         - Simple hysterectomy         - Bilateral salpingo-oophorectomy         - Omentectomy         - Peritoneal washing     Specimen Integrity:         - Opened     TUMOR     Tumor Site:         - Endometrium         - Lower uterine segment     Histologic Type:         - Endometrioid carcinoma,  NOS     Histologic Grade:         - FIGO grade 1     Tumor Size: 5.5 Centimeters (cm)     Tumor Extent       Myometrial Invasion:           - Present         Depth of Invasion: 15 Millimeters (mm)         Myometrial Thickness: 15 Millimeters (mm)         Percentage of Myometrial Invasion: 100%       Adenomyosis:           - Present, involved by carcinoma       Uterine Serosa Involvement:           - Present       Lower Uterine Segment Involvement:           - Present         Level of Tumor Involvement:             - Myoinvasive       Cervical Stromal Involvement:           - Present       Other Tissue / Organ Involvement:           - Right ovary           - Left ovary           - Left fallopian tube       Peritoneal Ascitic Fluid:           - Negative for malignancy (normal / benign)     Accessory Findings       Lymphovascular Invasion:           - Present     MARGINS     Ectocervical / Vaginal Cuff Margin:         - Uninvolved by carcinoma     LYMPH NODES     Number of Pelvic Nodes with Macrometastasis: 2     Number of Pelvic Nodes with Micrometastasis: 0     Laterality of Pelvic Node(s) with Tumor:         - Right         - Left     Total Number of Pelvic Node(s) Examined (sentinel and nonsentinel): 14     Number of Pelvic [place] Nodes Examined: 0     Laterality of Pelvic Node(s) Examined:         - Right         - Left     Number of Para-Aortic Nodes with Macrometastasis: 0     Number of Para-Aortic Nodes with Micrometastasis: 0     Total Number of Para-Aortic Node(s) Examined (sentinel and nonsentinel):    4     Number of Para-Aortic [place] Nodes Examined: 0     Laterality of Para-Aortic Node(s) Examined:         - Right         - Left     PATHOLOGIC STAGE CLASSIFICATION (PTNM, AJCC 8TH EDITION)     Primary Tumor (pT):         - pT3a     Regional Lymph Nodes (pN)       Category (pN):           - pN1a     FIGO STAGE     FIGO Stage:         - IIIC1      4/29/2018: CT:  IMPRESSION:   1. No pulmonary embolism.  Postop atelectasis.  2. Fluid-filled loops of dilated small bowel in the upper abdomen,  favored to represent adynamic ileus although incompletely visualized.  3. Pneumoperitoneum and small volume ascites, expected given  postoperative day 2 following open surgery.  4. Partially visualized 10 mm right thyroid nodule.     6/13/18: C1D1 carboplatin/paclitaxel- anaphylactic reaction to paclitaxel, no carboplatin received.    7/6/18: C1 single agent carboplatin AUC 6     7/27/18: C2 SA carboplatin AUC 6    8/17/18: C3 SA carboplatin AUC 6, deferred due to thrombocytopenia    Past Medical History:   Diagnosis Date     Abnormal renal function test 12/04/2017    due to NSAIDS, normal after stopping taking them     Advanced directives, counseling/discussion      Arthritis      BMI 35.0-35.9,adult 11/12/2017     Coagulation disorder (H) 2012    Had DVT     DVT of upper extremity (deep vein thrombosis) (H) 3/27/2012     Endometrial cancer (H) 04/2018     Gastroesophageal reflux disease      Hyperlipidemia LDL goal < 160      MEDICAL HISTORY OF - 1997    left breast density     Microscopic hematuria 3/22/2018     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  7/17/2012     Thyrotoxicosis 2007    hyperthyroid, enlarged right thyroid on thyroid uptake, treate by [facility]in [facility] with tapozole till 12/2008, FNA neg of 1.9 cm right lobe dominat nodule     Whooping cough due to Bordetella pertussis (p. pertussis) infant    whooping cough       Past Surgical History:   Procedure Laterality Date     ARTHROPLASTY HIP Left 12/4/2017    Procedure: ARTHROPLASTY HIP;  Left total hip arthroplasty;  Surgeon: Rajinder Goodwin MD;  Location: [facility] OR     BREAST BIOPSY, RT/LT  2004    left breast     DILATION AND CURETTAGE, OPERATIVE HYSTEROSCOPY WITH MORCELLATOR, COMBINED N/A 4/11/2018    Procedure: COMBINED DILATION AND CURETTAGE, OPERATIVE HYSTEROSCOPY WITH MORCELLATOR;  Hysteroscopy, Dilation and Curettage Under  Ultrasound Guidance ;  Surgeon: Adry Tineo MD;  Location: [facility] OR     DILATION AND CURETTAGE, WITH ULTRASOUND GUIDANCE  4/11/2018    Procedure: DILATION AND CURETTAGE, WITH ULTRASOUND GUIDANCE;;  Surgeon: Adry Tineo MD;  Location: [facility]     HYSTERECTOMY TOTAL ABDOMINAL, BILATERAL SALPINGO-OOPHORECTOMY, NODE DISSECTION, COMBINED Bilateral 4/27/2018    Procedure: COMBINED HYSTERECTOMY TOTAL ABDOMINAL, SALPINGO-OOPHORECTOMY, NODE DISSECTION;;  Surgeon: Bradley Tierney MD;  Location: [facility] OR     INSERT PORT VASCULAR ACCESS Right 5/31/2018    Procedure: INSERT PORT VASCULAR ACCESS;  Single Lumen Chest Power Port;  Surgeon: Jamila Major PA-C;  [facility]     LAPAROSCOPIC HYSTERECTOMY TOTAL, BILATERAL SALPINGO-OOPHORECTOMY, NODE DISSECTION, COMBINED N/A 4/27/2018    Procedure: COMBINED LAPAROSCOPIC HYSTERECTOMY TOTAL, SALPINGO-OOPHORECTOMY, NODE DISSECTION;  Laparoscopic converted to open Removal Of Uterus, Tubes, Ovaries, Cervix, Pelvic and Para Aortic Lymphnode Dissection, Tumor Debulking, CUSA, Partial Omentectomy, Anesthesia Block;  Surgeon: Bradley Tierney MD;  Location: [facility] OR     TONSILLECTOMY  1960    tonsillectomy       Current Outpatient Prescriptions   Medication     acetaminophen (TYLENOL) 500 MG tablet     calcium citrate-vitamin D (CITRACAL) 315-200 MG-UNIT TABS per tablet     enoxaparin (LOVENOX) 80 MG/0.8ML injection     FERROUS GLUCONATE PO     GLUCOSAMINE CHONDROITIN COMPLX OR TABS     LORazepam (ATIVAN) 1 MG tablet     ondansetron (ZOFRAN) 8 MG tablet     prochlorperazine (COMPAZINE) 10 MG tablet     RANITIDINE HCL PO     sulfamethoxazole-trimethoprim (BACTRIM/SEPTRA) 400-80 MG per tablet     VITAMIN C 250 MG OR TABS     Current Facility-Administered Medications   Medication     heparin 100 UNIT/ML injection 5 mL     sodium chloride (PF) 0.9% PF flush 20 mL       Allergies   Allergen Reactions     Paclitaxel      reportedTaxol= anaphylaxsis     Nickel Rash        Family History   Problem Relation Age of Onset     Diabetes Mother      type 2     HEART DISEASE Mother      Hypertension Mother      C.A.D. Mother       after 2nd heart attack     Hypertension Father      C.A.D. Father      mild heart attack     Cancer Father      skin cancer     HEART DISEASE Father      Arthritis Sister      rheumatoid arthritis     HEART DISEASE Brother      hereditary heart murmer     Lipids Brother      Lipids Brother      KIDNEY DISEASE No family hx of        Social History     Social History     Marital status:      Spouse name: Doug     Number of children: 0     Years of education: N/A     Occupational History     rn [employer]     Social History Main Topics     Smoking status: Never Smoker     Smokeless tobacco: Never Used     Alcohol use Yes      Comment: rarely     Drug use: No     Sexual activity: Yes     Partners: Male     Other Topics Concern     Parent/Sibling W/ Cabg, Mi Or Angioplasty Before 65f 55m? 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  She does not smoke and was never smoker, she drinks alcohol about a month, no illicit drugs            Balanced Diet - Yes, in the last 6 months    Osteoporosis Prevention Measures - Dairy servings per day: 2 servings plus a supplement    Regular Exercise -  Yes Describe walks for 30 to 40 minutes 4 to 5 times a week    Dental Exam - YES - Date: 1 year ago, and is going today    Eye Exam - YES - Date: 4 months ago    Self Breast Exam - Yes    Abuse: Current or Past (Physical, Sexual or Emotional)- No    Do you feel safe in your environment - Yes    Guns stored in the home - Yes, locked    Sunscreen used - Yes    Seatbelts used - Yes    Lipids -  YES - Date: 10-02    Glucose -  YES - Date: 10-02    Colon Cancer Screening - No    Hemoccults - NO    Pap Test -  YES - Date: 10-02    Do you have any concerns about STD's -   No    Mammography - YES - Date: 8-06    DEXA - NO    Immunizations reviewed and up to date - Yes, lst td 7-2000    10-23-07  MEL Mueller [facility]           ROS  Answers for HPI/ROS submitted by the patient on 8/3/2018   General Symptoms: Yes  Skin Symptoms: No  HENT Symptoms: No  EYE SYMPTOMS: No  HEART SYMPTOMS: No  LUNG SYMPTOMS: No  INTESTINAL SYMPTOMS: No  URINARY SYMPTOMS: No  GYNECOLOGIC SYMPTOMS: No  BREAST SYMPTOMS: No  SKELETAL SYMPTOMS: No  BLOOD SYMPTOMS: No  NERVOUS SYSTEM SYMPTOMS: No  MENTAL HEALTH SYMPTOMS: No  Fever: No  Loss of appetite: Yes  Weight loss: Yes  Weight gain: No  Fatigue: Yes  Night sweats: No  Chills: No  Increased stress: No  Excessive hunger: No  Excessive thirst: No  Feeling hot or cold when others believe the temperature is normal: No  Loss of height: No  Post-operative complications: No  Surgical site pain: No  Hallucinations: No  Change in or Loss of Energy: Yes  Hyperactivity: No  Confusion: No      I have reviewed the patient's ROS and discussed all pertinent information as noted in the HPI.      Physical Exam:  BP 99/69 (BP Location: Right arm, Patient Position: Sitting, Cuff Size: Adult Regular)  Pulse 87  Temp 97.1  F (36.2  C) (Oral)  Resp 18  Wt 73.6 kg (162 lb 3.2 oz)  LMP 03/04/2005  SpO2 98%  BMI 26.99 kg/m2    Weight down eight pounds from last visit    CONSTITUTIONAL: Alert non-toxic appearing female in no acute distress  HEAD: Normocephalic, atraumatic  EYES: PERRLA; no scleral icterus  ENT: Oropharynx pink without lesions  NECK: Neck supple without lymphadenopathy  RESPIRATORY: Lungs clear to auscultation, no increased work of breathing noted, dry cough with deep breathing x2  CV: Regular rate and rhythm, S1S2, no clicks, murmurs, rubs, or gallops; bilateral lower extremities without edema, dorsalis pedis pulses 2+ bilaterally  GASTROINTESTINAL: Normoactive bowel sounds x4 quadrants, abdomen soft, non-distended, and non-tender to palpation without masses or  organomegaly  GENITOURINARY: Not indicated  LYMPHATIC: Cervical, supraclavicular, and inguinal nodes without lymphadenopathy  MUSCULOSKELETAL: Moves all extremities, no obvious muscle wasting  NEUROLOGIC: No gross deficits, normal gait  SKIN: Appropriate color for race, warm and dry, no rashes or lesions to unclothed skin  PSYCHIATRIC: Pleasant and interactive, affect bright, makes appropriate eye contact, thought process linear      Labs:       8/17/2018  Day 1 (Planned)   Hemoglobin 11.7 - 15.7 g/dL 8.5 (A)   Hematocrit 35.0 - 47.0 % 28.4 (A)   Platelet Count 150 - 450 10e9/L 88 (A)   Absolute Neutrophil 1.6 - 8.3 10e9/L 1.7   Sodium 133 - 144 mmol/L 138   Potassium 3.4 - 5.3 mmol/L 3.6   Chloride 94 - 109 mmol/L 104   Carbon Dioxide 20 - 32 mmol/L 25   Urea Nitrogen 7 - 30 mg/dL 13   Creatinine 0.52 - 1.04 mg/dL 0.99   Calcium 8.5 - 10.1 mg/dL 9.2   Magnesium 1.6 - 2.3 mg/dL 2.1   Bilirubin Total 0.2 - 1.3 mg/dL 0.4   ALT 0 - 50 U/L 15   AST 0 - 45 U/L 13   Alkaline Phosphatase 40 - 150 U/L 110   Albumin 3.4 - 5.0 g/dL 2.9 (A)   Protein Total 6.8 - 8.8 g/dL 6.8   WBC 4.0 - 11.0 10e9/L 2.4 (A)     Assessment/Plan:  1) Stage IIIC1 low grade endometrial cancer: Currently feeling well, however, platelets 88K- no bleeding. Defer chemotherapy x1 week until plts 100K or greater. To receive three cycles of single agent carboplatin followed by radiation- will return upon completion of radiation therapy for three more cycles of carboplatin. Will add in Aloxi and Emend given her significant nausea and weight loss the first week of treatment- reviewed not to take Zofran for 72h post-Aloxi. Reviewed signs and symptoms for when she should contact the clinic or seek additional care, including but not limited to fever, chills, inability to keep down food or fluids, nausea and vomiting not controlled with antiemetics, and diarrhea leading to dehydration. Patient to contact the clinic with any questions or concerns in the interim.  Reinforced importance of seeking emergency care for T>100.4F. Port deaccessed prior to discharge without incident.  2) Chemotherapy symptoms:   Fatigue: Not impacting ADLs. Likely due to chemotherapy induced anemia as well as her chemotherapy. Continue iron supplement daily. Continue physical activity with periods of rest.   Nutrition: 8lb weight loss, though albumin and protein are increasing. Reviewed importance of small frequent meals and ways to increase her protein intake. Recommend that she start a supplement such as Boost or Ensure.   Nausea: Adding in Aloxi and Emend. See #1.  3) PE: Asymptomatic, tolerating Lovenox injections. Continue with therapeutic Lovenox.  4) Genetic counseling: MMR proteins with intact nuclear expression making Peterson syndrome unlikely.  5) Health maintenance issues discussed include to follow up with PCP for non-gynecologic concerns and co-morbid conditions  6) Patient verbalized understanding of and agreement with plan    25 minutes spent with patient, over 50% of which was spent on counseling and coordination of care.    ALFONZO Weathers, FNP-C  Division of Gynecologic Oncology  [facility]  Pager: [phone]

## 2018-08-20 ENCOUNTER — CARE COORDINATION (OUTPATIENT)
Dept: ONCOLOGY | Facility: CLINIC | Age: 63
End: 2018-08-20

## 2018-08-20 RX ORDER — HEPARIN SODIUM (PORCINE) LOCK FLUSH IV SOLN 100 UNIT/ML 100 UNIT/ML
500 SOLUTION INTRAVENOUS EVERY 8 HOURS
Status: CANCELLED
Start: 2018-08-24

## 2018-08-20 RX ORDER — SODIUM CHLORIDE 9 MG/ML
1000 INJECTION, SOLUTION INTRAVENOUS CONTINUOUS PRN
Status: CANCELLED
Start: 2018-08-24

## 2018-08-20 RX ORDER — EPINEPHRINE 0.3 MG/.3ML
0.3 INJECTION SUBCUTANEOUS EVERY 5 MIN PRN
Status: CANCELLED | OUTPATIENT
Start: 2018-08-24

## 2018-08-20 RX ORDER — LORAZEPAM 2 MG/ML
1 INJECTION INTRAMUSCULAR EVERY 6 HOURS PRN
Status: CANCELLED
Start: 2018-08-24

## 2018-08-20 RX ORDER — DIPHENHYDRAMINE HYDROCHLORIDE 50 MG/ML
50 INJECTION INTRAMUSCULAR; INTRAVENOUS
Status: CANCELLED
Start: 2018-08-24

## 2018-08-20 RX ORDER — EPINEPHRINE 1 MG/ML
0.3 INJECTION, SOLUTION INTRAMUSCULAR; SUBCUTANEOUS EVERY 5 MIN PRN
Status: CANCELLED | OUTPATIENT
Start: 2018-08-24

## 2018-08-20 RX ORDER — ALBUTEROL SULFATE 0.83 MG/ML
2.5 SOLUTION RESPIRATORY (INHALATION)
Status: CANCELLED | OUTPATIENT
Start: 2018-08-24

## 2018-08-20 RX ORDER — METHYLPREDNISOLONE SODIUM SUCCINATE 125 MG/2ML
125 INJECTION, POWDER, LYOPHILIZED, FOR SOLUTION INTRAMUSCULAR; INTRAVENOUS
Status: CANCELLED
Start: 2018-08-24

## 2018-08-20 RX ORDER — MEPERIDINE HYDROCHLORIDE 25 MG/ML
25 INJECTION INTRAMUSCULAR; INTRAVENOUS; SUBCUTANEOUS EVERY 30 MIN PRN
Status: CANCELLED | OUTPATIENT
Start: 2018-08-24

## 2018-08-20 RX ORDER — ALBUTEROL SULFATE 90 UG/1
1-2 AEROSOL, METERED RESPIRATORY (INHALATION)
Status: CANCELLED
Start: 2018-08-24

## 2018-08-20 RX ORDER — PALONOSETRON 0.05 MG/ML
0.25 INJECTION, SOLUTION INTRAVENOUS ONCE
Status: CANCELLED
Start: 2018-08-24 | End: 2018-08-24

## 2018-08-20 NOTE — PROGRESS NOTES
Care Coordinator Note  Left message with rescheduled appointment times for [facility].    Patient returned call will have lab and chemotherapy at [facility] on 8/24/18.    Joyce Gil MSN, RN  Care Coordinator  Gynecologic Cancer   Office:  [phone]  Pager: [phone] #[phone]

## 2018-08-24 ENCOUNTER — HOSPITAL ENCOUNTER (OUTPATIENT)
Facility: CLINIC | Age: 63
Setting detail: SPECIMEN
Discharge: HOME | End: 2018-08-24
Attending: OBSTETRICS & GYNECOLOGY | Admitting: NURSE PRACTITIONER
Payer: COMMERCIAL

## 2018-08-24 ENCOUNTER — INFUSION THERAPY VISIT (OUTPATIENT)
Dept: INFUSION THERAPY | Facility: CLINIC | Age: 63
End: 2018-08-24
Attending: OBSTETRICS & GYNECOLOGY
Payer: COMMERCIAL

## 2018-08-24 VITALS
TEMPERATURE: 98.1 F | SYSTOLIC BLOOD PRESSURE: 102 MMHG | HEIGHT: 65 IN | HEART RATE: 93 BPM | DIASTOLIC BLOOD PRESSURE: 69 MMHG | WEIGHT: 163 LBS | BODY MASS INDEX: 27.16 KG/M2

## 2018-08-24 DIAGNOSIS — Z79.899 ENCOUNTER FOR LONG-TERM (CURRENT) USE OF MEDICATIONS: ICD-10-CM

## 2018-08-24 DIAGNOSIS — C54.1 ENDOMETRIAL CANCER (H): Primary | ICD-10-CM

## 2018-08-24 LAB
ALBUMIN SERPL-MCNC: 2.9 G/DL (ref 3.4–5)
ALP SERPL-CCNC: 102 U/L (ref 40–150)
ALT SERPL W P-5'-P-CCNC: 15 U/L (ref 0–50)
ANION GAP SERPL CALCULATED.3IONS-SCNC: 8 MMOL/L (ref 3–14)
AST SERPL W P-5'-P-CCNC: 12 U/L (ref 0–45)
BASOPHILS # BLD AUTO: 0 10E9/L (ref 0–0.2)
BASOPHILS NFR BLD AUTO: 0 %
BILIRUB SERPL-MCNC: 0.3 MG/DL (ref 0.2–1.3)
BUN SERPL-MCNC: 17 MG/DL (ref 7–30)
CALCIUM SERPL-MCNC: 9.3 MG/DL (ref 8.5–10.1)
CHLORIDE SERPL-SCNC: 109 MMOL/L (ref 94–109)
CO2 SERPL-SCNC: 26 MMOL/L (ref 20–32)
CREAT SERPL-MCNC: 0.94 MG/DL (ref 0.52–1.04)
DIFFERENTIAL METHOD BLD: ABNORMAL
EOSINOPHIL # BLD AUTO: 0 10E9/L (ref 0–0.7)
EOSINOPHIL NFR BLD AUTO: 0.6 %
ERYTHROCYTE [DISTWIDTH] IN BLOOD BY AUTOMATED COUNT: 30.8 % (ref 10–15)
GFR SERPL CREATININE-BSD FRML MDRD: 61 ML/MIN/1.7M2
GLUCOSE SERPL-MCNC: 93 MG/DL (ref 70–99)
HCT VFR BLD AUTO: 27.9 % (ref 35–47)
HGB BLD-MCNC: 8.7 G/DL (ref 11.7–15.7)
IMM GRANULOCYTES # BLD: 0 10E9/L (ref 0–0.4)
IMM GRANULOCYTES NFR BLD: 0 %
LYMPHOCYTES # BLD AUTO: 0.5 10E9/L (ref 0.8–5.3)
LYMPHOCYTES NFR BLD AUTO: 30.1 %
MAGNESIUM SERPL-MCNC: 2.3 MG/DL (ref 1.6–2.3)
MCH RBC QN AUTO: 28 PG (ref 26.5–33)
MCHC RBC AUTO-ENTMCNC: 31.2 G/DL (ref 31.5–36.5)
MCV RBC AUTO: 90 FL (ref 78–100)
MONOCYTES # BLD AUTO: 0.3 10E9/L (ref 0–1.3)
MONOCYTES NFR BLD AUTO: 17.9 %
NEUTROPHILS # BLD AUTO: 0.9 10E9/L (ref 1.6–8.3)
NEUTROPHILS NFR BLD AUTO: 51.4 %
NRBC # BLD AUTO: 0 10*3/UL
NRBC BLD AUTO-RTO: 0 /100
PLATELET # BLD AUTO: 404 10E9/L (ref 150–450)
POTASSIUM SERPL-SCNC: 3.9 MMOL/L (ref 3.4–5.3)
PROT SERPL-MCNC: 6.8 G/DL (ref 6.8–8.8)
RBC # BLD AUTO: 3.11 10E12/L (ref 3.8–5.2)
SODIUM SERPL-SCNC: 143 MMOL/L (ref 133–144)
WBC # BLD AUTO: 1.7 10E9/L (ref 4–11)

## 2018-08-24 PROCEDURE — 85025 COMPLETE CBC W/AUTO DIFF WBC: CPT | Performed by: NURSE PRACTITIONER

## 2018-08-24 PROCEDURE — 80053 COMPREHEN METABOLIC PANEL: CPT | Performed by: NURSE PRACTITIONER

## 2018-08-24 PROCEDURE — 83735 ASSAY OF MAGNESIUM: CPT | Performed by: NURSE PRACTITIONER

## 2018-08-24 PROCEDURE — 36591 DRAW BLOOD OFF VENOUS DEVICE: CPT

## 2018-08-24 PROCEDURE — 25000128 H RX IP 250 OP 636: Performed by: OBSTETRICS & GYNECOLOGY

## 2018-08-24 RX ORDER — HEPARIN SODIUM (PORCINE) LOCK FLUSH IV SOLN 100 UNIT/ML 100 UNIT/ML
500 SOLUTION INTRAVENOUS ONCE
Status: COMPLETED | OUTPATIENT
Start: 2018-08-24 | End: 2018-08-24

## 2018-08-24 RX ORDER — MULTIPLE VITAMINS W/ MINERALS TAB 9MG-400MCG
1 TAB ORAL DAILY
COMMUNITY

## 2018-08-24 RX ADMIN — SODIUM CHLORIDE, PRESERVATIVE FREE 500 UNITS: 5 INJECTION INTRAVENOUS at 13:14

## 2018-08-24 ASSESSMENT — PAIN SCALES - GENERAL: PAINLEVEL: NO PAIN (0)

## 2018-08-24 NOTE — MR AVS SNAPSHOT
After Visit Summary   8/24/2018    Gerri Owens    MRN: [id number]           Patient Information     Date Of Birth          1955        Visit Information        Provider Department      8/24/2018 12:30 PM  INFUSION CHAIR 9 [facility]        Today's Diagnoses     Endometrial cancer (H)    -  1    Encounter for long-term (current) use of medications           Follow-ups after your visit        Your next 10 appointments already scheduled     Aug 27, 2018 10:30 AM CDT   Return Visit with Darling Billings MD   Radiation Oncology Clinic ([facility])    [facility]  1st Floor  [address] [place] [address]   [phone]            Aug 27, 2018 11:00 AM CDT   TCT/SIM Suite Visit with Darling Billings MD   Radiation Oncology Clinic ([facility])    [facility]  1st Floor  [address] [place] [address]   [phone]            Aug 27, 2018 12:20 PM CDT   (Arrive by 11:50 AM)   CT CHEST/ABDOMEN/PELVIS W CONTRAST with UCCT1   [facility] CT ([facility])    [address]  1st [facility] [address] [phone]           Please bring any scans or X-rays taken at other hospitals, if similar tests were done. Also bring a list of your medicines, including vitamins, minerals and over-the-counter drugs. It is safest to leave personal items at home.  Be sure to tell your doctor:   If you have any allergies.   If there s any chance you are pregnant.   If you are breastfeeding.  How to prepare:   Do not eat or drink for 2 hours before your exam. If you need to take medicine, you may take it with small sips of water. (We may ask you to take liquid medicine as well.)   Please wear loose clothing, such as a sweat suit or jogging clothes. Avoid snaps, zippers and other metal. We may ask you to undress and put on a hospital gown.  Please  arrive 30 minutes early for your CT. Once in the department you might be asked to drink water 15-20 minutes prior to your exam.  If indicated you may be asked to drink an oral contrast in advance of your CT.  If this is the case, the imaging team will let you know or be in contact with you prior to your appointment  Patients over 70 or patients with diabetes or kidney problems:   If you haven t had a blood test (creatinine test) within the last 30 days, the Cardiologist/Radiologist may require you to get this test prior to your exam.  If you have diabetes:   Continue to take your metformin medication on the day of your exam  If you have any questions, please call the Imaging Department where you will have your exam.            Aug 31, 2018 10:00 AM CDT   Level 2 with  INFUSION CHAIR 7   [facility] ([facility])    [facility]  [address]  [facility] [id number] [phone]              Who to contact     If you have questions or need follow up information about today's clinic visit or your schedule please contact [facility] directly at [phone].  Normal or non-critical lab and imaging results will be communicated to you by MyChart, letter or phone within 4 business days after the clinic has received the results. If you do not hear from us within 7 days, please contact the clinic through [employer] or phone. If you have a critical or abnormal lab result, we will notify you by phone as soon as possible.  Submit refill requests through [employer] or call your pharmacy and they will forward the refill request to us. Please allow 3 business days for your refill to be completed.          Additional Information About Your Visit        [employer] Information     [employer] gives you secure access to your electronic health record. If you see a primary care provider, you can also send messages to your care team and  "make appointments. If you have questions, please call your primary care clinic.  If you do not have a primary care provider, please call [phone] and they will assist you.        Care EveryWhere ID     This is your Care EveryWhere ID. This could be used by other organizations to access your [place] medical records  [id number]        Your Vitals Were     Pulse Temperature Height Last Period BMI (Body Mass Index)       93 98.1  F (36.7  C) (Oral) 1.651 m (5' 5\") 03/04/2005 27.12 kg/m2        Blood Pressure from Last 3 Encounters:   08/24/18 102/69   08/17/18 99/69   08/07/18 104/67    Weight from Last 3 Encounters:   08/24/18 73.9 kg (163 lb)   08/17/18 73.6 kg (162 lb 3.2 oz)   08/07/18 74.5 kg (164 lb 4 oz)              We Performed the Following     CBC with platelets differential     Comprehensive metabolic panel     Magnesium        Primary Care Provider Office Phone # Fax #    Karo Doty [phone] [address] [place] [id number]        Equal Access to Services     [facility] : Hadii thalia Springer, waursulada filemon, qaoscar benavides, carol escobar . So [facility] [phone].    ATENCIÓN: Si habla español, tiene a graham disposición servicios gratuitos de asistencia lingüística. [facility] [phone].    We comply with applicable federal civil rights laws and [place] laws. We do not discriminate on the basis of race, color, national origin, age, disability, sex, sexual orientation, or gender identity.            Thank you!     Thank you for choosing [facility]  for your care. Our goal is always to provide you with excellent care. Hearing back from our patients is one way we can continue to improve our services. Please take a few minutes to complete the written survey that you may receive in the mail after your visit with us. Thank you!             Your Updated Medication List - Protect others around you: " Learn how to safely use, store and throw away your medicines at www.disposemymeds.org.          This list is accurate as of 8/24/18  1:25 PM.  Always use your most recent med list.                   Brand Name Dispense Instructions for use Diagnosis    acetaminophen 500 MG tablet    TYLENOL     Take 1 tablet (500 mg) by mouth every 4 hours as needed    S/P DEMETRIO-BSO       ascorbic acid 250 MG tablet    VITAMIN C    90    250 mg    Routine general medical examination at a health care facility       calcium citrate-vitamin D 315-200 MG-UNIT Tabs per tablet    CITRACAL     Take 2 tablets by mouth daily        enoxaparin 80 MG/0.8ML injection    LOVENOX    60 Syringe    Inject 0.8 mLs (80 mg) Subcutaneous 2 times daily    Other acute pulmonary embolism without acute cor pulmonale (H)       FERROUS GLUCONATE PO      Take 325 mg by mouth daily (with breakfast)        GLUCOSAMINE CHONDROITIN COMPLX Tabs      Take  by mouth. 1500 mg 1xqd.    Routine general medical examination at a health care facility       LORazepam 1 MG tablet    ATIVAN    30 tablet    Take 1 tablet (1 mg) by mouth every 6 hours as needed (nausea/vomiting, anxiety or sleep)    Endometrial cancer (H), Encounter for long-term (current) use of medications       multivitamin, therapeutic with minerals Tabs tablet      Take 1 tablet by mouth daily        ondansetron 8 MG tablet    ZOFRAN    60 tablet    Take 1 tablet (8 mg) by mouth every 8 hours as needed for nausea (vomiting)    Endometrial cancer (H), Encounter for long-term (current) use of medications       prochlorperazine 10 MG tablet    COMPAZINE    30 tablet    Take 1 tablet (10 mg) by mouth every 6 hours as needed (nausea/vomiting)    Endometrial cancer (H), Encounter for long-term (current) use of medications       RANITIDINE HCL PO      Take 150 mg by mouth 2 times daily

## 2018-08-24 NOTE — PROGRESS NOTES
Infusion Nursing Note:  Gerri Owens presents today for C3D1 carboplatin   Patient seen by provider today: No   present during visit today: Not Applicable.    Note: N/A.    Intravenous Access:  Labs drawn without difficulty.  Implanted Port.    Treatment Conditions:  Lab Results   Component Value Date    HGB 8.7 08/24/2018     Lab Results   Component Value Date    WBC 1.7 08/24/2018      Lab Results   Component Value Date    ANEU 0.9 08/24/2018     Lab Results   Component Value Date     08/24/2018      Lab Results   Component Value Date     08/24/2018                   Lab Results   Component Value Date    POTASSIUM 3.9 08/24/2018           Lab Results   Component Value Date    MAG 2.3 08/24/2018            Lab Results   Component Value Date    CR 0.94 08/24/2018                   Lab Results   Component Value Date    LAURENT 9.3 08/24/2018                Lab Results   Component Value Date    BILITOTAL 0.3 08/24/2018           Lab Results   Component Value Date    ALBUMIN 2.9 08/24/2018                    Lab Results   Component Value Date    ALT 15 08/24/2018           Lab Results   Component Value Date    AST 12 08/24/2018       Results reviewed, labs did NOT meet treatment parameters: neutropenic. Per AYDEN Villalpando, hold chemo for one week. Patient scheduled accordingly.      Post Infusion Assessment:  Site patent and intact, free from redness, edema or discomfort.  No evidence of extravasations.  Access discontinued per protocol.    Discharge Plan:   Copy of AVS reviewed with patient and/or family.  Patient will return 8/31 for next appointment.  Patient discharged in stable condition accompanied by: .  Departure Mode: Ambulatory.    Johnny Hassan RN

## 2018-08-27 ENCOUNTER — RADIANT APPOINTMENT (OUTPATIENT)
Dept: CT IMAGING | Facility: CLINIC | Age: 63
End: 2018-08-27
Attending: RADIOLOGY
Payer: OTHER GOVERNMENT

## 2018-08-27 ENCOUNTER — ALLIED HEALTH/NURSE VISIT (OUTPATIENT)
Dept: RADIATION ONCOLOGY | Facility: CLINIC | Age: 63
End: 2018-08-27
Attending: RADIOLOGY
Payer: COMMERCIAL

## 2018-08-27 DIAGNOSIS — C54.1 ENDOMETRIAL CANCER (H): Primary | ICD-10-CM

## 2018-08-27 DIAGNOSIS — C54.1 ENDOMETRIAL CANCER (H): ICD-10-CM

## 2018-08-27 PROCEDURE — 77334 RADIATION TREATMENT AID(S): CPT | Performed by: RADIOLOGY

## 2018-08-27 RX ORDER — EPINEPHRINE 1 MG/ML
0.3 INJECTION, SOLUTION INTRAMUSCULAR; SUBCUTANEOUS EVERY 5 MIN PRN
Status: CANCELLED | OUTPATIENT
Start: 2018-08-31

## 2018-08-27 RX ORDER — SODIUM CHLORIDE 9 MG/ML
1000 INJECTION, SOLUTION INTRAVENOUS CONTINUOUS PRN
Status: CANCELLED
Start: 2018-08-31

## 2018-08-27 RX ORDER — MEPERIDINE HYDROCHLORIDE 25 MG/ML
25 INJECTION INTRAMUSCULAR; INTRAVENOUS; SUBCUTANEOUS EVERY 30 MIN PRN
Status: CANCELLED | OUTPATIENT
Start: 2018-08-31

## 2018-08-27 RX ORDER — DIPHENHYDRAMINE HYDROCHLORIDE 50 MG/ML
50 INJECTION INTRAMUSCULAR; INTRAVENOUS
Status: CANCELLED
Start: 2018-08-31

## 2018-08-27 RX ORDER — ALBUTEROL SULFATE 90 UG/1
1-2 AEROSOL, METERED RESPIRATORY (INHALATION)
Status: CANCELLED
Start: 2018-08-31

## 2018-08-27 RX ORDER — IOPAMIDOL 755 MG/ML
100 INJECTION, SOLUTION INTRAVASCULAR ONCE
Status: COMPLETED | OUTPATIENT
Start: 2018-08-27 | End: 2018-08-27

## 2018-08-27 RX ORDER — ALBUTEROL SULFATE 0.83 MG/ML
2.5 SOLUTION RESPIRATORY (INHALATION)
Status: CANCELLED | OUTPATIENT
Start: 2018-08-31

## 2018-08-27 RX ORDER — PALONOSETRON 0.05 MG/ML
0.25 INJECTION, SOLUTION INTRAVENOUS ONCE
Status: CANCELLED
Start: 2018-08-31 | End: 2018-08-31

## 2018-08-27 RX ORDER — LORAZEPAM 2 MG/ML
1 INJECTION INTRAMUSCULAR EVERY 6 HOURS PRN
Status: CANCELLED
Start: 2018-08-31

## 2018-08-27 RX ORDER — EPINEPHRINE 0.3 MG/.3ML
0.3 INJECTION SUBCUTANEOUS EVERY 5 MIN PRN
Status: CANCELLED | OUTPATIENT
Start: 2018-08-31

## 2018-08-27 RX ORDER — HEPARIN SODIUM (PORCINE) LOCK FLUSH IV SOLN 100 UNIT/ML 100 UNIT/ML
500 SOLUTION INTRAVENOUS EVERY 8 HOURS
Status: CANCELLED
Start: 2018-08-31

## 2018-08-27 RX ORDER — METHYLPREDNISOLONE SODIUM SUCCINATE 125 MG/2ML
125 INJECTION, POWDER, LYOPHILIZED, FOR SOLUTION INTRAMUSCULAR; INTRAVENOUS
Status: CANCELLED
Start: 2018-08-31

## 2018-08-27 RX ADMIN — IOPAMIDOL 100 ML: 755 INJECTION, SOLUTION INTRAVASCULAR at 13:00

## 2018-08-27 NOTE — PROGRESS NOTES
Radiation Therapy Patient Education    Person involved with teaching: Patient and     Patient educational needs for self management of treatment-related side effects assessment completed.  EPIC Patient Ed tab contains Patient Learning Assessment    Education Materials Given  Radiation Therapy for GYN Patients    Educational Topics Discussed  Side effects expected, Activity and Nutrition and weight loss    Response To Teaching  Verbalizes understanding    GYN Only  Vaginal Dilator-given and educated: not given    Referrals sent: None    Chemotherapy?  Due for 3rd cycle chemotherapy  RAdiation not scheduled pending chemo completion.

## 2018-08-27 NOTE — PROGRESS NOTES
[facility] [place]  Radiation Oncology Follow-up Note  2018    Gerri Owens   MRN: [id number]  : 1955     PROBLEM: FIGO stage IIIC1 endometrial cancer     SUBJECTIVE: Ms. Owens represents today to further discuss radiation therapy for her FIGO grade 1 stage IIIC1 endometrial cancer.  See radiation oncology consult note dated 2018 for full details.  In short, she presented in 2018 with abnormal uterine bleeding, endometrial biopsy showed FIGO grade 2 endometrioid adenocarcinoma (of note, final pathologic specimen is FIGO grade 1).  She underwent laparoscopic converted to open total hysterectomy (due to sharp deviation of the uterus), BSO, pelvic and periaortic lymph node dissection, tumor debulking, CUSA, and omentectomy.  Pathology showed grade 1 endometrioid adenocarcinoma of the endometrium, 5.5 cm in size with 15/15 mm (100%) myometrial invasion and uterine serosal involvement.  There was lower uterine segment and cervical stromal invasion, as well as involvement of the right ovarian surface and left adnexa.  The anterior pericervical surgical margin was positive.  LVS I was present. Involved lymph nodes were as follows: 1 of 8 left pelvic (3mm, no extranodal extension), 1/6 R pelvic (21 mm, extranodal extension present), and 0/4 periaortic. The initial anterior fundal biopsy was positive for carcinoma, and omentectomy was negative.    The patient was then planned for adjuvant sandwich chemoradiation therapy with carboplatin and Taxol, which was initially delayed due to leukopenia.  She had an adverse reaction to Taxol on 2018 and so she was continued on single agent carboplatin.     The patient met with Dr. Billings in radiation oncology on 2018 who agreed to provide adjuvant radiation therapy using the Dixon method as above.  The recommendation was to treat her with external beam radiation therapy with 5040 cGy in 20 daily fractions over 5  weeks, followed by HDR brachytherapy using vaginal cylinder to boost the vaginal cuff with 2 treatments of 600 cGy.    Since then, the patient has had ongoing difficulty with cytopenia related to her chemotherapy.  She has completed 2 cycles thus far at this point, with the potential for her third cycle on 8/31/2018 (previous 2 attempts were delayed due to cytopenia).  Currently, she reports that she is doing well.  She has had some nausea and weight loss with chemotherapy but feels her energy is improved compared to previously.    OBJECTIVE:   LMP 03/04/2005    PHYSICAL EXAM:  Gen: Alert, in NAD  Eyes: EOMI, sclera anicteric  HENT      Head: NC/AT     Ears: No external auricular lesions     Nose/sinus: No rhinorrhea or epistaxis     Oral Cavity/Oropharynx: MMM, no thrush noted  Pulm: Breathing comfortably on room air, no audible wheezes or ronchi  CV: Well-perfused, no cyanosis  Abdominal: Soft, nondistended  Skin: Normal color and turgor  Neurologic/MSK: motor grossly intact, normal gait  Psych: Mildly anxious    IMPRESSION:  Ms. Owens is a 63 year old woman with FIGO grade 1 endometrioid endometrial adenocarcinoma, FIGO stage IIIC1.  Pathology showed cervical stromal involvement, 100% myometrial invasion with uterine serosal involvement, bilateral ovarian and left fallopian tube involvement, positive anterior surface margin, 2 of 14 pelvic and 0 of 4 periaortic lymph nodes involved, one with extranodal extension.  She is now status post laparoscopic converted to open total abdominal hysterectomy, BSO, PPALND and omentectomy and 2 cycles of chemotherapy.  Her last cycle of chemotherapy prior to radiation has been delayed due to cytopenia.     RECOMMENDATION:  Again, we explained the recommendation for both pelvic external beam radiation therapy (EBRT), consisting of 5040 cGy in 28 daily fractions over 5 weeks, as well as HDR brachytherapy using a vaginal cylinder to boost the vaginal cuff, consisting of 2  treatments of 600 cGy prescribed to the vaginal surface.  We explained our rationale, as well as the logistics, risks, benefits, and alternatives to adjuvant radiation therapy.       We encouraged Ms. Owens to ask questions, which we answered to the best of our ability.  Informed consent form was signed.  Her simulation was performed this morning.    If Ms. Owens is unable to receive her third cycle of chemotherapy this Friday, we would recommend consideration of initiation of her radiotherapy so as not to further delay this treatment.  We would expect her blood counts to recover during radiotherapy and she could finish her cycles of chemotherapy after completion of RT.  We will plan to schedule her after finding out her labs this Friday and will discuss with Dr. Tierney if there needs to be any modifications to our plan.    The patient was seen and examined with Dr. Billings, who agrees with the above assessment and plan.    Kevin Anaya MD PGY-2   Radiation Oncology, [facility]  [phone] clinic  Pager [phone]      I saw and examined the patient with the resident.  I have reviewed and edited the resident's note and agree with the plan of care.    The patient had a diagnostic CT scan shortly after simulation to facilitate radiation planning as well as for restaging.  Unfortunately, CT showed large enhancing necrotic mass involving the L psoas muscle, as well as multiple enlarged adjacent nodes, highly concerning for recurrent disease.        I will discuss with Dr. Tierney regarding next steps.  She is not tolerating chemotherapy well due to cytopenia.  It is unclear if there are options for less cytotoxic chemotherapy.  Otherwise, we can proceed with radiation therapy but it is unlikely to achieve durable local control given the bulkiness of the mass.        Darling Billings MD

## 2018-08-27 NOTE — MR AVS SNAPSHOT
After Visit Summary   8/27/2018    Gerri Owens    MRN: [id number]           Patient Information     Date Of Birth          1955        Visit Information        Provider Department      8/27/2018 11:00 AM Darling Billings MD Radiation Oncology Clinic        Today's Diagnoses     Endometrial cancer (H)    -  1       Follow-ups after your visit        Your next 10 appointments already scheduled     Aug 31, 2018 10:00 AM CDT   Level 2 with  INFUSION CHAIR 7   [facility] ([facility])    [facility]  [address]  [facility] [id number] [phone]              Who to contact     Please call your clinic at [phone] to:    Ask questions about your health    Make or cancel appointments    Discuss your medicines    Learn about your test results    Speak to your doctor            Additional Information About Your Visit        MyChart Information     [employer] gives you secure access to your electronic health record. If you see a primary care provider, you can also send messages to your care team and make appointments. If you have questions, please call your primary care clinic.  If you do not have a primary care provider, please call [phone] and they will assist you.      [employer] is an electronic gateway that provides easy, online access to your medical records. With [employer], you can request a clinic appointment, read your test results, renew a prescription or communicate with your care team.     To access your existing account, please contact your [facility] or call [phone] for assistance.        Care EveryWhere ID     This is your Care EveryWhere ID. This could be used by other organizations to access your [place] medical records  [id number]        Your Vitals Were     Last Period                   03/04/2005            Blood Pressure from Last 3 Encounters:   08/24/18  102/69   08/17/18 99/69   08/07/18 104/67    Weight from Last 3 Encounters:   08/24/18 73.9 kg (163 lb)   08/17/18 73.6 kg (162 lb 3.2 oz)   08/07/18 74.5 kg (164 lb 4 oz)              Today, you had the following     No orders found for display       Primary Care Provider Office Phone # Fax #    Karo Doty [phone] [address]  [facility] [address]        Equal Access to Services     JEREMÍAS BUCK : Hadii thalia ku hadasho Sograce, waaxda luqadaha, qaybta kaalmada adecasiyanelly, carol escobar . So [facility] [phone].    ATENCIÓN: Si habla español, tiene a graham disposición servicios gratuitos de asistencia lingüística. [facility] [phone].    We comply with applicable federal civil rights laws and [place] laws. We do not discriminate on the basis of race, color, national origin, age, disability, sex, sexual orientation, or gender identity.            Thank you!     Thank you for choosing RADIATION ONCOLOGY CLINIC  for your care. Our goal is always to provide you with excellent care. Hearing back from our patients is one way we can continue to improve our services. Please take a few minutes to complete the written survey that you may receive in the mail after your visit with us. Thank you!             Your Updated Medication List - Protect others around you: Learn how to safely use, store and throw away your medicines at www.disposemymeds.org.          This list is accurate as of 8/27/18  4:03 PM.  Always use your most recent med list.                   Brand Name Dispense Instructions for use Diagnosis    acetaminophen 500 MG tablet    TYLENOL     Take 1 tablet (500 mg) by mouth every 4 hours as needed    S/P DEMETRIO-BSO       ascorbic acid 250 MG tablet    VITAMIN C    90    250 mg    Routine general medical examination at a health care facility       calcium citrate-vitamin D 315-200 MG-UNIT Tabs per tablet    CITRACAL     Take 2 tablets by mouth daily         enoxaparin 80 MG/0.8ML injection    LOVENOX    60 Syringe    Inject 0.8 mLs (80 mg) Subcutaneous 2 times daily    Other acute pulmonary embolism without acute cor pulmonale (H)       FERROUS GLUCONATE PO      Take 325 mg by mouth daily (with breakfast)        GLUCOSAMINE CHONDROITIN COMPLX Tabs      Take  by mouth. 1500 mg 1xqd.    Routine general medical examination at a health care facility       LORazepam 1 MG tablet    ATIVAN    30 tablet    Take 1 tablet (1 mg) by mouth every 6 hours as needed (nausea/vomiting, anxiety or sleep)    Endometrial cancer (H), Encounter for long-term (current) use of medications       multivitamin, therapeutic with minerals Tabs tablet      Take 1 tablet by mouth daily        ondansetron 8 MG tablet    ZOFRAN    60 tablet    Take 1 tablet (8 mg) by mouth every 8 hours as needed for nausea (vomiting)    Endometrial cancer (H), Encounter for long-term (current) use of medications       prochlorperazine 10 MG tablet    COMPAZINE    30 tablet    Take 1 tablet (10 mg) by mouth every 6 hours as needed (nausea/vomiting)    Endometrial cancer (H), Encounter for long-term (current) use of medications       RANITIDINE HCL PO      Take 150 mg by mouth 2 times daily

## 2018-08-27 NOTE — LETTER
2018       RE: Gerri Owens  [address]  [facility] [address]     Dear Colleague,    Thank you for referring your patient, Gerri Owens, to the RADIATION ONCOLOGY CLINIC. Please see a copy of my visit note below.    [facility], [place]  Radiation Oncology Follow-up Note  2018    Gerri Owens   MRN: [id number]  : 1955     PROBLEM: FIGO stage IIIC1 endometrial cancer     SUBJECTIVE: Ms. Owens represents today to further discuss radiation therapy for her FIGO grade 1 stage IIIC1 endometrial cancer.  See radiation oncology consult note dated 2018 for full details.  In short, she presented in 2018 with abnormal uterine bleeding, endometrial biopsy showed FIGO grade 2 endometrioid adenocarcinoma (of note, final pathologic specimen is FIGO grade 1).  She underwent laparoscopic converted to open total hysterectomy (due to sharp deviation of the uterus), BSO, pelvic and periaortic lymph node dissection, tumor debulking, CUSA, and omentectomy.  Pathology showed grade 1 endometrioid adenocarcinoma of the endometrium, 5.5 cm in size with 15/15 mm (100%) myometrial invasion and uterine serosal involvement.  There was lower uterine segment and cervical stromal invasion, as well as involvement of the right ovarian surface and left adnexa.  The anterior pericervical surgical margin was positive.  LVS I was present. Involved lymph nodes were as follows: 1 of 8 left pelvic (3mm, no extranodal extension), 1/6 R pelvic (21 mm, extranodal extension present), and 0/4 periaortic. The initial anterior fundal biopsy was positive for carcinoma, and omentectomy was negative.    The patient was then planned for adjuvant sandwich chemoradiation therapy with carboplatin and Taxol, which was initially delayed due to leukopenia.  She had an adverse reaction to Taxol on 2018 and so she was continued on single agent carboplatin.     The patient met with Dr. Billings  in radiation oncology on 6/6/2018 who agreed to provide adjuvant radiation therapy using the Purlear method as above.  The recommendation was to treat her with external beam radiation therapy with 5040 cGy in 20 daily fractions over 5 weeks, followed by HDR brachytherapy using vaginal cylinder to boost the vaginal cuff with 2 treatments of 600 cGy.    Since then, the patient has had ongoing difficulty with cytopenia related to her chemotherapy.  She has completed 2 cycles thus far at this point, with the potential for her third cycle on 8/31/2018 (previous 2 attempts were delayed due to cytopenia).  Currently, she reports that she is doing well.  She has had some nausea and weight loss with chemotherapy but feels her energy is improved compared to previously.    OBJECTIVE:   LMP 03/04/2005    PHYSICAL EXAM:  Gen: Alert, in NAD  Eyes: EOMI, sclera anicteric  HENT      Head: NC/AT     Ears: No external auricular lesions     Nose/sinus: No rhinorrhea or epistaxis     Oral Cavity/Oropharynx: MMM, no thrush noted  Pulm: Breathing comfortably on room air, no audible wheezes or ronchi  CV: Well-perfused, no cyanosis  Abdominal: Soft, nondistended  Skin: Normal color and turgor  Neurologic/MSK: motor grossly intact, normal gait  Psych: Mildly anxious    IMPRESSION:  Ms. Owens is a 63 year old woman with FIGO grade 1 endometrioid endometrial adenocarcinoma, FIGO stage IIIC1.  Pathology showed cervical stromal involvement, 100% myometrial invasion with uterine serosal involvement, bilateral ovarian and left fallopian tube involvement, positive anterior surface margin, 2 of 14 pelvic and 0 of 4 periaortic lymph nodes involved, one with extranodal extension.  She is now status post laparoscopic converted to open total abdominal hysterectomy, BSO, PPALND and omentectomy and 2 cycles of chemotherapy.  Her last cycle of chemotherapy prior to radiation has been delayed due to cytopenia.     RECOMMENDATION:  Again, we explained  the recommendation for both pelvic external beam radiation therapy (EBRT), consisting of 5040 cGy in 28 daily fractions over 5 weeks, as well as HDR brachytherapy using a vaginal cylinder to boost the vaginal cuff, consisting of 2 treatments of 600 cGy prescribed to the vaginal surface.  We explained our rationale, as well as the logistics, risks, benefits, and alternatives to adjuvant radiation therapy.       We encouraged Ms. Owens to ask questions, which we answered to the best of our ability.  Informed consent form was signed.  Her simulation was performed this morning.    If Ms. Owens is unable to receive her third cycle of chemotherapy this Friday, we would recommend consideration of initiation of her radiotherapy so as not to further delay this treatment.  We would expect her blood counts to recover during radiotherapy and she could finish her cycles of chemotherapy after completion of RT.  We will plan to schedule her after finding out her labs this Friday and will discuss with Dr. Tierney if there needs to be any modifications to our plan.    The patient was seen and examined with Dr. Billings, who agrees with the above assessment and plan.    Kevin Anaya MD PGY-2   Radiation Oncology, [facility]  [phone] clinic  Pager [phone]      I saw and examined the patient with the resident.  I have reviewed and edited the resident's note and agree with the plan of care.    The patient had a diagnostic CT scan shortly after simulation to facilitate radiation planning as well as for restaging.  Unfortunately, CT showed large enhancing necrotic mass involving the L psoas muscle, as well as multiple enlarged adjacent nodes, highly concerning for recurrent disease.        I will discuss with Dr. Tierney regarding next steps.  She is not tolerating chemotherapy well due to cytopenia.  It is unclear if there are options for less cytotoxic chemotherapy.  Otherwise, we can proceed with radiation  therapy but it is unlikely to achieve durable local control given the bulkiness of the mass.        Darling Billings MD

## 2018-08-27 NOTE — MR AVS SNAPSHOT
After Visit Summary   8/27/2018    Gerri Owens    MRN: [id number]           Patient Information     Date Of Birth          1955        Visit Information        Provider Department      8/27/2018 10:30 AM Darling Billings MD Radiation Oncology Clinic         Follow-ups after your visit        Your next 10 appointments already scheduled     Jul 11, 2018 12:00 PM CDT   LAB with  LAB   [facility] ([facility])    [address] [place] [id number] [phone]           Please do not eat 10-12 hours before your appointment if you are coming in fasting for labs on lipids, cholesterol, or glucose (sugar). This does not apply to pregnant women. Water, hot tea and black coffee (with nothing added) are okay. Do not drink other fluids, diet soda or chew gum.            Jul 27, 2018  7:45 AM CDT   [facility] Lab Draw with  [facility] LAB DRAW   [facility] Draw ([facility])    [address] [place] [address]  [address] [place] [address]   [phone]            Jul 27, 2018  8:20 AM CDT   (Arrive by 8:05 AM)   Return Visit with ALFONZO Weathers CNP   [facility] ([facility])    [address] [place] [address]  [address] [place] [address]   [phone]            Jul 27, 2018  9:00 AM CDT   Infusion 120 with UC ONCOLOGY INFUSION, UC 23 ATC   [facility] ([facility])    [address] [place] [address]  [address] [place] [address]   [phone]            Aug 17, 2018  7:45 AM CDT   [facility]ic Lab Draw with  MASONIC LAB DRAW   [facility] Lab Draw ([facility])    [address] [place] [address]  [address]  [facility] [address]   [phone]            Aug 17, 2018  8:20 AM CDT   (Arrive by 8:05 AM)   Return Active Treatment with ALFONZO Weathers CNP   [facility] ([facility])    [address]  [address]  [facility] [address]   [phone]            Aug 17, 2018  9:00 AM CDT   Infusion 120 with UC ONCOLOGY INFUSION, UC 23 ATC   [facility] ([facility])    [address]  [address]  [facility] [address]   [phone]            Aug 27, 2018 10:30 AM CDT   Return Visit with Darling Billings MD   Radiation Oncology Clinic ([facility])    [facility]  1st Floor  [address] [id number] [phone]              Future tests that were ordered for you today     Open Future Orders        Priority Expected Expires Ordered    CT Chest/Abdomen/Pelvis w Contrast Routine  7/10/2019 7/10/2018            Who to contact     Please call your clinic at [phone] to:    Ask questions about your health    Make or cancel appointments    Discuss your medicines    Learn about your test results    Speak to your doctor            Additional Information About Your Visit        [employer] Information     [employer] gives you secure access to your electronic health record. If you see a primary care provider, you can also send messages to your care team and make appointments. If you have questions, please call your primary care clinic.  If you do not have a primary care provider, please call [phone] and they will assist you.      [employer] is an electronic gateway that provides easy, online access to your medical records. With [employer], you can request a clinic appointment, read your test results, renew a prescription or communicate with your care team.     To access your existing account, please contact your [facility] or call [phone] for assistance.        Care EveryWhere ID     This is your Care EveryWhere ID. This could be used by other organizations to access your [place] medical records  [id number]        Your Vitals Were     Last Period                    03/04/2005            Blood Pressure from Last 3 Encounters:   07/07/18 94/57   07/06/18 92/62   06/13/18 96/60    Weight from Last 3 Encounters:   07/06/18 78.9 kg (173 lb 14.4 oz)   07/06/18 78 kg (171 lb 14.4 oz)   06/13/18 80.8 kg (178 lb 3.2 oz)              Today, you had the following     No orders found for display       Primary Care Provider Office Phone # Fax #    Karo Doty [phone] [address]  [facility] [address]        Equal Access to Services     [facility]: Hadii thalia Springer, waaxda filemon, qaybta kaalmada makayla, carol escobar . So [facility] [phone].    ATENCIÓN: Si habla español, tiene a graham disposición servicios gratuitos de asistencia lingüística. [facility] [phone].    We comply with applicable federal civil rights laws and [place] laws. We do not discriminate on the basis of race, color, national origin, age, disability, sex, sexual orientation, or gender identity.            Thank you!     Thank you for choosing RADIATION ONCOLOGY CLINIC  for your care. Our goal is always to provide you with excellent care. Hearing back from our patients is one way we can continue to improve our services. Please take a few minutes to complete the written survey that you may receive in the mail after your visit with us. Thank you!             Your Updated Medication List - Protect others around you: Learn how to safely use, store and throw away your medicines at www.disposemymeds.org.          This list is accurate as of 7/11/18  8:17 AM.  Always use your most recent med list.                   Brand Name Dispense Instructions for use Diagnosis    acetaminophen 500 MG tablet    TYLENOL     Take 1 tablet (500 mg) by mouth every 4 hours as needed    S/P DEMETRIO-BSO       ascorbic acid 250 MG tablet    VITAMIN C    90    250 mg    Routine general medical examination at a health care facility       benzonatate 100 MG capsule     TESSALON    60 capsule    Take 1 capsule (100 mg) by mouth 3 times daily as needed for cough    Cough       calcium citrate-vitamin D 315-200 MG-UNIT Tabs per tablet    CITRACAL     Take 2 tablets by mouth daily        * enoxaparin 80 MG/0.8ML injection    LOVENOX    60 Syringe    Inject 0.8 mLs (80 mg) Subcutaneous 2 times daily    Other acute pulmonary embolism without acute cor pulmonale (H)       * enoxaparin 80 MG/0.8ML injection    LOVENOX    60 Syringe    Inject 0.8 mLs (80 mg) Subcutaneous 2 times daily    Other acute pulmonary embolism without acute cor pulmonale (H)       FERROUS GLUCONATE PO      Take 325 mg by mouth daily (with breakfast)        GLUCOSAMINE CHONDROITIN COMPLX Tabs      Take  by mouth. 1500 mg 1xqd.    Routine general medical examination at a health care facility       LORazepam 1 MG tablet    ATIVAN    30 tablet    Take 1 tablet (1 mg) by mouth every 6 hours as needed (nausea/vomiting, anxiety or sleep)    Endometrial cancer (H), Encounter for long-term (current) use of medications       ondansetron 8 MG tablet    ZOFRAN    20 tablet    Take 1 tablet (8 mg) by mouth every 8 hours as needed for nausea (vomiting)    Endometrial cancer (H), Encounter for long-term (current) use of medications       prochlorperazine 10 MG tablet    COMPAZINE    30 tablet    Take 1 tablet (10 mg) by mouth every 6 hours as needed (nausea/vomiting)    Endometrial cancer (H), Encounter for long-term (current) use of medications       RANITIDINE HCL PO      Take 150 mg by mouth 2 times daily        * Notice:  This list has 2 medication(s) that are the same as other medications prescribed for you. Read the directions carefully, and ask your doctor or other care provider to review them with you.

## 2018-08-27 NOTE — DISCHARGE INSTRUCTIONS

## 2018-08-29 DIAGNOSIS — C54.1 ENDOMETRIAL CANCER (H): Primary | ICD-10-CM

## 2018-08-30 ENCOUNTER — CARE COORDINATION (OUTPATIENT)
Dept: ONCOLOGY | Facility: CLINIC | Age: 63
End: 2018-08-30

## 2018-08-30 DIAGNOSIS — Z79.899 ENCOUNTER FOR LONG-TERM (CURRENT) USE OF MEDICATIONS: ICD-10-CM

## 2018-08-30 DIAGNOSIS — C54.1 ENDOMETRIAL CANCER (H): Primary | ICD-10-CM

## 2018-08-30 PROCEDURE — 82274 ASSAY TEST FOR BLOOD FECAL: CPT | Performed by: FAMILY MEDICINE

## 2018-08-30 NOTE — PROGRESS NOTES
Care Coordinator Note  Message left for patient that Friday 8/31/18 chemotherapy appointment has been cancelled.  Stated that she would have a lab appointment scheduled prior to radiation therpy appointment on 8/31/18.    Joyce Gil MSN, RN  Care Coordinator  Gynecologic Cancer   Office:  [phone]  Pager: [phone] #[phone]

## 2018-08-31 ENCOUNTER — OFFICE VISIT (OUTPATIENT)
Dept: RADIATION ONCOLOGY | Facility: CLINIC | Age: 63
End: 2018-08-31
Attending: RADIOLOGY
Payer: COMMERCIAL

## 2018-08-31 ENCOUNTER — HOSPITAL ENCOUNTER (OUTPATIENT)
Facility: CLINIC | Age: 63
Setting detail: SPECIMEN
End: 2018-08-31
Attending: RADIOLOGY
Payer: COMMERCIAL

## 2018-08-31 VITALS
SYSTOLIC BLOOD PRESSURE: 101 MMHG | DIASTOLIC BLOOD PRESSURE: 67 MMHG | HEART RATE: 90 BPM | BODY MASS INDEX: 27.29 KG/M2 | OXYGEN SATURATION: 98 % | WEIGHT: 164 LBS

## 2018-08-31 DIAGNOSIS — C54.1 ENDOMETRIAL CANCER (H): Primary | ICD-10-CM

## 2018-08-31 DIAGNOSIS — C54.1 ENDOMETRIAL CANCER (H): ICD-10-CM

## 2018-08-31 DIAGNOSIS — D63.8 ANEMIA DUE TO CHRONIC ILLNESS: Primary | ICD-10-CM

## 2018-08-31 DIAGNOSIS — N18.2 CKD (CHRONIC KIDNEY DISEASE) STAGE 2, GFR 60-89 ML/MIN: ICD-10-CM

## 2018-08-31 DIAGNOSIS — Z79.899 ENCOUNTER FOR LONG-TERM (CURRENT) USE OF MEDICATIONS: ICD-10-CM

## 2018-08-31 LAB
BASOPHILS # BLD AUTO: 0 10E9/L (ref 0–0.2)
BASOPHILS NFR BLD AUTO: 0.3 %
DIFFERENTIAL METHOD BLD: ABNORMAL
EOSINOPHIL # BLD AUTO: 0 10E9/L (ref 0–0.7)
EOSINOPHIL NFR BLD AUTO: 0.7 %
ERYTHROCYTE [DISTWIDTH] IN BLOOD BY AUTOMATED COUNT: ABNORMAL % (ref 10–15)
HCT VFR BLD AUTO: 32.5 % (ref 35–47)
HGB BLD-MCNC: 9.8 G/DL (ref 11.7–15.7)
IMM GRANULOCYTES # BLD: 0 10E9/L (ref 0–0.4)
IMM GRANULOCYTES NFR BLD: 1 %
LYMPHOCYTES # BLD AUTO: 0.8 10E9/L (ref 0.8–5.3)
LYMPHOCYTES NFR BLD AUTO: 28.3 %
MCH RBC QN AUTO: 28.9 PG (ref 26.5–33)
MCHC RBC AUTO-ENTMCNC: 30.2 G/DL (ref 31.5–36.5)
MCV RBC AUTO: 96 FL (ref 78–100)
MONOCYTES # BLD AUTO: 0.5 10E9/L (ref 0–1.3)
MONOCYTES NFR BLD AUTO: 16.8 %
NEUTROPHILS # BLD AUTO: 1.5 10E9/L (ref 1.6–8.3)
NEUTROPHILS NFR BLD AUTO: 52.9 %
NRBC # BLD AUTO: 0 10*3/UL
NRBC BLD AUTO-RTO: 0 /100
PLATELET # BLD AUTO: 359 10E9/L (ref 150–450)
RBC # BLD AUTO: 3.39 10E12/L (ref 3.8–5.2)
WBC # BLD AUTO: 2.9 10E9/L (ref 4–11)

## 2018-08-31 PROCEDURE — 85025 COMPLETE CBC W/AUTO DIFF WBC: CPT | Performed by: OBSTETRICS & GYNECOLOGY

## 2018-08-31 PROCEDURE — G0463 HOSPITAL OUTPT CLINIC VISIT: HCPCS | Mod: 25 | Performed by: RADIOLOGY

## 2018-08-31 ASSESSMENT — PAIN SCALES - GENERAL: PAINLEVEL: NO PAIN (0)

## 2018-08-31 NOTE — MR AVS SNAPSHOT
After Visit Summary   8/31/2018    Gerri Owens    MRN: [id number]           Patient Information     Date Of Birth          1955        Visit Information        Provider Department      8/31/2018 11:00 AM Darling Billings MD Radiation Oncology Clinic        Today's Diagnoses     Endometrial cancer (H)    -  1       Follow-ups after your visit        Your next 10 appointments already scheduled     Sep 05, 2018  4:00 PM CDT   (Arrive by 3:45 PM)   New Patient Visit with ALFONZO [facility] Interventional Radiology ([facility])    [address] [place] [address] [phone]              Who to contact     Please call your clinic at [phone] to:    Ask questions about your health    Make or cancel appointments    Discuss your medicines    Learn about your test results    Speak to your doctor            Additional Information About Your Visit        MyChart Information     [employer] gives you secure access to your electronic health record. If you see a primary care provider, you can also send messages to your care team and make appointments. If you have questions, please call your primary care clinic.  If you do not have a primary care provider, please call [phone] and they will assist you.      [employer] is an electronic gateway that provides easy, online access to your medical records. With [employer], you can request a clinic appointment, read your test results, renew a prescription or communicate with your care team.     To access your existing account, please contact your [facility] or call [phone] for assistance.        Care EveryWhere ID     This is your Care EveryWhere ID. This could be used by other organizations to access your [place] medical records  [id number]        Your Vitals Were     Pulse Last Period Pulse Oximetry BMI (Body Mass Index)          90 03/04/2005 98% 27.29  kg/m2         Blood Pressure from Last 3 Encounters:   08/31/18 101/67   08/24/18 102/69   08/17/18 99/69    Weight from Last 3 Encounters:   08/31/18 74.4 kg (164 lb)   08/24/18 73.9 kg (163 lb)   08/17/18 73.6 kg (162 lb 3.2 oz)              Today, you had the following     No orders found for display       Primary Care Provider Office Phone # Fax #    Karo Doty [phone] [address]  [facility] [address]        Equal Access to Services     [facility]: Hadii thalia hurd haddeepak Sograce, waaxda filemon, qaybta kasherlyn benavides, carol escobar . So [facility] [phone].    ATENCIÓN: Si habla español, tiene a graham disposición servicios gratuitos de asistencia lingüística. [facility] [phone].    We comply with applicable federal civil rights laws and [place] laws. We do not discriminate on the basis of race, color, national origin, age, disability, sex, sexual orientation, or gender identity.            Thank you!     Thank you for choosing RADIATION ONCOLOGY CLINIC  for your care. Our goal is always to provide you with excellent care. Hearing back from our patients is one way we can continue to improve our services. Please take a few minutes to complete the written survey that you may receive in the mail after your visit with us. Thank you!             Your Updated Medication List - Protect others around you: Learn how to safely use, store and throw away your medicines at www.disposemymeds.org.          This list is accurate as of 8/31/18  1:37 PM.  Always use your most recent med list.                   Brand Name Dispense Instructions for use Diagnosis    acetaminophen 500 MG tablet    TYLENOL     Take 1 tablet (500 mg) by mouth every 4 hours as needed    S/P DEMETRIO-BSO       ascorbic acid 250 MG tablet    VITAMIN C    90    250 mg    Routine general medical examination at a health care facility       calcium citrate-vitamin D 315-200 MG-UNIT Tabs per tablet     CITRACAL     Take 2 tablets by mouth daily        enoxaparin 80 MG/0.8ML injection    LOVENOX    60 Syringe    Inject 0.8 mLs (80 mg) Subcutaneous 2 times daily    Other acute pulmonary embolism without acute cor pulmonale (H)       FERROUS GLUCONATE PO      Take 325 mg by mouth daily (with breakfast)        GLUCOSAMINE CHONDROITIN COMPLX Tabs      Take  by mouth. 1500 mg 1xqd.    Routine general medical examination at a health care facility       LORazepam 1 MG tablet    ATIVAN    30 tablet    Take 1 tablet (1 mg) by mouth every 6 hours as needed (nausea/vomiting, anxiety or sleep)    Endometrial cancer (H), Encounter for long-term (current) use of medications       multivitamin, therapeutic with minerals Tabs tablet      Take 1 tablet by mouth daily        ondansetron 8 MG tablet    ZOFRAN    60 tablet    Take 1 tablet (8 mg) by mouth every 8 hours as needed for nausea (vomiting)    Endometrial cancer (H), Encounter for long-term (current) use of medications       prochlorperazine 10 MG tablet    COMPAZINE    30 tablet    Take 1 tablet (10 mg) by mouth every 6 hours as needed (nausea/vomiting)    Endometrial cancer (H), Encounter for long-term (current) use of medications       RANITIDINE HCL PO      Take 150 mg by mouth 2 times daily

## 2018-08-31 NOTE — NURSING NOTE
Chief Complaint   Patient presents with     Lab Only     labs drawn with vpt by rn.       Labs drawn with vpt by rn.  Pt tolerated well.  VS taken.  Pt checked in for next appt.    Beti Huber RN

## 2018-08-31 NOTE — PROGRESS NOTES
Gerri came 2 days after initial follow up visit on Wednesday to review CT result.  She had CBC earlier today, which showed improving counts.    I reviewed the CT scan images with her and her , pointing out the necrotic mass along her left psoas muscle.  I told her that it is concerning for recurrent endometrial cancer.  This is not completely surprising due to her initial advanced stage, the adnexal spread of her tumor, and less than ideal chemotherapy received so far.  In talking to Dr. Tierney, he feels that the finding could be consistent with a hematoma.  As such, I am arranging a biopsy with Interventional Radiology.  Gerri said that she did receive phone call about scheduling.  However the IR was closed by the time she called and she will try again today.    We discussed that if this were indeed recurrent tumor, the options would include surgery, chemotherapy, and radiation therapy.  We briefly discussed the pros and cons of each approach.    I am concerned about her slow blood count recovery, which could compromise her ability to receive systemic therapy in the future.  I did reach out via Epic to Dr. Rapp, whom she saw before for leucopenia before.      Gerri expressed understanding of the above plan.    I spent 15 minutes with the patient, all of which were face-to-face counseling.      Darling Billings MD

## 2018-08-31 NOTE — LETTER
8/31/2018       RE: Gerri Owens  [address]  [facility] [address]     Dear Colleague,    Thank you for referring your patient, Gerri Owens, to the RADIATION ONCOLOGY CLINIC. Please see a copy of my visit note below.    Gerri came 2 days after initial follow up visit on Wednesday to review CT result.  She had CBC earlier today, which showed improving counts.    I reviewed the CT scan images with her and her , pointing out the necrotic mass along her left psoas muscle.  I told her that it is concerning for recurrent endometrial cancer.  This is not completely surprising due to her initial advanced stage, the adnexal spread of her tumor, and less than ideal chemotherapy received so far.  In talking to Dr. Tierney, he feels that the finding could be consistent with a hematoma.  As such, I am arranging a biopsy with Interventional Radiology.  Gerri said that she did receive phone call about scheduling.  However the IR was closed by the time she called and she will try again today.    We discussed that if this were indeed recurrent tumor, the options would include surgery, chemotherapy, and radiation therapy.  We briefly discussed the pros and cons of each approach.    I am concerned about her slow blood count recovery, which could compromise her ability to receive systemic therapy in the future.  I did reach out via Epic to Dr. Rapp, whom she saw before for leucopenia before.      Gerri expressed understanding of the above plan.    I spent 15 minutes with the patient, all of which were face-to-face counseling.      Darling Billings MD        Again, thank you for allowing me to participate in the care of your patient.      Sincerely,    Darling Billings MD

## 2018-09-02 LAB — HEMOCCULT STL QL IA: NEGATIVE

## 2018-09-04 DIAGNOSIS — Z12.11 SPECIAL SCREENING FOR MALIGNANT NEOPLASMS, COLON: ICD-10-CM

## 2018-09-04 NOTE — PROGRESS NOTES
Flor Ms. Owens,  Your results came back and are within acceptable limits. -FIT test (screening test for colon cancer) was normal. ADVISE - rechecking in 1 year.. If you have any further concerns please do not hesitate to contact us by message, phone or making an appointment.  Have a good day   Dr Soren MATHIS

## 2018-09-05 ENCOUNTER — OFFICE VISIT (OUTPATIENT)
Dept: INTERVENTIONAL RADIOLOGY/VASCULAR | Facility: CLINIC | Age: 63
End: 2018-09-05
Payer: OTHER GOVERNMENT

## 2018-09-05 VITALS
SYSTOLIC BLOOD PRESSURE: 103 MMHG | DIASTOLIC BLOOD PRESSURE: 69 MMHG | WEIGHT: 163.5 LBS | HEART RATE: 79 BPM | BODY MASS INDEX: 27.21 KG/M2 | OXYGEN SATURATION: 100 %

## 2018-09-05 DIAGNOSIS — M79.89 SOFT TISSUE MASS: Primary | ICD-10-CM

## 2018-09-05 DIAGNOSIS — N18.2 CKD (CHRONIC KIDNEY DISEASE) STAGE 2, GFR 60-89 ML/MIN: ICD-10-CM

## 2018-09-05 DIAGNOSIS — C54.1 ENDOMETRIAL CANCER (H): ICD-10-CM

## 2018-09-05 DIAGNOSIS — M79.89 SOFT TISSUE MASS: ICD-10-CM

## 2018-09-05 DIAGNOSIS — D63.8 ANEMIA DUE TO CHRONIC ILLNESS: ICD-10-CM

## 2018-09-05 LAB
ALBUMIN SERPL-MCNC: 3.3 G/DL (ref 3.4–5)
ALP SERPL-CCNC: 119 U/L (ref 40–150)
ALT SERPL W P-5'-P-CCNC: 22 U/L (ref 0–50)
ANION GAP SERPL CALCULATED.3IONS-SCNC: 7 MMOL/L (ref 3–14)
AST SERPL W P-5'-P-CCNC: 16 U/L (ref 0–45)
BASOPHILS # BLD AUTO: 0 10E9/L (ref 0–0.2)
BASOPHILS NFR BLD AUTO: 0.6 %
BILIRUB SERPL-MCNC: 0.3 MG/DL (ref 0.2–1.3)
BUN SERPL-MCNC: 21 MG/DL (ref 7–30)
CALCIUM SERPL-MCNC: 9.1 MG/DL (ref 8.5–10.1)
CHLORIDE SERPL-SCNC: 106 MMOL/L (ref 94–109)
CO2 SERPL-SCNC: 27 MMOL/L (ref 20–32)
CREAT SERPL-MCNC: 1.02 MG/DL (ref 0.52–1.04)
DIFFERENTIAL METHOD BLD: ABNORMAL
EOSINOPHIL # BLD AUTO: 0 10E9/L (ref 0–0.7)
EOSINOPHIL NFR BLD AUTO: 0.2 %
ERYTHROCYTE [DISTWIDTH] IN BLOOD BY AUTOMATED COUNT: ABNORMAL % (ref 10–15)
GFR SERPL CREATININE-BSD FRML MDRD: 55 ML/MIN/1.7M2
GLUCOSE SERPL-MCNC: 85 MG/DL (ref 70–99)
HCT VFR BLD AUTO: 35.2 % (ref 35–47)
HGB BLD-MCNC: 10.7 G/DL (ref 11.7–15.7)
IMM GRANULOCYTES # BLD: 0 10E9/L (ref 0–0.4)
IMM GRANULOCYTES NFR BLD: 0.6 %
INR PPP: 0.91 (ref 0.86–1.14)
IRON SATN MFR SERPL: 7 % (ref 15–46)
IRON SERPL-MCNC: 26 UG/DL (ref 35–180)
LYMPHOCYTES # BLD AUTO: 1.4 10E9/L (ref 0.8–5.3)
LYMPHOCYTES NFR BLD AUTO: 27 %
MCH RBC QN AUTO: 29.3 PG (ref 26.5–33)
MCHC RBC AUTO-ENTMCNC: 30.4 G/DL (ref 31.5–36.5)
MCV RBC AUTO: 96 FL (ref 78–100)
MONOCYTES # BLD AUTO: 0.5 10E9/L (ref 0–1.3)
MONOCYTES NFR BLD AUTO: 9.8 %
NEUTROPHILS # BLD AUTO: 3.2 10E9/L (ref 1.6–8.3)
NEUTROPHILS NFR BLD AUTO: 61.8 %
NRBC # BLD AUTO: 0 10*3/UL
NRBC BLD AUTO-RTO: 0 /100
PLATELET # BLD AUTO: 320 10E9/L (ref 150–450)
POTASSIUM SERPL-SCNC: 3.9 MMOL/L (ref 3.4–5.3)
PROT SERPL-MCNC: 7.1 G/DL (ref 6.8–8.8)
RBC # BLD AUTO: 3.65 10E12/L (ref 3.8–5.2)
RETICS # AUTO: 115.7 10E9/L (ref 25–95)
RETICS/RBC NFR AUTO: 3.2 % (ref 0.5–2)
SODIUM SERPL-SCNC: 140 MMOL/L (ref 133–144)
TIBC SERPL-MCNC: 363 UG/DL (ref 240–430)
WBC # BLD AUTO: 5.2 10E9/L (ref 4–11)

## 2018-09-05 NOTE — PATIENT INSTRUCTIONS
You are scheduled for your biopsy on  Report at   [address] [facility], 2nd floor (which is street level), Imaging   Your procedure will start approximately at    No solid foods or milk products for 6 hours prior to the procedure  You may have clear liquids until 2 hours prior to the procedure (this includes water, apple juice, broth, coffee or tea without milk or sugar, jello-o that is not red, white grape juice)    You will need a       If you have any questions you may call the nurse at [phone]    Hold the Lovenox the night before and the morning of the biopsy

## 2018-09-05 NOTE — LETTER
9/5/2018       RE: Gerri Owens  [address]  [facility] [place] [facility] [address]     Dear Colleague,    Thank you for referring your patient, Gerri Owens, to the [facility] INTERVENTIONAL RADIOLOGY at [facility]. Please see a copy of my visit note below.    First Name: Gerri   Age: 63 year old   Referring Physician: Dr. Billings   REASON FOR REFERRAL: Consult for left psoas soft tissue mass biopsy    Assessment:  Gerri is a 64 yo with FIGO stage IIIC1 endometrial cancer, s/p surgical resection in April 2018.  She has tolerated chemotherapy poorly with an allergic reaction to taxol, and poor count recovery.  While reconsidering when to start radiation therapy she had follow CT scans which show a left psoas mass which is concerning for malignancy and biopsy is requested.     Plan:  Image guided left psoas soft tissue mass biopsy, please have pathology present  Blood work today  Will have patient hold Lovenox the evening before and the morning of the procedure    HPI: This is a patient who presented in 2/2018 with abnormal uterine bleeding, endometrial biopsy showed FIGO grade 2 endometrioid adenocarcinoma (of note, final pathologic specimen is FIGO grade 1).  She underwent laparoscopic converted to open total hysterectomy (due to sharp deviation of the uterus), BSO, pelvic and periaortic lymph node dissection, tumor debulking, CUSA, and omentectomy.  Pathology showed grade 1 endometrioid adenocarcinoma of the endometrium, 5.5 cm in size with 15/15 mm (100%) myometrial invasion and uterine serosal involvement.  There was lower uterine segment and cervical stromal invasion, as well as involvement of the right ovarian surface and left adnexa.  The anterior pericervical surgical margin was positive.  LVS I was present. Involved lymph nodes were as follows: 1 of 8 left pelvic (3mm, no extranodal extension), 1/6 R pelvic (21 mm, extranodal extension present), and 0/4 periaortic. The  "initial anterior fundal biopsy was positive for carcinoma, and omentectomy was negative.     The patient was then planned for adjuvant sandwich chemoradiation therapy with carboplatin and Taxol, which was initially delayed due to leukopenia.  She had an adverse reaction to Taxol on 6/13/2018 and so she was continued on single agent carboplatin.  The patient met with Dr. Billings in radiation oncology on 6/6/2018 who agreed to provide adjuvant radiation therapy using the [place] method as above.  The recommendation was to treat her with external beam radiation therapy with 5040 cGy in 20 daily fractions over 5 weeks, followed by HDR brachytherapy using vaginal cylinder to boost the vaginal cuff with 2 treatments of 600 cGy.  Since then, the patient has had ongoing difficulty with cytopenia related to her chemotherapy.  She has completed 2 cycles thus far at this point, with the potential for her third cycle on 8/31/2018 (previous 2 attempts were delayed due to cytopenia).  Currently, she reports that she is doing well.  She has had some nausea and weight loss with chemotherapy but feels her energy is improved compared to previously.  Prior to to start of this chemo round she got a CT CAP which showed a left psoas mass.  \"Heterogeneously enhancing necrotic mass involving the left psoas  muscle, left iliac muscle extending within the abdominopelvic cavity along the external iliac chain, concerning for malignancy\".  Dr. Miller from IR reviewed the imaging and approved the biopsy.  Her counts are slowly recovering and she has been to Dr. Rapp for further evaluation.   She has a family hx of factor 5 leiden, although she tested negative.  She had a DVT in her right upper arm in 2012.  She had  PE in July 2018 and is currently on Lovenox BID.      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  7/17/2012     Thyrotoxicosis 2007    hyperthyroid, enlarged right thyroid on thyroid uptake, treate by endocrin eclinic [facility] with tapozole till 12/2008, FNA neg of 1.9 cm right lobe dominat nodule     Whooping cough due to Bordetella pertussis (p. pertussis) infant    whooping cough     PAST SURGICAL HISTORY:   Past Surgical History:   Procedure Laterality Date     ARTHROPLASTY HIP Left 12/4/2017    Procedure: ARTHROPLASTY HIP;  Left total hip arthroplasty;  Surgeon: Rajinder Goodwin MD;  Location: [facility] OR     BREAST BIOPSY, RT/LT  2004    left breast     DILATION AND CURETTAGE, OPERATIVE HYSTEROSCOPY WITH MORCELLATOR, COMBINED N/A 4/11/2018    Procedure: COMBINED DILATION AND CURETTAGE, OPERATIVE HYSTEROSCOPY WITH MORCELLATOR;  Hysteroscopy, Dilation and Curettage Under Ultrasound Guidance ;  Surgeon: Adry Tineo MD;  Location: [facility] OR     DILATION AND CURETTAGE, WITH ULTRASOUND GUIDANCE  4/11/2018    Procedure: DILATION AND CURETTAGE, WITH ULTRASOUND GUIDANCE;;  Surgeon: Adry Tineo MD;  Location: [facility] OR     HYSTERECTOMY TOTAL ABDOMINAL, BILATERAL SALPINGO-OOPHORECTOMY, NODE DISSECTION, COMBINED Bilateral 4/27/2018    Procedure: COMBINED HYSTERECTOMY TOTAL ABDOMINAL, SALPINGO-OOPHORECTOMY, NODE DISSECTION;;  Surgeon: Bradley Tierney MD;  Location: [facility] OR     INSERT PORT VASCULAR ACCESS Right 5/31/2018    Procedure: INSERT PORT VASCULAR ACCESS;  Single Lumen Chest Power Port;  Surgeon: Jamila Major PA-C;  Location: [facility]     LAPAROSCOPIC  HYSTERECTOMY TOTAL, BILATERAL SALPINGO-OOPHORECTOMY, NODE DISSECTION, COMBINED N/A 2018    Procedure: COMBINED LAPAROSCOPIC HYSTERECTOMY TOTAL, SALPINGO-OOPHORECTOMY, NODE DISSECTION;  Laparoscopic converted to open Removal Of Uterus, Tubes, Ovaries, Cervix, Pelvic and Para Aortic Lymphnode Dissection, Tumor Debulking, CUSA, Partial Omentectomy, Anesthesia Block;  Surgeon: Bradley Tierney MD;  Location: [facility] OR     TONSILLECTOMY  1960    tonsillectomy     FAMILY HISTORY:   Family History   Problem Relation Age of Onset     Diabetes Mother      type 2     HEART DISEASE Mother      Hypertension Mother      C.A.D. Mother       after 2nd heart attack     Hypertension Father      C.A.D. Father      mild heart attack     Cancer Father      skin cancer     HEART DISEASE Father      Rheumatoid Arthritis Sister      HEART DISEASE Brother      herdetary heart murmur     Hyperlipidemia Brother      CLOTTING DISORDER Brother      factor 5 leiden     Deep Vein Thrombosis Brother      leg     Hyperlipidemia Brother      Deep Vein Thrombosis Brother      leg; negative for clotting disorder     KIDNEY DISEASE No family hx of      SOCIAL HISTORY:   Social History   Substance Use Topics     Smoking status: Never Smoker     Smokeless tobacco: Never Used     Alcohol use Yes      Comment: rarely     PROBLEM LIST:   Patient Active Problem List    Diagnosis Date Noted     Antineoplastic chemotherapy induced anemia 2018     Priority: Medium     Low ferritin 2018     Priority: Medium     Osteoarthritis of both hands 2018     Priority: Medium     Encounter for antineoplastic chemotherapy 2018     Priority: Medium     Pulmonary embolus (H) 2018     Priority: Medium     Endometrial cancer (H) 2018     Priority: Medium     Encounter for long-term (current) use of medications 2018     Priority: Medium     S/P DEMETRIO-BSO 2018     Priority: Medium     CKD (chronic kidney disease) stage  2, GFR 60-89 ml/min 03/22/2018     Priority: Medium     Calculus of left kidney 03/22/2018     Priority: Medium     Presence of left hip implant 12/26/2017     Priority: Medium     Primary osteoarthritis of both hips 11/12/2017     Priority: Medium     History of deep venous thrombosis 11/12/2017     Priority: Medium     Hyperlipidemia, unspecified hyperlipidemia type 11/12/2017     Priority: Medium     Advanced directives, counseling/discussion 05/07/2012     Priority: Medium     Discussed advance care planning with patient; information given to patient to review. 5/16/2012          MEDICATIONS:   Prescription Medications as of 9/5/2018             acetaminophen (TYLENOL) 500 MG tablet Take 1 tablet (500 mg) by mouth every 4 hours as needed    calcium citrate-vitamin D (CITRACAL) 315-200 MG-UNIT TABS per tablet Take 2 tablets by mouth daily    enoxaparin (LOVENOX) 80 MG/0.8ML injection Inject 0.8 mLs (80 mg) Subcutaneous 2 times daily    FERROUS GLUCONATE PO Take 325 mg by mouth daily (with breakfast)     GLUCOSAMINE CHONDROITIN COMPLX OR TABS Take  by mouth. 1500 mg 1xqd.     LORazepam (ATIVAN) 1 MG tablet Take 1 tablet (1 mg) by mouth every 6 hours as needed (nausea/vomiting, anxiety or sleep)    multivitamin, therapeutic with minerals (THERA-VIT-M) TABS tablet Take 1 tablet by mouth daily    ondansetron (ZOFRAN) 8 MG tablet Take 1 tablet (8 mg) by mouth every 8 hours as needed for nausea (vomiting)    prochlorperazine (COMPAZINE) 10 MG tablet Take 1 tablet (10 mg) by mouth every 6 hours as needed (nausea/vomiting)    RANITIDINE HCL PO Take 150 mg by mouth 2 times daily     VITAMIN C 250 MG OR TABS 250 mg        ALLERGIES: Paclitaxel and Nickel  VITALS: /69  Pulse 79  Wt 74.2 kg (163 lb 8 oz)  LMP 03/04/2005  SpO2 100%  BMI 27.21 kg/m2    ROS:  Answers for HPI/ROS submitted by the patient on 8/31/2018   General Symptoms: No  Skin Symptoms: No  HENT Symptoms: No  EYE SYMPTOMS: No  HEART SYMPTOMS:  No  LUNG SYMPTOMS: No  INTESTINAL SYMPTOMS: No  URINARY SYMPTOMS: No  GYNECOLOGIC SYMPTOMS: No  BREAST SYMPTOMS: No  SKELETAL SYMPTOMS: No  BLOOD SYMPTOMS: No  NERVOUS SYSTEM SYMPTOMS: No  MENTAL HEALTH SYMPTOMS: No    Physical Examination: Vital signs are reviewed and they are stable  Constitutional: Pleasant, older woman, in no acute physical distress, came alone to her appt  Cardiovascular: negative, RRR  Respiratory: negative, lungs clear  Musculoskeletal: extremities normal- no gross deformities noted, gait normal and normal muscle tone  Skin: no suspicious lesions or rashes  Neurologic: negative  Psychiatric: affect normal/bright and mentation appears normal.    BMP RESULTS:  Lab Results   Component Value Date     09/05/2018    POTASSIUM 3.9 09/05/2018    CHLORIDE 106 09/05/2018    CO2 27 09/05/2018    ANIONGAP 7 09/05/2018    GLC 85 09/05/2018    BUN 21 09/05/2018    CR 1.02 09/05/2018    GFRESTIMATED 55 (L) 09/05/2018    GFRESTBLACK 66 09/05/2018    LAURENT 9.1 09/05/2018        CBC RESULTS:  Lab Results   Component Value Date    WBC 5.2 09/05/2018    RBC 3.65 (L) 09/05/2018    HGB 10.7 (L) 09/05/2018    HCT 35.2 09/05/2018    MCV 96 09/05/2018    MCH 29.3 09/05/2018    MCHC 30.4 (L) 09/05/2018    RDW Dimorphic population - unable to calculate 09/05/2018     09/05/2018       INR/PTT:  Lab Results   Component Value Date    INR 0.91 09/05/2018       Diagnostic studies: see recent CT scan    PROVIDER NOTE:  I explained the procedure to Gerri  This included:  Preparing for the procedure, the actual procedure and recovery.  I explained the risks of the biopsy:  Bleeding, infection, hitting an unintended organ (vessel or nerve)  I explained that usually the results return after two to four business days.    I explained that he/she would be contacted by Dr. Billings/Niall's office following this to determine a future plan.  Thank you for involving us in the care of your patient.    40 minutes was spent  with Gerri.  35 minutes was spent in counseling.  Airam Gant MS, APRN, CNS, CRN  Clinical Nurse Specialist  Interventional Radiology  [phone] (voice mail)  [phone] (pager)    CC  Patient Care Team:  Karo Doty MD as PCP - General (Family Practice)  Gege Gil, RN as Continuity Care Coordinator (Gyn-Onc)  Bradley Tierney MD as MD (Oncology)  TWIN MARIE

## 2018-09-05 NOTE — MR AVS SNAPSHOT
After Visit Summary   9/5/2018    Gerri Owens    MRN: [id number]           Patient Information     Date Of Birth          1955        Visit Information        Provider Department      9/5/2018 4:00 PM Airam Gant APRN CNS [facility] Interventional Radiology        Today's Diagnoses     Soft tissue mass    -  1    Endometrial cancer (H)          Care Instructions    You are scheduled for your biopsy on  Report at   [address] [facility] [place], 2nd floor (which is street level), Imaging   Your procedure will start approximately at    No solid foods or milk products for 6 hours prior to the procedure  You may have clear liquids until 2 hours prior to the procedure (this includes water, apple juice, broth, coffee or tea without milk or sugar, jello-o that is not red, white grape juice)    You will need a       If you have any questions you may call the nurse at [phone]    Hold the Lovenox the night before and the morning of the biopsy          Follow-ups after your visit        Your next 10 appointments already scheduled     Sep 13, 2018  8:30 AM CDT   [facility] Draw with  [facility]   [facility] ([facility])    [address] [place] [address]  [address]  [facility] [address] [phone]            Sep 13, 2018  9:00 AM CDT   (Arrive by 8:45 AM)   Return Visit with Lamar Rapp MD   [facility] ([facility])    [address] [place] [address]  [address]  [facility] [address] [phone]              Future tests that were ordered for you today     Open Future Orders        Priority Expected Expires Ordered    CT Abdomen Retroperitoneal Biopsy Routine  9/5/2019 9/5/2018    INR Routine  9/5/2019 9/5/2018            Who to contact     Please call your clinic at [phone] to:    Ask questions about your health    Make or cancel appointments    Discuss your medicines    Learn  about your test results    Speak to your doctor            Additional Information About Your Visit        FRShart Information     [employer] gives you secure access to your electronic health record. If you see a primary care provider, you can also send messages to your care team and make appointments. If you have questions, please call your primary care clinic.  If you do not have a primary care provider, please call [phone] and they will assist you.      [employer] is an electronic gateway that provides easy, online access to your medical records. With [employer], you can request a clinic appointment, read your test results, renew a prescription or communicate with your care team.     To access your existing account, please contact your [facility] or call [phone] for assistance.        Care EveryWhere ID     This is your Care EveryWhere ID. This could be used by other organizations to access your [facility] medical records  [id number]        Your Vitals Were     Pulse Last Period Pulse Oximetry BMI (Body Mass Index)          79 03/04/2005 100% 27.21 kg/m2         Blood Pressure from Last 3 Encounters:   09/05/18 103/69   08/31/18 101/67   08/24/18 102/69    Weight from Last 3 Encounters:   09/05/18 74.2 kg (163 lb 8 oz)   08/31/18 74.4 kg (164 lb)   08/24/18 73.9 kg (163 lb)               Primary Care Provider Office Phone # Fax #    Karo Doty [phone] [address]  [facility] [address]        Equal Access to Services     JEREMÍAS BUCK : Hadii thalia ku hadasho Soomaali, waaxda luqadaha, qaybta kaalmada adeegyanelly, carol escobar . So [facility] [phone].    ATENCIÓN: Si habla español, tiene a graham disposición servicios gratuitos de asistencia lingüística. Llame al [phone].    We comply with applicable federal civil rights laws and [place] laws. We do not discriminate on the basis of race, color, national origin, age, disability,  sex, sexual orientation, or gender identity.            Thank you!     Thank you for choosing [facility] INTERVENTIONAL RADIOLOGY  for your care. Our goal is always to provide you with excellent care. Hearing back from our patients is one way we can continue to improve our services. Please take a few minutes to complete the written survey that you may receive in the mail after your visit with us. Thank you!             Your Updated Medication List - Protect others around you: Learn how to safely use, store and throw away your medicines at www.disposemymeds.org.          This list is accurate as of 9/5/18  4:29 PM.  Always use your most recent med list.                   Brand Name Dispense Instructions for use Diagnosis    acetaminophen 500 MG tablet    TYLENOL     Take 1 tablet (500 mg) by mouth every 4 hours as needed    S/P DEMETRIO-BSO       ascorbic acid 250 MG tablet    VITAMIN C    90    250 mg    Routine general medical examination at a health care facility       calcium citrate-vitamin D 315-200 MG-UNIT Tabs per tablet    CITRACAL     Take 2 tablets by mouth daily        enoxaparin 80 MG/0.8ML injection    LOVENOX    60 Syringe    Inject 0.8 mLs (80 mg) Subcutaneous 2 times daily    Other acute pulmonary embolism without acute cor pulmonale (H)       FERROUS GLUCONATE PO      Take 325 mg by mouth daily (with breakfast)        GLUCOSAMINE CHONDROITIN COMPLX Tabs      Take  by mouth. 1500 mg 1xqd.    Routine general medical examination at a health care facility       LORazepam 1 MG tablet    ATIVAN    30 tablet    Take 1 tablet (1 mg) by mouth every 6 hours as needed (nausea/vomiting, anxiety or sleep)    Endometrial cancer (H), Encounter for long-term (current) use of medications       multivitamin, therapeutic with minerals Tabs tablet      Take 1 tablet by mouth daily        ondansetron 8 MG tablet    ZOFRAN    60 tablet    Take 1 tablet (8 mg) by mouth every 8 hours as needed for nausea (vomiting)    Endometrial  cancer (H), Encounter for long-term (current) use of medications       prochlorperazine 10 MG tablet    COMPAZINE    30 tablet    Take 1 tablet (10 mg) by mouth every 6 hours as needed (nausea/vomiting)    Endometrial cancer (H), Encounter for long-term (current) use of medications       RANITIDINE HCL PO      Take 150 mg by mouth 2 times daily

## 2018-09-05 NOTE — PROGRESS NOTES
First Name: Gerri   Age: 63 year old   Referring Physician: Dr. Billings   REASON FOR REFERRAL: Consult for left psoas soft tissue mass biopsy    Assessment:  Gerri is a 62 yo with FIGO stage IIIC1 endometrial cancer, s/p surgical resection in April 2018.  She has tolerated chemotherapy poorly with an allergic reaction to taxol, and poor count recovery.  While reconsidering when to start radiation therapy she had follow CT scans which show a left psoas mass which is concerning for malignancy and biopsy is requested.     Plan:  Image guided left psoas soft tissue mass biopsy, please have pathology present  Blood work today  Will have patient hold Lovenox the evening before and the morning of the procedure    HPI: This is a patient who presented in 2/2018 with abnormal uterine bleeding, endometrial biopsy showed FIGO grade 2 endometrioid adenocarcinoma (of note, final pathologic specimen is FIGO grade 1).  She underwent laparoscopic converted to open total hysterectomy (due to sharp deviation of the uterus), BSO, pelvic and periaortic lymph node dissection, tumor debulking, CUSA, and omentectomy.  Pathology showed grade 1 endometrioid adenocarcinoma of the endometrium, 5.5 cm in size with 15/15 mm (100%) myometrial invasion and uterine serosal involvement.  There was lower uterine segment and cervical stromal invasion, as well as involvement of the right ovarian surface and left adnexa.  The anterior pericervical surgical margin was positive.  LVS I was present. Involved lymph nodes were as follows: 1 of 8 left pelvic (3mm, no extranodal extension), 1/6 R pelvic (21 mm, extranodal extension present), and 0/4 periaortic. The initial anterior fundal biopsy was positive for carcinoma, and omentectomy was negative.     The patient was then planned for adjuvant sandwich chemoradiation therapy with carboplatin and Taxol, which was initially delayed due to leukopenia.  She had an adverse reaction to Taxol on 6/13/2018 and so  "she was continued on single agent carboplatin.  The patient met with Dr. Billings in radiation oncology on 6/6/2018 who agreed to provide adjuvant radiation therapy using the Tigrett method as above.  The recommendation was to treat her with external beam radiation therapy with 5040 cGy in 20 daily fractions over 5 weeks, followed by HDR brachytherapy using vaginal cylinder to boost the vaginal cuff with 2 treatments of 600 cGy.  Since then, the patient has had ongoing difficulty with cytopenia related to her chemotherapy.  She has completed 2 cycles thus far at this point, with the potential for her third cycle on 8/31/2018 (previous 2 attempts were delayed due to cytopenia).  Currently, she reports that she is doing well.  She has had some nausea and weight loss with chemotherapy but feels her energy is improved compared to previously.  Prior to to start of this chemo round she got a CT CAP which showed a left psoas mass.  \"Heterogeneously enhancing necrotic mass involving the left psoas  muscle, left iliac muscle extending within the abdominopelvic cavity along the external iliac chain, concerning for malignancy\".  Dr. Miller from IR reviewed the imaging and approved the biopsy.  Her counts are slowly recovering and she has been to Dr. Rapp for further evaluation.   She has a family hx of factor 5 leiden, although she tested negative.  She had a DVT in her right upper arm in 2012.  She had  PE in July 2018 and is currently on Lovenox BID.      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  7/17/2012     Thyrotoxicosis 2007    hyperthyroid, enlarged right thyroid on thyroid uptake, treate by endocrin eclinic [facility] with tapozole till 12/2008, FNA neg of 1.9 cm right lobe dominat nodule     Whooping cough due to Bordetella pertussis (p. pertussis) infant    whooping cough     PAST SURGICAL HISTORY:   Past Surgical History:   Procedure Laterality Date     ARTHROPLASTY HIP Left 12/4/2017    Procedure: ARTHROPLASTY HIP;  Left total hip arthroplasty;  Surgeon: Rajinder Goodwin MD;  Location: [facility] OR     BREAST BIOPSY, RT/LT  2004    left breast     DILATION AND CURETTAGE, OPERATIVE HYSTEROSCOPY WITH MORCELLATOR, COMBINED N/A 4/11/2018    Procedure: COMBINED DILATION AND CURETTAGE, OPERATIVE HYSTEROSCOPY WITH MORCELLATOR;  Hysteroscopy, Dilation and Curettage Under Ultrasound Guidance ;  Surgeon: Adry Tineo MD;  Location: [facility] OR     DILATION AND CURETTAGE, WITH ULTRASOUND GUIDANCE  4/11/2018    Procedure: DILATION AND CURETTAGE, WITH ULTRASOUND GUIDANCE;;  Surgeon: Adry Tineo MD;  Location: [facility] OR     HYSTERECTOMY TOTAL ABDOMINAL, BILATERAL SALPINGO-OOPHORECTOMY, NODE DISSECTION, COMBINED Bilateral 4/27/2018    Procedure: COMBINED HYSTERECTOMY TOTAL ABDOMINAL, SALPINGO-OOPHORECTOMY, NODE DISSECTION;;  Surgeon: Bradley Tierney MD;  Location: [facility] OR     INSERT PORT VASCULAR ACCESS Right 5/31/2018    Procedure: INSERT PORT VASCULAR ACCESS;  Single Lumen Chest Power Port;  Surgeon: Jamila Major PA-C;  [facility]     LAPAROSCOPIC HYSTERECTOMY TOTAL, BILATERAL SALPINGO-OOPHORECTOMY, NODE DISSECTION, COMBINED N/A 4/27/2018    Procedure: COMBINED LAPAROSCOPIC HYSTERECTOMY TOTAL, SALPINGO-OOPHORECTOMY, NODE DISSECTION;  Laparoscopic converted to open Removal Of Uterus, Tubes, Ovaries, Cervix, Pelvic and Para Aortic Lymphnode  Dissection, Tumor Debulking, CUSA, Partial Omentectomy, Anesthesia Block;  Surgeon: Bradley Tierney MD;  Location: [facility]     TONSILLECTOMY  1960    tonsillectomy     FAMILY HISTORY:   Family History   Problem Relation Age of Onset     Diabetes Mother      type 2     HEART DISEASE Mother      Hypertension Mother      C.A.D. Mother       after 2nd heart attack     Hypertension Father      C.A.D. Father      mild heart attack     Cancer Father      skin cancer     HEART DISEASE Father      Rheumatoid Arthritis Sister      HEART DISEASE Brother      herdetary heart murmur     Hyperlipidemia Brother      CLOTTING DISORDER Brother      factor 5 leiden     Deep Vein Thrombosis Brother      leg     Hyperlipidemia Brother      Deep Vein Thrombosis Brother      leg; negative for clotting disorder     KIDNEY DISEASE No family hx of      SOCIAL HISTORY:   Social History   Substance Use Topics     Smoking status: Never Smoker     Smokeless tobacco: Never Used     Alcohol use Yes      Comment: rarely     PROBLEM LIST:   Patient Active Problem List    Diagnosis Date Noted     Antineoplastic chemotherapy induced anemia 2018     Priority: Medium     Low ferritin 2018     Priority: Medium     Osteoarthritis of both hands 2018     Priority: Medium     Encounter for antineoplastic chemotherapy 2018     Priority: Medium     Pulmonary embolus (H) 2018     Priority: Medium     Endometrial cancer (H) 2018     Priority: Medium     Encounter for long-term (current) use of medications 2018     Priority: Medium     S/P DEMETRIO-BSO 2018     Priority: Medium     CKD (chronic kidney disease) stage 2, GFR 60-89 ml/min 2018     Priority: Medium     Calculus of left kidney 2018     Priority: Medium     Presence of left hip implant 2017     Priority: Medium     Primary osteoarthritis of both hips 2017     Priority: Medium     History of deep venous thrombosis 2017      Priority: Medium     Hyperlipidemia, unspecified hyperlipidemia type 11/12/2017     Priority: Medium     Advanced directives, counseling/discussion 05/07/2012     Priority: Medium     Discussed advance care planning with patient; information given to patient to review. 5/16/2012          MEDICATIONS:   Prescription Medications as of 9/5/2018             acetaminophen (TYLENOL) 500 MG tablet Take 1 tablet (500 mg) by mouth every 4 hours as needed    calcium citrate-vitamin D (CITRACAL) 315-200 MG-UNIT TABS per tablet Take 2 tablets by mouth daily    enoxaparin (LOVENOX) 80 MG/0.8ML injection Inject 0.8 mLs (80 mg) Subcutaneous 2 times daily    FERROUS GLUCONATE PO Take 325 mg by mouth daily (with breakfast)     GLUCOSAMINE CHONDROITIN COMPLX OR TABS Take  by mouth. 1500 mg 1xqd.     LORazepam (ATIVAN) 1 MG tablet Take 1 tablet (1 mg) by mouth every 6 hours as needed (nausea/vomiting, anxiety or sleep)    multivitamin, therapeutic with minerals (THERA-VIT-M) TABS tablet Take 1 tablet by mouth daily    ondansetron (ZOFRAN) 8 MG tablet Take 1 tablet (8 mg) by mouth every 8 hours as needed for nausea (vomiting)    prochlorperazine (COMPAZINE) 10 MG tablet Take 1 tablet (10 mg) by mouth every 6 hours as needed (nausea/vomiting)    RANITIDINE HCL PO Take 150 mg by mouth 2 times daily     VITAMIN C 250 MG OR TABS 250 mg        ALLERGIES: Paclitaxel and Nickel  VITALS: /69  Pulse 79  Wt 74.2 kg (163 lb 8 oz)  LMP 03/04/2005  SpO2 100%  BMI 27.21 kg/m2    ROS:  Answers for HPI/ROS submitted by the patient on 8/31/2018   General Symptoms: No  Skin Symptoms: No  HENT Symptoms: No  EYE SYMPTOMS: No  HEART SYMPTOMS: No  LUNG SYMPTOMS: No  INTESTINAL SYMPTOMS: No  URINARY SYMPTOMS: No  GYNECOLOGIC SYMPTOMS: No  BREAST SYMPTOMS: No  SKELETAL SYMPTOMS: No  BLOOD SYMPTOMS: No  NERVOUS SYSTEM SYMPTOMS: No  MENTAL HEALTH SYMPTOMS: No    Physical Examination: Vital signs are reviewed and they are stable  Constitutional:  Pleasant, older woman, in no acute physical distress, came alone to her appt  Cardiovascular: negative, RRR  Respiratory: negative, lungs clear  Musculoskeletal: extremities normal- no gross deformities noted, gait normal and normal muscle tone  Skin: no suspicious lesions or rashes  Neurologic: negative  Psychiatric: affect normal/bright and mentation appears normal.    BMP RESULTS:  Lab Results   Component Value Date     09/05/2018    POTASSIUM 3.9 09/05/2018    CHLORIDE 106 09/05/2018    CO2 27 09/05/2018    ANIONGAP 7 09/05/2018    GLC 85 09/05/2018    BUN 21 09/05/2018    CR 1.02 09/05/2018    GFRESTIMATED 55 (L) 09/05/2018    GFRESTBLACK 66 09/05/2018    LAURENT 9.1 09/05/2018        CBC RESULTS:  Lab Results   Component Value Date    WBC 5.2 09/05/2018    RBC 3.65 (L) 09/05/2018    HGB 10.7 (L) 09/05/2018    HCT 35.2 09/05/2018    MCV 96 09/05/2018    MCH 29.3 09/05/2018    MCHC 30.4 (L) 09/05/2018    RDW Dimorphic population - unable to calculate 09/05/2018     09/05/2018       INR/PTT:  Lab Results   Component Value Date    INR 0.91 09/05/2018       Diagnostic studies: see recent CT scan    PROVIDER NOTE:  I explained the procedure to Gerri  This included:  Preparing for the procedure, the actual procedure and recovery.  I explained the risks of the biopsy:  Bleeding, infection, hitting an unintended organ (vessel or nerve)  I explained that usually the results return after two to four business days.    I explained that he/she would be contacted by Dr. Billings/Niall's office following this to determine a future plan.  Thank you for involving us in the care of your patient.    40 minutes was spent with Gerri.  35 minutes was spent in counseling.  Airam Gant MS, APRN, CNS, CRN  Clinical Nurse Specialist  Interventional Radiology  [phone] (voice mail)  [phone] (pager)    CC  Patient Care Team:  Karo Doty MD as PCP - General (Family Practice)  Gege Gil, OTIS as Continuity Care  Coordinator (Gyn-Onc)  Bradley Tierney MD as MD (Oncology)  TWIN MARIE

## 2018-09-06 LAB — HAPTOGLOB SERPL-MCNC: 175 MG/DL (ref 35–175)

## 2018-09-07 ENCOUNTER — TELEPHONE (OUTPATIENT)
Dept: INTERVENTIONAL RADIOLOGY/VASCULAR | Facility: CLINIC | Age: 63
End: 2018-09-07

## 2018-09-07 LAB — EPO SERPL-ACNC: 37 MU/ML (ref 4–27)

## 2018-09-07 RX ORDER — LIDOCAINE 40 MG/G
CREAM TOPICAL
Status: CANCELLED | OUTPATIENT
Start: 2018-09-07

## 2018-09-10 ENCOUNTER — HOSPITAL ENCOUNTER (OUTPATIENT)
Dept: GENERAL RADIOLOGY | Facility: CLINIC | Age: 63
End: 2018-09-10
Attending: RADIOLOGY | Admitting: RADIOLOGY
Payer: COMMERCIAL

## 2018-09-10 ENCOUNTER — HOSPITAL ENCOUNTER (OUTPATIENT)
Facility: CLINIC | Age: 63
Discharge: HOME | End: 2018-09-10
Attending: RADIOLOGY | Admitting: RADIOLOGY
Payer: COMMERCIAL

## 2018-09-10 ENCOUNTER — HOSPITAL ENCOUNTER (OUTPATIENT)
Dept: CT IMAGING | Facility: CLINIC | Age: 63
End: 2018-09-10
Attending: CLINICAL NURSE SPECIALIST | Admitting: RADIOLOGY
Payer: COMMERCIAL

## 2018-09-10 VITALS
RESPIRATION RATE: 15 BRPM | DIASTOLIC BLOOD PRESSURE: 59 MMHG | HEART RATE: 69 BPM | OXYGEN SATURATION: 100 % | TEMPERATURE: 97.8 F | SYSTOLIC BLOOD PRESSURE: 89 MMHG

## 2018-09-10 DIAGNOSIS — M79.89 SOFT TISSUE MASS: ICD-10-CM

## 2018-09-10 DIAGNOSIS — C54.1 ENDOMETRIAL CANCER (H): ICD-10-CM

## 2018-09-10 PROCEDURE — 88312 SPECIAL STAINS GROUP 1: CPT | Mod: 26 | Performed by: RADIOLOGY

## 2018-09-10 PROCEDURE — 99152 MOD SED SAME PHYS/QHP 5/>YRS: CPT

## 2018-09-10 PROCEDURE — 88305 TISSUE EXAM BY PATHOLOGIST: CPT | Performed by: RADIOLOGY

## 2018-09-10 PROCEDURE — 25000125 ZZHC RX 250: Performed by: RADIOLOGY

## 2018-09-10 PROCEDURE — 88312 SPECIAL STAINS GROUP 1: CPT | Performed by: RADIOLOGY

## 2018-09-10 PROCEDURE — 88333 PATH CONSLTJ SURG CYTO XM 1: CPT | Performed by: RADIOLOGY

## 2018-09-10 PROCEDURE — 99153 MOD SED SAME PHYS/QHP EA: CPT

## 2018-09-10 PROCEDURE — 25000128 H RX IP 250 OP 636: Performed by: RADIOLOGY

## 2018-09-10 PROCEDURE — 88305 TISSUE EXAM BY PATHOLOGIST: CPT | Mod: 26 | Performed by: RADIOLOGY

## 2018-09-10 PROCEDURE — 88333 PATH CONSLTJ SURG CYTO XM 1: CPT | Mod: 26 | Performed by: RADIOLOGY

## 2018-09-10 PROCEDURE — 27210259 CT ABDOMEN RETROPERITONEAL BIOPSY

## 2018-09-10 RX ORDER — FLUMAZENIL 0.1 MG/ML
0.2 INJECTION, SOLUTION INTRAVENOUS
Status: DISCONTINUED | OUTPATIENT
Start: 2018-09-10 | End: 2018-09-11 | Stop reason: HOSPADM

## 2018-09-10 RX ORDER — FENTANYL CITRATE 50 UG/ML
25-50 INJECTION, SOLUTION INTRAMUSCULAR; INTRAVENOUS EVERY 5 MIN PRN
Status: DISCONTINUED | OUTPATIENT
Start: 2018-09-10 | End: 2018-09-11 | Stop reason: HOSPADM

## 2018-09-10 RX ORDER — NALOXONE HYDROCHLORIDE 0.4 MG/ML
.1-.4 INJECTION, SOLUTION INTRAMUSCULAR; INTRAVENOUS; SUBCUTANEOUS
Status: DISCONTINUED | OUTPATIENT
Start: 2018-09-10 | End: 2018-09-11 | Stop reason: HOSPADM

## 2018-09-10 RX ADMIN — MIDAZOLAM HYDROCHLORIDE 1 MG: 1 INJECTION, SOLUTION INTRAMUSCULAR; INTRAVENOUS at 09:40

## 2018-09-10 RX ADMIN — LIDOCAINE HYDROCHLORIDE 10 ML: 10 INJECTION, SOLUTION EPIDURAL; INFILTRATION; INTRACAUDAL; PERINEURAL at 10:10

## 2018-09-10 RX ADMIN — FENTANYL CITRATE 50 MCG: 50 INJECTION INTRAMUSCULAR; INTRAVENOUS at 09:40

## 2018-09-10 NOTE — PROGRESS NOTES
Interventional Radiology Pre-Procedure Sedation Assessment   Time of Assessment: 9:26 AM    Expected Level: Moderate Sedation    Indication: Sedation is required for the following type of Procedure: Biopsy    Sedation and procedural consent: Risks, benefits and alternatives were discussed with Patient    PO Intake: Appropriately NPO for procedure    ASA Class: Class 3 - SEVERE SYSTEMIC DISEASE, DEFINITE FUNCTIONAL LIMITATIONS.    Mallampati: Grade 2:  Soft palate, base of uvula, tonsillar pillars, and portion of posterior pharyngeal wall visible    Lungs: Lungs Clear with good breath sounds bilaterally    Heart: Normal heart sounds and rate    History and physical reviewed and no updates needed. I have reviewed the lab findings, diagnostic data, medications, and the plan for sedation. I have determined this patient to be an appropriate candidate for the planned sedation and procedure and have reassessed the patient IMMEDIATELY PRIOR to sedation and procedure.    Fabien Calhoun MD

## 2018-09-10 NOTE — PROGRESS NOTES
``Interventional Radiology Intra-procedural Nursing Note    Patient Name: Gerri Owens  Medical Record Number: [id number]  Today's Date: September 10, 2018    Start Time: 0940  End of procedure time: *1010  Procedure: left psoas muscle biopsy   Report given to: same  Time pt departs:  1215  :     Other Notes: tolerated procedure very well. Denies any pain or discomfort. No bleeding or hematoma at puncture site. Vitals remained stable. Given versed 1 mg and fentanyl 50 mcg total. Given discharge instructions.  to drive home.    Jayne Carranza

## 2018-09-10 NOTE — DISCHARGE INSTRUCTIONS
[facility]  Interventional Radiology  Patient Instructions Following Biopsy    AFTER YOU GO HOME:    If you were given sedation DO NOT drive or operate machinery at home or at work for at least 24 hours    DO relax and take it easy for 48 hours, no strenuous activity for 24 hours    DO drink plenty of fluids    DO resume your regular diet, unless otherwise instructed by your Primary Physician    Keep the dressing dry and in place for 24 hours to prevent the site from re-opening and bleeding    DO NOT SMOKE FOR AT LEAST 24 HOURS. If you have been given any mediations that were to help you relax or sedate youd uring your procedure    DO NOT drink alcoholic beverages the day of your procedure    DO NOT take Aspirin or Ibuprofen for 3 days    DO NOT do any strenuous exercise or lifting (>10 lbs) for at least 7 days following your procedure    DO NOT take a bath or shower for at least 12 hours following your procedure    DO NOT make any important or legal decisions for 24 hours following your procedure    There should be minimum drainage from the biopsy site    CALL THE PHYSICIAN IF:    You start bleeding from procedure site. If you do start to bleed from that site, lie down flat and hold pressure on the site for a minimum of 10 minutes. Your physician will tell you if you need to return to the hospital    You develop nausea or vomiting    You have excessive swelling, redness, or tenderness at the site    You have drainage that is green, yellow, thick white, or has a bad odor    You experience severe pain    You develop hives or a rash or unexplained itching    You develop shortness of breath    You develop a temperature of 101 degrees F or greater        ADDITIONAL INSTRUCTIONS:        [facility] INTERVENTIONAL RADIOLOGY DEPARTMENT:    Procedure Physician Dr. Calhoun Date of Procedure: 09/10/18    Telephone Numbers [phone] Monday-Friday 8:00 am to 4:30 pm    [phone] After 4:30 pm  Monday-Friday, Weekends & Holidays  Ask for Interventional Radiologist on call. Someone is on call 24 hours/day   [facility] toll free number [phone] Monday-Friday 8:00 am to 4:30 pm   [facility] Emergency Dept [phone]             I have received and understand my discharge instructions and I have all of my personal belongings.      __________________________________________________      Patient/Significant Other's Signature     Relationship        __________________________________________________  Nurse/Radiologist Signature  9/10/2018

## 2018-09-10 NOTE — BRIEF OP NOTE
Interventional Radiology Brief Post Procedure Note    Procedure: L psoas mass bx    Proceduralist: Fabien Calhoun MD    Assistant: None    Time Out: Prior to the start of the procedure and with procedural staff participation, I verbally confirmed the patient s identity using two indicators, relevant allergies, that the procedure was appropriate and matched the consent or emergent situation, and that the correct equipment/implants were available. Immediately prior to starting the procedure I conducted the Time Out with the procedural staff and re-confirmed the patient s name, procedure, and site/side. (The Joint Commission universal protocol was followed.)  Yes    Medications   Medication Event Details Admin User Admin Time       Sedation: IR Nurse Monitored Care   Post Procedure Summary:  Prior to the start of the procedure and with procedural staff participation, I verbally confirmed the patient s identity using two indicators, relevant allergies, that the procedure was appropriate and matched the consent or emergent situation, and that the correct equipment/implants were available. Immediately prior to starting the procedure I conducted the Time Out with the procedural staff and re-confirmed the patient s name, procedure, and site/side. (The Joint Commission universal protocol was followed.)  Yes       Sedatives: Fentanyl and Midazolam (Versed)    Vital signs, airway and pulse oximetry were monitored and remained stable throughout the procedure and sedation was maintained until the procedure was complete.  The patient was monitored by staff until sedation discharge criteria were met.    Patient tolerance: Patient tolerated the procedure well with no immediate complications.    Time of sedation in minutes: 60 Minutes minutes from beginning to end of physician one to one monitoring.          Findings: L psoas mass, successful bx    Estimated Blood Loss: Minimal    Fluoroscopy Time:  minute(s)    SPECIMENS:  None    Complications: 1. None     Condition: Stable    Plan: Bed rest for 3 hrs    Comments: See dictated procedure note for full details.    Fabien Calhoun MD

## 2018-09-10 NOTE — IP AVS SNAPSHOT
MRN:[id number]                      After Visit Summary   9/10/2018    Gerri Owens    MRN: [id number]           Visit Information        Provider Department      9/10/2018  9:00 AM URCT[facility]; [facility] IR RAD; [facility], [place], Radiology           Review of your medicines      Notice     This visit is during an admission. Changes to the med list made in this visit will be reflected in the After Visit Summary of the admission.             Protect others around you: Learn how to safely use, store and throw away your medicines at www.disposemymeds.org.         Follow-ups after your visit        Your next 10 appointments already scheduled     Sep 13, 2018  8:30 AM CDT   [facility] with  [facility]   [facility] Draw ([facility])    [address] [place] [address]  [address] [place] [id number] [phone]            Sep 13, 2018  9:00 AM CDT   (Arrive by 8:45 AM)   Return Visit with Lamar Rapp MD   [facility] ([facility])    [address] [place] [address]  [address] [place] [id number] [phone]               Care Instructions        Further instructions from your care team       [facility]  Interventional Radiology  Patient Instructions Following Biopsy    AFTER YOU GO HOME:    If you were given sedation DO NOT drive or operate machinery at home or at work for at least 24 hours    DO relax and take it easy for 48 hours, no strenuous activity for 24 hours    DO drink plenty of fluids    DO resume your regular diet, unless otherwise instructed by your Primary Physician    Keep the dressing dry and in place for 24 hours to prevent the site from re-opening and bleeding    DO NOT SMOKE FOR AT LEAST 24 HOURS. If you have been given any mediations that were to help you relax or sedate youd uring your procedure    DO NOT drink alcoholic beverages the day of your  procedure    DO NOT take Aspirin or Ibuprofen for 3 days    DO NOT do any strenuous exercise or lifting (>10 lbs) for at least 7 days following your procedure    DO NOT take a bath or shower for at least 12 hours following your procedure    DO NOT make any important or legal decisions for 24 hours following your procedure    There should be minimum drainage from the biopsy site    CALL THE PHYSICIAN IF:    You start bleeding from procedure site. If you do start to bleed from that site, lie down flat and hold pressure on the site for a minimum of 10 minutes. Your physician will tell you if you need to return to the hospital    You develop nausea or vomiting    You have excessive swelling, redness, or tenderness at the site    You have drainage that is green, yellow, thick white, or has a bad odor    You experience severe pain    You develop hives or a rash or unexplained itching    You develop shortness of breath    You develop a temperature of 101 degrees F or greater        ADDITIONAL INSTRUCTIONS:        [facility] INTERVENTIONAL RADIOLOGY DEPARTMENT:    Procedure Physician Dr. Calhoun Date of Procedure: 09/10/18    Telephone Numbers [phone] Monday-Friday 8:00 am to 4:30 pm    [phone] After 4:30 pm Monday-Friday, Weekends & Holidays  Ask for Interventional Radiologist on call. Someone is on call 24 hours/day   [facility] toll free number [phone] Monday-Friday 8:00 am to 4:30 pm   [facility] Emergency Dept [phone]             I have received and understand my discharge instructions and I have all of my personal belongings.      __________________________________________________      Patient/Significant Other's Signature     Relationship        __________________________________________________  Nurse/Radiologist Signature  9/10/2018     Additional Information About Your Visit        [employer] Information     [employer] gives you secure access to your electronic health record. If you see a primary care provider, you  can also send messages to your care team and make appointments. If you have questions, please call your primary care clinic.  If you do not have a primary care provider, please call [phone] and they will assist you.        Care EveryWhere ID     This is your Care EveryWhere ID. This could be used by other organizations to access your [facility] medical records  [id number]        Your Vitals Were     Blood Pressure Pulse Temperature Respirations Last Period Pulse Oximetry    93/61 (BP Location: Right arm) 64 97.8  F (36.6  C) 14 03/04/2005 98%       Primary Care Provider Office Phone # Fax #    Karo Doty [phone]      Equal Access to Services     MARLEY BUCK : Hadii thalia lopezo Sograce, waaxda mekaadaha, qaybta kaalmada catiayanelly, carol escobar . So [facility] [phone].    ATENCIÓN: Si habla español, tiene a graham disposición servicios gratuitos de asistencia lingüística. Llame al [phone].    We comply with applicable federal civil rights laws and [place] laws. We do not discriminate on the basis of race, color, national origin, age, disability, sex, sexual orientation, or gender identity.            Thank you!     Thank you for choosing [facility] for your care. Our goal is always to provide you with excellent care. Hearing back from our patients is one way we can continue to improve our services. Please take a few minutes to complete the written survey that you may receive in the mail after you visit with us. Thank you!             Medication List: This is a list of all your medications and when to take them. Check marks below indicate your daily home schedule. Keep this list as a reference.      Notice     This visit is during an admission. Changes to the med list made in this visit will be reflected in the After Visit Summary of the admission.

## 2018-09-10 NOTE — IP AVS SNAPSHOT
[facility], [place], Radiology    [address] [facility] [facility] [address]    Phone:  [phone]                                       After Visit Summary   9/10/2018    Gerri Owens    MRN: [id number]           After Visit Summary Signature Page     I have received my discharge instructions, and my questions have been answered. I have discussed any challenges I see with this plan with the nurse or doctor.    ..........................................................................................................................................  Patient/Patient Representative Signature      ..........................................................................................................................................  Patient Representative Print Name and Relationship to Patient    ..................................................               ................................................  Date                                            Time    ..........................................................................................................................................  Reviewed by Signature/Title    ...................................................              ..............................................  Date                                                            Time          22EPIC Rev 08/18

## 2018-09-11 LAB — COPATH REPORT: NORMAL

## 2018-09-13 ENCOUNTER — ONCOLOGY VISIT (OUTPATIENT)
Dept: ONCOLOGY | Facility: CLINIC | Age: 63
End: 2018-09-13
Attending: INTERNAL MEDICINE
Payer: COMMERCIAL

## 2018-09-13 VITALS
HEART RATE: 79 BPM | DIASTOLIC BLOOD PRESSURE: 76 MMHG | SYSTOLIC BLOOD PRESSURE: 107 MMHG | TEMPERATURE: 97.5 F | OXYGEN SATURATION: 100 % | RESPIRATION RATE: 18 BRPM | WEIGHT: 161.9 LBS | BODY MASS INDEX: 26.98 KG/M2 | HEIGHT: 65 IN

## 2018-09-13 DIAGNOSIS — D69.6 THROMBOCYTOPENIA (H): ICD-10-CM

## 2018-09-13 DIAGNOSIS — R79.0 LOW FERRITIN: Primary | ICD-10-CM

## 2018-09-13 DIAGNOSIS — T45.1X5A CHEMOTHERAPY-INDUCED NEUTROPENIA (H): ICD-10-CM

## 2018-09-13 DIAGNOSIS — D70.1 CHEMOTHERAPY-INDUCED NEUTROPENIA (H): ICD-10-CM

## 2018-09-13 PROCEDURE — G0463 HOSPITAL OUTPT CLINIC VISIT: HCPCS | Mod: ZF

## 2018-09-13 PROCEDURE — 99214 OFFICE O/P EST MOD 30 MIN: CPT | Mod: ZP | Performed by: INTERNAL MEDICINE

## 2018-09-13 ASSESSMENT — PAIN SCALES - GENERAL: PAINLEVEL: NO PAIN (0)

## 2018-09-13 NOTE — PROGRESS NOTES
"PROBLEM LIST:  1.  Endometrial carcinoma -status post hysterectomy bilateral salpingo-oophorectomy with lymph node dissection on 4/27/18.  Found to have positive lymph nodes.  2.  History of anemia and thrombus cytopenia and leukopenia-probably mainly related to chemotherapy.  3.  History of iron deficiency anemia.  4.  History of right upper extremity DVT March 2012.  5.  GERD.  6.  Osteoarthritis.  7. Taxol allergy- anaphylaxis    Interim history: Ms. Edward is here for follow-up of pancytopenia.  With chemotherapy of paclitaxel 175 mg/m  on 6/13/18 and carboplatinum 505 mg on 7/6/18 and 535 mg on 7/27/18, she has had prolonged cytopenias.  She is supposed to have cycle 3 of carboplatinum on 8/24/18, but could not because the total white count was only 1.7 with an ANC of 0.9, and hemoglobin 8.7, and platelets of 404.  Week earlier the platelets have been 88. She says that she did not have any sort of URI or other sx of infection on 8/24/18. She report she did have infection earlier, prior to starting any chemotherapy.     Today she is feeling well. No fever, chills sweats, anorexia, mouth sores, sinus pain, diarrhea, dysuria, skin rash    PHYSICAL EXAMINATION:  /76 (BP Location: Left arm, Patient Position: Sitting, Cuff Size: Adult Regular)  Pulse 79  Temp 97.5  F (36.4  C) (Tympanic)  Resp 18  Ht 1.651 m (5' 5\")  Wt 73.4 kg (161 lb 14.4 oz)  LMP 03/04/2005  SpO2 100%  BMI 26.94 kg/m2    General appearance:  Patient is 64 yo woman in no acute distress.   Not otherwise examined.   HEENT:  No pallor, icterus, or mucositis.     Labs:  Results for MARILEE EDWARD (MRN [id number]) as of 9/13/2018 08:59   Ref. Range 7/27/2018 08:20 8/17/2018 08:14 8/24/2018 12:20 8/31/2018 10:30 9/5/2018 16:59   WBC Latest Ref Range: 4.0 - 11.0 10e9/L 3.4 (L) 2.4 (L) 1.7 (L) 2.9 (L) 5.2   Hemoglobin Latest Ref Range: 11.7 - 15.7 g/dL 8.1 (L) 8.5 (L) 8.7 (L) 9.8 (L) 10.7 (L)   Hematocrit Latest Ref Range: 35.0 - 47.0 % " 27.5 (L) 28.4 (L) 27.9 (L) 32.5 (L) 35.2   Platelet Count Latest Ref Range: 150 - 450 10e9/L 182 88 (L) 404 359 320   RBC Count Latest Ref Range: 3.8 - 5.2 10e12/L 3.22 (L) 3.21 (L) 3.11 (L) 3.39 (L) 3.65 (L)   MCV Latest Ref Range: 78 - 100 fl 85 89 90 96 96   MCH Latest Ref Range: 26.5 - 33.0 pg 25.2 (L) 26.5 28.0 28.9 29.3   MCHC Latest Ref Range: 31.5 - 36.5 g/dL 29.5 (L) 29.9 (L) 31.2 (L) 30.2 (L) 30.4 (L)   RDW Latest Ref Range: 10.0 - 15.0 % 20.4 (H) 27.3 (H) 30.8 (H) Dimorphic populat... Dimorphic populat...   Diff Method Unknown Automated Method Automated Method Automated Method Automated Method Automated Method   % Neutrophils Latest Units: % 68.5 72.5 51.4 52.9 61.8   % Lymphocytes Latest Units: % 19.1 19.5 30.1 28.3 27.0   % Monocytes Latest Units: % 11.5 6.8 17.9 16.8 9.8   % Eosinophils Latest Units: % 0.6 0.4 0.6 0.7 0.2   % Basophils Latest Units: % 0.3 0.4 0.0 0.3 0.6   % Immature Granulocytes Latest Units: % 0.0 0.4 0.0 1.0 0.6   Nucleated RBCs Latest Ref Range: 0 /100 0 0 0 0 0   Absolute Neutrophil Latest Ref Range: 1.6 - 8.3 10e9/L 2.3 1.7 0.9 (L) 1.5 (L) 3.2   Absolute Lymphocytes Latest Ref Range: 0.8 - 5.3 10e9/L 0.7 (L) 0.5 (L) 0.5 (L) 0.8 1.4   Absolute Monocytes Latest Ref Range: 0.0 - 1.3 10e9/L 0.4 0.2 0.3 0.5 0.5   Absolute Eosinophils Latest Ref Range: 0.0 - 0.7 10e9/L 0.0 0.0 0.0 0.0 0.0   Absolute Basophils Latest Ref Range: 0.0 - 0.2 10e9/L 0.0 0.0 0.0 0.0 0.0   Abs Immature Granulocytes Latest Ref Range: 0 - 0.4 10e9/L 0.0 0.0 0.0 0.0 0.0   Absolute Nucleated RBC Unknown 0.0 0.0 0.0 0.0 0.0   % Retic Latest Ref Range: 0.5 - 2.0 %     3.2 (H)   Absolute Retic Latest Ref Range: 25 - 95 10e9/L     115.7 (H)   Results for MARILEE EDWARD (MRN [id number]) as of 9/13/2018 08:59   Ref. Range 7/19/2018 09:30 7/27/2018 08:20 8/17/2018 08:14 8/24/2018 12:20 8/30/2018 08:00 9/5/2018 16:59   Erythropoietin Latest Ref Range: 4 - 27 mU/mL      37 (H)   Ferritin Latest Ref Range: 8 - 252 ng/mL  73        HDL Cholesterol Latest Ref Range: >49 mg/dL 51        Iron Latest Ref Range: 35 - 180 ug/dL      26 (L)   Iron Binding Cap Latest Ref Range: 240 - 430 ug/dL      363   Iron Saturation Index Latest Ref Range: 15 - 46 %      7 (L)     Assessment and Recommendation:  1. Transient thrombocytopenia and neutropenia related to chemotherapy The platelet count of 88k  was not too surprising with carboplatin. However, more unusual to have the moderate neutropenia that occurred 4 weeks after receiving the carboplatin. Her neutrophils and platelets are now fully recovered. This goes against the possibility of a serious bone marrow problem. She has been checked previously for nutritional deficiencies.  It is possible that she has a mildly hypocellular bone marrow that is more sensitive to the chemotherapy. She will be starting pelvic radiation, which can also suppress the marrow. After much consideration, I recommended checking a bone marrow biopsy to see if she has any marrow aplasia. However, such a finding would not  of her endometrial cancer. However, she does not want to undergo bone marrow biopsy, and it is difficult for me to push for one, since thee is high chance that results will not give us much useful inromarion or .   - Must always check a differential along with the CBC, since leukopenia is often due to lymphopenia, not neutropenia.   -She can be given platelet transfusions if need.   -G-CSF (filgatrim, neupogen) is not recommended routinely if she develops neutropenia, only if febrile and neutropenia.     2. Anemia- improved on oral iron, but not yet normal. Previously had low Hgb due to combination of iron deficiency, chemotherapy and anemia of chronic disease. She should continue on oral iron.     I will have hr return to see me in 4 weeks with labs  Lamar Rapp MD  Hematology

## 2018-09-13 NOTE — NURSING NOTE
"Oncology Rooming Note    September 13, 2018 8:48 AM   Gerri Owens is a 63 year old female who presents for:    Chief Complaint   Patient presents with     Oncology Clinic Visit     Return visit related to Low WBC     Initial Vitals: /76 (BP Location: Left arm, Patient Position: Sitting, Cuff Size: Adult Regular)  Pulse 79  Temp 97.5  F (36.4  C) (Tympanic)  Resp 18  Ht 1.651 m (5' 5\")  Wt 73.4 kg (161 lb 14.4 oz)  LMP 03/04/2005  SpO2 100%  BMI 26.94 kg/m2 Estimated body mass index is 26.94 kg/(m^2) as calculated from the following:    Height as of this encounter: 1.651 m (5' 5\").    Weight as of this encounter: 73.4 kg (161 lb 14.4 oz). Body surface area is 1.83 meters squared.  No Pain (0) Comment: Data Unavailable   Patient's last menstrual period was 03/04/2005.  Allergies reviewed: Yes  Medications reviewed: Yes    Medications: Medication refills not needed today.  Pharmacy name entered into [employer]:    [facility] [place], [place] - [address] [place] [address] OUTPATIENT PHARMACY  [facility] [place], [place] [facility] [address] [facility]    Clinical concerns: No new concerns. Provider was notified.    10 minutes for nursing intake (face to face time)     Ena Phillips LPN            "

## 2018-09-13 NOTE — MR AVS SNAPSHOT
After Visit Summary   9/13/2018    Gerri Owens    MRN: [id number]           Patient Information     Date Of Birth          1955        Visit Information        Provider Department      9/13/2018 9:00 AM Lamar Rapp MD [facility]         Follow-ups after your visit        Follow-up notes from your care team     Return in about 4 weeks (around 10/11/2018) for f/u Dionte.      Your next 10 appointments already scheduled     Oct 11, 2018  7:30 AM CDT   [facility] with  [facility] DRAW   [facility] ([facility])    [address] [place] [address]  [address]  [facility] [id number] [phone]            Oct 11, 2018  8:00 AM CDT   (Arrive by 7:45 AM)   Return Visit with Lamar Rapp MD   [facility] ([facility])    [address] [place] [address]  [address]  [facility] [id number] [phone]              Who to contact     If you have questions or need follow up information about today's clinic visit or your schedule please contact [facility] directly at [phone].  Normal or non-critical lab and imaging results will be communicated to you by MyChart, letter or phone within 4 business days after the clinic has received the results. If you do not hear from us within 7 days, please contact the clinic through MyChart or phone. If you have a critical or abnormal lab result, we will notify you by phone as soon as possible.  Submit refill requests through [employer] or call your pharmacy and they will forward the refill request to us. Please allow 3 business days for your refill to be completed.          Additional Information About Your Visit        MyChart Information     [employer] gives you secure access to your electronic health record. If you see a primary care provider, you can also send messages to your care team and make appointments. If you have questions,  "please call your primary care clinic.  If you do not have a primary care provider, please call [phone] and they will assist you.        Care EveryWhere ID     This is your Care EveryWhere ID. This could be used by other organizations to access your [facility] medical records  [id number]        Your Vitals Were     Pulse Temperature Respirations Height Last Period Pulse Oximetry    79 97.5  F (36.4  C) (Tympanic) 18 1.651 m (5' 5\") 03/04/2005 100%    BMI (Body Mass Index)                   26.94 kg/m2            Blood Pressure from Last 3 Encounters:   09/13/18 107/76   09/10/18 (!) 89/59   09/05/18 103/69    Weight from Last 3 Encounters:   09/13/18 73.4 kg (161 lb 14.4 oz)   09/05/18 74.2 kg (163 lb 8 oz)   08/31/18 74.4 kg (164 lb)              Today, you had the following     No orders found for display         Today's Medication Changes      Notice     This visit is during an admission. Changes to the med list made in this visit will be reflected in the After Visit Summary of the admission.             Primary Care Provider Office Phone # Fax #    Karo Doty [phone] [address] [place], [place] [id number]        Equal Access to Services     JEREMÍAS BUCK : Hadii thalia lopezo Soisaiahali, waaxda luqadaha, qaybta kaalmada adeegyada, carol roland. So [facility] [phone].    ATENCIÓN: Si habla español, tiene a graham disposición servicios gratuitos de asistencia lingüística. magui al [phone].    We comply with applicable federal civil rights laws and [place] laws. We do not discriminate on the basis of race, color, national origin, age, disability, sex, sexual orientation, or gender identity.            Thank you!     Thank you for choosing [facility]  for your care. Our goal is always to provide you with excellent care. Hearing back from our patients is one way we can continue to improve our services. Please take a few minutes to complete " the written survey that you may receive in the mail after your visit with us. Thank you!             Your Updated Medication List - Protect others around you: Learn how to safely use, store and throw away your medicines at www.disposemymeds.org.      Notice     This visit is during an admission. Changes to the med list made in this visit will be reflected in the After Visit Summary of the admission.

## 2018-09-13 NOTE — LETTER
"9/13/2018       RE: Marilee [place]  [address]  [facility] [address]     Dear Colleague,    Thank you for referring your patient, Marilee [place], to the [facility]. Please see a copy of my visit note below.    PROBLEM LIST:  1.  Endometrial carcinoma -status post hysterectomy bilateral salpingo-oophorectomy with lymph node dissection on 4/27/18.  Found to have positive lymph nodes.  2.  History of anemia and thrombus cytopenia and leukopenia-probably mainly related to chemotherapy.  3.  History of iron deficiency anemia.  4.  History of right upper extremity DVT March 2012.  5.  GERD.  6.  Osteoarthritis.  7. Taxol allergy- anaphylaxis    Interim history: Ms. Edward is here for follow-up of pancytopenia.  With chemotherapy of paclitaxel 175 mg/m  on 6/13/18 and carboplatinum 505 mg on 7/6/18 and 535 mg on 7/27/18, she has had prolonged cytopenias.  She is supposed to have cycle 3 of carboplatinum on 8/24/18, but could not because the total white count was only 1.7 with an ANC of 0.9, and hemoglobin 8.7, and platelets of 404.  Week earlier the platelets have been 88. She says that she did not have any sort of URI or other sx of infection on 8/24/18. She report she did have infection earlier, prior to starting any chemotherapy.     Today she is feeling well. No fever, chills sweats, anorexia, mouth sores, sinus pain, diarrhea, dysuria, skin rash    PHYSICAL EXAMINATION:  /76 (BP Location: Left arm, Patient Position: Sitting, Cuff Size: Adult Regular)  Pulse 79  Temp 97.5  F (36.4  C) (Tympanic)  Resp 18  Ht 1.651 m (5' 5\")  Wt 73.4 kg (161 lb 14.4 oz)  LMP 03/04/2005  SpO2 100%  BMI 26.94 kg/m2    General appearance:  Patient is 62 yo woman in no acute distress.   Not otherwise examined.   HEENT:  No pallor, icterus, or mucositis.     Labs:  Results for MARILEE EDWARD (MRN [id number]) as of 9/13/2018 08:59   Ref. Range 7/27/2018 08:20 8/17/2018 08:14 8/24/2018 12:20 " 8/31/2018 10:30 9/5/2018 16:59   WBC Latest Ref Range: 4.0 - 11.0 10e9/L 3.4 (L) 2.4 (L) 1.7 (L) 2.9 (L) 5.2   Hemoglobin Latest Ref Range: 11.7 - 15.7 g/dL 8.1 (L) 8.5 (L) 8.7 (L) 9.8 (L) 10.7 (L)   Hematocrit Latest Ref Range: 35.0 - 47.0 % 27.5 (L) 28.4 (L) 27.9 (L) 32.5 (L) 35.2   Platelet Count Latest Ref Range: 150 - 450 10e9/L 182 88 (L) 404 359 320   RBC Count Latest Ref Range: 3.8 - 5.2 10e12/L 3.22 (L) 3.21 (L) 3.11 (L) 3.39 (L) 3.65 (L)   MCV Latest Ref Range: 78 - 100 fl 85 89 90 96 96   MCH Latest Ref Range: 26.5 - 33.0 pg 25.2 (L) 26.5 28.0 28.9 29.3   MCHC Latest Ref Range: 31.5 - 36.5 g/dL 29.5 (L) 29.9 (L) 31.2 (L) 30.2 (L) 30.4 (L)   RDW Latest Ref Range: 10.0 - 15.0 % 20.4 (H) 27.3 (H) 30.8 (H) Dimorphic populat... Dimorphic populat...   Diff Method Unknown Automated Method Automated Method Automated Method Automated Method Automated Method   % Neutrophils Latest Units: % 68.5 72.5 51.4 52.9 61.8   % Lymphocytes Latest Units: % 19.1 19.5 30.1 28.3 27.0   % Monocytes Latest Units: % 11.5 6.8 17.9 16.8 9.8   % Eosinophils Latest Units: % 0.6 0.4 0.6 0.7 0.2   % Basophils Latest Units: % 0.3 0.4 0.0 0.3 0.6   % Immature Granulocytes Latest Units: % 0.0 0.4 0.0 1.0 0.6   Nucleated RBCs Latest Ref Range: 0 /100 0 0 0 0 0   Absolute Neutrophil Latest Ref Range: 1.6 - 8.3 10e9/L 2.3 1.7 0.9 (L) 1.5 (L) 3.2   Absolute Lymphocytes Latest Ref Range: 0.8 - 5.3 10e9/L 0.7 (L) 0.5 (L) 0.5 (L) 0.8 1.4   Absolute Monocytes Latest Ref Range: 0.0 - 1.3 10e9/L 0.4 0.2 0.3 0.5 0.5   Absolute Eosinophils Latest Ref Range: 0.0 - 0.7 10e9/L 0.0 0.0 0.0 0.0 0.0   Absolute Basophils Latest Ref Range: 0.0 - 0.2 10e9/L 0.0 0.0 0.0 0.0 0.0   Abs Immature Granulocytes Latest Ref Range: 0 - 0.4 10e9/L 0.0 0.0 0.0 0.0 0.0   Absolute Nucleated RBC Unknown 0.0 0.0 0.0 0.0 0.0   % Retic Latest Ref Range: 0.5 - 2.0 %     3.2 (H)   Absolute Retic Latest Ref Range: 25 - 95 10e9/L     115.7 (H)   Results for MARILEE EDWARD (MRN  [id number]) as of 9/13/2018 08:59   Ref. Range 7/19/2018 09:30 7/27/2018 08:20 8/17/2018 08:14 8/24/2018 12:20 8/30/2018 08:00 9/5/2018 16:59   Erythropoietin Latest Ref Range: 4 - 27 mU/mL      37 (H)   Ferritin Latest Ref Range: 8 - 252 ng/mL 73        HDL Cholesterol Latest Ref Range: >49 mg/dL 51        Iron Latest Ref Range: 35 - 180 ug/dL      26 (L)   Iron Binding Cap Latest Ref Range: 240 - 430 ug/dL      363   Iron Saturation Index Latest Ref Range: 15 - 46 %      7 (L)     Assessment and Recommendation:  1. Transient thrombocytopenia and neutropenia related to chemotherapy The platelet count of 88k  was not too surprising with carboplatin. However, more unusual to have the moderate neutropenia that occurred 4 weeks after receiving the carboplatin. Her neutrophils and platelets are now fully recovered. This goes against the possibility of a serious bone marrow problem. She has been checked previously for nutritional deficiencies.  It is possible that she has a mildly hypocellular bone marrow that is more sensitive to the chemotherapy. She will be starting pelvic radiation, which can also suppress the marrow. After much consideration, I recommended checking a bone marrow biopsy to see if she has any marrow aplasia. However, such a finding would not  of her endometrial cancer. However, she does not want to undergo bone marrow biopsy, and it is difficult for me to push for one, since thee is high chance that results will not give us much useful inromarion or .   - Must always check a differential along with the CBC, since leukopenia is often due to lymphopenia, not neutropenia.   -She can be given platelet transfusions if need.   -G-CSF (filgatrim, neupogen) is not recommended routinely if she develops neutropenia, only if febrile and neutropenia.     2. Anemia- improved on oral iron, but not yet normal. Previously had low Hgb due to combination of iron deficiency,  chemotherapy and anemia of chronic disease. She should continue on oral iron.     I will have hr return to see me in 4 weeks with labs  Lamar Rapp MD  Hematology

## 2018-09-17 ENCOUNTER — CARE COORDINATION (OUTPATIENT)
Dept: ONCOLOGY | Facility: CLINIC | Age: 63
End: 2018-09-17

## 2018-09-17 NOTE — PROGRESS NOTES
Care Coordinator Note  Per Dr. Tierney:  schedule cycle #3 Carboplatin.    9/20/18:  Per Dr. Tierney patient will start radiation therapy 9/24/18 and plan 4 cycles chemotherapy after completion of radiation therapy.       Joyce Gil MSN, RN  Care Coordinator  Gynecologic Cancer   Office:  [phone]  Pager: [phone] #[phone]

## 2018-09-24 ENCOUNTER — APPOINTMENT (OUTPATIENT)
Dept: RADIATION ONCOLOGY | Facility: CLINIC | Age: 63
End: 2018-09-24
Attending: RADIOLOGY
Payer: COMMERCIAL

## 2018-09-24 PROCEDURE — 77386 ZZH IMRT TREATMENT DELIVERY, COMPLEX: CPT | Performed by: RADIOLOGY

## 2018-09-25 ENCOUNTER — APPOINTMENT (OUTPATIENT)
Dept: RADIATION ONCOLOGY | Facility: CLINIC | Age: 63
End: 2018-09-25
Attending: RADIOLOGY
Payer: COMMERCIAL

## 2018-09-25 PROCEDURE — 77386 ZZH IMRT TREATMENT DELIVERY, COMPLEX: CPT | Performed by: RADIOLOGY

## 2018-09-26 ENCOUNTER — APPOINTMENT (OUTPATIENT)
Dept: RADIATION ONCOLOGY | Facility: CLINIC | Age: 63
End: 2018-09-26
Attending: RADIOLOGY
Payer: COMMERCIAL

## 2018-09-26 PROCEDURE — 77386 ZZH IMRT TREATMENT DELIVERY, COMPLEX: CPT | Performed by: RADIOLOGY

## 2018-09-27 ENCOUNTER — APPOINTMENT (OUTPATIENT)
Dept: RADIATION ONCOLOGY | Facility: CLINIC | Age: 63
End: 2018-09-27
Attending: RADIOLOGY
Payer: COMMERCIAL

## 2018-09-27 VITALS — WEIGHT: 165.6 LBS | BODY MASS INDEX: 27.56 KG/M2

## 2018-09-27 DIAGNOSIS — C54.1 ENDOMETRIAL CANCER (H): Primary | ICD-10-CM

## 2018-09-27 PROCEDURE — 77386 ZZH IMRT TREATMENT DELIVERY, COMPLEX: CPT | Performed by: RADIOLOGY

## 2018-09-27 NOTE — MR AVS SNAPSHOT
After Visit Summary   9/27/2018    Gerri Owens    MRN: [id number]           Patient Information     Date Of Birth          1955        Visit Information        Provider Department      9/27/2018 10:30 AM Darling Billings MD Radiation Oncology Clinic        Today's Diagnoses     Endometrial cancer (H)    -  1       Follow-ups after your visit        Your next 10 appointments already scheduled     Sep 28, 2018 10:00 AM CDT   EXTERNAL RADIATION TREATMENT with UMP RAD ONC ELEKTA   Radiation Oncology Clinic ([facility])    [facility]  1st Floor  [address] [facility] [address]   [phone]            Oct 01, 2018 10:00 AM CDT   EXTERNAL RADIATION TREATMENT with UMP RAD ONC ELEKTA   Radiation Oncology Clinic ([facility])    [facility]  1st Floor  [address] [facility] [address]   [phone]            Oct 02, 2018 10:30 AM CDT   EXTERNAL RADIATION TREATMENT with UMP RAD ONC ELEKTA   Radiation Oncology Clinic ([facility])    [facility]  1st Floor  [address] [facility] [address]   [phone]            Oct 03, 2018 10:00 AM CDT   EXTERNAL RADIATION TREATMENT with UMP RAD ONC ELEKTA   Radiation Oncology Clinic ([facility])    [facility]  1st Floor  [address] [facility] [address]   [phone]            Oct 04, 2018 10:00 AM CDT   EXTERNAL RADIATION TREATMENT with UMP RAD ONC ELEKTA   Radiation Oncology Clinic ([facility])    [facility]  1st Floor  [address] [facility] [address]   [phone]            Oct 04, 2018 10:30 AM CDT   ON TREATMENT VISIT with Darling Billings MD   Radiation Oncology Clinic ([facility])    [facility]  1st Floor  [address] [facility] [address]   [phone]            Oct 05, 2018 10:00 AM  CDT   EXTERNAL RADIATION TREATMENT with UMP RAD ONC ELEKTA   Radiation Oncology Clinic ([facility])    [facility]  1st Floor  [address] [facility] [address]   [phone]            Oct 08, 2018 10:00 AM CDT   EXTERNAL RADIATION TREATMENT with UMP RAD ONC ELEKTA   Radiation Oncology Clinic ([facility])    [facility]  1st Floor  [address] [facility] [address]   [phone]            Oct 09, 2018 10:00 AM CDT   EXTERNAL RADIATION TREATMENT with UMP RAD ONC ELEKTA   Radiation Oncology Clinic ([facility])    [facility]  1st Floor  [address] [facility] [address]   [phone]            Oct 10, 2018 10:00 AM CDT   EXTERNAL RADIATION TREATMENT with UMP RAD ONC ELEKTA   Radiation Oncology Clinic ([facility])    [facility]  1st Floor  [address] [facility] [address]   [phone]              Who to contact     Please call your clinic at [phone] to:    Ask questions about your health    Make or cancel appointments    Discuss your medicines    Learn about your test results    Speak to your doctor            Additional Information About Your Visit        [employer] Information     [employer] gives you secure access to your electronic health record. If you see a primary care provider, you can also send messages to your care team and make appointments. If you have questions, please call your primary care clinic.  If you do not have a primary care provider, please call [phone] and they will assist you.      [employer] is an electronic gateway that provides easy, online access to your medical records. With [employer], you can request a clinic appointment, read your test results, renew a prescription or communicate with your care team.     To access your existing account, please contact your [facility] or call  [phone] for assistance.        Care EveryWhere ID     This is your Care EveryWhere ID. This could be used by other organizations to access your [place] medical records  [id number]        Your Vitals Were     Last Period BMI (Body Mass Index)                03/04/2005 27.56 kg/m2           Blood Pressure from Last 3 Encounters:   09/13/18 107/76   09/10/18 (!) 89/59   09/05/18 103/69    Weight from Last 3 Encounters:   09/27/18 75.1 kg (165 lb 9.6 oz)   09/13/18 73.4 kg (161 lb 14.4 oz)   09/05/18 74.2 kg (163 lb 8 oz)              Today, you had the following     No orders found for display       Primary Care Provider Office Phone # Fax #    Karo Doty [phone] [address]  [facility] [address]        Equal Access to Services     JEREMÍAS BUCK : Hadii aad ku hadasho Soomaali, waaxda luqadaha, qaybta kaalmada adeegyada, waxay tamarain haylbue escobar . So [facility] [phone].    ATENCIÓN: Si habla español, tiene a graham disposición servicios gratuitos de asistencia lingüística. Avril al [phone].    We comply with applicable federal civil rights laws and [place] laws. We do not discriminate on the basis of race, color, national origin, age, disability, sex, sexual orientation, or gender identity.            Thank you!     Thank you for choosing [facility] CL[facility]  for your care. Our goal is always to provide you with excellent care. Hearing back from our patients is one way we can continue to improve our services. Please take a few minutes to complete the written survey that you may receive in the mail after your visit with us. Thank you!             Your Updated Medication List - Protect others around you: Learn how to safely use, store and throw away your medicines at www.disposemymeds.org.          This list is accurate as of 9/27/18 11:13 AM.  Always use your most recent med list.                   Brand Name Dispense Instructions for use Diagnosis     acetaminophen 500 MG tablet    TYLENOL     Take 1 tablet (500 mg) by mouth every 4 hours as needed    S/P DEMETRIO-BSO       ascorbic acid 250 MG tablet    VITAMIN C    90    250 mg    Routine general medical examination at a health care facility       calcium citrate-vitamin D 315-200 MG-UNIT Tabs per tablet    CITRACAL     Take 2 tablets by mouth daily        enoxaparin 80 MG/0.8ML injection    LOVENOX    60 Syringe    Inject 0.8 mLs (80 mg) Subcutaneous 2 times daily    Other acute pulmonary embolism without acute cor pulmonale (H)       FERROUS GLUCONATE PO      Take 325 mg by mouth daily (with breakfast)        GLUCOSAMINE CHONDROITIN COMPLX Tabs      Take  by mouth. 1500 mg 1xqd.    Routine general medical examination at a health care facility       LORazepam 1 MG tablet    ATIVAN    30 tablet    Take 1 tablet (1 mg) by mouth every 6 hours as needed (nausea/vomiting, anxiety or sleep)    Endometrial cancer (H), Encounter for long-term (current) use of medications       multivitamin, therapeutic with minerals Tabs tablet      Take 1 tablet by mouth daily        ondansetron 8 MG tablet    ZOFRAN    60 tablet    Take 1 tablet (8 mg) by mouth every 8 hours as needed for nausea (vomiting)    Endometrial cancer (H), Encounter for long-term (current) use of medications       prochlorperazine 10 MG tablet    COMPAZINE    30 tablet    Take 1 tablet (10 mg) by mouth every 6 hours as needed (nausea/vomiting)    Endometrial cancer (H), Encounter for long-term (current) use of medications       RANITIDINE HCL PO      Take 150 mg by mouth 2 times daily

## 2018-09-27 NOTE — PROGRESS NOTES
"[facility] PHYSICIANS  SPECIALIZING IN BREAKTHROUGHS  Radiation Oncology    On Treatment Visit Note      Gerri Owens      Date: 2018   MRN: [id number]   : 1955  Diagnosis: endometrial cancer      Reason for Visit:  On Radiation Treatment Visit     Treatment Summary to Date  Treatment Site: pelvis Current Dose: 720/5040 cGy Fractions:  + 3           Subjective:   Gerri is doing well.  She has no complaints and says that radiation is \"easy\".    Nursing ROS:   Nutrition Alteration  Diet Type: Patient's Preference  Skin  Skin Reaction: 0 - No changes        Cardiovascular  Respiratory effort: 1 - Normal - without distress           Pain Assessment  0-10 Pain Scale: 0      Objective:   Wt 75.1 kg (165 lb 9.6 oz)  LMP 2005  BMI 27.56 kg/m2  Gen: Appears well, in no acute distress    Labs:  CBC RESULTS:   Recent Labs   Lab Test  18   1659   WBC  5.2   RBC  3.65*   HGB  10.7*   HCT  35.2   MCV  96   MCH  29.3   MCHC  30.4*   RDW  Dimorphic population - unable to calculate   PLT  320     ELECTROLYTES:  Recent Labs   Lab Test  18   1659   NA  140   POTASSIUM  3.9   CHLORIDE  106   LAURENT  9.1   CO2  27   BUN  21   CR  1.02   GLC  85       Assessment:    Tolerating radiation therapy well.  All questions and concerns addressed.    Toxicities:  None    Plan:   1. Continue current therapy.    2. Discussed anticipated side effects again.       Mosaiq chart and setup information reviewed  MVCT/IGRT images checked    Medication Review  Med list reviewed with patient?: Yes    Educational Topic Discussed  Education Instructions: reviewed    The patient was seen and examined with Dr. Billings, who agrees with the above assessment and plan.    Kevin Anaya MD PGY-2   Radiation Oncology, [facility]  [phone] clinic  Pager [phone]    "

## 2018-09-27 NOTE — LETTER
"2018       RE: Gerri Owens  [address]  [facility] [address]     Dear Colleague,    Thank you for referring your patient, Gerri Owens, to the RADIATION ONCOLOGY CLINIC. Please see a copy of my visit note below.    [facility] PHYSICIANS  SPECIALIZING IN BREAKTHROUGHS  Radiation Oncology    On Treatment Visit Note      Gerri Owens      Date: 2018   MRN: [id number]   : 1955  Diagnosis: endometrial cancer      Reason for Visit:  On Radiation Treatment Visit     Treatment Summary to Date  Treatment Site: pelvis Current Dose: 720/5040 cGy Fractions:  + 3           Subjective:   Gerri is doing well.  She has no complaints and says that radiation is \"easy\".    Nursing ROS:   Nutrition Alteration  Diet Type: Patient's Preference  Skin  Skin Reaction: 0 - No changes        Cardiovascular  Respiratory effort: 1 - Normal - without distress           Pain Assessment  0-10 Pain Scale: 0      Objective:   Wt 75.1 kg (165 lb 9.6 oz)  LMP 2005  BMI 27.56 kg/m2  Gen: Appears well, in no acute distress    Labs:  CBC RESULTS:   Recent Labs   Lab Test  18   1659   WBC  5.2   RBC  3.65*   HGB  10.7*   HCT  35.2   MCV  96   MCH  29.3   MCHC  30.4*   RDW  Dimorphic population - unable to calculate   PLT  320     ELECTROLYTES:  Recent Labs   Lab Test  18   1659   NA  140   POTASSIUM  3.9   CHLORIDE  106   LAURENT  9.1   CO2  27   BUN  21   CR  1.02   GLC  85       Assessment:    Tolerating radiation therapy well.  All questions and concerns addressed.    Toxicities:  None    Plan:   1. Continue current therapy.    2. Discussed anticipated side effects again.       Mosaiq chart and setup information reviewed  MVCT/IGRT images checked    Medication Review  Med list reviewed with patient?: Yes    Educational Topic Discussed  Education Instructions: reviewed    The patient was seen and examined with Dr. Billings, who agrees with the above assessment and plan.    Kevin" MD Jaclyn PGY-2   Radiation Oncology, [facility]  [phone] clinic  Pager [phone]      Again, thank you for allowing me to participate in the care of your patient.      Sincerely,    Darling Billings MD

## 2018-09-28 ENCOUNTER — APPOINTMENT (OUTPATIENT)
Dept: RADIATION ONCOLOGY | Facility: CLINIC | Age: 63
End: 2018-09-28
Attending: RADIOLOGY
Payer: COMMERCIAL

## 2018-09-28 PROCEDURE — 77336 RADIATION PHYSICS CONSULT: CPT | Performed by: RADIOLOGY

## 2018-09-28 PROCEDURE — 77386 ZZH IMRT TREATMENT DELIVERY, COMPLEX: CPT | Performed by: RADIOLOGY

## 2018-10-01 ENCOUNTER — APPOINTMENT (OUTPATIENT)
Dept: RADIATION ONCOLOGY | Facility: CLINIC | Age: 63
End: 2018-10-01
Attending: RADIOLOGY
Payer: COMMERCIAL

## 2018-10-01 PROCEDURE — 77386 ZZH IMRT TREATMENT DELIVERY, COMPLEX: CPT | Performed by: RADIOLOGY

## 2018-10-02 ENCOUNTER — APPOINTMENT (OUTPATIENT)
Dept: RADIATION ONCOLOGY | Facility: CLINIC | Age: 63
End: 2018-10-02
Attending: RADIOLOGY
Payer: COMMERCIAL

## 2018-10-02 PROCEDURE — 77386 ZZH IMRT TREATMENT DELIVERY, COMPLEX: CPT | Performed by: RADIOLOGY

## 2018-10-03 ENCOUNTER — APPOINTMENT (OUTPATIENT)
Dept: RADIATION ONCOLOGY | Facility: CLINIC | Age: 63
End: 2018-10-03
Attending: RADIOLOGY
Payer: COMMERCIAL

## 2018-10-03 PROCEDURE — 77386 ZZH IMRT TREATMENT DELIVERY, COMPLEX: CPT | Performed by: RADIOLOGY

## 2018-10-04 ENCOUNTER — OFFICE VISIT (OUTPATIENT)
Dept: RADIATION ONCOLOGY | Facility: CLINIC | Age: 63
End: 2018-10-04
Attending: RADIOLOGY
Payer: COMMERCIAL

## 2018-10-04 VITALS — BODY MASS INDEX: 27.76 KG/M2 | WEIGHT: 166.8 LBS

## 2018-10-04 DIAGNOSIS — C54.1 ENDOMETRIAL CANCER (H): Primary | ICD-10-CM

## 2018-10-04 PROCEDURE — 77386 ZZH IMRT TREATMENT DELIVERY, COMPLEX: CPT | Performed by: RADIOLOGY

## 2018-10-04 NOTE — LETTER
10/4/2018       RE: Gerri Owens  [address]  [facility] [address]     Dear Colleague,    Thank you for referring your patient, Gerri Owens, to the RADIATION ONCOLOGY CLINIC. Please see a copy of my visit note below.    [facility] PHYSICIANS  SPECIALIZING IN BREAKTHROUGHS  Radiation Oncology    On Treatment Visit Note      Gerri Owens      Date: 10/4/2018   MRN: [id number]   : 1955  Diagnosis: endometrial cancer      Reason for Visit:  On Radiation Treatment Visit     Treatment Summary to Date  Treatment Site: pelvis Current Dose: 1620/5040 cGy Fractions:  + 3      ED Visit/Hosiptal Admission: None    Treatment Breaks: None    Subjective:  Gerri is doing well.  She had one episode of diarrhea last Thursday, but none since.  Her stools are turning a bit soft.  Energy level improved since her Hgb has gone up.      Nursing ROS:   Nutrition Alteration  Diet Type: Patient's Preference  Skin  Skin Reaction: 0 - No changes        Cardiovascular  Respiratory effort: 1 - Normal - without distress  Gastrointestinal  Diarrhea: 1 - Abdominal cramping, two or less soft or liquid bowel movements  Genitourinary  Urinary Status: 0 - Normal     Pain Assessment  0-10 Pain Scale: 0      Objective:   Wt 75.7 kg (166 lb 12.8 oz)  LMP 2005  BMI 27.76 kg/m2  Gen: Appears well, in no acute distress  Skin: Deferred    Labs:  CBC RESULTS:   Recent Labs   Lab Test  18   1659   WBC  5.2   RBC  3.65*   HGB  10.7*   HCT  35.2   MCV  96   MCH  29.3   MCHC  30.4*   RDW  Dimorphic population - unable to calculate   PLT  320     ELECTROLYTES:  Recent Labs   Lab Test  18   1659   NA  140   POTASSIUM  3.9   CHLORIDE  106   LAURENT  9.1   CO2  27   BUN  21   CR  1.02   GLC  85       Assessment:    Tolerating radiation therapy well.  All questions and concerns addressed.    Toxicities:  Fatigue: Grade 0: No toxicity  Diarrhea: Grade 1: Increase of <4 stools per day over baseline; mild  increase in ostomy output compared to baseline    Plan:   1. Continue current therapy.    2. Discussed imodium use if diarrhea gets worse.    3. Seeing Dr. Rapp for follow up next week.       Mosaiq chart and setup information reviewed  MVCT/IGRT images checked    Medication Review  Med list reviewed with patient?: Yes    Educational Topic Discussed  Education Instructions: reviewed        Darling Billings MD/PhD  [phone] clinic  Pager [phone]    Please do not send letter to referring physician.      Again, thank you for allowing me to participate in the care of your patient.      Sincerely,    Darling Billings MD

## 2018-10-04 NOTE — MR AVS SNAPSHOT
After Visit Summary   10/4/2018    Gerri Owens    MRN: [id number]           Patient Information     Date Of Birth          1955        Visit Information        Provider Department      10/4/2018 10:30 AM Darling Billings MD Radiation Oncology Clinic        Today's Diagnoses     Endometrial cancer (H)    -  1       Follow-ups after your visit        Your next 10 appointments already scheduled     Oct 05, 2018 10:00 AM CDT   EXTERNAL RADIATION TREATMENT with UMP RAD ONC ELEKTA   Radiation Oncology Clinic ([facility])    [facility]  1st Floor  [address]  [facility] [address]   [phone]            Oct 08, 2018 10:00 AM CDT   EXTERNAL RADIATION TREATMENT with UMP RAD ONC ELEKTA   Radiation Oncology Clinic ([facility])    [facility]  1st Floor  [address]  [facility] [address]   [phone]            Oct 09, 2018 10:00 AM CDT   EXTERNAL RADIATION TREATMENT with UMP RAD ONC ELEKTA   Radiation Oncology Clinic ([facility])    [facility]  1st Floor  [address]  [facility] [address]   [phone]            Oct 10, 2018 10:00 AM CDT   EXTERNAL RADIATION TREATMENT with UMP RAD ONC ELEKTA   Radiation Oncology Clinic ([facility])    [facility]  1st Floor  [address]  [facility] [address]   [phone]            Oct 11, 2018  7:30 AM CDT   [facility] Lab Draw with  [facility] LAB DRAW   [facility] ([facility])    [address]  [address]  [facility] [address]   [phone]            Oct 11, 2018  8:00 AM CDT   (Arrive by 7:45 AM)   Return Visit with Lamar Rapp MD   [facility] ([facility])    [address]  [address]  [facility] [address]   [phone]            Oct 11, 2018 10:00 AM CDT   EXTERNAL  RADIATION TREATMENT with [facility] RAD ONC ELEKTA   Radiation Oncology Clinic ([facility])    [facility]  1st Floor  [address] [facility] [address]   [phone]            Oct 11, 2018 10:30 AM CDT   ON TREATMENT VISIT with Darling Billings MD   Radiation Oncology Clinic ([facility])    [facility]  1st Floor  [address] [facility] [address] [phone]            Oct 12, 2018 10:00 AM CDT   EXTERNAL RADIATION TREATMENT with [facility] RAD ONC ELEKTA   Radiation Oncology Clinic ([facility])    [facility]  1st Floor  [address] [facility] [address]   [phone]            Oct 15, 2018 10:00 AM CDT   EXTERNAL RADIATION TREATMENT with [facility] RAD ONC ELEKTA   Radiation Oncology Clinic ([facility])    [facility]  1st Floor  [address] [facility] [address] [facility] [address] [phone]              Who to contact     Please call your clinic at [phone] to:    Ask questions about your health    Make or cancel appointments    Discuss your medicines    Learn about your test results    Speak to your doctor            Additional Information About Your Visit        [employer] Information     [employer] gives you secure access to your electronic health record. If you see a primary care provider, you can also send messages to your care team and make appointments. If you have questions, please call your primary care clinic.  If you do not have a primary care provider, please call [phone] and they will assist you.      [employer] is an electronic gateway that provides easy, online access to your medical records. With [employer], you can request a clinic appointment, read your test results, renew a prescription or communicate with your care team.     To access your existing account, please contact your [facility] or call [phone] for  assistance.        Care EveryWhere ID     This is your Care EveryWhere ID. This could be used by other organizations to access your [place] medical records  [id number]        Your Vitals Were     Last Period BMI (Body Mass Index)                03/04/2005 27.76 kg/m2           Blood Pressure from Last 3 Encounters:   09/13/18 107/76   09/10/18 (!) 89/59   09/05/18 103/69    Weight from Last 3 Encounters:   10/04/18 75.7 kg (166 lb 12.8 oz)   09/27/18 75.1 kg (165 lb 9.6 oz)   09/13/18 73.4 kg (161 lb 14.4 oz)              Today, you had the following     No orders found for display       Primary Care Provider Office Phone # Fax #    Karo Doty [phone] [address]  [facility] [address]        Equal Access to Services     [facility]EMÍAS [facility] : Hadii thalia lopezo Sograce, waaxda luqadaha, qaybta kaalmada adecasiyanelly, carol escobar . So [facility] [phone].    ATENCIÓN: Si habla español, tiene a graham disposición servicios gratuitos de asistencia lingüística. Llame al [phone].    We comply with applicable federal civil rights laws and [place] laws. We do not discriminate on the basis of race, color, national origin, age, disability, sex, sexual orientation, or gender identity.            Thank you!     Thank you for choosing [facility] CL[facility]  for your care. Our goal is always to provide you with excellent care. Hearing back from our patients is one way we can continue to improve our services. Please take a few minutes to complete the written survey that you may receive in the mail after your visit with us. Thank you!             Your Updated Medication List - Protect others around you: Learn how to safely use, store and throw away your medicines at www.disposemymeds.org.          This list is accurate as of 10/4/18 10:46 AM.  Always use your most recent med list.                   Brand Name Dispense Instructions for use Diagnosis    acetaminophen 500 MG  tablet    TYLENOL     Take 1 tablet (500 mg) by mouth every 4 hours as needed    S/P DEMETRIO-BSO       ascorbic acid 250 MG tablet    VITAMIN C    90    250 mg    Routine general medical examination at a health care facility       calcium citrate-vitamin D 315-200 MG-UNIT Tabs per tablet    CITRACAL     Take 2 tablets by mouth daily        enoxaparin 80 MG/0.8ML injection    LOVENOX    60 Syringe    Inject 0.8 mLs (80 mg) Subcutaneous 2 times daily    Other acute pulmonary embolism without acute cor pulmonale (H)       FERROUS GLUCONATE PO      Take 325 mg by mouth daily (with breakfast)        GLUCOSAMINE CHONDROITIN COMPLX Tabs      Take  by mouth. 1500 mg 1xqd.    Routine general medical examination at a health care facility       LORazepam 1 MG tablet    ATIVAN    30 tablet    Take 1 tablet (1 mg) by mouth every 6 hours as needed (nausea/vomiting, anxiety or sleep)    Endometrial cancer (H), Encounter for long-term (current) use of medications       multivitamin, therapeutic with minerals Tabs tablet      Take 1 tablet by mouth daily        ondansetron 8 MG tablet    ZOFRAN    60 tablet    Take 1 tablet (8 mg) by mouth every 8 hours as needed for nausea (vomiting)    Endometrial cancer (H), Encounter for long-term (current) use of medications       prochlorperazine 10 MG tablet    COMPAZINE    30 tablet    Take 1 tablet (10 mg) by mouth every 6 hours as needed (nausea/vomiting)    Endometrial cancer (H), Encounter for long-term (current) use of medications       RANITIDINE HCL PO      Take 150 mg by mouth 2 times daily

## 2018-10-04 NOTE — LETTER
Date:October 5, 2018      Provider requested that no letter be sent. Do not send.       [facility] Health Information

## 2018-10-04 NOTE — PROGRESS NOTES
[facility] PHYSICIANS  SPECIALIZING IN BREAKTHROUGHS  Radiation Oncology    On Treatment Visit Note      Gerri Owens      Date: 10/4/2018   MRN: [id number]   : 1955  Diagnosis: endometrial cancer      Reason for Visit:  On Radiation Treatment Visit     Treatment Summary to Date  Treatment Site: pelvis Current Dose: 1620/5040 cGy Fractions:  + 3      ED Visit/Hosiptal Admission: None    Treatment Breaks: None    Subjective:  Gerri is doing well.  She had one episode of diarrhea last Thursday, but none since.  Her stools are turning a bit soft.  Energy level improved since her Hgb has gone up.      Nursing ROS:   Nutrition Alteration  Diet Type: Patient's Preference  Skin  Skin Reaction: 0 - No changes        Cardiovascular  Respiratory effort: 1 - Normal - without distress  Gastrointestinal  Diarrhea: 1 - Abdominal cramping, two or less soft or liquid bowel movements  Genitourinary  Urinary Status: 0 - Normal     Pain Assessment  0-10 Pain Scale: 0      Objective:   Wt 75.7 kg (166 lb 12.8 oz)  LMP 2005  BMI 27.76 kg/m2  Gen: Appears well, in no acute distress  Skin: Deferred    Labs:  CBC RESULTS:   Recent Labs   Lab Test  18   1659   WBC  5.2   RBC  3.65*   HGB  10.7*   HCT  35.2   MCV  96   MCH  29.3   MCHC  30.4*   RDW  Dimorphic population - unable to calculate   PLT  320     ELECTROLYTES:  Recent Labs   Lab Test  18   1659   NA  140   POTASSIUM  3.9   CHLORIDE  106   LAURENT  9.1   CO2  27   BUN  21   CR  1.02   GLC  85       Assessment:    Tolerating radiation therapy well.  All questions and concerns addressed.    Toxicities:  Fatigue: Grade 0: No toxicity  Diarrhea: Grade 1: Increase of <4 stools per day over baseline; mild increase in ostomy output compared to baseline    Plan:   1. Continue current therapy.    2. Discussed imodium use if diarrhea gets worse.    3. Seeing Dr. Rapp for follow up next week.       Yapert chart and setup information  reviewed  MVCT/IGRT images checked    Medication Review  Med list reviewed with patient?: Yes    Educational Topic Discussed  Education Instructions: reviewed        Darling Billings MD/PhD  [phone] clinic  Pager [phone]    Please do not send letter to referring physician.

## 2018-10-05 ENCOUNTER — APPOINTMENT (OUTPATIENT)
Dept: RADIATION ONCOLOGY | Facility: CLINIC | Age: 63
End: 2018-10-05
Attending: RADIOLOGY
Payer: COMMERCIAL

## 2018-10-05 PROCEDURE — 77336 RADIATION PHYSICS CONSULT: CPT | Performed by: RADIOLOGY

## 2018-10-05 PROCEDURE — 77386 ZZH IMRT TREATMENT DELIVERY, COMPLEX: CPT | Performed by: RADIOLOGY

## 2018-10-08 ENCOUNTER — APPOINTMENT (OUTPATIENT)
Dept: RADIATION ONCOLOGY | Facility: CLINIC | Age: 63
End: 2018-10-08
Attending: RADIOLOGY
Payer: COMMERCIAL

## 2018-10-08 PROCEDURE — 77386 ZZH IMRT TREATMENT DELIVERY, COMPLEX: CPT | Performed by: RADIOLOGY

## 2018-10-09 ENCOUNTER — APPOINTMENT (OUTPATIENT)
Dept: RADIATION ONCOLOGY | Facility: CLINIC | Age: 63
End: 2018-10-09
Attending: RADIOLOGY
Payer: COMMERCIAL

## 2018-10-09 PROCEDURE — 77386 ZZH IMRT TREATMENT DELIVERY, COMPLEX: CPT | Performed by: RADIOLOGY

## 2018-10-10 ENCOUNTER — APPOINTMENT (OUTPATIENT)
Dept: RADIATION ONCOLOGY | Facility: CLINIC | Age: 63
End: 2018-10-10
Attending: RADIOLOGY
Payer: COMMERCIAL

## 2018-10-10 PROCEDURE — 77386 ZZH IMRT TREATMENT DELIVERY, COMPLEX: CPT | Performed by: RADIOLOGY

## 2018-10-11 ENCOUNTER — ONCOLOGY VISIT (OUTPATIENT)
Dept: ONCOLOGY | Facility: CLINIC | Age: 63
End: 2018-10-11
Attending: INTERNAL MEDICINE
Payer: COMMERCIAL

## 2018-10-11 ENCOUNTER — OFFICE VISIT (OUTPATIENT)
Dept: RADIATION ONCOLOGY | Facility: CLINIC | Age: 63
End: 2018-10-11
Attending: RADIOLOGY
Payer: COMMERCIAL

## 2018-10-11 ENCOUNTER — APPOINTMENT (OUTPATIENT)
Dept: LAB | Facility: CLINIC | Age: 63
End: 2018-10-11
Attending: INTERNAL MEDICINE
Payer: COMMERCIAL

## 2018-10-11 VITALS
HEIGHT: 65 IN | SYSTOLIC BLOOD PRESSURE: 106 MMHG | HEART RATE: 69 BPM | BODY MASS INDEX: 27.41 KG/M2 | DIASTOLIC BLOOD PRESSURE: 63 MMHG | WEIGHT: 164.5 LBS | RESPIRATION RATE: 18 BRPM | TEMPERATURE: 98.1 F | OXYGEN SATURATION: 96 %

## 2018-10-11 VITALS — WEIGHT: 165.2 LBS | BODY MASS INDEX: 27.49 KG/M2

## 2018-10-11 DIAGNOSIS — T45.1X5A CHEMOTHERAPY-INDUCED NEUTROPENIA (H): ICD-10-CM

## 2018-10-11 DIAGNOSIS — R79.0 LOW FERRITIN: ICD-10-CM

## 2018-10-11 DIAGNOSIS — D70.1 CHEMOTHERAPY-INDUCED NEUTROPENIA (H): ICD-10-CM

## 2018-10-11 DIAGNOSIS — C54.1 ENDOMETRIAL CANCER (H): Primary | ICD-10-CM

## 2018-10-11 DIAGNOSIS — D69.6 THROMBOCYTOPENIA (H): ICD-10-CM

## 2018-10-11 LAB
BASOPHILS # BLD AUTO: 0 10E9/L (ref 0–0.2)
BASOPHILS NFR BLD AUTO: 0.8 %
DIFFERENTIAL METHOD BLD: ABNORMAL
EOSINOPHIL # BLD AUTO: 0.2 10E9/L (ref 0–0.7)
EOSINOPHIL NFR BLD AUTO: 7.2 %
ERYTHROCYTE [DISTWIDTH] IN BLOOD BY AUTOMATED COUNT: 13.2 % (ref 10–15)
FERRITIN SERPL-MCNC: 17 NG/ML (ref 8–252)
HCT VFR BLD AUTO: 39.3 % (ref 35–47)
HGB BLD-MCNC: 12.4 G/DL (ref 11.7–15.7)
IMM GRANULOCYTES # BLD: 0 10E9/L (ref 0–0.4)
IMM GRANULOCYTES NFR BLD: 0.4 %
LYMPHOCYTES # BLD AUTO: 0.2 10E9/L (ref 0.8–5.3)
LYMPHOCYTES NFR BLD AUTO: 8.3 %
MCH RBC QN AUTO: 31.9 PG (ref 26.5–33)
MCHC RBC AUTO-ENTMCNC: 31.6 G/DL (ref 31.5–36.5)
MCV RBC AUTO: 101 FL (ref 78–100)
MONOCYTES # BLD AUTO: 0.3 10E9/L (ref 0–1.3)
MONOCYTES NFR BLD AUTO: 11.3 %
NEUTROPHILS # BLD AUTO: 1.9 10E9/L (ref 1.6–8.3)
NEUTROPHILS NFR BLD AUTO: 72 %
NRBC # BLD AUTO: 0 10*3/UL
NRBC BLD AUTO-RTO: 0 /100
PLATELET # BLD AUTO: 122 10E9/L (ref 150–450)
RBC # BLD AUTO: 3.89 10E12/L (ref 3.8–5.2)
RETICS # AUTO: 56.4 10E9/L (ref 25–95)
RETICS/RBC NFR AUTO: 1.5 % (ref 0.5–2)
WBC # BLD AUTO: 2.7 10E9/L (ref 4–11)

## 2018-10-11 PROCEDURE — G0463 HOSPITAL OUTPT CLINIC VISIT: HCPCS | Mod: ZF

## 2018-10-11 PROCEDURE — 85025 COMPLETE CBC W/AUTO DIFF WBC: CPT | Performed by: INTERNAL MEDICINE

## 2018-10-11 PROCEDURE — 25000128 H RX IP 250 OP 636: Mod: ZF | Performed by: INTERNAL MEDICINE

## 2018-10-11 PROCEDURE — 99214 OFFICE O/P EST MOD 30 MIN: CPT | Mod: ZP | Performed by: INTERNAL MEDICINE

## 2018-10-11 PROCEDURE — 36591 DRAW BLOOD OFF VENOUS DEVICE: CPT

## 2018-10-11 PROCEDURE — 77386 ZZH IMRT TREATMENT DELIVERY, COMPLEX: CPT | Performed by: RADIOLOGY

## 2018-10-11 PROCEDURE — 85045 AUTOMATED RETICULOCYTE COUNT: CPT | Performed by: INTERNAL MEDICINE

## 2018-10-11 PROCEDURE — 82728 ASSAY OF FERRITIN: CPT | Performed by: INTERNAL MEDICINE

## 2018-10-11 RX ORDER — LOPERAMIDE HCL 2 MG
2 CAPSULE ORAL 4 TIMES DAILY PRN
COMMUNITY
End: 2019-02-18

## 2018-10-11 RX ORDER — HEPARIN SODIUM (PORCINE) LOCK FLUSH IV SOLN 100 UNIT/ML 100 UNIT/ML
5 SOLUTION INTRAVENOUS ONCE
Status: COMPLETED | OUTPATIENT
Start: 2018-10-11 | End: 2018-10-11

## 2018-10-11 RX ADMIN — Medication 5 ML: at 07:50

## 2018-10-11 ASSESSMENT — PAIN SCALES - GENERAL: PAINLEVEL: NO PAIN (0)

## 2018-10-11 NOTE — LETTER
Date:October 16, 2018      Provider requested that no letter be sent. Do not send.       [facility] Health Information

## 2018-10-11 NOTE — MR AVS SNAPSHOT
After Visit Summary   10/11/2018    Gerri Owens    MRN: [id number]           Patient Information     Date Of Birth          1955        Visit Information        Provider Department      10/11/2018 10:30 AM Darling Billings MD Radiation Oncology Clinic        Today's Diagnoses     Endometrial cancer (H)    -  1       Follow-ups after your visit        Your next 10 appointments already scheduled     Oct 11, 2018 10:30 AM CDT   ON TREATMENT VISIT with Darling Billings MD   Radiation Oncology Clinic ([facility])    [facility]  1st Floor  [address] [facility] [address]   [phone]            Oct 12, 2018 10:00 AM CDT   EXTERNAL RADIATION TREATMENT with P RAD ONC ELEKTA   Radiation Oncology Clinic ([facility])    [facility]  1st Floor  [address] [facility] [address]   [phone]            Oct 15, 2018 10:00 AM CDT   EXTERNAL RADIATION TREATMENT with P RAD ONC ELEKTA   Radiation Oncology Clinic ([facility])    [facility]  1st Floor  [address] [facility] [address] [facility] [address]   [phone]            Oct 16, 2018 10:00 AM CDT   EXTERNAL RADIATION TREATMENT with P RAD ONC ELEKTA   Radiation Oncology Clinic ([facility])    [facility]  1st Floor  [address] [facility] [address] [facility] [address]   [phone]            Oct 17, 2018 10:00 AM CDT   EXTERNAL RADIATION TREATMENT with P RAD ONC ELEKTA   Radiation Oncology Clinic ([facility])    [facility]  1st Floor  [address] [facility] [address]   [phone]            Oct 18, 2018  8:45 AM CDT   Masonic Lab Draw with  MASONIC LAB DRAW   [facility] Masonic Lab Draw ([facility])    [address]  [address]  [facility] [address] [phone]            Oct 18, 2018 10:00 AM CDT   EXTERNAL RADIATION  TREATMENT with [facility] RAD ONC ELEKTA   Radiation Oncology Clinic ([facility])    [facility]  1st Floor  [address] [address]   [phone]            Oct 18, 2018 10:30 AM CDT   ON TREATMENT VISIT with Darling Billings MD   Radiation Oncology Clinic ([facility])    [facility]  1st Floor  [address] [facility] [address] [address]   [phone]            Oct 19, 2018 10:00 AM CDT   EXTERNAL RADIATION TREATMENT with [facility] RAD ONC ELEKTA   Radiation Oncology Clinic ([facility])    [facility]  1st Floor  [address] [address]   [phone]            Oct 22, 2018 10:00 AM CDT   EXTERNAL RADIATION TREATMENT with [facility] RAD ONC ELEKTA   Radiation Oncology Clinic ([facility])    [facility]  1st Floor  [address] [address] [address]   [phone]              Future tests that were ordered for you today     Open Standing Orders        Priority Remaining Interval Expires Ordered    CBC with platelets differential Routine 6/6 weekly 11/22/2018 10/11/2018            Who to contact     Please call your clinic at [phone] to:    Ask questions about your health    Make or cancel appointments    Discuss your medicines    Learn about your test results    Speak to your doctor            Additional Information About Your Visit        Brille24 Information     Brille24 gives you secure access to your electronic health record. If you see a primary care provider, you can also send messages to your care team and make appointments. If you have questions, please call your primary care clinic.  If you do not have a primary care provider, please call [phone] and they will assist you.      Brille[employer] is an electronic gateway that provides easy, online access to your medical records. With [employer]ille[employer], you can request a clinic appointment, read your test  results, renew a prescription or communicate with your care team.     To access your existing account, please contact your [facility] or call [phone] for assistance.        Care EveryWhere ID     This is your Care EveryWhere ID. This could be used by other organizations to access your [place] medical records  [id number]        Your Vitals Were     Last Period BMI (Body Mass Index)                03/04/2005 27.49 kg/m2           Blood Pressure from Last 3 Encounters:   10/11/18 106/63   09/13/18 107/76   09/10/18 (!) 89/59    Weight from Last 3 Encounters:   10/11/18 74.9 kg (165 lb 3.2 oz)   10/11/18 74.6 kg (164 lb 8 oz)   10/04/18 75.7 kg (166 lb 12.8 oz)              Today, you had the following     No orders found for display       Primary Care Provider Office Phone # Fax #    Karo Doty [phone] [address] [facility] [address]        Equal Access to Services     JEREMÍAS BUCK : Hadii aad ku hadasho Soomaali, waaxda luqadaha, qaybta kaalmada adeegyada, carol escobar . So [facility] [phone].    ATENCIÓN: Si habla español, tiene a graham disposición servicios gratuitos de asistencia lingüística. Llame al [phone].    We comply with applicable federal civil rights laws and [place] laws. We do not discriminate on the basis of race, color, national origin, age, disability, sex, sexual orientation, or gender identity.            Thank you!     Thank you for choosing RADIATION ONCOLOGY CLINIC  for your care. Our goal is always to provide you with excellent care. Hearing back from our patients is one way we can continue to improve our services. Please take a few minutes to complete the written survey that you may receive in the mail after your visit with us. Thank you!             Your Updated Medication List - Protect others around you: Learn how to safely use, store and throw away your medicines at www.disposemymeds.org.           This list is accurate as of 10/11/18 10:27 AM.  Always use your most recent med list.                   Brand Name Dispense Instructions for use Diagnosis    acetaminophen 500 MG tablet    TYLENOL     Take 1 tablet (500 mg) by mouth every 4 hours as needed    S/P DEMETRIO-BSO       ascorbic acid 250 MG tablet    VITAMIN C    90    250 mg    Routine general medical examination at a health care facility       calcium citrate-vitamin D 315-200 MG-UNIT Tabs per tablet    CITRACAL     Take 2 tablets by mouth daily        enoxaparin 80 MG/0.8ML injection    LOVENOX    60 Syringe    Inject 0.8 mLs (80 mg) Subcutaneous 2 times daily    Other acute pulmonary embolism without acute cor pulmonale (H)       FERROUS GLUCONATE PO      Take 325 mg by mouth daily (with breakfast)        GLUCOSAMINE CHONDROITIN COMPLX Tabs      Take  by mouth. 1500 mg 1xqd.    Routine general medical examination at a health care facility       loperamide 2 MG capsule    IMODIUM     Take 2 mg by mouth 4 times daily as needed for diarrhea        LORazepam 1 MG tablet    ATIVAN    30 tablet    Take 1 tablet (1 mg) by mouth every 6 hours as needed (nausea/vomiting, anxiety or sleep)    Endometrial cancer (H), Encounter for long-term (current) use of medications       multivitamin, therapeutic with minerals Tabs tablet      Take 1 tablet by mouth daily        ondansetron 8 MG tablet    ZOFRAN    60 tablet    Take 1 tablet (8 mg) by mouth every 8 hours as needed for nausea (vomiting)    Endometrial cancer (H), Encounter for long-term (current) use of medications       prochlorperazine 10 MG tablet    COMPAZINE    30 tablet    Take 1 tablet (10 mg) by mouth every 6 hours as needed (nausea/vomiting)    Endometrial cancer (H), Encounter for long-term (current) use of medications       RANITIDINE HCL PO      Take 150 mg by mouth 2 times daily

## 2018-10-11 NOTE — NURSING NOTE
"Oncology Rooming Note    October 11, 2018 8:07 AM   Gerri Owens is a 63 year old female who presents for:    Chief Complaint   Patient presents with     Port Draw     labs and JIC tube drawn via port by RN     Oncology Clinic Visit     REturn visit related to Low WBC     Initial Vitals: /63 (BP Location: Right arm, Patient Position: Chair, Cuff Size: Adult Regular)  Pulse 69  Temp 98.1  F (36.7  C) (Oral)  Resp 18  Ht 1.651 m (5' 5\")  Wt 74.6 kg (164 lb 8 oz)  LMP 03/04/2005  SpO2 96%  BMI 27.37 kg/m2 Estimated body mass index is 27.37 kg/(m^2) as calculated from the following:    Height as of this encounter: 1.651 m (5' 5\").    Weight as of this encounter: 74.6 kg (164 lb 8 oz). Body surface area is 1.85 meters squared.  No Pain (0) Comment: Data Unavailable   Patient's last menstrual period was 03/04/2005.  Allergies reviewed: Yes  Medications reviewed: Yes    Medications: MEDICATION REFILLS NEEDED TODAY. Provider was notified.  Pharmacy name entered into [email or web address]:    [facility] [place] - [place], [place] - [address] [place] [address] OUTPATIENT PHARMACY  [facility] - [place], [place] - [address] [facility]    Clinical concerns: Refill needed today. Provider was notified.    10 minutes for nursing intake (face to face time)     Ena Phillips LPN            "

## 2018-10-11 NOTE — MR AVS SNAPSHOT
After Visit Summary   10/11/2018    Gerri Owens    MRN: [id number]           Patient Information     Date Of Birth          1955        Visit Information        Provider Department      10/11/2018 8:00 AM Lamar Rapp MD [facility]        Today's Diagnoses     Low ferritin        Thrombocytopenia (H)        Chemotherapy-induced neutropenia (H)           Follow-ups after your visit        Follow-up notes from your care team     Return if symptoms worsen or fail to improve.      Your next 10 appointments already scheduled     Oct 11, 2018 10:00 AM CDT   EXTERNAL RADIATION TREATMENT with P RAD ONC ELEKTA   Radiation Oncology Clinic ([facility])    [facility]  1st Floor  [address] [facility] [address]   [phone]            Oct 11, 2018 10:30 AM CDT   ON TREATMENT VISIT with Darling Billings MD   Radiation Oncology Clinic ([facility])    [facility]  1st Floor  [address] [facility] [address]   [phone]            Oct 12, 2018 10:00 AM CDT   EXTERNAL RADIATION TREATMENT with [facility] RAD ONC ELEKTA   Radiation Oncology Clinic ([facility])    [facility]  1st Floor  [address] [facility] [address]   [phone]            Oct 15, 2018 10:00 AM CDT   EXTERNAL RADIATION TREATMENT with [facility] RAD ONC ELEKTA   Radiation Oncology Clinic ([facility])    [facility]  1st Floor  [address] [facility] [address]   [phone]            Oct 16, 2018 10:00 AM CDT   EXTERNAL RADIATION TREATMENT with [facility] RAD ONC ELEKTA   Radiation Oncology Clinic ([facility])    [facility]  1st Floor  [address] [facility] [address]   [phone]            Oct 17, 2018 10:00 AM CDT   EXTERNAL RADIATION TREATMENT with [facility] RAD ONC ELEKTA   Radiation Oncology Clinic ([facility]  [facility])    [facility]  1st Floor  [address] [facility] [address]   [phone]            Oct 18, 2018  8:45 AM CDT   Masonic Lab Draw with  MASONIC LAB DRAW   [facility] Lab Draw ([facility])    [address]  [address]  [facility] [address]   [phone]            Oct 18, 2018 10:00 AM CDT   EXTERNAL RADIATION TREATMENT with [facility] RAD ONC ELEKTA   Radiation Oncology Clinic ([facility])    [facility]  1st Floor  [address] [facility] [address]   [phone]            Oct 18, 2018 10:30 AM CDT   ON TREATMENT VISIT with Darling Billings MD   Radiation Oncology Clinic ([facility])    [facility]  1st Floor  [address] [address]   [phone]            Oct 19, 2018 10:00 AM CDT   EXTERNAL RADIATION TREATMENT with [facility] RAD ONC ELEKTA   Radiation Oncology Clinic ([facility])    [facility]  1st Floor  [address] [address] [facility] [address]   [phone]              Future tests that were ordered for you today     Open Standing Orders        Priority Remaining Interval Expires Ordered    CBC with platelets differential Routine 6/6 weekly 11/22/2018 10/11/2018            Who to contact     If you have questions or need follow up information about today's clinic visit or your schedule please contact [facility] directly at [phone].  Normal or non-critical lab and imaging results will be communicated to you by MyChart, letter or phone within 4 business days after the clinic has received the results. If you do not hear from us within 7 days, please contact the clinic through MyChart or phone. If you have a critical or abnormal lab result, we will notify you by phone as soon as possible.  Submit refill requests through [employer] or call your pharmacy and they will  "forward the refill request to us. Please allow 3 business days for your refill to be completed.          Additional Information About Your Visit        Arigohart Information     gamigo gives you secure access to your electronic health record. If you see a primary care provider, you can also send messages to your care team and make appointments. If you have questions, please call your primary care clinic.  If you do not have a primary care provider, please call [phone] and they will assist you.        Care EveryWhere ID     This is your Care EveryWhere ID. This could be used by other organizations to access your [place] medical records  [id number]        Your Vitals Were     Pulse Temperature Respirations Height Last Period Pulse Oximetry    69 98.1  F (36.7  C) (Oral) 18 1.651 m (5' 5\") 03/04/2005 96%    BMI (Body Mass Index)                   27.37 kg/m2            Blood Pressure from Last 3 Encounters:   10/11/18 106/63   09/13/18 107/76   09/10/18 (!) 89/59    Weight from Last 3 Encounters:   10/11/18 74.6 kg (164 lb 8 oz)   10/04/18 75.7 kg (166 lb 12.8 oz)   09/27/18 75.1 kg (165 lb 9.6 oz)              We Performed the Following     CBC with platelets differential     Ferritin     Reticulocyte count        Primary Care Provider Office Phone # Fax #    Karo Doty [phone]       [address]  [facility] [address]        Equal Access to Services     MARLEY [facility] HospitalBELLO AH: Hadii thalia ku mike Springer, waaxda ludennisadaha, qaybta kaalmada makayla, carol roland. So [facility] [phone].    ATENCIÓN: Si habla español, tiene a graham disposición servicios gratuitos de asistencia lingüística. Llame al [phone].    We comply with applicable federal civil rights laws and [place] laws. We do not discriminate on the basis of race, color, national origin, age, disability, sex, sexual orientation, or gender identity.            Thank you!     Thank you for choosing [facility] " [facility]  for your care. Our goal is always to provide you with excellent care. Hearing back from our patients is one way we can continue to improve our services. Please take a few minutes to complete the written survey that you may receive in the mail after your visit with us. Thank you!             Your Updated Medication List - Protect others around you: Learn how to safely use, store and throw away your medicines at www.disposemymeds.org.          This list is accurate as of 10/11/18  8:35 AM.  Always use your most recent med list.                   Brand Name Dispense Instructions for use Diagnosis    acetaminophen 500 MG tablet    TYLENOL     Take 1 tablet (500 mg) by mouth every 4 hours as needed    S/P DEMETRIO-BSO       ascorbic acid 250 MG tablet    VITAMIN C    90    250 mg    Routine general medical examination at a health care facility       calcium citrate-vitamin D 315-200 MG-UNIT Tabs per tablet    CITRACAL     Take 2 tablets by mouth daily        enoxaparin 80 MG/0.8ML injection    LOVENOX    60 Syringe    Inject 0.8 mLs (80 mg) Subcutaneous 2 times daily    Other acute pulmonary embolism without acute cor pulmonale (H)       FERROUS GLUCONATE PO      Take 325 mg by mouth daily (with breakfast)        GLUCOSAMINE CHONDROITIN COMPLX Tabs      Take  by mouth. 1500 mg 1xqd.    Routine general medical examination at a health care facility       loperamide 2 MG capsule    IMODIUM     Take 2 mg by mouth 4 times daily as needed for diarrhea        LORazepam 1 MG tablet    ATIVAN    30 tablet    Take 1 tablet (1 mg) by mouth every 6 hours as needed (nausea/vomiting, anxiety or sleep)    Endometrial cancer (H), Encounter for long-term (current) use of medications       multivitamin, therapeutic with minerals Tabs tablet      Take 1 tablet by mouth daily        ondansetron 8 MG tablet    ZOFRAN    60 tablet    Take 1 tablet (8 mg) by mouth every 8 hours as needed for nausea (vomiting)    Endometrial  cancer (H), Encounter for long-term (current) use of medications       prochlorperazine 10 MG tablet    COMPAZINE    30 tablet    Take 1 tablet (10 mg) by mouth every 6 hours as needed (nausea/vomiting)    Endometrial cancer (H), Encounter for long-term (current) use of medications       RANITIDINE HCL PO      Take 150 mg by mouth 2 times daily

## 2018-10-11 NOTE — PROGRESS NOTES
"PROBLEM LIST:  1.  Endometrial carcinoma -status post hysterectomy bilateral salpingo-oophorectomy with lymph node dissection on 4/27/18.  Found to have positive lymph nodes.  2.  History of anemia and thrombus cytopenia and leukopenia-probably mainly related to chemotherapy.  3.  History of iron deficiency anemia.  4.  History of right upper extremity DVT March 2012.  5.  GERD.  6.  Osteoarthritis.  7. Taxol allergy- anaphylaxis  7. Pulmonary embolism 7/6/18     Interim history: Ms. Edward is here for follow-up of pancytopenia.  She has been undergoing pelvic radiation for endometrial carcinoma.  She is about day 13 of a total of 5040 cGy.  She says she is tolerating the radiation quite well.  She has a little bit of diarrhea that is well controlled with Imodium.  No nausea, eating okay.  Energy is okay.  No fevers, chills, sweats.  No mouth sores or dental problems.      PHYSICAL EXAMINATION:  /63 (BP Location: Right arm, Patient Position: Chair, Cuff Size: Adult Regular)  Pulse 69  Temp 98.1  F (36.7  C) (Oral)  Resp 18  Ht 1.651 m (5' 5\")  Wt 74.6 kg (164 lb 8 oz)  LMP 03/04/2005  SpO2 96%  BMI 27.37 kg/m2    General appearance:  Patient is 62 yo woman in no acute distress.     HEENT:  No pallor, icterus, or mucositis.  No thyromegaly.   Lymph nodes:  No cervical, supraclavicular, axillary, or inguinal lymphadenopathy.   Lungs:  Clear to auscultation bilaterally.   Heart:  Regular rate and rhythm; no S3 S4 or murmer.     Abdomen:  A few tiny bruises at enoxaparin injection sites. Positive bowel sounds, soft and nontender, nondistended.  No hepatomegaly. No splenomegaly appreciated.    Extremities:  No joint swelling or tenderness.  No ankle edema.     Skin:  No rash, no petechiae or ecchymoses.    Labs: Ferritin pending  Results for MARILEE EDWARD (MRN [id number]) as of 10/11/2018 08:28   Ref. Range 10/11/2018 07:56   WBC Latest Ref Range: 4.0 - 11.0 10e9/L 2.7 (L)   Hemoglobin Latest Ref Range: " 11.7 - 15.7 g/dL 12.4   Hematocrit Latest Ref Range: 35.0 - 47.0 % 39.3   Platelet Count Latest Ref Range: 150 - 450 10e9/L 122 (L)   RBC Count Latest Ref Range: 3.8 - 5.2 10e12/L 3.89   MCV Latest Ref Range: 78 - 100 fl 101 (H)   MCH Latest Ref Range: 26.5 - 33.0 pg 31.9   MCHC Latest Ref Range: 31.5 - 36.5 g/dL 31.6   RDW Latest Ref Range: 10.0 - 15.0 % 13.2   Diff Method Unknown Automated Method   % Neutrophils Latest Units: % 72.0   % Lymphocytes Latest Units: % 8.3   % Monocytes Latest Units: % 11.3   % Eosinophils Latest Units: % 7.2   % Basophils Latest Units: % 0.8   % Immature Granulocytes Latest Units: % 0.4   Nucleated RBCs Latest Ref Range: 0 /100 0   Absolute Neutrophil Latest Ref Range: 1.6 - 8.3 10e9/L 1.9   Absolute Lymphocytes Latest Ref Range: 0.8 - 5.3 10e9/L 0.2 (L)   Absolute Monocytes Latest Ref Range: 0.0 - 1.3 10e9/L 0.3   Absolute Eosinophils Latest Ref Range: 0.0 - 0.7 10e9/L 0.2   Absolute Basophils Latest Ref Range: 0.0 - 0.2 10e9/L 0.0   Abs Immature Granulocytes Latest Ref Range: 0 - 0.4 10e9/L 0.0   Absolute Nucleated RBC Unknown 0.0   % Retic Latest Ref Range: 0.5 - 2.0 % 1.5   Absolute Retic Latest Ref Range: 25 - 95 10e9/L 56.4     Assessment and recommendation: This is a 63-year-old woman with endometrial cancer  1.  thrombocytopenia and neutropenia related to chemotherapy.  She seems quite sensitive to the chemotherapy agents.  Now nursing home through her pelvic radiation her total white count has dropped, however she is not neutropenic.  The low white count is due to lymphopenia, which is not too surprising with the radiation.  However I think she is at risk for becoming neutropenic so she needs to be monitored closely.  Her platelets have also dropped to slightly below the normal range.,  Again I think this warrants close monitoring.  I will have her check CBC with differential in 1 and 2 weeks while she completes her radiation.  Based on what is going on with her counts I can  decide whether she needs to come back to see me.    2.  Iron deficiency anemia-her hemoglobin is improved into the normal range.  She should continue the iron tablets for another 2 months.    3.  History of PE in the setting of endometrial cancer-she should continue with the enoxaparin until she has completed her radiation and is in complete remission. I leave it to Dr. Tierney to refill her prescription.     Lamar Rapp MD  Hematology

## 2018-10-11 NOTE — LETTER
10/11/2018       RE: Gerri Owens  [address]  [facility] [address]     Dear Colleague,    Thank you for referring your patient, Gerri Owens, to the RADIATION ONCOLOGY CLINIC. Please see a copy of my visit note below.    [facility] PHYSICIANS  SPECIALIZING IN BREAKTHROUGHS  Radiation Oncology    On Treatment Visit Note      Gerri Owens      Date: 10/11/2018   MRN: [id number]   : 1955  Diagnosis: endometrial cancer      Reason for Visit:  On Radiation Treatment Visit     Treatment Summary to Date  Treatment Site: pelvis Current Dose: 2520/5040 cGy Fractions:  + 3      Chemotherapy  Chemo concurrent with radx?: No    ED Visit/Hosiptal Admission: None    Treatment Breaks: None      Subjective:   Gerri has been having some loose stools.  She states that usually a tablet of imodium keeps things under control for a day or so.  She also had one day of nausea, which she attributes to taking iron with an empty stomach.  Her energy level is maintained and she does not feel overly fatigued.     Nursing ROS:   Nutrition Alteration  Diet Type: Patient's Preference  Nutrition Note: appetite good  Skin  Skin Reaction: 0 - No changes        Cardiovascular  Respiratory effort: 1 - Normal - without distress  Gastrointestinal  Diarrhea: 1 - Abdominal cramping, two or less soft or liquid bowel movements (imodium every couple days. Diarrhea diet given)  Genitourinary  Urinary Status: 0 - Normal  Psychosocial  Pyschosocial Note: feeling well  Pain Assessment  0-10 Pain Scale: 0      Objective:   Wt 74.9 kg (165 lb 3.2 oz)  LMP 2005  BMI 27.49 kg/m2  Gen: Appears well, in no acute distress  Skin: deferred     Labs:  CBC RESULTS:   Recent Labs   Lab Test  10/11/18   0756   WBC  2.7*   RBC  3.89   HGB  12.4   HCT  39.3   MCV  101*   MCH  31.9   MCHC  31.6   RDW  13.2   PLT  122*     ELECTROLYTES:  Recent Labs   Lab Test  18   1659   NA  140   POTASSIUM  3.9   CHLORIDE  106    LAURENT  9.1   CO2  27   BUN  21   CR  1.02   GLC  85       Assessment:    Tolerating radiation therapy well.  All questions and concerns addressed.    Toxicities:  Fatigue: Grade 0: No toxicity  Diarrhea: Grade 1: Increase of <4 stools per day over baseline; mild increase in ostomy output compared to baseline    Plan:   1. Continue current therapy.    2. Dietary recommendations discussed.        Mosaiq chart and setup information reviewed  MVCT/IGRT images checked    Medication Review  Med list reviewed with patient?: Yes    Educational Topic Discussed  Education Instructions: reviewed        Darling Billings MD/PhD  [phone] clinic  Pager [phone]    Please do not send letter to referring physician.      Again, thank you for allowing me to participate in the care of your patient.      Sincerely,    Darling Billings MD

## 2018-10-11 NOTE — LETTER
"10/11/2018       RE: Gerri Owens  [address]  [facility] [address]     Dear Colleague,    Thank you for referring your patient, Gerri Owens, to the [facility]. Please see a copy of my visit note below.    PROBLEM LIST:  1.  Endometrial carcinoma -status post hysterectomy bilateral salpingo-oophorectomy with lymph node dissection on 4/27/18.  Found to have positive lymph nodes.  2.  History of anemia and thrombus cytopenia and leukopenia-probably mainly related to chemotherapy.  3.  History of iron deficiency anemia.  4.  History of right upper extremity DVT March 2012.  5.  GERD.  6.  Osteoarthritis.  7. Taxol allergy- anaphylaxis  7. Pulmonary embolism 7/6/18     Interim history: Ms. Owens is here for follow-up of pancytopenia.  She has been undergoing pelvic radiation for endometrial carcinoma.  She is about day 13 of a total of 5040 cGy.  She says she is tolerating the radiation quite well.  She has a little bit of diarrhea that is well controlled with Imodium.  No nausea, eating okay.  Energy is okay.  No fevers, chills, sweats.  No mouth sores or dental problems.      PHYSICAL EXAMINATION:  /63 (BP Location: Right arm, Patient Position: Chair, Cuff Size: Adult Regular)  Pulse 69  Temp 98.1  F (36.7  C) (Oral)  Resp 18  Ht 1.651 m (5' 5\")  Wt 74.6 kg (164 lb 8 oz)  LMP 03/04/2005  SpO2 96%  BMI 27.37 kg/m2    General appearance:  Patient is 64 yo woman in no acute distress.     HEENT:  No pallor, icterus, or mucositis.  No thyromegaly.   Lymph nodes:  No cervical, supraclavicular, axillary, or inguinal lymphadenopathy.   Lungs:  Clear to auscultation bilaterally.   Heart:  Regular rate and rhythm; no S3 S4 or murmer.     Abdomen:  A few tiny bruises at enoxaparin injection sites. Positive bowel sounds, soft and nontender, nondistended.  No hepatomegaly. No splenomegaly appreciated.    Extremities:  No joint swelling or tenderness.  No ankle edema.     Skin: "  No rash, no petechiae or ecchymoses.    Labs: Ferritin pending  Results for MARILEE EDWARD (MRN [id number]) as of 10/11/2018 08:28   Ref. Range 10/11/2018 07:56   WBC Latest Ref Range: 4.0 - 11.0 10e9/L 2.7 (L)   Hemoglobin Latest Ref Range: 11.7 - 15.7 g/dL 12.4   Hematocrit Latest Ref Range: 35.0 - 47.0 % 39.3   Platelet Count Latest Ref Range: 150 - 450 10e9/L 122 (L)   RBC Count Latest Ref Range: 3.8 - 5.2 10e12/L 3.89   MCV Latest Ref Range: 78 - 100 fl 101 (H)   MCH Latest Ref Range: 26.5 - 33.0 pg 31.9   MCHC Latest Ref Range: 31.5 - 36.5 g/dL 31.6   RDW Latest Ref Range: 10.0 - 15.0 % 13.2   Diff Method Unknown Automated Method   % Neutrophils Latest Units: % 72.0   % Lymphocytes Latest Units: % 8.3   % Monocytes Latest Units: % 11.3   % Eosinophils Latest Units: % 7.2   % Basophils Latest Units: % 0.8   % Immature Granulocytes Latest Units: % 0.4   Nucleated RBCs Latest Ref Range: 0 /100 0   Absolute Neutrophil Latest Ref Range: 1.6 - 8.3 10e9/L 1.9   Absolute Lymphocytes Latest Ref Range: 0.8 - 5.3 10e9/L 0.2 (L)   Absolute Monocytes Latest Ref Range: 0.0 - 1.3 10e9/L 0.3   Absolute Eosinophils Latest Ref Range: 0.0 - 0.7 10e9/L 0.2   Absolute Basophils Latest Ref Range: 0.0 - 0.2 10e9/L 0.0   Abs Immature Granulocytes Latest Ref Range: 0 - 0.4 10e9/L 0.0   Absolute Nucleated RBC Unknown 0.0   % Retic Latest Ref Range: 0.5 - 2.0 % 1.5   Absolute Retic Latest Ref Range: 25 - 95 10e9/L 56.4     Assessment and recommendation: This is a 63-year-old woman with endometrial cancer  1.  thrombocytopenia and neutropenia related to chemotherapy.  She seems quite sensitive to the chemotherapy agents.  Now skilled nursing through her pelvic radiation her total white count has dropped, however she is not neutropenic.  The low white count is due to lymphopenia, which is not too surprising with the radiation.  However I think she is at risk for becoming neutropenic so she needs to be monitored closely.  Her platelets have  also dropped to slightly below the normal range.,  Again I think this warrants close monitoring.  I will have her check CBC with differential in 1 and 2 weeks while she completes her radiation.  Based on what is going on with her counts I can decide whether she needs to come back to see me.    2.  Iron deficiency anemia-her hemoglobin is improved into the normal range.  She should continue the iron tablets for another 2 months.    3.  History of PE in the setting of endometrial cancer-she should continue with the enoxaparin until she has completed her radiation and is in complete remission. I leave it to Dr. Tierney to refill her prescription.     Lamar Rapp MD  Hematology

## 2018-10-11 NOTE — NURSING NOTE
Chief Complaint   Patient presents with     Port Draw     labs and JIC tube drawn via port by RN     /63 (BP Location: Right arm, Patient Position: Chair, Cuff Size: Adult Regular)  Pulse 69  Temp 98.1  F (36.7  C) (Oral)  Wt 74.6 kg (164 lb 8 oz)  LMP 03/04/2005  SpO2 96%  BMI 27.37 kg/m2    Vitals taken. Port accessed by RN. Labs collected and sent. Line flushed with NS & Heparin. Pt tolerated well. Pt checked in for next appointment.    Peyton Pond, RN

## 2018-10-11 NOTE — PROGRESS NOTES
[facility] PHYSICIANS  SPECIALIZING IN BREAKTHROUGHS  Radiation Oncology    On Treatment Visit Note      Gerri Owens      Date: 10/11/2018   MRN: [id number]   : 1955  Diagnosis: endometrial cancer      Reason for Visit:  On Radiation Treatment Visit     Treatment Summary to Date  Treatment Site: pelvis Current Dose: 2520/5040 cGy Fractions:  + 3      Chemotherapy  Chemo concurrent with radx?: No    ED Visit/Hosiptal Admission: None    Treatment Breaks: None      Subjective:   Gerri has been having some loose stools.  She states that usually a tablet of imodium keeps things under control for a day or so.  She also had one day of nausea, which she attributes to taking iron with an empty stomach.  Her energy level is maintained and she does not feel overly fatigued.     Nursing ROS:   Nutrition Alteration  Diet Type: Patient's Preference  Nutrition Note: appetite good  Skin  Skin Reaction: 0 - No changes        Cardiovascular  Respiratory effort: 1 - Normal - without distress  Gastrointestinal  Diarrhea: 1 - Abdominal cramping, two or less soft or liquid bowel movements (imodium every couple days. Diarrhea diet given)  Genitourinary  Urinary Status: 0 - Normal  Psychosocial  Pyschosocial Note: feeling well  Pain Assessment  0-10 Pain Scale: 0      Objective:   Wt 74.9 kg (165 lb 3.2 oz)  LMP 2005  BMI 27.49 kg/m2  Gen: Appears well, in no acute distress  Skin: deferred     Labs:  CBC RESULTS:   Recent Labs   Lab Test  10/11/18   0756   WBC  2.7*   RBC  3.89   HGB  12.4   HCT  39.3   MCV  101*   MCH  31.9   MCHC  31.6   RDW  13.2   PLT  122*     ELECTROLYTES:  Recent Labs   Lab Test  18   1659   NA  140   POTASSIUM  3.9   CHLORIDE  106   LAURENT  9.1   CO2  27   BUN  21   CR  1.02   GLC  85       Assessment:    Tolerating radiation therapy well.  All questions and concerns addressed.    Toxicities:  Fatigue: Grade 0: No toxicity  Diarrhea: Grade 1: Increase of <4 stools per day  over baseline; mild increase in ostomy output compared to baseline    Plan:   1. Continue current therapy.    2. Dietary recommendations discussed.        Mosaiq chart and setup information reviewed  MVCT/IGRT images checked    Medication Review  Med list reviewed with patient?: Yes    Educational Topic Discussed  Education Instructions: reviewed        Darling Billings MD/PhD  [phone] clinic  Pager [phone]    Please do not send letter to referring physician.

## 2018-10-12 ENCOUNTER — APPOINTMENT (OUTPATIENT)
Dept: RADIATION ONCOLOGY | Facility: CLINIC | Age: 63
End: 2018-10-12
Attending: RADIOLOGY
Payer: COMMERCIAL

## 2018-10-12 PROCEDURE — 77386 ZZH IMRT TREATMENT DELIVERY, COMPLEX: CPT | Performed by: RADIOLOGY

## 2018-10-12 PROCEDURE — 77336 RADIATION PHYSICS CONSULT: CPT | Performed by: RADIOLOGY

## 2018-10-15 ENCOUNTER — APPOINTMENT (OUTPATIENT)
Dept: RADIATION ONCOLOGY | Facility: CLINIC | Age: 63
End: 2018-10-15
Attending: RADIOLOGY
Payer: COMMERCIAL

## 2018-10-15 PROCEDURE — 77386 ZZH IMRT TREATMENT DELIVERY, COMPLEX: CPT | Performed by: RADIOLOGY

## 2018-10-16 ENCOUNTER — APPOINTMENT (OUTPATIENT)
Dept: RADIATION ONCOLOGY | Facility: CLINIC | Age: 63
End: 2018-10-16
Attending: RADIOLOGY
Payer: COMMERCIAL

## 2018-10-16 PROCEDURE — 77386 ZZH IMRT TREATMENT DELIVERY, COMPLEX: CPT | Performed by: RADIOLOGY

## 2018-10-17 ENCOUNTER — APPOINTMENT (OUTPATIENT)
Dept: RADIATION ONCOLOGY | Facility: CLINIC | Age: 63
End: 2018-10-17
Attending: RADIOLOGY
Payer: COMMERCIAL

## 2018-10-17 PROCEDURE — 77386 ZZH IMRT TREATMENT DELIVERY, COMPLEX: CPT | Performed by: RADIOLOGY

## 2018-10-18 ENCOUNTER — APPOINTMENT (OUTPATIENT)
Dept: RADIATION ONCOLOGY | Facility: CLINIC | Age: 63
End: 2018-10-18
Attending: RADIOLOGY
Payer: COMMERCIAL

## 2018-10-18 VITALS — BODY MASS INDEX: 27.37 KG/M2 | WEIGHT: 164.5 LBS

## 2018-10-18 DIAGNOSIS — D69.6 THROMBOCYTOPENIA (H): ICD-10-CM

## 2018-10-18 DIAGNOSIS — C54.1 ENDOMETRIAL CANCER (H): Primary | ICD-10-CM

## 2018-10-18 LAB
BASOPHILS # BLD AUTO: 0 10E9/L (ref 0–0.2)
BASOPHILS NFR BLD AUTO: 0.9 %
DIFFERENTIAL METHOD BLD: ABNORMAL
EOSINOPHIL # BLD AUTO: 0.3 10E9/L (ref 0–0.7)
EOSINOPHIL NFR BLD AUTO: 14 %
ERYTHROCYTE [DISTWIDTH] IN BLOOD BY AUTOMATED COUNT: 13 % (ref 10–15)
HCT VFR BLD AUTO: 39.1 % (ref 35–47)
HGB BLD-MCNC: 12.5 G/DL (ref 11.7–15.7)
IMM GRANULOCYTES # BLD: 0 10E9/L (ref 0–0.4)
IMM GRANULOCYTES NFR BLD: 0.4 %
LYMPHOCYTES # BLD AUTO: 0.2 10E9/L (ref 0.8–5.3)
LYMPHOCYTES NFR BLD AUTO: 8.8 %
MCH RBC QN AUTO: 32 PG (ref 26.5–33)
MCHC RBC AUTO-ENTMCNC: 32 G/DL (ref 31.5–36.5)
MCV RBC AUTO: 100 FL (ref 78–100)
MONOCYTES # BLD AUTO: 0.3 10E9/L (ref 0–1.3)
MONOCYTES NFR BLD AUTO: 11 %
NEUTROPHILS # BLD AUTO: 1.5 10E9/L (ref 1.6–8.3)
NEUTROPHILS NFR BLD AUTO: 64.9 %
NRBC # BLD AUTO: 0 10*3/UL
NRBC BLD AUTO-RTO: 0 /100
PLATELET # BLD AUTO: 127 10E9/L (ref 150–450)
RBC # BLD AUTO: 3.91 10E12/L (ref 3.8–5.2)
WBC # BLD AUTO: 2.3 10E9/L (ref 4–11)

## 2018-10-18 PROCEDURE — 85025 COMPLETE CBC W/AUTO DIFF WBC: CPT | Performed by: INTERNAL MEDICINE

## 2018-10-18 PROCEDURE — 77386 ZZH IMRT TREATMENT DELIVERY, COMPLEX: CPT | Performed by: RADIOLOGY

## 2018-10-18 RX ORDER — HEPARIN SODIUM (PORCINE) LOCK FLUSH IV SOLN 100 UNIT/ML 100 UNIT/ML
5 SOLUTION INTRAVENOUS EVERY 8 HOURS
Status: DISCONTINUED | OUTPATIENT
Start: 2018-10-18 | End: 2018-10-26 | Stop reason: HOSPADM

## 2018-10-18 NOTE — LETTER
Date:October 19, 2018      Provider requested that no letter be sent. Do not send.       [facility] Health Information

## 2018-10-18 NOTE — NURSING NOTE
Chief Complaint   Patient presents with     Labs Only     venipuncture     Pt did not want her port accessed today

## 2018-10-18 NOTE — LETTER
"10/18/2018       RE: Gerri Owens  [address]  [facility] [address]     Dear Colleague,    Thank you for referring your patient, Gerri Owens, to the RADIATION ONCOLOGY CLINIC. Please see a copy of my visit note below.    [facility] PHYSICIANS  SPECIALIZING IN BREAKTHROUGHS  Radiation Oncology    On Treatment Visit Note      Gerri Owens      Date: 10/18/2018   MRN: [id number]   : 1955  Diagnosis: endometrial cancer      Reason for Visit:  On Radiation Treatment Visit     Treatment Summary to Date  Treatment Site: pelvis Current Dose: 3420/5040 cGy Fractions:  + 2      Chemotherapy  Chemo concurrent with radx?: No    ED Visit/Hosiptal Admission: None    Treatment Breaks: None    Subjective:   Gerri continues to have daily diarrhea.  She usually takes one imodium in the morning, which is able to \"keep things under control\".  Mildly nauseated.  No vomiting.  Otherwise no concerns.  She is having weekly CBC with Dr. Rapp.     Nursing ROS:   Nutrition Alteration  Diet Type: Patient's Preference  Nutrition Note: appetite good  Skin  Skin Reaction: 0 - No changes        Cardiovascular  Respiratory effort: 1 - Normal - without distress  Gastrointestinal  Nausea: 1 - One to two episodes of nausea/24 (talked about starting some antemetic)  Diarrhea: 1 - Abdominal cramping, two or less soft or liquid bowel movements (1 imodium in the am with good control)  GI Note: reviewed use of imodium  Genitourinary  Urinary Status: 0 - Normal  Psychosocial  Pyschosocial Note: feeling well  Pain Assessment  0-10 Pain Scale: 0      Objective:   Wt 74.6 kg (164 lb 8 oz)  LMP 2005  BMI 27.37 kg/m2  Gen: Appears well, in no acute distress  Skin: deferred    Labs:  CBC RESULTS:   Recent Labs   Lab Test  10/18/18   0856   WBC  2.3*   RBC  3.91   HGB  12.5   HCT  39.1   MCV  100   MCH  32.0   MCHC  32.0   RDW  13.0   PLT  127*     ELECTROLYTES:  Recent Labs   Lab Test  18   1659 "   NA  140   POTASSIUM  3.9   CHLORIDE  106   LAURENT  9.1   CO2  27   BUN  21   CR  1.02   GLC  85       Assessment:    Tolerating radiation therapy well.  All questions and concerns addressed.    Toxicities:  Nausea: Grade 1: Loss of appetite without alteration in eating habits  Diarrhea: Grade 1: Increase of <4 stools per day over baseline; mild increase in ostomy output compared to baseline    Plan:   1. Continue current therapy.    2. Diarrhea: continue with imodium as needed.  3. Nausea: recommend taking zofran 30 minutes before daily treatment.  4. Decreased WBC and Plt: they are lower than prior to radiation.  Certainly could be due to radiation therapy.  Will continue to follow her CBC with Dr. Rapp.       Mosaiq chart and setup information reviewed  MVCT/IGRT images checked    Medication Review  Med list reviewed with patient?: Yes    Educational Topic Discussed  Education Instructions: reviewed        Darling Billings MD/PhD  [phone] clinic  Pager [phone]    Please do not send letter to referring physician.      Again, thank you for allowing me to participate in the care of your patient.      Sincerely,    Darling Billings MD

## 2018-10-18 NOTE — MR AVS SNAPSHOT
After Visit Summary   10/18/2018    Gerri Owens    MRN: [id number]           Patient Information     Date Of Birth          1955        Visit Information        Provider Department      10/18/2018 10:30 AM Darling Billings MD Radiation Oncology Clinic        Today's Diagnoses     Endometrial cancer (H)    -  1       Follow-ups after your visit        Your next 10 appointments already scheduled     Oct 18, 2018 10:30 AM CDT   ON TREATMENT VISIT with Darling Billings MD   Radiation Oncology Clinic ([facility])    [facility]  1st Floor  [address] [address]   [phone]            Oct 19, 2018 10:00 AM CDT   EXTERNAL RADIATION TREATMENT with P RAD ONC ELEKTA   Radiation Oncology Clinic ([facility])    [facility]  1st Floor  [address] [address]   [phone]            Oct 22, 2018 10:00 AM CDT   EXTERNAL RADIATION TREATMENT with P RAD ONC ELEKTA   Radiation Oncology Clinic ([facility])    [facility]  1st Floor  [address] [address]   [phone]            Oct 23, 2018 10:00 AM CDT   EXTERNAL RADIATION TREATMENT with P RAD ONC ELEKTA   Radiation Oncology Clinic ([facility])    [facility]  1st Floor  [address] [address]   [phone]            Oct 24, 2018 10:00 AM CDT   EXTERNAL RADIATION TREATMENT with P RAD ONC ELEKTA   Radiation Oncology Clinic ([facility])    [facility]  1st Floor  [address] [address]   [phone]            Oct 25, 2018  8:45 AM CDT   [facility] Draw with  [facility] D[facility]   [facility] Draw ([facility])    [address]  [address]  [facility] [place] [address] [phone]            Oct 25, 2018 10:00 AM CDT   EXTERNAL RADIATION  TREATMENT with [facility] RAD ONC ELEKTA   Radiation Oncology Clinic ([facility])    [facility]  1st Floor  [address] [address]   [phone]            Oct 25, 2018 10:30 AM CDT   ON TREATMENT VISIT with Darling Billings MD   Radiation Oncology Clinic ([facility])    [facility]  1st Floor  [address] [address]   [phone]            Oct 26, 2018 10:00 AM CDT   EXTERNAL RADIATION TREATMENT with [facility] RAD ONC ELEKTA   Radiation Oncology Clinic ([facility])    [facility]  1st Floor  [address] [address]   [phone]            Oct 29, 2018 10:00 AM CDT   EXTERNAL RADIATION TREATMENT with [facility] RAD ONC ELEKTA   Radiation Oncology Clinic ([facility])    [facility]  1st Floor  [address] [address] [phone]              Who to contact     Please call your clinic at [phone] to:    Ask questions about your health    Make or cancel appointments    Discuss your medicines    Learn about your test results    Speak to your doctor            Additional Information About Your Visit        Softdesk Information     Softdesk gives you secure access to your electronic health record. If you see a primary care provider, you can also send messages to your care team and make appointments. If you have questions, please call your primary care clinic.  If you do not have a primary care provider, please call [phone] and they will assist you.      Softdesk is an electronic gateway that provides easy, online access to your medical records. With Softdesk, you can request a clinic appointment, read your test results, renew a prescription or communicate with your care team.     To access your existing account, please contact your [facility] or call [phone] for assistance.         Care EveryWhere ID     This is your Care EveryWhere ID. This could be used by other organizations to access your [place] medical records  [id number]        Your Vitals Were     Last Period BMI (Body Mass Index)                03/04/2005 27.37 kg/m2           Blood Pressure from Last 3 Encounters:   10/11/18 106/63   09/13/18 107/76   09/10/18 (!) 89/59    Weight from Last 3 Encounters:   10/18/18 74.6 kg (164 lb 8 oz)   10/11/18 74.9 kg (165 lb 3.2 oz)   10/11/18 74.6 kg (164 lb 8 oz)              Today, you had the following     No orders found for display       Primary Care Provider Office Phone # Fax #    Karo Doty [phone] [address]  [facility] [address]        Equal Access to Services     JEREMÍAS BUCK : Hadii thalia hurd hadasho Soomaali, waaxda luqadaha, qaybta kaalmada adecasiyada, carol escobar . So [facility] [phone].    ATENCIÓN: Si habla español, tiene a graham disposición servicios gratuitos de asistencia lingüística. Llame al [phone].    We comply with applicable federal civil rights laws and [place] laws. We do not discriminate on the basis of race, color, national origin, age, disability, sex, sexual orientation, or gender identity.            Thank you!     Thank you for choosing [facility]  for your care. Our goal is always to provide you with excellent care. Hearing back from our patients is one way we can continue to improve our services. Please take a few minutes to complete the written survey that you may receive in the mail after your visit with us. Thank you!             Your Updated Medication List - Protect others around you: Learn how to safely use, store and throw away your medicines at www.disposemymeds.org.          This list is accurate as of 10/18/18 10:27 AM.  Always use your most recent med list.                   Brand Name Dispense Instructions for use Diagnosis    acetaminophen 500 MG tablet    TYLENOL      Take 1 tablet (500 mg) by mouth every 4 hours as needed    S/P DEMETRIO-BSO       ascorbic acid 250 MG tablet    VITAMIN C    90    250 mg    Routine general medical examination at a health care facility       calcium citrate-vitamin D 315-200 MG-UNIT Tabs per tablet    CITRACAL     Take 2 tablets by mouth daily        enoxaparin 80 MG/0.8ML injection    LOVENOX    60 Syringe    Inject 0.8 mLs (80 mg) Subcutaneous 2 times daily    Other acute pulmonary embolism without acute cor pulmonale (H)       FERROUS GLUCONATE PO      Take 325 mg by mouth daily (with breakfast)        GLUCOSAMINE CHONDROITIN COMPLX Tabs      Take  by mouth. 1500 mg 1xqd.    Routine general medical examination at a health care facility       loperamide 2 MG capsule    IMODIUM     Take 2 mg by mouth 4 times daily as needed for diarrhea        LORazepam 1 MG tablet    ATIVAN    30 tablet    Take 1 tablet (1 mg) by mouth every 6 hours as needed (nausea/vomiting, anxiety or sleep)    Endometrial cancer (H), Encounter for long-term (current) use of medications       multivitamin, therapeutic with minerals Tabs tablet      Take 1 tablet by mouth daily        ondansetron 8 MG tablet    ZOFRAN    60 tablet    Take 1 tablet (8 mg) by mouth every 8 hours as needed for nausea (vomiting)    Endometrial cancer (H), Encounter for long-term (current) use of medications       prochlorperazine 10 MG tablet    COMPAZINE    30 tablet    Take 1 tablet (10 mg) by mouth every 6 hours as needed (nausea/vomiting)    Endometrial cancer (H), Encounter for long-term (current) use of medications       RANITIDINE HCL PO      Take 150 mg by mouth 2 times daily

## 2018-10-18 NOTE — PROGRESS NOTES
"[facility] PHYSICIANS  SPECIALIZING IN BREAKTHROUGHS  Radiation Oncology    On Treatment Visit Note      Gerri Owens      Date: 10/18/2018   MRN: [id number]   : 1955  Diagnosis: endometrial cancer      Reason for Visit:  On Radiation Treatment Visit     Treatment Summary to Date  Treatment Site: pelvis Current Dose: 3420/5040 cGy Fractions:  + 2      Chemotherapy  Chemo concurrent with radx?: No    ED Visit/Hosiptal Admission: None    Treatment Breaks: None    Subjective:   Gerri continues to have daily diarrhea.  She usually takes one imodium in the morning, which is able to \"keep things under control\".  Mildly nauseated.  No vomiting.  Otherwise no concerns.  She is having weekly CBC with Dr. Rapp.     Nursing ROS:   Nutrition Alteration  Diet Type: Patient's Preference  Nutrition Note: appetite good  Skin  Skin Reaction: 0 - No changes        Cardiovascular  Respiratory effort: 1 - Normal - without distress  Gastrointestinal  Nausea: 1 - One to two episodes of nausea/24 (talked about starting some antemetic)  Diarrhea: 1 - Abdominal cramping, two or less soft or liquid bowel movements (1 imodium in the am with good control)  GI Note: reviewed use of imodium  Genitourinary  Urinary Status: 0 - Normal  Psychosocial  Pyschosocial Note: feeling well  Pain Assessment  0-10 Pain Scale: 0      Objective:   Wt 74.6 kg (164 lb 8 oz)  LMP 2005  BMI 27.37 kg/m2  Gen: Appears well, in no acute distress  Skin: deferred    Labs:  CBC RESULTS:   Recent Labs   Lab Test  10/18/18   0856   WBC  2.3*   RBC  3.91   HGB  12.5   HCT  39.1   MCV  100   MCH  32.0   MCHC  32.0   RDW  13.0   PLT  127*     ELECTROLYTES:  Recent Labs   Lab Test  18   1659   NA  140   POTASSIUM  3.9   CHLORIDE  106   LAURENT  9.1   CO2  27   BUN  21   CR  1.02   GLC  85       Assessment:    Tolerating radiation therapy well.  All questions and concerns addressed.    Toxicities:  Nausea: Grade 1: Loss of appetite " without alteration in eating habits  Diarrhea: Grade 1: Increase of <4 stools per day over baseline; mild increase in ostomy output compared to baseline    Plan:   1. Continue current therapy.    2. Diarrhea: continue with imodium as needed.  3. Nausea: recommend taking zofran 30 minutes before daily treatment.  4. Decreased WBC and Plt: they are lower than prior to radiation.  Certainly could be due to radiation therapy.  Will continue to follow her CBC with Dr. Rapp.       Mosaiq chart and setup information reviewed  MVCT/IGRT images checked    Medication Review  Med list reviewed with patient?: Yes    Educational Topic Discussed  Education Instructions: reviewed        Darling Billings MD/PhD  [phone] clinic  Pager [phone]    Please do not send letter to referring physician.

## 2018-10-19 ENCOUNTER — APPOINTMENT (OUTPATIENT)
Dept: RADIATION ONCOLOGY | Facility: CLINIC | Age: 63
End: 2018-10-19
Attending: RADIOLOGY
Payer: COMMERCIAL

## 2018-10-19 PROCEDURE — 77336 RADIATION PHYSICS CONSULT: CPT | Performed by: RADIOLOGY

## 2018-10-19 PROCEDURE — 77386 ZZH IMRT TREATMENT DELIVERY, COMPLEX: CPT | Performed by: RADIOLOGY

## 2018-10-22 ENCOUNTER — MYC MEDICAL ADVICE (OUTPATIENT)
Dept: ONCOLOGY | Facility: CLINIC | Age: 63
End: 2018-10-22

## 2018-10-22 ENCOUNTER — APPOINTMENT (OUTPATIENT)
Dept: RADIATION ONCOLOGY | Facility: CLINIC | Age: 63
End: 2018-10-22
Attending: RADIOLOGY
Payer: COMMERCIAL

## 2018-10-22 DIAGNOSIS — I26.99 OTHER ACUTE PULMONARY EMBOLISM WITHOUT ACUTE COR PULMONALE (H): ICD-10-CM

## 2018-10-22 PROCEDURE — 77386 ZZH IMRT TREATMENT DELIVERY, COMPLEX: CPT | Performed by: RADIOLOGY

## 2018-10-23 ENCOUNTER — APPOINTMENT (OUTPATIENT)
Dept: RADIATION ONCOLOGY | Facility: CLINIC | Age: 63
End: 2018-10-23
Attending: RADIOLOGY
Payer: COMMERCIAL

## 2018-10-23 PROCEDURE — 77386 ZZH IMRT TREATMENT DELIVERY, COMPLEX: CPT | Performed by: RADIOLOGY

## 2018-10-24 ENCOUNTER — APPOINTMENT (OUTPATIENT)
Dept: RADIATION ONCOLOGY | Facility: CLINIC | Age: 63
End: 2018-10-24
Attending: RADIOLOGY
Payer: COMMERCIAL

## 2018-10-24 PROCEDURE — 77386 ZZH IMRT TREATMENT DELIVERY, COMPLEX: CPT | Performed by: RADIOLOGY

## 2018-10-25 ENCOUNTER — ALLIED HEALTH/NURSE VISIT (OUTPATIENT)
Dept: NURSING | Facility: CLINIC | Age: 63
End: 2018-10-25
Payer: COMMERCIAL

## 2018-10-25 ENCOUNTER — APPOINTMENT (OUTPATIENT)
Dept: RADIATION ONCOLOGY | Facility: CLINIC | Age: 63
End: 2018-10-25
Attending: RADIOLOGY
Payer: COMMERCIAL

## 2018-10-25 VITALS — WEIGHT: 165.6 LBS | BODY MASS INDEX: 27.56 KG/M2

## 2018-10-25 DIAGNOSIS — Z23 NEED FOR PROPHYLACTIC VACCINATION AND INOCULATION AGAINST INFLUENZA: Primary | ICD-10-CM

## 2018-10-25 DIAGNOSIS — D69.6 THROMBOCYTOPENIA (H): ICD-10-CM

## 2018-10-25 DIAGNOSIS — C54.1 ENDOMETRIAL CANCER (H): Primary | ICD-10-CM

## 2018-10-25 LAB
BASOPHILS # BLD AUTO: 0 10E9/L (ref 0–0.2)
BASOPHILS NFR BLD AUTO: 0.9 %
DIFFERENTIAL METHOD BLD: ABNORMAL
EOSINOPHIL # BLD AUTO: 0.3 10E9/L (ref 0–0.7)
EOSINOPHIL NFR BLD AUTO: 15.7 %
ERYTHROCYTE [DISTWIDTH] IN BLOOD BY AUTOMATED COUNT: 12.8 % (ref 10–15)
HCT VFR BLD AUTO: 39.9 % (ref 35–47)
HGB BLD-MCNC: 12.4 G/DL (ref 11.7–15.7)
IMM GRANULOCYTES # BLD: 0 10E9/L (ref 0–0.4)
IMM GRANULOCYTES NFR BLD: 0 %
LYMPHOCYTES # BLD AUTO: 0.2 10E9/L (ref 0.8–5.3)
LYMPHOCYTES NFR BLD AUTO: 8.3 %
MCH RBC QN AUTO: 31.2 PG (ref 26.5–33)
MCHC RBC AUTO-ENTMCNC: 31.1 G/DL (ref 31.5–36.5)
MCV RBC AUTO: 101 FL (ref 78–100)
MONOCYTES # BLD AUTO: 0.3 10E9/L (ref 0–1.3)
MONOCYTES NFR BLD AUTO: 13 %
NEUTROPHILS # BLD AUTO: 1.3 10E9/L (ref 1.6–8.3)
NEUTROPHILS NFR BLD AUTO: 62.1 %
NRBC # BLD AUTO: 0 10*3/UL
NRBC BLD AUTO-RTO: 0 /100
PLATELET # BLD AUTO: 127 10E9/L (ref 150–450)
RBC # BLD AUTO: 3.97 10E12/L (ref 3.8–5.2)
WBC # BLD AUTO: 2.2 10E9/L (ref 4–11)

## 2018-10-25 PROCEDURE — 99207 ZZC NO CHARGE NURSE ONLY: CPT

## 2018-10-25 PROCEDURE — 90471 IMMUNIZATION ADMIN: CPT

## 2018-10-25 PROCEDURE — 90682 RIV4 VACC RECOMBINANT DNA IM: CPT

## 2018-10-25 PROCEDURE — 85025 COMPLETE CBC W/AUTO DIFF WBC: CPT | Performed by: INTERNAL MEDICINE

## 2018-10-25 PROCEDURE — 77386 ZZH IMRT TREATMENT DELIVERY, COMPLEX: CPT | Performed by: RADIOLOGY

## 2018-10-25 NOTE — MR AVS SNAPSHOT
After Visit Summary   10/25/2018    Gerri Owens    MRN: [id number]           Patient Information     Date Of Birth          1955        Visit Information        Provider Department      10/25/2018 11:45 AM  FLU CLINIC NURSE [facility]        Today's Diagnoses     Need for prophylactic vaccination and inoculation against influenza    -  1       Follow-ups after your visit        Your next 10 appointments already scheduled     Oct 26, 2018 10:00 AM CDT   EXTERNAL RADIATION TREATMENT with P RAD ONC ELEKTA   Radiation Oncology Clinic ([facility])    [facility]  1st Floor  [address] [address]   [phone]            Oct 29, 2018 10:00 AM CDT   EXTERNAL RADIATION TREATMENT with [facility] RAD ONC ELEKTA   Radiation Oncology Clinic ([facility])    [facility]  1st Floor  [address] [address]   [phone]            Oct 30, 2018 10:00 AM CDT   EXTERNAL RADIATION TREATMENT with [facility] RAD ONC ELEKTA   Radiation Oncology Clinic ([facility])    [facility]  1st Floor  [address] [address]   [phone]            Oct 31, 2018 10:00 AM CDT   EXTERNAL RADIATION TREATMENT with [facility] RAD ONC ELEKTA   Radiation Oncology Clinic ([facility])    [facility]  1st Floor  [address] [address]   [phone]            Nov 07, 2018 10:15 AM CST   TCT/SIM Suite Visit with Darling Billings MD   Radiation Oncology Clinic ([facility])    [facility]  1st Floor  [address] [address]   [phone]            Nov 09, 2018  1:00 PM CST   TCT/SIM Suite Visit with Giuliana Burleson MD   Radiation Oncology Clinic ([facility])    [facility]  1st Floor  [address] [address]    [phone]            Nov 12, 2018  9:40 AM CST   (Arrive by 9:25 AM)   Return Visit with Bradley Tierney MD   [facility] ([facility])    [address] [place] [address]  [address] [place] [address] [phone]              Who to contact     If you have questions or need follow up information about today's clinic visit or your schedule please contact [facility] directly at [phone].  Normal or non-critical lab and imaging results will be communicated to you by Sennarihart, letter or phone within 4 business days after the clinic has received the results. If you do not hear from us within 7 days, please contact the clinic through Karma Snapt or phone. If you have a critical or abnormal lab result, we will notify you by phone as soon as possible.  Submit refill requests through Euroling or call your pharmacy and they will forward the refill request to us. Please allow 3 business days for your refill to be completed.          Additional Information About Your Visit        Sennarihart Information     Euroling gives you secure access to your electronic health record. If you see a primary care provider, you can also send messages to your care team and make appointments. If you have questions, please call your primary care clinic.  If you do not have a primary care provider, please call [phone] and they will assist you.        Care EveryWhere ID     This is your Care EveryWhere ID. This could be used by other organizations to access your [place] medical records  [id number]        Your Vitals Were     Last Period                   03/04/2005            Blood Pressure from Last 3 Encounters:   10/11/18 106/63   09/13/18 107/76   09/10/18 (!) 89/59    Weight from Last 3 Encounters:   10/25/18 165 lb 9.6 oz (75.1 kg)   10/18/18 164 lb 8 oz (74.6 kg)   10/11/18 165 lb 3.2 oz (74.9 kg)              We Performed the Following     FLU VACCINE, (RIV4)  RECOMBINANT HA  , IM (FluBlok, egg free) [21936]- >18 YRS (FMG recommended  50-64 YRS)     Vaccine Administration, Initial [77826]        Primary Care Provider Office Phone # Fax #    Karo Doty [phone] [address]  [facility] [address]        Equal Access to Services     JEREMÍAS BUCK : Hadii aad ku hadasho Soomaali, waaxda luqadaha, qaybta kaalmada adeegyada, waxay idiin hayaan adeeg ghadaboydchristiano laeleazar . So [facility] [phone].    ATENCIÓN: Si habla español, tiene a graham disposición servicios gratuitos de asistencia lingüística. Danielame al [phone].    We comply with applicable federal civil rights laws and [place] laws. We do not discriminate on the basis of race, color, national origin, age, disability, sex, sexual orientation, or gender identity.            Thank you!     Thank you for choosing [facility]  for your care. Our goal is always to provide you with excellent care. Hearing back from our patients is one way we can continue to improve our services. Please take a few minutes to complete the written survey that you may receive in the mail after your visit with us. Thank you!             Your Updated Medication List - Protect others around you: Learn how to safely use, store and throw away your medicines at www.disposemymeds.org.          This list is accurate as of 10/25/18 12:05 PM.  Always use your most recent med list.                   Brand Name Dispense Instructions for use Diagnosis    acetaminophen 500 MG tablet    TYLENOL     Take 1 tablet (500 mg) by mouth every 4 hours as needed    S/P DEMETRIO-BSO       ascorbic acid 250 MG tablet    VITAMIN C    90    250 mg    Routine general medical examination at a health care facility       calcium citrate-vitamin D 315-200 MG-UNIT Tabs per tablet    CITRACAL     Take 2 tablets by mouth daily        enoxaparin 80 MG/0.8ML injection    LOVENOX    60 Syringe    Inject 0.8 mLs (80 mg) Subcutaneous 2 times daily    Other  acute pulmonary embolism without acute cor pulmonale (H)       FERROUS GLUCONATE PO      Take 325 mg by mouth daily (with breakfast)        GLUCOSAMINE CHONDROITIN COMPLX Tabs      Take  by mouth. 1500 mg 1xqd.    Routine general medical examination at a health care facility       loperamide 2 MG capsule    IMODIUM     Take 2 mg by mouth 4 times daily as needed for diarrhea        LORazepam 1 MG tablet    ATIVAN    30 tablet    Take 1 tablet (1 mg) by mouth every 6 hours as needed (nausea/vomiting, anxiety or sleep)    Endometrial cancer (H), Encounter for long-term (current) use of medications       multivitamin, therapeutic with minerals Tabs tablet      Take 1 tablet by mouth daily        ondansetron 8 MG tablet    ZOFRAN    60 tablet    Take 1 tablet (8 mg) by mouth every 8 hours as needed for nausea (vomiting)    Endometrial cancer (H), Encounter for long-term (current) use of medications       prochlorperazine 10 MG tablet    COMPAZINE    30 tablet    Take 1 tablet (10 mg) by mouth every 6 hours as needed (nausea/vomiting)    Endometrial cancer (H), Encounter for long-term (current) use of medications       RANITIDINE HCL PO      Take 150 mg by mouth 2 times daily

## 2018-10-25 NOTE — LETTER
Date:October 29, 2018      Provider requested that no letter be sent. Do not send.       [facility] Health Information

## 2018-10-25 NOTE — MR AVS SNAPSHOT
After Visit Summary   10/25/2018    Gerri Owens    MRN: [id number]           Patient Information     Date Of Birth          1955        Visit Information        Provider Department      10/25/2018 10:30 AM Darling Billings MD Radiation Oncology Clinic        Today's Diagnoses     Endometrial cancer (H)    -  1       Follow-ups after your visit        Your next 10 appointments already scheduled     Oct 25, 2018 10:30 AM CDT   ON TREATMENT VISIT with Darling Billings MD   Radiation Oncology Clinic ([facility])    [facility]  1st Floor  [address] [address]   [phone]            Oct 25, 2018 11:45 AM CDT   Nurse Only with [facility] CLINIC NURSE   [facility] ([facility]    [address] [place] [address] [phone]            Oct 26, 2018 10:00 AM CDT   EXTERNAL RADIATION TREATMENT with P RAD ONC ELEKTA   Radiation Oncology Clinic ([facility])    [facility]  1st Floor  [address] [address]   [phone]            Oct 29, 2018 10:00 AM CDT   EXTERNAL RADIATION TREATMENT with P RAD ONC ELEKTA   Radiation Oncology Clinic ([facility])    [facility]  1st Floor  [address] [address]   [phone]            Oct 30, 2018 10:00 AM CDT   EXTERNAL RADIATION TREATMENT with P RAD ONC ELEKTA   Radiation Oncology Clinic ([facility])    [facility]  1st Floor  [address] [address]   [phone]            Oct 31, 2018 10:00 AM CDT   EXTERNAL RADIATION TREATMENT with P RAD ONC ELEKTA   Radiation Oncology Clinic ([facility])    [facility]  1st Floor  [address] [address]   [phone]            Nov 07, 2018 10:15 AM CST   TCT/SIM Suite Visit with Darling Billings MD    Radiation Oncology Clinic ([facility])    [facility]  1st Floor  [address]  [facility] [address]   [phone]            Nov 09, 2018  1:00 PM CST   TCT/SIM Suite Visit with Giuliana Burleson MD   Radiation Oncology Clinic ([facility])    [facility]  1st Floor  [address]  [facility] [address]   [phone]            Nov 12, 2018  9:40 AM CST   (Arrive by 9:25 AM)   Return Visit with Bradley Tierney MD   [facility] ([email or web address])    [address]  [address]  [facility] [id number] [phone]              Who to contact     Please call your clinic at [phone] to:    Ask questions about your health    Make or cancel appointments    Discuss your medicines    Learn about your test results    Speak to your doctor            Additional Information About Your Visit        [employer] Information     [employer] gives you secure access to your electronic health record. If you see a primary care provider, you can also send messages to your care team and make appointments. If you have questions, please call your primary care clinic.  If you do not have a primary care provider, please call [phone] and they will assist you.      [employer] is an electronic gateway that provides easy, online access to your medical records. With [employer], you can request a clinic appointment, read your test results, renew a prescription or communicate with your care team.     To access your existing account, please contact your [facility] or call [phone] for assistance.        Care EveryWhere ID     This is your Care EveryWhere ID. This could be used by other organizations to access your [place] medical records  [id number]        Your Vitals Were     Last Period BMI (Body Mass Index)                03/04/2005 27.56 kg/m2           Blood Pressure  from Last 3 Encounters:   10/11/18 106/63   09/13/18 107/76   09/10/18 (!) 89/59    Weight from Last 3 Encounters:   10/25/18 75.1 kg (165 lb 9.6 oz)   10/18/18 74.6 kg (164 lb 8 oz)   10/11/18 74.9 kg (165 lb 3.2 oz)              Today, you had the following     No orders found for display       Primary Care Provider Office Phone # Fax #    Karo Doty [phone] [address]  [facility] [address]        Equal Access to Services     [facility] Center[facility] : Hadii thalia hurd haddeepak Springer, wajoana colbert, qaoscar kaalmanelly benavides, carol escobar . So [facility] [phone].    ATENCIÓN: Si habla español, tiene a graham disposición servicios gratuitos de asistencia lingüística. [facility] [phone].    We comply with applicable federal civil rights laws and [place] laws. We do not discriminate on the basis of race, color, national origin, age, disability, sex, sexual orientation, or gender identity.            Thank you!     Thank you for choosing [facility]ATION ONCOLOGY CLINIC  for your care. Our goal is always to provide you with excellent care. Hearing back from our patients is one way we can continue to improve our services. Please take a few minutes to complete the written survey that you may receive in the mail after your visit with us. Thank you!             Your Updated Medication List - Protect others around you: Learn how to safely use, store and throw away your medicines at www.disposemymeds.org.          This list is accurate as of 10/25/18 10:27 AM.  Always use your most recent med list.                   Brand Name Dispense Instructions for use Diagnosis    acetaminophen 500 MG tablet    TYLENOL     Take 1 tablet (500 mg) by mouth every 4 hours as needed    S/P DEMETRIO-BSO       ascorbic acid 250 MG tablet    VITAMIN C    90    250 mg    Routine general medical examination at a health care facility       calcium citrate-vitamin D 315-200 MG-UNIT Tabs per tablet    CITRACAL      Take 2 tablets by mouth daily        enoxaparin 80 MG/0.8ML injection    LOVENOX    60 Syringe    Inject 0.8 mLs (80 mg) Subcutaneous 2 times daily    Other acute pulmonary embolism without acute cor pulmonale (H)       FERROUS GLUCONATE PO      Take 325 mg by mouth daily (with breakfast)        GLUCOSAMINE CHONDROITIN COMPLX Tabs      Take  by mouth. 1500 mg 1xqd.    Routine general medical examination at a health care facility       loperamide 2 MG capsule    IMODIUM     Take 2 mg by mouth 4 times daily as needed for diarrhea        LORazepam 1 MG tablet    ATIVAN    30 tablet    Take 1 tablet (1 mg) by mouth every 6 hours as needed (nausea/vomiting, anxiety or sleep)    Endometrial cancer (H), Encounter for long-term (current) use of medications       multivitamin, therapeutic with minerals Tabs tablet      Take 1 tablet by mouth daily        ondansetron 8 MG tablet    ZOFRAN    60 tablet    Take 1 tablet (8 mg) by mouth every 8 hours as needed for nausea (vomiting)    Endometrial cancer (H), Encounter for long-term (current) use of medications       prochlorperazine 10 MG tablet    COMPAZINE    30 tablet    Take 1 tablet (10 mg) by mouth every 6 hours as needed (nausea/vomiting)    Endometrial cancer (H), Encounter for long-term (current) use of medications       RANITIDINE HCL PO      Take 150 mg by mouth 2 times daily

## 2018-10-25 NOTE — PROGRESS NOTES
[facility] PHYSICIANS  SPECIALIZING IN BREAKTHROUGHS  Radiation Oncology    On Treatment Visit Note      Gerri Owens      Date: 10/25/2018   MRN: [id number]   : 1955  Diagnosis: endometrial cancer      Reason for Visit:  On Radiation Treatment Visit     Treatment Summary to Date  Treatment Site: pelvis Current Dose: 4320/5040 cGy Fractions:  + 2      Chemotherapy  Chemo concurrent with radx?: No    ED Visit/Hosiptal Admission: None    Treatment Breaks: None    Subjective:   Gerri continues to have nausea.  She takes 2 zofrans a day, which seems to be able to suppress it.  She usually takes 2 imodiums a day for diarrhea but had to increase to 4 pills yesterday.  She thinks that the worsening of diarrhea may be attributable to eating split peas soup.  She feels fatigued but overall is managing.      Nursing ROS:   Nutrition Alteration  Diet Type: Patient's Preference  Nutrition Note: appetite good  Skin  Skin Reaction: 0 - No changes        Cardiovascular  Respiratory effort: 1 - Normal - without distress  Gastrointestinal  Nausea: 1 - One to two episodes of nausea/24 (talked about starting some antemetic)  Diarrhea: 1 - Abdominal cramping, two or less soft or liquid bowel movements (1 imodium in the am with good control)  GI Note: reviewed use of imodium  Genitourinary  Urinary Status: 0 - Normal  Psychosocial  Pyschosocial Note: feeling well  Pain Assessment  0-10 Pain Scale: 0      Objective:   Wt 75.1 kg (165 lb 9.6 oz)  LMP 2005  BMI 27.56 kg/m2  Gen: Appears well, in no acute distress  Skin: deferred    Labs:  CBC RESULTS:   Recent Labs   Lab Test  10/25/18   0859   WBC  2.2*   RBC  3.97   HGB  12.4   HCT  39.9   MCV  101*   MCH  31.2   MCHC  31.1*   RDW  12.8   PLT  127*     ELECTROLYTES:  Recent Labs   Lab Test  18   1659   NA  140   POTASSIUM  3.9   CHLORIDE  106   LAURENT  9.1   CO2  27   BUN  21   CR  1.02   GLC  85       Assessment:    Tolerating radiation therapy  well.  All questions and concerns addressed.    Toxicities:  Fatigue: Grade 1: Fatigue relieved by rest  Nausea: Grade 1: Loss of appetite without alteration in eating habits  Diarrhea: Grade 1: Increase of <4 stools per day over baseline; mild increase in ostomy output compared to baseline    Plan:   1. Continue current therapy.    2. CBC stable from last week. Dr. Rapp is following her counts.  3. She is scheduled for 2 fractions of vaginal brachy after completing external beam radiotherapy.        Mosaiq chart and setup information reviewed  MVCT/IGRT images checked    Medication Review  Med list reviewed with patient?: Yes    Educational Topic Discussed  Education Instructions: reviewed        Darling Billings MD/PhD  [phone] clinic  Pager [phone]    Please do not send letter to referring physician.

## 2018-10-25 NOTE — NURSING NOTE
Chief Complaint   Patient presents with     Blood Draw     labs drawn with vpt by rn.      Labs drawn with vpt by rn.  Pt tolerated well.  VS taken.  Pt checked in for next appt.    Beti Huber RN

## 2018-10-25 NOTE — LETTER
10/25/2018       RE: Gerri Owens  [address]  [facility] [address]     Dear Colleague,    Thank you for referring your patient, Gerri Owens, to the RADIATION ONCOLOGY CLINIC. Please see a copy of my visit note below.    [facility] PHYSICIANS  SPECIALIZING IN BREAKTHROUGHS  Radiation Oncology    On Treatment Visit Note      Gerri Owens      Date: 10/25/2018   MRN: [id number]   : 1955  Diagnosis: endometrial cancer      Reason for Visit:  On Radiation Treatment Visit     Treatment Summary to Date  Treatment Site: pelvis Current Dose: 4320/5040 cGy Fractions:  + 2      Chemotherapy  Chemo concurrent with radx?: No    ED Visit/Hosiptal Admission: None    Treatment Breaks: None    Subjective:   Gerri continues to have nausea.  She takes 2 zofrans a day, which seems to be able to suppress it.  She usually takes 2 imodiums a day for diarrhea but had to increase to 4 pills yesterday.  She thinks that the worsening of diarrhea may be attributable to eating split peas soup.  She feels fatigued but overall is managing.      Nursing ROS:   Nutrition Alteration  Diet Type: Patient's Preference  Nutrition Note: appetite good  Skin  Skin Reaction: 0 - No changes        Cardiovascular  Respiratory effort: 1 - Normal - without distress  Gastrointestinal  Nausea: 1 - One to two episodes of nausea/24 (talked about starting some antemetic)  Diarrhea: 1 - Abdominal cramping, two or less soft or liquid bowel movements (1 imodium in the am with good control)  GI Note: reviewed use of imodium  Genitourinary  Urinary Status: 0 - Normal  Psychosocial  Pyschosocial Note: feeling well  Pain Assessment  0-10 Pain Scale: 0      Objective:   Wt 75.1 kg (165 lb 9.6 oz)  LMP 2005  BMI 27.56 kg/m2  Gen: Appears well, in no acute distress  Skin: deferred    Labs:  CBC RESULTS:   Recent Labs   Lab Test  10/25/18   0859   WBC  2.2*   RBC  3.97   HGB  12.4   HCT  39.9   MCV  101*   MCH  31.2    MCHC  31.1*   RDW  12.8   PLT  127*     ELECTROLYTES:  Recent Labs   Lab Test  09/05/18   1659   NA  140   POTASSIUM  3.9   CHLORIDE  106   LAURENT  9.1   CO2  27   BUN  21   CR  1.02   GLC  85       Assessment:    Tolerating radiation therapy well.  All questions and concerns addressed.    Toxicities:  Fatigue: Grade 1: Fatigue relieved by rest  Nausea: Grade 1: Loss of appetite without alteration in eating habits  Diarrhea: Grade 1: Increase of <4 stools per day over baseline; mild increase in ostomy output compared to baseline    Plan:   1. Continue current therapy.    2. CBC stable from last week. Dr. Rapp is following her counts.  3. She is scheduled for 2 fractions of vaginal brachy after completing external beam radiotherapy.        Mosaiq chart and setup information reviewed  MVCT/IGRT images checked    Medication Review  Med list reviewed with patient?: Yes    Educational Topic Discussed  Education Instructions: reviewed        Darling Billigns MD/PhD  [phone] clinic  Pager [phone]    Please do not send letter to referring physician.      Again, thank you for allowing me to participate in the care of your patient.      Sincerely,    Darling Billings MD

## 2018-10-25 NOTE — PROGRESS NOTES
Injectable Influenza Immunization Documentation    1.  Is the person to be vaccinated sick today?   No    2. Does the person to be vaccinated have an allergy to a component   of the vaccine?   No  Egg Allergy Algorithm Link    3. Has the person to be vaccinated ever had a serious reaction   to influenza vaccine in the past?   No    4. Has the person to be vaccinated ever had Guillain-Barré syndrome?   No    Form completed by Jefferson Rangel CMA ([facility])  Podiatry / Foot & Ankle Surgery  [facility]

## 2018-10-26 ENCOUNTER — APPOINTMENT (OUTPATIENT)
Dept: RADIATION ONCOLOGY | Facility: CLINIC | Age: 63
End: 2018-10-26
Attending: RADIOLOGY
Payer: COMMERCIAL

## 2018-10-26 PROCEDURE — 77386 ZZH IMRT TREATMENT DELIVERY, COMPLEX: CPT | Performed by: RADIOLOGY

## 2018-10-26 PROCEDURE — 77336 RADIATION PHYSICS CONSULT: CPT | Performed by: RADIOLOGY

## 2018-10-29 ENCOUNTER — APPOINTMENT (OUTPATIENT)
Dept: RADIATION ONCOLOGY | Facility: CLINIC | Age: 63
End: 2018-10-29
Attending: RADIOLOGY
Payer: COMMERCIAL

## 2018-10-29 PROCEDURE — 77386 ZZH IMRT TREATMENT DELIVERY, COMPLEX: CPT | Performed by: RADIOLOGY

## 2018-10-30 ENCOUNTER — APPOINTMENT (OUTPATIENT)
Dept: RADIATION ONCOLOGY | Facility: CLINIC | Age: 63
End: 2018-10-30
Attending: RADIOLOGY
Payer: COMMERCIAL

## 2018-10-30 PROCEDURE — 77386 ZZH IMRT TREATMENT DELIVERY, COMPLEX: CPT | Performed by: RADIOLOGY

## 2018-10-30 ASSESSMENT — ENCOUNTER SYMPTOMS
BOWEL INCONTINENCE: 0
HEARTBURN: 0
BLOOD IN STOOL: 0
RECTAL PAIN: 0
VOMITING: 0
CONSTIPATION: 0
DIARRHEA: 1
BLOATING: 0
JAUNDICE: 0
NAUSEA: 1
ABDOMINAL PAIN: 1

## 2018-10-31 ENCOUNTER — APPOINTMENT (OUTPATIENT)
Dept: RADIATION ONCOLOGY | Facility: CLINIC | Age: 63
End: 2018-10-31
Attending: RADIOLOGY
Payer: COMMERCIAL

## 2018-10-31 PROCEDURE — 77336 RADIATION PHYSICS CONSULT: CPT | Performed by: RADIOLOGY

## 2018-10-31 PROCEDURE — 77386 ZZH IMRT TREATMENT DELIVERY, COMPLEX: CPT | Performed by: RADIOLOGY

## 2018-11-07 ENCOUNTER — ALLIED HEALTH/NURSE VISIT (OUTPATIENT)
Dept: RADIATION ONCOLOGY | Facility: CLINIC | Age: 63
End: 2018-11-07
Attending: RADIOLOGY
Payer: COMMERCIAL

## 2018-11-07 VITALS — HEART RATE: 52 BPM | SYSTOLIC BLOOD PRESSURE: 99 MMHG | DIASTOLIC BLOOD PRESSURE: 68 MMHG

## 2018-11-07 DIAGNOSIS — C54.1 ENDOMETRIAL CANCER (H): Primary | ICD-10-CM

## 2018-11-07 PROCEDURE — C1717 BRACHYTX, NON-STR,HDR IR-192: HCPCS | Performed by: RADIOLOGY

## 2018-11-07 PROCEDURE — 27110014 ZZH IMPLANT RADIATION BRIEF: Performed by: RADIOLOGY

## 2018-11-07 PROCEDURE — 77332 RADIATION TREATMENT AID(S): CPT | Performed by: RADIOLOGY

## 2018-11-07 PROCEDURE — 77470 SPECIAL RADIATION TREATMENT: CPT | Performed by: RADIOLOGY

## 2018-11-07 PROCEDURE — 77770 HDR RDNCL NTRSTL/ICAV BRCHTX: CPT | Performed by: RADIOLOGY

## 2018-11-07 PROCEDURE — 57156 INS VAG BRACHYTX DEVICE: CPT | Performed by: RADIOLOGY

## 2018-11-07 PROCEDURE — 77295 3-D RADIOTHERAPY PLAN: CPT | Performed by: RADIOLOGY

## 2018-11-07 NOTE — PROGRESS NOTES
Gerri Owens is here for scheduled HDR brachytherapy    HDR Single Channel Cylinder  1    Pre-procedure-  Time out done by Maureen Behrns and Dr. Billings.   Patient identified, arm band applied, signed consent available   Medications taken by patient prior to procedure   Pain assessment: 0  BP 99/68  Pulse 52  LMP 03/04/2005    Post-procedure-  Pain assessment: 0  Cylinder removed, possible side effects discussed and pt discharged to home

## 2018-11-07 NOTE — MR AVS SNAPSHOT
After Visit Summary   11/7/2018    Gerri Owens    MRN: [id number]           Patient Information     Date Of Birth          1955        Visit Information        Provider Department      11/7/2018 10:15 AM Darling Billings MD Radiation Oncology Clinic        Today's Diagnoses     Endometrial cancer (H)    -  1       Follow-ups after your visit        Your next 10 appointments already scheduled     Nov 09, 2018  1:00 PM CST   TCT/SIM Suite Visit with Giuliana Burleson MD   Radiation Oncology Clinic ([facility])    [facility]  1st Floor  [address] [id number] [phone]            Nov 12, 2018  9:40 AM CST   (Arrive by 9:25 AM)   Return Visit with Bradley Tierney MD   [facility] ([facility])    [address]  [address]  [facility] [address] [phone]              Who to contact     Please call your clinic at [phone] to:    Ask questions about your health    Make or cancel appointments    Discuss your medicines    Learn about your test results    Speak to your doctor            Additional Information About Your Visit        MyChart Information     [employer] Online [employer] gives you secure access to your electronic health record. If you see a primary care provider, you can also send messages to your care team and make appointments. If you have questions, please call your primary care clinic.  If you do not have a primary care provider, please call [phone] and they will assist you.      [employer] is an electronic gateway that provides easy, online access to your medical records. With [employer], you can request a clinic appointment, read your test results, renew a prescription or communicate with your care team.     To access your existing account, please contact your [facility] or call [phone] for assistance.        Care EveryWhere ID     This  is your Care EveryWhere ID. This could be used by other organizations to access your [place] medical records  [id number]        Your Vitals Were     Pulse Last Period                52 03/04/2005           Blood Pressure from Last 3 Encounters:   11/07/18 99/68   10/11/18 106/63   09/13/18 107/76    Weight from Last 3 Encounters:   10/25/18 75.1 kg (165 lb 9.6 oz)   10/18/18 74.6 kg (164 lb 8 oz)   10/11/18 74.9 kg (165 lb 3.2 oz)              Today, you had the following     No orders found for display       Primary Care Provider Office Phone # Fax #    Karo Doty [phone] [address] [facility] [address]        Equal Access to Services     JEREMÍAS BUCK : Hadii thalia lopezo Sograce, waaxda luqadaha, qaybta kaalmada adeegyada, carol escobar . So [facility] [phone].    ATENCIÓN: Si habla español, tiene a graham disposición servicios gratuitos de asistencia lingüística. [facility] [phone].    We comply with applicable federal civil rights laws and [place] laws. We do not discriminate on the basis of race, color, national origin, age, disability, sex, sexual orientation, or gender identity.            Thank you!     Thank you for choosing [facility] CLINIC  for your care. Our goal is always to provide you with excellent care. Hearing back from our patients is one way we can continue to improve our services. Please take a few minutes to complete the written survey that you may receive in the mail after your visit with us. Thank you!             Your Updated Medication List - Protect others around you: Learn how to safely use, store and throw away your medicines at www.disposemymeds.org.          This list is accurate as of 11/7/18 11:27 AM.  Always use your most recent med list.                   Brand Name Dispense Instructions for use Diagnosis    acetaminophen 500 MG tablet    TYLENOL     Take 1 tablet (500 mg) by mouth every 4 hours as needed     S/P DEMETRIO-BSO       ascorbic acid 250 MG tablet    VITAMIN C    90    250 mg    Routine general medical examination at a health care facility       calcium citrate-vitamin D 315-200 MG-UNIT Tabs per tablet    CITRACAL     Take 2 tablets by mouth daily        enoxaparin 80 MG/0.8ML injection    LOVENOX    60 Syringe    Inject 0.8 mLs (80 mg) Subcutaneous 2 times daily    Other acute pulmonary embolism without acute cor pulmonale (H)       FERROUS GLUCONATE PO      Take 325 mg by mouth daily (with breakfast)        GLUCOSAMINE CHONDROITIN COMPLX Tabs      Take  by mouth. 1500 mg 1xqd.    Routine general medical examination at a health care facility       loperamide 2 MG capsule    IMODIUM     Take 2 mg by mouth 4 times daily as needed for diarrhea        LORazepam 1 MG tablet    ATIVAN    30 tablet    Take 1 tablet (1 mg) by mouth every 6 hours as needed (nausea/vomiting, anxiety or sleep)    Endometrial cancer (H), Encounter for long-term (current) use of medications       multivitamin, therapeutic with minerals Tabs tablet      Take 1 tablet by mouth daily        ondansetron 8 MG tablet    ZOFRAN    60 tablet    Take 1 tablet (8 mg) by mouth every 8 hours as needed for nausea (vomiting)    Endometrial cancer (H), Encounter for long-term (current) use of medications       prochlorperazine 10 MG tablet    COMPAZINE    30 tablet    Take 1 tablet (10 mg) by mouth every 6 hours as needed (nausea/vomiting)    Endometrial cancer (H), Encounter for long-term (current) use of medications       RANITIDINE HCL PO      Take 150 mg by mouth 2 times daily

## 2018-11-09 ENCOUNTER — ALLIED HEALTH/NURSE VISIT (OUTPATIENT)
Dept: RADIATION ONCOLOGY | Facility: CLINIC | Age: 63
End: 2018-11-09
Attending: RADIOLOGY
Payer: COMMERCIAL

## 2018-11-09 VITALS — BODY MASS INDEX: 27.29 KG/M2 | WEIGHT: 164 LBS | DIASTOLIC BLOOD PRESSURE: 76 MMHG | SYSTOLIC BLOOD PRESSURE: 120 MMHG

## 2018-11-09 DIAGNOSIS — C54.1 ENDOMETRIAL CANCER (H): Primary | ICD-10-CM

## 2018-11-09 PROCEDURE — 77332 RADIATION TREATMENT AID(S): CPT | Performed by: RADIOLOGY

## 2018-11-09 PROCEDURE — 27110014 ZZH IMPLANT RADIATION BRIEF: Performed by: RADIOLOGY

## 2018-11-09 PROCEDURE — C1717 BRACHYTX, NON-STR,HDR IR-192: HCPCS | Performed by: RADIOLOGY

## 2018-11-09 PROCEDURE — 57156 INS VAG BRACHYTX DEVICE: CPT | Performed by: RADIOLOGY

## 2018-11-09 PROCEDURE — 77280 THER RAD SIMULAJ FIELD SMPL: CPT | Performed by: RADIOLOGY

## 2018-11-09 PROCEDURE — 77336 RADIATION PHYSICS CONSULT: CPT | Performed by: RADIOLOGY

## 2018-11-09 PROCEDURE — 77770 HDR RDNCL NTRSTL/ICAV BRCHTX: CPT | Performed by: RADIOLOGY

## 2018-11-09 NOTE — MR AVS SNAPSHOT
After Visit Summary   11/9/2018    Gerri Owens    MRN: [id number]           Patient Information     Date Of Birth          1955        Visit Information        Provider Department      11/9/2018 1:00 PM Giuliana Burleson MD [facility]        Today's Diagnoses     Endometrial cancer (H)    -  1       Follow-ups after your visit        Your next 10 appointments already scheduled     Nov 12, 2018  9:40 AM CST   (Arrive by 9:25 AM)   Return Visit with Bradley Tierney MD   [facility]    [address] [place] [address]  [address] [place] [id number] [phone]              Who to contact     Please call your clinic at [phone] to:    Ask questions about your health    Make or cancel appointments    Discuss your medicines    Learn about your test results    Speak to your doctor            Additional Information About Your Visit        MyChart Information     Mlog gives you secure access to your electronic health record. If you see a primary care provider, you can also send messages to your care team and make appointments. If you have questions, please call your primary care clinic.  If you do not have a primary care provider, please call [phone] and they will assist you.      Mlog is an electronic gateway that provides easy, online access to your medical records. With Mlog, you can request a clinic appointment, read your test results, renew a prescription or communicate with your care team.     To access your existing account, please contact your [facility] or call [phone] for assistance.        Care EveryWhere ID     This is your Care EveryWhere ID. This could be used by other organizations to access your [facility] medical records  [id number]        Your Vitals Were     Last Period BMI (Body Mass Index)                03/04/2005 27.29 kg/m2           Blood  Pressure from Last 3 Encounters:   11/09/18 120/76   11/07/18 99/68   10/11/18 106/63    Weight from Last 3 Encounters:   11/09/18 74.4 kg (164 lb)   10/25/18 75.1 kg (165 lb 9.6 oz)   10/18/18 74.6 kg (164 lb 8 oz)              Today, you had the following     No orders found for display       Primary Care Provider Office Phone # Fax #    Karo Doty [phone] [address]  [facility] [address]        Equal Access to Services     [facility]: Hadii aad ku hadasho Sograce, waaxda luqadaha, qaybta kaalmanelly benavides, carol escobar . So [facility] [place] [phone].    ATENCIÓN: Si habla español, tiene a graham disposición servicios gratuitos de asistencia lingüística. [facility] [phone].    We comply with applicable federal civil rights laws and [place] laws. We do not discriminate on the basis of race, color, national origin, age, disability, sex, sexual orientation, or gender identity.            Thank you!     Thank you for choosing [facility]  for your care. Our goal is always to provide you with excellent care. Hearing back from our patients is one way we can continue to improve our services. Please take a few minutes to complete the written survey that you may receive in the mail after your visit with us. Thank you!             Your Updated Medication List - Protect others around you: Learn how to safely use, store and throw away your medicines at www.disposemymeds.org.          This list is accurate as of 11/9/18  3:16 PM.  Always use your most recent med list.                   Brand Name Dispense Instructions for use Diagnosis    acetaminophen 500 MG tablet    TYLENOL     Take 1 tablet (500 mg) by mouth every 4 hours as needed    S/P DEMETRIO-BSO       ascorbic acid 250 MG tablet    VITAMIN C    90    250 mg    Routine general medical examination at a health care facility       calcium citrate-vitamin D 315-200 MG-UNIT Tabs per tablet    CITRACAL      Take 2 tablets by mouth daily        enoxaparin 80 MG/0.8ML injection    LOVENOX    60 Syringe    Inject 0.8 mLs (80 mg) Subcutaneous 2 times daily    Other acute pulmonary embolism without acute cor pulmonale (H)       FERROUS GLUCONATE PO      Take 325 mg by mouth daily (with breakfast)        GLUCOSAMINE CHONDROITIN COMPLX Tabs      Take  by mouth. 1500 mg 1xqd.    Routine general medical examination at a health care facility       loperamide 2 MG capsule    IMODIUM     Take 2 mg by mouth 4 times daily as needed for diarrhea        LORazepam 1 MG tablet    ATIVAN    30 tablet    Take 1 tablet (1 mg) by mouth every 6 hours as needed (nausea/vomiting, anxiety or sleep)    Endometrial cancer (H), Encounter for long-term (current) use of medications       multivitamin, therapeutic with minerals Tabs tablet      Take 1 tablet by mouth daily        ondansetron 8 MG tablet    ZOFRAN    60 tablet    Take 1 tablet (8 mg) by mouth every 8 hours as needed for nausea (vomiting)    Endometrial cancer (H), Encounter for long-term (current) use of medications       prochlorperazine 10 MG tablet    COMPAZINE    30 tablet    Take 1 tablet (10 mg) by mouth every 6 hours as needed (nausea/vomiting)    Endometrial cancer (H), Encounter for long-term (current) use of medications       RANITIDINE HCL PO      Take 150 mg by mouth 2 times daily

## 2018-11-09 NOTE — PROGRESS NOTES
A radiation therapy treatment planning simulation was performed.  HDR brachytherapy vaginal cylinder 2 of 2.  Please see the [employer] record for documentation.    Giuliana Burleson MD  Radiation Oncology

## 2018-11-09 NOTE — PROGRESS NOTES
Gerri Owens is here for scheduled HDR brachytherapy   HDR Single Channel Cylinder  2     Pre-procedure-  Time out done by Varsha Centeno RN and Dr Billings.   Patient identified, arm band applied, signed consent available   Medications taken by patient prior to procedure None  Pain assessment: 0/10  /76  Wt 74.4 kg (164 lb)  LMP 03/04/2005  BMI 27.29 kg/m2    Post-procedure-  Pain assessment: 0/10   Device removed without difficulty, Education for possible side effects and management, Discharge teaching reviewed, questions answered, Vaginal dilator use reviewed and Discharged to home

## 2018-11-11 NOTE — PROGRESS NOTES
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    Thus far, has received 2 cycles of chemotherapy (2 cycles of single agent carboplatin AUC6) followed by EBRT and vaginal brachytherapy, just completed 18. Fatigued has improved. Tolerating regular diet without nausea or vomiting. She does have some diarrhea, controlled with imodium. Does have a little urinary incontinence since the vaginal brachytherapy, but no hematuria. Had vaginal spotting yesterday. Has dilator, but hasn't started using yet. States she feels great.    Has pulmonary embolus and remains on lovenox injections BID. Denies chest pain or SOA.    Oncology History:  18- OBGYN visit with Dr. Tineo.  Complains of 1.5 years of post-menopausal bleeding.                           EMB performed and showed      18- EUA, hysteroscopy D&C under ultrasound guidance.  Diffuse proliferative vascular endometrium noted.                         Pathology:                          FINAL DIAGNOSIS:                          ENDOMETRIAL CURETTINGS:                          - Endometrial endometrioid adenocarcinoma, FIGO grade 2                            COMMENT:                          While the tumor maintained a glandular morphology, the occasional high                          nuclear grade warranted upgrading to                          FIGO 2.      18- Patient reports that she continues to have some post-menopausal spotting      18 DEMETRIO/BSO and staging:  PATH:  A. ANTERIOR FUNDUS, BIOPSY:   - Positive for adenocarcinoma     B. UTERUS, CERVIX, BILATERAL TUBES AND OVARIES, HYSTERECTOMY WITH   BILATERAL SALPINGO-OOPHORECTOMY:   - Endometrial endometrioid adenocarcinoma, FIGO grade 1   -  Adenomyosis   - Cervix invaded by endometrial adenocarcinoma   - Right ovarian surface adhesions involved by endometrial adenocarcinoma   - Right fallopian tube with endometriosis   - Left adnexa invaded by endometrial adenocarcinoma (5.5 cm)     C. LYMPH NODES, LEFT PELVIC, EXCISION:   - Metastatic adenocarcinoma to one of eight lymph nodes (1/8)   - The metastatic focus is 3 mm in greatest dimension with no extranodal   extension     D. LYMPH NODES, LEFT PARA-AORTIC, EXCISION:   - One reactive lymph node, negative for malignancy (0/1)     E. LYMPH NODES, RIGHT PELVIC, EXCISION:   - Metastatic adenocarcinoma to one of six lymph nodes (1/6)   - The metastatic focus is 21 mm in greatest dimension with positive   extranodal extension     F. LYMPH NODES, RIGHT PARA-AORTIC, EXCISION:   - Three reactive lymph nodes, negative for malignancy (0/3)     G. OMENTUM, RESECTION:   - Omental adipose tissue with focal inflammation and surface mesothelial   hyperplasia   - Negative for malignancy     COMMENT:   The final diagnosis confirms the interpretation provided intraoperatively.     Report Name: Endometrium - Hysterectomy        Status: Submitted Checklist Inst: 1      Last Updated By: Fernie Ramirez M.D., PhD, Physicians,   05/06/2018 13:52:11   Part(s) Involved:   B: Uterus, cervix, bilateral tubes and ovaries     Synoptic Report:     SPECIMEN     Procedure:         - Simple hysterectomy         - Bilateral salpingo-oophorectomy         - Omentectomy         - Peritoneal washing     Specimen Integrity:         - Opened     TUMOR     Tumor Site:         - Endometrium         - Lower uterine segment     Histologic Type:         - Endometrioid carcinoma, NOS     Histologic Grade:         - FIGO grade 1     Tumor Size: 5.5 Centimeters (cm)     Tumor Extent       Myometrial Invasion:           - Present         Depth of Invasion: 15 Millimeters (mm)         Myometrial Thickness: 15 Millimeters (mm)         Percentage of  Myometrial Invasion: 100%       Adenomyosis:           - Present, involved by carcinoma       Uterine Serosa Involvement:           - Present       Lower Uterine Segment Involvement:           - Present         Level of Tumor Involvement:             - Myoinvasive       Cervical Stromal Involvement:           - Present       Other Tissue / Organ Involvement:           - Right ovary           - Left ovary           - Left fallopian tube       Peritoneal Ascitic Fluid:           - Negative for malignancy (normal / benign)     Accessory Findings       Lymphovascular Invasion:           - Present     MARGINS     Ectocervical / Vaginal Cuff Margin:         - Uninvolved by carcinoma     LYMPH NODES     Number of Pelvic Nodes with Macrometastasis: 2     Number of Pelvic Nodes with Micrometastasis: 0     Laterality of Pelvic Node(s) with Tumor:         - Right         - Left     Total Number of Pelvic Node(s) Examined (sentinel and nonsentinel): 14     Number of Pelvic [place] Nodes Examined: 0     Laterality of Pelvic Node(s) Examined:         - Right         - Left     Number of Para-Aortic Nodes with Macrometastasis: 0     Number of Para-Aortic Nodes with Micrometastasis: 0     Total Number of Para-Aortic Node(s) Examined (sentinel and nonsentinel):    4     Number of Para-Aortic [place] Nodes Examined: 0     Laterality of Para-Aortic Node(s) Examined:         - Right         - Left     PATHOLOGIC STAGE CLASSIFICATION (PTNM, AJCC 8TH EDITION)     Primary Tumor (pT):         - pT3a     Regional Lymph Nodes (pN)       Category (pN):           - pN1a     FIGO STAGE     FIGO Stage:         - IIIC1      4/29/2018: CT:  IMPRESSION:   1. No pulmonary embolism. Postop atelectasis.  2. Fluid-filled loops of dilated small bowel in the upper abdomen,  favored to represent adynamic ileus although incompletely visualized.  3. Pneumoperitoneum and small volume ascites, expected given  postoperative day 2 following open surgery.  4.  Partially visualized 10 mm right thyroid nodule.     6/13/18: C1D1 carboplatin/paclitaxel- anaphylactic reaction to paclitaxel, no carboplatin received.    7/6/18: C1 single agent carboplatin AUC 6     7/27/18: C2 SA carboplatin AUC 6    8/17/18: C3 SA carboplatin AUC 6, deferred due to thrombocytopenia    8-11/2018 RT: EBRT and brachytherapy.      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  7/17/2012     Thyrotoxicosis 2007    hyperthyroid, enlarged right thyroid on thyroid uptake, treate by endocrin [facility] with tapozole till 12/2008, FNA neg of 1.9 cm right lobe dominat nodule     Whooping cough due to Bordetella pertussis (p. pertussis) infant    whooping cough       Past Surgical History:   Procedure Laterality Date     ARTHROPLASTY HIP Left 12/4/2017    Procedure: ARTHROPLASTY HIP;  Left total hip arthroplasty;  Surgeon: Rajinder Goodwin MD;  Location: [facility] OR     BREAST BIOPSY, RT/LT  2004    left breast     DILATION AND CURETTAGE, OPERATIVE HYSTEROSCOPY WITH MORCELLATOR, COMBINED N/A 4/11/2018    Procedure: COMBINED DILATION AND CURETTAGE, OPERATIVE HYSTEROSCOPY WITH MORCELLATOR;  Hysteroscopy, Dilation and Curettage Under Ultrasound Guidance ;  Surgeon: Adry Tineo MD;  Location: [facility] OR     DILATION AND CURETTAGE, WITH ULTRASOUND GUIDANCE  4/11/2018    Procedure: DILATION AND CURETTAGE, WITH ULTRASOUND GUIDANCE;;  Surgeon: Adry Tineo  MD;  Location: [facility] OR     HYSTERECTOMY TOTAL ABDOMINAL, BILATERAL SALPINGO-OOPHORECTOMY, NODE DISSECTION, COMBINED Bilateral 2018    Procedure: COMBINED HYSTERECTOMY TOTAL ABDOMINAL, SALPINGO-OOPHORECTOMY, NODE DISSECTION;;  Surgeon: Bradley Tierney MD;  Location[facility] OR     INSERT PORT VASCULAR ACCESS Right 2018    Procedure: INSERT PORT VASCULAR ACCESS;  Single Lumen Chest Power Port;  Surgeon: Jamila Major PA-C;  [facility]     LAPAROSCOPIC HYSTERECTOMY TOTAL, BILATERAL SALPINGO-OOPHORECTOMY, NODE DISSECTION, COMBINED N/A 2018    Procedure: COMBINED LAPAROSCOPIC HYSTERECTOMY TOTAL, SALPINGO-OOPHORECTOMY, NODE DISSECTION;  Laparoscopic converted to open Removal Of Uterus, Tubes, Ovaries, Cervix, Pelvic and Para Aortic Lymphnode Dissection, Tumor Debulking, CUSA, Partial Omentectomy, Anesthesia Block;  Surgeon: Bradley Tierney MD;  Location: [facility] OR     TONSILLECTOMY  1960    tonsillectomy       Current Outpatient Prescriptions   Medication     acetaminophen (TYLENOL) 500 MG tablet     calcium citrate-vitamin D (CITRACAL) 315-200 MG-UNIT TABS per tablet     enoxaparin (LOVENOX) 80 MG/0.8ML injection     FERROUS GLUCONATE PO     GLUCOSAMINE CHONDROITIN COMPLX OR TABS     loperamide (IMODIUM) 2 MG capsule     LORazepam (ATIVAN) 1 MG tablet     multivitamin, therapeutic with minerals (THERA-VIT-M) TABS tablet     ondansetron (ZOFRAN) 8 MG tablet     prochlorperazine (COMPAZINE) 10 MG tablet     RANITIDINE HCL PO     VITAMIN C 250 MG OR TABS     No current facility-administered medications for this visit.        Allergies   Allergen Reactions     Paclitaxel Anaphylaxis     reportedTaxol= anaphylaxsis     Nickel Rash       Family History   Problem Relation Age of Onset     Diabetes Mother      type 2     HEART DISEASE Mother      Hypertension Mother      C.A.D. Mother       after 2nd heart attack     Hypertension Father      C.A.D. Father      mild heart attack     Cancer  Father      skin cancer     HEART DISEASE Father      Rheumatoid Arthritis Sister      HEART DISEASE Brother      herdetary heart murmur     Hyperlipidemia Brother      CLOTTING DISORDER Brother      factor 5 leiden     Deep Vein Thrombosis Brother      leg     Hyperlipidemia Brother      Deep Vein Thrombosis Brother      leg; negative for clotting disorder     KIDNEY DISEASE No family hx of        Social History     Social History     Marital status:      Spouse name: Doug     Number of children: 0     Years of education: N/A     Occupational History     rn [employer]     Social History Main Topics     Smoking status: Never Smoker     Smokeless tobacco: Never Used     Alcohol use Yes      Comment: rarely     Drug use: No     Sexual activity: Yes     Partners: Male     Other Topics Concern     Parent/Sibling W/ Cabg, Mi Or Angioplasty Before 65f 55m? 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  She does not smoke and was never smoker, she drinks alcohol about a month, no illicit drugs            Balanced Diet - Yes, in the last 6 months    Osteoporosis Prevention Measures - Dairy servings per day: 2 servings plus a supplement    Regular Exercise -  Yes Describe walks for 30 to 40 minutes 4 to 5 times a week    Dental Exam - YES - Date: 1 year ago, and is going today    Eye Exam - YES - Date: 4 months ago    Self Breast Exam - Yes    Abuse: Current or Past (Physical, Sexual or Emotional)- No    Do you feel safe in your environment - Yes    Guns stored in the home - Yes, locked    Sunscreen used - Yes    Seatbelts used - Yes    Lipids -  YES - Date: 10-02    Glucose -  YES - Date: 10-02    Colon Cancer Screening - No    Hemoccults - NO    Pap Test -  YES - Date: 10-02    Do you have any concerns about STD's -  No    Mammography - YES - Date: 8-06    DEXA - NO    Immunizations reviewed and up to date - Yes,  lst td 7-2000    10-23-07  [facility]           ROS  See below    I have reviewed the patient's ROS and discussed all pertinent information as noted in the HPI.    Physical Exam:  B/P: 104/69, T: 97, P: 65, R: 16    Weight down eight pounds from last visit    CONSTITUTIONAL: Alert non-toxic appearing female in no acute distress  HEAD: Normocephalic, atraumatic  EYES: PERRLA; no scleral icterus  ENT: Oropharynx pink without lesions  NECK: Neck supple without lymphadenopathy  RESPIRATORY: Lungs clear to auscultation, no increased work of breathing noted  CV: Regular rate and rhythm, S1S2, no clicks, murmurs, rubs, or gallops; bilateral lower extremities without edema  GASTROINTESTINAL: Abdomen soft, non-distended, and non-tender to palpation without masses or organomegaly; midline incision healed well  GENITOURINARY: Normal appearing external genitalia, vagina smooth without nodularity or masses, vaginal cuff intact, no masses palpated on bimanual exam. Rectal exam confirmed these findings.  LYMPHATIC: Cervical, supraclavicular, and inguinal nodes without lymphadenopathy  MUSCULOSKELETAL: Moves all extremities, no obvious muscle wasting  NEUROLOGIC: No gross deficits, normal gait  SKIN: Appropriate color for race, warm and dry, no rashes or lesions to unclothed skin  PSYCHIATRIC: Pleasant and interactive, affect bright, makes appropriate eye contact, thought process linear      Assessment/Plan:  1) Stage IIIC1 low grade endometrial cancer: Has completed 2 cycles of carboplatin AUC6, followed by EBRT with vaginal brachytherapy by Dr. Billings at the [facility]. ROCKY on exam today. Since she had so much difficulty with chemotherapy, will forgo the remainder of chemotherapy since she got EBRT and vaginal brachytherapy, appropriate therapy per NCCN guidelines. Discussed this with Ms. Owens and her . Will f/u in 3 months with repeat CT scan.    2) PE: Asymptomatic, tolerating Lovenox injections. She is  interested in switching to coumadin which is reasonable.  She will take this up with her primary MD, but we dicussed lifelong anti-coagulation given her multiple events and her FH which also included 2 siblings on lifelong anti-coagulation.    3) Genetic counseling: MMR proteins with intact nuclear expression making Peterson syndrome unlikely.    4) Health maintenance issues discussed include to follow up with PCP for non-gynecologic concerns and co-morbid conditions    5) Patient verbalized understanding of and agreement with plan    25 minutes spent with patient, over 50% of which was spent on counseling and coordination of care.    Bradley Tierney    Pager: [phone]

## 2018-11-12 ENCOUNTER — ONCOLOGY VISIT (OUTPATIENT)
Dept: ONCOLOGY | Facility: CLINIC | Age: 63
End: 2018-11-12
Attending: OBSTETRICS & GYNECOLOGY
Payer: COMMERCIAL

## 2018-11-12 VITALS
DIASTOLIC BLOOD PRESSURE: 69 MMHG | SYSTOLIC BLOOD PRESSURE: 104 MMHG | BODY MASS INDEX: 26.99 KG/M2 | TEMPERATURE: 97 F | HEART RATE: 65 BPM | OXYGEN SATURATION: 98 % | HEIGHT: 65 IN | RESPIRATION RATE: 16 BRPM | WEIGHT: 162 LBS

## 2018-11-12 DIAGNOSIS — C54.1 ENDOMETRIAL CANCER (H): Primary | ICD-10-CM

## 2018-11-12 PROCEDURE — 99214 OFFICE O/P EST MOD 30 MIN: CPT | Mod: ZP | Performed by: OBSTETRICS & GYNECOLOGY

## 2018-11-12 PROCEDURE — G0463 HOSPITAL OUTPT CLINIC VISIT: HCPCS | Mod: ZF

## 2018-11-12 ASSESSMENT — PAIN SCALES - GENERAL: PAINLEVEL: NO PAIN (0)

## 2018-11-12 NOTE — NURSING NOTE
"Oncology Rooming Note    November 12, 2018 9:34 AM   Gerri Owens is a 63 year old female who presents for:    Chief Complaint   Patient presents with     Oncology Clinic Visit     Return Endometrial Ca     Initial Vitals: /69  Pulse 65  Temp 97  F (36.1  C) (Tympanic)  Resp 16  Ht 1.651 m (5' 5\")  Wt 73.5 kg (162 lb)  LMP 03/04/2005  SpO2 98%  BMI 26.96 kg/m2 Estimated body mass index is 26.96 kg/(m^2) as calculated from the following:    Height as of this encounter: 1.651 m (5' 5\").    Weight as of this encounter: 73.5 kg (162 lb). Body surface area is 1.84 meters squared.  No Pain (0) Comment: Data Unavailable   Patient's last menstrual period was 03/04/2005.  Allergies reviewed: Yes  Medications reviewed: Yes    Medications: Medication refills not needed today.  Pharmacy name entered into [facility]:    [facility] [place], [place] - [address] [place] [address] OUTPATIENT PHARMACY  [facility] [place], [place] - [address] [facility]    Clinical concerns: patient has list of questions     6 minutes for nursing intake (face to face time)     Joyce Jimenez [facility]              "

## 2018-11-12 NOTE — LETTER
2018       RE: Gerri Owens  [address]  [facility] [address]     Dear Colleague,    Thank you for referring your patient, Gerri Owens, to the [facility]. Please see a copy of my visit note below.    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    Thus far, has received 2 cycles of chemotherapy (2 cycles of single agent carboplatin AUC6) followed by EBRT and vaginal brachytherapy, just completed 18. Fatigued has improved. Tolerating regular diet without nausea or vomiting. She does have some diarrhea, controlled with imodium. Does have a little urinary incontinence since the vaginal brachytherapy, but no hematuria. Had vaginal spotting yesterday. Has dilator, but hasn't started using yet. States she feels great.    Has pulmonary embolus and remains on lovenox injections BID. Denies chest pain or SOA.    Oncology History:  18- OBGYN visit with Dr. Tineo.  Complains of 1.5 years of post-menopausal bleeding.                           EMB performed and showed      18- EUA, hysteroscopy D&C under ultrasound guidance.  Diffuse proliferative vascular endometrium noted.                         Pathology:                          FINAL DIAGNOSIS:                          ENDOMETRIAL CURETTINGS:                          - Endometrial endometrioid adenocarcinoma, FIGO grade 2                            COMMENT:                          While the tumor maintained a glandular morphology, the occasional high                          nuclear grade warranted upgrading to                          FIGO 2.      18- Patient reports that she continues to have some post-menopausal spotting      18 DEMETRIO/BSO and  staging:  PATH:  A. ANTERIOR FUNDUS, BIOPSY:   - Positive for adenocarcinoma     B. UTERUS, CERVIX, BILATERAL TUBES AND OVARIES, HYSTERECTOMY WITH   BILATERAL SALPINGO-OOPHORECTOMY:   - Endometrial endometrioid adenocarcinoma, FIGO grade 1   - Adenomyosis   - Cervix invaded by endometrial adenocarcinoma   - Right ovarian surface adhesions involved by endometrial adenocarcinoma   - Right fallopian tube with endometriosis   - Left adnexa invaded by endometrial adenocarcinoma (5.5 cm)     C. LYMPH NODES, LEFT PELVIC, EXCISION:   - Metastatic adenocarcinoma to one of eight lymph nodes (1/8)   - The metastatic focus is 3 mm in greatest dimension with no extranodal   extension     D. LYMPH NODES, LEFT PARA-AORTIC, EXCISION:   - One reactive lymph node, negative for malignancy (0/1)     E. LYMPH NODES, RIGHT PELVIC, EXCISION:   - Metastatic adenocarcinoma to one of six lymph nodes (1/6)   - The metastatic focus is 21 mm in greatest dimension with positive   extranodal extension     F. LYMPH NODES, RIGHT PARA-AORTIC, EXCISION:   - Three reactive lymph nodes, negative for malignancy (0/3)     G. OMENTUM, RESECTION:   - Omental adipose tissue with focal inflammation and surface mesothelial   hyperplasia   - Negative for malignancy     COMMENT:   The final diagnosis confirms the interpretation provided intraoperatively.     Report Name: Endometrium - Hysterectomy        Status: Submitted Checklist Inst: 1      Last Updated By: Fernie Ramirez M.D., PhD, [facility],   05/06/2018 13:52:11   Part(s) Involved:   B: Uterus, cervix, bilateral tubes and ovaries     Synoptic Report:     SPECIMEN     Procedure:         - Simple hysterectomy         - Bilateral salpingo-oophorectomy         - Omentectomy         - Peritoneal washing     Specimen Integrity:         - Opened     TUMOR     Tumor Site:         - Endometrium         - Lower uterine segment     Histologic Type:         - Endometrioid carcinoma, NOS     Histologic Grade:          - FIGO grade 1     Tumor Size: 5.5 Centimeters (cm)     Tumor Extent       Myometrial Invasion:           - Present         Depth of Invasion: 15 Millimeters (mm)         Myometrial Thickness: 15 Millimeters (mm)         Percentage of Myometrial Invasion: 100%       Adenomyosis:           - Present, involved by carcinoma       Uterine Serosa Involvement:           - Present       Lower Uterine Segment Involvement:           - Present         Level of Tumor Involvement:             - Myoinvasive       Cervical Stromal Involvement:           - Present       Other Tissue / Organ Involvement:           - Right ovary           - Left ovary           - Left fallopian tube       Peritoneal Ascitic Fluid:           - Negative for malignancy (normal / benign)     Accessory Findings       Lymphovascular Invasion:           - Present     MARGINS     Ectocervical / Vaginal Cuff Margin:         - Uninvolved by carcinoma     LYMPH NODES     Number of Pelvic Nodes with Macrometastasis: 2     Number of Pelvic Nodes with Micrometastasis: 0     Laterality of Pelvic Node(s) with Tumor:         - Right         - Left     Total Number of Pelvic Node(s) Examined (sentinel and nonsentinel): 14     Number of Pelvic [place] Nodes Examined: 0     Laterality of Pelvic Node(s) Examined:         - Right         - Left     Number of Para-Aortic Nodes with Macrometastasis: 0     Number of Para-Aortic Nodes with Micrometastasis: 0     Total Number of Para-Aortic Node(s) Examined (sentinel and nonsentinel):    4     Number of Para-Aortic [place] Nodes Examined: 0     Laterality of Para-Aortic Node(s) Examined:         - Right         - Left     PATHOLOGIC STAGE CLASSIFICATION (PTNM, AJCC 8TH EDITION)     Primary Tumor (pT):         - pT3a     Regional Lymph Nodes (pN)       Category (pN):           - pN1a     FIGO STAGE     FIGO Stage:         - IIIC1      4/29/2018: CT:  IMPRESSION:   1. No pulmonary embolism. Postop atelectasis.  2.  Fluid-filled loops of dilated small bowel in the upper abdomen,  favored to represent adynamic ileus although incompletely visualized.  3. Pneumoperitoneum and small volume ascites, expected given  postoperative day 2 following open surgery.  4. Partially visualized 10 mm right thyroid nodule.     6/13/18: C1D1 carboplatin/paclitaxel- anaphylactic reaction to paclitaxel, no carboplatin received.    7/6/18: C1 single agent carboplatin AUC 6     7/27/18: C2 SA carboplatin AUC 6    8/17/18: C3 SA carboplatin AUC 6, deferred due to thrombocytopenia    8-11/2018 RT: EBRT and brachytherapy.      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  7/17/2012     Thyrotoxicosis 2007    hyperthyroid, enlarged right thyroid on thyroid uptake, treate by [facility] with tapozole till 12/2008, FNA neg of 1.9 cm right lobe dominat nodule     Whooping cough due to Bordetella pertussis (p. pertussis) infant    whooping cough       Past Surgical History:   Procedure Laterality Date     ARTHROPLASTY HIP Left 12/4/2017    Procedure: ARTHROPLASTY HIP;  Left total hip arthroplasty;  Surgeon: Rajinder Goodwin MD;  Location: [facility] OR     BREAST BIOPSY, RT/LT  2004    left breast     DILATION AND CURETTAGE, OPERATIVE HYSTEROSCOPY WITH MORCELLATOR, COMBINED N/A 4/11/2018    Procedure: COMBINED DILATION AND CURETTAGE, OPERATIVE HYSTEROSCOPY WITH MORCELLATOR;  Hysteroscopy, Dilation and  Curettage Under Ultrasound Guidance ;  Surgeon: Adry Tineo MD;  Location: [facility] OR     DILATION AND CURETTAGE, WITH ULTRASOUND GUIDANCE  4/11/2018    Procedure: DILATION AND CURETTAGE, WITH ULTRASOUND GUIDANCE;;  Surgeon: Adry Tineo MD;  Location: [facility] OR     HYSTERECTOMY TOTAL ABDOMINAL, BILATERAL SALPINGO-OOPHORECTOMY, NODE DISSECTION, COMBINED Bilateral 4/27/2018    Procedure: COMBINED HYSTERECTOMY TOTAL ABDOMINAL, SALPINGO-OOPHORECTOMY, NODE DISSECTION;;  Surgeon: Bradley Tierney MD;  Location: [facility] OR     INSERT PORT VASCULAR ACCESS Right 5/31/2018    Procedure: INSERT PORT VASCULAR ACCESS;  Single Lumen Chest Power Port;  Surgeon: Jamila Major PA-C;  [facility]     LAPAROSCOPIC HYSTERECTOMY TOTAL, BILATERAL SALPINGO-OOPHORECTOMY, NODE DISSECTION, COMBINED N/A 4/27/2018    Procedure: COMBINED LAPAROSCOPIC HYSTERECTOMY TOTAL, SALPINGO-OOPHORECTOMY, NODE DISSECTION;  Laparoscopic converted to open Removal Of Uterus, Tubes, Ovaries, Cervix, Pelvic and Para Aortic Lymphnode Dissection, Tumor Debulking, CUSA, Partial Omentectomy, Anesthesia Block;  Surgeon: Bradley Tierney MD;  Location: [facility] OR     TONSILLECTOMY  1960    tonsillectomy       Current Outpatient Prescriptions   Medication     acetaminophen (TYLENOL) 500 MG tablet     calcium citrate-vitamin D (CITRACAL) 315-200 MG-UNIT TABS per tablet     enoxaparin (LOVENOX) 80 MG/0.8ML injection     FERROUS GLUCONATE PO     GLUCOSAMINE CHONDROITIN COMPLX OR TABS     loperamide (IMODIUM) 2 MG capsule     LORazepam (ATIVAN) 1 MG tablet     multivitamin, therapeutic with minerals (THERA-VIT-M) TABS tablet     ondansetron (ZOFRAN) 8 MG tablet     prochlorperazine (COMPAZINE) 10 MG tablet     RANITIDINE HCL PO     VITAMIN C 250 MG OR TABS     No current facility-administered medications for this visit.        Allergies   Allergen Reactions     Paclitaxel Anaphylaxis     reportedTaxol= anaphylaxsis     Nickel Rash       Family  History   Problem Relation Age of Onset     Diabetes Mother      type 2     HEART DISEASE Mother      Hypertension Mother      C.A.D. Mother       after 2nd heart attack     Hypertension Father      C.A.D. Father      mild heart attack     Cancer Father      skin cancer     HEART DISEASE Father      Rheumatoid Arthritis Sister      HEART DISEASE Brother      herdetary heart murmur     Hyperlipidemia Brother      CLOTTING DISORDER Brother      factor 5 leiden     Deep Vein Thrombosis Brother      leg     Hyperlipidemia Brother      Deep Vein Thrombosis Brother      leg; negative for clotting disorder     KIDNEY DISEASE No family hx of        Social History     Social History     Marital status:      Spouse name: Doug     Number of children: 0     Years of education: N/A     Occupational History     [employer]     Social History Main Topics     Smoking status: Never Smoker     Smokeless tobacco: Never Used     Alcohol use Yes      Comment: rarely     Drug use: No     Sexual activity: Yes     Partners: Male     Other Topics Concern     Parent/Sibling W/ Cabg, Mi Or Angioplasty Before 65f 55m? 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  She does not smoke and was never smoker, she drinks alcohol about a month, no illicit drugs            Balanced Diet - Yes, in the last 6 months    Osteoporosis Prevention Measures - Dairy servings per day: 2 servings plus a supplement    Regular Exercise -  Yes Describe walks for 30 to 40 minutes 4 to 5 times a week    Dental Exam - YES - Date: 1 year ago, and is going today    Eye Exam - YES - Date: 4 months ago    Self Breast Exam - Yes    Abuse: Current or Past (Physical, Sexual or Emotional)- No    Do you feel safe in your environment - Yes    Guns stored in the home - Yes, locked    Sunscreen used - Yes    Seatbelts used - Yes    Lipids -  YES - Date: 10-02     Glucose -  YES - Date: 10-02    Colon Cancer Screening - No    Hemoccults - NO    Pap Test -  YES - Date: 10-02    Do you have any concerns about STD's -  No    Mammography - YES - Date: 8-06    DEXA - NO    Immunizations reviewed and up to date - Yes, lst td 7-2000    10-23-07  MEL Mueller CMA           ROS  See below    I have reviewed the patient's ROS and discussed all pertinent information as noted in the HPI.    Physical Exam:  B/P: 104/69, T: 97, P: 65, R: 16    Weight down eight pounds from last visit    CONSTITUTIONAL: Alert non-toxic appearing female in no acute distress  HEAD: Normocephalic, atraumatic  EYES: PERRLA; no scleral icterus  ENT: Oropharynx pink without lesions  NECK: Neck supple without lymphadenopathy  RESPIRATORY: Lungs clear to auscultation, no increased work of breathing noted  CV: Regular rate and rhythm, S1S2, no clicks, murmurs, rubs, or gallops; bilateral lower extremities without edema  GASTROINTESTINAL: Abdomen soft, non-distended, and non-tender to palpation without masses or organomegaly; midline incision healed well  GENITOURINARY: Normal appearing external genitalia, vagina smooth without nodularity or masses, vaginal cuff intact, no masses palpated on bimanual exam. Rectal exam confirmed these findings.  LYMPHATIC: Cervical, supraclavicular, and inguinal nodes without lymphadenopathy  MUSCULOSKELETAL: Moves all extremities, no obvious muscle wasting  NEUROLOGIC: No gross deficits, normal gait  SKIN: Appropriate color for race, warm and dry, no rashes or lesions to unclothed skin  PSYCHIATRIC: Pleasant and interactive, affect bright, makes appropriate eye contact, thought process linear      Assessment/Plan:  1) Stage IIIC1 low grade endometrial cancer: Has completed 2 cycles of carboplatin AUC6, followed by EBRT with vaginal brachytherapy by Dr. Billings at the [facility]. ROCKY on exam today. Since she had so much difficulty with chemotherapy, will forgo the remainder of  chemotherapy since she got EBRT and vaginal brachytherapy, appropriate therapy per NCCN guidelines. Discussed this with Ms. Owens and her . Will f/u in 3 months with repeat CT scan.    2) PE: Asymptomatic, tolerating Lovenox injections. She is interested in switching to coumadin which is reasonable.  She will take this up with her primary MD, but we dicussed lifelong anti-coagulation given her multiple events and her FH which also included 2 siblings on lifelong anti-coagulation.    3) Genetic counseling: MMR proteins with intact nuclear expression making Peterson syndrome unlikely.    4) Health maintenance issues discussed include to follow up with PCP for non-gynecologic concerns and co-morbid conditions    5) Patient verbalized understanding of and agreement with plan    25 minutes spent with patient, over 50% of which was spent on counseling and coordination of care.    Bradley Tierney    Pager: [phone]        Again, thank you for allowing me to participate in the care of your patient.      Sincerely,    Bradley Tierney MD

## 2018-11-12 NOTE — MR AVS SNAPSHOT
After Visit Summary   11/12/2018    Gerri Owens    MRN: [id number]           Patient Information     Date Of Birth          1955        Visit Information        Provider Department      11/12/2018 9:40 AM Bradley Tierney MD [facility]        Today's Diagnoses     Endometrial cancer (H)    -  1       Follow-ups after your visit        Follow-up notes from your care team     Return in about 3 months (around 2/12/2019).      Future tests that were ordered for you today     Open Future Orders        Priority Expected Expires Ordered    CT Chest Abdomen Pelvis w/o & w Contrast Routine  11/12/2019 11/12/2018            Who to contact     If you have questions or need follow up information about today's clinic visit or your schedule please contact [facility] directly at [phone].  Normal or non-critical lab and imaging results will be communicated to you by MyChart, letter or phone within 4 business days after the clinic has received the results. If you do not hear from us within 7 days, please contact the clinic through [employer] or phone. If you have a critical or abnormal lab result, we will notify you by phone as soon as possible.  Submit refill requests through [employer] or call your pharmacy and they will forward the refill request to us. Please allow 3 business days for your refill to be completed.          Additional Information About Your Visit        MyChart Information     [employer] gives you secure access to your electronic health record. If you see a primary care provider, you can also send messages to your care team and make appointments. If you have questions, please call your primary care clinic.  If you do not have a primary care provider, please call [phone] and they will assist you.        Care EveryWhere ID     This is your Care EveryWhere ID. This could be used by other organizations to access your [facility]  "records  [id number]        Your Vitals Were     Pulse Temperature Respirations Height Last Period Pulse Oximetry    65 97  F (36.1  C) (Tympanic) 16 1.651 m (5' 5\") 03/04/2005 98%    BMI (Body Mass Index)                   26.96 kg/m2            Blood Pressure from Last 3 Encounters:   11/12/18 104/69   11/09/18 120/76   11/07/18 99/68    Weight from Last 3 Encounters:   11/12/18 73.5 kg (162 lb)   11/09/18 74.4 kg (164 lb)   10/25/18 75.1 kg (165 lb 9.6 oz)               Primary Care Provider Office Phone # Fax #    Karo Doty [phone] [address]  [facility] [address]        Equal Access to Services     JEREMÍAS BUCK : Gopi Springer, wajoana colbert, catracho malikalmavis benavides, carol escobar . So [facility] [phone].    ATENCIÓN: Si habla español, tiene a graham disposición servicios gratuitos de asistencia lingüística. Avril al [phone].    We comply with applicable federal civil rights laws and [place] laws. We do not discriminate on the basis of race, color, national origin, age, disability, sex, sexual orientation, or gender identity.            Thank you!     Thank you for choosing [facility]  for your care. Our goal is always to provide you with excellent care. Hearing back from our patients is one way we can continue to improve our services. Please take a few minutes to complete the written survey that you may receive in the mail after your visit with us. Thank you!             Your Updated Medication List - Protect others around you: Learn how to safely use, store and throw away your medicines at www.disposemymeds.org.          This list is accurate as of 11/12/18 11:18 AM.  Always use your most recent med list.                   Brand Name Dispense Instructions for use Diagnosis    acetaminophen 500 MG tablet    TYLENOL     Take 1 tablet (500 mg) by mouth every 4 hours as needed    S/P DEMETRIO-BSO       ascorbic acid " 250 MG tablet    VITAMIN C    90    250 mg    Routine general medical examination at a health care facility       calcium citrate-vitamin D 315-200 MG-UNIT Tabs per tablet    CITRACAL     Take 2 tablets by mouth daily        enoxaparin 80 MG/0.8ML injection    LOVENOX    60 Syringe    Inject 0.8 mLs (80 mg) Subcutaneous 2 times daily    Other acute pulmonary embolism without acute cor pulmonale (H)       FERROUS GLUCONATE PO      Take 325 mg by mouth daily (with breakfast)        GLUCOSAMINE CHONDROITIN COMPLX Tabs      Take  by mouth. 1500 mg 1xqd.    Routine general medical examination at a health care facility       loperamide 2 MG capsule    IMODIUM     Take 2 mg by mouth 4 times daily as needed for diarrhea        LORazepam 1 MG tablet    ATIVAN    30 tablet    Take 1 tablet (1 mg) by mouth every 6 hours as needed (nausea/vomiting, anxiety or sleep)    Endometrial cancer (H), Encounter for long-term (current) use of medications       multivitamin, therapeutic with minerals Tabs tablet      Take 1 tablet by mouth daily        ondansetron 8 MG tablet    ZOFRAN    60 tablet    Take 1 tablet (8 mg) by mouth every 8 hours as needed for nausea (vomiting)    Endometrial cancer (H), Encounter for long-term (current) use of medications       prochlorperazine 10 MG tablet    COMPAZINE    30 tablet    Take 1 tablet (10 mg) by mouth every 6 hours as needed (nausea/vomiting)    Endometrial cancer (H), Encounter for long-term (current) use of medications       RANITIDINE HCL PO      Take 150 mg by mouth 2 times daily

## 2018-11-13 ENCOUNTER — ONCOLOGY VISIT (OUTPATIENT)
Dept: RADIATION ONCOLOGY | Facility: CLINIC | Age: 63
End: 2018-11-13

## 2018-11-13 NOTE — MR AVS SNAPSHOT
After Visit Summary   11/13/2018    Greri Owens    MRN: [id number]           Patient Information     Date Of Birth          1955        Visit Information        Provider Department      11/13/2018 10:00 PM Darling Billings MD [facility]         Follow-ups after your visit        Your next 10 appointments already scheduled     Feb 18, 2019 10:30 AM CST   (Arrive by 10:15 AM)   Return Visit with Bradley Tierney MD   [facility] ([facility])    [address] [place] [address]  [address] [place] [id number] [phone]              Who to contact     Please call your clinic at [phone] to:    Ask questions about your health    Make or cancel appointments    Discuss your medicines    Learn about your test results    Speak to your doctor            Additional Information About Your Visit        MyChart Information     [employer] gives you secure access to your electronic health record. If you see a primary care provider, you can also send messages to your care team and make appointments. If you have questions, please call your primary care clinic.  If you do not have a primary care provider, please call [phone] and they will assist you.      [employer] is an electronic gateway that provides easy, online access to your medical records. With [employer], you can request a clinic appointment, read your test results, renew a prescription or communicate with your care team.     To access your existing account, please contact your [facility] or call [phone] for assistance.        Care EveryWhere ID     This is your Care EveryWhere ID. This could be used by other organizations to access your [facility] medical records  [id number]        Your Vitals Were     Last Period                   03/04/2005            Blood Pressure from Last 3 Encounters:   No data found for BP    Weight from Last 3 Encounters:   No  data found for Wt              Today, you had the following     No orders found for display       Primary Care Provider Office Phone # Fax #    Karo Doty [phone] [address]  [facility] [address]        Equal Access to Services     JEREMÍAS BUCK : Hadlisha thalia ku johno Soisaiahali, waaxda luqadaha, qaybta kaalmada adegrayda, carol hunt lagermanroxane . So [facility] [phone].    ATENCIÓN: Si habla español, tiene a graham disposición servicios gratuitos de asistencia lingüística. Llame al [phone].    We comply with applicable federal civil rights laws and [place] laws. We do not discriminate on the basis of race, color, national origin, age, disability, sex, sexual orientation, or gender identity.            Thank you!     Thank you for choosing [facility]  for your care. Our goal is always to provide you with excellent care. Hearing back from our patients is one way we can continue to improve our services. Please take a few minutes to complete the written survey that you may receive in the mail after your visit with us. Thank you!             Your Updated Medication List - Protect others around you: Learn how to safely use, store and throw away your medicines at www.disposemymeds.org.          This list is accurate as of 11/13/18 11:59 PM.  Always use your most recent med list.                   Brand Name Dispense Instructions for use Diagnosis    acetaminophen 500 MG tablet    TYLENOL     Take 1 tablet (500 mg) by mouth every 4 hours as needed    S/P DEMETRIO-BSO       ascorbic acid 250 MG tablet    VITAMIN C    90    250 mg    Routine general medical examination at a health care facility       calcium citrate-vitamin D 315-200 MG-UNIT Tabs per tablet    CITRACAL     Take 2 tablets by mouth daily        enoxaparin 80 MG/0.8ML injection    LOVENOX    60 Syringe    Inject 0.8 mLs (80 mg) Subcutaneous 2 times daily    Other acute pulmonary embolism without acute  cor pulmonale (H)       FERROUS GLUCONATE PO      Take 325 mg by mouth daily (with breakfast)        GLUCOSAMINE CHONDROITIN COMPLX Tabs      Take  by mouth. 1500 mg 1xqd.    Routine general medical examination at a health care facility       iohexol 140 MG/ML Soln solution    OMNIPAQUE    140 mL    Mix entire bottle (50ml) of contast with 600ml (20 ounces) of water and drink half 2 hrs prior to CT scan and half 1 hr prior to scan    Endometrial cancer (H)       loperamide 2 MG capsule    IMODIUM     Take 2 mg by mouth 4 times daily as needed for diarrhea        LORazepam 1 MG tablet    ATIVAN    30 tablet    Take 1 tablet (1 mg) by mouth every 6 hours as needed (nausea/vomiting, anxiety or sleep)    Endometrial cancer (H), Encounter for long-term (current) use of medications       multivitamin, therapeutic with minerals Tabs tablet      Take 1 tablet by mouth daily        ondansetron 8 MG tablet    ZOFRAN    60 tablet    Take 1 tablet (8 mg) by mouth every 8 hours as needed for nausea (vomiting)    Endometrial cancer (H), Encounter for long-term (current) use of medications       prochlorperazine 10 MG tablet    COMPAZINE    30 tablet    Take 1 tablet (10 mg) by mouth every 6 hours as needed (nausea/vomiting)    Endometrial cancer (H), Encounter for long-term (current) use of medications       RANITIDINE HCL PO      Take 150 mg by mouth 2 times daily

## 2018-11-16 NOTE — PROCEDURES
Radiotherapy Treatment Summary          Date of Report: 2018     PATIENT: MARILEE EDWARD  MEDICAL RECORD NO: [id number]  : 1955     DIAGNOSIS: C54.1 Malignant neoplasm of endometrium  INTENT OF RADIOTHERAPY: Cure (adjuvant)  PATHOLOGY: Endometrial Adenocarcinoma                                  STAGE: FIGO Stage IIIC1  CONCURRENT SYSTEMIC THERAPY:  None                  Details of the treatments summarized below are found in records kept in the Department of Radiation Oncology at [facility].     Treatment Summary:  Radiation Oncology - Course: 1  Treatment Site Dose Modality From To Days Fx.  1 Pelvis+ low PAN  5,040 cGy 10 X  9/24/2018 10/31/2018  37 28  1 Vaginal Cuff  1,200 cGy  2018   2  2          Dose per Fraction: 180 cGy/ 600 cGy        Total Dose:   6240 cGy           COMMENTS:    Ms. Edward is a very pleasant 63-year-old woman with recent diagnosis of FIGO stage IIIC1 endometrial adenocarcinoma   with grade 1 features on final pathology and grade 2 features on endometrial biopsy.     She underwent a total abdominal hysterectomy open, BSO, pelvic and periaortic lymph node dissection, tumor debulking,   CUSA, and omentectomy on 2018 at care of Dr. Tierney.  Surgical pathology demonstrated a 5.5 cm focus of   primary adenocarcinoma with 15/15 mm of myometrial invasion, (+) LVSI, and uterine serosal involvement.  There was   involvement of the lower uterine segment, cervical stroma, right ovarian surface, and left adnexa.  The anterior   paracervical surgical margin was positive.  In regards to the lymphadenectomy, 1/8 left pelvic lymph nodes (3 mm, no   ZACKARY) and 1/6 right pelvic (21 mm, no ZACKARY), and 0/4 periaortic lymph nodes were involved with metastatic   adenocarcinoma.  The initial anterior fundal biopsy was positive for carcinoma.  Omentectomy specimen was negative.     She was recommended therefore to undergo adjuvant sandwich chemoradiotherapy.  She  received 3 cycles of chemo   infusion on 6/13/2018, 7/6/2018, 7/27/2018.  Her infusion was initially delayed due to leukopenia.  For her first cycle she   received paclitaxel at a dose of 175 mg/mï   alone.  As she had an adverse reaction to paclitaxel administration she was   continued on single agent carboplatin for the remainder of her 2 cycles of chemotherapy.  She has been followed by Dr. Lamar Rapp in Hematology.      She then received a course of external beam radiotherapy to the pelvis and low periaortic lymph nodes to a dose of 5040   cGy in 28 daily fractions without concurrent chemosensitization delivered with 10 MV photons via IMRT technique   (9/24/2018-10/31/2018).  Of note, her treatment planning CT showed necrotic mass along the left psoas muscle, highly   suspicious for tumor recurrence.  Subsequent biopsy, however, was negative for malignancy.  She therefore proceeded   with EBRT. This was then followed by a second phase boost delivered with HDR brachytherapy delivered with poly-  energetic, 380 Iban (avg) photons delivered with afterloaded IR-192 into a 2.5 cm single channel cylinder to the proximal   4 cm of the vaginal cuff.  The second phase of treatment was prescribed to a total dose of 1200 cGy in 2 fractions   (11/7/2018, 11/9/2018) prescribed to the surface of the vaginal mucosa.     She developed the expected acute toxicities during treatment, namely CTCAE v5.0 grade 1 fatigue, grade 1 nausea, and   grade 1 diarrhea managed with Imodium.  She did not require any unplanned treatment breaks.  She did not require any IV   fluids as a result of treatment related toxicities.  She did not have any emergency room visits or hospital admissions as a   result of treatment related toxicities during her treatment course.                    PAIN MANAGEMENT: Ms. Owens did not require any pain management from treatment related toxicities.                             FOLLOW UP PLAN:   She will return  in 1 month for routine follow-up.                           Resident Physician:   Sarthak Castro M.D.   Staff Physician: Darling Billings M.D.  Physicist: Monico Cutler, PhD     CC:   Karo Doty MD as PCP - General (Family Practice)  Gege Gil RN as Continuity Care Coordinator (Gyn-Onc)  ALEX FLOREZ                                 Radiation Oncology:  [facility] [address] [place] [address]

## 2018-11-19 ENCOUNTER — OFFICE VISIT (OUTPATIENT)
Dept: FAMILY MEDICINE | Facility: CLINIC | Age: 63
End: 2018-11-19
Payer: COMMERCIAL

## 2018-11-19 VITALS
TEMPERATURE: 98.5 F | BODY MASS INDEX: 27.25 KG/M2 | SYSTOLIC BLOOD PRESSURE: 97 MMHG | HEART RATE: 63 BPM | WEIGHT: 163.75 LBS | DIASTOLIC BLOOD PRESSURE: 62 MMHG | RESPIRATION RATE: 15 BRPM | OXYGEN SATURATION: 100 %

## 2018-11-19 DIAGNOSIS — I26.99 OTHER ACUTE PULMONARY EMBOLISM WITHOUT ACUTE COR PULMONALE (H): Primary | ICD-10-CM

## 2018-11-19 DIAGNOSIS — N18.2 CKD (CHRONIC KIDNEY DISEASE) STAGE 2, GFR 60-89 ML/MIN: ICD-10-CM

## 2018-11-19 DIAGNOSIS — Z86.718 HISTORY OF DEEP VENOUS THROMBOSIS: ICD-10-CM

## 2018-11-19 DIAGNOSIS — C54.1 ENDOMETRIAL CANCER (H): ICD-10-CM

## 2018-11-19 PROCEDURE — 99214 OFFICE O/P EST MOD 30 MIN: CPT | Performed by: FAMILY MEDICINE

## 2018-11-19 RX ORDER — WARFARIN SODIUM 5 MG/1
5 TABLET ORAL DAILY
Qty: 30 TABLET | Refills: 0 | Status: SHIPPED | OUTPATIENT
Start: 2018-11-19 | End: 2018-12-06

## 2018-11-19 NOTE — PROGRESS NOTES
SUBJECTIVE:   Gerri Owens is a 63 year old female who presents to clinic today for the following health issues:    Pt is here to requesting LOVENOX in tablet form for lifelong chronic anticoagulation.     Retired nurse (behavioral health) with Hx of obesity, BMI 34 now 27, HLD on diet control, prior DVT RUE 2012 unknown causes reported negative hypercoagulable workup, treated with warfarin 1 year, resolved post phelbetic syndrome RUE, Two brothers with hx of DVT. One had a factor problem but reports she was checked and didn't have it. One brother remains on life long coumadin. History of right upper extremity DVT March 2012, subclavian vein, no clear provoking factors such as a PICC line.  She postulated that it was due to carrying a very heavy purse on her shoulder all the time. Labs on 3/29/12 for antiphospholipid antibodies, lupus anticoagulant, factor V Leiden, prothrombin gene mutation, Antithrombin, protein C, protein S free, were all normal.  She was anticoagulated for 6 months. notes then had maybe phlebitis in both legs 1 yr out  after that but never seen for it. Since then wore compression socks which helped with post phlebitic syndrome. Along time ago she had hyperthyroidism, thyrotoxicosis, treated with methimazole, it cleared up and not had since. Is euthyroid clinically and chemically. Hx of prior tonsillectomy, left breast biopsy, hx of back spasm, allergic to nickel and taxol, occasional GERD with prn use of OTC PPI in the past now on ranitidine, seen in 6/2016 and given flexeril and did PT with good resolution. Hx of CKD2 and left kidney stone, OA B/l hands, OA B/l hips, S/p Left PRESLEY 2017, endometrial cancer S/p lap hysterectomy converted to open DEMETRIO, BSO and node dissection 4/2018, on antineoplastic treatment , anaphylaxis with taxol on single carboplatin sandwich protocol, S/p radiation, with newly diagnosed PE in 7/2018 & since  on anticoagulation with Lovenox and on iron for low ferritin,  under care of gynonc, hematology and radiation oncology.  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 No sign of stroke, referred to PT and ortho, seen by PT 8/8/17, after PT felt pain mostly in her hips, seen by ortho 8/15/17 had moderate OA on xray. Had left interarticular injection 8/24/17 with significant pain improvement, made good progress with PT completed 10/10/17, seen by ortho 11/2 discussed arthroplasty for primary OA hips, scheduled for Left PRESLEY 12/4/17  and right PRESLEY 1/15/18.  Seen November 14, 2017 for pre op.  CBC was normal.  Creatinine was elevated at 1.6 with GFR down at 40%.  Hepatitis C was negative.  Was recommended to stop NSAID'S, PPI's and increase fluid intake.  Recheck GFR 11/19 was 49% and creatinine had improved and recheck again 11/22 showed further improvement with GFR 60%.  Underwent left hip arthroplasty 12/4/2017 and discharged on postop day 2 with good progress and received OT, PT and social work consult in house.  Seen 12/14 for staple removal.  Seen 1/30/2018 postop doing well on physical therapy.  Was on Lovenox post op empirically.  Seen by Gyn 2/13 for abnormal uterine bleeding of 1-1/2 years, Pap was normal opted, to get her endometrial biopsy done under anesthesia.  Mammogram done was slightly abnormal but diagnostic studies were done and those were normal.  Seen by nephrology 3/13 /18. noted had Stage II CKD-baseline serum creatinine of 0.8 mg/dL and GFR of 69 ml/min/1.73m2 BSA likely from chronic NSAID'S use. Low range proteinuria, protein-to-creatinine ratio was 0.22 G/gCr. Microscopic hematuria( hpf) was likely secondary to ongoing uterine bleeding. Recommended a repeat urine analysis after her uterine bleeding had resolved and further evaluation by urologist if her hematuria persisted, was advised to avoid NSAID'S, follow up with nephrology clinic as needed, Electrolytes/ Acid/Base status were within acceptable limits.  Her blood pressure was stable & controlled. She was not on antihypertensive medications at home. Euvolemic on exam. Anemia of acute blood loss secondary to blood loss during Left PRESLEY in 12/2017 had resolved and Hgb was within normal limits of 13.8 G/dl and iron repleted.  Had a normal serum calcium and phosphorus & Normal PTH level. She did have elevated vitamin D level of 78 ug/L and was advised to discontinue vitamin D supplements. For her GERD was advised to continue ranitidine & avoid PPI's. Renal ultrasound showed a stone of 7 mm nonobstructive in left mid kidney.  Noted occasional cramps relieved with Tylenol.  Managed her hip pain with ice and rest.  Planned  to retire by the summer 2018   Post hip surgery was on Lovenox 3 weeks then discontinued. Continued with her compression socks daily.  Seen 3/22/18 for preop for hysteroscopy, LDL was elevated and ASCVD was 3.7 %. Seen by ortho 4/3 and doing well. Deferred right hip surgery. Seen 4/11 for EUA, hysteroscopy and D & C under u/S guidance and biopsy showed endometrial cancer and referred to gyn onc. Seen by gyn onc and on  4/27 underwent Laparoscopic converted to open hysterectomy, bilateral salpingo-oophorectomy, pelvic and para-aortic lymph node dissection, tumor debulking, CUSA, partial omentectomy. with FIGO grade 1 endometrioid endometrial adenocarcinoma, FIGO stage IIIC1.  Pathology showed cervical stromal involvement, 100% myometrial invasion with uterine serosal involvement, bilateral ovarian and left fallopian tube involvement, positive anterior surface margin, 2 of 14 pelvic and 0 of 4 periaortic lymph nodes involved, one with extranodal extension. FIGO grade 1 with cervical and left adnexa invasion & Right fallopian tube endometriosis. Post op noted some tachycardia and hypoxia but CT was neg for PE and thought due to post op anemia. Discharged 5/2/18 and declined sleep eval.  Sutures removed 5/7. Seen by gynonc 5/14 approximately 5 weeks status  post laparoscopic converted to open total abdominal hysterectomy, BSO, PPALND and omentectomy. On 5/30 completed 28 days of Lovenox and on 5/31 port placed by IR.  On 6/1 she was scheduled to start adjuvant carboplatin and paclitaxel chemotherapy sandwich protocol chemoradiation which was delayed due to low white blood cell count and referred to hematology. Seen 6/6 by hematology Dr Rapp for mild neutropenia that resolved with normal B12, TSH and low ferritin, was advised daily ferrous gluconate and approved to start chemo. Seen by radiation oncology 6/6. On 6/13 within 5 min of receiving first time taxol had flushing, cough, SATs dropped to 70 %, was short of breath and had to get oxygen, epi, iv solumedrol and Benadryl and finally came to baseline and chemo since changed to single agent carboplatin. Seen 7/6 by gyn onc in clinic for her dose of carboplatin. She reported a 3 week history of a dry cough, since her previous infusion reaction. In the clinic she was tachy to ~110 with a BP of ~90/60. Chest CT showed segmental pulmonary emboli in the right upper lobe and lingula, no evidence of heart strain. Unable to obtain an echocardiogram in clinic and so was admitted to the hospital for further observation, therapeutic Lovenox administration, and echocardiogram. She denied any other symptoms including headache, chest pain, dyspnea, racing heart, coughing anything up. She had GERD at baseline, denied n/v. Had had a few recent episodes of stool incontinence since she started her carboplatin infusion. She denied noticing any LE edema. She wore rosemary hose at baseline because of her history of DVT and her family history. Echo showed normal right and left ventricular function with EF of 60-65%. All valves normal appearing with trace mitral insufficiency present. IVC and aortic root are normal appearing. Was discharged on 7/7 on Lovenox. Heparin 10a on 7/13 was at expected level.   Seen 7/19 hospital d/C follow up, S/p DEMETRIO  BSO, node dissection now on sandwich chemoradiation. Had anaphylaxis with taxol so just received carboplatin one infusion. On Lovenox likely for life for PE, prior DVT. Not taking iron as directed by hematology. Had at home. Trying to get via diet as it upset her stomach.  Noted weight loss related to nausea from chemo denied any significant anxiety & had not used lorazepam given by oncologist.  Felt good when she was not getting the chemo.  UA was negative for RBCs at that time.  Had asymptomatic kidney stones.  Mild low hemoglobin, resolved to restart iron. CMP showed decreased GFR improved fluid intake and it improved on recheck with oncology, lipids were normal, HIV test was negative. FIT ordered but not sent in yet.  Seen by Gyn on the 7/27th for stage III C1 low-grade endometrial cancer to continue cycle 2 of carboplatin which was given that same day, noted fatigue secondary to chemo induced anemia as well as from chemotherapy itself, noted had restarted iron and to consider transfusion if hemoglobin dropped further & advised to increase protein in diet. Hemoglobin was 8.1. CMP was normal. magnesium was 2. Heparin x-ray was normal at 1.03 and no dose adjustments made.  Seen 8/7/18 for e coli UTI treated with bactrim. Seen by gyn onc 8/17 for [id number] low grade endometrial cancer, fatigue, weight loss, given meds for nausea. Genetic testing negative for MMR & Peterson. Had infusion 8/24. Seen by radiation oncology, not tolerated chemo secondary to cytopenia, to start sandwich radiation. Ct showed left necrotic psoas, biopsy done showed no cancer on 9/10/18. FIT was negative. Seen by hematology 9/13 for low cell counts, advised bone marrow biopsy but declined & anemia improved as got further from chemo. Started radiation 9/27, 10/11, 10/18 & 10/25. Seen by gyn onc 10/11 & flu shot given.seen then 11/12 by gyn onc Thus far, has received 2 cycles of chemotherapy (2 cycles of single agent carboplatin AUC6) followed by  EBRT and vaginal brachytherapy, just completed 11/9/18. Fatigued had improved. Tolerating regular diet without nausea or vomiting. She did have some diarrhea, controlled with imodium. Did have a little urinary incontinence since the vaginal brachytherapy, but no hematuria. Had vaginal spotting once. Has a dilator, but hadn't started using yet. Stated she felt great. Determined no further chemo & to recheck ct in 3 months. & to be on life long anticoagulation so enquired about warfarin. [place]  shows received hydromorphone 2 mg # 40 on 12/6/17, oxycodone 5 mg # 25 on 4/30/18 & lorazepam 2 mg # 15 on 6/1/18   CURRENTLY  Done with chemo and radiation. Was told she is cancer free & get surveillance with ct in 3 months. Cbc showed low wbc and platelets seen by hematology. To see gyn onc and will get C checked in feb so declines today. Last radiation was 10 days ago. Weight has been stable. On Lovenox injection twice a day. Wanted course of Rx. Then could go to oral. Hx of DVT in right arm 6 or so yrs ago, was at INR clinic then. No fever or chills, no headache or dizziness, no double or blurry vision, no facial pain, earache, sore throat, runny nose, post nasal drip, no trouble hearing, smelling, tasting or swallowing, no cough, no chest pain, trouble breathing or palpitations, No abdominal pain, heart burn, reflux, mild nausea not needed Zofran or compazine, no vomiting or mild diarrhea  Not needed imodium any more, or constipation, no blood in stools or black stools, no weight loss or night sweats. No dysuria, hematuria, frequency, urgency, hesitancy, incontinence, No pelvic complaints. No leg swelling or joint pain. No rash. Since iron up regularly, only taking iron once or twice a week. Has a port in. Not required any prn meds.   Problem list and histories reviewed & adjusted, as indicated.  Additional history: as documented    Patient Active Problem List   Diagnosis     Advanced directives, counseling/discussion      Primary osteoarthritis of both hips     History of deep venous thrombosis     Hyperlipidemia, unspecified hyperlipidemia type     Presence of left hip implant     CKD (chronic kidney disease) stage 2, GFR 60-89 ml/min     Calculus of left kidney     S/P DEMETRIO-BSO     Endometrial cancer (H)     Encounter for long-term (current) use of medications     Encounter for antineoplastic chemotherapy     Pulmonary embolus (H)     Osteoarthritis of both hands     Low ferritin     Antineoplastic chemotherapy induced anemia     Past Surgical History:   Procedure Laterality Date     ARTHROPLASTY HIP Left 12/4/2017    Procedure: ARTHROPLASTY HIP;  Left total hip arthroplasty;  Surgeon: Rajinder Goodwin MD;  Location: [facility] OR     BREAST BIOPSY, RT/LT  2004    left breast     DILATION AND CURETTAGE, OPERATIVE HYSTEROSCOPY WITH MORCELLATOR, COMBINED N/A 4/11/2018    Procedure: COMBINED DILATION AND CURETTAGE, OPERATIVE HYSTEROSCOPY WITH MORCELLATOR;  Hysteroscopy, Dilation and Curettage Under Ultrasound Guidance ;  Surgeon: Adry Tineo MD;  Location: [facility] OR     DILATION AND CURETTAGE, WITH ULTRASOUND GUIDANCE  4/11/2018    Procedure: DILATION AND CURETTAGE, WITH ULTRASOUND GUIDANCE;;  Surgeon: Adry Tineo MD;  Location: [facility]     HYSTERECTOMY TOTAL ABDOMINAL, BILATERAL SALPINGO-OOPHORECTOMY, NODE DISSECTION, COMBINED Bilateral 4/27/2018    Procedure: COMBINED HYSTERECTOMY TOTAL ABDOMINAL, SALPINGO-OOPHORECTOMY, NODE DISSECTION;;  Surgeon: Bradley Tierney MD;  Location: [facility] OR     INSERT PORT VASCULAR ACCESS Right 5/31/2018    Procedure: INSERT PORT VASCULAR ACCESS;  Single Lumen Chest Power Port;  Surgeon: Jamila Major PA-C;  Location: [facility]     LAPAROSCOPIC HYSTERECTOMY TOTAL, BILATERAL SALPINGO-OOPHORECTOMY, NODE DISSECTION, COMBINED N/A 4/27/2018    Procedure: COMBINED LAPAROSCOPIC HYSTERECTOMY TOTAL, SALPINGO-OOPHORECTOMY, NODE DISSECTION;  Laparoscopic converted to open Removal Of Uterus, Tubes,  Ovaries, Cervix, Pelvic and Para Aortic Lymphnode Dissection, Tumor Debulking, CUSA, Partial Omentectomy, Anesthesia Block;  Surgeon: Bradley Tierney MD;  Location: [facility] OR     TONSILLECTOMY  1960    tonsillectomy       Social History   Substance Use Topics     Smoking status: Never Smoker     Smokeless tobacco: Never Used     Alcohol use Yes      Comment: rarely     Family History   Problem Relation Age of Onset     Diabetes Mother      type 2     HEART DISEASE Mother      Hypertension Mother      C.A.D. Mother       after 2nd heart attack     Hypertension Father      C.A.D. Father      mild heart attack     Cancer Father      skin cancer     HEART DISEASE Father      Rheumatoid Arthritis Sister      HEART DISEASE Brother      herdetary heart murmur     Hyperlipidemia Brother      CLOTTING DISORDER Brother      factor 5 leiden     Deep Vein Thrombosis Brother      leg     Hyperlipidemia Brother      Deep Vein Thrombosis Brother      leg; negative for clotting disorder     KIDNEY DISEASE No family hx of          Current Outpatient Prescriptions   Medication Sig Dispense Refill     calcium citrate-vitamin D (CITRACAL) 315-200 MG-UNIT TABS per tablet Take 2 tablets by mouth daily       enoxaparin (LOVENOX) 80 MG/0.8ML injection Inject 0.8 mLs (80 mg) Subcutaneous 2 times daily 60 Syringe 3     FERROUS GLUCONATE PO Take 325 mg by mouth daily (with breakfast)        GLUCOSAMINE CHONDROITIN COMPLX OR TABS Take  by mouth. 1500 mg 1xqd.   0     iohexol (OMNIPAQUE) 140 MG/ML SOLN solution Mix entire bottle (50ml) of contast with 600ml (20 ounces) of water and drink half 2 hrs prior to CT scan and half 1 hr prior to scan 140 mL 0     loperamide (IMODIUM) 2 MG capsule Take 2 mg by mouth 4 times daily as needed for diarrhea       multivitamin, therapeutic with minerals (THERA-VIT-M) TABS tablet Take 1 tablet by mouth daily       RANITIDINE HCL PO Take 150 mg by mouth 2 times daily        VITAMIN C 250 MG OR TABS  250 mg 90 0     warfarin (COUMADIN) 5 MG tablet Take 1 tablet (5 mg) by mouth daily 30 tablet 0     Allergies   Allergen Reactions     Paclitaxel Anaphylaxis     reportedTaxol= anaphylaxsis     Nickel Rash     Recent Labs   Lab Test  09/05/18   1659  08/24/18   1220  08/17/18   0814   07/19/18   0930   06/06/18   1103   03/22/18   1706   11/14/17   0923  09/11/12   1108   LDL   --    --    --    --   104*   --    --    --   149*   --    --   175*   HDL   --    --    --    --   51   --    --    --   47*   --    --   45*   TRIG   --    --    --    --   89   --    --    --   143   --    --   197*   ALT  22  15  15   < >  22   < >  12   < >   --    --   22  27   CR  1.02  0.94  0.99   < >  1.08*   < >  1.00   < >   --    < >  1.60*  0.85   GFRESTIMATED  55*  61  57*   < >  51*   < >  56*   < >   --    < >  33*  69   GFRESTBLACK  66  73  69   < >  62   < >  68   < >   --    < >  40*  84   POTASSIUM  3.9  3.9  3.6   < >  4.2   < >  4.1   < >   --    < >  5.1  4.1   TSH   --    --    --    --    --    --   1.58   --    --    --   2.02  1.02    < > = values in this interval not displayed.      BP Readings from Last 3 Encounters:   11/19/18 97/62   11/12/18 104/69   11/09/18 120/76    Wt Readings from Last 3 Encounters:   11/19/18 163 lb 12 oz (74.3 kg)   11/12/18 162 lb (73.5 kg)   11/09/18 164 lb (74.4 kg)                  Labs reviewed in EPIC    Reviewed and updated as needed this visit by clinical staff       Reviewed and updated as needed this visit by Provider         ROS:  Constitutional, HEENT, cardiovascular, pulmonary, GI, , musculoskeletal, neuro, skin, endocrine and psych systems are negative, except as otherwise noted.    OBJECTIVE:     BP 97/62 (BP Location: Left arm, Patient Position: Chair, Cuff Size: Adult Regular)  Pulse 63  Temp 98.5  F (36.9  C) (Oral)  Resp 15  Wt 163 lb 12 oz (74.3 kg)  LMP 03/04/2005  SpO2 100%  BMI 27.25 kg/m2  Body mass index is 27.25 kg/(m^2).  GENERAL: healthy, alert and  no distress  EYES: Eyes grossly normal to inspection, PERRL and conjunctivae and sclerae normal  HENT: ear canals and TM's normal, nose and mouth without ulcers or lesions  NECK: no adenopathy, no asymmetry, masses, or scars and thyroid normal to palpation  RESP: lungs clear to auscultation - no rales, rhonchi or wheezes  CV: regular rate and rhythm, normal S1 S2, no S3 or S4, no murmur, click or rub, no peripheral edema and peripheral pulses strong  ABDOMEN: soft, non tender, no hepatosplenomegaly, no masses and bowel sounds normal  MS: no gross musculoskeletal defects noted, no edema  SKIN: no suspicious lesions or rashes  NEURO: Normal strength and tone, mentation intact and speech normal  PSYCH: mentation appears normal, affect normal/bright    Diagnostic Test Results:  No results found for this or any previous visit (from the past 24 hour(s)).    ASSESSMENT/PLAN:   Hx of endometrial cancer S/ DEMETRIO/ BSO, chemo & radiation, hx of prior DVT, new PE as of 7/2018 on anticoagulation, overweight, HLD, OA B/l hips S/p left hip arthroplasty, left kidney stone, CKD2, anemia secondary to chemo, low ferritin      ICD-10-CM    1. Other acute pulmonary embolism without acute cor pulmonale (H) I26.99 INR CLINIC REFERRAL     warfarin (COUMADIN) 5 MG tablet     INR   2. History of deep venous thrombosis Z86.718 INR CLINIC REFERRAL     warfarin (COUMADIN) 5 MG tablet     INR   3. Endometrial cancer (H) C54.1 INR CLINIC REFERRAL     INR   4. CKD (chronic kidney disease) stage 2, GFR 60-89 ml/min N18.2      Prior DVT and recent PE no anticoagulation disorder, completed radiation for endometrial cancer S/p DEMETRIO BSO but could only do 2 cycles of chemo due to pancytopenia limiting further use, in remission under care of gyn onc. Needs lifelong anticoagulation. Start warfarin 5 mg at 6 pm daily. Check INR on Friday. Goal INR 2 to 3. Once INR above 2 on 2 consecutive days can come off Lovenox. Continue Lovenox for now along with  warfarin. Monitor closely for any bleeding or bruising or new symptoms. Enroll in [facility]. Education material given. Is familiar from before but needs a refresher. See gyn onc as planned with labs in feb  See Patient Instructions    Karo Doty MD  [facility]

## 2018-11-19 NOTE — PATIENT INSTRUCTIONS
Start warfarin 5 mg at 6 pm daily  Check inr on Friday  If above 2 on 2 consecutive days can come off lovenow  Continue lovenox for now along with warfarin  Monitor closely for any bleeding or bruising or new symptoms  See gyn onc as planned with labs in feb      Diet Guidelines for Patients Taking Warfarin (Coumadin)  The foods you eat and drink can affect how your medicine works. Here is what you should know about warfarin and your diet.  Vitamin K in foods  While you are on warfarin, your intake of vitamin K should stay the same from day to day. Your warfarin dose is based on your daily intake of vitamin K foods. So it is important to get the same amount of vitamin K each day.  Warfarin helps to thin your blood. Vitamin K helps to clot your blood. When there is a sudden increase or decrease in vitamin K intake, the warfarin may not work as well.  If you ate vitamin K foods before starting warfarin, you should continue to eat them. If you plan to eat more or less of the vitamin K foods, you must see your doctor. Your doctor will change your dose to match your vitamin K intake.   Dietary supplements  Some dietary supplements have vitamin K. We do not know how these interact with warfarin. To be safe, don't take dietary supplements (including your daily multi-vitamin) unless your doctor approves.  Vitamin E and fish oil are often taken by people with heart problems. Both of these have blood-thinning effects. If you take these, be sure to tell your doctor.  These foods are high in vitamin K   (serving size is   cup) These foods are medium-high in Vitamin K  (serving size is 1 cup, except brussels sprouts =   cup)   Collards (cooked) [place] sprouts (cooked)   Kale (cooked) Broccoli   Mustard greens (cooked) Endive (raw)   Parsley (raw) Green leaf lettuce   Spinach (cooked) [place] lettuce   Swiss chard (cooked) Spinach (raw)   Turnip greens (cooked) Turnip greens (raw)   Alcohol   If you choose to drink, have no  more than 1 to 2 drinks in 24 hours. One drink equals:     5 ounces of wine    12 ounces of beer    1 1/2 ounces of hard liquor  Drinking too much alcohol will increase your risk for bleeding. Ask your doctor how much alcohol is safe for you. Some doctors advise no alcohol while taking warfarin.  Cranberry juice  You may wish to limit how much cranberry juice you drink each day.  The makers of warfarin state that cranberry juice may increase your risk of bleeding. Studies do not support this. If you have questions about cranberry juice, please speak with your doctor or pharmacist.  Green tea  Green tea is often found on lists of foods that are high in Vitamin K. The tea leaves themselves are high in Vitamin K, but the tea provides only a small amount. You can drink a couple cups of green tea--just do not drink gallons of it.  Resources  American Dietetic Association. ADA Nutrition Care Manual. Available at http://nutritioncaremanual.org. Accessed 2005.  [employer] Patient's Guide to Using Coumadin, [place], [place]: 2003.  National [place] [employer]. Drug-nutrient interactions: Coumadin (warfarin) and vitamin K. Last updated: December 2003. Accessed October 2006.  [employer], Human Nutrition Information Service. Provisional table on the vitamin K content of foods. HNIS/PT-104. Revised 1994.  For informational purposes only. Not to replace the advice of your health care provider.   Copyright   2006 [facility]. All rights reserved. "[employer] 686993 - REV 09/15.

## 2018-11-19 NOTE — MR AVS SNAPSHOT
After Visit Summary   11/19/2018    Gerri Owens    MRN: [id number]           Patient Information     Date Of Birth          1955        Visit Information        Provider Department      11/19/2018 8:40 AM Karo Dtoy MD [facility] [place] 2]        Today's Diagnoses     Other acute pulmonary embolism without acute cor pulmonale (H)    -  1    History of deep venous thrombosis        Endometrial cancer (H)        CKD (chronic kidney disease) stage 2, GFR 60-89 ml/min          Care Instructions    Start warfarin 5 mg at 6 pm daily  Check inr on Friday  If above 2 on 2 consecutive days can come off lovenow  Continue lovenox for now along with warfarin  Monitor closely for any bleeding or bruising or new symptoms  See gyn onc as planned with labs in feb      Diet Guidelines for Patients Taking Warfarin (Coumadin)  The foods you eat and drink can affect how your medicine works. Here is what you should know about warfarin and your diet.  Vitamin K in foods  While you are on warfarin, your intake of vitamin K should stay the same from day to day. Your warfarin dose is based on your daily intake of vitamin K foods. So it is important to get the same amount of vitamin K each day.  Warfarin helps to thin your blood. Vitamin K helps to clot your blood. When there is a sudden increase or decrease in vitamin K intake, the warfarin may not work as well.  If you ate vitamin K foods before starting warfarin, you should continue to eat them. If you plan to eat more or less of the vitamin K foods, you must see your doctor. Your doctor will change your dose to match your vitamin K intake.   Dietary supplements  Some dietary supplements have vitamin K. We do not know how these interact with warfarin. To be safe, don't take dietary supplements (including your daily multi-vitamin) unless your doctor approves.  Vitamin E and fish oil are often taken by people with heart problems. Both of these have  blood-thinning effects. If you take these, be sure to tell your doctor.  These foods are high in vitamin K   (serving size is   cup) These foods are medium-high in Vitamin K  (serving size is 1 cup, except brussels sprouts =   cup)   Collards (cooked) [employer] sprouts (cooked)   Kale (cooked) Broccoli   Mustard greens (cooked) Endive (raw)   Parsley (raw) Green leaf lettuce   Spinach (cooked) Valentin lettuce   Swiss chard (cooked) Spinach (raw)   Turnip greens (cooked) Turnip greens (raw)   Alcohol   If you choose to drink, have no more than 1 to 2 drinks in 24 hours. One drink equals:     5 ounces of wine    12 ounces of beer    1 1/2 ounces of hard liquor  Drinking too much alcohol will increase your risk for bleeding. Ask your doctor how much alcohol is safe for you. Some doctors advise no alcohol while taking warfarin.  Cranberry juice  You may wish to limit how much cranberry juice you drink each day.  The makers of warfarin state that cranberry juice may increase your risk of bleeding. Studies do not support this. If you have questions about cranberry juice, please speak with your doctor or pharmacist.  Green tea  Green tea is often found on lists of foods that are high in Vitamin K. The tea leaves themselves are high in Vitamin K, but the tea provides only a small amount. You can drink a couple cups of green tea--just do not drink gallons of it.  Resources  American Dietetic Association. ADA Nutrition Care Manual. Available at http://nutritioncaremanual.org. Accessed 2005.  [employer] Patient's Guide to Using Coumadin, [place], [place]: 2003.  National [place] of [employer]. Drug-nutrient interactions: Coumadin (warfarin) and vitamin K. Last updated: December 2003. Accessed October 2006.  USDA, Human Nutrition Information Service. Provisional table on the vitamin K content of foods. HNIS/PT-104. Revised 1994.  For informational purposes only. Not to replace the advice of your health care provider.    Copyright   2006 [facility]. All rights reserved. [employer] 178366 - REV 09/15.            Follow-ups after your visit        Additional Services     INR CLINIC REFERRAL       Your provider has referred you to INR Services.    Please be aware that coverage of these services is subject to the terms and limitations of your health insurance plan.  Call member services at your health plan with any benefit or coverage questions.    Indication for Anticoagulation: Pulmonary Embolism  If nonstandard INR is desired, indicate goal range and explanation:   Expected Duration of Therapy: Lifetime                  Follow-up notes from your care team     Return in about 3 months (around 2/19/2019) for Routine Visit for chronic issues with PCP.      Your next 10 appointments already scheduled     Nov 23, 2018  9:00 AM CST   LAB with  LAB   [facility] ([facility])    [address] [place] [address] [phone]           Please do not eat 10-12 hours before your appointment if you are coming in fasting for labs on lipids, cholesterol, or glucose (sugar). This does not apply to pregnant women. Water, hot tea and black coffee (with nothing added) are okay. Do not drink other fluids, diet soda or chew gum.            Nov 26, 2018  9:45 AM CST   Office Visit with  INR CLINIC   [facility] ([facility])    [address] [place] [address] [phone]           Bring a current list of meds and any records pertaining to this visit. For Physicals, please bring immunization records and any forms needing to be filled out. Please arrive 10 minutes early to complete paperwork.            Feb 12, 2019 10:00 AM CST   CT CHEST ABDOMEN PELVIS W/O & W CONTRAST with UCCT1   [facility] ([facility])    [address]  1st Floor  [facility] [address] [phone]           How do I  prepare for my exam? (Food and drink instructions) To prepare: Do not eat or drink for 2 hours before your exam. If you need to take medicine, you may take it with small sips of water. (We may ask you to take liquid medicine as well.)  How do I prepare for my exam? (Other instructions) Please arrive 30 minutes early for your CT.  Once in the department you might be asked to drink water 15-20 minutes prior to your exam.  If indicated you may be asked to drink an oral contrast in advance of your CT.  If this is the case, the imaging team will let you know or be in contact with you prior to your appointment  Patients over 70 or patients with diabetes or kidney problems: If you haven t had a blood test (creatinine test) within the last 30 days, the Cardiologist/Radiologist may require you to get this test prior to your exam.  If you have diabetes:  Continue to take your metformin medication on the day of your exam  What should I wear: Please wear loose clothing, such as a sweat suit or jogging clothes. Avoid snaps, zippers and other metal. We may ask you to undress and put on a hospital gown.  How long does the exam take: Most scans take less than 20 minutes.  What should I bring: Please bring any scans or X-rays taken at other hospitals, if similar tests were done. Also bring a list of your medicines, including vitamins, minerals and over-the-counter drugs. It is safest to leave personal items at home.  Do I need a : No  is needed.  What do I need to tell my doctor? Be sure to tell your doctor: * If you have any allergies. * If there s any chance you are pregnant. * If you are breastfeeding.  What should I do after the exam: No restrictions, You may resume normal activities.  What is this test: A CT (computed tomography) scan is a series of pictures that allows us to look inside your body. The scanner creates images of the body in cross sections, much like slices of bread. This helps us see any problems  more clearly. You may receive contrast (X-ray dye) before or during your scan. You will be asked to drink the contrast.  Who should I call with questions: If you have any questions, please call the Imaging Department where you will have your exam. Directions, parking instructions, and other information is available on our website, [email or web address].            Feb 18, 2019 10:30 AM CST   (Arrive by 10:15 AM)   Return Visit with Bradley Tierney MD   [facility] ([facility])    [address] [place] [address]  [address] [place] [address] [phone]              Future tests that were ordered for you today     Open Future Orders        Priority Expected Expires Ordered    INR Routine 11/23/2018 11/19/2019 11/19/2018            Who to contact     If you have questions or need follow up information about today's clinic visit or your schedule please contact [facility] directly at [phone].  Normal or non-critical lab and imaging results will be communicated to you by [employer], letter or phone within 4 business days after the clinic has received the results. If you do not hear from us within 7 days, please contact the clinic through [employer] or phone. If you have a critical or abnormal lab result, we will notify you by phone as soon as possible.  Submit refill requests through [employer] or call your pharmacy and they will forward the refill request to us. Please allow 3 business days for your refill to be completed.          Additional Information About Your Visit        [employer] Information     [employer] gives you secure access to your electronic health record. If you see a primary care provider, you can also send messages to your care team and make appointments. If you have questions, please call your primary care clinic.  If you do not have a primary care provider, please call [phone] and they will assist you.        Care EveryWhere ID     This  is your Care EveryWhere ID. This could be used by other organizations to access your [facility] medical records  [id number]        Your Vitals Were     Pulse Temperature Respirations Last Period Pulse Oximetry BMI (Body Mass Index)    63 98.5  F (36.9  C) (Oral) 15 03/04/2005 100% 27.25 kg/m2       Blood Pressure from Last 3 Encounters:   11/19/18 97/62   11/12/18 104/69   11/09/18 120/76    Weight from Last 3 Encounters:   11/19/18 163 lb 12 oz (74.3 kg)   11/12/18 162 lb (73.5 kg)   11/09/18 164 lb (74.4 kg)              We Performed the Following     INR CLINIC REFERRAL          Today's Medication Changes          These changes are accurate as of 11/19/18  9:17 AM.  If you have any questions, ask your nurse or doctor.               Start taking these medicines.        Dose/Directions    warfarin 5 MG tablet   Commonly known as:  COUMADIN   Used for:  Other acute pulmonary embolism without acute cor pulmonale (H), History of deep venous thrombosis   Started by:  Karo Doty MD        Dose:  5 mg   Take 1 tablet (5 mg) by mouth daily   Quantity:  30 tablet   Refills:  0            Where to get your medicines      These medications were sent to [facility] [address]  [address] [facility] [address]     Phone:  [phone]     warfarin 5 MG tablet                Primary Care Provider Office Phone # Fax #    Karo Doty [phone]       [address]  [facility] [address]        Equal Access to Services     MARLEY BUCK AH: Hadii thalia lopezo Soisaiahali, waaxda luqadaha, qaybta kaalmada adeegyada, waxjeyson laurence roland. So [facility] [phone].    ATENCIÓN: Si habla español, tiene a graham disposición servicios gratuitos de asistencia lingüística. Llame al [phone].    We comply with applicable federal civil rights laws and [place] laws. We do not discriminate on the basis of race, color, national origin, age, disability, sex, sexual  orientation, or gender identity.            Thank you!     Thank you for choosing [facility]  for your care. Our goal is always to provide you with excellent care. Hearing back from our patients is one way we can continue to improve our services. Please take a few minutes to complete the written survey that you may receive in the mail after your visit with us. Thank you!             Your Updated Medication List - Protect others around you: Learn how to safely use, store and throw away your medicines at www.disposemymeds.org.          This list is accurate as of 11/19/18  9:17 AM.  Always use your most recent med list.                   Brand Name Dispense Instructions for use Diagnosis    ascorbic acid 250 MG tablet    VITAMIN C    90    250 mg    Routine general medical examination at a health care facility       calcium citrate-vitamin D 315-200 MG-UNIT Tabs per tablet    CITRACAL     Take 2 tablets by mouth daily        enoxaparin 80 MG/0.8ML injection    LOVENOX    60 Syringe    Inject 0.8 mLs (80 mg) Subcutaneous 2 times daily    Other acute pulmonary embolism without acute cor pulmonale (H)       FERROUS GLUCONATE PO      Take 325 mg by mouth daily (with breakfast)        GLUCOSAMINE CHONDROITIN COMPLX Tabs      Take  by mouth. 1500 mg 1xqd.    Routine general medical examination at a health care facility       iohexol 140 MG/ML Soln solution    OMNIPAQUE    140 mL    Mix entire bottle (50ml) of contast with 600ml (20 ounces) of water and drink half 2 hrs prior to CT scan and half 1 hr prior to scan    Endometrial cancer (H)       loperamide 2 MG capsule    IMODIUM     Take 2 mg by mouth 4 times daily as needed for diarrhea        multivitamin, therapeutic with minerals Tabs tablet      Take 1 tablet by mouth daily        RANITIDINE HCL PO      Take 150 mg by mouth 2 times daily        warfarin 5 MG tablet    COUMADIN    30 tablet    Take 1 tablet (5 mg) by mouth daily    Other acute pulmonary  embolism without acute cor pulmonale (H), History of deep venous thrombosis

## 2018-11-23 ENCOUNTER — TELEPHONE (OUTPATIENT)
Dept: FAMILY MEDICINE | Facility: CLINIC | Age: 63
End: 2018-11-23

## 2018-11-23 DIAGNOSIS — Z86.718 HISTORY OF DEEP VENOUS THROMBOSIS: ICD-10-CM

## 2018-11-23 DIAGNOSIS — C54.1 ENDOMETRIAL CANCER (H): ICD-10-CM

## 2018-11-23 DIAGNOSIS — I26.99 OTHER ACUTE PULMONARY EMBOLISM WITHOUT ACUTE COR PULMONALE (H): ICD-10-CM

## 2018-11-23 LAB — INR PPP: 1.17 (ref 0.86–1.14)

## 2018-11-23 PROCEDURE — 85610 PROTHROMBIN TIME: CPT | Performed by: FAMILY MEDICINE

## 2018-11-23 PROCEDURE — 36416 COLLJ CAPILLARY BLOOD SPEC: CPT | Performed by: FAMILY MEDICINE

## 2018-11-23 NOTE — TELEPHONE ENCOUNTER
INr not goal  Increase warfarin to 7.5 mg today and tomorrow and Sunday and check INr on Monday with [facility] as planned  Continue lovenox for now until INR more than 2  Monitor closely for bleeding

## 2018-11-23 NOTE — TELEPHONE ENCOUNTER
Phone call to patient     Discussed information below and writer asked patient to write down the information and she read it back to writer to clarify it was correct     7.5 mg 11/23-11/24-11/25 (patient has 5 mg tablets and has a pill cutter)   Continue lovenox injections   F/u with INR RN on 11/26 as scheduled    Patient has some bruising where she has administered her lovenox.   At times has had bloody nose since starting the lovenox but able to control and stop it quickly -  Patient will continue to monitor for signs of bleeding     This writer advised patient that she can reach [facility] nurse advisor's after clinic closing for questions/concerns     Patient stated understanding and red back information to writer to ensure correct     Leydi Alvares Registered Nurse   [facility]

## 2018-11-26 ENCOUNTER — ANTICOAGULATION THERAPY VISIT (OUTPATIENT)
Dept: NURSING | Facility: CLINIC | Age: 63
End: 2018-11-26
Payer: COMMERCIAL

## 2018-11-26 DIAGNOSIS — I26.99 OTHER ACUTE PULMONARY EMBOLISM WITHOUT ACUTE COR PULMONALE (H): ICD-10-CM

## 2018-11-26 DIAGNOSIS — Z86.718 HISTORY OF DEEP VENOUS THROMBOSIS: Primary | ICD-10-CM

## 2018-11-26 LAB — INR PPP: 1.64 (ref 0.86–1.14)

## 2018-11-26 PROCEDURE — 36415 COLL VENOUS BLD VENIPUNCTURE: CPT | Performed by: FAMILY MEDICINE

## 2018-11-26 PROCEDURE — 85610 PROTHROMBIN TIME: CPT | Performed by: FAMILY MEDICINE

## 2018-11-26 PROCEDURE — 99207 ZZC NO CHARGE NURSE ONLY: CPT

## 2018-11-26 NOTE — PROGRESS NOTES
ANTICOAGULATION INITIAL CLINIC VISIT    Patient Name:  Gerri Owens  Date:  11/26/2018  Referred by: Dr. Doty  Contact Type:  Face to Face    SUBJECTIVE:  Coumadin education was completed today.  Topics covered include:  -Introduction to coumadin  -Proper Administration  -INR Testing  -Sign/Symptoms of Bleeding  -Signs/Symptoms of Clot Formation or Stroke  -Dietary Intake of Vitamin K  -Drug Interactions  -Anticoagulation Identification (bracelet, necklace or wallet card)  -Future Surgery  -Effects of Alcohol, Tobacco, and Exercise on Coumadin    Coumadin Education Booklet and Coumadin Identification Wallet Card were given to the patient.       Patient Findings     Positives Initiation of therapy    Comments Lovenox therapy continues until INR >2.0.           OBJECTIVE    INR   Date Value Ref Range Status   11/26/2018 1.64 (H) 0.86 - 1.14 Final       ASSESSMENT / PLAN  INR assessment SUB    Recheck INR In: 2 DAYS    INR Location Clinic      Anticoagulation Summary as of 11/26/2018     INR goal 2.0-3.0   Today's INR 1.64!   Warfarin maintenance plan No maintenance plan   Full warfarin instructions 11/26: 7.5 mg; 11/27: 7.5 mg; Otherwise No maintenance plan   Next INR check 11/28/2018   Target end date     Indications   DVT of upper extremity (deep vein thrombosis) (H) (Resolved) [I82.629]  Pulmonary embolus (H) [I26.99]         Anticoagulation Episode Summary     INR check location     Preferred lab     Send INR reminders to [facility]    Comments Consistent spinach and broccoli intake. DVT subclavian vein 2012. PE 7/2018. Brother positive Factor V Leiden. Another brother on lifetime AC. 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       Anticoagulation Care Providers     Provider Role Specialty Phone number    Karo Doty MD [facility] [phone]            See the Encounter Report to view Anticoagulation Flowsheet and Dosing Calendar (Go to Encounters tab in chart review,  and find the Anticoagulation Therapy Visit)    Writer advised patient to continue 7.5 mg daily until Wednesday. New weekly dose will be 47.5 mg, which is a 12% dose increase from today. Per protocol, INR level should increase by 0.7-1.0 units.     Patient made aware if signs of clotting (pain, tenderness, swelling, or color change in any extremity) AND/OR bleeding occur (nosebleeds, bleeding gums, bruising, or blood in stool or urine) to notify provider & seek medical attention. If severe symptoms develop, such as major bleeding, chest pain, shortness of breath, fall, trauma or s/s of stroke, patient to call 911 immediately.     Dosage adjustment made based on physician directed care plan.    Letha Chapin RN

## 2018-11-28 ENCOUNTER — ANTICOAGULATION THERAPY VISIT (OUTPATIENT)
Dept: NURSING | Facility: CLINIC | Age: 63
End: 2018-11-28
Payer: COMMERCIAL

## 2018-11-28 DIAGNOSIS — Z86.718 HISTORY OF DEEP VENOUS THROMBOSIS: ICD-10-CM

## 2018-11-28 DIAGNOSIS — I26.99 OTHER ACUTE PULMONARY EMBOLISM WITHOUT ACUTE COR PULMONALE (H): ICD-10-CM

## 2018-11-28 LAB — INR PPP: 2 (ref 0.86–1.14)

## 2018-11-28 PROCEDURE — 85610 PROTHROMBIN TIME: CPT | Performed by: FAMILY MEDICINE

## 2018-11-28 PROCEDURE — 36415 COLL VENOUS BLD VENIPUNCTURE: CPT | Performed by: FAMILY MEDICINE

## 2018-11-28 PROCEDURE — 99207 ZZC NO CHARGE NURSE ONLY: CPT

## 2018-11-28 NOTE — PROGRESS NOTES
ANTICOAGULATION FOLLOW-UP CLINIC VISIT    Patient Name:  Gerri Owens  Date:  11/28/2018  Contact Type:  Telephone/ spoke with pt.     SUBJECTIVE:     Patient Findings     Comments Patient will continue Lovenox shots until second check INR >2.0           OBJECTIVE    INR   Date Value Ref Range Status   11/28/2018 2.00 (H) 0.86 - 1.14 Final       ASSESSMENT / PLAN  INR assessment THER    Recheck INR In: 5 DAYS    INR Location Clinic      Anticoagulation Summary as of 11/28/2018     INR goal 2.0-3.0   Today's INR 2.00   Warfarin maintenance plan No maintenance plan   Full warfarin instructions 11/28: 7.5 mg; 11/29: 5 mg; 11/30: 7.5 mg; 12/1: 7.5 mg; 12/2: 7.5 mg; 12/3: 7.5 mg; Otherwise No maintenance plan   Next INR check 12/3/2018   Target end date     Indications   DVT of upper extremity (deep vein thrombosis) (H) (Resolved) [I82.629]  Pulmonary embolus (H) [I26.99]         Anticoagulation Episode Summary     INR check location     Preferred lab     Send INR reminders to  [facility]    Comments Consistent spinach and broccoli intake. DVT subclavian vein 2012. PE 7/2018. Brother positive Factor V Leiden. Another brother on lifetime AC. 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       Anticoagulation Care Providers     Provider Role Specialty Phone number    Karo Doty MD [facility] [phone]            See the Encounter Report to view Anticoagulation Flowsheet and Dosing Calendar (Go to Encounters tab in chart review, and find the Anticoagulation Therapy Visit)    Patient INR 2.0. Patient 7 day total at this point is 47.5. Patient to continue to take 7.5 mg all days except for tomorrow when patient will take 5 mg. Recheck INR on 12/3.  Seven day total will be 50 mg to ensure INR remains >2.0 and Lovenox can be d/c'd.    Patient aware if signs of clotting (pain, tenderness, swelling, color change in leg or arm, SOB) and bleeding occur (blood in stool, urine, large  bruising, bleeding gums, nosebleeds) to have INR check sooner. If sx severe report to ER or concerned for stroke call 911. If general questions or concerns arise, call clinic.         Varsha Crowe RN

## 2018-11-28 NOTE — MR AVS SNAPSHOT
Gerriroxane Owens   11/28/2018 10:15 AM   Anticoagulation Therapy Visit    Description:  63 year old female   Provider:   INR CLINIC   Department:   Nurse           INR as of 11/28/2018     Today's INR 2.00      Anticoagulation Summary as of 11/28/2018     INR goal 2.0-3.0   Today's INR 2.00   Full warfarin instructions 11/28: 7.5 mg; 11/29: 5 mg; 11/30: 7.5 mg; 12/1: 7.5 mg; 12/2: 7.5 mg; 12/3: 7.5 mg; Otherwise No maintenance plan   Next INR check 12/3/2018    Indications   DVT of upper extremity (deep vein thrombosis) (H) (Resolved) [I82.629]  Pulmonary embolus (H) [I26.99]         Your next Anticoagulation Clinic appointment(s)     Dec 03, 2018  2:30 PM CST   Anticoagulation Visit with  INR CLINIC   [facility] ([facility])    [address] [place] [address] [phone]              Contact Numbers     [facility]  Please call [phone] to cancel and/or reschedule your appointment   Please call [phone] with any problems or questions regarding your therapy.        November 2018 Details    Sun Mon Tue Wed Thu Fri Sat         1               2               3                 4               5               6               7               8               9               10                 11               12               13               14               15               16               17                 18               19               20               21               22               23               24                 25               26               27               28      7.5 mg   See details      29      5 mg         30      7.5 mg           Date Details   11/28 This INR check               How to take your warfarin dose     To take:  5 mg Take 1 of the 5 mg tablets.    To take:  7.5 mg Take 1.5 of the 5 mg tablets.           December 2018 Details    Sun Mon Tue Wed Thu Fri Sat           1      7.5 mg           2      7.5 mg          3            4               5               6               7               8                 9               10               11               12               13               14               15                 16               17               18               19               20               21               22                 23               24               25               26               27               28               29                 30               31                     Date Details   No additional details    Date of next INR:  12/3/2018         How to take your warfarin dose     To take:  7.5 mg Take 1.5 of the 5 mg tablets.

## 2018-12-03 ENCOUNTER — ANTICOAGULATION THERAPY VISIT (OUTPATIENT)
Dept: NURSING | Facility: CLINIC | Age: 63
End: 2018-12-03
Payer: COMMERCIAL

## 2018-12-03 DIAGNOSIS — I26.99 OTHER ACUTE PULMONARY EMBOLISM WITHOUT ACUTE COR PULMONALE (H): ICD-10-CM

## 2018-12-03 LAB — INR POINT OF CARE: 2.2 (ref 0.86–1.14)

## 2018-12-03 PROCEDURE — 99207 ZZC NO CHARGE NURSE ONLY: CPT

## 2018-12-03 PROCEDURE — 85610 PROTHROMBIN TIME: CPT | Mod: QW

## 2018-12-03 PROCEDURE — 36416 COLLJ CAPILLARY BLOOD SPEC: CPT

## 2018-12-03 NOTE — MR AVS SNAPSHOT
Gerri Owens   12/3/2018 2:30 PM   Anticoagulation Therapy Visit    Description:  63 year old female   Provider:   INR CLINIC   Department:   Nurse           INR as of 12/3/2018     Today's INR 2.2      Anticoagulation Summary as of 12/3/2018     INR goal 2.0-3.0   Today's INR 2.2   Full warfarin instructions 12/3: 7.5 mg; 12/4: 7.5 mg; 12/5: 7.5 mg; Otherwise No maintenance plan   Next INR check 12/6/2018    Indications   DVT of upper extremity (deep vein thrombosis) (H) (Resolved) [I82.629]  Pulmonary embolus (H) [I26.99]         Your next Anticoagulation Clinic appointment(s)     Dec 06, 2018 11:15 AM CST   Anticoagulation Visit with  INR CLINIC   [facility] ([facility])    [address] [place] [id number] [phone]              Contact Numbers     [facility]  Please call [phone] to cancel and/or reschedule your appointment   Please call [phone] with any problems or questions regarding your therapy.        December 2018 Details    Sun Mon Tue Wed Thu Fri Sat           1                 2               3      7.5 mg   See details      4      7.5 mg         5      7.5 mg         6            7               8                 9               10               11               12               13               14               15                 16               17               18               19               20               21               22                 23               24               25               26               27               28               29                 30               31                     Date Details   12/03 This INR check       Date of next INR:  12/6/2018         How to take your warfarin dose     To take:  7.5 mg Take 1.5 of the 5 mg tablets.

## 2018-12-03 NOTE — PROGRESS NOTES
ANTICOAGULATION FOLLOW-UP CLINIC VISIT    Patient Name:  Gerri Owens  Date:  12/3/2018  Contact Type:  Face to Face    SUBJECTIVE:     Patient Findings     Positives No Problem Findings (Pt advised to stop Lovenox injection as INR is >2.0 for the second consecutive check. Of note, pt's MTV has vitamin K in it, writer instructed pt to bring bottle to next appt and continue to take daily!)           OBJECTIVE    INR Protime   Date Value Ref Range Status   12/03/2018 2.2 (A) 0.86 - 1.14 Final       ASSESSMENT / PLAN  INR assessment THER    Recheck INR In: 3 DAYS    INR Location Clinic      Anticoagulation Summary as of 12/3/2018     INR goal 2.0-3.0   Today's INR 2.2   Warfarin maintenance plan No maintenance plan   Full warfarin instructions 12/3: 7.5 mg; 12/4: 7.5 mg; 12/5: 7.5 mg; Otherwise No maintenance plan   Next INR check 12/6/2018   Target end date     Indications   DVT of upper extremity (deep vein thrombosis) (H) (Resolved) [I82.629]  Pulmonary embolus (H) [I26.99]         Anticoagulation Episode Summary     INR check location     Preferred lab     Send INR reminders to [facility]    Comments Consistent spinach and broccoli intake. DVT subclavian vein 2012. PE 7/2018. Brother positive Factor V Leiden. Another brother on lifetime AC. 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       Anticoagulation Care Providers     Provider Role Specialty Phone number    Karo Doty MD [facility] [phone]            See the Encounter Report to view Anticoagulation Flowsheet and Dosing Calendar (Go to Encounters tab in chart review, and find the Anticoagulation Therapy Visit)    Patient instructed to continue 7.5 mg every day until next appt on 12/6.    Patient made aware if signs of clotting (pain, tenderness, swelling, or color change in any extremity) AND/OR bleeding occur (nosebleeds, bleeding gums, bruising, or blood in stool or urine) to notify provider & seek medical  attention. If severe symptoms develop, such as major bleeding, chest pain, shortness of breath, fall, trauma or s/s of stroke, patient to call 911 immediately.       Letha Chapin RN

## 2018-12-05 PROCEDURE — 96523 IRRIG DRUG DELIVERY DEVICE: CPT

## 2018-12-05 PROCEDURE — 25000128 H RX IP 250 OP 636: Performed by: OBSTETRICS & GYNECOLOGY

## 2018-12-05 RX ORDER — HEPARIN SODIUM (PORCINE) LOCK FLUSH IV SOLN 100 UNIT/ML 100 UNIT/ML
5 SOLUTION INTRAVENOUS ONCE
Status: COMPLETED | OUTPATIENT
Start: 2018-12-05 | End: 2018-12-05

## 2018-12-05 RX ADMIN — SODIUM CHLORIDE, PRESERVATIVE FREE 5 ML: 5 INJECTION INTRAVENOUS at 09:45

## 2018-12-05 NOTE — NURSING NOTE
Chief Complaint   Patient presents with     Port Flush     port flushed by rn.      Port accessed and blood return noted by RN. Port flushed by rn with saline and heparin. Port de-accessed.    Felicitas Block RN

## 2018-12-06 ENCOUNTER — ANTICOAGULATION THERAPY VISIT (OUTPATIENT)
Dept: NURSING | Facility: CLINIC | Age: 63
End: 2018-12-06
Payer: COMMERCIAL

## 2018-12-06 DIAGNOSIS — Z86.718 HISTORY OF DEEP VENOUS THROMBOSIS: ICD-10-CM

## 2018-12-06 DIAGNOSIS — I26.99 OTHER ACUTE PULMONARY EMBOLISM WITHOUT ACUTE COR PULMONALE (H): ICD-10-CM

## 2018-12-06 LAB — INR POINT OF CARE: 2.5 (ref 0.86–1.14)

## 2018-12-06 PROCEDURE — 85610 PROTHROMBIN TIME: CPT | Mod: QW

## 2018-12-06 PROCEDURE — 99207 ZZC NO CHARGE NURSE ONLY: CPT

## 2018-12-06 PROCEDURE — 36416 COLLJ CAPILLARY BLOOD SPEC: CPT

## 2018-12-06 RX ORDER — WARFARIN SODIUM 5 MG/1
TABLET ORAL
Qty: 125 TABLET | Refills: 1 | Status: SHIPPED | OUTPATIENT
Start: 2018-12-06 | End: 2018-12-10

## 2018-12-06 NOTE — MR AVS SNAPSHOT
Gerri Owens   12/6/2018 11:15 AM   Anticoagulation Therapy Visit    Description:  63 year old female   Provider:   [facility]R CLINIC   Department:   Nurse           INR as of 12/6/2018     Today's INR 2.5      Anticoagulation Summary as of 12/6/2018     INR goal 2.0-3.0   Today's INR 2.5   Full warfarin instructions 5 mg on Thu; 7.5 mg all other days   Next INR check 12/10/2018    Indications   DVT of upper extremity (deep vein thrombosis) (H) (Resolved) [I82.629]  Pulmonary embolus (H) [I26.99]         Your next Anticoagulation Clinic appointment(s)     Dec 10, 2018  4:00 PM CST   Anticoagulation Visit with  INR CLINIC   [facility] ([facility])    [address] [place] [id number] [phone]            Dec 17, 2018 10:15 AM CST   Anticoagulation Visit with  INR CLINIC   [facility] ([facility])    [address] [place] [id number] [phone]              Contact Numbers     [facility]  Please call [phone] to cancel and/or reschedule your appointment   Please call [phone] with any problems or questions regarding your therapy.        December 2018 Details    Sun Mon Tue Wed Thu Fri Sat           1                 2               3               4               5               6      5 mg   See details      7      7.5 mg         8      7.5 mg           9      7.5 mg         10            11               12               13               14               15                 16               17               18               19               20               21               22                 23               24               25               26               27               28               29                 30               31                     Date Details   12/06 This INR check       Date of next INR:  12/10/2018         How to take your warfarin dose     To take:  5 mg Take 1 of the 5 mg  tablets.    To take:  7.5 mg Take 1.5 of the 5 mg tablets.

## 2018-12-06 NOTE — PROGRESS NOTES
ANTICOAGULATION FOLLOW-UP CLINIC VISIT    Patient Name:  Gerri Owens  Date:  12/6/2018  Contact Type:  Face to Face    SUBJECTIVE:     Patient Findings     Positives Initiation of therapy, No Problem Findings (Of note, pt brought in MTV, which has 30 mcg of vitamin K. Pt understands to continue to take daily.)           OBJECTIVE    INR Protime   Date Value Ref Range Status   12/06/2018 2.5 (A) 0.86 - 1.14 Final       ASSESSMENT / PLAN  INR assessment THER    Recheck INR In: 4 DAYS    INR Location Clinic      Anticoagulation Summary as of 12/6/2018     INR goal 2.0-3.0   Today's INR 2.5   Warfarin maintenance plan 5 mg (5 mg x 1) on Thu; 7.5 mg (5 mg x 1.5) all other days   Full warfarin instructions 5 mg on Thu; 7.5 mg all other days   Weekly warfarin total 50 mg   Plan last modified Letha Chapin RN (12/6/2018)   Next INR check 12/10/2018   Target end date     Indications   DVT of upper extremity (deep vein thrombosis) (H) (Resolved) [I82.629]  Pulmonary embolus (H) [I26.99]         Anticoagulation Episode Summary     INR check location     Preferred lab     Send INR reminders to [facility]    Comments MTV 30 mcg. Consistent spinach and broccoli intake. DVT subclavian vein 2012. PE 7/2018. Brother positive Factor V Leiden. Another brother on lifetime AC. 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       Anticoagulation Care Providers     Provider Role Specialty Phone number    Karo Doty MD [facility] [phone]            See the Encounter Report to view Anticoagulation Flowsheet and Dosing Calendar (Go to Encounters tab in chart review, and find the Anticoagulation Therapy Visit)    Patient advised to continue current dosing pattern of 50 mg and recheck Monday. If next INR is stable, patient may move to weekly appts.     Patient made aware if signs of clotting (pain, tenderness, swelling, or color change in any extremity) AND/OR bleeding occur (nosebleeds,  bleeding gums, bruising, or blood in stool or urine) to notify provider & seek medical attention. If severe symptoms develop, such as major bleeding, chest pain, shortness of breath, fall, trauma or s/s of stroke, patient to call 911 immediately.       Letha Chapin RN

## 2018-12-10 ENCOUNTER — ANTICOAGULATION THERAPY VISIT (OUTPATIENT)
Dept: NURSING | Facility: CLINIC | Age: 63
End: 2018-12-10
Payer: COMMERCIAL

## 2018-12-10 DIAGNOSIS — Z86.718 HISTORY OF DEEP VENOUS THROMBOSIS: ICD-10-CM

## 2018-12-10 DIAGNOSIS — I26.99 OTHER ACUTE PULMONARY EMBOLISM WITHOUT ACUTE COR PULMONALE (H): ICD-10-CM

## 2018-12-10 LAB — INR POINT OF CARE: 2.1 (ref 0.86–1.14)

## 2018-12-10 PROCEDURE — 36416 COLLJ CAPILLARY BLOOD SPEC: CPT

## 2018-12-10 PROCEDURE — 99207 ZZC NO CHARGE NURSE ONLY: CPT

## 2018-12-10 PROCEDURE — 85610 PROTHROMBIN TIME: CPT | Mod: QW

## 2018-12-10 RX ORDER — WARFARIN SODIUM 5 MG/1
TABLET ORAL
Qty: 130 TABLET | Refills: 1 | Status: SHIPPED | OUTPATIENT
Start: 2018-12-10 | End: 2019-01-01

## 2018-12-10 NOTE — PROGRESS NOTES
ANTICOAGULATION FOLLOW-UP CLINIC VISIT    Patient Name:  Gerri Owens  Date:  12/10/2018  Contact Type:  Face to Face    SUBJECTIVE:     Patient Findings     Positives:   Change in diet/appetite (Pt noted having some liverwurst, maybe an issues in protein amt.), Initiation of therapy, No Problem Findings           OBJECTIVE    INR Protime   Date Value Ref Range Status   12/10/2018 2.1 (A) 0.86 - 1.14 Final       ASSESSMENT / PLAN  INR assessment THER    Recheck INR In: 1 WEEK    INR Location Clinic      Anticoagulation Summary  As of 12/10/2018    INR goal:   2.0-3.0   TTR:   100.0 % (4 d)   INR used for dosin.1 (12/10/2018)   Warfarin maintenance plan:   7.5 mg (5 mg x 1.5) every day   Full warfarin instructions:   7.5 mg every day   Weekly warfarin total:   52.5 mg   Plan last modified:   Letha Chapin RN (12/10/2018)   Next INR check:   2018   Target end date:       Indications    DVT of upper extremity (deep vein thrombosis) (H) (Resolved) [I82.629]  Pulmonary embolus (H) [I26.99]             Anticoagulation Episode Summary     INR check location:       Preferred lab:       Send INR reminders to:   [facility]    Comments:   MTV 30 mcg. Consistent spinach and broccoli intake. DVT subclavian vein . PE 2018. Brother positive Factor V Leiden. Another brother on lifetime AC. 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       Anticoagulation Care Providers     Provider Role Specialty Phone number    Karo Doty MD [facility] [phone]            See the Encounter Report to view Anticoagulation Flowsheet and Dosing Calendar (Go to Encounters tab in chart review, and find the Anticoagulation Therapy Visit)    Patient advised to increase dose to 7.5 mg every day and recheck in one week.     Patient made aware if signs of clotting (pain, tenderness, swelling, or color change in any extremity) AND/OR bleeding occur (nosebleeds, bleeding gums, bruising, or  blood in stool or urine) to notify provider & seek medical attention. If severe symptoms develop, such as major bleeding, chest pain, shortness of breath, fall, trauma or s/s of stroke, patient to call 911 immediately.       Letha Chapin RN

## 2018-12-17 ENCOUNTER — ANTICOAGULATION THERAPY VISIT (OUTPATIENT)
Dept: NURSING | Facility: CLINIC | Age: 63
End: 2018-12-17
Payer: COMMERCIAL

## 2018-12-17 DIAGNOSIS — I26.99 OTHER ACUTE PULMONARY EMBOLISM WITHOUT ACUTE COR PULMONALE (H): ICD-10-CM

## 2018-12-17 LAB — INR POINT OF CARE: 3.5 (ref 0.86–1.14)

## 2018-12-17 PROCEDURE — 85610 PROTHROMBIN TIME: CPT | Mod: QW

## 2018-12-17 PROCEDURE — 99207 ZZC NO CHARGE NURSE ONLY: CPT

## 2018-12-17 PROCEDURE — 36416 COLLJ CAPILLARY BLOOD SPEC: CPT

## 2018-12-17 NOTE — PROGRESS NOTES
ANTICOAGULATION FOLLOW-UP CLINIC VISIT    Patient Name:  Gerri Owens  Date:  12/17/2018  Contact Type:  Face to Face    SUBJECTIVE:     Patient Findings     Positives:   Change in diet/appetite (Pt has been using up her remaining Ensure bottles about twice/week (leftover from Chemo treatment). Typically drinks as a breakfast meal replacement. Since pt noticed the amt of vitamin K, she held greens Wed and Thurs. ), Initiation of therapy           OBJECTIVE    INR Protime   Date Value Ref Range Status   12/17/2018 3.5 (A) 0.86 - 1.14 Final       ASSESSMENT / PLAN  INR assessment SUPRA    Recheck INR In: 3 DAYS    INR Location Clinic      Anticoagulation Summary  As of 12/17/2018    INR goal:   2.0-3.0   TTR:   77.3 % (1.6 wk)   INR used for dosing:   3.5! (12/17/2018)   Warfarin maintenance plan:   7.5 mg (5 mg x 1.5) every day   Full warfarin instructions:   12/17: 5 mg; Otherwise 7.5 mg every day   Weekly warfarin total:   52.5 mg   Plan last modified:   Letha Chapin RN (12/10/2018)   Next INR check:   12/20/2018   Target end date:       Indications    DVT of upper extremity (deep vein thrombosis) (H) (Resolved) [I82.629]  Pulmonary embolus (H) [I26.99]             Anticoagulation Episode Summary     INR check location:       Preferred lab:       Send INR reminders to:   [facility]    Comments:   MTV 30 mcg. Consistent spinach and broccoli intake. DVT subclavian vein 2012. PE 7/2018. Brother positive Factor V Leiden. Another brother on lifetime AC. 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       Anticoagulation Care Providers     Provider Role Specialty Phone number    Karo Doty MD [facility] [phone]            See the Encounter Report to view Anticoagulation Flowsheet and Dosing Calendar (Go to Encounters tab in chart review, and find the Anticoagulation Therapy Visit)    Per protocol, patient advised to decrease today's warfarin dose by 2.5 mg to account  for supra-therapeutic INR level. Patient instructed to drink one of her last few bottles of Ensure today and then resume normal daily green intake. Recheck in 3 days to verify INR stability.     Patient made aware if signs of clotting (pain, tenderness, swelling, or color change in any extremity) AND/OR bleeding occur (nosebleeds, bleeding gums, bruising, or blood in stool or urine) to notify provider & seek medical attention. If severe symptoms develop, such as major bleeding, chest pain, shortness of breath, fall, trauma or s/s of stroke, patient to call 911 immediately.       Letha Chapin RN

## 2018-12-20 ENCOUNTER — ANTICOAGULATION THERAPY VISIT (OUTPATIENT)
Dept: NURSING | Facility: CLINIC | Age: 63
End: 2018-12-20
Payer: COMMERCIAL

## 2018-12-20 DIAGNOSIS — I26.99 OTHER ACUTE PULMONARY EMBOLISM WITHOUT ACUTE COR PULMONALE (H): ICD-10-CM

## 2018-12-20 LAB — INR POINT OF CARE: 3 (ref 0.86–1.14)

## 2018-12-20 PROCEDURE — 85610 PROTHROMBIN TIME: CPT | Mod: QW

## 2018-12-20 PROCEDURE — 36416 COLLJ CAPILLARY BLOOD SPEC: CPT

## 2018-12-20 PROCEDURE — 99207 ZZC NO CHARGE NURSE ONLY: CPT

## 2018-12-20 NOTE — PROGRESS NOTES
ANTICOAGULATION FOLLOW-UP CLINIC VISIT    Patient Name:  Gerri Owens  Date:  12/20/2018  Contact Type:  Face to Face    SUBJECTIVE:     Patient Findings     Positives:   Change in diet/appetite (Pt finished the remaining two bottles of Ensure over the last few days. Pt plans to eat 2 servings of high greens/week in order to keep consistent. Pt will use ACC booklet to track green intake. )           OBJECTIVE    INR Protime   Date Value Ref Range Status   12/20/2018 3.0 (A) 0.86 - 1.14 Final       ASSESSMENT / PLAN  INR assessment THER    Recheck INR In: 6 DAYS    INR Location Clinic      Anticoagulation Summary  As of 12/20/2018    INR goal:   2.0-3.0   TTR:   60.7 % (2 wk)   INR used for dosing:   3.0 (12/20/2018)   Warfarin maintenance plan:   5 mg (5 mg x 1) every Mon; 7.5 mg (5 mg x 1.5) all other days   Full warfarin instructions:   5 mg every Mon; 7.5 mg all other days   Weekly warfarin total:   50 mg   Plan last modified:   Letha Chapin RN (12/20/2018)   Next INR check:   12/26/2018   Target end date:       Indications    DVT of upper extremity (deep vein thrombosis) (H) (Resolved) [I82.629]  Pulmonary embolus (H) [I26.99]             Anticoagulation Episode Summary     INR check location:       Preferred lab:       Send INR reminders to:   [facility]    Comments:   MTV 30 mcg. Consistent spinach and broccoli intake. DVT subclavian vein 2012. PE 7/2018. Brother positive Factor V Leiden. Another brother on lifetime AC. 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       Anticoagulation Care Providers     Provider Role Specialty Phone number    Karo Doty MD [facility] [phone]            See the Encounter Report to view Anticoagulation Flowsheet and Dosing Calendar (Go to Encounters tab in chart review, and find the Anticoagulation Therapy Visit)    No dose change, continue 50 mg weekly and recheck in 6 days. If next INR is again 3.0 or higher, weekly  reduction will be advised.     Patient made aware if signs of clotting (pain, tenderness, swelling, or color change in any extremity) AND/OR bleeding occur (nosebleeds, bleeding gums, bruising, or blood in stool or urine) to notify provider & seek medical attention. If severe symptoms develop, such as major bleeding, chest pain, shortness of breath, fall, trauma or s/s of stroke, patient to call 911 immediately.       Letha Chapin RN

## 2018-12-26 ENCOUNTER — ANTICOAGULATION THERAPY VISIT (OUTPATIENT)
Dept: NURSING | Facility: CLINIC | Age: 63
End: 2018-12-26
Payer: COMMERCIAL

## 2018-12-26 DIAGNOSIS — I26.99 OTHER ACUTE PULMONARY EMBOLISM WITHOUT ACUTE COR PULMONALE (H): ICD-10-CM

## 2018-12-26 LAB — INR POINT OF CARE: 2.9 (ref 0.86–1.14)

## 2018-12-26 PROCEDURE — 99207 ZZC NO CHARGE NURSE ONLY: CPT

## 2018-12-26 PROCEDURE — 36416 COLLJ CAPILLARY BLOOD SPEC: CPT

## 2018-12-26 PROCEDURE — 85610 PROTHROMBIN TIME: CPT | Mod: QW

## 2018-12-26 NOTE — PROGRESS NOTES
ANTICOAGULATION FOLLOW-UP CLINIC VISIT    Patient Name:  Gerri Owens  Date:  2018  Contact Type:  Face to Face    SUBJECTIVE:     Patient Findings     Positives:   Change in diet/appetite (Pt continues to track her greens to clarify her weekly intake. Pt is realizing she has a pretty low intake in the winter. )           OBJECTIVE    INR Protime   Date Value Ref Range Status   2018 2.9 (A) 0.86 - 1.14 Final       ASSESSMENT / PLAN  INR assessment THER    Recheck INR In: 8 DAYS    INR Location Clinic      Anticoagulation Summary  As of 2018    INR goal:   2.0-3.0   TTR:   72.5 % (2.9 wk)   INR used for dosin.9 (2018)   Warfarin maintenance plan:   5 mg (5 mg x 1) every Mon; 7.5 mg (5 mg x 1.5) all other days   Full warfarin instructions:   5 mg every Mon; 7.5 mg all other days   Weekly warfarin total:   50 mg   No change documented:   Letha Chapin RN   Plan last modified:   eLtha Chapin RN (2018)   Next INR check:   2019   Target end date:       Indications    DVT of upper extremity (deep vein thrombosis) (H) (Resolved) [I82.629]  Pulmonary embolus (H) [I26.99]             Anticoagulation Episode Summary     INR check location:       Preferred lab:       Send INR reminders to:   [facility]    Comments:   MTV 30 mcg. Consistent spinach and broccoli intake. DVT subclavian vein . PE 2018. Brother positive Factor V Leiden. Another brother on lifetime AC. 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       Anticoagulation Care Providers     Provider Role Specialty Phone number    Karo Doty MD [facility] [phone]            See the Encounter Report to view Anticoagulation Flowsheet and Dosing Calendar (Go to Encounters tab in chart review, and find the Anticoagulation Therapy Visit)    Writer recommended contining current weekly dose and rechecking in one week. If INR is again 2.9 or higher, another 5 mg dose will be  added at that time.     Patient made aware if signs of clotting (pain, tenderness, swelling, or color change in any extremity) AND/OR bleeding occur (nosebleeds, bleeding gums, bruising, or blood in stool or urine) to notify provider & seek medical attention. If severe symptoms develop, such as major bleeding, chest pain, shortness of breath, fall, trauma or s/s of stroke, patient to call 911 immediately.       Letha Chapin RN

## 2019-01-01 ENCOUNTER — ANTICOAGULATION THERAPY VISIT (OUTPATIENT)
Dept: ANTICOAGULATION | Facility: OTHER | Age: 64
End: 2019-01-01
Attending: NURSE PRACTITIONER
Payer: OTHER GOVERNMENT

## 2019-01-01 ENCOUNTER — TELEPHONE (OUTPATIENT)
Dept: FAMILY MEDICINE | Facility: OTHER | Age: 64
End: 2019-01-01

## 2019-01-01 ENCOUNTER — MYC MEDICAL ADVICE (OUTPATIENT)
Dept: FAMILY MEDICINE | Facility: CLINIC | Age: 64
End: 2019-01-01

## 2019-01-01 ENCOUNTER — ONCOLOGY VISIT (OUTPATIENT)
Dept: ONCOLOGY | Facility: CLINIC | Age: 64
End: 2019-01-01
Attending: OBSTETRICS & GYNECOLOGY
Payer: OTHER GOVERNMENT

## 2019-01-01 ENCOUNTER — APPOINTMENT (OUTPATIENT)
Dept: LAB | Facility: HOSPITAL | Age: 64
End: 2019-01-01
Attending: SURGERY
Payer: OTHER GOVERNMENT

## 2019-01-01 ENCOUNTER — OFFICE VISIT (OUTPATIENT)
Dept: FAMILY MEDICINE | Facility: OTHER | Age: 64
End: 2019-01-01
Attending: NURSE PRACTITIONER
Payer: OTHER GOVERNMENT

## 2019-01-01 ENCOUNTER — INFUSION THERAPY VISIT (OUTPATIENT)
Dept: INFUSION THERAPY | Facility: OTHER | Age: 64
End: 2019-01-01
Attending: NURSE PRACTITIONER
Payer: OTHER GOVERNMENT

## 2019-01-01 ENCOUNTER — OFFICE VISIT (OUTPATIENT)
Dept: SURGERY | Facility: OTHER | Age: 64
End: 2019-01-01
Attending: NURSE PRACTITIONER
Payer: OTHER GOVERNMENT

## 2019-01-01 ENCOUNTER — MYC MEDICAL ADVICE (OUTPATIENT)
Dept: FAMILY MEDICINE | Facility: OTHER | Age: 64
End: 2019-01-01

## 2019-01-01 ENCOUNTER — HOSPITAL ENCOUNTER (OUTPATIENT)
Dept: CT IMAGING | Facility: HOSPITAL | Age: 64
Discharge: HOME | End: 2019-11-08
Attending: OBSTETRICS & GYNECOLOGY | Admitting: OBSTETRICS & GYNECOLOGY
Payer: OTHER GOVERNMENT

## 2019-01-01 ENCOUNTER — ANTICOAGULATION THERAPY VISIT (OUTPATIENT)
Dept: ANTICOAGULATION | Facility: OTHER | Age: 64
End: 2019-01-01

## 2019-01-01 ENCOUNTER — HOSPITAL ENCOUNTER (OUTPATIENT)
Dept: CT IMAGING | Facility: HOSPITAL | Age: 64
End: 2019-09-24
Attending: NURSE PRACTITIONER
Payer: OTHER GOVERNMENT

## 2019-01-01 ENCOUNTER — TRANSFERRED RECORDS (OUTPATIENT)
Dept: HEALTH INFORMATION MANAGEMENT | Facility: CLINIC | Age: 64
End: 2019-01-01

## 2019-01-01 ENCOUNTER — ANCILLARY PROCEDURE (OUTPATIENT)
Dept: CT IMAGING | Facility: CLINIC | Age: 64
End: 2019-01-01
Attending: OBSTETRICS & GYNECOLOGY
Payer: OTHER GOVERNMENT

## 2019-01-01 ENCOUNTER — HOSPITAL ENCOUNTER (OUTPATIENT)
Dept: CT IMAGING | Facility: HOSPITAL | Age: 64
Discharge: HOME | End: 2019-09-24
Attending: NURSE PRACTITIONER | Admitting: NURSE PRACTITIONER
Payer: OTHER GOVERNMENT

## 2019-01-01 ENCOUNTER — TELEPHONE (OUTPATIENT)
Dept: FAMILY MEDICINE | Facility: CLINIC | Age: 64
End: 2019-01-01

## 2019-01-01 ENCOUNTER — ANESTHESIA EVENT (OUTPATIENT)
Dept: SURGERY | Facility: HOSPITAL | Age: 64
End: 2019-01-01
Payer: OTHER GOVERNMENT

## 2019-01-01 ENCOUNTER — HEALTH MAINTENANCE LETTER (OUTPATIENT)
Age: 64
End: 2019-01-01

## 2019-01-01 ENCOUNTER — ANESTHESIA (OUTPATIENT)
Dept: SURGERY | Facility: HOSPITAL | Age: 64
End: 2019-01-01
Payer: OTHER GOVERNMENT

## 2019-01-01 ENCOUNTER — HOSPITAL ENCOUNTER (OUTPATIENT)
Facility: HOSPITAL | Age: 64
Discharge: HOME | End: 2019-08-28
Attending: SURGERY | Admitting: SURGERY
Payer: OTHER GOVERNMENT

## 2019-01-01 VITALS
HEART RATE: 100 BPM | DIASTOLIC BLOOD PRESSURE: 72 MMHG | TEMPERATURE: 97.7 F | WEIGHT: 143 LBS | RESPIRATION RATE: 14 BRPM | SYSTOLIC BLOOD PRESSURE: 105 MMHG | HEIGHT: 65 IN | OXYGEN SATURATION: 98 % | BODY MASS INDEX: 23.82 KG/M2

## 2019-01-01 VITALS
TEMPERATURE: 98.8 F | SYSTOLIC BLOOD PRESSURE: 120 MMHG | OXYGEN SATURATION: 97 % | HEART RATE: 54 BPM | DIASTOLIC BLOOD PRESSURE: 68 MMHG

## 2019-01-01 VITALS
OXYGEN SATURATION: 98 % | DIASTOLIC BLOOD PRESSURE: 64 MMHG | HEART RATE: 62 BPM | SYSTOLIC BLOOD PRESSURE: 98 MMHG | BODY MASS INDEX: 24.32 KG/M2 | WEIGHT: 146 LBS | HEIGHT: 65 IN | TEMPERATURE: 98.1 F

## 2019-01-01 VITALS
SYSTOLIC BLOOD PRESSURE: 104 MMHG | HEIGHT: 63 IN | RESPIRATION RATE: 14 BRPM | HEART RATE: 60 BPM | OXYGEN SATURATION: 98 % | BODY MASS INDEX: 26.4 KG/M2 | WEIGHT: 149 LBS | DIASTOLIC BLOOD PRESSURE: 72 MMHG | TEMPERATURE: 97.2 F

## 2019-01-01 VITALS
OXYGEN SATURATION: 100 % | DIASTOLIC BLOOD PRESSURE: 70 MMHG | WEIGHT: 143 LBS | HEART RATE: 64 BPM | TEMPERATURE: 97.3 F | HEIGHT: 65 IN | BODY MASS INDEX: 23.82 KG/M2 | SYSTOLIC BLOOD PRESSURE: 104 MMHG

## 2019-01-01 VITALS
WEIGHT: 143.8 LBS | TEMPERATURE: 97.9 F | DIASTOLIC BLOOD PRESSURE: 70 MMHG | HEIGHT: 65 IN | OXYGEN SATURATION: 97 % | BODY MASS INDEX: 23.96 KG/M2 | SYSTOLIC BLOOD PRESSURE: 102 MMHG | RESPIRATION RATE: 16 BRPM

## 2019-01-01 VITALS
HEIGHT: 63 IN | WEIGHT: 160 LBS | DIASTOLIC BLOOD PRESSURE: 60 MMHG | SYSTOLIC BLOOD PRESSURE: 110 MMHG | BODY MASS INDEX: 28.35 KG/M2 | OXYGEN SATURATION: 96 % | HEART RATE: 76 BPM

## 2019-01-01 VITALS
WEIGHT: 142 LBS | HEART RATE: 58 BPM | DIASTOLIC BLOOD PRESSURE: 68 MMHG | SYSTOLIC BLOOD PRESSURE: 126 MMHG | OXYGEN SATURATION: 98 % | BODY MASS INDEX: 23.66 KG/M2 | HEIGHT: 65 IN | TEMPERATURE: 98.3 F

## 2019-01-01 DIAGNOSIS — I26.99 OTHER ACUTE PULMONARY EMBOLISM WITHOUT ACUTE COR PULMONALE (H): ICD-10-CM

## 2019-01-01 DIAGNOSIS — R11.2 NON-INTRACTABLE VOMITING WITH NAUSEA, UNSPECIFIED VOMITING TYPE: ICD-10-CM

## 2019-01-01 DIAGNOSIS — C54.1 ENDOMETRIAL CANCER (H): Primary | ICD-10-CM

## 2019-01-01 DIAGNOSIS — R10.84 GENERALIZED ABDOMINAL PAIN: ICD-10-CM

## 2019-01-01 DIAGNOSIS — R11.0 NAUSEA: ICD-10-CM

## 2019-01-01 DIAGNOSIS — R11.0 CHRONIC NAUSEA: ICD-10-CM

## 2019-01-01 DIAGNOSIS — R31.9 HEMATURIA, UNSPECIFIED TYPE: Primary | ICD-10-CM

## 2019-01-01 DIAGNOSIS — Z23 NEED FOR PROPHYLACTIC VACCINATION AND INOCULATION AGAINST INFLUENZA: ICD-10-CM

## 2019-01-01 DIAGNOSIS — R63.4 WEIGHT LOSS: ICD-10-CM

## 2019-01-01 DIAGNOSIS — Z76.89 ENCOUNTER TO ESTABLISH CARE: ICD-10-CM

## 2019-01-01 DIAGNOSIS — R10.13 EPIGASTRIC PAIN: Primary | ICD-10-CM

## 2019-01-01 DIAGNOSIS — R30.0 DYSURIA: Primary | ICD-10-CM

## 2019-01-01 DIAGNOSIS — R19.7 DIARRHEA, UNSPECIFIED TYPE: ICD-10-CM

## 2019-01-01 DIAGNOSIS — C54.1 ENDOMETRIAL CANCER (H): ICD-10-CM

## 2019-01-01 DIAGNOSIS — R11.10 VOMITING, INTRACTABILITY OF VOMITING NOT SPECIFIED, PRESENCE OF NAUSEA NOT SPECIFIED, UNSPECIFIED VOMITING TYPE: ICD-10-CM

## 2019-01-01 DIAGNOSIS — D64.81 ANTINEOPLASTIC CHEMOTHERAPY INDUCED ANEMIA: Primary | ICD-10-CM

## 2019-01-01 DIAGNOSIS — R19.7 DIARRHEA, UNSPECIFIED TYPE: Primary | ICD-10-CM

## 2019-01-01 DIAGNOSIS — N18.2 CKD (CHRONIC KIDNEY DISEASE) STAGE 2, GFR 60-89 ML/MIN: ICD-10-CM

## 2019-01-01 DIAGNOSIS — T45.1X5A ANTINEOPLASTIC CHEMOTHERAPY INDUCED ANEMIA: Primary | ICD-10-CM

## 2019-01-01 DIAGNOSIS — Z79.01 CHRONIC ANTICOAGULATION: ICD-10-CM

## 2019-01-01 DIAGNOSIS — R30.0 DYSURIA: ICD-10-CM

## 2019-01-01 DIAGNOSIS — Z86.718 HISTORY OF DEEP VENOUS THROMBOSIS: ICD-10-CM

## 2019-01-01 DIAGNOSIS — R82.90 ABNORMAL URINE FINDINGS: Primary | ICD-10-CM

## 2019-01-01 DIAGNOSIS — Z12.11 SPECIAL SCREENING FOR MALIGNANT NEOPLASMS, COLON: ICD-10-CM

## 2019-01-01 DIAGNOSIS — R35.0 URINARY FREQUENCY: ICD-10-CM

## 2019-01-01 DIAGNOSIS — R10.32 ABDOMINAL PAIN, LEFT LOWER QUADRANT: Primary | ICD-10-CM

## 2019-01-01 DIAGNOSIS — N30.01 ACUTE CYSTITIS WITH HEMATURIA: Primary | ICD-10-CM

## 2019-01-01 LAB
ALBUMIN SERPL-MCNC: 3.5 G/DL (ref 3.4–5)
ALBUMIN SERPL-MCNC: 3.5 G/DL (ref 3.4–5)
ALBUMIN UR-MCNC: 10 MG/DL
ALBUMIN UR-MCNC: 100 MG/DL
ALBUMIN UR-MCNC: 30 MG/DL
ALBUMIN UR-MCNC: NEGATIVE MG/DL
ALP SERPL-CCNC: 80 U/L (ref 40–150)
ALP SERPL-CCNC: 95 U/L (ref 40–150)
ALT SERPL W P-5'-P-CCNC: 20 U/L (ref 0–50)
ALT SERPL W P-5'-P-CCNC: 23 U/L (ref 0–50)
ANION GAP SERPL CALCULATED.3IONS-SCNC: 3 MMOL/L (ref 3–14)
ANION GAP SERPL CALCULATED.3IONS-SCNC: 5 MMOL/L (ref 3–14)
ANION GAP SERPL CALCULATED.3IONS-SCNC: 5 MMOL/L (ref 3–14)
APPEARANCE UR: ABNORMAL
APPEARANCE UR: CLEAR
AST SERPL W P-5'-P-CCNC: 16 U/L (ref 0–45)
AST SERPL W P-5'-P-CCNC: 17 U/L (ref 0–45)
BACTERIA #/AREA URNS HPF: ABNORMAL /HPF
BACTERIA SPEC CULT: ABNORMAL
BACTERIA SPEC CULT: ABNORMAL
BACTERIA SPEC CULT: NORMAL
BASOPHILS # BLD AUTO: 0 10E9/L (ref 0–0.2)
BASOPHILS NFR BLD AUTO: 0.8 %
BILIRUB SERPL-MCNC: 0.8 MG/DL (ref 0.2–1.3)
BILIRUB SERPL-MCNC: 0.9 MG/DL (ref 0.2–1.3)
BILIRUB UR QL STRIP: NEGATIVE
BUN SERPL-MCNC: 10 MG/DL (ref 7–30)
BUN SERPL-MCNC: 13 MG/DL (ref 7–30)
BUN SERPL-MCNC: 9 MG/DL (ref 7–30)
C DIFF TOX B STL QL: NEGATIVE
CALCIUM SERPL-MCNC: 9.2 MG/DL (ref 8.5–10.1)
CALCIUM SERPL-MCNC: 9.2 MG/DL (ref 8.5–10.1)
CALCIUM SERPL-MCNC: 9.5 MG/DL (ref 8.5–10.1)
CHLORIDE SERPL-SCNC: 107 MMOL/L (ref 94–109)
CHLORIDE SERPL-SCNC: 108 MMOL/L (ref 94–109)
CHLORIDE SERPL-SCNC: 109 MMOL/L (ref 94–109)
CO2 SERPL-SCNC: 28 MMOL/L (ref 20–32)
CO2 SERPL-SCNC: 28 MMOL/L (ref 20–32)
CO2 SERPL-SCNC: 29 MMOL/L (ref 20–32)
COLOR UR AUTO: ABNORMAL
COLOR UR AUTO: YELLOW
COPATH REPORT: NORMAL
COPATH REPORT: NORMAL
CREAT BLD-MCNC: 0.8 MG/DL (ref 0.52–1.04)
CREAT SERPL-MCNC: 1.02 MG/DL (ref 0.52–1.04)
CREAT SERPL-MCNC: 1.02 MG/DL (ref 0.52–1.04)
CREAT SERPL-MCNC: 1.07 MG/DL (ref 0.52–1.04)
CRP SERPL-MCNC: <2.9 MG/L (ref 0–8)
DIFFERENTIAL METHOD BLD: ABNORMAL
E COLI SXT1+2 STL IA: NORMAL
EOSINOPHIL # BLD AUTO: 0.1 10E9/L (ref 0–0.7)
EOSINOPHIL NFR BLD AUTO: 2.6 %
ERYTHROCYTE [DISTWIDTH] IN BLOOD BY AUTOMATED COUNT: 13.3 % (ref 10–15)
ERYTHROCYTE [DISTWIDTH] IN BLOOD BY AUTOMATED COUNT: 13.6 % (ref 10–15)
ERYTHROCYTE [DISTWIDTH] IN BLOOD BY AUTOMATED COUNT: 13.7 % (ref 10–15)
GFR SERPL CREATININE-BSD FRML MDRD: 55 ML/MIN/{1.73_M2}
GFR SERPL CREATININE-BSD FRML MDRD: 58 ML/MIN/{1.73_M2}
GFR SERPL CREATININE-BSD FRML MDRD: 58 ML/MIN/{1.73_M2}
GFR SERPL CREATININE-BSD FRML MDRD: 72 ML/MIN/{1.73_M2}
GLUCOSE SERPL-MCNC: 80 MG/DL (ref 70–99)
GLUCOSE SERPL-MCNC: 84 MG/DL (ref 70–99)
GLUCOSE SERPL-MCNC: 96 MG/DL (ref 70–99)
GLUCOSE UR STRIP-MCNC: NEGATIVE MG/DL
HCT VFR BLD AUTO: 43.8 % (ref 35–47)
HCT VFR BLD AUTO: 45.2 % (ref 35–47)
HCT VFR BLD AUTO: 45.6 % (ref 35–47)
HEMOCCULT SP1 STL QL: POSITIVE
HGB BLD-MCNC: 14.3 G/DL (ref 11.7–15.7)
HGB BLD-MCNC: 14.7 G/DL (ref 11.7–15.7)
HGB BLD-MCNC: 14.8 G/DL (ref 11.7–15.7)
HGB UR QL STRIP: ABNORMAL
HGB UR QL STRIP: ABNORMAL
HGB UR QL STRIP: NEGATIVE
HGB UR QL STRIP: NEGATIVE
IMM GRANULOCYTES # BLD: 0 10E9/L (ref 0–0.4)
IMM GRANULOCYTES NFR BLD: 0.3 %
INR POINT OF CARE: 2.1 (ref 0.86–1.14)
INR POINT OF CARE: 2.4 (ref 0.86–1.14)
INR POINT OF CARE: 2.5 (ref 0.86–1.14)
INR POINT OF CARE: 2.5 (ref 0.86–1.14)
INR POINT OF CARE: 3 (ref 0.86–1.14)
INR POINT OF CARE: 3.1 (ref 0.86–1.14)
INR POINT OF CARE: 3.7 (ref 0.86–1.14)
INR POINT OF CARE: 4.9 (ref 0.86–1.14)
INR POINT OF CARE: 5.8 (ref 0.86–1.14)
INR POINT OF CARE: >8 (ref 0.86–1.14)
INR PPP: 1.09 (ref 0.8–1.2)
INR PPP: 6.26 (ref 0.8–1.2)
INR PPP: 9 (ref 0.8–1.2)
KETONES UR STRIP-MCNC: 5 MG/DL
KETONES UR STRIP-MCNC: NEGATIVE MG/DL
LEUKOCYTE ESTERASE UR QL STRIP: ABNORMAL
LIPASE SERPL-CCNC: 148 U/L (ref 73–393)
LYMPHOCYTES # BLD AUTO: 0.7 10E9/L (ref 0.8–5.3)
LYMPHOCYTES NFR BLD AUTO: 17.9 %
MCH RBC QN AUTO: 29.8 PG (ref 26.5–33)
MCH RBC QN AUTO: 30.4 PG (ref 26.5–33)
MCH RBC QN AUTO: 30.4 PG (ref 26.5–33)
MCHC RBC AUTO-ENTMCNC: 32.5 G/DL (ref 31.5–36.5)
MCHC RBC AUTO-ENTMCNC: 32.5 G/DL (ref 31.5–36.5)
MCHC RBC AUTO-ENTMCNC: 32.6 G/DL (ref 31.5–36.5)
MCV RBC AUTO: 91 FL (ref 78–100)
MCV RBC AUTO: 94 FL (ref 78–100)
MCV RBC AUTO: 94 FL (ref 78–100)
MONOCYTES # BLD AUTO: 0.4 10E9/L (ref 0–1.3)
MONOCYTES NFR BLD AUTO: 11.4 %
MUCOUS THREADS #/AREA URNS LPF: PRESENT /LPF
NEUTROPHILS # BLD AUTO: 2.6 10E9/L (ref 1.6–8.3)
NEUTROPHILS NFR BLD AUTO: 67 %
NITRATE UR QL: NEGATIVE
NRBC # BLD AUTO: 0 10*3/UL
NRBC BLD AUTO-RTO: 0 /100
PH UR STRIP: 5.5 PH (ref 4.7–8)
PH UR STRIP: 6 PH (ref 4.7–8)
PH UR STRIP: 6 PH (ref 4.7–8)
PH UR STRIP: 7 PH (ref 4.7–8)
PLATELET # BLD AUTO: 165 10E9/L (ref 150–450)
PLATELET # BLD AUTO: 170 10E9/L (ref 150–450)
PLATELET # BLD AUTO: 173 10E9/L (ref 150–450)
POTASSIUM SERPL-SCNC: 3.7 MMOL/L (ref 3.4–5.3)
POTASSIUM SERPL-SCNC: 3.7 MMOL/L (ref 3.4–5.3)
POTASSIUM SERPL-SCNC: 3.9 MMOL/L (ref 3.4–5.3)
PROT SERPL-MCNC: 6.9 G/DL (ref 6.8–8.8)
PROT SERPL-MCNC: 7.1 G/DL (ref 6.8–8.8)
RBC # BLD AUTO: 4.8 10E12/L (ref 3.8–5.2)
RBC # BLD AUTO: 4.83 10E12/L (ref 3.8–5.2)
RBC # BLD AUTO: 4.87 10E12/L (ref 3.8–5.2)
RBC #/AREA URNS AUTO: 0 /HPF (ref 0–2)
RBC #/AREA URNS AUTO: 2 /HPF (ref 0–2)
RBC #/AREA URNS AUTO: >182 /HPF (ref 0–2)
RBC #/AREA URNS AUTO: >182 /HPF (ref 0–2)
SODIUM SERPL-SCNC: 139 MMOL/L (ref 133–144)
SODIUM SERPL-SCNC: 140 MMOL/L (ref 133–144)
SODIUM SERPL-SCNC: 143 MMOL/L (ref 133–144)
SOURCE: ABNORMAL
SP GR UR STRIP: 1.02 (ref 1–1.03)
SP GR UR STRIP: 1.03 (ref 1–1.03)
SPECIMEN SOURCE: ABNORMAL
SPECIMEN SOURCE: ABNORMAL
SPECIMEN SOURCE: NORMAL
UROBILINOGEN UR STRIP-MCNC: NORMAL MG/DL (ref 0–2)
WBC # BLD AUTO: 3.5 10E9/L (ref 4–11)
WBC # BLD AUTO: 3.9 10E9/L (ref 4–11)
WBC # BLD AUTO: 4.2 10E9/L (ref 4–11)
WBC #/AREA URNS AUTO: 20 /HPF (ref 0–5)
WBC #/AREA URNS AUTO: 3 /HPF (ref 0–5)
WBC #/AREA URNS AUTO: 46 /HPF (ref 0–5)
WBC #/AREA URNS AUTO: >182 /HPF (ref 0–5)
WBC CLUMPS #/AREA URNS HPF: PRESENT /HPF

## 2019-01-01 PROCEDURE — 71000027 ZZH RECOVERY PHASE 2 EACH 15 MINS: Performed by: SURGERY

## 2019-01-01 PROCEDURE — 85610 PROTHROMBIN TIME: CPT | Mod: QW

## 2019-01-01 PROCEDURE — 87086 URINE CULTURE/COLONY COUNT: CPT | Performed by: NURSE PRACTITIONER

## 2019-01-01 PROCEDURE — 85025 COMPLETE CBC W/AUTO DIFF WBC: CPT | Performed by: NURSE PRACTITIONER

## 2019-01-01 PROCEDURE — 96523 IRRIG DRUG DELIVERY DEVICE: CPT | Performed by: INTERNAL MEDICINE

## 2019-01-01 PROCEDURE — 88108 CYTOPATH CONCENTRATE TECH: CPT | Mod: TC | Performed by: NURSE PRACTITIONER

## 2019-01-01 PROCEDURE — 96523 IRRIG DRUG DELIVERY DEVICE: CPT | Performed by: NURSE PRACTITIONER

## 2019-01-01 PROCEDURE — 45380 COLONOSCOPY AND BIOPSY: CPT | Performed by: NURSE ANESTHETIST, CERTIFIED REGISTERED

## 2019-01-01 PROCEDURE — G0463 HOSPITAL OUTPT CLINIC VISIT: HCPCS | Mod: ZF

## 2019-01-01 PROCEDURE — 25000128 H RX IP 250 OP 636

## 2019-01-01 PROCEDURE — 45380 COLONOSCOPY AND BIOPSY: CPT | Performed by: ANESTHESIOLOGY

## 2019-01-01 PROCEDURE — 36416 COLLJ CAPILLARY BLOOD SPEC: CPT

## 2019-01-01 PROCEDURE — 99203 OFFICE O/P NEW LOW 30 MIN: CPT | Performed by: NURSE PRACTITIONER

## 2019-01-01 PROCEDURE — 87046 STOOL CULTR AEROBIC BACT EA: CPT | Performed by: NURSE PRACTITIONER

## 2019-01-01 PROCEDURE — 82274 ASSAY TEST FOR BLOOD FECAL: CPT | Performed by: NURSE PRACTITIONER

## 2019-01-01 PROCEDURE — 85610 PROTHROMBIN TIME: CPT | Mod: 91 | Performed by: NURSE PRACTITIONER

## 2019-01-01 PROCEDURE — 25500064 ZZH RX 255 OP 636: Performed by: RADIOLOGY

## 2019-01-01 PROCEDURE — 85027 COMPLETE CBC AUTOMATED: CPT | Performed by: NURSE PRACTITIONER

## 2019-01-01 PROCEDURE — 25800030 ZZH RX IP 258 OP 636: Performed by: NURSE ANESTHETIST, CERTIFIED REGISTERED

## 2019-01-01 PROCEDURE — 85610 PROTHROMBIN TIME: CPT | Performed by: NURSE PRACTITIONER

## 2019-01-01 PROCEDURE — 81001 URINALYSIS AUTO W/SCOPE: CPT | Performed by: NURSE PRACTITIONER

## 2019-01-01 PROCEDURE — 25000128 H RX IP 250 OP 636: Mod: ZF | Performed by: INTERNAL MEDICINE

## 2019-01-01 PROCEDURE — 36415 COLL VENOUS BLD VENIPUNCTURE: CPT | Performed by: NURSE PRACTITIONER

## 2019-01-01 PROCEDURE — 36000050 ZZH SURGERY LEVEL 2 1ST 30 MIN: Performed by: SURGERY

## 2019-01-01 PROCEDURE — 86140 C-REACTIVE PROTEIN: CPT | Performed by: NURSE PRACTITIONER

## 2019-01-01 PROCEDURE — 40000305 ZZH STATISTIC PRE PROC ASSESS I: Performed by: SURGERY

## 2019-01-01 PROCEDURE — 25000128 H RX IP 250 OP 636: Performed by: SURGERY

## 2019-01-01 PROCEDURE — 99214 OFFICE O/P EST MOD 30 MIN: CPT | Mod: ZP | Performed by: OBSTETRICS & GYNECOLOGY

## 2019-01-01 PROCEDURE — 80053 COMPREHEN METABOLIC PANEL: CPT | Performed by: NURSE PRACTITIONER

## 2019-01-01 PROCEDURE — 36415 COLL VENOUS BLD VENIPUNCTURE: CPT | Performed by: NURSE ANESTHETIST, CERTIFIED REGISTERED

## 2019-01-01 PROCEDURE — 80048 BASIC METABOLIC PNL TOTAL CA: CPT | Performed by: NURSE PRACTITIONER

## 2019-01-01 PROCEDURE — 85610 PROTHROMBIN TIME: CPT | Performed by: NURSE ANESTHETIST, CERTIFIED REGISTERED

## 2019-01-01 PROCEDURE — 87045 FECES CULTURE AEROBIC BACT: CPT | Performed by: NURSE PRACTITIONER

## 2019-01-01 PROCEDURE — 25000128 H RX IP 250 OP 636: Performed by: RADIOLOGY

## 2019-01-01 PROCEDURE — 87015 SPECIMEN INFECT AGNT CONCNTJ: CPT | Performed by: NURSE PRACTITIONER

## 2019-01-01 PROCEDURE — 87899 AGENT NOS ASSAY W/OPTIC: CPT | Performed by: NURSE PRACTITIONER

## 2019-01-01 PROCEDURE — 90682 RIV4 VACC RECOMBINANT DNA IM: CPT | Mod: ZF | Performed by: INTERNAL MEDICINE

## 2019-01-01 PROCEDURE — 45380 COLONOSCOPY AND BIOPSY: CPT | Performed by: SURGERY

## 2019-01-01 PROCEDURE — 25000128 H RX IP 250 OP 636: Performed by: NURSE ANESTHETIST, CERTIFIED REGISTERED

## 2019-01-01 PROCEDURE — 88305 TISSUE EXAM BY PATHOLOGIST: CPT | Mod: TC | Performed by: SURGERY

## 2019-01-01 PROCEDURE — 99213 OFFICE O/P EST LOW 20 MIN: CPT | Performed by: NURSE PRACTITIONER

## 2019-01-01 PROCEDURE — G0008 ADMIN INFLUENZA VIRUS VAC: HCPCS

## 2019-01-01 PROCEDURE — 37000008 ZZH ANESTHESIA TECHNICAL FEE, 1ST 30 MIN: Performed by: SURGERY

## 2019-01-01 PROCEDURE — 74177 CT ABD & PELVIS W/CONTRAST: CPT | Mod: TC

## 2019-01-01 PROCEDURE — 70470 CT HEAD/BRAIN W/O & W/DYE: CPT | Mod: TC

## 2019-01-01 PROCEDURE — 99204 OFFICE O/P NEW MOD 45 MIN: CPT | Performed by: SURGERY

## 2019-01-01 PROCEDURE — 27210794 ZZH OR GENERAL SUPPLY STERILE: Performed by: SURGERY

## 2019-01-01 PROCEDURE — G0463 HOSPITAL OUTPT CLINIC VISIT: HCPCS | Mod: 25

## 2019-01-01 PROCEDURE — 87493 C DIFF AMPLIFIED PROBE: CPT | Performed by: NURSE PRACTITIONER

## 2019-01-01 PROCEDURE — 71260 CT THORAX DX C+: CPT | Mod: TC

## 2019-01-01 PROCEDURE — 83690 ASSAY OF LIPASE: CPT | Performed by: NURSE PRACTITIONER

## 2019-01-01 RX ORDER — HEPARIN SODIUM (PORCINE) LOCK FLUSH IV SOLN 100 UNIT/ML 100 UNIT/ML
5 SOLUTION INTRAVENOUS ONCE
Status: COMPLETED | OUTPATIENT
Start: 2019-01-01 | End: 2019-01-01

## 2019-01-01 RX ORDER — SODIUM CHLORIDE, SODIUM LACTATE, POTASSIUM CHLORIDE, CALCIUM CHLORIDE 600; 310; 30; 20 MG/100ML; MG/100ML; MG/100ML; MG/100ML
INJECTION, SOLUTION INTRAVENOUS CONTINUOUS
Status: DISCONTINUED | OUTPATIENT
Start: 2019-01-01 | End: 2019-01-01 | Stop reason: HOSPADM

## 2019-01-01 RX ORDER — HEPARIN SODIUM,PORCINE 10 UNIT/ML
5-10 VIAL (ML) INTRAVENOUS
Status: DISCONTINUED | OUTPATIENT
Start: 2019-01-01 | End: 2019-01-01 | Stop reason: HOSPADM

## 2019-01-01 RX ORDER — ONDANSETRON 4 MG/1
TABLET, FILM COATED ORAL EVERY 8 HOURS PRN
COMMUNITY
Start: 2019-01-01 | End: 2019-01-01

## 2019-01-01 RX ORDER — HEPARIN SODIUM,PORCINE 10 UNIT/ML
5-10 VIAL (ML) INTRAVENOUS
Status: CANCELLED | OUTPATIENT
Start: 2019-01-01

## 2019-01-01 RX ORDER — HEPARIN SODIUM (PORCINE) LOCK FLUSH IV SOLN 100 UNIT/ML 100 UNIT/ML
5 SOLUTION INTRAVENOUS ONCE
Status: CANCELLED | OUTPATIENT
Start: 2019-01-01

## 2019-01-01 RX ORDER — LIDOCAINE 40 MG/G
CREAM TOPICAL
Status: DISCONTINUED | OUTPATIENT
Start: 2019-01-01 | End: 2019-01-01 | Stop reason: HOSPADM

## 2019-01-01 RX ORDER — LIDOCAINE 40 MG/G
CREAM TOPICAL
Status: DISCONTINUED | OUTPATIENT
Start: 2019-01-01 | End: 2019-01-01

## 2019-01-01 RX ORDER — IOPAMIDOL 612 MG/ML
100 INJECTION, SOLUTION INTRAVASCULAR ONCE
Status: COMPLETED | OUTPATIENT
Start: 2019-01-01 | End: 2019-01-01

## 2019-01-01 RX ORDER — WARFARIN SODIUM 5 MG/1
TABLET ORAL
Qty: 130 TABLET | Refills: 1 | Status: SHIPPED | OUTPATIENT
Start: 2019-01-01 | End: 2020-01-01

## 2019-01-01 RX ORDER — HEPARIN SODIUM (PORCINE) LOCK FLUSH IV SOLN 100 UNIT/ML 100 UNIT/ML
5 SOLUTION INTRAVENOUS
Status: DISCONTINUED | OUTPATIENT
Start: 2019-01-01 | End: 2019-01-01 | Stop reason: HOSPADM

## 2019-01-01 RX ORDER — HEPARIN SODIUM (PORCINE) LOCK FLUSH IV SOLN 100 UNIT/ML 100 UNIT/ML
5 SOLUTION INTRAVENOUS
Status: CANCELLED | OUTPATIENT
Start: 2019-01-01

## 2019-01-01 RX ORDER — NALOXONE HYDROCHLORIDE 0.4 MG/ML
.1-.4 INJECTION, SOLUTION INTRAMUSCULAR; INTRAVENOUS; SUBCUTANEOUS
Status: DISCONTINUED | OUTPATIENT
Start: 2019-01-01 | End: 2019-01-01 | Stop reason: HOSPADM

## 2019-01-01 RX ORDER — HEPARIN SODIUM,PORCINE 10 UNIT/ML
5-10 VIAL (ML) INTRAVENOUS EVERY 24 HOURS
Status: DISCONTINUED | OUTPATIENT
Start: 2019-01-01 | End: 2019-01-01 | Stop reason: HOSPADM

## 2019-01-01 RX ORDER — LIDOCAINE 40 MG/G
CREAM TOPICAL
Status: CANCELLED | OUTPATIENT
Start: 2019-01-01

## 2019-01-01 RX ORDER — SUCRALFATE 1 G/1
1 TABLET ORAL 4 TIMES DAILY
Qty: 40 TABLET | Refills: 0 | Status: SHIPPED | OUTPATIENT
Start: 2019-01-01 | End: 2019-01-01

## 2019-01-01 RX ORDER — ONDANSETRON 4 MG/1
4 TABLET, ORALLY DISINTEGRATING ORAL EVERY 30 MIN PRN
Status: DISCONTINUED | OUTPATIENT
Start: 2019-01-01 | End: 2019-01-01 | Stop reason: HOSPADM

## 2019-01-01 RX ORDER — ONDANSETRON 2 MG/ML
4 INJECTION INTRAMUSCULAR; INTRAVENOUS EVERY 30 MIN PRN
Status: DISCONTINUED | OUTPATIENT
Start: 2019-01-01 | End: 2019-01-01 | Stop reason: HOSPADM

## 2019-01-01 RX ORDER — OMEPRAZOLE 10 MG/1
10 CAPSULE, DELAYED RELEASE ORAL DAILY
COMMUNITY
End: 2019-01-01

## 2019-01-01 RX ORDER — HEPARIN SODIUM (PORCINE) LOCK FLUSH IV SOLN 100 UNIT/ML 100 UNIT/ML
SOLUTION INTRAVENOUS
Status: COMPLETED
Start: 2019-01-01 | End: 2019-01-01

## 2019-01-01 RX ORDER — HEPARIN SODIUM,PORCINE 10 UNIT/ML
5-10 VIAL (ML) INTRAVENOUS EVERY 24 HOURS
Status: CANCELLED | OUTPATIENT
Start: 2019-01-01

## 2019-01-01 RX ORDER — PROPOFOL 10 MG/ML
INJECTION, EMULSION INTRAVENOUS PRN
Status: DISCONTINUED | OUTPATIENT
Start: 2019-01-01 | End: 2019-01-01

## 2019-01-01 RX ORDER — SODIUM, POTASSIUM,MAG SULFATES 17.5-3.13G
SOLUTION, RECONSTITUTED, ORAL ORAL
Qty: 2 BOTTLE | Refills: 0 | Status: ON HOLD | OUTPATIENT
Start: 2019-01-01 | End: 2019-01-01

## 2019-01-01 RX ORDER — METOCLOPRAMIDE HYDROCHLORIDE 5 MG/5ML
5 SOLUTION ORAL
COMMUNITY
Start: 2019-01-01

## 2019-01-01 RX ORDER — ONDANSETRON 4 MG/1
4 TABLET, FILM COATED ORAL EVERY 8 HOURS PRN
Qty: 180 TABLET | Refills: 1 | Status: SHIPPED | OUTPATIENT
Start: 2019-01-01

## 2019-01-01 RX ORDER — PANTOPRAZOLE SODIUM 40 MG/1
40 TABLET, DELAYED RELEASE ORAL DAILY
COMMUNITY
Start: 2019-01-01

## 2019-01-01 RX ORDER — CIPROFLOXACIN 250 MG/1
250 TABLET, FILM COATED ORAL 2 TIMES DAILY
Qty: 10 TABLET | Refills: 0 | Status: SHIPPED | OUTPATIENT
Start: 2019-01-01 | End: 2019-01-01

## 2019-01-01 RX ORDER — IOPAMIDOL 755 MG/ML
100 INJECTION, SOLUTION INTRAVASCULAR ONCE
Status: COMPLETED | OUTPATIENT
Start: 2019-01-01 | End: 2019-01-01

## 2019-01-01 RX ORDER — HEPARIN SODIUM (PORCINE) LOCK FLUSH IV SOLN 100 UNIT/ML 100 UNIT/ML
SOLUTION INTRAVENOUS
Status: DISPENSED
Start: 2019-01-01 | End: 2019-01-01

## 2019-01-01 RX ADMIN — IOHEXOL 100 ML: 300 INJECTION, SOLUTION INTRAVENOUS at 11:14

## 2019-01-01 RX ADMIN — PROPOFOL 20 MG: 10 INJECTION, EMULSION INTRAVENOUS at 11:18

## 2019-01-01 RX ADMIN — HEPARIN 5 ML: 100 SYRINGE at 11:16

## 2019-01-01 RX ADMIN — HEPARIN SODIUM (PORCINE) LOCK FLUSH IV SOLN 100 UNIT/ML 5 ML: 100 SOLUTION at 11:30

## 2019-01-01 RX ADMIN — PROPOFOL 50 MG: 10 INJECTION, EMULSION INTRAVENOUS at 11:04

## 2019-01-01 RX ADMIN — PROPOFOL 50 MG: 10 INJECTION, EMULSION INTRAVENOUS at 11:08

## 2019-01-01 RX ADMIN — SODIUM CHLORIDE, POTASSIUM CHLORIDE, SODIUM LACTATE AND CALCIUM CHLORIDE: 600; 310; 30; 20 INJECTION, SOLUTION INTRAVENOUS at 10:49

## 2019-01-01 RX ADMIN — PROPOFOL 50 MG: 10 INJECTION, EMULSION INTRAVENOUS at 11:12

## 2019-01-01 RX ADMIN — INFLUENZA A VIRUS A/BRISBANE/02/2018 (H1N1) RECOMBINANT HEMAGGLUTININ ANTIGEN, INFLUENZA A VIRUS A/KANSAS/14/2017 (H3N2) RECOMBINANT HEMAGGLUTININ ANTIGEN, INFLUENZA B VIRUS B/PHUKET/3073/2013 RECOMBINANT HEMAGGLUTININ ANTIGEN, AND INFLUENZA B VIRUS B/MARYLAND/15/2016 RECOMBINANT HEMAGGLUTININ ANTIGEN 0.5 ML: 45; 45; 45; 45 INJECTION INTRAMUSCULAR at 10:17

## 2019-01-01 RX ADMIN — HEPARIN SODIUM (PORCINE) LOCK FLUSH IV SOLN 100 UNIT/ML 5 ML: 100 SOLUTION at 14:31

## 2019-01-01 RX ADMIN — IOPAMIDOL 100 ML: 612 INJECTION, SOLUTION INTRAVENOUS at 11:21

## 2019-01-01 RX ADMIN — Medication 5 ML: at 11:30

## 2019-01-01 RX ADMIN — SODIUM CHLORIDE, POTASSIUM CHLORIDE, SODIUM LACTATE AND CALCIUM CHLORIDE: 600; 310; 30; 20 INJECTION, SOLUTION INTRAVENOUS at 10:10

## 2019-01-01 RX ADMIN — HEPARIN SODIUM (PORCINE) LOCK FLUSH IV SOLN 100 UNIT/ML 5 ML: 100 SOLUTION at 13:53

## 2019-01-01 RX ADMIN — HEPARIN SODIUM (PORCINE) LOCK FLUSH IV SOLN 100 UNIT/ML 5 ML: 100 SOLUTION at 10:00

## 2019-01-01 RX ADMIN — PROPOFOL 30 MG: 10 INJECTION, EMULSION INTRAVENOUS at 11:15

## 2019-01-01 RX ADMIN — IOPAMIDOL 100 ML: 755 INJECTION, SOLUTION INTRAVASCULAR at 09:52

## 2019-01-01 RX ADMIN — HEPARIN 5 ML: 100 SYRINGE at 11:57

## 2019-01-01 RX ADMIN — DIATRIZOATE MEGLUMINE AND DIATRIZOATE SODIUM 30 ML: 660; 100 SOLUTION ORAL; RECTAL at 11:14

## 2019-01-01 RX ADMIN — HEPARIN SODIUM (PORCINE) LOCK FLUSH IV SOLN 100 UNIT/ML 5 ML: 100 SOLUTION at 13:15

## 2019-01-01 ASSESSMENT — ANXIETY QUESTIONNAIRES
7. FEELING AFRAID AS IF SOMETHING AWFUL MIGHT HAPPEN: NOT AT ALL
1. FEELING NERVOUS, ANXIOUS, OR ON EDGE: NOT AT ALL
6. BECOMING EASILY ANNOYED OR IRRITABLE: NOT AT ALL
3. WORRYING TOO MUCH ABOUT DIFFERENT THINGS: NOT AT ALL
2. NOT BEING ABLE TO STOP OR CONTROL WORRYING: NOT AT ALL
GAD7 TOTAL SCORE: 0
GAD7 TOTAL SCORE: 0
IF YOU CHECKED OFF ANY PROBLEMS ON THIS QUESTIONNAIRE, HOW DIFFICULT HAVE THESE PROBLEMS MADE IT FOR YOU TO DO YOUR WORK, TAKE CARE OF THINGS AT HOME, OR GET ALONG WITH OTHER PEOPLE: NOT DIFFICULT AT ALL
5. BEING SO RESTLESS THAT IT IS HARD TO SIT STILL: NOT AT ALL

## 2019-01-01 ASSESSMENT — MIFFLIN-ST. JEOR
SCORE: 1218.13
SCORE: 1204.52
SCORE: 1249.89
SCORE: 1208.15
SCORE: 1199.99
SCORE: 1199.99
SCORE: 1204.52

## 2019-01-01 ASSESSMENT — PATIENT HEALTH QUESTIONNAIRE - PHQ9
SUM OF ALL RESPONSES TO PHQ QUESTIONS 1-9: 3
5. POOR APPETITE OR OVEREATING: NOT AT ALL

## 2019-01-01 ASSESSMENT — PAIN SCALES - GENERAL
PAINLEVEL: NO PAIN (0)
PAINLEVEL: MILD PAIN (3)
PAINLEVEL: NO PAIN (0)
PAINLEVEL: NO PAIN (0)
PAINLEVEL: MODERATE PAIN (5)
PAINLEVEL: NO PAIN (0)
PAINLEVEL: NO PAIN (0)

## 2019-01-03 ENCOUNTER — ANTICOAGULATION THERAPY VISIT (OUTPATIENT)
Dept: NURSING | Facility: CLINIC | Age: 64
End: 2019-01-03
Payer: OTHER GOVERNMENT

## 2019-01-03 DIAGNOSIS — I26.99 OTHER ACUTE PULMONARY EMBOLISM WITHOUT ACUTE COR PULMONALE (H): ICD-10-CM

## 2019-01-03 LAB — INR POINT OF CARE: 2.1 (ref 0.86–1.14)

## 2019-01-03 PROCEDURE — 85610 PROTHROMBIN TIME: CPT | Mod: QW

## 2019-01-03 PROCEDURE — 36416 COLLJ CAPILLARY BLOOD SPEC: CPT

## 2019-01-03 PROCEDURE — 99207 ZZC NO CHARGE NURSE ONLY: CPT

## 2019-01-03 NOTE — PROGRESS NOTES
ANTICOAGULATION FOLLOW-UP CLINIC VISIT    Patient Name:  Gerri Owens  Date:  1/3/2019  Contact Type:  Face to Face    SUBJECTIVE:     Patient Findings     Positives:   No Problem Findings           OBJECTIVE    INR Protime   Date Value Ref Range Status   2019 2.1 (A) 0.86 - 1.14 Final       ASSESSMENT / PLAN  INR assessment THER    Recheck INR In: 2 WEEKS    INR Location Clinic      Anticoagulation Summary  As of 1/3/2019    INR goal:   2.0-3.0   TTR:   80.4 % (4 wk)   INR used for dosin.1 (1/3/2019)   Warfarin maintenance plan:   5 mg (5 mg x 1) every Mon; 7.5 mg (5 mg x 1.5) all other days   Full warfarin instructions:   5 mg every Mon; 7.5 mg all other days   Weekly warfarin total:   50 mg   No change documented:   Varsha Crowe, RN   Plan last modified:   Letha Chapin RN (2018)   Next INR check:   2019   Target end date:       Indications    DVT of upper extremity (deep vein thrombosis) (H) (Resolved) [I82.629]  Pulmonary embolus (H) [I26.99]             Anticoagulation Episode Summary     INR check location:       Preferred lab:       Send INR reminders to:   [facility]    Comments:   MTV 30 mcg. Consistent spinach and broccoli intake. DVT subclavian vein . PE 2018. Brother positive Factor V Leiden. Another brother on lifetime AC. 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       Anticoagulation Care Providers     Provider Role Specialty Phone number    Karo Doty MD [facility] [phone]            See the Encounter Report to view Anticoagulation Flowsheet and Dosing Calendar (Go to Encounters tab in chart review, and find the Anticoagulation Therapy Visit)    Patient aware if signs of clotting (pain, tenderness, swelling, color change in leg or arm, SOB) and bleeding occur (blood in stool, urine, large bruising, bleeding gums, nosebleeds) to have INR check sooner. If sx severe report to ER or concerned for stroke call 911. If  general questions or concerns arise, call clinic.         Varsha Crowe, RN

## 2019-01-16 PROCEDURE — 25000128 H RX IP 250 OP 636: Performed by: OBSTETRICS & GYNECOLOGY

## 2019-01-16 PROCEDURE — 96523 IRRIG DRUG DELIVERY DEVICE: CPT

## 2019-01-16 RX ORDER — HEPARIN SODIUM (PORCINE) LOCK FLUSH IV SOLN 100 UNIT/ML 100 UNIT/ML
5 SOLUTION INTRAVENOUS ONCE
Status: COMPLETED | OUTPATIENT
Start: 2019-01-16 | End: 2019-01-16

## 2019-01-16 RX ADMIN — HEPARIN 5 ML: 100 SYRINGE at 09:54

## 2019-01-16 NOTE — NURSING NOTE
Chief Complaint   Patient presents with     Port Flush     Port flushed by RN in lab.      Port accessed with 20g gripper needle by RN, labs collected, line flushed with saline and heparin.      Lucia SHEIKH RN PHN BSN  BMT/Oncology Lab

## 2019-01-17 ENCOUNTER — ANTICOAGULATION THERAPY VISIT (OUTPATIENT)
Dept: NURSING | Facility: CLINIC | Age: 64
End: 2019-01-17
Payer: OTHER GOVERNMENT

## 2019-01-17 DIAGNOSIS — I26.99 OTHER ACUTE PULMONARY EMBOLISM WITHOUT ACUTE COR PULMONALE (H): ICD-10-CM

## 2019-01-17 LAB — INR POINT OF CARE: 3.1 (ref 0.86–1.14)

## 2019-01-17 PROCEDURE — 36416 COLLJ CAPILLARY BLOOD SPEC: CPT

## 2019-01-17 PROCEDURE — 85610 PROTHROMBIN TIME: CPT | Mod: QW

## 2019-01-17 PROCEDURE — 99207 ZZC NO CHARGE NURSE ONLY: CPT

## 2019-01-17 NOTE — PROGRESS NOTES
ANTICOAGULATION FOLLOW-UP CLINIC VISIT    Patient Name:  Gerri Owens  Date:  1/17/2019  Contact Type:  Face to Face    SUBJECTIVE:     Patient Findings     Positives:   Change in diet/appetite (Less spinach salads this week. ), No Problem Findings           OBJECTIVE    INR Protime   Date Value Ref Range Status   01/17/2019 3.1 (A) 0.86 - 1.14 Final       ASSESSMENT / PLAN  INR assessment THER    Recheck INR In: 2 WEEKS    INR Location Clinic      Anticoagulation Summary  As of 1/17/2019    INR goal:   2.0-3.0   TTR:   83.6 % (1.4 mo)   INR used for dosing:   3.1! (1/17/2019)   Warfarin maintenance plan:   5 mg (5 mg x 1) every Mon; 7.5 mg (5 mg x 1.5) all other days   Full warfarin instructions:   5 mg every Mon; 7.5 mg all other days   Weekly warfarin total:   50 mg   No change documented:   Varsha Crowe RN   Plan last modified:   Letha Chapin RN (12/20/2018)   Next INR check:   1/31/2019   Target end date:       Indications    DVT of upper extremity (deep vein thrombosis) (H) (Resolved) [I82.629]  Pulmonary embolus (H) [I26.99]             Anticoagulation Episode Summary     INR check location:       Preferred lab:       Send INR reminders to:   [facility]    Comments:   MTV 30 mcg. Consistent spinach and broccoli intake. DVT subclavian vein 2012. PE 7/2018. Brother positive Factor V Leiden. Another brother on lifetime AC. 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       Anticoagulation Care Providers     Provider Role Specialty Phone number    Karo Doty MD [facility] [phone]            See the Encounter Report to view Anticoagulation Flowsheet and Dosing Calendar (Go to Encounters tab in chart review, and find the Anticoagulation Therapy Visit)    Patient to eat an extra serving of greens today and continue with consistent green intake. Will recheck INR in 2 weeks.     Patient aware if signs of clotting (pain, tenderness, swelling, color change in leg  or arm, SOB) and bleeding occur (blood in stool, urine, large bruising, bleeding gums, nosebleeds) to have INR check sooner. If sx severe report to ER or concerned for stroke call 911. If general questions or concerns arise, call clinic.         Varsha Crowe, RN

## 2019-01-31 ENCOUNTER — ANTICOAGULATION THERAPY VISIT (OUTPATIENT)
Dept: NURSING | Facility: CLINIC | Age: 64
End: 2019-01-31
Payer: OTHER GOVERNMENT

## 2019-01-31 DIAGNOSIS — I26.99 OTHER ACUTE PULMONARY EMBOLISM WITHOUT ACUTE COR PULMONALE (H): ICD-10-CM

## 2019-01-31 LAB — INR POINT OF CARE: 4.7 (ref 0.86–1.14)

## 2019-01-31 PROCEDURE — 85610 PROTHROMBIN TIME: CPT | Mod: QW

## 2019-01-31 PROCEDURE — 36416 COLLJ CAPILLARY BLOOD SPEC: CPT

## 2019-01-31 PROCEDURE — 99207 ZZC NO CHARGE NURSE ONLY: CPT

## 2019-01-31 NOTE — PROGRESS NOTES
ANTICOAGULATION FOLLOW-UP CLINIC VISIT    Patient Name:  Gerri Owens  Date:  2019  Contact Type:  Face to Face    SUBJECTIVE:     Patient Findings     Positives:   Change in diet/appetite (Pt believes INR level change due to decreased green intake.  Pt states this will be a consistent change. )           OBJECTIVE    INR Protime   Date Value Ref Range Status   2019 4.7 (A) 0.86 - 1.14 Final       ASSESSMENT / PLAN  INR assessment SUPRA    Recheck INR In: 10 DAYS    INR Location Clinic      Anticoagulation Summary  As of 2019    INR goal:   2.0-3.0   TTR:   62.7 % (1.9 mo)   INR used for dosin.7! (2019)   Warfarin maintenance plan:   5 mg (5 mg x 1) every Mon, Fri; 7.5 mg (5 mg x 1.5) all other days   Full warfarin instructions:   : Hold; Otherwise 5 mg every Mon, Fri; 7.5 mg all other days   Weekly warfarin total:   47.5 mg   Plan last modified:   Varsha Crowe RN (2019)   Next INR check:   2019   Target end date:       Indications    DVT of upper extremity (deep vein thrombosis) (H) (Resolved) [I82.629]  Pulmonary embolus (H) [I26.99]             Anticoagulation Episode Summary     INR check location:       Preferred lab:       Send INR reminders to:   [facility]    Comments:   MTV 30 mcg. Consistent spinach and broccoli intake. DVT subclavian vein . PE 2018. Brother positive Factor V Leiden. Another brother on lifetime AC. 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       Anticoagulation Care Providers     Provider Role Specialty Phone number    Karo Doty MD [facility] [phone]            See the Encounter Report to view Anticoagulation Flowsheet and Dosing Calendar (Go to Encounters tab in chart review, and find the Anticoagulation Therapy Visit)    Patient to hold today's dose and then reduce maintenance dose to 5 mg on Friday as well (5% reduction). ACC team to start low go slow with reduction to see how impacts  INR. Will recheck INR on 2/11 to determine if more of a reduction is needed. Patient instructed to eat a serving of greens tonight.     Patient aware if signs of clotting (pain, tenderness, swelling, color change in leg or arm, SOB) and bleeding occur (blood in stool, urine, large bruising, bleeding gums, nosebleeds) to have INR check sooner. If sx severe report to ER or concerned for stroke call 911. If general questions or concerns arise, call clinic.         Varsha Crowe RN

## 2019-02-11 ENCOUNTER — ANTICOAGULATION THERAPY VISIT (OUTPATIENT)
Dept: NURSING | Facility: CLINIC | Age: 64
End: 2019-02-11
Payer: OTHER GOVERNMENT

## 2019-02-11 DIAGNOSIS — I26.99 OTHER ACUTE PULMONARY EMBOLISM WITHOUT ACUTE COR PULMONALE (H): ICD-10-CM

## 2019-02-11 LAB — INR POINT OF CARE: 3.1 (ref 0.86–1.14)

## 2019-02-11 PROCEDURE — 99207 ZZC NO CHARGE NURSE ONLY: CPT

## 2019-02-11 PROCEDURE — 85610 PROTHROMBIN TIME: CPT | Mod: QW

## 2019-02-11 PROCEDURE — 36416 COLLJ CAPILLARY BLOOD SPEC: CPT

## 2019-02-11 NOTE — PROGRESS NOTES
ANTICOAGULATION FOLLOW-UP CLINIC VISIT    Patient Name:  Gerri Owens  Date:  2/11/2019  Contact Type:  Face to Face    SUBJECTIVE:     Patient Findings     Positives:   Change in diet/appetite (Pt reports less greens during winter months, no changes in diet since last appt.)           OBJECTIVE    INR Protime   Date Value Ref Range Status   02/11/2019 3.1 (A) 0.86 - 1.14 Final       ASSESSMENT / PLAN  No question data found.  Anticoagulation Summary  As of 2/11/2019    INR goal:   2.0-3.0   TTR:   52.4 % (2.2 mo)   INR used for dosing:   3.1! (2/11/2019)   Warfarin maintenance plan:   5 mg (5 mg x 1) every Mon, Fri; 7.5 mg (5 mg x 1.5) all other days   Full warfarin instructions:   5 mg every Mon, Fri; 7.5 mg all other days   Weekly warfarin total:   47.5 mg   No change documented:   Letha Chapin RN   Plan last modified:   Varsha Crowe RN (1/31/2019)   Next INR check:   2/25/2019   Target end date:       Indications    DVT of upper extremity (deep vein thrombosis) (H) (Resolved) [I82.629]  Pulmonary embolus (H) [I26.99]             Anticoagulation Episode Summary     INR check location:       Preferred lab:       Send INR reminders to:   [facility]    Comments:   MTV 30 mcg. Consistent spinach and broccoli intake. DVT subclavian vein 2012. PE 7/2018. Brother positive Factor V Leiden. Another brother on lifetime AC. 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       Anticoagulation Care Providers     Provider Role Specialty Phone number    Karo Doty MD [facility] [phone]            See the Encounter Report to view Anticoagulation Flowsheet and Dosing Calendar (Go to Encounters tab in chart review, and find the Anticoagulation Therapy Visit)    Continue 47.5 mg weekly dose and recheck in 2 weeks. If INR remains on high end of range, reduce weekly dose by another 2.5 mg.     Patient made aware if signs of clotting (pain, tenderness, swelling, or color change  in any extremity) AND/OR bleeding occur (nosebleeds, bleeding gums, bruising, or blood in stool or urine) to notify provider & seek medical attention. If severe symptoms develop, such as major bleeding, chest pain, shortness of breath, fall, trauma or s/s of stroke, patient to call 911 immediately.       Letha Chapin RN

## 2019-02-12 ENCOUNTER — ANCILLARY PROCEDURE (OUTPATIENT)
Dept: CT IMAGING | Facility: CLINIC | Age: 64
End: 2019-02-12
Attending: OBSTETRICS & GYNECOLOGY
Payer: OTHER GOVERNMENT

## 2019-02-12 DIAGNOSIS — C54.1 ENDOMETRIAL CANCER (H): ICD-10-CM

## 2019-02-12 LAB
CREAT BLD-MCNC: 0.9 MG/DL (ref 0.52–1.04)
GFR SERPL CREATININE-BSD FRML MDRD: 63 ML/MIN/{1.73_M2}

## 2019-02-12 RX ORDER — IOPAMIDOL 755 MG/ML
100 INJECTION, SOLUTION INTRAVASCULAR ONCE
Status: COMPLETED | OUTPATIENT
Start: 2019-02-12 | End: 2019-02-12

## 2019-02-12 RX ORDER — HEPARIN SODIUM (PORCINE) LOCK FLUSH IV SOLN 100 UNIT/ML 100 UNIT/ML
500 SOLUTION INTRAVENOUS ONCE
Status: ACTIVE | OUTPATIENT
Start: 2019-02-12

## 2019-02-12 RX ORDER — HEPARIN SODIUM (PORCINE) LOCK FLUSH IV SOLN 100 UNIT/ML 100 UNIT/ML
5 SOLUTION INTRAVENOUS ONCE
Status: COMPLETED | OUTPATIENT
Start: 2019-02-12 | End: 2019-02-12

## 2019-02-12 RX ADMIN — IOPAMIDOL 100 ML: 755 INJECTION, SOLUTION INTRAVASCULAR at 10:16

## 2019-02-12 RX ADMIN — HEPARIN SODIUM (PORCINE) LOCK FLUSH IV SOLN 100 UNIT/ML 5 ML: 100 SOLUTION at 10:26

## 2019-02-12 NOTE — DISCHARGE INSTRUCTIONS

## 2019-02-17 NOTE — PROGRESS NOTES
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    Thus far, has received chemotherapy (6 cycles of single agent carboplatin AUC6) followed by EBRT and vaginal brachytherapy, just completed 18.     Fatigued has improved. Tolerating regular diet without nausea or vomiting. She does have some diarrhea, controlled with imodium. Does have a little urinary incontinence since the vaginal brachytherapy, but no hematuria. Had vaginal spotting yesterday. Has dilator, but hasn't started using yet. States she feels great.    Has pulmonary embolus and remains on lovenox injections BID. Denies chest pain or SOA.    Oncology History:  18- OBGYN visit with Dr. Tineo.  Complains of 1.5 years of post-menopausal bleeding.                           EMB performed and showed      18- EUA, hysteroscopy D&C under ultrasound guidance.  Diffuse proliferative vascular endometrium noted.                         Pathology:                          FINAL DIAGNOSIS:                          ENDOMETRIAL CURETTINGS:                          - Endometrial endometrioid adenocarcinoma, FIGO grade 2                            COMMENT:                          While the tumor maintained a glandular morphology, the occasional high                          nuclear grade warranted upgrading to                          FIGO 2.      18- Patient reports that she continues to have some post-menopausal spotting      18 DEMETRIO/BSO and staging:  PATH:  A. ANTERIOR FUNDUS, BIOPSY:   - Positive for adenocarcinoma     B. UTERUS, CERVIX, BILATERAL TUBES AND OVARIES, HYSTERECTOMY WITH   BILATERAL SALPINGO-OOPHORECTOMY:   - Endometrial endometrioid adenocarcinoma, FIGO grade 1   -  Adenomyosis   - Cervix invaded by endometrial adenocarcinoma   - Right ovarian surface adhesions involved by endometrial adenocarcinoma   - Right fallopian tube with endometriosis   - Left adnexa invaded by endometrial adenocarcinoma (5.5 cm)     C. LYMPH NODES, LEFT PELVIC, EXCISION:   - Metastatic adenocarcinoma to one of eight lymph nodes (1/8)   - The metastatic focus is 3 mm in greatest dimension with no extranodal   extension     D. LYMPH NODES, LEFT PARA-AORTIC, EXCISION:   - One reactive lymph node, negative for malignancy (0/1)     E. LYMPH NODES, RIGHT PELVIC, EXCISION:   - Metastatic adenocarcinoma to one of six lymph nodes (1/6)   - The metastatic focus is 21 mm in greatest dimension with positive   extranodal extension     F. LYMPH NODES, RIGHT PARA-AORTIC, EXCISION:   - Three reactive lymph nodes, negative for malignancy (0/3)     G. OMENTUM, RESECTION:   - Omental adipose tissue with focal inflammation and surface mesothelial   hyperplasia   - Negative for malignancy       Synoptic Report:     SPECIMEN     Procedure:         - Simple hysterectomy         - Bilateral salpingo-oophorectomy         - Omentectomy         - Peritoneal washing     Specimen Integrity:         - Opened     TUMOR     Tumor Site:         - Endometrium         - Lower uterine segment     Histologic Type:         - Endometrioid carcinoma, NOS     Histologic Grade:         - FIGO grade 1     Tumor Size: 5.5 Centimeters (cm)     Tumor Extent       Myometrial Invasion:           - Present         Depth of Invasion: 15 Millimeters (mm)         Myometrial Thickness: 15 Millimeters (mm)         Percentage of Myometrial Invasion: 100%       Adenomyosis:           - Present, involved by carcinoma       Uterine Serosa Involvement:           - Present       Lower Uterine Segment Involvement:           - Present         Level of Tumor Involvement:             - Myoinvasive       Cervical Stromal Involvement:           - Present       Other  Tissue / Organ Involvement:           - Right ovary           - Left ovary           - Left fallopian tube       Peritoneal Ascitic Fluid:           - Negative for malignancy (normal / benign)     Accessory Findings       Lymphovascular Invasion:           - Present     MARGINS     Ectocervical / Vaginal Cuff Margin:         - Uninvolved by carcinoma     LYMPH NODES     Number of Pelvic Nodes with Macrometastasis: 2     Number of Pelvic Nodes with Micrometastasis: 0     Laterality of Pelvic Node(s) with Tumor:         - Right         - Left     Total Number of Pelvic Node(s) Examined (sentinel and nonsentinel): 14     Number of Pelvic [place] Nodes Examined: 0     Laterality of Pelvic Node(s) Examined:         - Right         - Left     Number of Para-Aortic Nodes with Macrometastasis: 0     Number of Para-Aortic Nodes with Micrometastasis: 0     Total Number of Para-Aortic Node(s) Examined (sentinel and nonsentinel):    4     Number of Para-Aortic [place] Nodes Examined: 0     Laterality of Para-Aortic Node(s) Examined:         - Right         - Left     PATHOLOGIC STAGE CLASSIFICATION (PTNM, AJCC 8TH EDITION)     Primary Tumor (pT):         - pT3a     Regional Lymph Nodes (pN)       Category (pN):           - pN1a     FIGO STAGE     FIGO Stage:         - IIIC1      4/29/2018: CT:  IMPRESSION:   1. No pulmonary embolism. Postop atelectasis.  2. Fluid-filled loops of dilated small bowel in the upper abdomen,  favored to represent adynamic ileus although incompletely visualized.  3. Pneumoperitoneum and small volume ascites, expected given  postoperative day 2 following open surgery.  4. Partially visualized 10 mm right thyroid nodule.     6/13/18: C1D1 carboplatin/paclitaxel- anaphylactic reaction to paclitaxel, no carboplatin received.    7/6/18: C1 single agent carboplatin AUC 6     7/27/18: C2 SA carboplatin AUC 6    8/17/18: C3 SA carboplatin AUC 6, deferred due to thrombocytopenia    8-11/2018 RT: EBRT and  brachytherapy.      2/2019:  IMPRESSION: In this patient with history of endometrial cancer status  post hysterectomy:  1. Increased size of a nodule in the posterior left upper lobe, which  now measures up to 6 mm (previously 3 mm. This is suspicious for  metastatic disease. Recommend short-term follow-up. Biopsy is an  option, though small size may limit yield.  2. Indeterminate mesenteric nodule, which may represent a mildly  enlarged lymph node. This is unchanged in size in comparison to  8/27/2018. Attention on follow-up.  3. Resolution of the previously seen heterogeneous irregularity of the  left psoas muscle. Biopsy 9/10/2018 compatible with granulomatous  inflammation and necrosis.          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  7/17/2012     Thyrotoxicosis 2007    hyperthyroid, enlarged right thyroid on thyroid uptake, treate by [facility]in [facility] with tapozole till 12/2008, FNA neg of 1.9 cm right lobe dominat nodule     Whooping cough due to Bordetella pertussis (p. pertussis) infant    whooping cough       Past Surgical History:   Procedure Laterality Date     ARTHROPLASTY HIP Left 12/4/2017    Procedure: ARTHROPLASTY HIP;  Left total hip arthroplasty;  Surgeon: Rajinder Goodwin MD;  Location: [facility] OR     BREAST BIOPSY, RT/LT  2004    left breast     DILATION AND CURETTAGE, OPERATIVE HYSTEROSCOPY WITH MORCELLATOR, COMBINED N/A 4/11/2018     Procedure: COMBINED DILATION AND CURETTAGE, OPERATIVE HYSTEROSCOPY WITH MORCELLATOR;  Hysteroscopy, Dilation and Curettage Under Ultrasound Guidance ;  Surgeon: Adry Tineo MD;  Location: [facility]     DILATION AND CURETTAGE, WITH ULTRASOUND GUIDANCE  4/11/2018    Procedure: DILATION AND CURETTAGE, WITH ULTRASOUND GUIDANCE;;  Surgeon: Adry Tineo MD;  Location: [facility]     HYSTERECTOMY TOTAL ABDOMINAL, BILATERAL SALPINGO-OOPHORECTOMY, NODE DISSECTION, COMBINED Bilateral 4/27/2018    Procedure: COMBINED HYSTERECTOMY TOTAL ABDOMINAL, SALPINGO-OOPHORECTOMY, NODE DISSECTION;;  Surgeon: Bradley Tierney MD;  Location: [facility] OR     INSERT PORT VASCULAR ACCESS Right 5/31/2018    Procedure: INSERT PORT VASCULAR ACCESS;  Single Lumen Chest Power Port;  Surgeon: Jamila Major PA-C;  [facility]     LAPAROSCOPIC HYSTERECTOMY TOTAL, BILATERAL SALPINGO-OOPHORECTOMY, NODE DISSECTION, COMBINED N/A 4/27/2018    Procedure: COMBINED LAPAROSCOPIC HYSTERECTOMY TOTAL, SALPINGO-OOPHORECTOMY, NODE DISSECTION;  Laparoscopic converted to open Removal Of Uterus, Tubes, Ovaries, Cervix, Pelvic and Para Aortic Lymphnode Dissection, Tumor Debulking, CUSA, Partial Omentectomy, Anesthesia Block;  Surgeon: Bradley Tierney MD;  Location: [facility] OR     TONSILLECTOMY  1960    tonsillectomy       Current Outpatient Medications   Medication     calcium citrate-vitamin D (CITRACAL) 315-200 MG-UNIT TABS per tablet     enoxaparin (LOVENOX) 80 MG/0.8ML injection     FERROUS GLUCONATE PO     GLUCOSAMINE CHONDROITIN COMPLX OR TABS     iohexol (OMNIPAQUE) 140 MG/ML SOLN solution     loperamide (IMODIUM) 2 MG capsule     multivitamin, therapeutic with minerals (THERA-VIT-M) TABS tablet     RANITIDINE HCL PO     VITAMIN C 250 MG OR TABS     warfarin (COUMADIN) 5 MG tablet     No current facility-administered medications for this visit.      Facility-Administered Medications Ordered in Other Visits   Medication     heparin 100  UNIT/ML injection 500 Units       Allergies   Allergen Reactions     Paclitaxel Anaphylaxis     reportedTaxol= anaphylaxsis     Nickel Rash       Family History   Problem Relation Age of Onset     Diabetes Mother         type 2     Heart Disease Mother      Hypertension Mother      C.A.D. Mother          after 2nd heart attack     Hypertension Father      C.A.D. Father         mild heart attack     Cancer Father         skin cancer     Heart Disease Father      Rheumatoid Arthritis Sister      Heart Disease Brother         herdetary heart murmur     Hyperlipidemia Brother      Clotting Disorder Brother         factor 5 leiden     Deep Vein Thrombosis Brother         leg     Hyperlipidemia Brother      Deep Vein Thrombosis Brother         leg; negative for clotting disorder     Kidney Disease No family hx of        Social History     Socioeconomic History     Marital status:      Spouse name: Doug     Number of children: 0     Years of education: Not on file     Highest education level: Not on file   Social Needs     Financial resource strain: Not on file     Food insecurity - worry: Not on file     Food insecurity - inability: Not on file     Transportation needs - medical: Not on file     Transportation needs - non-medical: Not on file   Occupational History     Occupation: rn     Employer: [employer]   Tobacco Use     Smoking status: Never Smoker     Smokeless tobacco: Never Used   Substance and Sexual Activity     Alcohol use: Yes     Comment: rarely     Drug use: No     Sexual activity: Yes     Partners: Male   Other Topics Concern     Parent/sibling w/ CABG, MI or angioplasty before 65F 55M? 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  She does not smoke and was never smoker, she drinks alcohol about a month, no illicit drugs            Balanced Diet - Yes, in the last 6 months    Osteoporosis  "Prevention Measures - Dairy servings per day: 2 servings plus a supplement    Regular Exercise -  Yes Describe walks for 30 to 40 minutes 4 to 5 times a week    Dental Exam - YES - Date: 1 year ago, and is going today    Eye Exam - YES - Date: 4 months ago    Self Breast Exam - Yes    Abuse: Current or Past (Physical, Sexual or Emotional)- No    Do you feel safe in your environment - Yes    Guns stored in the home - Yes, locked    Sunscreen used - Yes    Seatbelts used - Yes    Lipids -  YES - Date: 10-02    Glucose -  YES - Date: 10-02    Colon Cancer Screening - No    Hemoccults - NO    Pap Test -  YES - Date: 10-02    Do you have any concerns about STD's -  No    Mammography - YES - Date: 8-06    DEXA - NO    Immunizations reviewed and up to date - Yes, lst td 7-2000    10-23-07  MEL Mueller CMA           ROS  See below    I have reviewed the patient's ROS and discussed all pertinent information as noted in the HPI.    Physical Exam:  PS 1  VS: /73 (BP Location: Right arm, Patient Position: Sitting, Cuff Size: Adult Regular)   Pulse 64   Temp 97  F (36.1  C) (Oral)   Resp 18   Ht 1.651 m (5' 5\")   Wt 77.1 kg (169 lb 14.4 oz)   LMP 03/04/2005   SpO2 96%   BMI 28.27 kg/m       CONSTITUTIONAL: Alert non-toxic appearing female in no acute distress  HEAD: Normocephalic, atraumatic  ENT: Oropharynx pink without lesions  NECK: Neck supple without lymphadenopathy  RESPIRATORY: Lungs clear to auscultation, no increased work of breathing noted  CV: Regular rate and rhythm, S1S2, no clicks, murmurs, rubs, or gallops; bilateral lower extremities without edema  GASTROINTESTINAL: Abdomen soft, non-distended, and non-tender to palpation without masses or organomegaly; midline incision healed well  GENITOURINARY: Normal appearing external genitalia, vagina smooth without nodularity or masses, vaginal cuff intact, no masses palpated on bimanual exam. Rectal exam confirmed these findings.  LYMPHATIC: Cervical, " supraclavicular, and inguinal nodes without lymphadenopathy  MUSCULOSKELETAL: Moves all extremities, no obvious muscle wasting  NEUROLOGIC: No gross deficits, normal gait  SKIN: Appropriate color for race, warm and dry, no rashes or lesions to unclothed skin  PSYCHIATRIC: Pleasant and interactive, affect bright, makes appropriate eye contact, thought process linear      Assessment/Plan:  1) Stage IIIC1 low grade endometrial cancer: S/p EBRT and vaginal cuff therapy - did not tolerate chemo (Taxol reaction and neutropenia) - with CT findings suggestive of possible small lung lesion.  I have recommended consideration of a biopsy (this is being evaluated for by IR at present).  IF infeasible she is prepared to wait 10-12 weeks for interval assessment.       2) PE: Asymptomatic, tolerating Lovenox injections. She is interested in switching to coumadin which is reasonable.  She will take this up with her primary MD, but we dicussed lifelong anti-coagulation given her multiple events and her FH which also included 2 siblings on lifelong anti-coagulation.    3) Genetic counseling: MMR proteins with intact nuclear expression making Peterson syndrome unlikely.    4) Health maintenance issues discussed include to follow up with PCP for non-gynecologic concerns and co-morbid conditions    5) Patient verbalized understanding of and agreement with plan    25 minutes spent with patient, over 50% of which was spent on counseling and coordination of care.    Bradley Tierney        Answers for HPI/ROS submitted by the patient on 2/12/2019   General Symptoms: No  Skin Symptoms: No  HENT Symptoms: No  EYE SYMPTOMS: No  HEART SYMPTOMS: No  LUNG SYMPTOMS: No  INTESTINAL SYMPTOMS: No  URINARY SYMPTOMS: No  GYNECOLOGIC SYMPTOMS: No  BREAST SYMPTOMS: No  SKELETAL SYMPTOMS: No  BLOOD SYMPTOMS: No  NERVOUS SYSTEM SYMPTOMS: No  MENTAL HEALTH SYMPTOMS: No

## 2019-02-18 ENCOUNTER — ONCOLOGY VISIT (OUTPATIENT)
Dept: ONCOLOGY | Facility: CLINIC | Age: 64
End: 2019-02-18
Attending: OBSTETRICS & GYNECOLOGY
Payer: OTHER GOVERNMENT

## 2019-02-18 VITALS
TEMPERATURE: 97 F | OXYGEN SATURATION: 96 % | HEART RATE: 64 BPM | BODY MASS INDEX: 28.31 KG/M2 | SYSTOLIC BLOOD PRESSURE: 121 MMHG | RESPIRATION RATE: 18 BRPM | DIASTOLIC BLOOD PRESSURE: 73 MMHG | WEIGHT: 169.9 LBS | HEIGHT: 65 IN

## 2019-02-18 DIAGNOSIS — C54.1 ENDOMETRIAL CANCER (H): Primary | ICD-10-CM

## 2019-02-18 PROCEDURE — 99214 OFFICE O/P EST MOD 30 MIN: CPT | Mod: ZP | Performed by: OBSTETRICS & GYNECOLOGY

## 2019-02-18 PROCEDURE — G0463 HOSPITAL OUTPT CLINIC VISIT: HCPCS | Mod: ZF

## 2019-02-18 ASSESSMENT — MIFFLIN-ST. JEOR: SCORE: 1331.54

## 2019-02-18 ASSESSMENT — PAIN SCALES - GENERAL: PAINLEVEL: NO PAIN (0)

## 2019-02-18 NOTE — NURSING NOTE
"Oncology Rooming Note    February 18, 2019 10:17 AM   Gerri Owens is a 63 year old female who presents for:    Chief Complaint   Patient presents with     Oncology Clinic Visit     Return visit related to Endometrial Cancer     Initial Vitals: /73 (BP Location: Right arm, Patient Position: Sitting, Cuff Size: Adult Regular)   Pulse 64   Temp 97  F (36.1  C) (Oral)   Resp 18   Ht 1.651 m (5' 5\")   Wt 77.1 kg (169 lb 14.4 oz)   LMP 03/04/2005   SpO2 96%   BMI 28.27 kg/m   Estimated body mass index is 28.27 kg/m  as calculated from the following:    Height as of this encounter: 1.651 m (5' 5\").    Weight as of this encounter: 77.1 kg (169 lb 14.4 oz). Body surface area is 1.88 meters squared.  No Pain (0) Comment: Data Unavailable   Patient's last menstrual period was 03/04/2005.  Allergies reviewed: Yes  Medications reviewed: Yes    Medications: Medication refills not needed today.  Pharmacy name entered into [facility]: [facility] [place], [place] - [id number]    Clinical concerns: No new concerns. Provider was notified.    10 minutes for nursing intake (face to face time)     Ena Phillips LPN            "

## 2019-02-18 NOTE — LETTER
2019     RE: Gerri Owens  [address]  [facility] [address]     Dear Colleague,    Thank you for referring your patient, Gerri Owens, to the [facility]. Please see a copy of my visit note below.    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    Thus far, has received chemotherapy (6 cycles of single agent carboplatin AUC6) followed by EBRT and vaginal brachytherapy, just completed 18.     Fatigued has improved. Tolerating regular diet without nausea or vomiting. She does have some diarrhea, controlled with imodium. Does have a little urinary incontinence since the vaginal brachytherapy, but no hematuria. Had vaginal spotting yesterday. Has dilator, but hasn't started using yet. States she feels great.    Has pulmonary embolus and remains on lovenox injections BID. Denies chest pain or SOA.    Oncology History:  18- OBGYN visit with Dr. Tineo.  Complains of 1.5 years of post-menopausal bleeding.                           EMB performed and showed      18- EUA, hysteroscopy D&C under ultrasound guidance.  Diffuse proliferative vascular endometrium noted.                         Pathology:                          FINAL DIAGNOSIS:                          ENDOMETRIAL CURETTINGS:                          - Endometrial endometrioid adenocarcinoma, FIGO grade 2                            COMMENT:                          While the tumor maintained a glandular morphology, the occasional high                          nuclear grade warranted upgrading to                          FIGO 2.      18- Patient reports that she continues to have some post-menopausal spotting      18 DEMETRIO/BSO and  staging:  PATH:  A. ANTERIOR FUNDUS, BIOPSY:   - Positive for adenocarcinoma     B. UTERUS, CERVIX, BILATERAL TUBES AND OVARIES, HYSTERECTOMY WITH   BILATERAL SALPINGO-OOPHORECTOMY:   - Endometrial endometrioid adenocarcinoma, FIGO grade 1   - Adenomyosis   - Cervix invaded by endometrial adenocarcinoma   - Right ovarian surface adhesions involved by endometrial adenocarcinoma   - Right fallopian tube with endometriosis   - Left adnexa invaded by endometrial adenocarcinoma (5.5 cm)     C. LYMPH NODES, LEFT PELVIC, EXCISION:   - Metastatic adenocarcinoma to one of eight lymph nodes (1/8)   - The metastatic focus is 3 mm in greatest dimension with no extranodal   extension     D. LYMPH NODES, LEFT PARA-AORTIC, EXCISION:   - One reactive lymph node, negative for malignancy (0/1)     E. LYMPH NODES, RIGHT PELVIC, EXCISION:   - Metastatic adenocarcinoma to one of six lymph nodes (1/6)   - The metastatic focus is 21 mm in greatest dimension with positive   extranodal extension     F. LYMPH NODES, RIGHT PARA-AORTIC, EXCISION:   - Three reactive lymph nodes, negative for malignancy (0/3)     G. OMENTUM, RESECTION:   - Omental adipose tissue with focal inflammation and surface mesothelial   hyperplasia   - Negative for malignancy       Synoptic Report:     SPECIMEN     Procedure:         - Simple hysterectomy         - Bilateral salpingo-oophorectomy         - Omentectomy         - Peritoneal washing     Specimen Integrity:         - Opened     TUMOR     Tumor Site:         - Endometrium         - Lower uterine segment     Histologic Type:         - Endometrioid carcinoma, NOS     Histologic Grade:         - FIGO grade 1     Tumor Size: 5.5 Centimeters (cm)     Tumor Extent       Myometrial Invasion:           - Present         Depth of Invasion: 15 Millimeters (mm)         Myometrial Thickness: 15 Millimeters (mm)         Percentage of Myometrial Invasion: 100%       Adenomyosis:           - Present, involved by carcinoma        Uterine Serosa Involvement:           - Present       Lower Uterine Segment Involvement:           - Present         Level of Tumor Involvement:             - Myoinvasive       Cervical Stromal Involvement:           - Present       Other Tissue / Organ Involvement:           - Right ovary           - Left ovary           - Left fallopian tube       Peritoneal Ascitic Fluid:           - Negative for malignancy (normal / benign)     Accessory Findings       Lymphovascular Invasion:           - Present     MARGINS     Ectocervical / Vaginal Cuff Margin:         - Uninvolved by carcinoma     LYMPH NODES     Number of Pelvic Nodes with Macrometastasis: 2     Number of Pelvic Nodes with Micrometastasis: 0     Laterality of Pelvic Node(s) with Tumor:         - Right         - Left     Total Number of Pelvic Node(s) Examined (sentinel and nonsentinel): 14     Number of Pelvic Hernshaw Nodes Examined: 0     Laterality of Pelvic Node(s) Examined:         - Right         - Left     Number of Para-Aortic Nodes with Macrometastasis: 0     Number of Para-Aortic Nodes with Micrometastasis: 0     Total Number of Para-Aortic Node(s) Examined (sentinel and nonsentinel):    4     Number of Para-Aortic Hernshaw Nodes Examined: 0     Laterality of Para-Aortic Node(s) Examined:         - Right         - Left     PATHOLOGIC STAGE CLASSIFICATION (PTNM, AJCC 8TH EDITION)     Primary Tumor (pT):         - pT3a     Regional Lymph Nodes (pN)       Category (pN):           - pN1a     FIGO STAGE     FIGO Stage:         - IIIC1      4/29/2018: CT:  IMPRESSION:   1. No pulmonary embolism. Postop atelectasis.  2. Fluid-filled loops of dilated small bowel in the upper abdomen,  favored to represent adynamic ileus although incompletely visualized.  3. Pneumoperitoneum and small volume ascites, expected given  postoperative day 2 following open surgery.  4. Partially visualized 10 mm right thyroid nodule.     6/13/18: C1D1  carboplatin/paclitaxel- anaphylactic reaction to paclitaxel, no carboplatin received.    7/6/18: C1 single agent carboplatin AUC 6     7/27/18: C2 SA carboplatin AUC 6    8/17/18: C3 SA carboplatin AUC 6, deferred due to thrombocytopenia    8-11/2018 RT: EBRT and brachytherapy.      2/2019:  IMPRESSION: In this patient with history of endometrial cancer status  post hysterectomy:  1. Increased size of a nodule in the posterior left upper lobe, which  now measures up to 6 mm (previously 3 mm. This is suspicious for  metastatic disease. Recommend short-term follow-up. Biopsy is an  option, though small size may limit yield.  2. Indeterminate mesenteric nodule, which may represent a mildly  enlarged lymph node. This is unchanged in size in comparison to  8/27/2018. Attention on follow-up.  3. Resolution of the previously seen heterogeneous irregularity of the  left psoas muscle. Biopsy 9/10/2018 compatible with granulomatous  inflammation and necrosis.          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  7/17/2012     Thyrotoxicosis 2007    hyperthyroid, enlarged right thyroid on thyroid uptake, treate by [facility]in [facility] with tapozole till 12/2008, FNA neg of 1.9 cm right lobe dominat nodule     Whooping cough due to Bordetella pertussis (p. pertussis) infant    whooping cough       Past Surgical History:   Procedure Laterality Date     ARTHROPLASTY HIP Left 12/4/2017     Procedure: ARTHROPLASTY HIP;  Left total hip arthroplasty;  Surgeon: Rajinder Goodwin MD;  Location: [facility] OR     BREAST BIOPSY, RT/LT  2004    left breast     DILATION AND CURETTAGE, OPERATIVE HYSTEROSCOPY WITH MORCELLATOR, COMBINED N/A 4/11/2018    Procedure: COMBINED DILATION AND CURETTAGE, OPERATIVE HYSTEROSCOPY WITH MORCELLATOR;  Hysteroscopy, Dilation and Curettage Under Ultrasound Guidance ;  Surgeon: Adry Tineo MD;  Location: [facility] OR     DILATION AND CURETTAGE, WITH ULTRASOUND GUIDANCE  4/11/2018    Procedure: DILATION AND CURETTAGE, WITH ULTRASOUND GUIDANCE;;  Surgeon: Adry Tineo MD;  Location: [facility] OR     HYSTERECTOMY TOTAL ABDOMINAL, BILATERAL SALPINGO-OOPHORECTOMY, NODE DISSECTION, COMBINED Bilateral 4/27/2018    Procedure: COMBINED HYSTERECTOMY TOTAL ABDOMINAL, SALPINGO-OOPHORECTOMY, NODE DISSECTION;;  Surgeon: Bradley Tierney MD;  Location: [facility] OR     INSERT PORT VASCULAR ACCESS Right 5/31/2018    Procedure: INSERT PORT VASCULAR ACCESS;  Single Lumen Chest Power Port;  Surgeon: Jamila Major PA-C;  [facility]     LAPAROSCOPIC HYSTERECTOMY TOTAL, BILATERAL SALPINGO-OOPHORECTOMY, NODE DISSECTION, COMBINED N/A 4/27/2018    Procedure: COMBINED LAPAROSCOPIC HYSTERECTOMY TOTAL, SALPINGO-OOPHORECTOMY, NODE DISSECTION;  Laparoscopic converted to open Removal Of Uterus, Tubes, Ovaries, Cervix, Pelvic and Para Aortic Lymphnode Dissection, Tumor Debulking, CUSA, Partial Omentectomy, Anesthesia Block;  Surgeon: Bradley Tierney MD;  Location: [facility] OR     TONSILLECTOMY  1960    tonsillectomy       Current Outpatient Medications   Medication     calcium citrate-vitamin D (CITRACAL) 315-200 MG-UNIT TABS per tablet     enoxaparin (LOVENOX) 80 MG/0.8ML injection     FERROUS GLUCONATE PO     GLUCOSAMINE CHONDROITIN COMPLX OR TABS     iohexol (OMNIPAQUE) 140 MG/ML SOLN solution     loperamide (IMODIUM) 2 MG capsule     multivitamin, therapeutic with minerals (THERA-VIT-M)  TABS tablet     RANITIDINE HCL PO     VITAMIN C 250 MG OR TABS     warfarin (COUMADIN) 5 MG tablet     No current facility-administered medications for this visit.      Facility-Administered Medications Ordered in Other Visits   Medication     heparin 100 UNIT/ML injection 500 Units       Allergies   Allergen Reactions     Paclitaxel Anaphylaxis     reportedTaxol= anaphylaxsis     Nickel Rash       Family History   Problem Relation Age of Onset     Diabetes Mother         type 2     Heart Disease Mother      Hypertension Mother      C.A.D. Mother          after 2nd heart attack     Hypertension Father      C.A.D. Father         mild heart attack     Cancer Father         skin cancer     Heart Disease Father      Rheumatoid Arthritis Sister      Heart Disease Brother         herdetary heart murmur     Hyperlipidemia Brother      Clotting Disorder Brother         factor 5 leiden     Deep Vein Thrombosis Brother         leg     Hyperlipidemia Brother      Deep Vein Thrombosis Brother         leg; negative for clotting disorder     Kidney Disease No family hx of        Social History     Socioeconomic History     Marital status:      Spouse name: Doug     Number of children: 0     Years of education: Not on file     Highest education level: Not on file   Social Needs     Financial resource strain: Not on file     Food insecurity - worry: Not on file     Food insecurity - inability: Not on file     Transportation needs - medical: Not on file     Transportation needs - non-medical: Not on file   Occupational History     Occupation: rn     Employer: [employer]   Tobacco Use     Smoking status: Never Smoker     Smokeless tobacco: Never Used   Substance and Sexual Activity     Alcohol use: Yes     Comment: rarely     Drug use: No     Sexual activity: Yes     Partners: Male   Other Topics Concern     Parent/sibling w/ CABG, MI or angioplasty before 65F 55M? 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  She does not smoke and was never smoker, she drinks alcohol about a month, no illicit drugs            Balanced Diet - Yes, in the last 6 months    Osteoporosis Prevention Measures - Dairy servings per day: 2 servings plus a supplement    Regular Exercise -  Yes Describe walks for 30 to 40 minutes 4 to 5 times a week    Dental Exam - YES - Date: 1 year ago, and is going today    Eye Exam - YES - Date: 4 months ago    Self Breast Exam - Yes    Abuse: Current or Past (Physical, Sexual or Emotional)- No    Do you feel safe in your environment - Yes    Guns stored in the home - Yes, locked    Sunscreen used - Yes    Seatbelts used - Yes    Lipids -  YES - Date: 10-02    Glucose -  YES - Date: 10-02    Colon Cancer Screening - No    Hemoccults - NO    Pap Test -  YES - Date: 10-02    Do you have any concerns about STD's -  No    Mammography - YES - Date: 8-06    DEXA - NO    Immunizations reviewed and up to date - Yes, lst td 7-2000    10-23-07  MEL Mueller CMA           ROS  See below    I have reviewed the patient's ROS and discussed all pertinent information as noted in the HPI.    Physical Exam:  PS 1  VS: /73 (BP Location: Right arm, Patient Position: Sitting, Cuff Size: Adult Regular)   Pulse 64   Temp 97  F (36.1  C) (Oral)   Resp 18   Ht 1.651 m (5' 5\")   Wt 77.1 kg (169 lb 14.4 oz)   LMP 03/04/2005   SpO2 96%   BMI 28.27 kg/m        CONSTITUTIONAL: Alert non-toxic appearing female in no acute distress  HEAD: Normocephalic, atraumatic  ENT: Oropharynx pink without lesions  NECK: Neck supple without lymphadenopathy  RESPIRATORY: Lungs clear to auscultation, no increased work of breathing noted  CV: Regular rate and rhythm, S1S2, no clicks, murmurs, rubs, or gallops; bilateral lower extremities without edema  GASTROINTESTINAL: Abdomen soft, non-distended, and non-tender to palpation without masses or organomegaly; " midline incision healed well  GENITOURINARY: Normal appearing external genitalia, vagina smooth without nodularity or masses, vaginal cuff intact, no masses palpated on bimanual exam. Rectal exam confirmed these findings.  LYMPHATIC: Cervical, supraclavicular, and inguinal nodes without lymphadenopathy  MUSCULOSKELETAL: Moves all extremities, no obvious muscle wasting  NEUROLOGIC: No gross deficits, normal gait  SKIN: Appropriate color for race, warm and dry, no rashes or lesions to unclothed skin  PSYCHIATRIC: Pleasant and interactive, affect bright, makes appropriate eye contact, thought process linear      Assessment/Plan:  1) Stage IIIC1 low grade endometrial cancer: S/p EBRT and vaginal cuff therapy - did not tolerate chemo (Taxol reaction and neutropenia) - with CT findings suggestive of possible small lung lesion.  I have recommended consideration of a biopsy (this is being evaluated for by IR at present).  IF infeasible she is prepared to wait 10-12 weeks for interval assessment.       2) PE: Asymptomatic, tolerating Lovenox injections. She is interested in switching to coumadin which is reasonable.  She will take this up with her primary MD, but we dicussed lifelong anti-coagulation given her multiple events and her FH which also included 2 siblings on lifelong anti-coagulation.    3) Genetic counseling: MMR proteins with intact nuclear expression making Peterson syndrome unlikely.    4) Health maintenance issues discussed include to follow up with PCP for non-gynecologic concerns and co-morbid conditions    5) Patient verbalized understanding of and agreement with plan    25 minutes spent with patient, over 50% of which was spent on counseling and coordination of care.    Bradley Tierney        Answers for HPI/ROS submitted by the patient on 2/12/2019   General Symptoms: No  Skin Symptoms: No  HENT Symptoms: No  EYE SYMPTOMS: No  HEART SYMPTOMS: No  LUNG SYMPTOMS: No  INTESTINAL SYMPTOMS: No  URINARY  SYMPTOMS: No  GYNECOLOGIC SYMPTOMS: No  BREAST SYMPTOMS: No  SKELETAL SYMPTOMS: No  BLOOD SYMPTOMS: No  NERVOUS SYSTEM SYMPTOMS: No  MENTAL HEALTH SYMPTOMS: No

## 2019-02-21 NOTE — NURSING NOTE
IR to review to see if biopsy is possible  Message sent to IR   If biopsy not possible pt to return in 3 months with CT scan prior

## 2019-02-25 ENCOUNTER — ANTICOAGULATION THERAPY VISIT (OUTPATIENT)
Dept: NURSING | Facility: CLINIC | Age: 64
End: 2019-02-25
Payer: OTHER GOVERNMENT

## 2019-02-25 DIAGNOSIS — I26.99 OTHER ACUTE PULMONARY EMBOLISM WITHOUT ACUTE COR PULMONALE (H): ICD-10-CM

## 2019-02-25 LAB — INR POINT OF CARE: 3.2 (ref 0.86–1.14)

## 2019-02-25 PROCEDURE — 99207 ZZC NO CHARGE NURSE ONLY: CPT

## 2019-02-25 PROCEDURE — 36416 COLLJ CAPILLARY BLOOD SPEC: CPT

## 2019-02-25 PROCEDURE — 85610 PROTHROMBIN TIME: CPT | Mod: QW

## 2019-02-25 NOTE — PROGRESS NOTES
ANTICOAGULATION FOLLOW-UP CLINIC VISIT    Patient Name:  Gerri Owens  Date:  2/25/2019  Contact Type:  Face to Face    SUBJECTIVE:     Patient Findings     Positives:   No Problem Findings           OBJECTIVE    INR Protime   Date Value Ref Range Status   02/25/2019 3.2 (A) 0.86 - 1.14 Final       ASSESSMENT / PLAN  INR assessment SUPRA    Recheck INR In: 2 WEEKS    INR Location Clinic      Anticoagulation Summary  As of 2/25/2019    INR goal:   2.0-3.0   TTR:   43.3 % (2.7 mo)   INR used for dosing:   3.2! (2/25/2019)   Warfarin maintenance plan:   5 mg (5 mg x 1) every Mon, Wed, Fri; 7.5 mg (5 mg x 1.5) all other days   Full warfarin instructions:   5 mg every Mon, Wed, Fri; 7.5 mg all other days   Weekly warfarin total:   45 mg   Plan last modified:   Letha Chapin RN (2/25/2019)   Next INR check:   3/13/2019   Target end date:       Indications    DVT of upper extremity (deep vein thrombosis) (H) (Resolved) [I82.629]  Pulmonary embolus (H) [I26.99]             Anticoagulation Episode Summary     INR check location:       Preferred lab:       Send INR reminders to:    [facility]    Comments:   MTV 30 mcg. Consistent spinach and broccoli intake. DVT subclavian vein 2012. PE 7/2018. Brother positive Factor V Leiden. Another brother on lifetime AC. 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       Anticoagulation Care Providers     Provider Role Specialty Phone number    Karo Doty MD [facility] [phone]            See the Encounter Report to view Anticoagulation Flowsheet and Dosing Calendar (Go to Encounters tab in chart review, and find the Anticoagulation Therapy Visit)    Writer advised patient to reduce weekly dose by 2.5 mg given recent elevated INR trends. Recheck within 2-3 weeks.    Patient made aware if signs of clotting (pain, tenderness, swelling, or color change in any extremity) AND/OR bleeding occur (nosebleeds, bleeding gums, bruising, or blood in  stool or urine) to notify provider & seek medical attention. If severe symptoms develop, such as major bleeding, chest pain, shortness of breath, fall, trauma or s/s of stroke, patient to call 911 immediately.       Letha Chapin RN

## 2019-02-25 NOTE — NURSING NOTE
nodule in the posterior left upper lobe  That is too small to biopsy  Airam SHINE nurse    Patient notified  Will be scheduled for follow up visit with CT prior

## 2019-03-13 ENCOUNTER — ANTICOAGULATION THERAPY VISIT (OUTPATIENT)
Dept: NURSING | Facility: CLINIC | Age: 64
End: 2019-03-13
Payer: OTHER GOVERNMENT

## 2019-03-13 DIAGNOSIS — I26.99 OTHER ACUTE PULMONARY EMBOLISM WITHOUT ACUTE COR PULMONALE (H): ICD-10-CM

## 2019-03-13 LAB — INR POINT OF CARE: 3.6 (ref 0.86–1.14)

## 2019-03-13 PROCEDURE — 85610 PROTHROMBIN TIME: CPT | Mod: QW

## 2019-03-13 PROCEDURE — 99207 ZZC NO CHARGE NURSE ONLY: CPT

## 2019-03-13 PROCEDURE — 36416 COLLJ CAPILLARY BLOOD SPEC: CPT

## 2019-03-13 NOTE — PROGRESS NOTES
ANTICOAGULATION FOLLOW-UP CLINIC VISIT    Patient Name:  Gerri Owens  Date:  3/13/2019  Contact Type:  Face to Face    SUBJECTIVE:     Patient Findings     Comments:   Unexplained INR increase. Continued elevated trends.           OBJECTIVE    INR Protime   Date Value Ref Range Status   03/13/2019 3.6 (A) 0.86 - 1.14 Final       ASSESSMENT / PLAN  INR assessment THER    Recheck INR In: 2 WEEKS    INR Location Clinic      Anticoagulation Summary  As of 3/13/2019    INR goal:   2.0-3.0   TTR:   36.2 % (3.2 mo)   INR used for dosing:   3.6! (3/13/2019)   Warfarin maintenance plan:   7.5 mg (5 mg x 1.5) every Sun, Tue, Thu; 5 mg (5 mg x 1) all other days   Full warfarin instructions:   3/13: 2.5 mg; Otherwise 7.5 mg every Sun, Tue, Thu; 5 mg all other days   Weekly warfarin total:   42.5 mg   Plan last modified:   Varsha Crowe RN (3/13/2019)   Next INR check:   3/27/2019   Target end date:       Indications    DVT of upper extremity (deep vein thrombosis) (H) (Resolved) [I82.629]  Pulmonary embolus (H) [I26.99]             Anticoagulation Episode Summary     INR check location:       Preferred lab:       Send INR reminders to:   [facility]    Comments:   MTV 30 mcg. Consistent spinach and broccoli intake. DVT subclavian vein 2012. PE 7/2018. Brother positive Factor V Leiden. Another brother on lifetime AC. 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       Anticoagulation Care Providers     Provider Role Specialty Phone number    Karo Doty MD [facility] [phone]            See the Encounter Report to view Anticoagulation Flowsheet and Dosing Calendar (Go to Encounters tab in chart review, and find the Anticoagulation Therapy Visit)    Patient maintenance decreased due to recent elevated INR trends. Today's dose reduced to 2.5 mg (one time decrease on this day.    Maintenance now:   7.5 mg every Sun, Tue, Thu; 5 mg all other days this is  5.6% decrease. Today's dose  reduced to 2.5 mg (one time decrease on this day. Will recheck INR in 2 weeks to ensure INR stability.       Patient aware if signs of clotting (pain, tenderness, swelling, color change in leg or arm, SOB) and bleeding occur (blood in stool, urine, large bruising, bleeding gums, nosebleeds) to have INR check sooner. If sx severe report to ER or concerned for stroke call 911. If general questions or concerns arise, call clinic.         Varsha Crowe RN

## 2019-03-27 ENCOUNTER — ANTICOAGULATION THERAPY VISIT (OUTPATIENT)
Dept: NURSING | Facility: CLINIC | Age: 64
End: 2019-03-27
Payer: OTHER GOVERNMENT

## 2019-03-27 DIAGNOSIS — I26.99 OTHER ACUTE PULMONARY EMBOLISM WITHOUT ACUTE COR PULMONALE (H): ICD-10-CM

## 2019-03-27 LAB — INR POINT OF CARE: 2.7 (ref 0.86–1.14)

## 2019-03-27 PROCEDURE — 36416 COLLJ CAPILLARY BLOOD SPEC: CPT

## 2019-03-27 PROCEDURE — 99207 ZZC NO CHARGE NURSE ONLY: CPT

## 2019-03-27 PROCEDURE — 85610 PROTHROMBIN TIME: CPT | Mod: QW

## 2019-03-27 NOTE — PROGRESS NOTES
ANTICOAGULATION FOLLOW-UP CLINIC VISIT    Patient Name:  Gerri Owens  Date:  3/27/2019  Contact Type:  Face to Face    SUBJECTIVE:     Patient Findings     Comments:   No changes.            OBJECTIVE    INR Protime   Date Value Ref Range Status   2019 2.7 (A) 0.86 - 1.14 Final       ASSESSMENT / PLAN  INR assessment THER    Recheck INR In: 3 WEEKS    INR Location Clinic      Anticoagulation Summary  As of 3/27/2019    INR goal:   2.0-3.0   TTR:   35.8 % (3.7 mo)   INR used for dosin.7 (3/27/2019)   Warfarin maintenance plan:   7.5 mg (5 mg x 1.5) every Sun, Tue, Thu; 5 mg (5 mg x 1) all other days   Full warfarin instructions:   7.5 mg every Sun, Tue, Thu; 5 mg all other days   Weekly warfarin total:   42.5 mg   No change documented:   Varsha Crowe RN   Plan last modified:   Varsha Crowe RN (3/13/2019)   Next INR check:   2019   Target end date:       Indications    DVT of upper extremity (deep vein thrombosis) (H) (Resolved) [I82.629]  Pulmonary embolus (H) [I26.99]             Anticoagulation Episode Summary     INR check location:       Preferred lab:       Send INR reminders to:   [facility]    Comments:   MTV 30 mcg. Consistent spinach and broccoli intake. DVT subclavian vein . PE 2018. Brother positive Factor V Leiden. Another brother on lifetime AC. 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       Anticoagulation Care Providers     Provider Role Specialty Phone number    Karo Doty MD [facility] [phone]            See the Encounter Report to view Anticoagulation Flowsheet and Dosing Calendar (Go to Encounters tab in chart review, and find the Anticoagulation Therapy Visit)    Patient aware if signs of clotting (pain, tenderness, swelling, color change in leg or arm, SOB) and bleeding occur (blood in stool, urine, large bruising, bleeding gums, nosebleeds) to have INR check sooner. If sx severe report to ER or concerned for  stroke call 911. If general questions or concerns arise, call clinic.         Varsha Crowe RN

## 2019-04-10 PROCEDURE — 96523 IRRIG DRUG DELIVERY DEVICE: CPT

## 2019-04-10 PROCEDURE — 25000128 H RX IP 250 OP 636: Performed by: OBSTETRICS & GYNECOLOGY

## 2019-04-10 RX ORDER — HEPARIN SODIUM (PORCINE) LOCK FLUSH IV SOLN 100 UNIT/ML 100 UNIT/ML
5 SOLUTION INTRAVENOUS ONCE
Status: COMPLETED | OUTPATIENT
Start: 2019-04-10 | End: 2019-04-10

## 2019-04-10 RX ADMIN — HEPARIN 5 ML: 100 SYRINGE at 09:31

## 2019-04-10 NOTE — NURSING NOTE
Chief Complaint   Patient presents with     Port Flush     Port flush only, heparin locked and deaccessed.  Hx endometrial CA.

## 2019-04-22 ENCOUNTER — ANTICOAGULATION THERAPY VISIT (OUTPATIENT)
Dept: NURSING | Facility: CLINIC | Age: 64
End: 2019-04-22
Payer: OTHER GOVERNMENT

## 2019-04-22 DIAGNOSIS — I26.99 OTHER ACUTE PULMONARY EMBOLISM WITHOUT ACUTE COR PULMONALE (H): ICD-10-CM

## 2019-04-22 LAB — INR POINT OF CARE: 2.2 (ref 0.86–1.14)

## 2019-04-22 PROCEDURE — 85610 PROTHROMBIN TIME: CPT | Mod: QW

## 2019-04-22 PROCEDURE — 36416 COLLJ CAPILLARY BLOOD SPEC: CPT

## 2019-04-22 NOTE — PROGRESS NOTES
ANTICOAGULATION FOLLOW-UP CLINIC VISIT    Patient Name:  Gerri Owens  Date:  2019  Contact Type:  Face to Face    SUBJECTIVE:     Patient Findings     Comments:   Pt reports missing her MTV with vitamin K (30 mcg) tablet a few times over the last several weeks. Reviewed the importance of taking daily for AC therapy stability. Of note, patient is in the process of selling her [place] home and moving to [place]. Standing orders placed in case new concerns arise in the coming weeks.            OBJECTIVE    INR Protime   Date Value Ref Range Status   2019 2.2 (A) 0.86 - 1.14 Final       ASSESSMENT / PLAN  INR assessment THER    Recheck INR In: 4 WEEKS    INR Location Clinic      Anticoagulation Summary  As of 2019    INR goal:   2.0-3.0   TTR:   48.0 % (4.6 mo)   INR used for dosin.2 (2019)   Warfarin maintenance plan:   7.5 mg (5 mg x 1.5) every Sun, Tue, Thu; 5 mg (5 mg x 1) all other days   Full warfarin instructions:   7.5 mg every Sun, Tue, Thu; 5 mg all other days   Weekly warfarin total:   42.5 mg   No change documented:   Letha Chapin RN   Plan last modified:   Varsha Crowe, RN (3/13/2019)   Next INR check:   2019   Target end date:       Indications    DVT of upper extremity (deep vein thrombosis) (H) (Resolved) [I82.629]  Pulmonary embolus (H) [I26.99]             Anticoagulation Episode Summary     INR check location:       Preferred lab:       Send INR reminders to:   [facility]    Comments:   MTV 30 mcg. Consistent spinach and broccoli intake. DVT subclavian vein . PE 2018. Brother positive Factor V Leiden. Another brother on lifetime AC. 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       Anticoagulation Care Providers     Provider Role Specialty Phone number    Karo Doty MD [facility] [phone]            See the Encounter Report to view Anticoagulation Flowsheet and Dosing Calendar (Go to Encounters tab in chart  review, and find the Anticoagulation Therapy Visit)    Writer recommended recheck in three weeks, patient declined and requested a month.     Patient made aware if signs of clotting (pain, tenderness, swelling, or color change in any extremity) AND/OR bleeding occur (nosebleeds, bleeding gums, bruising, or blood in stool or urine) to notify provider & seek medical attention. If severe symptoms develop, such as major bleeding, chest pain, shortness of breath, fall, trauma or s/s of stroke, patient to call 911 immediately.     Dosage adjustment made based on physician directed care plan.    Letha Chapin RN

## 2019-04-26 ENCOUNTER — ANCILLARY PROCEDURE (OUTPATIENT)
Dept: CT IMAGING | Facility: CLINIC | Age: 64
End: 2019-04-26
Attending: OBSTETRICS & GYNECOLOGY
Payer: OTHER GOVERNMENT

## 2019-04-26 DIAGNOSIS — C54.1 ENDOMETRIAL CANCER (H): ICD-10-CM

## 2019-04-26 RX ORDER — IOPAMIDOL 755 MG/ML
104 INJECTION, SOLUTION INTRAVASCULAR ONCE
Status: COMPLETED | OUTPATIENT
Start: 2019-04-26 | End: 2019-04-26

## 2019-04-26 RX ORDER — HEPARIN SODIUM (PORCINE) LOCK FLUSH IV SOLN 100 UNIT/ML 100 UNIT/ML
500 SOLUTION INTRAVENOUS ONCE
Status: COMPLETED | OUTPATIENT
Start: 2019-04-26 | End: 2019-04-26

## 2019-04-26 RX ADMIN — IOPAMIDOL 104 ML: 755 INJECTION, SOLUTION INTRAVASCULAR at 10:27

## 2019-04-26 RX ADMIN — HEPARIN SODIUM (PORCINE) LOCK FLUSH IV SOLN 100 UNIT/ML 500 UNITS: 100 SOLUTION at 10:32

## 2019-04-26 NOTE — DISCHARGE INSTRUCTIONS

## 2019-04-29 ENCOUNTER — ONCOLOGY VISIT (OUTPATIENT)
Dept: ONCOLOGY | Facility: CLINIC | Age: 64
End: 2019-04-29
Attending: OBSTETRICS & GYNECOLOGY
Payer: OTHER GOVERNMENT

## 2019-04-29 VITALS
OXYGEN SATURATION: 99 % | WEIGHT: 171.1 LBS | DIASTOLIC BLOOD PRESSURE: 67 MMHG | SYSTOLIC BLOOD PRESSURE: 111 MMHG | HEART RATE: 78 BPM | BODY MASS INDEX: 28.47 KG/M2 | TEMPERATURE: 97.8 F

## 2019-04-29 DIAGNOSIS — C54.1 ENDOMETRIAL CANCER (H): Primary | ICD-10-CM

## 2019-04-29 PROCEDURE — 99214 OFFICE O/P EST MOD 30 MIN: CPT | Mod: ZP | Performed by: OBSTETRICS & GYNECOLOGY

## 2019-04-29 PROCEDURE — G0463 HOSPITAL OUTPT CLINIC VISIT: HCPCS | Mod: ZF

## 2019-04-29 ASSESSMENT — PAIN SCALES - GENERAL: PAINLEVEL: NO PAIN (0)

## 2019-04-29 NOTE — PROGRESS NOTES
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    Thus far, has received chemotherapy (6 cycles of single agent carboplatin AUC6) followed by EBRT and vaginal brachytherapy, just completed 18.     Fatigued has improved. Tolerating regular diet without nausea or vomiting. She does have some diarrhea, controlled with imodium. Does have a little urinary incontinence since the vaginal brachytherapy, but no hematuria. Had vaginal spotting yesterday. Has dilator, but hasn't started using yet. States she feels great.    Has pulmonary embolus and remains on lovenox injections BID. Denies chest pain or SOA.    Oncology History:  18- OBGYN visit with Dr. Tineo.  Complains of 1.5 years of post-menopausal bleeding.                           EMB performed and showed      18- EUA, hysteroscopy D&C under ultrasound guidance.  Diffuse proliferative vascular endometrium noted.                         Pathology:                          FINAL DIAGNOSIS:                          ENDOMETRIAL CURETTINGS:                          - Endometrial endometrioid adenocarcinoma, FIGO grade 2                            COMMENT:                          While the tumor maintained a glandular morphology, the occasional high                          nuclear grade warranted upgrading to                          FIGO 2.      18- Patient reports that she continues to have some post-menopausal spotting      18 DEMETRIO/BSO and staging:  PATH:  A. ANTERIOR FUNDUS, BIOPSY:   - Positive for adenocarcinoma     B. UTERUS, CERVIX, BILATERAL TUBES AND OVARIES, HYSTERECTOMY WITH   BILATERAL SALPINGO-OOPHORECTOMY:   - Endometrial endometrioid adenocarcinoma, FIGO grade 1   -  Adenomyosis   - Cervix invaded by endometrial adenocarcinoma   - Right ovarian surface adhesions involved by endometrial adenocarcinoma   - Right fallopian tube with endometriosis   - Left adnexa invaded by endometrial adenocarcinoma (5.5 cm)     C. LYMPH NODES, LEFT PELVIC, EXCISION:   - Metastatic adenocarcinoma to one of eight lymph nodes (1/8)   - The metastatic focus is 3 mm in greatest dimension with no extranodal   extension     D. LYMPH NODES, LEFT PARA-AORTIC, EXCISION:   - One reactive lymph node, negative for malignancy (0/1)     E. LYMPH NODES, RIGHT PELVIC, EXCISION:   - Metastatic adenocarcinoma to one of six lymph nodes (1/6)   - The metastatic focus is 21 mm in greatest dimension with positive   extranodal extension     F. LYMPH NODES, RIGHT PARA-AORTIC, EXCISION:   - Three reactive lymph nodes, negative for malignancy (0/3)     G. OMENTUM, RESECTION:   - Omental adipose tissue with focal inflammation and surface mesothelial   hyperplasia   - Negative for malignancy       TUMOR     Tumor Site:         - Endometrium         - Lower uterine segment     Histologic Type:         - Endometrioid carcinoma, NOS     Histologic Grade:         - FIGO grade 1     Tumor Size: 5.5 Centimeters (cm)     Tumor Extent       Myometrial Invasion:           - Present         Depth of Invasion: 15 Millimeters (mm)         Myometrial Thickness: 15 Millimeters (mm)         Percentage of Myometrial Invasion: 100%       Adenomyosis:           - Present, involved by carcinoma       Uterine Serosa Involvement:           - Present       Lower Uterine Segment Involvement:           - Present         Level of Tumor Involvement:             - Myoinvasive       Cervical Stromal Involvement:           - Present       Other Tissue / Organ Involvement:           - Right ovary           - Left ovary           - Left fallopian tube       Peritoneal Ascitic Fluid:           - Negative for malignancy (normal / benign)     Accessory  Findings       Lymphovascular Invasion:           - Present     MARGINS     Ectocervical / Vaginal Cuff Margin:         - Uninvolved by carcinoma     LYMPH NODES     Number of Pelvic Nodes with Macrometastasis: 2     Number of Pelvic Nodes with Micrometastasis: 0     Laterality of Pelvic Node(s) with Tumor:         - Right         - Left     Total Number of Pelvic Node(s) Examined (sentinel and nonsentinel): 14     Number of Pelvic [place] Nodes Examined: 0     Laterality of Pelvic Node(s) Examined:         - Right         - Left     Number of Para-Aortic Nodes with Macrometastasis: 0     Number of Para-Aortic Nodes with Micrometastasis: 0     Total Number of Para-Aortic Node(s) Examined (sentinel and nonsentinel):    4     Number of Para-Aortic [place] Nodes Examined: 0     Laterality of Para-Aortic Node(s) Examined:         - Right         - Left     PATHOLOGIC STAGE CLASSIFICATION (PTNM, AJCC 8TH EDITION)     Primary Tumor (pT):         - pT3a     Regional Lymph Nodes (pN)       Category (pN):           - pN1a     FIGO STAGE     FIGO Stage:         - IIIC1      4/29/2018: CT:  IMPRESSION:   1. No pulmonary embolism. Postop atelectasis.  2. Fluid-filled loops of dilated small bowel in the upper abdomen,  favored to represent adynamic ileus although incompletely visualized.  3. Pneumoperitoneum and small volume ascites, expected given  postoperative day 2 following open surgery.  4. Partially visualized 10 mm right thyroid nodule.     6/13/18: C1D1 carboplatin/paclitaxel- anaphylactic reaction to paclitaxel, no carboplatin received.    7/6/18: C1 single agent carboplatin AUC 6     7/27/18: C2 SA carboplatin AUC 6    8/17/18: C3 SA carboplatin AUC 6, deferred due to thrombocytopenia    8-11/2018 RT: EBRT and brachytherapy.      2/2019: CT  IMPRESSION: In this patient with history of endometrial cancer status  post hysterectomy:  1. Increased size of a nodule in the posterior left upper lobe, which  now measures up  to 6 mm (previously 3 mm. This is suspicious for  metastatic disease. Recommend short-term follow-up. Biopsy is an  option, though small size may limit yield.  2. Indeterminate mesenteric nodule, which may represent a mildly  enlarged lymph node. This is unchanged in size in comparison to  8/27/2018. Attention on follow-up.  3. Resolution of the previously seen heterogeneous irregularity of the  left psoas muscle. Biopsy 9/10/2018 compatible with granulomatous  inflammation and necrosis.    4/2019 CT  IMPRESSION: In this patient with history of endometrial cancer status  post hysterectomy, chemotherapy, and radiation:  1. Further slight increase in size of a now 7 mm subpleural nodule in  the superior segment of the left lower lobe, which remains suspicious  for metastatic disease.  2. New part solid part groundglass nodule measuring 4 mm in the  posterior medial left lower lobe could be infectious/inflammatory or  metastatic. Recommend attention on follow-up.  3. Continued stability of indeterminate pleural nodularity along the  right hemidiaphragm and a central mesenteric nodule. Recommend  continued attention on follow-up.  4. Mild diffuse bladder wall thickening with associated pericystic fat  stranding, likely sequela of radiation.        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  7/17/2012     Thyrotoxicosis 2007    hyperthyroid, enlarged right thyroid on thyroid uptake,  treate by endocrin eclinic [facility] with tapozole till 12/2008, FNA neg of 1.9 cm right lobe dominat nodule     Whooping cough due to Bordetella pertussis (p. pertussis) infant    whooping cough       Past Surgical History:   Procedure Laterality Date     ARTHROPLASTY HIP Left 12/4/2017    Procedure: ARTHROPLASTY HIP;  Left total hip arthroplasty;  Surgeon: Rajinder Goodwin MD;  Location: [facility] OR     BREAST BIOPSY, RT/LT  2004    left breast     DILATION AND CURETTAGE, OPERATIVE HYSTEROSCOPY WITH MORCELLATOR, COMBINED N/A 4/11/2018    Procedure: COMBINED DILATION AND CURETTAGE, OPERATIVE HYSTEROSCOPY WITH MORCELLATOR;  Hysteroscopy, Dilation and Curettage Under Ultrasound Guidance ;  Surgeon: Adry Tineo MD;  Location: [facility] OR     DILATION AND CURETTAGE, WITH ULTRASOUND GUIDANCE  4/11/2018    Procedure: DILATION AND CURETTAGE, WITH ULTRASOUND GUIDANCE;;  Surgeon: Adry Tineo MD;  Location: [facility] OR     HYSTERECTOMY TOTAL ABDOMINAL, BILATERAL SALPINGO-OOPHORECTOMY, NODE DISSECTION, COMBINED Bilateral 4/27/2018    Procedure: COMBINED HYSTERECTOMY TOTAL ABDOMINAL, SALPINGO-OOPHORECTOMY, NODE DISSECTION;;  Surgeon: Bradley Tierney MD;  Location: [facility] OR     INSERT PORT VASCULAR ACCESS Right 5/31/2018    Procedure: INSERT PORT VASCULAR ACCESS;  Single Lumen Chest Power Port;  Surgeon: Jamila Major PA-C;  [facility]     LAPAROSCOPIC HYSTERECTOMY TOTAL, BILATERAL SALPINGO-OOPHORECTOMY, NODE DISSECTION, COMBINED N/A 4/27/2018    Procedure: COMBINED LAPAROSCOPIC HYSTERECTOMY TOTAL, SALPINGO-OOPHORECTOMY, NODE DISSECTION;  Laparoscopic converted to open Removal Of Uterus, Tubes, Ovaries, Cervix, Pelvic and Para Aortic Lymphnode Dissection, Tumor Debulking, CUSA, Partial Omentectomy, Anesthesia Block;  Surgeon: Bradley Tierney MD;  Location: [facility] OR     TONSILLECTOMY  1960    tonsillectomy       Current Outpatient Medications   Medication     calcium citrate-vitamin D (CITRACAL)  315-200 MG-UNIT TABS per tablet     FERROUS GLUCONATE PO     GLUCOSAMINE CHONDROITIN COMPLX OR TABS     iohexol (OMNIPAQUE) 140 MG/ML solution for oral use     multivitamin, therapeutic with minerals (THERA-VIT-M) TABS tablet     RANITIDINE HCL PO     VITAMIN C 250 MG OR TABS     warfarin (COUMADIN) 5 MG tablet     No current facility-administered medications for this visit.      Facility-Administered Medications Ordered in Other Visits   Medication     heparin 100 UNIT/ML injection 500 Units       Allergies   Allergen Reactions     Paclitaxel Anaphylaxis     reportedTaxol= anaphylaxsis     Nickel Rash       Family History   Problem Relation Age of Onset     Diabetes Mother         type 2     Heart Disease Mother      Hypertension Mother      C.A.D. Mother          after 2nd heart attack     Hypertension Father      C.A.D. Father         mild heart attack     Cancer Father         skin cancer     Heart Disease Father      Rheumatoid Arthritis Sister      Heart Disease Brother         herdetary heart murmur     Hyperlipidemia Brother      Clotting Disorder Brother         factor 5 leiden     Deep Vein Thrombosis Brother         leg     Hyperlipidemia Brother      Deep Vein Thrombosis Brother         leg; negative for clotting disorder     Kidney Disease No family hx of        Social History     Socioeconomic History     Marital status:      Spouse name: Doug     Number of children: 0     Years of education: Not on file     Highest education level: Not on file   Occupational History     Occupation: rn     Employer: [employer]   Social Needs     Financial resource strain: Not on file     Food insecurity:     Worry: Not on file     Inability: Not on file     Transportation needs:     Medical: Not on file     Non-medical: Not on file   Tobacco Use     Smoking status: Never Smoker     Smokeless tobacco: Never Used   Substance and Sexual Activity     Alcohol use: Yes     Comment: rarely     Drug use: No      Sexual activity: Yes     Partners: Male   Lifestyle     Physical activity:     Days per week: Not on file     Minutes per session: Not on file     Stress: Not on file   Relationships     Social connections:     Talks on phone: Not on file     Gets together: Not on file     Attends Congregational service: Not on file     Active member of club or organization: Not on file     Attends meetings of clubs or organizations: Not on file     Relationship status: Not on file     Intimate partner violence:     Fear of current or ex partner: Not on file     Emotionally abused: Not on file     Physically abused: Not on file     Forced sexual activity: Not on file   Other Topics Concern     Parent/sibling w/ CABG, MI or angioplasty before 65F 55M? 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  She does not smoke and was never smoker, she drinks alcohol about a month, no illicit drugs            Balanced Diet - Yes, in the last 6 months    Osteoporosis Prevention Measures - Dairy servings per day: 2 servings plus a supplement    Regular Exercise -  Yes Describe walks for 30 to 40 minutes 4 to 5 times a week    Dental Exam - YES - Date: 1 year ago, and is going today    Eye Exam - YES - Date: 4 months ago    Self Breast Exam - Yes    Abuse: Current or Past (Physical, Sexual or Emotional)- No    Do you feel safe in your environment - Yes    Guns stored in the home - Yes, locked    Sunscreen used - Yes    Seatbelts used - Yes    Lipids -  YES - Date: 10-02    Glucose -  YES - Date: 10-02    Colon Cancer Screening - No    Hemoccults - NO    Pap Test -  YES - Date: 10-02    Do you have any concerns about STD's -  No    Mammography - YES - Date: 8-06    DEXA - NO    Immunizations reviewed and up to date - Yes, lst td 7-2000    10-23-07  MEL Mueller CMA           ROS  See below    I have reviewed the patient's ROS and discussed all pertinent  information as noted in the HPI.    Physical Exam:  PS 1  VS: /67   Pulse 78   Temp 97.8  F (36.6  C) (Oral)   Wt 77.6 kg (171 lb 1.6 oz)   LMP 03/04/2005   SpO2 99%   BMI 28.47 kg/m       CONSTITUTIONAL: Alert non-toxic appearing female in no acute distress  HEAD: Normocephalic, atraumatic  ENT: Oropharynx pink without lesions  NECK: Neck supple without lymphadenopathy  RESPIRATORY: Lungs clear to auscultation, no increased work of breathing noted  CV: Regular rate and rhythm, S1S2, no clicks, murmurs, rubs, or gallops; bilateral lower extremities without edema  GASTROINTESTINAL: Abdomen soft, non-distended, and non-tender to palpation without masses or organomegaly; midline incision healed well  GENITOURINARY: Normal appearing external genitalia, vagina smooth without nodularity or masses, vaginal cuff intact, no masses palpated on bimanual exam. Rectal exam confirmed these findings.  LYMPHATIC: Cervical, supraclavicular, and inguinal nodes without lymphadenopathy  MUSCULOSKELETAL: Moves all extremities, no obvious muscle wasting  NEUROLOGIC: No gross deficits, normal gait  SKIN: Appropriate color for race, warm and dry, no rashes or lesions to unclothed skin  PSYCHIATRIC: Pleasant and interactive, affect bright, makes appropriate eye contact, thought process linear      Assessment/Plan:  1) Stage IIIC1 low grade endometrial cancer: S/p EBRT and vaginal cuff therapy - did not tolerate chemo (Taxol reaction and neutropenia) - with CT findings suggestive of possible small lung lesion.  I had receommendedf a biopsy, which was felt to be infeasible owing to small size and location. The mass has grown albeit minimally in the last 8 weeks, so we have again discussed options incliuding biopsy and pre-emptive treatment.  She remains without symmptoms at this time    After a comprehensive discussion or the options and their risks and benefits she is inclined to rescan in 3-4 months with an eye to biopsy if the  mass continues to grow versus continued observation if stable    2) PE: Asymptomatic, tolerating Lovenox injections. She is interested in switching to coumadin which is reasonable.  She will take this up with her primary MD, but we dicussed lifelong anti-coagulation given her multiple events and her FH which also included 2 siblings on lifelong anti-coagulation.    3) Genetic counseling: MMR proteins with intact nuclear expression making Peterson syndrome unlikely.    4) Health maintenance issues discussed include to follow up with PCP for non-gynecologic concerns and co-morbid conditions    5) Patient verbalized understanding of and agreement with plan    25 minutes spent with patient, over 50% of which was spent on counseling and coordination of care.    Bradley Tierney        Answers for HPI/ROS submitted by the patient on 4/26/2019   General Symptoms: No  Skin Symptoms: No  HENT Symptoms: No  EYE SYMPTOMS: No  HEART SYMPTOMS: No  LUNG SYMPTOMS: No  INTESTINAL SYMPTOMS: No  URINARY SYMPTOMS: No  GYNECOLOGIC SYMPTOMS: No  BREAST SYMPTOMS: No  SKELETAL SYMPTOMS: No  BLOOD SYMPTOMS: No  NERVOUS SYSTEM SYMPTOMS: No  MENTAL HEALTH SYMPTOMS: No

## 2019-04-29 NOTE — NURSING NOTE
"Oncology Rooming Note    April 29, 2019 11:44 AM   Gerri Owens is a 63 year old female who presents for:    Chief Complaint   Patient presents with     Oncology Clinic Visit     Return; Endometrial CA     Initial Vitals: /67   Pulse 78   Temp 97.8  F (36.6  C) (Oral)   Wt 77.6 kg (171 lb 1.6 oz)   LMP 03/04/2005   SpO2 99%   BMI 28.47 kg/m   Estimated body mass index is 28.47 kg/m  as calculated from the following:    Height as of 2/18/19: 1.651 m (5' 5\").    Weight as of this encounter: 77.6 kg (171 lb 1.6 oz). Body surface area is 1.89 meters squared.  No Pain (0) Comment: Data Unavailable   Patient's last menstrual period was 03/04/2005.  Allergies reviewed: Yes  Medications reviewed: Yes    Medications: Medication refills not needed today.  Pharmacy name entered into [facility]: [facility] [place], [place] - [address]    Clinical concerns: Patient here for Imaging results. Has questions about continuing to take the iron supplement. Has not had her iron checked in a while. Also has questions about possible care up in [place], if she will need to be seen more regularly.  Niall was notified.      Nancy Desai [facility]              "

## 2019-04-29 NOTE — LETTER
2019       RE: Gerri Owens  [address]  [facility] [address]     Dear Colleague,    Thank you for referring your patient, Gerri Owens, to the [facility]. Please see a copy of my visit note below.    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    Thus far, has received chemotherapy (6 cycles of single agent carboplatin AUC6) followed by EBRT and vaginal brachytherapy, just completed 18.     Fatigued has improved. Tolerating regular diet without nausea or vomiting. She does have some diarrhea, controlled with imodium. Does have a little urinary incontinence since the vaginal brachytherapy, but no hematuria. Had vaginal spotting yesterday. Has dilator, but hasn't started using yet. States she feels great.    Has pulmonary embolus and remains on lovenox injections BID. Denies chest pain or SOA.    Oncology History:  18- OBGYN visit with Dr. Tineo.  Complains of 1.5 years of post-menopausal bleeding.                           EMB performed and showed      18- EUA, hysteroscopy D&C under ultrasound guidance.  Diffuse proliferative vascular endometrium noted.                         Pathology:                          FINAL DIAGNOSIS:                          ENDOMETRIAL CURETTINGS:                          - Endometrial endometrioid adenocarcinoma, FIGO grade 2                            COMMENT:                          While the tumor maintained a glandular morphology, the occasional high                          nuclear grade warranted upgrading to                          FIGO 2.      18- Patient reports that she continues to have some post-menopausal spotting      18 DEMETRIO/BSO and  staging:  PATH:  A. ANTERIOR FUNDUS, BIOPSY:   - Positive for adenocarcinoma     B. UTERUS, CERVIX, BILATERAL TUBES AND OVARIES, HYSTERECTOMY WITH   BILATERAL SALPINGO-OOPHORECTOMY:   - Endometrial endometrioid adenocarcinoma, FIGO grade 1   - Adenomyosis   - Cervix invaded by endometrial adenocarcinoma   - Right ovarian surface adhesions involved by endometrial adenocarcinoma   - Right fallopian tube with endometriosis   - Left adnexa invaded by endometrial adenocarcinoma (5.5 cm)     C. LYMPH NODES, LEFT PELVIC, EXCISION:   - Metastatic adenocarcinoma to one of eight lymph nodes (1/8)   - The metastatic focus is 3 mm in greatest dimension with no extranodal   extension     D. LYMPH NODES, LEFT PARA-AORTIC, EXCISION:   - One reactive lymph node, negative for malignancy (0/1)     E. LYMPH NODES, RIGHT PELVIC, EXCISION:   - Metastatic adenocarcinoma to one of six lymph nodes (1/6)   - The metastatic focus is 21 mm in greatest dimension with positive   extranodal extension     F. LYMPH NODES, RIGHT PARA-AORTIC, EXCISION:   - Three reactive lymph nodes, negative for malignancy (0/3)     G. OMENTUM, RESECTION:   - Omental adipose tissue with focal inflammation and surface mesothelial   hyperplasia   - Negative for malignancy       TUMOR     Tumor Site:         - Endometrium         - Lower uterine segment     Histologic Type:         - Endometrioid carcinoma, NOS     Histologic Grade:         - FIGO grade 1     Tumor Size: 5.5 Centimeters (cm)     Tumor Extent       Myometrial Invasion:           - Present         Depth of Invasion: 15 Millimeters (mm)         Myometrial Thickness: 15 Millimeters (mm)         Percentage of Myometrial Invasion: 100%       Adenomyosis:           - Present, involved by carcinoma       Uterine Serosa Involvement:           - Present       Lower Uterine Segment Involvement:           - Present         Level of Tumor Involvement:             - Myoinvasive       Cervical Stromal Involvement:            - Present       Other Tissue / Organ Involvement:           - Right ovary           - Left ovary           - Left fallopian tube       Peritoneal Ascitic Fluid:           - Negative for malignancy (normal / benign)     Accessory Findings       Lymphovascular Invasion:           - Present     MARGINS     Ectocervical / Vaginal Cuff Margin:         - Uninvolved by carcinoma     LYMPH NODES     Number of Pelvic Nodes with Macrometastasis: 2     Number of Pelvic Nodes with Micrometastasis: 0     Laterality of Pelvic Node(s) with Tumor:         - Right         - Left     Total Number of Pelvic Node(s) Examined (sentinel and nonsentinel): 14     Number of Pelvic Ruffin Nodes Examined: 0     Laterality of Pelvic Node(s) Examined:         - Right         - Left     Number of Para-Aortic Nodes with Macrometastasis: 0     Number of Para-Aortic Nodes with Micrometastasis: 0     Total Number of Para-Aortic Node(s) Examined (sentinel and nonsentinel):    4     Number of Para-Aortic Ruffin Nodes Examined: 0     Laterality of Para-Aortic Node(s) Examined:         - Right         - Left     PATHOLOGIC STAGE CLASSIFICATION (PTNM, AJCC 8TH EDITION)     Primary Tumor (pT):         - pT3a     Regional Lymph Nodes (pN)       Category (pN):           - pN1a     FIGO STAGE     FIGO Stage:         - IIIC1      4/29/2018: CT:  IMPRESSION:   1. No pulmonary embolism. Postop atelectasis.  2. Fluid-filled loops of dilated small bowel in the upper abdomen,  favored to represent adynamic ileus although incompletely visualized.  3. Pneumoperitoneum and small volume ascites, expected given  postoperative day 2 following open surgery.  4. Partially visualized 10 mm right thyroid nodule.     6/13/18: C1D1 carboplatin/paclitaxel- anaphylactic reaction to paclitaxel, no carboplatin received.    7/6/18: C1 single agent carboplatin AUC 6     7/27/18: C2 SA carboplatin AUC 6    8/17/18: C3 SA carboplatin AUC 6, deferred due to  thrombocytopenia    8-11/2018 RT: EBRT and brachytherapy.      2/2019: CT  IMPRESSION: In this patient with history of endometrial cancer status  post hysterectomy:  1. Increased size of a nodule in the posterior left upper lobe, which  now measures up to 6 mm (previously 3 mm. This is suspicious for  metastatic disease. Recommend short-term follow-up. Biopsy is an  option, though small size may limit yield.  2. Indeterminate mesenteric nodule, which may represent a mildly  enlarged lymph node. This is unchanged in size in comparison to  8/27/2018. Attention on follow-up.  3. Resolution of the previously seen heterogeneous irregularity of the  left psoas muscle. Biopsy 9/10/2018 compatible with granulomatous  inflammation and necrosis.    4/2019 CT  IMPRESSION: In this patient with history of endometrial cancer status  post hysterectomy, chemotherapy, and radiation:  1. Further slight increase in size of a now 7 mm subpleural nodule in  the superior segment of the left lower lobe, which remains suspicious  for metastatic disease.  2. New part solid part groundglass nodule measuring 4 mm in the  posterior medial left lower lobe could be infectious/inflammatory or  metastatic. Recommend attention on follow-up.  3. Continued stability of indeterminate pleural nodularity along the  right hemidiaphragm and a central mesenteric nodule. Recommend  continued attention on follow-up.  4. Mild diffuse bladder wall thickening with associated pericystic fat  stranding, likely sequela of radiation.        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  7/17/2012     Thyrotoxicosis 2007    hyperthyroid, enlarged right thyroid on thyroid uptake, treate by endocrin eclinic [facility] with tapozole till 12/2008, FNA neg of 1.9 cm right lobe dominat nodule     Whooping cough due to Bordetella pertussis (p. pertussis) infant    whooping cough       Past Surgical History:   Procedure Laterality Date     ARTHROPLASTY HIP Left 12/4/2017    Procedure: ARTHROPLASTY HIP;  Left total hip arthroplasty;  Surgeon: Rajinder Goodwin MD;  Location: [facility] OR     BREAST BIOPSY, RT/LT  2004    left breast     DILATION AND CURETTAGE, OPERATIVE HYSTEROSCOPY WITH MORCELLATOR, COMBINED N/A 4/11/2018    Procedure: COMBINED DILATION AND CURETTAGE, OPERATIVE HYSTEROSCOPY WITH MORCELLATOR;  Hysteroscopy, Dilation and Curettage Under Ultrasound Guidance ;  Surgeon: Adry Tineo MD;  Location: [facility] OR     DILATION AND CURETTAGE, WITH ULTRASOUND GUIDANCE  4/11/2018    Procedure: DILATION AND CURETTAGE, WITH ULTRASOUND GUIDANCE;;  Surgeon: Adry Tineo MD;  Location: [facility] OR     HYSTERECTOMY TOTAL ABDOMINAL, BILATERAL SALPINGO-OOPHORECTOMY, NODE DISSECTION, COMBINED Bilateral 4/27/2018    Procedure: COMBINED HYSTERECTOMY TOTAL ABDOMINAL, SALPINGO-OOPHORECTOMY, NODE DISSECTION;;  Surgeon: Bradley Tierney MD;  Location: [facility] OR     INSERT PORT VASCULAR ACCESS Right 5/31/2018    Procedure: INSERT PORT VASCULAR ACCESS;  Single Lumen Chest Power Port;  Surgeon: Jamila Major PA-C;  Location: [facility]     LAPAROSCOPIC HYSTERECTOMY TOTAL, BILATERAL SALPINGO-OOPHORECTOMY, NODE DISSECTION, COMBINED N/A 4/27/2018    Procedure: COMBINED LAPAROSCOPIC HYSTERECTOMY TOTAL, SALPINGO-OOPHORECTOMY, NODE DISSECTION;  Laparoscopic converted to open Removal Of Uterus, Tubes, Ovaries, Cervix, Pelvic and Para Aortic Lymphnode  Dissection, Tumor Debulking, CUSA, Partial Omentectomy, Anesthesia Block;  Surgeon: Bradley Tierney MD;  Location: [facility] OR     TONSILLECTOMY  1960    tonsillectomy       Current Outpatient Medications   Medication     calcium citrate-vitamin D (CITRACAL) 315-200 MG-UNIT TABS per tablet     FERROUS GLUCONATE PO     GLUCOSAMINE CHONDROITIN COMPLX OR TABS     iohexol (OMNIPAQUE) 140 MG/ML solution for oral use     multivitamin, therapeutic with minerals (THERA-VIT-M) TABS tablet     RANITIDINE HCL PO     VITAMIN C 250 MG OR TABS     warfarin (COUMADIN) 5 MG tablet     No current facility-administered medications for this visit.      Facility-Administered Medications Ordered in Other Visits   Medication     heparin 100 UNIT/ML injection 500 Units       Allergies   Allergen Reactions     Paclitaxel Anaphylaxis     reportedTaxol= anaphylaxsis     Nickel Rash       Family History   Problem Relation Age of Onset     Diabetes Mother         type 2     Heart Disease Mother      Hypertension Mother      C.A.D. Mother          after 2nd heart attack     Hypertension Father      C.A.D. Father         mild heart attack     Cancer Father         skin cancer     Heart Disease Father      Rheumatoid Arthritis Sister      Heart Disease Brother         herdetary heart murmur     Hyperlipidemia Brother      Clotting Disorder Brother         factor 5 leiden     Deep Vein Thrombosis Brother         leg     Hyperlipidemia Brother      Deep Vein Thrombosis Brother         leg; negative for clotting disorder     Kidney Disease No family hx of        Social History     Socioeconomic History     Marital status:      Spouse name: Doug     Number of children: 0     Years of education: Not on file     Highest education level: Not on file   Occupational History     Occupation: rn     Employer: [employer]   Social Needs     Financial resource strain: Not on file     Food insecurity:     Worry: Not on file     Inability:  Not on file     Transportation needs:     Medical: Not on file     Non-medical: Not on file   Tobacco Use     Smoking status: Never Smoker     Smokeless tobacco: Never Used   Substance and Sexual Activity     Alcohol use: Yes     Comment: rarely     Drug use: No     Sexual activity: Yes     Partners: Male   Lifestyle     Physical activity:     Days per week: Not on file     Minutes per session: Not on file     Stress: Not on file   Relationships     Social connections:     Talks on phone: Not on file     Gets together: Not on file     Attends Islam service: Not on file     Active member of club or organization: Not on file     Attends meetings of clubs or organizations: Not on file     Relationship status: Not on file     Intimate partner violence:     Fear of current or ex partner: Not on file     Emotionally abused: Not on file     Physically abused: Not on file     Forced sexual activity: Not on file   Other Topics Concern     Parent/sibling w/ CABG, MI or angioplasty before 65F 55M? 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  She does not smoke and was never smoker, she drinks alcohol about a month, no illicit drugs            Balanced Diet - Yes, in the last 6 months    Osteoporosis Prevention Measures - Dairy servings per day: 2 servings plus a supplement    Regular Exercise -  Yes Describe walks for 30 to 40 minutes 4 to 5 times a week    Dental Exam - YES - Date: 1 year ago, and is going today    Eye Exam - YES - Date: 4 months ago    Self Breast Exam - Yes    Abuse: Current or Past (Physical, Sexual or Emotional)- No    Do you feel safe in your environment - Yes    Guns stored in the home - Yes, locked    Sunscreen used - Yes    Seatbelts used - Yes    Lipids -  YES - Date: 10-02    Glucose -  YES - Date: 10-02    Colon Cancer Screening - No    Hemoccults - NO    Pap Test -  YES - Date: 10-02    Do you have  any concerns about STD's -  No    Mammography - YES - Date: 8-06    DEXA - NO    Immunizations reviewed and up to date - Yes, lst td 7-2000    10-23-07  MEL Mueller CMA           ROS  See below    I have reviewed the patient's ROS and discussed all pertinent information as noted in the HPI.    Physical Exam:  PS 1  VS: /67   Pulse 78   Temp 97.8  F (36.6  C) (Oral)   Wt 77.6 kg (171 lb 1.6 oz)   LMP 03/04/2005   SpO2 99%   BMI 28.47 kg/m        CONSTITUTIONAL: Alert non-toxic appearing female in no acute distress  HEAD: Normocephalic, atraumatic  ENT: Oropharynx pink without lesions  NECK: Neck supple without lymphadenopathy  RESPIRATORY: Lungs clear to auscultation, no increased work of breathing noted  CV: Regular rate and rhythm, S1S2, no clicks, murmurs, rubs, or gallops; bilateral lower extremities without edema  GASTROINTESTINAL: Abdomen soft, non-distended, and non-tender to palpation without masses or organomegaly; midline incision healed well  GENITOURINARY: Normal appearing external genitalia, vagina smooth without nodularity or masses, vaginal cuff intact, no masses palpated on bimanual exam. Rectal exam confirmed these findings.  LYMPHATIC: Cervical, supraclavicular, and inguinal nodes without lymphadenopathy  MUSCULOSKELETAL: Moves all extremities, no obvious muscle wasting  NEUROLOGIC: No gross deficits, normal gait  SKIN: Appropriate color for race, warm and dry, no rashes or lesions to unclothed skin  PSYCHIATRIC: Pleasant and interactive, affect bright, makes appropriate eye contact, thought process linear      Assessment/Plan:  1) Stage IIIC1 low grade endometrial cancer: S/p EBRT and vaginal cuff therapy - did not tolerate chemo (Taxol reaction and neutropenia) - with CT findings suggestive of possible small lung lesion.  I had receommendedf a biopsy, which was felt to be infeasible owing to small size and location. The mass has grown albeit minimally in the last 8 weeks, so we have  again discussed options incliuding biopsy and pre-emptive treatment.  She remains without symmptoms at this time    After a comprehensive discussion or the options and their risks and benefits she is inclined to rescan in 3-4 months with an eye to biopsy if the mass continues to grow versus continued observation if stable    2) PE: Asymptomatic, tolerating Lovenox injections. She is interested in switching to coumadin which is reasonable.  She will take this up with her primary MD, but we dicussed lifelong anti-coagulation given her multiple events and her FH which also included 2 siblings on lifelong anti-coagulation.    3) Genetic counseling: MMR proteins with intact nuclear expression making Peterson syndrome unlikely.    4) Health maintenance issues discussed include to follow up with PCP for non-gynecologic concerns and co-morbid conditions    5) Patient verbalized understanding of and agreement with plan    25 minutes spent with patient, over 50% of which was spent on counseling and coordination of care.    Bradley Tierney

## 2019-05-21 ENCOUNTER — OFFICE VISIT (OUTPATIENT)
Dept: FAMILY MEDICINE | Facility: CLINIC | Age: 64
End: 2019-05-21
Payer: OTHER GOVERNMENT

## 2019-05-21 VITALS
HEART RATE: 66 BPM | OXYGEN SATURATION: 99 % | RESPIRATION RATE: 16 BRPM | DIASTOLIC BLOOD PRESSURE: 88 MMHG | SYSTOLIC BLOOD PRESSURE: 128 MMHG | BODY MASS INDEX: 27.79 KG/M2 | TEMPERATURE: 97.6 F | WEIGHT: 167 LBS

## 2019-05-21 DIAGNOSIS — I26.99 OTHER ACUTE PULMONARY EMBOLISM WITHOUT ACUTE COR PULMONALE (H): ICD-10-CM

## 2019-05-21 DIAGNOSIS — Z90.722 S/P TAH-BSO: ICD-10-CM

## 2019-05-21 DIAGNOSIS — R10.13 EPIGASTRIC PAIN: Primary | ICD-10-CM

## 2019-05-21 DIAGNOSIS — M47.9 DEGENERATION OF SPINE: ICD-10-CM

## 2019-05-21 DIAGNOSIS — M19.049 ARTHRITIS OF HAND: ICD-10-CM

## 2019-05-21 DIAGNOSIS — Z79.01 CHRONIC ANTICOAGULATION: ICD-10-CM

## 2019-05-21 DIAGNOSIS — C54.1 ENDOMETRIAL CANCER (H): ICD-10-CM

## 2019-05-21 DIAGNOSIS — R10.10 UPPER ABDOMINAL PAIN: ICD-10-CM

## 2019-05-21 DIAGNOSIS — Z90.710 S/P TAH-BSO: ICD-10-CM

## 2019-05-21 DIAGNOSIS — Z86.718 HISTORY OF DEEP VENOUS THROMBOSIS: ICD-10-CM

## 2019-05-21 DIAGNOSIS — Z90.79 S/P TAH-BSO: ICD-10-CM

## 2019-05-21 DIAGNOSIS — N20.0 CALCULUS OF LEFT KIDNEY: ICD-10-CM

## 2019-05-21 DIAGNOSIS — N18.2 CKD (CHRONIC KIDNEY DISEASE) STAGE 2, GFR 60-89 ML/MIN: ICD-10-CM

## 2019-05-21 DIAGNOSIS — R91.8 PULMONARY NODULES: ICD-10-CM

## 2019-05-21 PROBLEM — E78.5 HYPERLIPIDEMIA, UNSPECIFIED HYPERLIPIDEMIA TYPE: Status: RESOLVED | Noted: 2017-11-12 | Resolved: 2019-05-21

## 2019-05-21 PROBLEM — R79.0 LOW FERRITIN: Status: RESOLVED | Noted: 2018-07-19 | Resolved: 2019-05-21

## 2019-05-21 PROBLEM — Z51.11 ENCOUNTER FOR ANTINEOPLASTIC CHEMOTHERAPY: Status: RESOLVED | Noted: 2018-07-06 | Resolved: 2019-05-21

## 2019-05-21 LAB
ALBUMIN SERPL-MCNC: 3.4 G/DL (ref 3.4–5)
ALP SERPL-CCNC: 100 U/L (ref 40–150)
ALT SERPL W P-5'-P-CCNC: 21 U/L (ref 0–50)
ANION GAP SERPL CALCULATED.3IONS-SCNC: 9 MMOL/L (ref 3–14)
AST SERPL W P-5'-P-CCNC: 16 U/L (ref 0–45)
BASOPHILS # BLD AUTO: 0 10E9/L (ref 0–0.2)
BASOPHILS NFR BLD AUTO: 0.6 %
BILIRUB SERPL-MCNC: 0.7 MG/DL (ref 0.2–1.3)
BUN SERPL-MCNC: 19 MG/DL (ref 7–30)
CALCIUM SERPL-MCNC: 8.6 MG/DL (ref 8.5–10.1)
CHLORIDE SERPL-SCNC: 108 MMOL/L (ref 94–109)
CO2 SERPL-SCNC: 24 MMOL/L (ref 20–32)
CREAT SERPL-MCNC: 1 MG/DL (ref 0.52–1.04)
DIFFERENTIAL METHOD BLD: ABNORMAL
EOSINOPHIL # BLD AUTO: 0.1 10E9/L (ref 0–0.7)
EOSINOPHIL NFR BLD AUTO: 3.5 %
ERYTHROCYTE [DISTWIDTH] IN BLOOD BY AUTOMATED COUNT: 14.8 % (ref 10–15)
FERRITIN SERPL-MCNC: 12 NG/ML (ref 8–252)
GFR SERPL CREATININE-BSD FRML MDRD: 60 ML/MIN/{1.73_M2}
GLUCOSE SERPL-MCNC: 92 MG/DL (ref 70–99)
HCT VFR BLD AUTO: 41.1 % (ref 35–47)
HGB BLD-MCNC: 12.9 G/DL (ref 11.7–15.7)
INR PPP: 2.05 (ref 0.86–1.14)
LACTATE BLD-SCNC: 0.5 MMOL/L (ref 0.7–2)
LIPASE SERPL-CCNC: 150 U/L (ref 73–393)
LYMPHOCYTES # BLD AUTO: 0.5 10E9/L (ref 0.8–5.3)
LYMPHOCYTES NFR BLD AUTO: 17 %
MCH RBC QN AUTO: 30.2 PG (ref 26.5–33)
MCHC RBC AUTO-ENTMCNC: 31.4 G/DL (ref 31.5–36.5)
MCV RBC AUTO: 96 FL (ref 78–100)
MONOCYTES # BLD AUTO: 0.4 10E9/L (ref 0–1.3)
MONOCYTES NFR BLD AUTO: 13.5 %
NEUTROPHILS # BLD AUTO: 2.1 10E9/L (ref 1.6–8.3)
NEUTROPHILS NFR BLD AUTO: 65.4 %
PLATELET # BLD AUTO: 144 10E9/L (ref 150–450)
POTASSIUM SERPL-SCNC: 3.9 MMOL/L (ref 3.4–5.3)
PROT SERPL-MCNC: 6.8 G/DL (ref 6.8–8.8)
RBC # BLD AUTO: 4.27 10E12/L (ref 3.8–5.2)
SODIUM SERPL-SCNC: 139 MMOL/L (ref 133–144)
WBC # BLD AUTO: 3.2 10E9/L (ref 4–11)

## 2019-05-21 PROCEDURE — 83605 ASSAY OF LACTIC ACID: CPT | Performed by: FAMILY MEDICINE

## 2019-05-21 PROCEDURE — 36415 COLL VENOUS BLD VENIPUNCTURE: CPT | Performed by: FAMILY MEDICINE

## 2019-05-21 PROCEDURE — 85025 COMPLETE CBC W/AUTO DIFF WBC: CPT | Performed by: FAMILY MEDICINE

## 2019-05-21 PROCEDURE — 99214 OFFICE O/P EST MOD 30 MIN: CPT | Performed by: FAMILY MEDICINE

## 2019-05-21 PROCEDURE — 85610 PROTHROMBIN TIME: CPT | Performed by: FAMILY MEDICINE

## 2019-05-21 PROCEDURE — 82728 ASSAY OF FERRITIN: CPT | Performed by: FAMILY MEDICINE

## 2019-05-21 PROCEDURE — 80053 COMPREHEN METABOLIC PANEL: CPT | Performed by: FAMILY MEDICINE

## 2019-05-21 PROCEDURE — 83690 ASSAY OF LIPASE: CPT | Performed by: FAMILY MEDICINE

## 2019-05-21 NOTE — PATIENT INSTRUCTIONS
Can do prilosec 20 mg daily before breakfast 2 weeks  continue ranitidine  Hold lime / citrus and vit c 2 weeks  If not better see GI / do u/s / ct abdomen   Labs today and then can decide about coming off ferrous gluconate. Pt etc   Try tylenol, warm pack to belly,   Go to the ER if worse  Physical due  continue with INr checks with [facility]  Continue care with oncology  Consider shingrex vaccine

## 2019-05-21 NOTE — RESULT ENCOUNTER NOTE
Flor Gupta Roman,  Your results came back making bowel ischemia unlikely to be cause of current pain. If you have any further concerns please do not hesitate to contact us by message, phone or making an appointment.  Have a good day   Dr Soren MATHIS

## 2019-05-21 NOTE — RESULT ENCOUNTER NOTE
Flor Ms. Owens,  Your results came back and are within acceptable limits. -Liver and gallbladder tests (ALT,AST, Alk phos,bilirubin) are normal.  -Kidney function (GFR) is decreased.  But stable to prior  -Sodium is normal.  -Potassium is normal.  -Calcium is normal.  -Glucose is normal.  Ferritin remains on lower end of normal and would recommend to continue taking iron daily and recheck ferritin in 2 months.   If you have any further concerns please do not hesitate to contact us by message, phone or making an appointment.  Have a good day   Dr Soren MATHIS

## 2019-05-21 NOTE — PROGRESS NOTES
Subjective     Gerri Owens is a 64 year old female who presents to clinic today for the following health issues:    HPI   Abdominal Pain      Duration: 3 weeks    Description (location/character/radiation): upper abdominal.        Associated flank pain: once day only    Intensity:  moderate    Accompanying signs and symptoms:        Fever/Chills: no        Gas/Bloating: YES- in the beginning        Nausea/vomiting: YES-once vomiting        Diarrhea: no        Dysuria or Hematuria: no     History (previous similar pain/trauma/previous testing): none    Precipitating or alleviating factors:       Pain worse with eating/BM/urination: none       Pain relieved by BM: no     Therapies tried and outcome: probiotics     LMP:  not applicable    Thinks stomach pain ( points to epigastric/ upper abdomen) started about 3 weeks ago. Currently has no pain, is not constant. Occurs about 1 to 2 a day. Usually start with a sense of nausea, if catch's it right away like with sitting down & pull her knees to her chest then it wont escalate but if working like doing dish's or if walking it will get worse but not sharp. If pays no attention to it, it gets intense. Feels in off midline in upper abdomen, radiate a bit. Does not occur in relation to eating so doesnt think its an ulcer. Usually happens in am, sometimes precedes having a BM. No constipation, has a BM once a day as usual, its regular soft stool. No diarrhea. No blood or black stools. Is urinating fine. Has no pain with urination. Has no pain with having the BM. Once threw up before she figured out how to manage it, better after that & not vomited since. ? More gas , feels burping more, Doesnt like to go to the doctor,  But told several people, told to drink ore water. Not specifically pre and probiotics. So doing apple and yoghurt and switched to one more bacteria and one of each of pre and probiotic. Also taking apple cider vinegar with the mother in it. Also did one  time take gasex. Still on ranitidine twice a day but has not noted a difference. No GERD like symptoms. Feels down in upper abdomen. Weight down a bit last week as didn't eat anything one day after the emesis. Wt 167 lb down from 171 lbs and 169 previously. Not significant. Nothing tried eliminated any of her symptoms though. Has had no radiation to legs or back. No rash or bruising. No leg swelling     1 month ago ct chest/ abdomen / pelvis done for cancer surveillance was negative.   Recently has been moving stuff, relocated from [facility] to [place] mid April where she grew up and has family is not sure if she pulled a muscle. Ct showed stable mesenteric nodule. Eventually will have to find a new doctor there and set up with [facility] too.    HLd resolved as last lipids in 2018 were normal, would like that resolved off her problem list.   Arthritis spine on ct and notes feels and sees arthritic changes in her hands too. Back not bother her. Has OA hip.     No fever or chills, no headache or dizziness, no double or blurry vision, no facial pain, earache, sore throat, runny nose, post nasal drip, no trouble hearing, smelling, tasting or swallowing, no cough, no chest pain, trouble breathing or palpitations, No weight loss or night sweats. No dysuria, hematuria, frequency, urgency, hesitancy, incontinence, No pelvic complaints. No leg swelling or joint pain. No rash.    BACKGROUND  Hx of endometrial cancer S/ DEMETRIO/ BSO, chemo & radiation, hx of prior DVT, new PE as of 7/2018 on anticoagulation, overweight, HLD, OA B/l hips S/p left hip arthroplasty, left kidney stone, CKD2, anemia secondary to chemo, low ferritin  Retired nurse (behavioral health) with Hx of obesity, BMI 34 now 27, HLD on diet control, prior DVT RUE 2012 unknown causes reported negative hypercoagulable workup, treated with warfarin 1 year, resolved post phelbetic syndrome RUE, Two brothers with hx of DVT. One had a factor problem but reports she  was checked and didn't have it. One brother remains on life long coumadin. History of right upper extremity DVT March 2012, subclavian vein, no clear provoking factors such as a PICC line.  She postulated that it was due to carrying a very heavy purse on her shoulder all the time. Labs on 3/29/12 for antiphospholipid antibodies, lupus anticoagulant, factor V Leiden, prothrombin gene mutation, Antithrombin, protein C, protein S free, were all normal.  She was anticoagulated for 6 months. notes then had maybe phlebitis in both legs 1 yr out  after that but never seen for it. Since then wore compression socks which helped with post phlebitic syndrome. Along time ago she had hyperthyroidism, thyrotoxicosis, treated with methimazole, it cleared up and not had since. Is euthyroid clinically and chemically. Hx of prior tonsillectomy, left breast biopsy, hx of back spasm, allergic to nickel and taxol, occasional GERD with prn use of OTC PPI in the past now on ranitidine, seen in 6/2016 and given flexeril and did PT with good resolution. Hx of CKD2 and left kidney stone, OA B/l hands, OA B/l hips, S/p Left PRESLEY 2017, endometrial cancer S/p lap hysterectomy converted to open DEMETRIO, BSO and node dissection 4/2018, on antineoplastic treatment , anaphylaxis with taxol on single carboplatin sandwich protocol, S/p radiation, with newly diagnosed PE in 7/2018 & since  on anticoagulation with Lovenox and on iron for low ferritin, under care of gynonc, hematology and radiation oncology.  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 No sign of stroke, referred to PT and ortho, seen by PT 8/8/17, after PT felt pain mostly in her hips, seen by ortho 8/15/17 had moderate OA on xray. Had left interarticular injection 8/24/17 with significant pain improvement, made good progress with PT completed 10/10/17, seen by ortho 11/2 discussed arthroplasty for primary OA hips, scheduled for Left PRESLEY 12/4/17   and right PRESLEY 1/15/18.  Seen November 14, 2017 for pre op.  CBC was normal.  Creatinine was elevated at 1.6 with GFR down at 40%.  Hepatitis C was negative.  Was recommended to stop NSAID'S, PPI's and increase fluid intake.  Recheck GFR 11/19 was 49% and creatinine had improved and recheck again 11/22 showed further improvement with GFR 60%.  Underwent left hip arthroplasty 12/4/2017 and discharged on postop day 2 with good progress and received OT, PT and social work consult in house.  Seen 12/14 for staple removal.  Seen 1/30/2018 postop doing well on physical therapy.  Was on Lovenox post op empirically.  Seen by Gyn 2/13 for abnormal uterine bleeding of 1-1/2 years, Pap was normal opted, to get her endometrial biopsy done under anesthesia.  Mammogram done was slightly abnormal but diagnostic studies were done and those were normal.  Seen by nephrology 3/13 /18. noted had Stage II CKD-baseline serum creatinine of 0.8 mg/dL and GFR of 69 ml/min/1.73m2 BSA likely from chronic NSAID'S use. Low range proteinuria, protein-to-creatinine ratio was 0.22 G/gCr. Microscopic hematuria( hpf) was likely secondary to ongoing uterine bleeding. Recommended a repeat urine analysis after her uterine bleeding had resolved and further evaluation by urologist if her hematuria persisted, was advised to avoid NSAID'S, follow up with nephrology clinic as needed, Electrolytes/ Acid/Base status were within acceptable limits. Her blood pressure was stable & controlled. She was not on antihypertensive medications at home. Euvolemic on exam. Anemia of acute blood loss secondary to blood loss during Left PRESLEY in 12/2017 had resolved and Hgb was within normal limits of 13.8 G/dl and iron repleted.  Had a normal serum calcium and phosphorus & Normal PTH level. She did have elevated vitamin D level of 78 ug/L and was advised to discontinue vitamin D supplements. For her GERD was advised to continue ranitidine & avoid PPI's. Renal  ultrasound showed a stone of 7 mm nonobstructive in left mid kidney.  Noted occasional cramps relieved with Tylenol.  Managed her hip pain with ice and rest.  Planned  to retire by the summer 2018   Post hip surgery was on Lovenox 3 weeks then discontinued. Continued with her compression socks daily.  Seen 3/22/18 for preop for hysteroscopy, LDL was elevated and ASCVD was 3.7 %. Seen by ortho 4/3 and doing well. Deferred right hip surgery. Seen 4/11 for EUA, hysteroscopy and D & C under u/S guidance and biopsy showed endometrial cancer and referred to gyn onc. Seen by gyn onc and on  4/27 underwent Laparoscopic converted to open hysterectomy, bilateral salpingo-oophorectomy, pelvic and para-aortic lymph node dissection, tumor debulking, CUSA, partial omentectomy. with FIGO grade 1 endometrioid endometrial adenocarcinoma, FIGO stage IIIC1.  Pathology showed cervical stromal involvement, 100% myometrial invasion with uterine serosal involvement, bilateral ovarian and left fallopian tube involvement, positive anterior surface margin, 2 of 14 pelvic and 0 of 4 periaortic lymph nodes involved, one with extranodal extension. FIGO grade 1 with cervical and left adnexa invasion & Right fallopian tube endometriosis. Post op noted some tachycardia and hypoxia but CT was neg for PE and thought due to post op anemia. Discharged 5/2/18 and declined sleep eval.  Sutures removed 5/7. Seen by gynonc 5/14 approximately 5 weeks status post laparoscopic converted to open total abdominal hysterectomy, BSO, PPALND and omentectomy. On 5/30 completed 28 days of Lovenox and on 5/31 port placed by IR.  On 6/1 she was scheduled to start adjuvant carboplatin and paclitaxel chemotherapy sandwich protocol chemoradiation which was delayed due to low white blood cell count and referred to hematology. Seen 6/6 by hematology Dr Rapp for mild neutropenia that resolved with normal B12, TSH and low ferritin, was advised daily ferrous gluconate and  approved to start chemo. Seen by radiation oncology 6/6. On 6/13 within 5 min of receiving first time taxol had flushing, cough, SATs dropped to 70 %, was short of breath and had to get oxygen, epi, iv solumedrol and Benadryl and finally came to baseline and chemo since changed to single agent carboplatin. Seen 7/6 by gyn onc in clinic for her dose of carboplatin. She reported a 3 week history of a dry cough, since her previous infusion reaction. In the clinic she was tachy to ~110 with a BP of ~90/60. Chest CT showed segmental pulmonary emboli in the right upper lobe and lingula, no evidence of heart strain. Unable to obtain an echocardiogram in clinic and so was admitted to the hospital for further observation, therapeutic Lovenox administration, and echocardiogram. She denied any other symptoms including headache, chest pain, dyspnea, racing heart, coughing anything up. She had GERD at baseline, denied n/v. Had had a few recent episodes of stool incontinence since she started her carboplatin infusion. She denied noticing any LE edema. She wore rosemary hose at baseline because of her history of DVT and her family history. Echo showed normal right and left ventricular function with EF of 60-65%. All valves normal appearing with trace mitral insufficiency present. IVC and aortic root are normal appearing. Was discharged on 7/7 on Lovenox. Heparin 10a on 7/13 was at expected level.   Seen 7/19 hospital d/C follow up, S/p DEMETRIO BSO, node dissection now on sandwich chemoradiation. Had anaphylaxis with taxol so just received carboplatin one infusion. On Lovenox likely for life for PE, prior DVT. Not taking iron as directed by hematology. Had at home. Trying to get via diet as it upset her stomach.  Noted weight loss related to nausea from chemo denied any significant anxiety & had not used lorazepam given by oncologist.  Felt good when she was not getting the chemo.  UA was negative for RBCs at that time.  Had asymptomatic  kidney stones.  Mild low hemoglobin, resolved to restart iron. CMP showed decreased GFR improved fluid intake and it improved on recheck with oncology, lipids were normal, HIV test was negative. FIT ordered but not sent in yet.  Seen by Gyn on the 7/27th for stage III C1 low-grade endometrial cancer to continue cycle 2 of carboplatin which was given that same day, noted fatigue secondary to chemo induced anemia as well as from chemotherapy itself, noted had restarted iron and to consider transfusion if hemoglobin dropped further & advised to increase protein in diet. Hemoglobin was 8.1. CMP was normal. magnesium was 2. Heparin x-ray was normal at 1.03 and no dose adjustments made.  Seen 8/7/18 for e coli UTI treated with bactrim. Seen by gyn onc 8/17 for [id number] low grade endometrial cancer, fatigue, weight loss, given meds for nausea. Genetic testing negative for MMR & Peterson. Had infusion 8/24. Seen by radiation oncology, not tolerated chemo secondary to cytopenia, to start sandwich radiation. Ct showed left necrotic psoas, biopsy done showed no cancer on 9/10/18. FIT was negative. Seen by hematology 9/13 for low cell counts, advised bone marrow biopsy but declined & anemia improved as got further from chemo. Started radiation 9/27, 10/11, 10/18 & 10/25. Seen by gyn onc 10/11 & flu shot given.seen then 11/12 by gyn onc Thus far, has received 2 cycles of chemotherapy (2 cycles of single agent carboplatin AUC6) followed by EBRT and vaginal brachytherapy, just completed 11/9/18. Fatigued had improved. Tolerating regular diet without nausea or vomiting. She did have some diarrhea, controlled with imodium. Did have a little urinary incontinence since the vaginal brachytherapy, but no hematuria. Had vaginal spotting once. Has a dilator, but hadn't started using yet. Stated she felt great. Determined no further chemo & to recheck ct in 3 months. & to be on life long anticoagulation so enquired about warfarin.      Seen  11/19/18 when done with chemo and radiation. Was told she was cancer free & to get surveillance with a ct in 3 months. Cbc showed low wbc and platelets seen by hematology. To see gyn onc in feb.  Last radiation was 10 days prior. Weight had been stable. Was on Lovenox injection twice a day. Hx of DVT in right arm 6 yrs prior, and recent PE no anticoagulation disorder, completed radiation for endometrial cancer S/p DEMETRIO BSO but could only do 2 cycles of full chemo due to pancytopenia limiting further use, in remission under care of gyn onc. Needed lifelong anticoagulation. Stared on warfarin 5 mg at 6 pm daily. Enrolled in [facility] clinic.seen by [facility] and had intermittent port flushes since then till seen by oncology 2/2019 for follow up of  Stage IIIC1 low grade endometrial cancer: S/p chemotherapy (6 cycles of single agent carboplatin AUC6- did not tolerate chemo (Taxol reaction and neutropenia)) followed by EBRT and vaginal brachytherapy, completed 11/9/18. Fatigued had improved. Was tolerating regular diet without nausea or vomiting. She did have some diarrhea, controlled with imodium. Did have a little urinary incontinence since the vaginal brachytherapy, but no hematuria. Had vaginal spotting one day. Had a dilator, but hadn't started using it yet.  CT findings were suggestive of possible small lung lesion. Biopsy, was felt to be infeasible owing to small size and location.     Ct abdomen 4/26 showed Further slight increase in size of a now 7 mm subpleural nodule in the superior segment of the left lower lobe, which remained suspicious for metastatic disease. New part solid part ground glass nodule measuring 4 mm in the posterior medial left lower lobe could be infectious/inflammatory or metastatic. Recommended attention on follow-up. Continued stability of indeterminate pleural nodularity along the right hemidiaphragm and a central mesenteric nodule. Recommended continued attention on follow-up.Mild diffuse bladder  wall thickening with associated pericystic fat stranding, likely sequela of radiation Abdomen and pelvis: Focal fat deposition along the falciform ligament. No focal hepatic lesion. The gallbladder, pancreas, spleen (with  splenules anteriorly), adrenal glands, and kidneys (with the exception of an unchanged cyst in the posterior mid to upper pole of the left kidney), were normal. Had no hydronephrosis, hydroureter, or urinary tract  stone. Mild circumferential bladder wall thickening with trace pericystic fat stranding. Surgical changes of hysterectomy and salpingo-oophorectomy. Trace free fluid in the pelvis. No bowel obstruction or inflammation. Stable prominent upper abdominal and periaortic lymph nodes. Unchanged 9 x 7 mm enhancing nodule in the  central mesentery (series 3 image 429). The major abdominal vessels were patent.  Seen again by oncology 4/29/19 . Ct scan showed the mass had grown albeit minimally in the last 8 weeks, so discussed options including biopsy and pre-emptive treatment.  She remained without symptoms & After a comprehensive discussion of the options and their risks and benefits she decided to rescan in 3-4 months with an eye to biopsy if the mass continued to grow versus continued observation if stable  [place]  shows received hydromorphone 2 mg # 40 on 12/6/17, oxycodone 5 mg # 25 on 4/30/18 & lorazepam 2 mg # 15 on 6/1/18     Reviewed and updated as needed this visit by Provider         Review of Systems   ROS COMP: Constitutional, HEENT, cardiovascular, pulmonary, GI, , musculoskeletal, neuro, skin, endocrine and psych systems are negative, except as otherwise noted.      Objective    /88 (BP Location: Right arm, Patient Position: Sitting, Cuff Size: Adult Regular)   Pulse 66   Temp 97.6  F (36.4  C) (Oral)   Resp 16   Wt 75.8 kg (167 lb)   LMP 03/04/2005   SpO2 99%   BMI 27.79 kg/m    Body mass index is 27.79 kg/m .  Physical Exam   GENERAL: healthy, alert and no  distress  EYES: Eyes grossly normal to inspection, PERRL and conjunctivae and sclerae normal  HENT: ear canals and TM's normal, nose and mouth without ulcers or lesions  NECK: no adenopathy, no asymmetry, masses, or scars and thyroid normal to palpation  RESP: lungs clear to auscultation - no rales, rhonchi or wheezes  CV: regular rate and rhythm, normal S1 S2, no S3 or S4, no murmur, click or rub, no peripheral edema and peripheral pulses strong  ABDOMEN: soft, non tender, no hepatosplenomegaly, no masses and bowel sounds normal  MS: no gross musculoskeletal defects noted, no edema, some arthritic changes fingers  SKIN: no suspicious lesions or rashes  NEURO: Normal strength and tone, mentation intact and speech normal  PSYCH: mentation appears normal, affect normal/bright    Diagnostic Test Results:  Labs reviewed in Epic  No results found for this or any previous visit (from the past 24 hour(s)).        Assessment & Plan       ICD-10-CM    1. Epigastric pain R10.13 CBC with platelets differential     Comprehensive metabolic panel     Lipase     H Pylori antigen, stool   2. Upper abdominal pain R10.10 CBC with platelets differential     Comprehensive metabolic panel     Lipase     INR     Ferritin     Lactic acid whole blood     CANCELED: Lactic acid   3. Endometrial cancer (H) C54.1 CBC with platelets differential     Comprehensive metabolic panel   4. S/P DEMETRIO-BSO Z90.710 CBC with platelets differential    Z90.722 Comprehensive metabolic panel    Z90.79    5. Calculus of left kidney N20.0 CBC with platelets differential     Comprehensive metabolic panel   6. CKD (chronic kidney disease) stage 2, GFR 60-89 ml/min N18.2 CBC with platelets differential     Comprehensive metabolic panel   7. History of deep venous thrombosis Z86.718 CBC with platelets differential     Comprehensive metabolic panel   8. Other acute pulmonary embolism without acute cor pulmonale (H) I26.99 CBC with platelets differential      "Comprehensive metabolic panel   9. Chronic anticoagulation Z79.01 CBC with platelets differential     Comprehensive metabolic panel   10. Pulmonary nodules R91.8 CBC with platelets differential     Comprehensive metabolic panel   11. Arthritis of hand M19.049    12. Degeneration of spine M47.819      Exam benign. Vital stable. No surgical abdomen. No hernia or shingles seen. Hx not suggestive of UTI. Labs so far and recent ct make a malignancy, or obstruction or gallbladder attack or bowel ischemia or pancreatitis less likely. Suspect some gastritis and possible pulled muscle from recent move. Can do Prilosec 20 mg daily before breakfast 2 weeks then stop while continuing ranitidine. Hold lime / citrus and vit C 2 weeks.  Check stool for H Pylori. If not better see GI / do u/S / ct abdomen. Declines to do ct today as just had one month ago was unremarkable abdominal wise and plans to get another with oncology in July. Labs today and then can decide about coming off ferrous gluconate. Can consider PT in future. Try tylenol, warm pack to belly, Go to the ER if worse. Physical due. Continue with [facility] checks with [facility]. Continue care with oncology. Consider shingrex vaccine. Is planning O switch PCP and acc clinic to one in [place]. arthritis hands not bothering her. No back issues just noted degeneration on her recent ct. HLD resolved off problem list given lipids wnl last fall.     BMI:   Estimated body mass index is 27.79 kg/m  as calculated from the following:    Height as of 2/18/19: 1.651 m (5' 5\").    Weight as of this encounter: 75.8 kg (167 lb).   Weight management plan: Discussed healthy diet and exercise guidelines        Work on weight loss  Regular exercise  Return in about 1 month (around 6/18/2019) for Physical Exam with PCP.    Karo Doty MD  [facility]      "

## 2019-05-21 NOTE — RESULT ENCOUNTER NOTE
Flor Ms. Owens,  Some of your results came back and are within acceptable limits. -Normal red blood cell (hgb) levels, decreased white blood cell count but improved from prior and normal platelet levels. Rest of labs not back. If you have any further concerns please do not hesitate to contact us by message, phone or making an appointment.  Have a good day   Dr Soren MATHIS

## 2019-05-22 DIAGNOSIS — R10.13 EPIGASTRIC PAIN: ICD-10-CM

## 2019-05-22 PROBLEM — R91.8 PULMONARY NODULES: Status: ACTIVE | Noted: 2019-05-22

## 2019-05-22 PROCEDURE — 87338 HPYLORI STOOL AG IA: CPT | Performed by: FAMILY MEDICINE

## 2019-05-22 NOTE — RESULT ENCOUNTER NOTE
Flor Ms. Owens,  Your results came back and are within acceptable limits. Normal lipase makes pancreatitis less likely and INR is within range. Continue current warfarin dosing and establish with coumadin clinic in [place] in next couple weeks to get further monitoring and management. . If you have any further concerns please do not hesitate to contact us by message, phone or making an appointment.  Have a good day   Dr Soren MATHIS

## 2019-05-23 LAB
H PYLORI AG STL QL IA: NORMAL
SPECIMEN SOURCE: NORMAL

## 2019-05-23 NOTE — RESULT ENCOUNTER NOTE
Flor MsAarti Roman,  Your results came back negative for h pylori   If you have any further concerns please do not hesitate to contact us by message, phone or making an appointment.  Have a good day   Dr Soren MATHIS

## 2019-05-28 NOTE — TELEPHONE ENCOUNTER
Dr. Doty-Please review and advise if there is any other medication you recommend.    Thank you!  JAMIA AngelN, RN

## 2019-05-28 NOTE — TELEPHONE ENCOUNTER
Can try sucralfate 10 mg 4 times a day 10 days. Where should I send it since she now lives in [facility]?  If not better or recurs after that needs to see GI to get a scope and or repeat ct ( has a ct abdomen coming up with her oncologist but not till July  Also recommend establishing care with new provider in [place] and [facility] there

## 2019-06-05 NOTE — PROGRESS NOTES
Subjective     Gerri Owens is a 64 year old female who presents to clinic today for the following health issues:    HPI   New Patient/Transfer of Care  At this time, past medical history, current medications, allergies and drug sensitivities, immunizations, habits and life style, family history, and social history are reviewed and updated. She is due for the Zoster vaccine. Will check with her pharmacy regarding coverage.     Endometrial Cancer:   Currently following with oncology. S/p hysterectomy, chemotherapy, and radiation - did not tolerate chemo (Taxol reaction and neutropenia).  Recent CT also showed possible new small lung lesion. Her oncologist had receommended a biopsy, which was felt to be infeasible owing to small size and location. The mass has grown albeit minimally in the last 8 weeks. After discussion with oncologist, they plan to rescan in 3-4 months with an eye to biopsy if the mass continues to grow versus continued observation if stable. He currently denies chest pain, shortness of breath, fevers, or cough.     CKD:   Has been stable. Avoids NSAIDs and limits sodium.     H/O DVT/Current PE:   Patient notes that she had her first DVT in 2007 in her right upper arm. This was unprovoked. She then was on Coumadin times 12 months. Workup did not show anything abnormal. Was negative for Factor V Leiden and protein S and C deficiency. She then went through chemo for endometrial cancer in the summer of 2018 and developed a PE. This, however, was a SE of the chemo she was receiving. She was placed on Lovenox injections and then switched to Coumadin. She also has a brother with Factor V Leiden who has had several DVTs and another brother without this that has also had DVTs. We dicussed lifelong anti-coagulation given her multiple events and her FH which also included 2 siblings on lifelong anti-coagulation. Goal INR 2-3.     Epigastric Pain:   Patient complains of epigastric pain for about the past 5  "weeks. She was seen in the [facility] for these symptoms and work up was started. On 5/21, her CBC, CMP, and lipase were unremarkable. Stool was also tested for H. Pylori and was negative. She had been taking ranitidine and was then placed on omeprazole in addition to the ranitidine. She, however, did not feel it helped and was then placed on Carafate. She also did not feel this helps. Today she tells me that since her appointment on 5/21, her epigastric pain has worsened. She describes it as a burning sensation that worsens with food. Relieved after vomiting. No blood noted in vomit. Denies fevers. No chest pain or wheezes. No dysuria. No hematuria. No melena. Weight is down on her scale about 5 pounds since her visit on 5/21/19. She notes that she currently does not have pain. She states that she has been eating a \"BRAT\" diet for the past 2 days and feels much better. No current nausea or vomiting. No flank pain. Does not take NSAIDs. Does not drink alcohol. She did have an abdominal CT done on 4/26/19.     Results: Abdomen and pelvis: Focal fat deposition along the falciform ligament.  No focal hepatic lesion. The gallbladder, pancreas, spleen (with  splenules anteriorly), adrenal glands, and kidneys (with the exception  of an unchanged cyst in the posterior mid to upper pole of the left  kidney), are normal. No hydronephrosis, hydroureter, or urinary tract  stone. Mild circumferential bladder wall thickening with trace  pericystic fat stranding. Surgical changes of hysterectomy and  salpingo-oophorectomy. Trace free fluid in the pelvis. No bowel  obstruction or inflammation. Stable prominent upper abdominal and  periaortic lymph nodes. Unchanged 9 x 7 mm enhancing nodule in the  central mesentery (series 3 image 429). The major abdominal vessels  are patent.    Patient Active Problem List   Diagnosis     Advanced directives, counseling/discussion     Primary osteoarthritis of both hips     History of deep venous " thrombosis     Presence of left hip implant     CKD (chronic kidney disease) stage 2, GFR 60-89 ml/min     Calculus of left kidney     S/P DEMETRIO-BSO     Endometrial cancer (H)     Encounter for long-term (current) use of medications     Pulmonary embolus (H)     Osteoarthritis of both hands     Antineoplastic chemotherapy induced anemia     Chronic anticoagulation     Arthritis of hand     Degeneration of spine     Pulmonary nodules     Past Surgical History:   Procedure Laterality Date     ARTHROPLASTY HIP Left 12/4/2017    Procedure: ARTHROPLASTY HIP;  Left total hip arthroplasty;  Surgeon: Rajinder Goodwin MD;  Location: [facility] OR     BREAST BIOPSY, RT/LT  2004    left breast     DILATION AND CURETTAGE, OPERATIVE HYSTEROSCOPY WITH MORCELLATOR, COMBINED N/A 4/11/2018    Procedure: COMBINED DILATION AND CURETTAGE, OPERATIVE HYSTEROSCOPY WITH MORCELLATOR;  Hysteroscopy, Dilation and Curettage Under Ultrasound Guidance ;  Surgeon: Adry Tineo MD;  Location: [facility]     DILATION AND CURETTAGE, WITH ULTRASOUND GUIDANCE  4/11/2018    Procedure: DILATION AND CURETTAGE, WITH ULTRASOUND GUIDANCE;;  Surgeon: Adry Tineo MD;  Location: [facility]     HYSTERECTOMY TOTAL ABDOMINAL, BILATERAL SALPINGO-OOPHORECTOMY, NODE DISSECTION, COMBINED Bilateral 4/27/2018    Procedure: COMBINED HYSTERECTOMY TOTAL ABDOMINAL, SALPINGO-OOPHORECTOMY, NODE DISSECTION;;  Surgeon: Bradley Tierney MD;  Location: [facility] OR     INSERT PORT VASCULAR ACCESS Right 5/31/2018    Procedure: INSERT PORT VASCULAR ACCESS;  Single Lumen Chest Power Port;  Surgeon: Jamila Major PA-C;  Location: [facility]     LAPAROSCOPIC HYSTERECTOMY TOTAL, BILATERAL SALPINGO-OOPHORECTOMY, NODE DISSECTION, COMBINED N/A 4/27/2018    Procedure: COMBINED LAPAROSCOPIC HYSTERECTOMY TOTAL, SALPINGO-OOPHORECTOMY, NODE DISSECTION;  Laparoscopic converted to open Removal Of Uterus, Tubes, Ovaries, Cervix, Pelvic and Para Aortic Lymphnode Dissection, Tumor Debulking,  CUSA, Partial Omentectomy, Anesthesia Block;  Surgeon: Bradley Tierney MD;  Location: [facility] OR     TONSILLECTOMY  1960    tonsillectomy       Social History     Tobacco Use     Smoking status: Never Smoker     Smokeless tobacco: Never Used   Substance Use Topics     Alcohol use: Yes     Comment: rarely     Family History   Problem Relation Age of Onset     Diabetes Mother         type 2     Heart Disease Mother      Hypertension Mother      C.A.D. Mother          after 2nd heart attack     Hypertension Father      C.A.D. Father         mild heart attack     Cancer Father         skin cancer     Heart Disease Father      Rheumatoid Arthritis Sister      Heart Disease Brother         herdetary heart murmur     Hyperlipidemia Brother      Clotting Disorder Brother         factor 5 leiden     Deep Vein Thrombosis Brother         leg     Hyperlipidemia Brother      Deep Vein Thrombosis Brother         leg; negative for clotting disorder     Kidney Disease No family hx of          Current Outpatient Medications   Medication Sig Dispense Refill     calcium citrate-vitamin D (CITRACAL) 315-200 MG-UNIT TABS per tablet Take 2 tablets by mouth daily       FERROUS GLUCONATE PO Take 325 mg by mouth once a week        GLUCOSAMINE CHONDROITIN COMPLX OR TABS Take  by mouth. 1500 mg 1xqd.   0     multivitamin, therapeutic with minerals (THERA-VIT-M) TABS tablet Take 1 tablet by mouth daily       RANITIDINE HCL PO Take 150 mg by mouth 2 times daily        warfarin (COUMADIN) 5 MG tablet Current dose (12/10/18): 7.5 mg DAILY OR as directed by ACC team. 130 tablet 1     Ascorbic Acid (VITAMIN C) 500 MG CHEW        omeprazole (PRILOSEC) 10 MG DR capsule 10 mg daily       Allergies   Allergen Reactions     Paclitaxel Anaphylaxis     reportedTaxol= anaphylaxsis     Nickel Rash     Reviewed and updated as needed this visit by Provider       Review of Systems   As noted in the HPI.       Objective    /60 (BP Location: Left  "arm, Patient Position: Chair, Cuff Size: Adult Regular)   Pulse 76   Ht 1.6 m (5' 3\")   Wt 72.6 kg (160 lb)   LMP 03/04/2005   SpO2 96%   BMI 28.34 kg/m    Body mass index is 28.34 kg/m .  Physical Exam   GENERAL: healthy, alert and no distress  EYES: Eyes grossly normal to inspection, PERRL and conjunctivae and sclerae normal  HENT: ear canals and TM's normal, nose and mouth without ulcers or lesions  NECK: no adenopathy, no asymmetry, masses, or scars and thyroid normal to palpation  RESP: lungs clear to auscultation - no rales, rhonchi or wheezes  CV: regular rate and rhythm, normal S1 S2, no S3 or S4, no murmur, click or rub, no peripheral edema and peripheral pulses strong  ABDOMEN: soft, nontender, no hepatosplenomegaly, no masses and bowel sounds normal  NEURO: Normal strength and tone, mentation intact and speech normal  PSYCH: mentation appears normal, affect normal/bright    Diagnostic Test Results:  Labs reviewed in Epic  Results for orders placed or performed in visit on 06/10/19 (from the past 24 hour(s))   CBC with platelets   Result Value Ref Range    WBC 3.5 (L) 4.0 - 11.0 10e9/L    RBC Count 4.80 3.8 - 5.2 10e12/L    Hemoglobin 14.3 11.7 - 15.7 g/dL    Hematocrit 43.8 35.0 - 47.0 %    MCV 91 78 - 100 fl    MCH 29.8 26.5 - 33.0 pg    MCHC 32.6 31.5 - 36.5 g/dL    RDW 13.3 10.0 - 15.0 %    Platelet Count 173 150 - 450 10e9/L   Comprehensive metabolic panel   Result Value Ref Range    Sodium 139 133 - 144 mmol/L    Potassium 3.7 3.4 - 5.3 mmol/L    Chloride 108 94 - 109 mmol/L    Carbon Dioxide 28 20 - 32 mmol/L    Anion Gap 3 3 - 14 mmol/L    Glucose 80 70 - 99 mg/dL    Urea Nitrogen 9 7 - 30 mg/dL    Creatinine 1.07 (H) 0.52 - 1.04 mg/dL    GFR Estimate 55 (L) >60 mL/min/[1.73_m2]    GFR Estimate If Black 64 >60 mL/min/[1.73_m2]    Calcium 9.2 8.5 - 10.1 mg/dL    Bilirubin Total 0.8 0.2 - 1.3 mg/dL    Albumin 3.5 3.4 - 5.0 g/dL    Protein Total 6.9 6.8 - 8.8 g/dL    Alkaline Phosphatase 80 40 - " "150 U/L    ALT 20 0 - 50 U/L    AST 17 0 - 45 U/L   CRP, inflammation   Result Value Ref Range    CRP Inflammation <2.9 0.0 - 8.0 mg/L   Lipase   Result Value Ref Range    Lipase 148 73 - 393 U/L   Reflex INR   Result Value Ref Range    INR 6.26 (HH) 0.80 - 1.20           Assessment & Plan   (R10.13) Epigastric pain  (primary encounter diagnosis)  (R11.0) Nausea  Comment: unsure cause, lab work unremarkable, better when eating the \"BRAT\" diet, ranitidine and omeprazole did not seem to help  Plan: GASTROENTEROLOGY ADULT REF CONSULT ONLY        Since symptoms have been occurring for about 5 weeks now, will refer to GI as most likely needs scope. Recent CT from 4/26/19 looked ok of abdomen. She agrees that she would like to meet with GI before doing additional tests. Will continue the BRAT diet for now as she is feeling well with this. Agrees to be seen by myself or the ER with new or worsening symptoms.      (I26.99) Other acute pulmonary embolism without acute cor pulmonale (H)  (Z86.718) History of deep venous thrombosis  Comment: will continue warfarin lifelong for now-goal 2-3; INR today over 6  Plan: warfarin (COUMADIN) 5 MG tablet        INR referral placed and she met with Coumadin nurse-Tena-theodore. Coumadin was cut and she has follow up with her on Thursday.     (Z76.89) Encounter to establish care  Plan: Patient is in need of a new provider. she has been explained the role of a CNP and the fact that I do not follow patients in the hospital. she was told that should he get admitted, he would then be followed by a hospitalist. she verbalizes understanding and would like to establish a relationship now.     (N18.2) CKD (chronic kidney disease) stage 2, GFR 60-89 ml/min  Comment: stable  Plan: Continue to monitor periodically.        BMI:   Estimated body mass index is 28.34 kg/m  as calculated from the following:    Height as of this encounter: 1.6 m (5' 3\").    Weight as of this encounter: 72.6 kg (160 lb). "       Katty Garcia NP  [facility]

## 2019-06-10 NOTE — PROGRESS NOTES
ANTICOAGULATION FOLLOW-UP CLINIC VISIT    Patient Name:  Gerri Owens  Date:  6/10/2019  Contact Type:  Face to Face    SUBJECTIVE:  Patient Findings     Positives:   Change in health (episodes of vomiting, diarrhea x 1 day), Change in diet/appetite (not eating much)    Comments:   INR referral received. Patient seen in warfarin clinic. Has recently been having episodes of vomiting. She has been eating less. She has no unusual bleeding/bruising. No new changes in medications. She has a lab drawn INR with other labs when establishing care with provider. INR noted to be elevated so saw patient prior to her leaving clinic. We discussed warfarin dosing/INR recheck date. She verbalized understanding of instruction and has no questions. She was told to go to ER for any uncontrolled bleeding which she says she will do.         Clinical Outcomes     Negatives:   Major bleeding event, Thromboembolic event, Anticoagulation-related hospital admission, Anticoagulation-related ED visit, Anticoagulation-related fatality    Comments:   INR referral received. Patient seen in warfarin clinic. Has recently been having episodes of vomiting. She has been eating less. She has no unusual bleeding/bruising. No new changes in medications. She has a lab drawn INR with other labs when establishing care with provider. INR noted to be elevated so saw patient prior to her leaving clinic. We discussed warfarin dosing/INR recheck date. She verbalized understanding of instruction and has no questions. She was told to go to ER for any uncontrolled bleeding which she says she will do.            OBJECTIVE    INR   Date Value Ref Range Status   06/10/2019 6.26 (HH) 0.80 - 1.20 Final     Comment:     Critical Value called to and read back by  CECE WONG AT 1607 ON 6/10/2019 BY PAYTON         ASSESSMENT / PLAN  INR assessment SUPRA vomiting, loss of appetite, one day of diarrhea   Recheck INR In: 3 DAYS    INR Location Clinic      Anticoagulation  Summary  As of 6/10/2019    INR goal:   2.0-3.0   TTR:   53.5 % (6.2 mo)   INR used for dosin.26! (6/10/2019)   Warfarin maintenance plan:   7.5 mg (5 mg x 1.5) every Sun, Tue, Thu; 5 mg (5 mg x 1) all other days   Full warfarin instructions:   6/10: Hold; : Hold; : 2.5 mg; Otherwise 7.5 mg every Sun, Tue, Thu; 5 mg all other days   Weekly warfarin total:   42.5 mg   Plan last modified:   Varsha Crowe, RN (3/13/2019)   Next INR check:   2019   Target end date:       Indications    DVT of upper extremity (deep vein thrombosis) (H) (Resolved) [I82.629]  Pulmonary embolus (H) [I26.99]             Anticoagulation Episode Summary     INR check location:       Preferred lab:       Send INR reminders to:   [facility]    Comments:   MTV 30 mcg. Consistent spinach and broccoli intake. DVT subclavian vein . PE 2018. Brother positive Factor V Leiden. Another brother on lifetime AC. 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       Anticoagulation Care Providers     Provider Role Specialty Phone number    Karo Doty MD [facility] [phone]            See the Encounter Report to view Anticoagulation Flowsheet and Dosing Calendar (Go to Encounters tab in chart review, and find the Anticoagulation Therapy Visit)        Tena Castillo RN

## 2019-06-10 NOTE — NURSING NOTE
"Chief Complaint   Patient presents with     Establish Care       Initial /60 (BP Location: Left arm, Patient Position: Chair, Cuff Size: Adult Regular)   Pulse 76   Ht 1.6 m (5' 3\")   Wt 72.6 kg (160 lb)   LMP 03/04/2005   SpO2 96%   BMI 28.34 kg/m   Estimated body mass index is 28.34 kg/m  as calculated from the following:    Height as of this encounter: 1.6 m (5' 3\").    Weight as of this encounter: 72.6 kg (160 lb).  Medication Reconciliation: complete    Ann Marie Rosado LPN  "

## 2019-06-10 NOTE — TELEPHONE ENCOUNTER
DATE:  6/10/2019   TIME OF RECEIPT FROM LAB: 4:06 P.M.  LAB TEST:  INR  LAB VALUE:  6.26  RESULTS GIVEN WITH READ-BACK TO (PROVIDER):  CROW ROLAND  TIME LAB VALUE REPORTED TO PROVIDER:   4:06

## 2019-06-11 NOTE — TELEPHONE ENCOUNTER
Let message for patient to return call. If patient is ok seeing a nurse practitioner Samantha Butcher can see her for her Gastro Consult as soon as July 8th.  Please inquire if patient is ok with that.

## 2019-06-11 NOTE — PROGRESS NOTES
Call from Katty Garcia's nurse Ann Marie noting that she would like to order monthly port flushes for patient, with Heparin. Therapy plan entered. Per Ann Marie, patient isn't due for a couple of weeks. Update to [facility] to schedule when patient reports she is due.

## 2019-06-11 NOTE — TELEPHONE ENCOUNTER
Spoke with patient and informed her that appointment for Gastro Consult   has been made for July 8th at 2:45 at the [facility] with Ankita Hernandez.

## 2019-06-13 NOTE — PROGRESS NOTES
ANTICOAGULATION FOLLOW-UP CLINIC VISIT    Patient Name:  Gerri Owens  Date:  2019  Contact Type:  Face to Face    SUBJECTIVE:  Patient Findings     Comments:   No bleeding/bruising, no new changes in diet/meds/activity. She states diarrhea has subsided as long as she is very careful with her diet.         Clinical Outcomes     Negatives:   Major bleeding event, Thromboembolic event, Anticoagulation-related hospital admission, Anticoagulation-related ED visit, Anticoagulation-related fatality    Comments:   No bleeding/bruising, no new changes in diet/meds/activity. She states diarrhea has subsided as long as she is very careful with her diet.            OBJECTIVE    INR Protime   Date Value Ref Range Status   2019 2.4 (A) 0.86 - 1.14 Final       ASSESSMENT / PLAN  INR assessment THER    Recheck INR In: 2 WEEKS    INR Location Clinic      Anticoagulation Summary  As of 2019    INR goal:   2.0-3.0   TTR:   53.0 % (6.3 mo)   INR used for dosin.4 (2019)   Warfarin maintenance plan:   7.5 mg (5 mg x 1.5) every Sun, Tue, Thu; 5 mg (5 mg x 1) all other days   Full warfarin instructions:   7.5 mg every Sun, Tue, Thu; 5 mg all other days   Weekly warfarin total:   42.5 mg   Plan last modified:   Varsha Crowe RN (3/13/2019)   Next INR check:   2019   Target end date:       Indications    DVT of upper extremity (deep vein thrombosis) (H) (Resolved) [I82.629]  Pulmonary embolus (H) [I26.99]             Anticoagulation Episode Summary     INR check location:       Preferred lab:       Send INR reminders to:    [facility]    Comments:   MTV 30 mcg. Consistent spinach and broccoli intake. DVT subclavian vein . PE 2018. Brother positive Factor V Leiden. Another brother on lifetime AC. 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       Anticoagulation Care Providers     Provider Role Specialty Phone number    Karo Doty MD [facility] [phone]             See the Encounter Report to view Anticoagulation Flowsheet and Dosing Calendar (Go to Encounters tab in chart review, and find the Anticoagulation Therapy Visit)        Tena Castillo RN

## 2019-06-17 NOTE — PROGRESS NOTES
Hand hygiene performed: yes   Mask donned by caregiver: yes   Site prepped with CHG: yes   Labs drawn: yes   Dressing applied using aseptic technique: yes    Patients right sided port accessed using non-coring, 19 gauge, 3/4 inch power needle. Port accessed per facility protocol. Port flushed easily, blood return noted.  No signs and symptoms of infection or infiltration.  Port flushed 10 mLs Normal Saline then Heparin 5mLs of 100 unit/mL.  Needle removed, small dressing applied.  Patient discharged with no complaints.

## 2019-06-27 NOTE — PROGRESS NOTES
ANTICOAGULATION FOLLOW-UP CLINIC VISIT    Patient Name:  Gerri Owens  Date:  6/27/2019  Contact Type:  Face to Face    SUBJECTIVE:  Patient Findings         Clinical Outcomes     Negatives:   Major bleeding event, Thromboembolic event, Anticoagulation-related hospital admission, Anticoagulation-related ED visit, Anticoagulation-related fatality           OBJECTIVE    INR Protime   Date Value Ref Range Status   06/27/2019 3.0 (A) 0.86 - 1.14 Final       ASSESSMENT / PLAN  INR assessment THER    Recheck INR In: 3 WEEKS    INR Location Clinic      Anticoagulation Summary  As of 6/27/2019    INR goal:   2.0-3.0   TTR:   56.3 % (6.8 mo)   INR used for dosing:   3.0 (6/27/2019)   Warfarin maintenance plan:   7.5 mg (5 mg x 1.5) every Sun, Tue, Thu; 5 mg (5 mg x 1) all other days   Full warfarin instructions:   7.5 mg every Sun, Tue, Thu; 5 mg all other days   Weekly warfarin total:   42.5 mg   No change documented:   Cristi Rahman RN   Plan last modified:   Varsha Crowe RN (3/13/2019)   Next INR check:   7/18/2019   Target end date:       Indications    DVT of upper extremity (deep vein thrombosis) (H) (Resolved) [I82.629]  Pulmonary embolus (H) [I26.99]             Anticoagulation Episode Summary     INR check location:       Preferred lab:       Send INR reminders to:   [facility] KIMBLE    Comments:   MTV 30 mcg. Consistent spinach and broccoli intake. DVT subclavian vein 2012. PE 7/2018. Brother positive Factor V Leiden. Another brother on lifetime AC. 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       Anticoagulation Care Providers     Provider Role Specialty Phone number    Karo Doty MD [facility] [phone]            See the Encounter Report to view Anticoagulation Flowsheet and Dosing Calendar (Go to Encounters tab in chart review, and find the Anticoagulation Therapy Visit)        CRISTI RAHMAN, OTIS

## 2019-07-17 NOTE — PROGRESS NOTES
ANTICOAGULATION FOLLOW-UP CLINIC VISIT    Patient Name:  Gerri Owens  Date:  7/17/2019  Contact Type:  Telephone    SUBJECTIVE:  Patient Findings     Comments:   Notified that patient will be having colonoscopy on 7/24/19. Per Katty Garcia NP, patient to be bridged with lovenox 40mg daily while off warfarin. Call placed to patient and message left to call warfarin clinic. Patient did call back and does have an appt for INR tomorrow so we will discuss changes at that time.         Clinical Outcomes     Negatives:   Major bleeding event, Thromboembolic event, Anticoagulation-related hospital admission, Anticoagulation-related ED visit, Anticoagulation-related fatality    Comments:   Notified that patient will be having colonoscopy on 7/24/19. Per Katty Garcia NP, patient to be bridged with lovenox 40mg daily while off warfarin. Call placed to patient and message left to call warfarin clinic. Patient did call back and does have an appt for INR tomorrow so we will discuss changes at that time.            OBJECTIVE    INR Protime   Date Value Ref Range Status   06/27/2019 3.0 (A) 0.86 - 1.14 Final       ASSESSMENT / PLAN  No question data found.  Anticoagulation Summary  As of 7/17/2019    INR goal:   2.0-3.0   TTR:   56.3 % (6.8 mo)   INR used for dosing:   No new INR was available at the time of this encounter.   Warfarin maintenance plan:   7.5 mg (5 mg x 1.5) every Sun, Tue, Thu; 5 mg (5 mg x 1) all other days   Full warfarin instructions:   7/19: Hold; 7/20: Hold; 7/21: Hold; 7/22: Hold; 7/23: Hold; 7/25: 10 mg; 7/26: 10 mg; 7/27: 7.5 mg; Otherwise 7.5 mg every Sun, Tue, Thu; 5 mg all other days   Weekly warfarin total:   42.5 mg   Plan last modified:   Varsha Crowe RN (3/13/2019)   Next INR check:   7/30/2019   Target end date:       Indications    DVT of upper extremity (deep vein thrombosis) (H) (Resolved) [I82.629]  Pulmonary embolus (H) [I26.99]             Anticoagulation Episode Summary     INR  check location:       Preferred lab:       Send INR reminders to:   [facility] KIMBLE    Comments:   MTV 30 mcg. Consistent spinach and broccoli intake. DVT subclavian vein 2012. PE 7/2018. Brother positive Factor V Leiden. Another brother on lifetime AC. 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       Anticoagulation Care Providers     Provider Role Specialty Phone number    Karo Doty MD [facility] [phone]            See the Encounter Report to view Anticoagulation Flowsheet and Dosing Calendar (Go to Encounters tab in chart review, and find the Anticoagulation Therapy Visit)        Tena Castillo RN

## 2019-07-18 NOTE — PROGRESS NOTES
ANTICOAGULATION FOLLOW-UP CLINIC VISIT    Patient Name:  Gerri Owens  Date:  7/18/2019  Contact Type:  Face to Face    SUBJECTIVE:  Patient Findings     Comments:   Had episode of vomiting/diarrhea. Held dose on 7/9, 5mg on 7/11 and 5mg on 7/14        Clinical Outcomes     Comments:   Had episode of vomiting/diarrhea. Held dose on 7/9, 5mg on 7/11 and 5mg on 7/14           OBJECTIVE    INR Protime   Date Value Ref Range Status   07/18/2019 3.7 (A) 0.86 - 1.14 Final       ASSESSMENT / PLAN  INR assessment SUPRA vomiting, diarrhea   Recheck INR In: 1 WEEK    INR Location Clinic      Anticoagulation Summary  As of 7/18/2019    INR goal:   2.0-3.0   TTR:   51.0 % (7.5 mo)   INR used for dosing:   3.7! (7/18/2019)   Warfarin maintenance plan:   7.5 mg (5 mg x 1.5) every Sun, Tue, Thu; 5 mg (5 mg x 1) all other days   Full warfarin instructions:   7/18: Hold; 7/19: Hold; 7/20: Hold; 7/21: Hold; 7/22: Hold; 7/23: Hold; 7/25: 10 mg; 7/26: 10 mg; 7/27: 7.5 mg; Otherwise 7.5 mg every Sun, Tue, Thu; 5 mg all other days   Weekly warfarin total:   42.5 mg   Plan last modified:   Varsha Crowe, RN (3/13/2019)   Next INR check:   7/31/2019   Target end date:       Indications    DVT of upper extremity (deep vein thrombosis) (H) (Resolved) [I82.629]  Pulmonary embolus (H) [I26.99]             Anticoagulation Episode Summary     INR check location:       Preferred lab:       Send INR reminders to:   ARIANNA KIMBLE    Comments:   MTV 30 mcg. Consistent spinach and broccoli intake. DVT subclavian vein 2012. PE 7/2018. Brother positive Factor V Leiden. Another brother on lifetime AC. 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       Anticoagulation Care Providers     Provider Role Specialty Phone number    Karo Doty MD [facility] [phone]            See the Encounter Report to view Anticoagulation Flowsheet and Dosing Calendar (Go to Encounters tab in chart review, and find the  Anticoagulation Therapy Visit)        Tena Castillo RN

## 2019-07-19 NOTE — TELEPHONE ENCOUNTER
Left message informing patient that U/A has been entered for her to come in and have the lab completed

## 2019-07-28 NOTE — PROGRESS NOTES
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    Thus far, has received chemotherapy (6 cycles of single agent carboplatin AUC6) followed by EBRT and vaginal brachytherapy, just completed 18.     Fatigued has improved. Tolerating regular diet without nausea or vomiting. She does have some diarrhea, controlled with imodium. Does have a little urinary incontinence since the vaginal brachytherapy, but no hematuria. Had vaginal spotting yesterday. Has dilator, but hasn't started using yet. States she feels great.    Has pulmonary embolus and remains on lovenox injections BID. Denies chest pain or SOA.    Oncology History:  18- OBGYN visit with Dr. Tineo.  Complains of 1.5 years of post-menopausal bleeding.                           EMB performed and showed      18- EUA, hysteroscopy D&C under ultrasound guidance.  Diffuse proliferative vascular endometrium noted.                         Pathology:                          FINAL DIAGNOSIS:                          ENDOMETRIAL CURETTINGS:                          - Endometrial endometrioid adenocarcinoma, FIGO grade 2                            COMMENT:                          While the tumor maintained a glandular morphology, the occasional high                          nuclear grade warranted upgrading to                          FIGO 2.      18- Patient reports that she continues to have some post-menopausal spotting      18 DEMETRIO/BSO and staging:  PATH:  A. ANTERIOR FUNDUS, BIOPSY:   - Positive for adenocarcinoma     B. UTERUS, CERVIX, BILATERAL TUBES AND OVARIES, HYSTERECTOMY WITH   BILATERAL SALPINGO-OOPHORECTOMY:   - Endometrial endometrioid adenocarcinoma, FIGO grade 1   -  Adenomyosis   - Cervix invaded by endometrial adenocarcinoma   - Right ovarian surface adhesions involved by endometrial adenocarcinoma   - Right fallopian tube with endometriosis   - Left adnexa invaded by endometrial adenocarcinoma (5.5 cm)     C. LYMPH NODES, LEFT PELVIC, EXCISION:   - Metastatic adenocarcinoma to one of eight lymph nodes (1/8)   - The metastatic focus is 3 mm in greatest dimension with no extranodal   extension     D. LYMPH NODES, LEFT PARA-AORTIC, EXCISION:   - One reactive lymph node, negative for malignancy (0/1)     E. LYMPH NODES, RIGHT PELVIC, EXCISION:   - Metastatic adenocarcinoma to one of six lymph nodes (1/6)   - The metastatic focus is 21 mm in greatest dimension with positive   extranodal extension     F. LYMPH NODES, RIGHT PARA-AORTIC, EXCISION:   - Three reactive lymph nodes, negative for malignancy (0/3)     G. OMENTUM, RESECTION:   - Omental adipose tissue with focal inflammation and surface mesothelial   hyperplasia   - Negative for malignancy       TUMOR     Tumor Site:         - Endometrium         - Lower uterine segment     Histologic Type:         - Endometrioid carcinoma, NOS     Histologic Grade:         - FIGO grade 1     Tumor Size: 5.5 Centimeters (cm)     Tumor Extent       Myometrial Invasion:           - Present         Depth of Invasion: 15 Millimeters (mm)         Myometrial Thickness: 15 Millimeters (mm)         Percentage of Myometrial Invasion: 100%       Adenomyosis:           - Present, involved by carcinoma       Uterine Serosa Involvement:           - Present       Lower Uterine Segment Involvement:           - Present         Level of Tumor Involvement:             - Myoinvasive       Cervical Stromal Involvement:           - Present       Other Tissue / Organ Involvement:           - Right ovary           - Left ovary           - Left fallopian tube       Peritoneal Ascitic Fluid:           - Negative for malignancy (normal / benign)     Accessory  Findings       Lymphovascular Invasion:           - Present     MARGINS     Ectocervical / Vaginal Cuff Margin:         - Uninvolved by carcinoma     LYMPH NODES     Number of Pelvic Nodes with Macrometastasis: 2     Number of Pelvic Nodes with Micrometastasis: 0     Laterality of Pelvic Node(s) with Tumor:         - Right         - Left     Total Number of Pelvic Node(s) Examined (sentinel and nonsentinel): 14     Number of Pelvic Cave Junction Nodes Examined: 0     Laterality of Pelvic Node(s) Examined:         - Right         - Left     Number of Para-Aortic Nodes with Macrometastasis: 0     Number of Para-Aortic Nodes with Micrometastasis: 0     Total Number of Para-Aortic Node(s) Examined (sentinel and nonsentinel):    4     Number of Para-Aortic Cave Junction Nodes Examined: 0     Laterality of Para-Aortic Node(s) Examined:         - Right         - Left     PATHOLOGIC STAGE CLASSIFICATION (PTNM, AJCC 8TH EDITION)     Primary Tumor (pT):         - pT3a     Regional Lymph Nodes (pN)       Category (pN):           - pN1a     FIGO STAGE     FIGO Stage:         - IIIC1      4/29/2018: CT:  IMPRESSION:   1. No pulmonary embolism. Postop atelectasis.  2. Fluid-filled loops of dilated small bowel in the upper abdomen,  favored to represent adynamic ileus although incompletely visualized.  3. Pneumoperitoneum and small volume ascites, expected given  postoperative day 2 following open surgery.  4. Partially visualized 10 mm right thyroid nodule.     6/13/18: C1D1 carboplatin/paclitaxel- anaphylactic reaction to paclitaxel, no carboplatin received.    7/6/18: C1 single agent carboplatin AUC 6     7/27/18: C2 SA carboplatin AUC 6    8/17/18: C3 SA carboplatin AUC 6, deferred due to thrombocytopenia    8-11/2018 RT: EBRT and brachytherapy.      Due to intolerance of chemotherapy plan was to forgo additional chemotherapy in favor of observation.    2/2019: CT  IMPRESSION: In this patient with history of endometrial cancer  status  post hysterectomy:  1. Increased size of a nodule in the posterior left upper lobe, which  now measures up to 6 mm (previously 3 mm. This is suspicious for  metastatic disease. Recommend short-term follow-up. Biopsy is an  option, though small size may limit yield.  2. Indeterminate mesenteric nodule, which may represent a mildly  enlarged lymph node. This is unchanged in size in comparison to  8/27/2018. Attention on follow-up.  3. Resolution of the previously seen heterogeneous irregularity of the  left psoas muscle. Biopsy 9/10/2018 compatible with granulomatous  inflammation and necrosis.    4/2019 CT  IMPRESSION: In this patient with history of endometrial cancer status  post hysterectomy, chemotherapy, and radiation:  1. Further slight increase in size of a now 7 mm subpleural nodule in  the superior segment of the left lower lobe, which remains suspicious  for metastatic disease.  2. New part solid part groundglass nodule measuring 4 mm in the  posterior medial left lower lobe could be infectious/inflammatory or  metastatic. Recommend attention on follow-up.  3. Continued stability of indeterminate pleural nodularity along the  right hemidiaphragm and a central mesenteric nodule. Recommend  continued attention on follow-up.  4. Mild diffuse bladder wall thickening with associated pericystic fat  stranding, likely sequela of radiation.    7/2019 CT:  IMPRESSION: In this patient with history of endometrial cancer status  post hysterectomy, chemotherapy, and radiation:  1. Further increased size of a now 9 mm subpleural nodule in the  superior segment of the left lower lobe, compatible with metastatic  disease.  2. Numerous new scattered groundglass nodules and a new 3 mm solid  nodule in the posterior left lower lobe are indeterminant,  infectious/inflammatory versus metastatic.  3. Stable pleural nodularity along the right hemidiaphragm.  4. Increased central mesenteric and peritoneal haziness,  indeterminate  but raising concern for peritoneal carcinomatosis. An enhancing  central mesenteric nodule remains unchanged.      Past Medical History:   Diagnosis Date     Abnormal renal function test 12/04/2017    due to NSAIDS, normal after stopping taking them     Advanced directives, counseling/discussion      Antineoplastic chemotherapy induced anemia 7/27/2018     Arthritis      BMI 35.0-35.9,adult 11/12/2017     Degeneration of spine 5/21/2019     DVT of upper extremity (deep vein thrombosis) (H) 03/27/2012    clotting disorder workup negative     Encounter for antineoplastic chemotherapy 7/6/2018     Encounter for long-term (current) use of medications 5/21/2018     Endometrial cancer (H) 04/2018     Gastroesophageal reflux disease      Hyperlipidemia LDL goal < 160      Hyperlipidemia, unspecified hyperlipidemia type 11/12/2017     Low ferritin 7/19/2018     MEDICAL HISTORY OF - 1997    left breast density     Microscopic hematuria 3/22/2018     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  7/17/2012     Thyrotoxicosis 2007    hyperthyroid, enlarged right thyroid on thyroid uptake, treate by [facility] with tapozole till 12/2008, FNA neg of 1.9 cm right lobe dominat nodule     Whooping cough due to Bordetella pertussis (p. pertussis) infant    whooping cough       Past Surgical History:   Procedure Laterality Date     ARTHROPLASTY HIP Left 12/4/2017    Procedure: ARTHROPLASTY HIP;  Left total hip arthroplasty;  Surgeon: Rajinder Goodwin MD;  Location: [facility] OR     BREAST BIOPSY, RT/LT  2004    left breast     DILATION AND CURETTAGE, OPERATIVE HYSTEROSCOPY WITH MORCELLATOR, COMBINED N/A 4/11/2018    Procedure: COMBINED DILATION AND CURETTAGE, OPERATIVE HYSTEROSCOPY WITH MORCELLATOR;  Hysteroscopy, Dilation and Curettage Under Ultrasound Guidance ;  Surgeon: Adry Tineo MD;  Location: [facility] OR     DILATION AND CURETTAGE, WITH ULTRASOUND GUIDANCE  4/11/2018     Procedure: DILATION AND CURETTAGE, WITH ULTRASOUND GUIDANCE;;  Surgeon: Adry Tineo MD;  Location[facility]     HYSTERECTOMY TOTAL ABDOMINAL, BILATERAL SALPINGO-OOPHORECTOMY, NODE DISSECTION, COMBINED Bilateral 2018    Procedure: COMBINED HYSTERECTOMY TOTAL ABDOMINAL, SALPINGO-OOPHORECTOMY, NODE DISSECTION;;  Surgeon: Bradley Tierney MD;  Location[facility]     INSERT PORT VASCULAR ACCESS Right 2018    Procedure: INSERT PORT VASCULAR ACCESS;  Single Lumen Chest Power Port;  Surgeon: Jamila Major PA-C;  [facility]     LAPAROSCOPIC HYSTERECTOMY TOTAL, BILATERAL SALPINGO-OOPHORECTOMY, NODE DISSECTION, COMBINED N/A 2018    Procedure: COMBINED LAPAROSCOPIC HYSTERECTOMY TOTAL, SALPINGO-OOPHORECTOMY, NODE DISSECTION;  Laparoscopic converted to open Removal Of Uterus, Tubes, Ovaries, Cervix, Pelvic and Para Aortic Lymphnode Dissection, Tumor Debulking, CUSA, Partial Omentectomy, Anesthesia Block;  Surgeon: Bradley Tierney MD;  Location: [facility]     TONSILLECTOMY  1960    tonsillectomy       Current Outpatient Medications   Medication     Ascorbic Acid (VITAMIN C) 500 MG CHEW     calcium citrate-vitamin D (CITRACAL) 315-200 MG-UNIT TABS per tablet     FERROUS GLUCONATE PO     GLUCOSAMINE CHONDROITIN COMPLX OR TABS     multivitamin, therapeutic with minerals (THERA-VIT-M) TABS tablet     omeprazole (PRILOSEC) 10 MG DR capsule     RANITIDINE HCL PO     warfarin (COUMADIN) 5 MG tablet     No current facility-administered medications for this visit.      Facility-Administered Medications Ordered in Other Visits   Medication     heparin 100 UNIT/ML injection 500 Units       Allergies   Allergen Reactions     Paclitaxel Anaphylaxis     reportedTaxol= anaphylaxsis     Nickel Rash       Family History   Problem Relation Age of Onset     Diabetes Mother         type 2     Heart Disease Mother      Hypertension Mother      C.A.D. Mother          after 2nd heart attack     Hypertension  Father      C.A.D. Father         mild heart attack     Cancer Father         skin cancer     Heart Disease Father      Rheumatoid Arthritis Sister      Heart Disease Brother         herdetary heart murmur     Hyperlipidemia Brother      Clotting Disorder Brother         factor 5 leiden     Deep Vein Thrombosis Brother         leg     Hyperlipidemia Brother      Deep Vein Thrombosis Brother         leg; negative for clotting disorder     Kidney Disease No family hx of        Social History     Socioeconomic History     Marital status:      Spouse name: Doug     Number of children: 0     Years of education: Not on file     Highest education level: Not on file   Occupational History     Occupation: rn     Employer: [employer]   Social Needs     Financial resource strain: Not on file     Food insecurity:     Worry: Not on file     Inability: Not on file     Transportation needs:     Medical: Not on file     Non-medical: Not on file   Tobacco Use     Smoking status: Never Smoker     Smokeless tobacco: Never Used   Substance and Sexual Activity     Alcohol use: Yes     Comment: rarely     Drug use: No     Sexual activity: Yes     Partners: Male   Lifestyle     Physical activity:     Days per week: Not on file     Minutes per session: Not on file     Stress: Not on file   Relationships     Social connections:     Talks on phone: Not on file     Gets together: Not on file     Attends Sabianism service: Not on file     Active member of club or organization: Not on file     Attends meetings of clubs or organizations: Not on file     Relationship status: Not on file     Intimate partner violence:     Fear of current or ex partner: Not on file     Emotionally abused: Not on file     Physically abused: Not on file     Forced sexual activity: Not on file   Other Topics Concern     Parent/sibling w/ CABG, MI or angioplasty before 65F 55M? 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  She does not smoke and was never smoker, she drinks alcohol about a month, no illicit drugs            Balanced Diet - Yes, in the last 6 months    Osteoporosis Prevention Measures - Dairy servings per day: 2 servings plus a supplement    Regular Exercise -  Yes Describe walks for 30 to 40 minutes 4 to 5 times a week    Dental Exam - YES - Date: 1 year ago, and is going today    Eye Exam - YES - Date: 4 months ago    Self Breast Exam - Yes    Abuse: Current or Past (Physical, Sexual or Emotional)- No    Do you feel safe in your environment - Yes    Guns stored in the home - Yes, locked    Sunscreen used - Yes    Seatbelts used - Yes    Lipids -  YES - Date: 10-02    Glucose -  YES - Date: 10-02    Colon Cancer Screening - No    Hemoccults - NO    Pap Test -  YES - Date: 10-02    Do you have any concerns about STD's -  No    Mammography - YES - Date: 8-06    DEXA - NO    Immunizations reviewed and up to date - Yes, lst td 7-2000    10-23-07  [facility]           ROS  See below    I have reviewed the patient's ROS and discussed all pertinent information as noted in the HPI.    Physical Exam:  PS 0  VS: /68   Pulse 54   Temp 98.8  F (37.1  C) (Oral)   LMP 03/04/2005   SpO2 97%      CONSTITUTIONAL: Alert non-toxic appearing female in no acute distress  HEAD: Normocephalic, atraumatic  ENT: Oropharynx pink without lesions  NECK: Neck supple without lymphadenopathy  RESPIRATORY: Lungs clear to auscultation, no increased work of breathing noted  CV: Regular rate and rhythm, S1S2, no clicks, murmurs, rubs, or gallops; bilateral lower extremities without edema  GASTROINTESTINAL: Abdomen soft, non-distended, and non-tender to palpation without masses or organomegaly; midline incision healed well  GENITOURINARY: Normal appearing external genitalia, vagina smooth without nodularity or masses, vaginal cuff intact, no masses palpated on  bimanual exam. Rectal exam confirmed these findings.  LYMPHATIC: Cervical, supraclavicular, and inguinal nodes without lymphadenopathy  MUSCULOSKELETAL: Moves all extremities, no obvious muscle wasting  NEUROLOGIC: No gross deficits, normal gait  SKIN: Appropriate color for race, warm and dry, no rashes or lesions to unclothed skin  PSYCHIATRIC: Pleasant and interactive, affect bright, makes appropriate eye contact, thought process linear      Assessment/Plan:   1) Stage IIIC1 low grade endometrial cancer: S/p EBRT and vaginal cuff therapy - did not tolerate chemo (Taxol reaction and neutropenia) - with CT findings suggestive of possible slow progression versus stable disease (I have reviewed the films personally with the patient).  I had previously recommended a biopsy, which was felt to be infeasible owing to small size and location. The mass has grown albeit minimally over last 2 scans and we have discussed treating for symptoms versus asymptomatic progression.  Given her previous intolerance of treatment she is inclined not t start treatment early.    After a comprehensive discussion or the options and their risks and benefits she is inclined to rescan in 3-4 months with an eye to biopsy if the mass continues to grow versus continued observation if stable     2) PE: Asymptomatic at present    3) Genetic counseling: MMR proteins with intact nuclear expression making Peterson syndrome unlikely.    4) Health maintenance issues discussed include to follow up with PCP for non-gynecologic concerns and co-morbid conditions    5) Patient verbalized understanding of and agreement with plan    25 minutes spent with patient, over 50% of which was spent on counseling and coordination of care.    Bradley Tierney

## 2019-07-29 NOTE — NURSING NOTE
"Oncology Rooming Note    July 29, 2019 12:21 PM   Gerri Owens is a 64 year old female who presents for:    Chief Complaint   Patient presents with     Oncology Clinic Visit     Endometrial Cancer     Initial Vitals: /68   Pulse 54   Temp 98.8  F (37.1  C) (Oral)   LMP 03/04/2005   SpO2 97%  Estimated body mass index is 28.34 kg/m  as calculated from the following:    Height as of 6/10/19: 1.6 m (5' 3\").    Weight as of 6/10/19: 72.6 kg (160 lb). There is no height or weight on file to calculate BSA.  No Pain (0) Comment: Data Unavailable   Patient's last menstrual period was 03/04/2005.  Allergies reviewed: Yes  Medications reviewed: Yes    Medications: Medication refills not needed today.  Pharmacy name entered into [facility]:    [facility] [place], [place] - [address] [id number] [place], [place] - [address] AT [facility] OF  [address]    Clinical concerns: n/a       CRISTI IZAGUIRRE CMA              "

## 2019-07-29 NOTE — LETTER
2019       RE: Gerri Owens  [address]  [place] [address]     Dear Colleague,    Thank you for referring your patient, Gerri Owens, to the [facility]. Please see a copy of my visit note below.    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    Thus far, has received chemotherapy (6 cycles of single agent carboplatin AUC6) followed by EBRT and vaginal brachytherapy, just completed 18.     Fatigued has improved. Tolerating regular diet without nausea or vomiting. She does have some diarrhea, controlled with imodium. Does have a little urinary incontinence since the vaginal brachytherapy, but no hematuria. Had vaginal spotting yesterday. Has dilator, but hasn't started using yet. States she feels great.    Has pulmonary embolus and remains on lovenox injections BID. Denies chest pain or SOA.    Oncology History:  18- OBGYN visit with Dr. Tineo.  Complains of 1.5 years of post-menopausal bleeding.                           EMB performed and showed      18- EUA, hysteroscopy D&C under ultrasound guidance.  Diffuse proliferative vascular endometrium noted.                         Pathology:                          FINAL DIAGNOSIS:                          ENDOMETRIAL CURETTINGS:                          - Endometrial endometrioid adenocarcinoma, FIGO grade 2                            COMMENT:                          While the tumor maintained a glandular morphology, the occasional high                          nuclear grade warranted upgrading to                          FIGO 2.      18- Patient reports that she continues to have some post-menopausal spotting      18 DEMETRIO/BSO and staging:  PATH:  A.  ANTERIOR FUNDUS, BIOPSY:   - Positive for adenocarcinoma     B. UTERUS, CERVIX, BILATERAL TUBES AND OVARIES, HYSTERECTOMY WITH   BILATERAL SALPINGO-OOPHORECTOMY:   - Endometrial endometrioid adenocarcinoma, FIGO grade 1   - Adenomyosis   - Cervix invaded by endometrial adenocarcinoma   - Right ovarian surface adhesions involved by endometrial adenocarcinoma   - Right fallopian tube with endometriosis   - Left adnexa invaded by endometrial adenocarcinoma (5.5 cm)     C. LYMPH NODES, LEFT PELVIC, EXCISION:   - Metastatic adenocarcinoma to one of eight lymph nodes (1/8)   - The metastatic focus is 3 mm in greatest dimension with no extranodal   extension     D. LYMPH NODES, LEFT PARA-AORTIC, EXCISION:   - One reactive lymph node, negative for malignancy (0/1)     E. LYMPH NODES, RIGHT PELVIC, EXCISION:   - Metastatic adenocarcinoma to one of six lymph nodes (1/6)   - The metastatic focus is 21 mm in greatest dimension with positive   extranodal extension     F. LYMPH NODES, RIGHT PARA-AORTIC, EXCISION:   - Three reactive lymph nodes, negative for malignancy (0/3)     G. OMENTUM, RESECTION:   - Omental adipose tissue with focal inflammation and surface mesothelial   hyperplasia   - Negative for malignancy       TUMOR     Tumor Site:         - Endometrium         - Lower uterine segment     Histologic Type:         - Endometrioid carcinoma, NOS     Histologic Grade:         - FIGO grade 1     Tumor Size: 5.5 Centimeters (cm)     Tumor Extent       Myometrial Invasion:           - Present         Depth of Invasion: 15 Millimeters (mm)         Myometrial Thickness: 15 Millimeters (mm)         Percentage of Myometrial Invasion: 100%       Adenomyosis:           - Present, involved by carcinoma       Uterine Serosa Involvement:           - Present       Lower Uterine Segment Involvement:           - Present         Level of Tumor Involvement:             - Myoinvasive       Cervical Stromal Involvement:           - Present        Other Tissue / Organ Involvement:           - Right ovary           - Left ovary           - Left fallopian tube       Peritoneal Ascitic Fluid:           - Negative for malignancy (normal / benign)     Accessory Findings       Lymphovascular Invasion:           - Present     MARGINS     Ectocervical / Vaginal Cuff Margin:         - Uninvolved by carcinoma     LYMPH NODES     Number of Pelvic Nodes with Macrometastasis: 2     Number of Pelvic Nodes with Micrometastasis: 0     Laterality of Pelvic Node(s) with Tumor:         - Right         - Left     Total Number of Pelvic Node(s) Examined (sentinel and nonsentinel): 14     Number of Pelvic [place] Nodes Examined: 0     Laterality of Pelvic Node(s) Examined:         - Right         - Left     Number of Para-Aortic Nodes with Macrometastasis: 0     Number of Para-Aortic Nodes with Micrometastasis: 0     Total Number of Para-Aortic Node(s) Examined (sentinel and nonsentinel):    4     Number of Para-Aortic [place] Nodes Examined: 0     Laterality of Para-Aortic Node(s) Examined:         - Right         - Left     PATHOLOGIC STAGE CLASSIFICATION (PTNM, AJCC 8TH EDITION)     Primary Tumor (pT):         - pT3a     Regional Lymph Nodes (pN)       Category (pN):           - pN1a     FIGO STAGE     FIGO Stage:         - IIIC1      4/29/2018: CT:  IMPRESSION:   1. No pulmonary embolism. Postop atelectasis.  2. Fluid-filled loops of dilated small bowel in the upper abdomen,  favored to represent adynamic ileus although incompletely visualized.  3. Pneumoperitoneum and small volume ascites, expected given  postoperative day 2 following open surgery.  4. Partially visualized 10 mm right thyroid nodule.     6/13/18: C1D1 carboplatin/paclitaxel- anaphylactic reaction to paclitaxel, no carboplatin received.    7/6/18: C1 single agent carboplatin AUC 6     7/27/18: C2 SA carboplatin AUC 6    8/17/18: C3 SA carboplatin AUC 6, deferred due to thrombocytopenia    8-11/2018 RT:  EBRT and brachytherapy.      Due to intolerance of chemotherapy plan was to forgo additional chemotherapy in favor of observation.    2/2019: CT  IMPRESSION: In this patient with history of endometrial cancer status  post hysterectomy:  1. Increased size of a nodule in the posterior left upper lobe, which  now measures up to 6 mm (previously 3 mm. This is suspicious for  metastatic disease. Recommend short-term follow-up. Biopsy is an  option, though small size may limit yield.  2. Indeterminate mesenteric nodule, which may represent a mildly  enlarged lymph node. This is unchanged in size in comparison to  8/27/2018. Attention on follow-up.  3. Resolution of the previously seen heterogeneous irregularity of the  left psoas muscle. Biopsy 9/10/2018 compatible with granulomatous  inflammation and necrosis.    4/2019 CT  IMPRESSION: In this patient with history of endometrial cancer status  post hysterectomy, chemotherapy, and radiation:  1. Further slight increase in size of a now 7 mm subpleural nodule in  the superior segment of the left lower lobe, which remains suspicious  for metastatic disease.  2. New part solid part groundglass nodule measuring 4 mm in the  posterior medial left lower lobe could be infectious/inflammatory or  metastatic. Recommend attention on follow-up.  3. Continued stability of indeterminate pleural nodularity along the  right hemidiaphragm and a central mesenteric nodule. Recommend  continued attention on follow-up.  4. Mild diffuse bladder wall thickening with associated pericystic fat  stranding, likely sequela of radiation.    7/2019 CT:  IMPRESSION: In this patient with history of endometrial cancer status  post hysterectomy, chemotherapy, and radiation:  1. Further increased size of a now 9 mm subpleural nodule in the  superior segment of the left lower lobe, compatible with metastatic  disease.  2. Numerous new scattered groundglass nodules and a new 3 mm solid  nodule in the  posterior left lower lobe are indeterminant,  infectious/inflammatory versus metastatic.  3. Stable pleural nodularity along the right hemidiaphragm.  4. Increased central mesenteric and peritoneal haziness, indeterminate  but raising concern for peritoneal carcinomatosis. An enhancing  central mesenteric nodule remains unchanged.      Past Medical History:   Diagnosis Date     Abnormal renal function test 12/04/2017    due to NSAIDS, normal after stopping taking them     Advanced directives, counseling/discussion      Antineoplastic chemotherapy induced anemia 7/27/2018     Arthritis      BMI 35.0-35.9,adult 11/12/2017     Degeneration of spine 5/21/2019     DVT of upper extremity (deep vein thrombosis) (H) 03/27/2012    clotting disorder workup negative     Encounter for antineoplastic chemotherapy 7/6/2018     Encounter for long-term (current) use of medications 5/21/2018     Endometrial cancer (H) 04/2018     Gastroesophageal reflux disease      Hyperlipidemia LDL goal < 160      Hyperlipidemia, unspecified hyperlipidemia type 11/12/2017     Low ferritin 7/19/2018     MEDICAL HISTORY OF - 1997    left breast density     Microscopic hematuria 3/22/2018     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  7/17/2012     Thyrotoxicosis 2007    hyperthyroid, enlarged right thyroid on thyroid uptake, treate by [facility] with tapozole till 12/2008, FNA neg of 1.9 cm right lobe dominat nodule     Whooping cough due to Bordetella pertussis (p. pertussis) infant    whooping cough       Past Surgical History:   Procedure Laterality Date     ARTHROPLASTY HIP Left 12/4/2017    Procedure: ARTHROPLASTY HIP;  Left total hip arthroplasty;  Surgeon: Rajinder Goodwin MD;  Location: [facility] OR     BREAST BIOPSY, RT/LT  2004    left breast     DILATION AND CURETTAGE, OPERATIVE HYSTEROSCOPY WITH MORCELLATOR, COMBINED N/A 4/11/2018    Procedure: COMBINED DILATION AND CURETTAGE, OPERATIVE HYSTEROSCOPY WITH  MORCELLATOR;  Hysteroscopy, Dilation and Curettage Under Ultrasound Guidance ;  Surgeon: Adry Tineo MD;  Location: [facility] OR     DILATION AND CURETTAGE, WITH ULTRASOUND GUIDANCE  4/11/2018    Procedure: DILATION AND CURETTAGE, WITH ULTRASOUND GUIDANCE;;  Surgeon: Adry Tineo MD;  Location: [facility] OR     HYSTERECTOMY TOTAL ABDOMINAL, BILATERAL SALPINGO-OOPHORECTOMY, NODE DISSECTION, COMBINED Bilateral 4/27/2018    Procedure: COMBINED HYSTERECTOMY TOTAL ABDOMINAL, SALPINGO-OOPHORECTOMY, NODE DISSECTION;;  Surgeon: Bradley Tierney MD;  Location: [facility] OR     INSERT PORT VASCULAR ACCESS Right 5/31/2018    Procedure: INSERT PORT VASCULAR ACCESS;  Single Lumen Chest Power Port;  Surgeon: Jamila Major PA-C;  Location: [facility]     LAPAROSCOPIC HYSTERECTOMY TOTAL, BILATERAL SALPINGO-OOPHORECTOMY, NODE DISSECTION, COMBINED N/A 4/27/2018    Procedure: COMBINED LAPAROSCOPIC HYSTERECTOMY TOTAL, SALPINGO-OOPHORECTOMY, NODE DISSECTION;  Laparoscopic converted to open Removal Of Uterus, Tubes, Ovaries, Cervix, Pelvic and Para Aortic Lymphnode Dissection, Tumor Debulking, CUSA, Partial Omentectomy, Anesthesia Block;  Surgeon: Bradley Tierney MD;  Location: [facility] OR     TONSILLECTOMY  1960    tonsillectomy       Current Outpatient Medications   Medication     Ascorbic Acid (VITAMIN C) 500 MG CHEW     calcium citrate-vitamin D (CITRACAL) 315-200 MG-UNIT TABS per tablet     FERROUS GLUCONATE PO     GLUCOSAMINE CHONDROITIN COMPLX OR TABS     multivitamin, therapeutic with minerals (THERA-VIT-M) TABS tablet     omeprazole (PRILOSEC) 10 MG DR capsule     RANITIDINE HCL PO     warfarin (COUMADIN) 5 MG tablet     No current facility-administered medications for this visit.      Facility-Administered Medications Ordered in Other Visits   Medication     heparin 100 UNIT/ML injection 500 Units       Allergies   Allergen Reactions     Paclitaxel Anaphylaxis     reportedTaxol= anaphylaxsis     Nickel Rash       Family  History   Problem Relation Age of Onset     Diabetes Mother         type 2     Heart Disease Mother      Hypertension Mother      C.A.D. Mother          after 2nd heart attack     Hypertension Father      C.A.D. Father         mild heart attack     Cancer Father         skin cancer     Heart Disease Father      Rheumatoid Arthritis Sister      Heart Disease Brother         herdetary heart murmur     Hyperlipidemia Brother      Clotting Disorder Brother         factor 5 leiden     Deep Vein Thrombosis Brother         leg     Hyperlipidemia Brother      Deep Vein Thrombosis Brother         leg; negative for clotting disorder     Kidney Disease No family hx of        Social History     Socioeconomic History     Marital status:      Spouse name: Doug     Number of children: 0     Years of education: Not on file     Highest education level: Not on file   Occupational History     Occupation: rn     Employer: [employer]   Social Needs     Financial resource strain: Not on file     Food insecurity:     Worry: Not on file     Inability: Not on file     Transportation needs:     Medical: Not on file     Non-medical: Not on file   Tobacco Use     Smoking status: Never Smoker     Smokeless tobacco: Never Used   Substance and Sexual Activity     Alcohol use: Yes     Comment: rarely     Drug use: No     Sexual activity: Yes     Partners: Male   Lifestyle     Physical activity:     Days per week: Not on file     Minutes per session: Not on file     Stress: Not on file   Relationships     Social connections:     Talks on phone: Not on file     Gets together: Not on file     Attends Congregation service: Not on file     Active member of club or organization: Not on file     Attends meetings of clubs or organizations: Not on file     Relationship status: Not on file     Intimate partner violence:     Fear of current or ex partner: Not on file     Emotionally abused: Not on file     Physically abused: Not on file      Forced sexual activity: Not on file   Other Topics Concern     Parent/sibling w/ CABG, MI or angioplasty before 65F 55M? 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  She does not smoke and was never smoker, she drinks alcohol about a month, no illicit drugs            Balanced Diet - Yes, in the last 6 months    Osteoporosis Prevention Measures - Dairy servings per day: 2 servings plus a supplement    Regular Exercise -  Yes Describe walks for 30 to 40 minutes 4 to 5 times a week    Dental Exam - YES - Date: 1 year ago, and is going today    Eye Exam - YES - Date: 4 months ago    Self Breast Exam - Yes    Abuse: Current or Past (Physical, Sexual or Emotional)- No    Do you feel safe in your environment - Yes    Guns stored in the home - Yes, locked    Sunscreen used - Yes    Seatbelts used - Yes    Lipids -  YES - Date: 10-02    Glucose -  YES - Date: 10-02    Colon Cancer Screening - No    Hemoccults - NO    Pap Test -  YES - Date: 10-02    Do you have any concerns about STD's -  No    Mammography - YES - Date: 8-06    DEXA - NO    Immunizations reviewed and up to date - Yes, lst td 7-2000    10-23-07  MEL Mueller CMA           ROS  See below    I have reviewed the patient's ROS and discussed all pertinent information as noted in the HPI.    Physical Exam:  PS 0  VS: /68   Pulse 54   Temp 98.8  F (37.1  C) (Oral)   LMP 03/04/2005   SpO2 97%      CONSTITUTIONAL: Alert non-toxic appearing female in no acute distress  HEAD: Normocephalic, atraumatic  ENT: Oropharynx pink without lesions  NECK: Neck supple without lymphadenopathy  RESPIRATORY: Lungs clear to auscultation, no increased work of breathing noted  CV: Regular rate and rhythm, S1S2, no clicks, murmurs, rubs, or gallops; bilateral lower extremities without edema  GASTROINTESTINAL: Abdomen soft, non-distended, and non-tender to palpation  without masses or organomegaly; midline incision healed well  GENITOURINARY: Normal appearing external genitalia, vagina smooth without nodularity or masses, vaginal cuff intact, no masses palpated on bimanual exam. Rectal exam confirmed these findings.  LYMPHATIC: Cervical, supraclavicular, and inguinal nodes without lymphadenopathy  MUSCULOSKELETAL: Moves all extremities, no obvious muscle wasting  NEUROLOGIC: No gross deficits, normal gait  SKIN: Appropriate color for race, warm and dry, no rashes or lesions to unclothed skin  PSYCHIATRIC: Pleasant and interactive, affect bright, makes appropriate eye contact, thought process linear      Assessment/Plan:   1) Stage IIIC1 low grade endometrial cancer: S/p EBRT and vaginal cuff therapy - did not tolerate chemo (Taxol reaction and neutropenia) - with CT findings suggestive of possible slow progression versus stable disease (I have reviewed the films personally with the patient).  I had previously recommended a biopsy, which was felt to be infeasible owing to small size and location. The mass has grown albeit minimally over last 2 scans and we have discussed treating for symptoms versus asymptomatic progression.  Given her previous intolerance of treatment she is inclined not t start treatment early.    After a comprehensive discussion or the options and their risks and benefits she is inclined to rescan in 3-4 months with an eye to biopsy if the mass continues to grow versus continued observation if stable     2) PE: Asymptomatic at present    3) Genetic counseling: MMR proteins with intact nuclear expression making Peterson syndrome unlikely.    4) Health maintenance issues discussed include to follow up with PCP for non-gynecologic concerns and co-morbid conditions    5) Patient verbalized understanding of and agreement with plan    25 minutes spent with patient, over 50% of which was spent on counseling and coordination of care.          Again, thank you for  allowing me to participate in the care of your patient.      Sincerely,    Bradley Tierney MD

## 2019-07-29 NOTE — DISCHARGE INSTRUCTIONS

## 2019-07-31 NOTE — PROGRESS NOTES
ANTICOAGULATION FOLLOW-UP CLINIC VISIT    Patient Name:  Gerri Oewns  Date:  7/31/2019  Contact Type:  Face to Face    SUBJECTIVE:  Patient Findings     Comments:    was recently in [place] for a couple MD appts and states she does have a hiatal hernia the will need repair in the future. Also  has some gastritis and reflux which she states could be the reason she has so much nausea and sometime vomiting. She will let me know if/when surgery will be done.        Clinical Outcomes     Negatives:   Major bleeding event, Thromboembolic event, Anticoagulation-related hospital admission, Anticoagulation-related ED visit, Anticoagulation-related fatality    Comments:    was recently in [place] for a couple MD appts and states she does have a hiatal hernia the will need repair in the future. Also  has some gastritis and reflux which she states could be the reason she has so much nausea and sometime vomiting. She will let me know if/when surgery will be done.           OBJECTIVE    INR Protime   Date Value Ref Range Status   07/31/2019 3.1 (A) 0.86 - 1.14 Final       ASSESSMENT / PLAN  INR assessment THER    Recheck INR In: 3 WEEKS    INR Location Clinic      Anticoagulation Summary  As of 7/31/2019    INR goal:   2.0-3.0   TTR:   48.2 % (7.9 mo)   INR used for dosing:   3.1! (7/31/2019)   Warfarin maintenance plan:   7.5 mg (5 mg x 1.5) every Sun, Tue, Thu; 5 mg (5 mg x 1) all other days   Full warfarin instructions:   7/31: 2.5 mg; Otherwise 7.5 mg every Sun, Tue, Thu; 5 mg all other days   Weekly warfarin total:   42.5 mg   Plan last modified:   Varsha Crowe RN (3/13/2019)   Next INR check:   8/21/2019   Target end date:       Indications    DVT of upper extremity (deep vein thrombosis) (H) (Resolved) [I82.629]  Pulmonary embolus (H) [I26.99]             Anticoagulation Episode Summary     INR check location:       Preferred lab:       Send INR reminders to:   ARIANNA KIMBLE    Comments:    MTV 30 mcg. Consistent spinach and broccoli intake. DVT subclavian vein 2012. PE 7/2018. Brother positive Factor V Leiden. Another brother on lifetime AC. 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       Anticoagulation Care Providers     Provider Role Specialty Phone number    Karo Doty MD [facility] [phone]            See the Encounter Report to view Anticoagulation Flowsheet and Dosing Calendar (Go to Encounters tab in chart review, and find the Anticoagulation Therapy Visit)        Tena Castillo RN               ANTICOAGULATION FOLLOW-UP CLINIC VISIT    Patient Name:  Gerri Owens  Date:  7/31/2019  Contact Type:  Face to Face    SUBJECTIVE:  Patient Findings     Comments:    was recently in [place] for a couple MD appts and states she does have a hiatal hernia the will need repair in the future. Also  has some gastritis and reflux which she states could be the reason she has so much nausea and sometime vomiting. She will let me know if/when surgery will be done. She is also to stop lovenox at this time.        Clinical Outcomes     Negatives:   Major bleeding event, Thromboembolic event, Anticoagulation-related hospital admission, Anticoagulation-related ED visit, Anticoagulation-related fatality    Comments:    [facility] and states she does have a hiatal hernia the will need repair in the future. Also  has some gastritis and reflux which she states could be the reason she has so much nausea and sometime vomiting. She will let me know if/when surgery will be done. She is also to stop lovenox at this time.           OBJECTIVE    INR Protime   Date Value Ref Range Status   07/31/2019 3.1 (A) 0.86 - 1.14 Final       ASSESSMENT / PLAN  INR assessment THER    Recheck INR In: 3 WEEKS    INR Location Clinic      Anticoagulation Summary  As of 7/31/2019    INR goal:   2.0-3.0   TTR:   48.2 % (7.9 mo)   INR used for dosing:    3.1! (7/31/2019)   Warfarin maintenance plan:   7.5 mg (5 mg x 1.5) every Sun, Tue, Thu; 5 mg (5 mg x 1) all other days   Full warfarin instructions:   7/31: 2.5 mg; Otherwise 7.5 mg every Sun, Tue, Thu; 5 mg all other days   Weekly warfarin total:   42.5 mg   Plan last modified:   Varsha Crowe RN (3/13/2019)   Next INR check:   8/21/2019   Target end date:       Indications    DVT of upper extremity (deep vein thrombosis) (H) (Resolved) [I82.629]  Pulmonary embolus (H) [I26.99]             Anticoagulation Episode Summary     INR check location:       Preferred lab:       Send INR reminders to:   ARIANNA KIMBLE    Comments:   MTV 30 mcg. Consistent spinach and broccoli intake. DVT subclavian vein 2012. PE 7/2018. Brother positive Factor V Leiden. Another brother on lifetime AC. 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       Anticoagulation Care Providers     Provider Role Specialty Phone number    Karo Doty MD [facility] [phone]            See the Encounter Report to view Anticoagulation Flowsheet and Dosing Calendar (Go to Encounters tab in chart review, and find the Anticoagulation Therapy Visit)        Tena Castillo RN

## 2019-08-02 NOTE — PROGRESS NOTES
ANTICOAGULATION FOLLOW-UP CLINIC VISIT    Patient Name:  Gerri Owens  Date:  8/2/2019  Contact Type:  Telephone/ spoke to patient via phone    SUBJECTIVE:  Patient Findings     Positives:   Other complaints (vomited after supper last night)    Comments:   Patient had called warfarin clinic and left message re: cut dose last night. Call returned to patient and we discussed that dose she took last night was too little and we discussed how to cut back warfarin for vomiting. We discussed warfarin dosing/INR recheck date. She verbalized understanding and has no questions        Clinical Outcomes     Negatives:   Major bleeding event, Thromboembolic event, Anticoagulation-related hospital admission, Anticoagulation-related ED visit, Anticoagulation-related fatality    Comments:   Patient had called warfarin clinic and left message re: cut dose last night. Call returned to patient and we discussed that dose she took last night was too little and we discussed how to cut back warfarin for vomiting. We discussed warfarin dosing/INR recheck date. She verbalized understanding and has no questions           OBJECTIVE    INR Protime   Date Value Ref Range Status   07/31/2019 3.1 (A) 0.86 - 1.14 Final       ASSESSMENT / PLAN  No question data found.  Anticoagulation Summary  As of 8/2/2019    INR goal:   2.0-3.0   TTR:   48.2 % (7.9 mo)   INR used for dosing:   No new INR was available at the time of this encounter.   Warfarin maintenance plan:   7.5 mg (5 mg x 1.5) every Sun, Tue, Thu; 5 mg (5 mg x 1) all other days   Full warfarin instructions:   8/2: 7.5 mg; Otherwise 7.5 mg every Sun, Tue, Thu; 5 mg all other days   Weekly warfarin total:   42.5 mg   Plan last modified:   Varsha Crowe RN (3/13/2019)   Next INR check:   8/21/2019   Target end date:       Indications    DVT of upper extremity (deep vein thrombosis) (H) (Resolved) [I82.629]  Pulmonary embolus (H) [I26.99]             Anticoagulation Episode Summary      INR check location:       Preferred lab:       Send INR reminders to:   ARIANNA KIMBLE    Comments:   MTV 30 mcg. Consistent spinach and broccoli intake. DVT subclavian vein 2012. PE 7/2018. Brother positive Factor V Leiden. Another brother on lifetime AC. 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       Anticoagulation Care Providers     Provider Role Specialty Phone number    Karo Doty MD [facility] [phone]            See the Encounter Report to view Anticoagulation Flowsheet and Dosing Calendar (Go to Encounters tab in chart review, and find the Anticoagulation Therapy Visit)        Tena Castillo RN

## 2019-08-13 NOTE — TELEPHONE ENCOUNTER
Patient having diarrhea and nausea, wanting a colonoscopy. Will have her come in tomorrow for visit and may then refer for colonoscopy.

## 2019-08-13 NOTE — PROGRESS NOTES
ANTICOAGULATION FOLLOW-UP CLINIC VISIT    Patient Name:  Gerri Owens  Date:  8/13/2019  Contact Type:  Face to Face    SUBJECTIVE:  Patient Findings     Positives:   Other complaints (vomiting and diarrhea)    Comments:   On prilosec. Has hiatal hernia. She states she has cut dosing as she has had both vomiting and diarrhea off and on. Dosing cuts she made were appropriate. We discussed vit K intake and she has trouble with most of the dark green vegetables but she can drink green tea.         Clinical Outcomes     Negatives:   Major bleeding event, Thromboembolic event, Anticoagulation-related hospital admission, Anticoagulation-related ED visit, Anticoagulation-related fatality    Comments:   On prilosec. Has hiatal hernia. She states she has cut dosing as she has had both vomiting and diarrhea off and on. Dosing cuts she made were appropriate. We discussed vit K intake and she has trouble with most of the dark green vegetables but she can drink green tea.            OBJECTIVE    INR Protime   Date Value Ref Range Status   08/13/2019 3.0 (A) 0.86 - 1.14 Final       ASSESSMENT / PLAN  INR assessment THER    Recheck INR In: 2 WEEKS    INR Location Clinic      Anticoagulation Summary  As of 8/13/2019    INR goal:   2.0-3.0   TTR:   45.7 % (8.3 mo)   INR used for dosing:   3.0 (8/13/2019)   Warfarin maintenance plan:   7.5 mg (5 mg x 1.5) every Sun, Tue, Thu; 5 mg (5 mg x 1) all other days   Full warfarin instructions:   8/13: 5 mg; Otherwise 7.5 mg every Sun, Tue, Thu; 5 mg all other days   Weekly warfarin total:   42.5 mg   Plan last modified:   Varsha Crowe RN (3/13/2019)   Next INR check:   8/27/2019   Target end date:       Indications    DVT of upper extremity (deep vein thrombosis) (H) (Resolved) [I82.629]  Pulmonary embolus (H) [I26.99]             Anticoagulation Episode Summary     INR check location:       Preferred lab:       Send INR reminders to:   ARIANNA KIMBLE    Comments:   MTV 30 mcg.  Consistent spinach and broccoli intake. DVT subclavian vein 2012. PE 7/2018. Brother positive Factor V Leiden. Another brother on lifetime AC. 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       Anticoagulation Care Providers     Provider Role Specialty Phone number    Karo Doty MD [facility] [phone]            See the Encounter Report to view Anticoagulation Flowsheet and Dosing Calendar (Go to Encounters tab in chart review, and find the Anticoagulation Therapy Visit)        Tena Castillo RN

## 2019-08-14 NOTE — PROGRESS NOTES
Subjective     Gerri Owens is a 64 year old female who presents to clinic today for the following health issues:    HPI   Diarrhea  Onset: comes and goes for the past 2 months, occurs a couple of times a week    Description:   Consistency of stool: watery  Blood in stool: no   Number of loose stools in past 24 hours: 2    Progression of Symptoms:  intermittent    Accompanying Signs & Symptoms:  Fever: no   Nausea or vomiting; YES- once a week, usually occurs after eating sugary foods or spicy foods, no blood noted  Abdominal pain: YES, she has associated cramps  Episodes of constipation: no   Weight loss: YES-patient is down 11 pounds since 6/10/19   She did have one black stool, but she is also on iron. She does take warfarin for recurrent PEs. Recent INR was 3.0. Following with the Warfarin clinic.     History:   Ill contacts: no   Recent use of antibiotics: no    Recent travels: no          Recent medication-new or changes(Rx or OTC): no     Precipitating factors:   Fruit, high sugar foods, and spicy foods increases symptoms    Alleviating factors: abdominal pressure-if she wears a binding her symptoms improve.   Therapies Tried and outcome:  Nothing yet, will try immodium for continued symptoms;    Do note, patient's history is quite complicated. She does have low grade endometrial cancer and had a reaction to her chemo. Because of this, the plan is to forgo additional chemotherapy in favor of observation. She did, however, recently have an abdominal CT that showed increased central mesenteric and peritoneal haziness, indeterminate but raising concern for peritoneal carcinomatosis. An enhancing central mesenteric nodule remains unchanged. She also has a history of pulmonary embolisms and is on Warfarin. She was having increased nausea at our last visit and an EGC was done. This did show a 2 cm hiatal hernia along with a tubulovillous adenoma. No dysplasia was seen. She has never had a colonoscopy.      No  chest pain or shortness of breath. No rashes. No muscle aches. No fevers.     Patient Active Problem List   Diagnosis     Advanced directives, counseling/discussion     Primary osteoarthritis of both hips     History of deep venous thrombosis     Presence of left hip implant     CKD (chronic kidney disease) stage 2, GFR 60-89 ml/min     Calculus of left kidney     S/P DEMETRIO-BSO     Endometrial cancer (H)     Encounter for long-term (current) use of medications     Pulmonary embolus (H)     Osteoarthritis of both hands     Antineoplastic chemotherapy induced anemia     Chronic anticoagulation     Arthritis of hand     Degeneration of spine     Pulmonary nodules     Past Surgical History:   Procedure Laterality Date     ARTHROPLASTY HIP Left 12/4/2017    Procedure: ARTHROPLASTY HIP;  Left total hip arthroplasty;  Surgeon: Rajinder Goodwin MD;  Location: [facility] OR     BREAST BIOPSY, RT/LT  2004    left breast     DILATION AND CURETTAGE, OPERATIVE HYSTEROSCOPY WITH MORCELLATOR, COMBINED N/A 4/11/2018    Procedure: COMBINED DILATION AND CURETTAGE, OPERATIVE HYSTEROSCOPY WITH MORCELLATOR;  Hysteroscopy, Dilation and Curettage Under Ultrasound Guidance ;  Surgeon: Adry Tineo MD;  Location: [facility] OR     DILATION AND CURETTAGE, WITH ULTRASOUND GUIDANCE  4/11/2018    Procedure: DILATION AND CURETTAGE, WITH ULTRASOUND GUIDANCE;;  Surgeon: Adry Tineo MD;  Location: [facility]     HYSTERECTOMY TOTAL ABDOMINAL, BILATERAL SALPINGO-OOPHORECTOMY, NODE DISSECTION, COMBINED Bilateral 4/27/2018    Procedure: COMBINED HYSTERECTOMY TOTAL ABDOMINAL, SALPINGO-OOPHORECTOMY, NODE DISSECTION;;  Surgeon: Bradley Tierney MD;  Location: [facility] OR     INSERT PORT VASCULAR ACCESS Right 5/31/2018    Procedure: INSERT PORT VASCULAR ACCESS;  Single Lumen Chest Power Port;  Surgeon: Jamila Major PA-C;  [facility]: [facility]     LAPAROSCOPIC HYSTERECTOMY TOTAL, BILATERAL SALPINGO-OOPHORECTOMY, NODE DISSECTION, COMBINED N/A 4/27/2018     Procedure: COMBINED LAPAROSCOPIC HYSTERECTOMY TOTAL, SALPINGO-OOPHORECTOMY, NODE DISSECTION;  Laparoscopic converted to open Removal Of Uterus, Tubes, Ovaries, Cervix, Pelvic and Para Aortic Lymphnode Dissection, Tumor Debulking, CUSA, Partial Omentectomy, Anesthesia Block;  Surgeon: Bradley Tierney MD;  Location: [facility] OR     TONSILLECTOMY  1960    tonsillectomy       Social History     Tobacco Use     Smoking status: Never Smoker     Smokeless tobacco: Never Used   Substance Use Topics     Alcohol use: Yes     Comment: rarely     Family History   Problem Relation Age of Onset     Diabetes Mother         type 2     Heart Disease Mother      Hypertension Mother      C.A.D. Mother          after 2nd heart attack     Hypertension Father      C.A.D. Father         mild heart attack     Cancer Father         skin cancer     Heart Disease Father      Rheumatoid Arthritis Sister      Heart Disease Brother         herdetary heart murmur     Hyperlipidemia Brother      Clotting Disorder Brother         factor 5 leiden     Deep Vein Thrombosis Brother         leg     Hyperlipidemia Brother      Deep Vein Thrombosis Brother         leg; negative for clotting disorder     Kidney Disease No family hx of          Current Outpatient Medications   Medication Sig Dispense Refill     Ascorbic Acid (VITAMIN C) 500 MG CHEW        calcium citrate-vitamin D (CITRACAL) 315-200 MG-UNIT TABS per tablet Take 2 tablets by mouth daily       FERROUS GLUCONATE PO Take 325 mg by mouth once a week        GLUCOSAMINE CHONDROITIN COMPLX OR TABS Take  by mouth. 1500 mg 1xqd.   0     multivitamin, therapeutic with minerals (THERA-VIT-M) TABS tablet Take 1 tablet by mouth daily       omeprazole (PRILOSEC) 10 MG DR capsule 10 mg daily       RANITIDINE HCL PO Take 150 mg by mouth 2 times daily        warfarin (COUMADIN) 5 MG tablet Current dose (12/10/18): 7.5 mg DAILY OR as directed by ACC team. 130 tablet 1     Allergies   Allergen  "Reactions     Paclitaxel Anaphylaxis     reportedTaxol= anaphylaxsis     Nickel Rash       Reviewed and updated as needed this visit by Provider         Review of Systems   As noted in the HPI.       Objective    /72 (BP Location: Left arm, Cuff Size: Adult Regular)   Pulse 60   Temp 97.2  F (36.2  C) (Tympanic)   Resp 14   Ht 1.6 m (5' 3\")   Wt 67.6 kg (149 lb)   LMP 03/04/2005   SpO2 98%   BMI 26.39 kg/m    Body mass index is 26.39 kg/m .  Physical Exam   GENERAL: healthy, alert and no distress  EYES: Eyes grossly normal to inspection, PERRL and conjunctivae and sclerae normal  HENT: ear canals and TM's normal, nose and mouth without ulcers or lesions  NECK: no adenopathy  RESP: lungs clear to auscultation - no rales, rhonchi or wheezes  CV: regular rate and rhythm, normal S1 S2, no S3 or S4, no murmur  ABDOMEN: soft, nontender, no hepatosplenomegaly, no masses and bowel sounds normal  MS: no gross musculoskeletal defects noted, no edema  PSYCH: mentation appears normal, affect normal/bright    Diagnostic Test Results:  Labs reviewed in Epic  Results for orders placed or performed in visit on 08/14/19 (from the past 24 hour(s))   CBC with platelets and differential   Result Value Ref Range    WBC 3.9 (L) 4.0 - 11.0 10e9/L    RBC Count 4.87 3.8 - 5.2 10e12/L    Hemoglobin 14.8 11.7 - 15.7 g/dL    Hematocrit 45.6 35.0 - 47.0 %    MCV 94 78 - 100 fl    MCH 30.4 26.5 - 33.0 pg    MCHC 32.5 31.5 - 36.5 g/dL    RDW 13.7 10.0 - 15.0 %    Platelet Count 165 150 - 450 10e9/L    Diff Method Automated Method     % Neutrophils 67.0 %    % Lymphocytes 17.9 %    % Monocytes 11.4 %    % Eosinophils 2.6 %    % Basophils 0.8 %    % Immature Granulocytes 0.3 %    Nucleated RBCs 0 0 /100    Absolute Neutrophil 2.6 1.6 - 8.3 10e9/L    Absolute Lymphocytes 0.7 (L) 0.8 - 5.3 10e9/L    Absolute Monocytes 0.4 0.0 - 1.3 10e9/L    Absolute Eosinophils 0.1 0.0 - 0.7 10e9/L    Absolute Basophils 0.0 0.0 - 0.2 10e9/L    Abs " "Immature Granulocytes 0.0 0 - 0.4 10e9/L    Absolute Nucleated RBC 0.0    Comprehensive metabolic panel   Result Value Ref Range    Sodium 140 133 - 144 mmol/L    Potassium 3.7 3.4 - 5.3 mmol/L    Chloride 107 94 - 109 mmol/L    Carbon Dioxide 28 20 - 32 mmol/L    Anion Gap 5 3 - 14 mmol/L    Glucose 84 70 - 99 mg/dL    Urea Nitrogen 10 7 - 30 mg/dL    Creatinine 1.02 0.52 - 1.04 mg/dL    GFR Estimate 58 (L) >60 mL/min/[1.73_m2]    GFR Estimate If Black 68 >60 mL/min/[1.73_m2]    Calcium 9.5 8.5 - 10.1 mg/dL    Bilirubin Total 0.9 0.2 - 1.3 mg/dL    Albumin 3.5 3.4 - 5.0 g/dL    Protein Total 7.1 6.8 - 8.8 g/dL    Alkaline Phosphatase 95 40 - 150 U/L    ALT 23 0 - 50 U/L    AST 16 0 - 45 U/L           Assessment & Plan   (R19.7) Diarrhea, unspecified type  (primary encounter diagnosis)  (R11.0) Nausea  (C54.1) Endometrial cancer (H)  (R63.4) Weight loss  (Z12.11) Special screening for malignant neoplasms, colon  Comment: unsure cause, patient with diarrhea, weight loss, and nausea for the past 8 weeks, EGD on 7/24/19 done at [facility] looked ok, she never has had colonoscopy  Plan: Will refer to surgery for possible colonoscopy.       BMI:   Estimated body mass index is 28.34 kg/m  as calculated from the following:    Height as of 6/10/19: 1.6 m (5' 3\").    Weight as of 6/10/19: 72.6 kg (160 lb).         Katty Garcia NP  [facility] [place]    "

## 2019-08-14 NOTE — NURSING NOTE
"Chief Complaint   Patient presents with     Diarrhea       Initial /72 (BP Location: Left arm, Cuff Size: Adult Regular)   Pulse 60   Temp 97.2  F (36.2  C) (Tympanic)   Resp 14   Ht 1.6 m (5' 3\")   Wt 64.9 kg (143 lb)   LMP 03/04/2005   SpO2 98%   BMI 25.33 kg/m   Estimated body mass index is 25.33 kg/m  as calculated from the following:    Height as of this encounter: 1.6 m (5' 3\").    Weight as of this encounter: 64.9 kg (143 lb).  Medication Reconciliation: complete    "

## 2019-08-19 NOTE — TELEPHONE ENCOUNTER
Left message for pt that her stool culture was negative, but IFOB was positive. She was told that if she notices any blood in her stool or has any dizziness or lightheadedness, she should go to the ER.

## 2019-08-19 NOTE — PATIENT INSTRUCTIONS
"We want your colonoscopy to be as pleasant as possible. Please review the instructions below. If you have any questions, please contact us at any of the following numbers:     [facility] Coordinator: [phone]  Clinic Nurse: [phone] Sophia   Surgery Education Nurse: [phone]    Date of Procedure: August 28, 2019 with Dr. Henley   Admit time: Hospital Surgery will call you the day before your procedure by 5pm with your arrival time. If your surgery is on Monday, expect a call on Friday.  If you are not contacted by 5 pm you may call admitting at [phone]. After hours or on weekends, call [phone] to postpone.     Call the clinic nurse if you become ill within 1 week of your procedure to reschedule.       7 DAYS BEFORE THE EXAM:   prescriptions at your pharmacy as soon as possible.   Call the Surgery Education Nurse at [phone] and have a medication list ready.   Do not take Aspirin or NSAIDS (Ibuprofen, Celebrex, Naproxen, etc) 7 days before surgery.   Stop fiber supplements, vitamins, iron, and herbals. Do not eat any corn, nuts or seeds.   You may continue your 81 mg Aspirin.If you are prescribed blood thinners or insulin, talk to your primary care provider instructions on these medications.   Arrange transportation with a responsible adult or you will be cancelled.     2 DAYS BEFORE THE EXAM:   Low fiber diet. See the list of low fiber foods on page 3 of the \"Split-Dose SuPrep\" packet. Drink 4-6 large glasses of sports today and tomorrow. Avoid red and purple.    1 DAY BEFORE THE EXAM:  No solid food or milk products after 12:01am. Drink only clear liquids all day. See the list of clear liquids on page 2 of the \"Split-Dose SuPrep\" packet. Nothing red or purple. No alcohol.          AT 6:00 PM THE EVENING PRIOR TO PROCEDURE:  Pour one bottle of Suprep liquid into the mixing container. Add cool water to the 16 oz line, mix and drink. Follow with two 16 oz. glasses of water in " the next hour. Stay near a toilet.    DAY OF COLONOSCOPY PROCEDURE:          6 HOURS PRIOR TO THE EXAM (set an alarm):  Repeat the previous instructions with the 2nd bottle of Suprep followed by 2 glasses of water. Continue clear liquids until 3 hours prior to exam. If you must take medication, take it with a sip of water.Do not take diabetes medicine by mouth until after your exam. If you have asthma, bring your inhaler to surgery.  Shower. Wear comfortable clothes. No jewelry, make-up, nail polish, hairspray, lotions, or perfumes. Anthony in Admitting through the [facility] Entr[facility].  You must have a responsible adult to drive and stay with you for 4 hours at home or you will be cancelled.     TIPS FOR COLON CLEANSING BEFORE YOUR COLONOSCOPY  To get accurate results from your exam, your colon must be completely empty or you may need to repeat the colon prep and exam. If you followed instructions and your stool is clear or yellow liquid, you are ready. If you are not sure if your colon is clean, please call the clinic nurse.     You may use tucks wipes, hemorrhoid treatments, hydrocortisone cream, or alcohol-free baby wipes to ease anal irritation. You may also use Vaseline to help protect the skin.     You will have loose watery stools and may also have chills. Expect to feel discomfort, bloating and nausea until the stool clears from your colon. Dress for comfort.     If SuPrep is not covered by insurance and you would like an alternate prep, you or your pharmacy may call the nurse to request a new prescription. The dietary instructions are the same for both preps. Take Dulcolax 5mg at bedtime 2 nights before procedure and 3pm day before exam. Drink 1/2 of the the Golytely at 6pm day before exam 1  8 oz glass every 15 minutes. Repeat with 2nd 1/2 of Golytely 6 hours prior to exam.

## 2019-08-19 NOTE — NURSING NOTE
"Chief Complaint   Patient presents with     Consult For     diarrhea and vomiting. Referred by Katty Garcia.        Initial BP 98/64   Pulse 62   Temp 98.1  F (36.7  C)   Ht 1.651 m (5' 5\")   Wt 66.2 kg (146 lb)   LMP 03/04/2005   SpO2 98%   BMI 24.30 kg/m   Estimated body mass index is 24.3 kg/m  as calculated from the following:    Height as of this encounter: 1.651 m (5' 5\").    Weight as of this encounter: 66.2 kg (146 lb).  Medication Reconciliation: complete    "

## 2019-08-19 NOTE — NURSING NOTE
"Chief Complaint   Patient presents with     Consult For     diarrhea and vomiting. Referred by Katty Garcia.        Initial BP 98/64   Pulse 62   Temp 98.1  F (36.7  C)   Ht 1.6 m (5' 3\")   Wt 66.2 kg (146 lb)   LMP 03/04/2005   SpO2 98%   BMI 25.86 kg/m   Estimated body mass index is 25.86 kg/m  as calculated from the following:    Height as of this encounter: 1.6 m (5' 3\").    Weight as of this encounter: 66.2 kg (146 lb).  Medication Reconciliation: complete    "

## 2019-08-19 NOTE — PROGRESS NOTES
Surgery Consult Clinic Note      RE: Gerri Owens  : 1955    Chief Complaint:  diarrhea    History of Present Illness:  Mrs. Owens is a very pleasant 64 year old female who I am seeing at the request of Katty Garcia NP for evaluation of diarrhea and for consideration for colonoscopy.  Has never had a colonoscopy before. She denies family history of colon or rectal cancer, blood in stool.  She had an EGD in July of this year for abdominal pain that was reportedly positive for a tubulovillous adenoma and a hiatal hernia.  She's been having diarrhea for the last 4 months with associated nausea and cramping left sided abdominal pain that improves with bowel movement. Stools studies negative. The pain is made worse with movement. Of note, she as recently diagnosed with metastatic endometrial cancer and underwent radiation therapy and couldn't complete her chemotherapy for an allergic reaction to it.  Recent CT suggests abdominal carcinomatosis.  She she's oncology at the [facility]. She's lost 10 lbs since the diarrhea and nausea started because of decreased food intake.  Abdominal surgeries include hysterectomy and lymphadenectomy.  She specifically denies fever, chills, vomiting, chest pain, shortness of breath or palpitations.      Medical history:  Past Medical History:   Diagnosis Date     Abnormal renal function test 2017    due to NSAIDS, normal after stopping taking them     Advanced directives, counseling/discussion      Antineoplastic chemotherapy induced anemia 2018     Arthritis      BMI 35.0-35.9,adult 2017     Degeneration of spine 2019     DVT of upper extremity (deep vein thrombosis) (H) 2012    clotting disorder workup negative     Encounter for antineoplastic chemotherapy 2018     Encounter for long-term (current) use of medications 2018     Endometrial cancer (H) 2018     Gastroesophageal reflux disease      Hyperlipidemia LDL goal <  160      Hyperlipidemia, unspecified hyperlipidemia type 11/12/2017     Low ferritin 7/19/2018     MEDICAL HISTORY OF - 1997    left breast density     Microscopic hematuria 3/22/2018     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  7/17/2012     Thyrotoxicosis 2007    hyperthyroid, enlarged right thyroid on thyroid uptake, treate by endocrin eclinic of [facility] with tapozole till 12/2008, FNA neg of 1.9 cm right lobe dominat nodule     Whooping cough due to Bordetella pertussis (p. pertussis) infant    whooping cough       Surgical history:  Past Surgical History:   Procedure Laterality Date     ARTHROPLASTY HIP Left 12/4/2017    Procedure: ARTHROPLASTY HIP;  Left total hip arthroplasty;  Surgeon: Rajinder Goodwin MD;  Location: [facility] OR     BREAST BIOPSY, RT/LT  2004    left breast     DILATION AND CURETTAGE, OPERATIVE HYSTEROSCOPY WITH MORCELLATOR, COMBINED N/A 4/11/2018    Procedure: COMBINED DILATION AND CURETTAGE, OPERATIVE HYSTEROSCOPY WITH MORCELLATOR;  Hysteroscopy, Dilation and Curettage Under Ultrasound Guidance ;  Surgeon: Adry Tineo MD;  [facility]     DILATION AND CURETTAGE, WITH ULTRASOUND GUIDANCE  4/11/2018    Procedure: DILATION AND CURETTAGE, WITH ULTRASOUND GUIDANCE;;  Surgeon: Adry Tineo MD;  [facility]     HYSTERECTOMY TOTAL ABDOMINAL, BILATERAL SALPINGO-OOPHORECTOMY, NODE DISSECTION, COMBINED Bilateral 4/27/2018    Procedure: COMBINED HYSTERECTOMY TOTAL ABDOMINAL, SALPINGO-OOPHORECTOMY, NODE DISSECTION;;  Surgeon: Bradley Tierney MD;  [facility]     INSERT PORT VASCULAR ACCESS Right 5/31/2018    Procedure: INSERT PORT VASCULAR ACCESS;  Single Lumen Chest Power Port;  Surgeon: Jamila Major PA-C;  [facility]     LAPAROSCOPIC HYSTERECTOMY TOTAL, BILATERAL SALPINGO-OOPHORECTOMY, NODE DISSECTION, COMBINED N/A 4/27/2018    Procedure: COMBINED LAPAROSCOPIC HYSTERECTOMY TOTAL, SALPINGO-OOPHORECTOMY, NODE DISSECTION;  Laparoscopic  converted to open Removal Of Uterus, Tubes, Ovaries, Cervix, Pelvic and Para Aortic Lymphnode Dissection, Tumor Debulking, CUSA, Partial Omentectomy, Anesthesia Block;  Surgeon: Bradley Tierney MD;  [facility]     TONSILLECTOMY  1960    tonsillectomy       Family history:  Family History   Problem Relation Age of Onset     Diabetes Mother         type 2     Heart Disease Mother      Hypertension Mother      C.A.D. Mother          after 2nd heart attack     Hypertension Father      C.A.D. Father         mild heart attack     Cancer Father         skin cancer     Heart Disease Father      Rheumatoid Arthritis Sister      Heart Disease Brother         herdetary heart murmur     Hyperlipidemia Brother      Clotting Disorder Brother         factor 5 leiden     Deep Vein Thrombosis Brother         leg     Hyperlipidemia Brother      Deep Vein Thrombosis Brother         leg; negative for clotting disorder     Kidney Disease No family hx of        Medications:  Prior to Admission medications    Medication Sig Start Date End Date Taking? Authorizing Provider   Ascorbic Acid (VITAMIN C) 500 MG CHEW Take 1 tablet by mouth daily    Yes Reported, Patient   calcium citrate-vitamin D (CITRACAL) 315-200 MG-UNIT TABS per tablet Take 2 tablets by mouth daily   Yes Reported, Patient   FERROUS GLUCONATE PO Take 325 mg by mouth once a week    Yes Reported, Patient   GLUCOSAMINE CHONDROITIN COMPLX OR TABS Take  by mouth. 1500 mg 1xqd.  10/23/07  Yes Spatz, Lisa, MD   multivitamin, therapeutic with minerals (THERA-VIT-M) TABS tablet Take 1 tablet by mouth daily   Yes Reported, Patient   omeprazole (PRILOSEC) 10 MG DR capsule 10 mg daily   Yes Reported, Patient   RANITIDINE HCL PO Take 150 mg by mouth 2 times daily    Yes Reported, Patient   warfarin (COUMADIN) 5 MG tablet Current dose (12/10/18): 7.5 mg DAILY OR as directed by [facility] team. 6/10/19  Yes Katty Garcia, NP       Allergies:  The patientis allergic to  "paclitaxel and nickel.  .  Social history:  Social History     Tobacco Use     Smoking status: Never Smoker     Smokeless tobacco: Never Used   Substance Use Topics     Alcohol use: Yes     Comment: rarely     Marital status: .    Review of Systems:    Constitutional: Per HPI  HENT: Negative for ear pain, nosebleeds, congestion, sore throat, tinnitus and ear discharge.    Eyes: Negative for blurred vision, double vision, photophobia and pain.   Respiratory: Negative for cough, hemoptysis, shortness of breath, wheezing and stridor.    Cardiovascular: Negative for chest pain, palpitations and orthopnea.   Gastrointestinal: Per hPI  Genitourinary: Negative for urgency, frequency and hematuria.   Musculoskeletal: Negative for myalgias, back pain and joint pain.   Neurological: Negative for tingling, speech change and headaches.   Endo/Heme/Allergies: Does not bruise/bleed easily.   Psychiatric/Behavioral: Negative for depression, suicidal ideas and hallucinations. The patient is not nervous/anxious.    Physical Examination:  BP 98/64   Pulse 62   Temp 98.1  F (36.7  C)   Ht 1.651 m (5' 5\")   Wt 66.2 kg (146 lb)   LMP 03/04/2005   SpO2 98%   BMI 24.30 kg/m    General: AAOx4, NAD, WN/WD, ambulating without assistance  HEENT:NCAT, EOMI, PERRL Sclerae anicteric; Trachea mideline, no JVD  Chest:   Clear to auscultation bilaterally.  Cardiac: S1S2 , regular rate and rhythm without additional sounds  Abdomen: Flat, soft, ND/NT, no rebound, no masses, well healed lower midline incision  Rectal: deferred until colonoscopy  Extremities: Cursory exam unremarkable.  Skin: Warm, dry, < 2 sec cap refill  Neuro: CN 2-12 grossly intact, no focal deficit, GCS 15  Psych: happy, calm, asks appropriate questions      Assessment/Plan:  #1 Diarrhea  #2 Abdominal pain  #3 Weight loss  #4 Nausea  #5 Endometrial cancer  #6 Chronic anticoagulation    Thank you for the consult.  Mrs. Owens and I had a long and getachew discussion " about colonoscopies.  The indications, risks, benefits, althernatives and technical aspects of whole colon colonoscopy were outlined with risks including, but not limited to, perforation, bleeding and inability to visualize entire colon.  Management of each was reviewed including the risk for life saving surgery and possible admittance to the ICU.  The need of mechanical preparation of the colon was reviewed along with the use of monitored anesthetic care which is needed to ensure proper visualization and safety concerns should biopsy be needed.  The patient's questions were asked and answered.  Scheduled first available date.            Dr. Kale DO, [facility]  [address] [place] [address], [address]  [place] [address]    Referring Provider:  [place] [address]     Primary Care Provider:  Katty Garcia

## 2019-08-19 NOTE — PROGRESS NOTES
ANTICOAGULATION FOLLOW-UP CLINIC VISIT    Patient Name:  Gerri Owens  Date:  2019  Contact Type:  Telephone    SUBJECTIVE:  Patient Findings     Positives:   Upcoming invasive procedure (colonoscopy on 19)    Comments:   Call and message from patient that she will be having a colonoscopy on 19.  I spoke to her PCP and patient need lovenox 1mg/kg q12h bridge while off warfarin. Call placed to patient and spoke to her re: warfarin hold/lovenox bridge for procedure. She repeated back instructions accurately and has no questions. She states she has 6 boxes of 10 syringes of lovenox 80 mg at home from when she started warfarin. We discussed how much lovenox she needed to use (66mg). She verbalized understanding and has no questions. No lovenox will be ordered as she has a supply at home that is not .         Clinical Outcomes     Comments:   Call and message from patient that she will be having a colonoscopy on 19.  I spoke to her PCP and patient need lovenox 1mg/kg q12h bridge while off warfarin. Call placed to patient and spoke to her re: warfarin hold/lovenox bridge for procedure. She repeated back instructions accurately and has no questions. She states she has 6 boxes of 10 syringes of lovenox 80 mg at home from when she started warfarin. We discussed how much lovenox she needed to use (66mg). She verbalized understanding and has no questions. No lovenox will be ordered as she has a supply at home that is not .            OBJECTIVE    INR Protime   Date Value Ref Range Status   2019 3.0 (A) 0.86 - 1.14 Final       ASSESSMENT / PLAN  No question data found.  Anticoagulation Summary  As of 2019    INR goal:   2.0-3.0   TTR:   45.7 % (8.3 mo)   INR used for dosing:   No new INR was available at the time of this encounter.   Warfarin maintenance plan:   7.5 mg (5 mg x 1.5) every Sun, Tue, Thu; 5 mg (5 mg x 1) all other days   Full warfarin instructions:   : Hold;  8/24: Hold; 8/25: Hold; 8/26: Hold; 8/27: Hold; 8/29: 10 mg; 8/30: 10 mg; 8/31: 7.5 mg; Otherwise 7.5 mg every Sun, Tue, Thu; 5 mg all other days   Weekly warfarin total:   42.5 mg   Plan last modified:   Varsha Crowe RN (3/13/2019)   Next INR check:   9/3/2019   Target end date:       Indications    DVT of upper extremity (deep vein thrombosis) (H) (Resolved) [I82.629]  Pulmonary embolus (H) [I26.99]             Anticoagulation Episode Summary     INR check location:       Preferred lab:       Send INR reminders to:   ARIANNA KIMBLE    Comments:   MTV 30 mcg. Consistent spinach and broccoli intake. DVT subclavian vein 2012. PE 7/2018. Brother positive Factor V Leiden. Another brother on lifetime AC. 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       Anticoagulation Care Providers     Provider Role Specialty Phone number    Karo Doty MD [facility] [phone]            See the Encounter Report to view Anticoagulation Flowsheet and Dosing Calendar (Go to Encounters tab in chart review, and find the Anticoagulation Therapy Visit)        Tena Castillo, RN

## 2019-08-21 NOTE — ANESTHESIA PREPROCEDURE EVALUATION
Anesthesia Pre-Procedure Evaluation    Patient: Gerri Owens   MRN: [id number] : 1955          Preoperative Diagnosis: DIARRHEA    Procedure(s):  COLONOSCOPY    Past Medical History:   Diagnosis Date     Abnormal renal function test 2017    due to NSAIDS, normal after stopping taking them     Advanced directives, counseling/discussion      Antineoplastic chemotherapy induced anemia 2018     Arthritis      BMI 35.0-35.9,adult 2017     Degeneration of spine 2019     DVT of upper extremity (deep vein thrombosis) (H) 2012    clotting disorder workup negative     Encounter for antineoplastic chemotherapy 2018     Encounter for long-term (current) use of medications 2018     Endometrial cancer (H) 2018     Gastroesophageal reflux disease      Hyperlipidemia LDL goal < 160      Hyperlipidemia, unspecified hyperlipidemia type 2017     Low ferritin 2018     MEDICAL HISTORY OF -     left breast density     Microscopic hematuria 3/22/2018     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  2012     Thyrotoxicosis     hyperthyroid, enlarged right thyroid on thyroid uptake, treate by endocrin eclinic [facility] with tapozole till 2008, FNA neg of 1.9 cm right lobe dominat nodule     Whooping cough due to Bordetella pertussis (p. pertussis) infant    whooping cough     Past Surgical History:   Procedure Laterality Date     ARTHROPLASTY HIP Left 2017    Procedure: ARTHROPLASTY HIP;  Left total hip arthroplasty;  Surgeon: Rajinder Goodwin MD;  [facility]: [facility] OR     BREAST BIOPSY, RT/LT      left breast     DILATION AND CURETTAGE, OPERATIVE HYSTEROSCOPY WITH MORCELLATOR, COMBINED N/A 2018    Procedure: COMBINED DILATION AND CURETTAGE, OPERATIVE HYSTEROSCOPY WITH MORCELLATOR;  Hysteroscopy, Dilation and Curettage Under Ultrasound Guidance ;  Surgeon: Adry Tineo MD;  [facility]     DILATION AND CURETTAGE, WITH  ULTRASOUND GUIDANCE  4/11/2018    Procedure: DILATION AND CURETTAGE, WITH ULTRASOUND GUIDANCE;;  Surgeon: Adry Tineo MD;  [facility]     HYSTERECTOMY TOTAL ABDOMINAL, BILATERAL SALPINGO-OOPHORECTOMY, NODE DISSECTION, COMBINED Bilateral 4/27/2018    Procedure: COMBINED HYSTERECTOMY TOTAL ABDOMINAL, SALPINGO-OOPHORECTOMY, NODE DISSECTION;;  Surgeon: Bradley Tierney MD;  [facility]     INSERT PORT VASCULAR ACCESS Right 5/31/2018    Procedure: INSERT PORT VASCULAR ACCESS;  Single Lumen Chest Power Port;  Surgeon: Jamila Major PA-C;  [facility]     LAPAROSCOPIC HYSTERECTOMY TOTAL, BILATERAL SALPINGO-OOPHORECTOMY, NODE DISSECTION, COMBINED N/A 4/27/2018    Procedure: COMBINED LAPAROSCOPIC HYSTERECTOMY TOTAL, SALPINGO-OOPHORECTOMY, NODE DISSECTION;  Laparoscopic converted to open Removal Of Uterus, Tubes, Ovaries, Cervix, Pelvic and Para Aortic Lymphnode Dissection, Tumor Debulking, CUSA, Partial Omentectomy, Anesthesia Block;  Surgeon: Bradley Tierney MD;  [facility]     TONSILLECTOMY  1960    tonsillectomy       Anesthesia Evaluation     . Pt has had prior anesthetic. Type: General    No history of anesthetic complications          ROS/MED HX    ENT/Pulmonary:     (+), . Other pulmonary disease pulmonary nodules.    Neurologic:  - neg neurologic ROS     Cardiovascular:     (+) Dyslipidemia, ----. Taking blood thinners (coumadin) : . . . :. . Previous cardiac testing Echodate:7/7/2018results:Interpretation Summary  Global and regional left ventricular function is normal with an EF of 60-65%.  The right ventricle is normal size. Global right ventricular function is normal.  The inferior vena cava is normal. Estimated mean right atrial pressure is 3 mmHg (normal).  No pericardial effusion is present.  Previous study not available for comparison.date: results:ECG reviewed date:4/23/2018 results:SB (vent. rate 54) date: results:          METS/Exercise Tolerance:      Hematologic:     (+) History of blood clots (upper extremity DVT, PE) pt is anticoagulated, Anemia (antineoplastic chemotherapy induced), -      Musculoskeletal:   (+) arthritis,  other musculoskeletal- DDD      GI/Hepatic:     (+) GERD bowel prep, Other GI/Hepatic diarrhea, abd pain, weight loss, nausea      Renal/Genitourinary:     (+) chronic renal disease (r/t NSAID use),       Endo:     (+) thyroid problem  hyperthyroidism, .      Psychiatric:  - neg psychiatric ROS       Infectious Disease:  - neg infectious disease ROS       Malignancy:   (+) Malignancy History of Other  Other CA endometrial status post Chemo         Other:    - neg other ROS                      Physical Exam  Normal systems: cardiovascular, pulmonary and dental    Airway   Mallampati: III  TM distance: >3 FB  Neck ROM: full    Dental     Cardiovascular   Rhythm and rate: regular and normal      Pulmonary    breath sounds clear to auscultation            Lab Results   Component Value Date    WBC 3.9 (L) 08/14/2019    HGB 14.8 08/14/2019    HCT 45.6 08/14/2019     08/14/2019    CRP <2.9 06/10/2019     08/14/2019    POTASSIUM 3.7 08/14/2019    CHLORIDE 107 08/14/2019    CO2 28 08/14/2019    BUN 10 08/14/2019    CR 1.02 08/14/2019    GLC 84 08/14/2019    LAURENT 9.5 08/14/2019    PHOS 2.9 03/13/2018    MAG 2.3 08/24/2018    ALBUMIN 3.5 08/14/2019    PROTTOTAL 7.1 08/14/2019    ALT 23 08/14/2019    AST 16 08/14/2019    ALKPHOS 95 08/14/2019    BILITOTAL 0.9 08/14/2019    LIPASE 148 06/10/2019    INR 3.0 (A) 08/13/2019    TSH 1.58 06/06/2018    T4 3.12 (H) 10/23/2007       Preop Vitals  BP Readings from Last 3 Encounters:   08/19/19 98/64   08/14/19 104/72   07/29/19 120/68    Pulse Readings from Last 3 Encounters:   08/19/19 62   08/14/19 60   07/29/19 54      Resp Readings from Last 3 Encounters:   08/14/19 14   05/21/19 16   02/18/19 18    SpO2 Readings from Last 3 Encounters:   08/19/19 98%   08/14/19 98%   07/29/19 97%      Temp  "Readings from Last 1 Encounters:   08/19/19 98.1  F (36.7  C)    Ht Readings from Last 1 Encounters:   08/19/19 1.651 m (5' 5\")      Wt Readings from Last 1 Encounters:   08/19/19 66.2 kg (146 lb)    Estimated body mass index is 24.3 kg/m  as calculated from the following:    Height as of 8/19/19: 1.651 m (5' 5\").    Weight as of 8/19/19: 66.2 kg (146 lb).       Anesthesia Plan      History & Physical Review  History and physical reviewed and following examination; no interval change.    ASA Status:  3 .    NPO Status:  > 8 hours    Plan for MAC with Intravenous and Propofol induction. Maintenance will be TIVA.  Reason for MAC:  Chronic cardiopulmonary disease (G9) and Other - see comments  PONV prophylaxis:  Ondansetron (or other 5HT-3)  H&P 8/19  INR 1.09  Surgeon requests deep sedation. Patient is an ASA 3. Will provide MAC.      Postoperative Care  Postoperative pain management:  IV analgesics.      Consents  Anesthetic plan, risks, benefits and alternatives discussed with:  Patient..                 ALFONZO Gupta CRNA  "

## 2019-08-28 NOTE — ANESTHESIA CARE TRANSFER NOTE
Patient: Gerri Owens    Procedure(s):  COLONOSCOPY WITH BIOPSIES    Diagnosis: DIARRHEA  Diagnosis Additional Information: No value filed.    Anesthesia Type:   MAC     Note:  Airway :Nasal Cannula  Patient transferred to:Phase II  Handoff Report: Identifed the Patient, Identified the Reponsible Provider, Reviewed the pertinent medical history, Discussed the surgical course, Reviewed Intra-OP anesthesia mangement and issues during anesthesia, Set expectations for post-procedure period and Allowed opportunity for questions and acknowledgement of understanding      Vitals: (Last set prior to Anesthesia Care Transfer)    CRNA VITALS  8/28/2019 1059 - 8/28/2019 1129      8/28/2019             Ht Rate:  0  (Abnormal)     Resp Rate (set):  8                Electronically Signed By: ALFONZO French CRNA  August 28, 2019  11:29 AM

## 2019-08-28 NOTE — ANESTHESIA POSTPROCEDURE EVALUATION
Patient: Gerri Owens    Procedure(s):  COLONOSCOPY WITH BIOPSIES    Diagnosis:DIARRHEA  Diagnosis Additional Information: No value filed.    Anesthesia Type:  MAC    Note:  Anesthesia Post Evaluation    Patient location during evaluation: Phase 2 and Bedside  Patient participation: Able to fully participate in evaluation  Level of consciousness: awake and alert  Pain management: adequate  Airway patency: patent  Cardiovascular status: acceptable  Respiratory status: acceptable  Hydration status: stable  PONV: none     Anesthetic complications: None          Last vitals:  Vitals:    08/28/19 1135 08/28/19 1140 08/28/19 1145   BP:      Resp: (P) 16 (P) 16 (P) 16   Temp:      SpO2:            Electronically Signed By: Justin Gee MD  August 28, 2019  11:45 AM

## 2019-08-28 NOTE — DISCHARGE INSTRUCTIONS

## 2019-08-28 NOTE — BRIEF OP NOTE
[facility]    Brief Operative Note    Pre-operative diagnosis: DIARRHEA  Post-operative diagnosis Diarrhea, hemorrhoids  Procedure: Procedure(s):  COLONOSCOPY WITH BIOPSIES  Surgeon: Surgeon(s) and Role:     * Rajinder Henley DO - Primary  Anesthesia: Monitor Anesthesia Care   Estimated blood loss: None  Drains: None  Specimens:   ID Type Source Tests Collected by Time Destination   A : TERMINAL ILEUM Biopsy Other SURGICAL PATHOLOGY EXAM Rajinder Henley,  8/28/2019 11:13 AM    B : random colon Biopsy Colon SURGICAL PATHOLOGY EXAM Rajinder Henley,  8/28/2019 11:15 AM    C :  Biopsy Large Intestine, Rectum SURGICAL PATHOLOGY EXAM Rajinder Henley,  8/28/2019 11:21 AM      Findings:   normal colon mucosa, hemorrhoids.  Complications: None.  Implants:  * No implants in log *

## 2019-08-28 NOTE — PROCEDURES
Procedure Date: 08/28/2019      PREOPERATIVE DIAGNOSIS:  Diarrhea.      POSTOPERATIVE DIAGNOSIS:  Diarrhea hemorrhoids.      PROCEDURE:  Colonoscopy.      INDICATION:  A 64-year-old female, no previous colonoscopy with a 4-month history of nausea, abdominal pain and diarrhea, all studies nondiagnostic with associated 10-pound weight loss, here for diagnostic colonoscopy.      SURGEON:  Rajinder Henley DO      DESCRIPTION OF PROCEDURE:  The patient was brought into the endoscopy suite and placed in the left lateral decubitus position.  After a preprocedural pause and attended monitored anesthesia was administered, the external anus was inspected and positive for external hemorrhoids.  Digital rectal exam was normal.  The colonoscope was inserted and advanced under direct visualization to the level of the cecum, which was identified by the appendiceal orifice and the ileocecal valve.  The terminal ileum was intubated.  The colonoscope was advanced about half of a foot up the small intestine.  The mucosa of the terminal ileum was unremarkable and biopsies were obtained.  The colonoscope was withdrawn back into the cecum.  The prep was excellent.  Upon slow withdrawal of the colonoscope, approximately 95% of the colon mucosa was directly visualized.  The cecum, ascending, transverse, descending and sigmoid colon were all unremarkable, no evidence of polyps, diverticula, inflammation, ulceration, bleeding or AVMs.  Biopsies were obtained in all sections of the colon.  Starting in the low sigmoid colon and rectum, the mucosa had evidence of diffuse mild proctitis, possibly from the prep or the history of radiation.  Specific biopsies of the rectum were obtained using the cold biting forceps.  Retroflexion in the rectum was normal.  The extra air was removed from the colon and the colonoscope was withdrawn.        The patient tolerated the procedure well and taken to the postanesthesia care unit.  Timing for interval  colonoscopy will depend upon the histologic evaluation of the biopsies.         ANTHONY ARROYO DO             D: 2019   T: 2019   MT: LEO      Name:     MARILEE EDWARD   MRN:      [id number]        Account:        [id number]   :      1955           Procedure Date: 2019      Document: [id number]

## 2019-08-29 PROBLEM — Z98.890 HISTORY OF COLONOSCOPY: Status: ACTIVE | Noted: 2019-01-01

## 2019-09-03 NOTE — PROGRESS NOTES
ANTICOAGULATION FOLLOW-UP CLINIC VISIT    Patient Name:  Gerri Owens  Date:  9/3/2019  Contact Type:  Face to Face    SUBJECTIVE:  Patient Findings     Positives:   Change in diet/appetite (decreased vit K intake)    Comments:   States is very nauseated. Vomited x 1 a few days ago and cut dose to 5 mg instead of 7.5mg .. We discussed that she has a burning stomach. She is already taking omeprazole but states it is not working. We discussed she could try maalox but to not take meds within a hour of taking Maalox. She is to stop lovenox today. She also states her food intake is very minimal.        Clinical Outcomes     Negatives:   Major bleeding event, Thromboembolic event, Anticoagulation-related hospital admission, Anticoagulation-related ED visit, Anticoagulation-related fatality    Comments:   States is very nauseated. Vomited x 1 a few days ago and cut dose to 5 mg instead of 7.5mg .. We discussed that she has a burning stomach. She is already taking omeprazole but states it is not working. We discussed she could try maalox but to not take meds within a hour of taking Maalox. She is to stop lovenox today. She also states her food intake is very minimal.           OBJECTIVE    INR Protime   Date Value Ref Range Status   2019 4.9 (A) 0.86 - 1.14 Final       ASSESSMENT / PLAN  INR assessment SUPRA not eating much, no vit K intake   Recheck INR In: 1 WEEK    INR Location Clinic      Anticoagulation Summary  As of 9/3/2019    INR goal:   2.0-3.0   TTR:   45.7 % (9 mo)   INR used for dosin.9! (9/3/2019)   Warfarin maintenance plan:   7.5 mg (5 mg x 1.5) every Sun, Tue, Thu; 5 mg (5 mg x 1) all other days   Full warfarin instructions:   9/3: Hold; : 2.5 mg; Otherwise 7.5 mg every Sun, Tue, Thu; 5 mg all other days   Weekly warfarin total:   42.5 mg   Plan last modified:   Varsha Crowe, RN (3/13/2019)   Next INR check:   9/10/2019   Target end date:       Indications    DVT of upper extremity  (deep vein thrombosis) (H) (Resolved) [I82.629]  Pulmonary embolus (H) [I26.99]             Anticoagulation Episode Summary     INR check location:       Preferred lab:       Send INR reminders to:   ARIANNA KIMBLE    Comments:   MTV 30 mcg. Consistent spinach and broccoli intake. DVT subclavian vein 2012. PE 7/2018. Brother positive Factor V Leiden. Another brother on lifetime AC. 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       Anticoagulation Care Providers     Provider Role Specialty Phone number    Karo Doty MD [facility] [phone]            See the Encounter Report to view Anticoagulation Flowsheet and Dosing Calendar (Go to Encounters tab in chart review, and find the Anticoagulation Therapy Visit)        Tena Castillo RN

## 2019-09-10 NOTE — PROGRESS NOTES
ANTICOAGULATION FOLLOW-UP CLINIC VISIT    Patient Name:  Gerri Owens  Date:  9/10/2019  Contact Type:  Face to Face    SUBJECTIVE:  Patient Findings     Positives:   Change in health (increased vomiting. ), Change in activity (significant decrease in activity), Change in diet/appetite (not eating much)    Comments:   Vomiting regularly. She gets nauseated and starts vomiting. any time she is upright. She is not eating much and has lost 2 pounds in past week. She has cut warfarin dosing when she vomits. She has no unusual bleeding, no bruising. We did discussed that if she has any bleeding she cannot get to stop within 10 min that she should go to ER. We discussed adding a little bit more vit K to diet and she states she will eat some spinach. She states she has a MD appt. To see GI provider tomorrow as her stomach issues are worsening. She has had significant decrease in activity.        Clinical Outcomes     Negatives:   Major bleeding event, Thromboembolic event, Anticoagulation-related hospital admission, Anticoagulation-related ED visit, Anticoagulation-related fatality    Comments:   Vomiting regularly. She gets nauseated and starts vomiting. any time she is upright. She is not eating much and has lost 2 pounds in past week. She has cut warfarin dosing when she vomits. She has no unusual bleeding, no bruising. We did discussed that if she has any bleeding she cannot get to stop within 10 min that she should go to ER. We discussed adding a little bit more vit K to diet and she states she will eat some spinach. She states she has a MD appt. To see GI provider tomorrow as her stomach issues are worsening. She has had significant decrease in activity.           OBJECTIVE    INR Protime   Date Value Ref Range Status   09/10/2019 >8.0 (A) 0.86 - 1.14 Final       ASSESSMENT / PLAN  INR assessment SUPRA    Recheck INR In: 3 DAYS    INR Location Clinic      Anticoagulation Summary  As of 9/10/2019    INR goal:    2.0-3.0   TTR:   45.7 % (9 mo)   INR used for dosing:   >8.0! (9/10/2019)   Warfarin maintenance plan:   7.5 mg (5 mg x 1.5) every Sun, Tue, Thu; 5 mg (5 mg x 1) all other days   Full warfarin instructions:   9/10: Hold; 9/11: Hold; 9/12: 2.5 mg; Otherwise 7.5 mg every Sun, Tue, Thu; 5 mg all other days   Weekly warfarin total:   42.5 mg   Plan last modified:   Varsha Crowe, RN (3/13/2019)   Next INR check:   9/13/2019   Target end date:       Indications    DVT of upper extremity (deep vein thrombosis) (H) (Resolved) [I82.629]  Pulmonary embolus (H) [I26.99]             Anticoagulation Episode Summary     INR check location:       Preferred lab:       Send INR reminders to:   ARIANNA KIMBLE    Comments:   MTV 30 mcg. Consistent spinach and broccoli intake. DVT subclavian vein 2012. PE 7/2018. Brother positive Factor V Leiden. Another brother on lifetime AC. 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       Anticoagulation Care Providers     Provider Role Specialty Phone number    Karo Doty MD [facility] [phone]            See the Encounter Report to view Anticoagulation Flowsheet and Dosing Calendar (Go to Encounters tab in chart review, and find the Anticoagulation Therapy Visit)        Tena Castillo, RN

## 2019-09-13 NOTE — TELEPHONE ENCOUNTER
Talked with patient. Having hematuria with urinary frequency. No fevers. UA done and positive for RBC and WBCs. Will treat as UTI, but have her return in 1 week for UA recheck. If blood still present, will order FISH and urine cytology. INR was recently 9 which could be contributing to the blood.

## 2019-09-13 NOTE — PROGRESS NOTES
ANTICOAGULATION FOLLOW-UP CLINIC VISIT    Patient Name:  Gerri Owens  Date:  9/13/2019  Contact Type:  Face to Face    SUBJECTIVE:  Patient Findings     Positives:   Change in health, Change in activity, Change in medications, Change in diet/appetite    Comments:   Patient has been vomiting since May sometimes up to 3 times a week.  This week has only vomited Monday.  Today nauseated and pain in stomach again.  Was started on pantoprazole instead of omeprazole on 9/12.   Patient eating very little and activity decreased.  Patient had some spotting on Monday and each morning but none the rest of the day.  INR completed by Nurse. Went over INR result, warfarin dosing and next INR recheck date.  Patient verbalized understanding.  Patient clarified slight blood in urine in the morning.  Coumadin Nurse will talk with PCP to see if urine test to be ordered.  Patient states no other bleeding/bruising or changes to meds.  Patient placed on Cipro 5 days.  New Warfarin dosing given to patient.  She verbalized understanding.        Clinical Outcomes     Comments:   Patient has been vomiting since May sometimes up to 3 times a week.  This week has only vomited Monday.  Today nauseated and pain in stomach again.  Was started on pantoprazole instead of omeprazole on 9/12.   Patient eating very little and activity decreased.  Patient had some spotting on Monday and each morning but none the rest of the day.  INR completed by Nurse. Went over INR result, warfarin dosing and next INR recheck date.  Patient verbalized understanding.  Patient clarified slight blood in urine in the morning.  Coumadin Nurse will talk with PCP to see if urine test to be ordered.  Patient states no other bleeding/bruising or changes to meds.  Patient placed on Cipro 5 days.  New Warfarin dosing given to patient.  She verbalized understanding.           OBJECTIVE    INR Protime   Date Value Ref Range Status   09/13/2019 2.1 (A) 0.86 - 1.14 Final        ASSESSMENT / PLAN  INR assessment THER not feeling well, vomiting   Recheck INR In: 1 WEEK    INR Location Clinic      Anticoagulation Summary  As of 2019    INR goal:   2.0-3.0   TTR:   44.2 % (9.4 mo)   INR used for dosin.1 (2019)   Warfarin maintenance plan:   7.5 mg (5 mg x 1.5) every Sun, Tue, Thu; 5 mg (5 mg x 1) all other days   Full warfarin instructions:   : 2.5 mg; 9/15: 5 mg; : 2.5 mg; Otherwise 7.5 mg every Sun, Tue, Thu; 5 mg all other days   Weekly warfarin total:   42.5 mg   Plan last modified:   Varsha Crowe RN (3/13/2019)   Next INR check:   2019   Target end date:       Indications    DVT of upper extremity (deep vein thrombosis) (H) (Resolved) [I82.629]  Pulmonary embolus (H) [I26.99]             Anticoagulation Episode Summary     INR check location:       Preferred lab:       Send INR reminders to:   ARIANNA KIMBLE    Comments:   MTV 30 mcg. Consistent spinach and broccoli intake. DVT subclavian vein . PE 2018. Brother positive Factor V Leiden. Another brother on lifetime AC. 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       Anticoagulation Care Providers     Provider Role Specialty Phone number    Karo Doty MD [facility] [phone]            See the Encounter Report to view Anticoagulation Flowsheet and Dosing Calendar (Go to Encounters tab in chart review, and find the Anticoagulation Therapy Visit)      Staci Stroud RN

## 2019-09-13 NOTE — TELEPHONE ENCOUNTER
Talked with patient. She met with GI and was given zofran. She is feeling better. She nausea has improved. Meets with her oncologist in 11/2019.

## 2019-09-18 NOTE — PROGRESS NOTES
Subjective     Gerri Owens is a 64 year old female who presents to clinic today for the following health issues:    HPI   UTI Follow Up  Patient was seen in the Coumadin clinic on 9/16/19. At this time, she was complaining of urinary urgency and hematuria. A UA was done and showed WBC, RBCs, and LE. She was placed on ciprofloxacin and follows up today for a urine recheck.     Do note, she also has a h/o endometrial cancer and has been having a lot of nausea and vomiting. EGD was completed on 7/24/2019 revealing LA grade B reflux esophagitis. This was biopsied. Found to have a mild Schatzki's ring that was dilated. A single duodenal polyp that was resected and removed. 2 cm hiatal hernia. Pathology report is reviewed. Duodenum biopsy with tubulovillous adenoma, no high-grade dysplasia identified. Stomach biopsies with no diagnostic abnormality. Gastroesophageal junction biopsies with mild reflux changes and fragments of gastric type mucosa no specialized Frey's epithelium evident. Colonoscopy was performed on 8/28/2019 the cecum, ascending, transverse, descending and sigmoid colon were all unremarkable, no evidence of polyps, diverticula, inflammation, ulceration, bleeding, or AVMs were seen. Starting in the low sigmoid colon and rectum, the mucosa had evidence of diffuse mild proctitis, possibly from the prep or history of radiation. Retroflexion in the rectum was normal. Noted to have external hemorrhoids.     She also takes warfarin due to recurrent PEs and her INR was 9 when her last UA was collected. Warfarin was adjusted and on recheck, her INR was 3.0.     Today she notes that she has been feeling better. Denies dysuria or urinary frequency, but she does admit that about one week ago, she had a lot of blood in her urine. She read on line that protonix can do this so she stopped and has not noticed any blood in her urine since. She continues to suffer from nausea and epigastric pain, but had not vomited  until 2 days ago. She is taking 8 mg of zofran and feels this is helping. She denies blood in her stool. She has a repeat CT scan of her abdomen, pelvis and head scheduled for tomorrow. She denies fevers. No chest pain or shortness of breath.     Patient Active Problem List   Diagnosis     Advanced directives, counseling/discussion     Primary osteoarthritis of both hips     History of deep venous thrombosis     Presence of left hip implant     CKD (chronic kidney disease) stage 2, GFR 60-89 ml/min     Calculus of left kidney     S/P DEMETRIO-BSO     Endometrial cancer (H)     Encounter for long-term (current) use of medications     Pulmonary embolus (H)     Osteoarthritis of both hands     Antineoplastic chemotherapy induced anemia     Chronic anticoagulation     Arthritis of hand     Degeneration of spine     Pulmonary nodules     History of colonoscopy     Past Surgical History:   Procedure Laterality Date     ARTHROPLASTY HIP Left 12/4/2017    Procedure: ARTHROPLASTY HIP;  Left total hip arthroplasty;  Surgeon: Rajinder Goodwin MD;  Location: [facility] OR     BREAST BIOPSY, RT/LT  2004    left breast     COLONOSCOPY N/A 8/28/2019    Procedure: COLONOSCOPY WITH BIOPSIES;  Surgeon: Rajinder Henley DO;  Location: [facility] OR     DILATION AND CURETTAGE, OPERATIVE HYSTEROSCOPY WITH MORCELLATOR, COMBINED N/A 4/11/2018    Procedure: COMBINED DILATION AND CURETTAGE, OPERATIVE HYSTEROSCOPY WITH MORCELLATOR;  Hysteroscopy, Dilation and Curettage Under Ultrasound Guidance ;  Surgeon: Adry Tineo MD;  Location: [facility] OR     DILATION AND CURETTAGE, WITH ULTRASOUND GUIDANCE  4/11/2018    Procedure: DILATION AND CURETTAGE, WITH ULTRASOUND GUIDANCE;;  Surgeon: Adry Tineo MD;  Location: [facility]     HYSTERECTOMY TOTAL ABDOMINAL, BILATERAL SALPINGO-OOPHORECTOMY, NODE DISSECTION, COMBINED Bilateral 4/27/2018    Procedure: COMBINED HYSTERECTOMY TOTAL ABDOMINAL, SALPINGO-OOPHORECTOMY, NODE DISSECTION;;  Surgeon: Niall  Bradley Blue MD;  Location: [facility]     INSERT PORT VASCULAR ACCESS Right 2018    Procedure: INSERT PORT VASCULAR ACCESS;  Single Lumen Chest Power Port;  Surgeon: Jamila Major PA-C;  Location: [facility]     LAPAROSCOPIC HYSTERECTOMY TOTAL, BILATERAL SALPINGO-OOPHORECTOMY, NODE DISSECTION, COMBINED N/A 2018    Procedure: COMBINED LAPAROSCOPIC HYSTERECTOMY TOTAL, SALPINGO-OOPHORECTOMY, NODE DISSECTION;  Laparoscopic converted to open Removal Of Uterus, Tubes, Ovaries, Cervix, Pelvic and Para Aortic Lymphnode Dissection, Tumor Debulking, CUSA, Partial Omentectomy, Anesthesia Block;  Surgeon: Bradley Tierney MD;  Location: [facility] OR     TONSILLECTOMY  1960    tonsillectomy       Social History     Tobacco Use     Smoking status: Never Smoker     Smokeless tobacco: Never Used   Substance Use Topics     Alcohol use: Yes     Comment: rarely     Family History   Problem Relation Age of Onset     Diabetes Mother         type 2     Heart Disease Mother      Hypertension Mother      C.A.D. Mother          after 2nd heart attack     Hypertension Father      C.A.D. Father         mild heart attack     Cancer Father         skin cancer     Heart Disease Father      Rheumatoid Arthritis Sister      Heart Disease Brother         herdetary heart murmur     Hyperlipidemia Brother      Clotting Disorder Brother         factor 5 leiden     Deep Vein Thrombosis Brother         leg     Hyperlipidemia Brother      Deep Vein Thrombosis Brother         leg; negative for clotting disorder     Kidney Disease No family hx of          Current Outpatient Medications   Medication Sig Dispense Refill     Ascorbic Acid (VITAMIN C) 500 MG CHEW Take 1 tablet by mouth daily        calcium citrate-vitamin D (CITRACAL) 315-200 MG-UNIT TABS per tablet Take 2 tablets by mouth daily       FERROUS GLUCONATE PO Take 325 mg by mouth once a week        GLUCOSAMINE CHONDROITIN COMPLX OR TABS Take  by mouth. 1500 mg 1xqd.   0      "multivitamin, therapeutic with minerals (THERA-VIT-M) TABS tablet Take 1 tablet by mouth daily       ondansetron (ZOFRAN) 4 MG tablet Take by mouth every 8 hours as needed for nausea       RANITIDINE HCL PO Take 150 mg by mouth 2 times daily        warfarin (COUMADIN) 5 MG tablet Current dose (12/10/18): 7.5 mg DAILY OR as directed by ACC team. 130 tablet 1     enoxaparin (LOVENOX) 40 MG/0.4ML syringe Inject 40 mg Subcutaneous 2 times daily       omeprazole (PRILOSEC) 10 MG DR capsule 10 mg daily       pantoprazole (PROTONIX) 40 MG EC tablet Take 1 tablet (40 mg) by mouth daily       Allergies   Allergen Reactions     Paclitaxel Anaphylaxis     reportedTaxol= anaphylaxsis     Nickel Rash       Reviewed and updated as needed this visit by Provider         Review of Systems   As noted in the HPI.       Objective    /70 (BP Location: Left arm, Patient Position: Chair, Cuff Size: Adult Regular)   Pulse 64   Temp 97.3  F (36.3  C) (Tympanic)   Ht 1.651 m (5' 5\")   Wt 64.9 kg (143 lb)   LMP 03/04/2005   SpO2 100%   BMI 23.80 kg/m    Body mass index is 23.8 kg/m .  Physical Exam   GENERAL: healthy, alert and no distress  EYES: Eyes grossly normal to inspection, PERRL and conjunctivae and sclerae normal  HENT: ear canals and TM's normal, nose and mouth without ulcers or lesions  NECK: no adenopathy, no asymmetry, masses, or scars and thyroid normal to palpation  RESP: lungs clear to auscultation - no rales, rhonchi or wheezes  CV: regular rate and rhythm, normal S1 S2, no S3 or S4, no murmur  ABDOMEN: soft, nontender, no hepatosplenomegaly, no masses and bowel sounds normal  PSYCH: mentation appears normal, affect normal/bright    Diagnostic Test Results:  Labs reviewed in Epic  Results for orders placed or performed in visit on 09/23/19 (from the past 24 hour(s))   Basic metabolic panel   Result Value Ref Range    Sodium 143 133 - 144 mmol/L    Potassium 3.9 3.4 - 5.3 mmol/L    Chloride 109 94 - 109 mmol/L    " Carbon Dioxide 29 20 - 32 mmol/L    Anion Gap 5 3 - 14 mmol/L    Glucose 96 70 - 99 mg/dL    Urea Nitrogen 13 7 - 30 mg/dL    Creatinine 1.02 0.52 - 1.04 mg/dL    GFR Estimate 58 (L) >60 mL/min/[1.73_m2]    GFR Estimate If Black 68 >60 mL/min/[1.73_m2]    Calcium 9.2 8.5 - 10.1 mg/dL   CBC with platelets   Result Value Ref Range    WBC 4.2 4.0 - 11.0 10e9/L    RBC Count 4.83 3.8 - 5.2 10e12/L    Hemoglobin 14.7 11.7 - 15.7 g/dL    Hematocrit 45.2 35.0 - 47.0 %    MCV 94 78 - 100 fl    MCH 30.4 26.5 - 33.0 pg    MCHC 32.5 31.5 - 36.5 g/dL    RDW 13.6 10.0 - 15.0 %    Platelet Count 170 150 - 450 10e9/L   UA reflex to Microscopic and Culture - [place]   Result Value Ref Range    Color Urine Yellow     Appearance Urine Slightly Cloudy     Glucose Urine Negative NEG^Negative mg/dL    Bilirubin Urine Negative NEG^Negative    Ketones Urine 5 (A) NEG^Negative mg/dL    Specific Gravity Urine 1.030 1.003 - 1.035    Blood Urine Large (A) NEG^Negative    pH Urine 5.5 4.7 - 8.0 pH    Protein Albumin Urine 30 (A) NEG^Negative mg/dL    Urobilinogen mg/dL Normal 0.0 - 2.0 mg/dL    Nitrite Urine Negative NEG^Negative    Leukocyte Esterase Urine Small (A) NEG^Negative    Source Midstream Urine     RBC Urine >182 (H) 0 - 2 /HPF    WBC Urine 20 (H) 0 - 5 /HPF    Bacteria Urine Few (A) NEG^Negative /HPF    Mucous Urine Present (A) NEG^Negative /LPF           Assessment & Plan   (R31.9) Hematuria, unspecified type  (primary encounter diagnosis)  Comment: unsure cause, but INR is 5.8  Plan: Patient continues to have blood in her urine, but her INR is high at 5.8. Will culture to r/o infection. Will also add on FISH and urine cytology to rule out cancer. Will notify patient of the results when available and intervene accordingly. If all testing normal and hematuria persists, will refer to nephrology. Will hold protonix until lab testing has returned.     (R11.0) Nausea  Comment: nausea continues, but better controlled with zofran  Plan:  Continue zofran. Proceed with head, abdominal, and chest CT tomorrow. Will notify patient of the results when available and intervene accordingly.                Katty Garcia NP  [facility] [place]

## 2019-09-23 NOTE — NURSING NOTE
"Chief Complaint   Patient presents with     UTI     follow up        Initial /70 (BP Location: Left arm, Patient Position: Chair, Cuff Size: Adult Regular)   Pulse 64   Temp 97.3  F (36.3  C) (Tympanic)   Ht 1.651 m (5' 5\")   Wt 64.9 kg (143 lb)   LMP 03/04/2005   SpO2 100%   BMI 23.80 kg/m   Estimated body mass index is 23.8 kg/m  as calculated from the following:    Height as of this encounter: 1.651 m (5' 5\").    Weight as of this encounter: 64.9 kg (143 lb).  Medication Reconciliation: complete  Ann Marie Rosado LPN  "

## 2019-09-30 NOTE — PROGRESS NOTES
ANTICOAGULATION FOLLOW-UP CLINIC VISIT    Patient Name:  Gerri Owens  Date:  2019  Contact Type:  Face to Face    SUBJECTIVE:  Patient Findings     Comments:   No bleeding/bruising. States she is back on the pantoprazole and feeling much better. She has only had vomiting 1-2 times last week. No diarhhea. No bleeding.         Clinical Outcomes     Negatives:   Major bleeding event, Thromboembolic event, Anticoagulation-related hospital admission, Anticoagulation-related ED visit, Anticoagulation-related fatality    Comments:   No bleeding/bruising. States she is back on the pantoprazole and feeling much better. She has only had vomiting 1-2 times last week. No diarhhea. No bleeding.            OBJECTIVE    INR Protime   Date Value Ref Range Status   2019 2.1 (A) 0.86 - 1.14 Final       ASSESSMENT / PLAN  INR assessment THER    Recheck INR In: 2 WEEKS    INR Location Clinic      Anticoagulation Summary  As of 2019    INR goal:   2.0-3.0   TTR:   43.2 % (9.9 mo)   INR used for dosin.1 (2019)   Warfarin maintenance plan:   5 mg (5 mg x 1) every day   Full warfarin instructions:   5 mg every day   Weekly warfarin total:   35 mg   No change documented:   Tena Castillo RN   Plan last modified:   Tena Castillo RN (2019)   Next INR check:   10/14/2019   Target end date:       Indications    DVT of upper extremity (deep vein thrombosis) (H) (Resolved) [I82.629]  Pulmonary embolus (H) [I26.99]             Anticoagulation Episode Summary     INR check location:       Preferred lab:       Send INR reminders to:   ARIANNA KIMBLE    Comments:   MTV 30 mcg. Consistent spinach and broccoli intake. DVT subclavian vein . PE 2018. Brother positive Factor V Leiden. Another brother on lifetime AC. 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       Anticoagulation Care Providers     Provider Role Specialty Phone number    Karo Doty MD [facility] [phone]             See the Encounter Report to view Anticoagulation Flowsheet and Dosing Calendar (Go to Encounters tab in chart review, and find the Anticoagulation Therapy Visit)        Tena Castillo RN

## 2019-10-14 NOTE — PROGRESS NOTES
ANTICOAGULATION FOLLOW-UP CLINIC VISIT    Patient Name:  Gerri Owens  Date:  10/14/2019  Contact Type:  Face to Face    SUBJECTIVE:  Patient Findings     Comments:   States she had the gastric emptying test and her stomach empties slower that average person. She states she has been modifying her diet and she has felt much better. She had a couple episodes of hematuria and did hold her warfarin dose 3 times over past 2 weeks. She has had no further bleeding and states is feeling better with some diet modification.         Clinical Outcomes     Negatives:   Major bleeding event, Thromboembolic event, Anticoagulation-related hospital admission, Anticoagulation-related ED visit, Anticoagulation-related fatality    Comments:   States she had the gastric emptying test and her stomach empties slower that average person. She states she has been modifying her diet and she has felt much better. She had a couple episodes of hematuria and did hold her warfarin dose 3 times over past 2 weeks. She has had no further bleeding and states is feeling better with some diet modification.            OBJECTIVE    INR Protime   Date Value Ref Range Status   10/14/2019 2.5 (A) 0.86 - 1.14 Final       ASSESSMENT / PLAN  INR assessment THER    Recheck INR In: 3 WEEKS    INR Location Clinic      Anticoagulation Summary  As of 10/14/2019    INR goal:   2.0-3.0   TTR:   45.8 % (10.4 mo)   INR used for dosin.5 (10/14/2019)   Warfarin maintenance plan:   2.5 mg (5 mg x 0.5) every Mon, Fri; 5 mg (5 mg x 1) all other days   Full warfarin instructions:   2.5 mg every Mon, Fri; 5 mg all other days   Weekly warfarin total:   30 mg   Plan last modified:   Tena Castillo RN (10/14/2019)   Next INR check:   2019   Target end date:       Indications    DVT of upper extremity (deep vein thrombosis) (H) (Resolved) [I82.629]  Pulmonary embolus (H) [I26.99]             Anticoagulation Episode Summary     INR check location:       Preferred  lab:       Send INR reminders to:   ARIANNA KIMBLE    Comments:   MTV 30 mcg. Consistent spinach and broccoli intake. DVT subclavian vein 2012. PE 7/2018. Brother positive Factor V Leiden. Another brother on lifetime AC. 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       Anticoagulation Care Providers     Provider Role Specialty Phone number    Karo Doty MD [facility] [phone]            See the Encounter Report to view Anticoagulation Flowsheet and Dosing Calendar (Go to Encounters tab in chart review, and find the Anticoagulation Therapy Visit)        Tena Castillo RN

## 2019-10-25 NOTE — PROGRESS NOTES
Patient is a 64 year old female here today for port flush per order of Ellen Garcia NP.  Skin is warm, clean, dry, and intact without redness, swelling, nor other signs of infection noted.  Port accessed via sterile technique per facility protocol with a 22 gauge, 3/4 inch non coring 90 degree bent payne needle. Port flushed easily, without resistance. Immediate blood return noted.  Flushed with 20 cc 0.9% normal saline and 5 cc heparinized saline.  Needle removed intact, sterile dressing applied.  Slight pressure applied for 30 seconds.   Patient tolerated port flush well, denies pain nor discomfort at this time. Patient instructed to leave dressing intact for a minimum of one hour, and to call with questions or concerns.  Patient states understanding and is in agreement with this plan.  Patient discharged ambulatory.  Oneida Max RN

## 2019-11-04 NOTE — PROGRESS NOTES
ANTICOAGULATION FOLLOW-UP CLINIC VISIT    Patient Name:  Gerri Owens  Date:  2019  Contact Type:  Face to Face    SUBJECTIVE:  Patient Findings     Comments:   Discussed warfarin dosing/INR recheck date. Patient verbalized understanding.         Clinical Outcomes     Negatives:   Major bleeding event, Thromboembolic event, Anticoagulation-related hospital admission, Anticoagulation-related ED visit, Anticoagulation-related fatality    Comments:   Discussed warfarin dosing/INR recheck date. Patient verbalized understanding.            OBJECTIVE    INR Protime   Date Value Ref Range Status   2019 2.5 (A) 0.86 - 1.14 Final       ASSESSMENT / PLAN  INR assessment THER    Recheck INR In: 5 WEEKS    INR Location Clinic      Anticoagulation Summary  As of 2019    INR goal:   2.0-3.0   TTR:   49.2 % (11.1 mo)   INR used for dosin.5 (2019)   Warfarin maintenance plan:   2.5 mg (5 mg x 0.5) every Mon, Fri; 5 mg (5 mg x 1) all other days   Full warfarin instructions:   2.5 mg every Mon, Fri; 5 mg all other days   Weekly warfarin total:   30 mg   No change documented:   Carl, Laura, RN   Plan last modified:   Tena Castillo RN (10/14/2019)   Next INR check:   2019   Target end date:       Indications    DVT of upper extremity (deep vein thrombosis) (H) (Resolved) [I82.629]  Pulmonary embolus (H) [I26.99]             Anticoagulation Episode Summary     INR check location:       Preferred lab:       Send INR reminders to:   ARIANNA KIMBLE    Comments:   MTV 30 mcg. Consistent spinach and broccoli intake. DVT subclavian vein . PE 2018. Brother positive Factor V Leiden. Another brother on lifetime AC. 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       Anticoagulation Care Providers     Provider Role Specialty Phone number    Karo Doty MD [facility] [phone]            See the Encounter Report to view Anticoagulation Flowsheet and Dosing Calendar (Go to  Encounters tab in chart review, and find the Anticoagulation Therapy Visit)        Laura Henry RN

## 2019-11-10 NOTE — PROGRESS NOTES
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    Oncology History:  18- OBGYN visit with Dr. Tineo.  Complains of 1.5 years of post-menopa                         ENDOMETRIAL CURETTINGS: usal bleeding.                           EMB performed and showed      18- EUA, hysteroscopy D&C under ultrasound guidance.  Diffuse proliferative vascular endometrium noted.                         Pathology:                          FINAL DIAGNOSIS:                          - Endometrial endometrioid adenocarcinoma, FIGO grade 2      18 DEMETRIO/BSO and staging:  PATH:  A. ANTERIOR FUNDUS, BIOPSY:   - Positive for adenocarcinoma     B. UTERUS, CERVIX, BILATERAL TUBES AND OVARIES, HYSTERECTOMY WITH   BILATERAL SALPINGO-OOPHORECTOMY:   - Endometrial endometrioid adenocarcinoma, FIGO grade 1   - Adenomyosis   - Cervix invaded by endometrial adenocarcinoma   - Right ovarian surface adhesions involved by endometrial adenocarcinoma   - Right fallopian tube with endometriosis   - Left adnexa invaded by endometrial adenocarcinoma (5.5 cm)     C. LYMPH NODES, LEFT PELVIC, EXCISION:   - Metastatic adenocarcinoma to one of eight lymph nodes (1/8)   - The metastatic focus is 3 mm in greatest dimension with no extranodal   extension     D. LYMPH NODES, LEFT PARA-AORTIC, EXCISION:   - One reactive lymph node, negative for malignancy (0/1)     E. LYMPH NODES, RIGHT PELVIC, EXCISION:   - Metastatic adenocarcinoma to one of six lymph nodes (1/6)   - The metastatic focus is 21 mm in greatest dimension with positive   extranodal extension     F. LYMPH NODES, RIGHT PARA-AORTIC, EXCISION:   - Three reactive lymph nodes, negative for malignancy (0/3)     G. OMENTUM, RESECTION:   -  Omental adipose tissue with focal inflammation and surface mesothelial   hyperplasia   - Negative for malignancy       TUMOR     Tumor Site:         - Endometrium         - Lower uterine segment     Histologic Type:         - Endometrioid carcinoma, NOS     Histologic Grade:         - FIGO grade 1     Tumor Size: 5.5 Centimeters (cm)     Tumor Extent       Myometrial Invasion:           - Present         Depth of Invasion: 15 Millimeters (mm)         Myometrial Thickness: 15 Millimeters (mm)         Percentage of Myometrial Invasion: 100%       Adenomyosis:           - Present, involved by carcinoma       Uterine Serosa Involvement:           - Present       Lower Uterine Segment Involvement:           - Present         Level of Tumor Involvement:             - Myoinvasive       Cervical Stromal Involvement:           - Present       Other Tissue / Organ Involvement:           - Right ovary           - Left ovary           - Left fallopian tube       Peritoneal Ascitic Fluid:           - Negative for malignancy (normal / benign)     Accessory Findings       Lymphovascular Invasion:           - Present     PATHOLOGIC STAGE CLASSIFICATION (PTNM, AJCC 8TH EDITION)     Primary Tumor (pT):         - pT3a     Regional Lymph Nodes (pN)       Category (pN):           - pN1a     FIGO STAGE     FIGO Stage:         - IIIC1      TREATMENT 1     6/13/18: C1D1 carboplatin/paclitaxel- anaphylactic reaction to paclitaxel, no carboplatin received.    7/6/18: C1 single agent carboplatin AUC 6     7/27/18: C2 SA carboplatin AUC 6    8/17/18: C3 SA carboplatin AUC 6, deferred due to thrombocytopenia    8-11/2018 RT: EBRT and brachytherapy.      Due to intolerance of chemotherapy plan was to forgo additional chemotherapy in favor of observation.    2/2019: CT  IMPRESSION: In this patient with history of endometrial cancer status  post hysterectomy:  1. Increased size of a nodule in the posterior left upper lobe, which  now measures up  to 6 mm (previously 3 mm. This is suspicious for  metastatic disease. Recommend short-term follow-up. Biopsy is an  option, though small size may limit yield.  2. Indeterminate mesenteric nodule, which may represent a mildly  enlarged lymph node. This is unchanged in size in comparison to  8/27/2018. Attention on follow-up.  3. Resolution of the previously seen heterogeneous irregularity of the  left psoas muscle. Biopsy 9/10/2018 compatible with granulomatous  inflammation and necrosis.    4/2019 CT  IMPRESSION: In this patient with history of endometrial cancer status  post hysterectomy, chemotherapy, and radiation:  1. Further slight increase in size of a now 7 mm subpleural nodule in  the superior segment of the left lower lobe, which remains suspicious  for metastatic disease.  2. New part solid part groundglass nodule measuring 4 mm in the  posterior medial left lower lobe could be infectious/inflammatory or  metastatic. Recommend attention on follow-up.  3. Continued stability of indeterminate pleural nodularity along the  right hemidiaphragm and a central mesenteric nodule. Recommend  continued attention on follow-up.  4. Mild diffuse bladder wall thickening with associated pericystic fat  stranding, likely sequela of radiation.    7/2019 CT:  IMPRESSION: In this patient with history of endometrial cancer status  post hysterectomy, chemotherapy, and radiation:  1. Further increased size of a now 9 mm subpleural nodule in the  superior segment of the left lower lobe, compatible with metastatic  disease.  2. Numerous new scattered groundglass nodules and a new 3 mm solid  nodule in the posterior left lower lobe are indeterminant,  infectious/inflammatory versus metastatic.  3. Stable pleural nodularity along the right hemidiaphragm.  4. Increased central mesenteric and peritoneal haziness, indeterminate  but raising concern for peritoneal carcinomatosis. An enhancing  central mesenteric nodule remains  unchanged.    Discussed initiation of treatment, but declined in light of poor tolerance of prior treatment.      11/2019: IMPRESSION: Concerning nodule in the superior segment of the left  lower lobe. Size has not significantly changed since the July 2018  exam however this has developed and increased in size since 8/27/2018.  Differential would include metastatic disease or primary lung  neoplasm. PET/CT imaging may be useful for further evaluation.     New small 6 mm groundglass nodular opacity in the left lung. Smaller  subtle groundglass opacities are again seen in both lungs.          Past Medical History:   Diagnosis Date     Abnormal renal function test 12/04/2017    due to NSAIDS, normal after stopping taking them     Advanced directives, counseling/discussion      Antineoplastic chemotherapy induced anemia 7/27/2018     Arthritis      BMI 35.0-35.9,adult 11/12/2017     Degeneration of spine 5/21/2019     DVT of upper extremity (deep vein thrombosis) (H) 03/27/2012    clotting disorder workup negative     Encounter for antineoplastic chemotherapy 7/6/2018     Encounter for long-term (current) use of medications 5/21/2018     Endometrial cancer (H) 04/2018     Gastroesophageal reflux disease      Hyperlipidemia LDL goal < 160      Hyperlipidemia, unspecified hyperlipidemia type 11/12/2017     Low ferritin 7/19/2018     MEDICAL HISTORY OF - 1997    left breast density     Microscopic hematuria 3/22/2018     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  7/17/2012     Thyrotoxicosis 2007    hyperthyroid, enlarged right thyroid on thyroid uptake, [facility] 12/2008, FNA neg of 1.9 cm right lobe dominat nodule     Whooping cough due to Bordetella pertussis (p. pertussis) infant    whooping cough       Past Surgical History:   Procedure Laterality Date     ARTHROPLASTY HIP Left 12/4/2017    Procedure: ARTHROPLASTY HIP;  Left total hip arthroplasty;   Surgeon: Rajinder Goodwin MD;  Location: [facility] OR     BREAST BIOPSY, RT/LT  2004    left breast     COLONOSCOPY N/A 8/28/2019    Procedure: COLONOSCOPY WITH BIOPSIES;  Surgeon: Rajinder Henley DO;  Location: [facility] OR     DILATION AND CURETTAGE, OPERATIVE HYSTEROSCOPY WITH MORCELLATOR, COMBINED N/A 4/11/2018    Procedure: COMBINED DILATION AND CURETTAGE, OPERATIVE HYSTEROSCOPY WITH MORCELLATOR;  Hysteroscopy, Dilation and Curettage Under Ultrasound Guidance ;  Surgeon: Adry Tineo MD;  Location: [facility] OR     DILATION AND CURETTAGE, WITH ULTRASOUND GUIDANCE  4/11/2018    Procedure: DILATION AND CURETTAGE, WITH ULTRASOUND GUIDANCE;;  Surgeon: Adry Tineo MD;  Location: [facility] OR     HYSTERECTOMY TOTAL ABDOMINAL, BILATERAL SALPINGO-OOPHORECTOMY, NODE DISSECTION, COMBINED Bilateral 4/27/2018    Procedure: COMBINED HYSTERECTOMY TOTAL ABDOMINAL, SALPINGO-OOPHORECTOMY, NODE DISSECTION;;  Surgeon: Bradley Tierney MD;  Location: [facility] OR     INSERT PORT VASCULAR ACCESS Right 5/31/2018    Procedure: INSERT PORT VASCULAR ACCESS;  Single Lumen Chest Power Port;  Surgeon: Jamila Major PA-C;  Location: [facility]     LAPAROSCOPIC HYSTERECTOMY TOTAL, BILATERAL SALPINGO-OOPHORECTOMY, NODE DISSECTION, COMBINED N/A 4/27/2018    Procedure: COMBINED LAPAROSCOPIC HYSTERECTOMY TOTAL, SALPINGO-OOPHORECTOMY, NODE DISSECTION;  Laparoscopic converted to open Removal Of Uterus, Tubes, Ovaries, Cervix, Pelvic and Para Aortic Lymphnode Dissection, Tumor Debulking, CUSA, Partial Omentectomy, Anesthesia Block;  Surgeon: Bradley Tierney MD;  Location: [facility] OR     TONSILLECTOMY  1960    tonsillectomy       Current Outpatient Medications   Medication     Ascorbic Acid (VITAMIN C) 500 MG CHEW     calcium citrate-vitamin D (CITRACAL) 315-200 MG-UNIT TABS per tablet     enoxaparin (LOVENOX) 40 MG/0.4ML syringe     FERROUS GLUCONATE PO     GLUCOSAMINE CHONDROITIN COMPLX OR TABS     multivitamin, therapeutic with minerals  (THERA-VIT-M) TABS tablet     omeprazole (PRILOSEC) 10 MG DR capsule     ondansetron (ZOFRAN) 4 MG tablet     pantoprazole (PROTONIX) 40 MG EC tablet     RANITIDINE HCL PO     warfarin (COUMADIN) 5 MG tablet     No current facility-administered medications for this visit.      Facility-Administered Medications Ordered in Other Visits   Medication     heparin 100 UNIT/ML injection 500 Units       Allergies   Allergen Reactions     Paclitaxel Anaphylaxis     reportedTaxol= anaphylaxsis     Nickel Rash       Family History   Problem Relation Age of Onset     Diabetes Mother         type 2     Heart Disease Mother      Hypertension Mother      C.A.D. Mother          after 2nd heart attack     Hypertension Father      C.A.D. Father         mild heart attack     Cancer Father         skin cancer     Heart Disease Father      Rheumatoid Arthritis Sister      Heart Disease Brother         herdetary heart murmur     Hyperlipidemia Brother      Clotting Disorder Brother         factor 5 leiden     Deep Vein Thrombosis Brother         leg     Hyperlipidemia Brother      Deep Vein Thrombosis Brother         leg; negative for clotting disorder     Kidney Disease No family hx of        Social History     Socioeconomic History     Marital status:      Spouse name: Doug     Number of children: 0     Years of education: Not on file     Highest education level: Not on file   Occupational History     Occupation: rn     Employer: [employer]   Social Needs     Financial resource strain: Not on file     Food insecurity:     Worry: Not on file     Inability: Not on file     Transportation needs:     Medical: Not on file     Non-medical: Not on file   Tobacco Use     Smoking status: Never Smoker     Smokeless tobacco: Never Used   Substance and Sexual Activity     Alcohol use: Yes     Comment: rarely     Drug use: No     Sexual activity: Yes     Partners: Male   Lifestyle     Physical activity:     Days per week: Not on  file     Minutes per session: Not on file     Stress: Not on file   Relationships     Social connections:     Talks on phone: Not on file     Gets together: Not on file     Attends Restorationism service: Not on file     Active member of club or organization: Not on file     Attends meetings of clubs or organizations: Not on file     Relationship status: Not on file     Intimate partner violence:     Fear of current or ex partner: Not on file     Emotionally abused: Not on file     Physically abused: Not on file     Forced sexual activity: Not on file   Other Topics Concern     Parent/sibling w/ CABG, MI or angioplasty before 65F 55M? 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  She does not smoke and was never smoker, she drinks alcohol about a month, no illicit drugs            Balanced Diet - Yes, in the last 6 months    Osteoporosis Prevention Measures - Dairy servings per day: 2 servings plus a supplement    Regular Exercise -  Yes Describe walks for 30 to 40 minutes 4 to 5 times a week    Dental Exam - YES - Date: 1 year ago, and is going today    Eye Exam - YES - Date: 4 months ago    Self Breast Exam - Yes    Abuse: Current or Past (Physical, Sexual or Emotional)- No    Do you feel safe in your environment - Yes    Guns stored in the home - Yes, locked    Sunscreen used - Yes    Seatbelts used - Yes    Lipids -  YES - Date: 10-02    Glucose -  YES - Date: 10-02    Colon Cancer Screening - No    Hemoccults - NO    Pap Test -  YES - Date: 10-02    Do you have any concerns about STD's -  No    Mammography - YES - Date: 8-06    DEXA - NO    Immunizations reviewed and up to date - Yes, lst td 7-2000    10-23-07  MEL Mueller CMA           ROS  See below    I have reviewed the patient's ROS and discussed all pertinent information as noted in the HPI.    Physical Exam:  PS 0  VS: /72   Pulse 100   Temp 97.7  " F (36.5  C) (Oral)   Resp 14   Ht 1.651 m (5' 5\")   Wt 64.9 kg (143 lb)   LMP 03/04/2005   SpO2 98%   BMI 23.80 kg/m       CONSTITUTIONAL: Alert non-toxic appearing female in no acute distress  HEAD: Normocephalic, atraumatic  ENT: Oropharynx pink without lesions  NECK: Neck supple without lymphadenopathy  RESPIRATORY: Lungs clear to auscultation, no increased work of breathing noted  CV: Regular rate and rhythm, S1S2, no clicks, murmurs, rubs, or gallops; bilateral lower extremities without edema  GASTROINTESTINAL: Abdomen soft, non-distended, and non-tender to palpation without masses or organomegaly; midline incision healed well  GENITOURINARY: Normal appearing external genitalia, vagina smooth without nodularity or masses, vaginal cuff intact, no masses palpated on bimanual exam. Rectal exam confirmed these findings.  LYMPHATIC: Cervical, supraclavicular, and inguinal nodes without lymphadenopathy  MUSCULOSKELETAL: Moves all extremities, no obvious muscle wasting  NEUROLOGIC: No gross deficits, normal gait  SKIN: Appropriate color for race, warm and dry, no rashes or lesions to unclothed skin  PSYCHIATRIC: Pleasant and interactive, affect bright, makes appropriate eye contact, thought process linear      Assessment/Plan:   1) Stage IIIC1 low grade endometrial cancer: S/p EBRT and vaginal cuff therapy - did not tolerate chemo (Taxol reaction and neutropenia) - with CT findings suggestive of possible slow progression versus stable disease (I have reviewed the films personally with the patient).  I had previously recommended a biopsy, which was felt to be infeasible owing to small size and location.     This has grown a small amount again and we will revisit the possibility of biopsy.  She is into inclined to this and would prefer for a 4 month follow-up to confirm stability verus slow growth.  She accepts that there could be more aggressive growth in this time, and also that the tumor may be another cancer " (lung), but is not sure how she feels about treatment in any event, and as she is feeling fine, she is unsure about proceeding with acquiring this information.    2) PE: Asymptomatic at present    3) Genetic counseling: MMR proteins with intact nuclear expression making Peterson syndrome unlikely.    4) Health maintenance issues discussed include to follow up with PCP for non-gynecologic concerns and co-morbid conditions    5) Patient verbalized understanding of and agreement with plan    25 minutes spent with patient, over 50% of which was spent on counseling and coordination of care.    Bradley Tierney

## 2019-11-11 NOTE — NURSING NOTE
"Oncology Rooming Note    November 11, 2019 9:24 AM   Gerri Owens is a 64 year old female who presents for:    Chief Complaint   Patient presents with     Oncology Clinic Visit     Return - Endometrial cancer      Initial Vitals: Pulse 100   Temp 97.7  F (36.5  C) (Oral)   Resp 14   Ht 1.651 m (5' 5\")   Wt 64.9 kg (143 lb)   LMP 03/04/2005   SpO2 98%   BMI 23.80 kg/m   Estimated body mass index is 23.8 kg/m  as calculated from the following:    Height as of this encounter: 1.651 m (5' 5\").    Weight as of this encounter: 64.9 kg (143 lb). Body surface area is 1.73 meters squared.  No Pain (0) Comment: Data Unavailable   Patient's last menstrual period was 03/04/2005.  Allergies reviewed: Yes  Medications reviewed: Yes    Medications: MEDICATION REFILLS NEEDED TODAY. Provider was NOT notified.  Pharmacy name entered into [facility]:    [facility] [place], [place] - [id number] - [place], [place] - [address] AT [facility] [place] [facility]  [address]    Clinical concerns: Concerns about continued weight loss.  Also, needs a refill on Thu [facility]              "

## 2019-11-11 NOTE — LETTER
2019       RE: Gerri Owens  [address]  [place] [address]     Dear Colleague,    Thank you for referring your patient, Gerri Owens, to the [facility]. Please see a copy of my visit note below.    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    Oncology History:  18- OBGYN visit with Dr. Tineo.  Complains of 1.5 years of post-menopa                         ENDOMETRIAL CURETTINGS: usal bleeding.                           EMB performed and showed      18- EUA, hysteroscopy D&C under ultrasound guidance.  Diffuse proliferative vascular endometrium noted.                         Pathology:                          FINAL DIAGNOSIS:                          - Endometrial endometrioid adenocarcinoma, FIGO grade 2      18 DEMETRIO/BSO and staging:  PATH:  A. ANTERIOR FUNDUS, BIOPSY:   - Positive for adenocarcinoma     B. UTERUS, CERVIX, BILATERAL TUBES AND OVARIES, HYSTERECTOMY WITH   BILATERAL SALPINGO-OOPHORECTOMY:   - Endometrial endometrioid adenocarcinoma, FIGO grade 1   - Adenomyosis   - Cervix invaded by endometrial adenocarcinoma   - Right ovarian surface adhesions involved by endometrial adenocarcinoma   - Right fallopian tube with endometriosis   - Left adnexa invaded by endometrial adenocarcinoma (5.5 cm)     C. LYMPH NODES, LEFT PELVIC, EXCISION:   - Metastatic adenocarcinoma to one of eight lymph nodes (1/8)   - The metastatic focus is 3 mm in greatest dimension with no extranodal   extension     D. LYMPH NODES, LEFT PARA-AORTIC, EXCISION:   - One reactive lymph node, negative for malignancy (0/1)     E. LYMPH NODES, RIGHT PELVIC, EXCISION:   - Metastatic adenocarcinoma to one of six lymph nodes (1/6)   -  The metastatic focus is 21 mm in greatest dimension with positive   extranodal extension     F. LYMPH NODES, RIGHT PARA-AORTIC, EXCISION:   - Three reactive lymph nodes, negative for malignancy (0/3)     G. OMENTUM, RESECTION:   - Omental adipose tissue with focal inflammation and surface mesothelial   hyperplasia   - Negative for malignancy       TUMOR     Tumor Site:         - Endometrium         - Lower uterine segment     Histologic Type:         - Endometrioid carcinoma, NOS     Histologic Grade:         - FIGO grade 1     Tumor Size: 5.5 Centimeters (cm)     Tumor Extent       Myometrial Invasion:           - Present         Depth of Invasion: 15 Millimeters (mm)         Myometrial Thickness: 15 Millimeters (mm)         Percentage of Myometrial Invasion: 100%       Adenomyosis:           - Present, involved by carcinoma       Uterine Serosa Involvement:           - Present       Lower Uterine Segment Involvement:           - Present         Level of Tumor Involvement:             - Myoinvasive       Cervical Stromal Involvement:           - Present       Other Tissue / Organ Involvement:           - Right ovary           - Left ovary           - Left fallopian tube       Peritoneal Ascitic Fluid:           - Negative for malignancy (normal / benign)     Accessory Findings       Lymphovascular Invasion:           - Present     PATHOLOGIC STAGE CLASSIFICATION (PTNM, AJCC 8TH EDITION)     Primary Tumor (pT):         - pT3a     Regional Lymph Nodes (pN)       Category (pN):           - pN1a     FIGO STAGE     FIGO Stage:         - IIIC1      TREATMENT 1     6/13/18: C1D1 carboplatin/paclitaxel- anaphylactic reaction to paclitaxel, no carboplatin received.    7/6/18: C1 single agent carboplatin AUC 6     7/27/18: C2 SA carboplatin AUC 6    8/17/18: C3 SA carboplatin AUC 6, deferred due to thrombocytopenia    8-11/2018 RT: EBRT and brachytherapy.      Due to intolerance of chemotherapy plan was to forgo additional  chemotherapy in favor of observation.    2/2019: CT  IMPRESSION: In this patient with history of endometrial cancer status  post hysterectomy:  1. Increased size of a nodule in the posterior left upper lobe, which  now measures up to 6 mm (previously 3 mm. This is suspicious for  metastatic disease. Recommend short-term follow-up. Biopsy is an  option, though small size may limit yield.  2. Indeterminate mesenteric nodule, which may represent a mildly  enlarged lymph node. This is unchanged in size in comparison to  8/27/2018. Attention on follow-up.  3. Resolution of the previously seen heterogeneous irregularity of the  left psoas muscle. Biopsy 9/10/2018 compatible with granulomatous  inflammation and necrosis.    4/2019 CT  IMPRESSION: In this patient with history of endometrial cancer status  post hysterectomy, chemotherapy, and radiation:  1. Further slight increase in size of a now 7 mm subpleural nodule in  the superior segment of the left lower lobe, which remains suspicious  for metastatic disease.  2. New part solid part groundglass nodule measuring 4 mm in the  posterior medial left lower lobe could be infectious/inflammatory or  metastatic. Recommend attention on follow-up.  3. Continued stability of indeterminate pleural nodularity along the  right hemidiaphragm and a central mesenteric nodule. Recommend  continued attention on follow-up.  4. Mild diffuse bladder wall thickening with associated pericystic fat  stranding, likely sequela of radiation.    7/2019 CT:  IMPRESSION: In this patient with history of endometrial cancer status  post hysterectomy, chemotherapy, and radiation:  1. Further increased size of a now 9 mm subpleural nodule in the  superior segment of the left lower lobe, compatible with metastatic  disease.  2. Numerous new scattered groundglass nodules and a new 3 mm solid  nodule in the posterior left lower lobe are indeterminant,  infectious/inflammatory versus metastatic.  3. Stable  pleural nodularity along the right hemidiaphragm.  4. Increased central mesenteric and peritoneal haziness, indeterminate  but raising concern for peritoneal carcinomatosis. An enhancing  central mesenteric nodule remains unchanged.    Discussed initiation of treatment, but declined in light of poor tolerance of prior treatment.      11/2019: IMPRESSION: Concerning nodule in the superior segment of the left  lower lobe. Size has not significantly changed since the July 2018  exam however this has developed and increased in size since 8/27/2018.  Differential would include metastatic disease or primary lung  neoplasm. PET/CT imaging may be useful for further evaluation.     New small 6 mm groundglass nodular opacity in the left lung. Smaller  subtle groundglass opacities are again seen in both lungs.          Past Medical History:   Diagnosis Date     Abnormal renal function test 12/04/2017    due to NSAIDS, normal after stopping taking them     Advanced directives, counseling/discussion      Antineoplastic chemotherapy induced anemia 7/27/2018     Arthritis      BMI 35.0-35.9,adult 11/12/2017     Degeneration of spine 5/21/2019     DVT of upper extremity (deep vein thrombosis) (H) 03/27/2012    clotting disorder workup negative     Encounter for antineoplastic chemotherapy 7/6/2018     Encounter for long-term (current) use of medications 5/21/2018     Endometrial cancer (H) 04/2018     Gastroesophageal reflux disease      Hyperlipidemia LDL goal < 160      Hyperlipidemia, unspecified hyperlipidemia type 11/12/2017     Low ferritin 7/19/2018     MEDICAL HISTORY OF - 1997    left breast density     Microscopic hematuria 3/22/2018     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  7/17/2012     Thyrotoxicosis 2007    hyperthyroid, enlarged right thyroid on thyroid uptake, treate [facility] with tapozole till 12/2008, FNA neg of 1.9 cm right lobe dominat nodule     Whooping cough due to  Bordetella pertussis (p. pertussis) infant    whooping cough       Past Surgical History:   Procedure Laterality Date     ARTHROPLASTY HIP Left 12/4/2017    Procedure: ARTHROPLASTY HIP;  Left total hip arthroplasty;  Surgeon: Rajinder Goodwin MD;  Location: [facility] OR     BREAST BIOPSY, RT/LT  2004    left breast     COLONOSCOPY N/A 8/28/2019    Procedure: COLONOSCOPY WITH BIOPSIES;  Surgeon: Rajinder Henley, DO;  Location: [facility]     DILATION AND CURETTAGE, OPERATIVE HYSTEROSCOPY WITH MORCELLATOR, COMBINED N/A 4/11/2018    Procedure: COMBINED DILATION AND CURETTAGE, OPERATIVE HYSTEROSCOPY WITH MORCELLATOR;  Hysteroscopy, Dilation and Curettage Under Ultrasound Guidance ;  Surgeon: Adry Tineo MD;  Location: [facility]     DILATION AND CURETTAGE, WITH ULTRASOUND GUIDANCE  4/11/2018    Procedure: DILATION AND CURETTAGE, WITH ULTRASOUND GUIDANCE;;  Surgeon: Adry Tineo MD;  Location: [facility]     HYSTERECTOMY TOTAL ABDOMINAL, BILATERAL SALPINGO-OOPHORECTOMY, NODE DISSECTION, COMBINED Bilateral 4/27/2018    Procedure: COMBINED HYSTERECTOMY TOTAL ABDOMINAL, SALPINGO-OOPHORECTOMY, NODE DISSECTION;;  Surgeon: Bradley Tierney MD;  Location: [facility]     INSERT PORT VASCULAR ACCESS Right 5/31/2018    Procedure: INSERT PORT VASCULAR ACCESS;  Single Lumen Chest Power Port;  Surgeon: Jamila Major PA-C;  Location: [facility]     LAPAROSCOPIC HYSTERECTOMY TOTAL, BILATERAL SALPINGO-OOPHORECTOMY, NODE DISSECTION, COMBINED N/A 4/27/2018    Procedure: COMBINED LAPAROSCOPIC HYSTERECTOMY TOTAL, SALPINGO-OOPHORECTOMY, NODE DISSECTION;  Laparoscopic converted to open Removal Of Uterus, Tubes, Ovaries, Cervix, Pelvic and Para Aortic Lymphnode Dissection, Tumor Debulking, CUSA, Partial Omentectomy, Anesthesia Block;  Surgeon: Bradley Tierney MD;  Location: [facility] OR     TONSILLECTOMY  1960    tonsillectomy       Current Outpatient Medications   Medication     Ascorbic Acid (VITAMIN C) 500 MG CHEW     calcium  citrate-vitamin D (CITRACAL) 315-200 MG-UNIT TABS per tablet     enoxaparin (LOVENOX) 40 MG/0.4ML syringe     FERROUS GLUCONATE PO     GLUCOSAMINE CHONDROITIN COMPLX OR TABS     multivitamin, therapeutic with minerals (THERA-VIT-M) TABS tablet     omeprazole (PRILOSEC) 10 MG DR capsule     ondansetron (ZOFRAN) 4 MG tablet     pantoprazole (PROTONIX) 40 MG EC tablet     RANITIDINE HCL PO     warfarin (COUMADIN) 5 MG tablet     No current facility-administered medications for this visit.      Facility-Administered Medications Ordered in Other Visits   Medication     heparin 100 UNIT/ML injection 500 Units       Allergies   Allergen Reactions     Paclitaxel Anaphylaxis     reportedTaxol= anaphylaxsis     Nickel Rash       Family History   Problem Relation Age of Onset     Diabetes Mother         type 2     Heart Disease Mother      Hypertension Mother      C.A.D. Mother          after 2nd heart attack     Hypertension Father      C.A.D. Father         mild heart attack     Cancer Father         skin cancer     Heart Disease Father      Rheumatoid Arthritis Sister      Heart Disease Brother         herdetary heart murmur     Hyperlipidemia Brother      Clotting Disorder Brother         factor 5 leiden     Deep Vein Thrombosis Brother         leg     Hyperlipidemia Brother      Deep Vein Thrombosis Brother         leg; negative for clotting disorder     Kidney Disease No family hx of        Social History     Socioeconomic History     Marital status:      Spouse name: Doug     Number of children: 0     Years of education: Not on file     Highest education level: Not on file   Occupational History     Occupation: rn     Employer: [employer]   Social Needs     Financial resource strain: Not on file     Food insecurity:     Worry: Not on file     Inability: Not on file     Transportation needs:     Medical: Not on file     Non-medical: Not on file   Tobacco Use     Smoking status: Never Smoker      Smokeless tobacco: Never Used   Substance and Sexual Activity     Alcohol use: Yes     Comment: rarely     Drug use: No     Sexual activity: Yes     Partners: Male   Lifestyle     Physical activity:     Days per week: Not on file     Minutes per session: Not on file     Stress: Not on file   Relationships     Social connections:     Talks on phone: Not on file     Gets together: Not on file     Attends [employer] service: Not on file     Active member of club or organization: Not on file     Attends meetings of clubs or organizations: Not on file     Relationship status: Not on file     Intimate partner violence:     Fear of current or ex partner: Not on file     Emotionally abused: Not on file     Physically abused: Not on file     Forced sexual activity: Not on file   Other Topics Concern     Parent/sibling w/ CABG, MI or angioplasty before 65F 55M? 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  She does not smoke and was never smoker, she drinks alcohol about a month, no illicit drugs            Balanced Diet - Yes, in the last 6 months    Osteoporosis Prevention Measures - Dairy servings per day: 2 servings plus a supplement    Regular Exercise -  Yes Describe walks for 30 to 40 minutes 4 to 5 times a week    Dental Exam - YES - Date: 1 year ago, and is going today    Eye Exam - YES - Date: 4 months ago    Self Breast Exam - Yes    Abuse: Current or Past (Physical, Sexual or Emotional)- No    Do you feel safe in your environment - Yes    Guns stored in the home - Yes, locked    Sunscreen used - Yes    Seatbelts used - Yes    Lipids -  YES - Date: 10-02    Glucose -  YES - Date: 10-02    Colon Cancer Screening - No    Hemoccults - NO    Pap Test -  YES - Date: 10-02    Do you have any concerns about STD's -  No    Mammography - YES - Date: 8-06    DEXA - NO    Immunizations reviewed and up to date - Yes, lst  "td 7-2000    10-23-07  [facility]           ROS  See below    I have reviewed the patient's ROS and discussed all pertinent information as noted in the HPI.    Physical Exam:  PS 0  VS: /72   Pulse 100   Temp 97.7  F (36.5  C) (Oral)   Resp 14   Ht 1.651 m (5' 5\")   Wt 64.9 kg (143 lb)   LMP 03/04/2005   SpO2 98%   BMI 23.80 kg/m        CONSTITUTIONAL: Alert non-toxic appearing female in no acute distress  HEAD: Normocephalic, atraumatic  ENT: Oropharynx pink without lesions  NECK: Neck supple without lymphadenopathy  RESPIRATORY: Lungs clear to auscultation, no increased work of breathing noted  CV: Regular rate and rhythm, S1S2, no clicks, murmurs, rubs, or gallops; bilateral lower extremities without edema  GASTROINTESTINAL: Abdomen soft, non-distended, and non-tender to palpation without masses or organomegaly; midline incision healed well  GENITOURINARY: Normal appearing external genitalia, vagina smooth without nodularity or masses, vaginal cuff intact, no masses palpated on bimanual exam. Rectal exam confirmed these findings.  LYMPHATIC: Cervical, supraclavicular, and inguinal nodes without lymphadenopathy  MUSCULOSKELETAL: Moves all extremities, no obvious muscle wasting  NEUROLOGIC: No gross deficits, normal gait  SKIN: Appropriate color for race, warm and dry, no rashes or lesions to unclothed skin  PSYCHIATRIC: Pleasant and interactive, affect bright, makes appropriate eye contact, thought process linear      Assessment/Plan:   1) Stage IIIC1 low grade endometrial cancer: S/p EBRT and vaginal cuff therapy - did not tolerate chemo (Taxol reaction and neutropenia) - with CT findings suggestive of possible slow progression versus stable disease (I have reviewed the films personally with the patient).  I had previously recommended a biopsy, which was felt to be infeasible owing to small size and location.     This has grown a small amount again and we will revisit the possibility of biopsy.  " She is into inclined to this and would prefer for a 4 month follow-up to confirm stability verus slow growth.  She accepts that there could be more aggressive growth in this time, and also that the tumor may be another cancer (lung), but is not sure how she feels about treatment in any event, and as she is feeling fine, she is unsure about proceeding with acquiring this information.    2) PE: Asymptomatic at present    3) Genetic counseling: MMR proteins with intact nuclear expression making Peterson syndrome unlikely.    4) Health maintenance issues discussed include to follow up with PCP for non-gynecologic concerns and co-morbid conditions    5) Patient verbalized understanding of and agreement with plan    25 minutes spent with patient, over 50% of which was spent on counseling and coordination of care.    Bradley Tierney        Again, thank you for allowing me to participate in the care of your patient.      Sincerely,    Bradley Tierney MD

## 2019-11-12 NOTE — PROGRESS NOTES
Subjective     Gerri Owens is a 64 year old female who presents to clinic today for the following health issues:    HPI   Hematuria    Patient was seen in the Coumadin clinic on 9/16/19. At this time, she was complaining of urinary urgency and hematuria. A UA was done and showed WBC, RBCs, and LE. Her INR was 3.0. She was placed on ciprofloxacin and followed up on 9/23/19. Her urine continued to show a large amount of blood. Her INR was, however, high at 5.8. Urine cytology was ordered, but it was unable to be read due to excessive blood. A fish bladder cancer order was placed, but this is still in process. A urology referral was encouraged, but patient wanted to wait until she met with her oncologist. Do note, she does have endometrial cancer and did not tolerate chemo. Due to intolerance of the chemo, plan is to forgo additional chemo in favor of observation. She does have CT every 3-4 months to monitor. Last chest CT was done in 11/2019.         11/2019: IMPRESSION: Concerning nodule in the superior segment of the left lower lobe. Size has not       significantly changed since the July 2018 exam however this has developed and increased in size since      8/27/2018. Differential would include metastatic disease or primary lung   neoplasm. PET/CT imaging      may be useful for further evaluation.         New small 6 mm groundglass nodular opacity in the left lung. Smaller      subtle groundglass opacities are again seen in both lungs.    When reviewing older CTs, her CT from 4/2019 showed mild diffuse bladder wall thickening with associated pericystic fat stranding, likely sequale of radiation.     Today she notes that she feels better today that she has in awhile. Denies any hematuria. No dysuria. No fevers. Denies further weight loss. Continues to have occasional abdominal pain, nausea, and vomiting, but these are not new symptoms. Do remember, she does follow with GI in [place]. Recent scopes normal. She was  thought to have a gastroparesis component, on Reglan and Zofran and feels they are helping. Follows up again with them in December. Sees her oncologist every 4 months. No chest pain or shortness of breath.            Patient Active Problem List   Diagnosis     Advanced directives, counseling/discussion     Primary osteoarthritis of both hips     History of deep venous thrombosis     Presence of left hip implant     CKD (chronic kidney disease) stage 2, GFR 60-89 ml/min     Calculus of left kidney     S/P DEMETRIO-BSO     Endometrial cancer (H)     Encounter for long-term (current) use of medications     Pulmonary embolus (H)     Osteoarthritis of both hands     Antineoplastic chemotherapy induced anemia     Chronic anticoagulation     Arthritis of hand     Degeneration of spine     Pulmonary nodules     History of colonoscopy     Past Surgical History:   Procedure Laterality Date     ARTHROPLASTY HIP Left 12/4/2017    Procedure: ARTHROPLASTY HIP;  Left total hip arthroplasty;  Surgeon: Rajinder Goodwin MD;  Location: [facility]     BREAST BIOPSY, RT/LT  2004    left breast     COLONOSCOPY N/A 8/28/2019    Procedure: COLONOSCOPY WITH BIOPSIES;  Surgeon: Rajinder Henley, DO;  Location: [facility]     DILATION AND CURETTAGE, OPERATIVE HYSTEROSCOPY WITH MORCELLATOR, COMBINED N/A 4/11/2018    Procedure: COMBINED DILATION AND CURETTAGE, OPERATIVE HYSTEROSCOPY WITH MORCELLATOR;  Hysteroscopy, Dilation and Curettage Under Ultrasound Guidance ;  Surgeon: Adry Tineo MD;  Location: [facility]     DILATION AND CURETTAGE, WITH ULTRASOUND GUIDANCE  4/11/2018    Procedure: DILATION AND CURETTAGE, WITH ULTRASOUND GUIDANCE;;  Surgeon: Adry Tineo MD;  Location: [facility]     HYSTERECTOMY TOTAL ABDOMINAL, BILATERAL SALPINGO-OOPHORECTOMY, NODE DISSECTION, COMBINED Bilateral 4/27/2018    Procedure: COMBINED HYSTERECTOMY TOTAL ABDOMINAL, SALPINGO-OOPHORECTOMY, NODE DISSECTION;;  Surgeon: Bradley Tierney MD;  Location:  [facility] OR     INSERT PORT VASCULAR ACCESS Right 2018    Procedure: INSERT PORT VASCULAR ACCESS;  Single Lumen Chest Power Port;  Surgeon: Jamila Major PA-C;  Location: [facility]     LAPAROSCOPIC HYSTERECTOMY TOTAL, BILATERAL SALPINGO-OOPHORECTOMY, NODE DISSECTION, COMBINED N/A 2018    Procedure: COMBINED LAPAROSCOPIC HYSTERECTOMY TOTAL, SALPINGO-OOPHORECTOMY, NODE DISSECTION;  Laparoscopic converted to open Removal Of Uterus, Tubes, Ovaries, Cervix, Pelvic and Para Aortic Lymphnode Dissection, Tumor Debulking, CUSA, Partial Omentectomy, Anesthesia Block;  Surgeon: Bradley Tierney MD;  Location: [facility] OR     TONSILLECTOMY  1960    tonsillectomy       Social History     Tobacco Use     Smoking status: Never Smoker     Smokeless tobacco: Never Used   Substance Use Topics     Alcohol use: Yes     Comment: rarely     Family History   Problem Relation Age of Onset     Diabetes Mother         type 2     Heart Disease Mother      Hypertension Mother      C.A.D. Mother          after 2nd heart attack     Hypertension Father      C.A.D. Father         mild heart attack     Cancer Father         skin cancer     Heart Disease Father      Rheumatoid Arthritis Sister      Heart Disease Brother         herdetary heart murmur     Hyperlipidemia Brother      Clotting Disorder Brother         factor 5 leiden     Deep Vein Thrombosis Brother         leg     Hyperlipidemia Brother      Deep Vein Thrombosis Brother         leg; negative for clotting disorder     Kidney Disease No family hx of          Current Outpatient Medications   Medication Sig Dispense Refill     Ascorbic Acid (VITAMIN C) 500 MG CHEW Take 1 tablet by mouth daily        calcium citrate-vitamin D (CITRACAL) 315-200 MG-UNIT TABS per tablet Take 2 tablets by mouth daily       enoxaparin (LOVENOX) 40 MG/0.4ML syringe Inject 40 mg Subcutaneous 2 times daily       FERROUS GLUCONATE PO Take 325 mg by mouth once a week        GLUCOSAMINE CHONDROITIN COMPLX  "OR TABS Take  by mouth. 1500 mg 1xqd.   0     iohexol (OMNIPAQUE) 140 MG/ML solution for oral use Mix entire bottle (50ml) of contast with 600ml (20 ounces) of water and drink half 2 hrs prior to CT scan and half 1 hr prior to scan 140 mL 0     metoclopramide (REGLAN) 5 MG/5ML solution Take 5 mg by mouth       multivitamin, therapeutic with minerals (THERA-VIT-M) TABS tablet Take 1 tablet by mouth daily       ondansetron (ZOFRAN) 4 MG tablet Take 1 tablet (4 mg) by mouth every 8 hours as needed for nausea 180 tablet 1     pantoprazole (PROTONIX) 40 MG EC tablet Take 1 tablet (40 mg) by mouth daily       RANITIDINE HCL PO Take 150 mg by mouth 2 times daily        warfarin (COUMADIN) 5 MG tablet Current dose (12/10/18): 7.5 mg DAILY OR as directed by [facility] team. 130 tablet 1     Allergies   Allergen Reactions     Paclitaxel Anaphylaxis     reportedTaxol= anaphylaxsis     Nickel Rash       Reviewed and updated as needed this visit by Provider         Review of Systems   As noted in the HPI.       Objective    /68 (BP Location: Left arm, Patient Position: Chair, Cuff Size: Adult Regular)   Pulse 58   Temp 98.3  F (36.8  C) (Tympanic)   Ht 1.651 m (5' 5\")   Wt 64.4 kg (142 lb)   LMP 03/04/2005   SpO2 98%   BMI 23.63 kg/m    Body mass index is 23.63 kg/m .  Physical Exam   GENERAL: healthy, alert and no distress  EYES: Eyes grossly normal to inspection, PERRL and conjunctivae and sclerae normal  HENT: ear canals and TM's normal, nose and mouth without ulcers or lesions  NECK: no adenopathy, no asymmetry, masses, or scars and thyroid normal to palpation  RESP: lungs clear to auscultation - no rales, rhonchi or wheezes  CV: regular rate and rhythm, normal S1 S2, no S3 or S4, no murmur, click or rub, no peripheral edema and peripheral pulses strong  ABDOMEN: soft, nontender, no hepatosplenomegaly, no masses and bowel sounds normal  PSYCH: mentation appears normal, affect normal/bright    Diagnostic Test " Results:  Labs reviewed in Epic  Results for orders placed or performed in visit on 11/13/19 (from the past 24 hour(s))   UA reflex to Microscopic and Culture - [place]   Result Value Ref Range    Color Urine Light Yellow     Appearance Urine Slightly Cloudy     Glucose Urine Negative NEG^Negative mg/dL    Bilirubin Urine Negative NEG^Negative    Ketones Urine Negative NEG^Negative mg/dL    Specific Gravity Urine 1.018 1.003 - 1.035    Blood Urine Negative NEG^Negative    pH Urine 7.0 4.7 - 8.0 pH    Protein Albumin Urine Negative NEG^Negative mg/dL    Urobilinogen mg/dL Normal 0.0 - 2.0 mg/dL    Nitrite Urine Negative NEG^Negative    Leukocyte Esterase Urine Trace (A) NEG^Negative    Source Midstream Urine     RBC Urine 0 0 - 2 /HPF    WBC Urine 3 0 - 5 /HPF    Bacteria Urine None (A) NEG^Negative /HPF    Mucous Urine Present (A) NEG^Negative /LPF           Assessment & Plan   (R31.9) Hematuria, unspecified type  (primary encounter diagnosis)  Comment: resolved  Plan: UA reflex to Microscopic and Culture - [place]        Will continue to monitor occasionally. May be r/t past radiation. Only occurred when INR was high. I do not think she needs to see urology at this time, but will send referral if the hematuria returns.     (R11.0) Nausea  Comment: much better controlled  Plan: ondansetron (ZOFRAN) 4 MG tablet        Will continue Zofran and Reglan and follow the gastroparesis diet. F/u with GI as scheduled. Sooner with new or worsening symptoms.     (C54.1) Endometrial cancer (H)  Comment: stable  Plan: Continue to follow with oncology.            Katty Garcia NP  [facility] [place]

## 2019-11-12 NOTE — TELEPHONE ENCOUNTER
----- Message from Katty Garcia NP sent at 11/12/2019  1:33 PM CST -----  Can you call pt and let her know that I reviewed note from oncology. I would like her to follow up regarding her hematuria. Can you help her make an appointment

## 2019-11-12 NOTE — TELEPHONE ENCOUNTER
Left message for patient to return call. Please help patient schedule a future appointment ok to use a triage spot per Katty

## 2019-11-13 NOTE — NURSING NOTE
"Chief Complaint   Patient presents with     Hematuria     last time she had blood in the urine was 3 weeks ago        Initial /68 (BP Location: Left arm, Patient Position: Chair, Cuff Size: Adult Regular)   Pulse 58   Temp 98.3  F (36.8  C) (Tympanic)   Ht 1.651 m (5' 5\")   Wt 64.4 kg (142 lb)   LMP 03/04/2005   SpO2 98%   BMI 23.63 kg/m   Estimated body mass index is 23.63 kg/m  as calculated from the following:    Height as of this encounter: 1.651 m (5' 5\").    Weight as of this encounter: 64.4 kg (142 lb).  Medication Reconciliation: complete  Ann Marie Rosado LPN  "

## 2019-12-02 NOTE — PROGRESS NOTES
Hand hygiene performed: yes   Mask donned by caregiver: yes   Site prepped with CHG: yes   Labs drawn: no   Dressing applied using aseptic technique: yes    Patients right sided port accessed using non-coring, 22 gauge, 3/4 inch needle. Port accessed per facility protocol. Port flushed easily, blood return noted.  No signs and symptoms of infection or infiltration.  Port flushed 10 mLs Normal Saline then Heparin 5mLs of 100 unit/mL.  Needle removed, small dressing applied. Patient asked to be scheduled for next port flush at 5 weeks due to the holiday season. Note to [facility] alerting that given appointment card for Monday 1-6-2020 at 1400.  Patient discharged with no complaints.

## 2019-12-09 NOTE — PROGRESS NOTES
ANTICOAGULATION FOLLOW-UP CLINIC VISIT    Patient Name:  Gerri Owens  Date:  2019  Contact Type:  Face to Face    SUBJECTIVE:  Patient Findings     Comments:   INR done. No bleeding/bruising, no changes in diet/meds/activity or questions. INR results dicussed and advised patient re: warfarin dosing/INR recheck date. Patient verbalized understanding.          Clinical Outcomes     Negatives:   Major bleeding event, Thromboembolic event, Anticoagulation-related hospital admission, Anticoagulation-related ED visit, Anticoagulation-related fatality    Comments:   INR done. No bleeding/bruising, no changes in diet/meds/activity or questions. INR results dicussed and advised patient re: warfarin dosing/INR recheck date. Patient verbalized understanding.             OBJECTIVE    INR Protime   Date Value Ref Range Status   2019 2.1 (A) 0.86 - 1.14 Final       ASSESSMENT / PLAN  INR assessment THER    Recheck INR In: 6 WEEKS    INR Location Clinic      Anticoagulation Summary  As of 2019    INR goal:   2.0-3.0   TTR:   53.6 % (1 y)   INR used for dosin.1 (2019)   Warfarin maintenance plan:   2.5 mg (5 mg x 0.5) every Mon, Fri; 5 mg (5 mg x 1) all other days   Full warfarin instructions:   2.5 mg every Mon, Fri; 5 mg all other days   Weekly warfarin total:   30 mg   No change documented:   Carl, Laura, RN   Plan last modified:   Tnea Castillo RN (10/14/2019)   Next INR check:   2020   Priority:   Maintenance   Target end date:       Indications    DVT of upper extremity (deep vein thrombosis) (H) (Resolved) [I82.629]  Pulmonary embolus (H) [I26.99]             Anticoagulation Episode Summary     INR check location:       Preferred lab:       Send INR reminders to:   ARIANNA KIMBLE    Comments:   MTV 30 mcg. Consistent spinach and broccoli intake. DVT subclavian vein . PE 2018. Brother positive Factor V Leiden. Another brother on lifetime AC. 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       Anticoagulation Care Providers     Provider Role Specialty Phone number    Karo Doty MD [facility] [phone]            See the Encounter Report to view Anticoagulation Flowsheet and Dosing Calendar (Go to Encounters tab in chart review, and find the Anticoagulation Therapy Visit)        Laura Henry RN

## 2020-01-01 ENCOUNTER — MEDICAL CORRESPONDENCE (OUTPATIENT)
Dept: HEALTH INFORMATION MANAGEMENT | Facility: CLINIC | Age: 65
End: 2020-01-01

## 2020-01-01 ENCOUNTER — ANTICOAGULATION THERAPY VISIT (OUTPATIENT)
Dept: ANTICOAGULATION | Facility: OTHER | Age: 65
End: 2020-01-01

## 2020-01-01 ENCOUNTER — INFUSION THERAPY VISIT (OUTPATIENT)
Dept: INFUSION THERAPY | Facility: OTHER | Age: 65
End: 2020-01-01
Attending: NURSE PRACTITIONER
Payer: OTHER GOVERNMENT

## 2020-01-01 ENCOUNTER — ANTICOAGULATION THERAPY VISIT (OUTPATIENT)
Dept: ANTICOAGULATION | Facility: OTHER | Age: 65
End: 2020-01-01
Attending: NURSE PRACTITIONER
Payer: OTHER GOVERNMENT

## 2020-01-01 ENCOUNTER — HOSPITAL ENCOUNTER (OUTPATIENT)
Dept: CT IMAGING | Facility: HOSPITAL | Age: 65
Discharge: HOME | End: 2020-03-27
Attending: OBSTETRICS & GYNECOLOGY | Admitting: OBSTETRICS & GYNECOLOGY
Payer: OTHER GOVERNMENT

## 2020-01-01 ENCOUNTER — APPOINTMENT (OUTPATIENT)
Dept: GENERAL RADIOLOGY | Facility: HOSPITAL | Age: 65
End: 2020-01-01
Attending: INTERNAL MEDICINE
Payer: OTHER GOVERNMENT

## 2020-01-01 ENCOUNTER — TRANSFERRED RECORDS (OUTPATIENT)
Dept: HEALTH INFORMATION MANAGEMENT | Facility: CLINIC | Age: 65
End: 2020-01-01

## 2020-01-01 ENCOUNTER — HOSPITAL ENCOUNTER (OUTPATIENT)
Dept: CARDIOLOGY | Facility: HOSPITAL | Age: 65
Discharge: HOME | End: 2020-04-16
Attending: INTERNAL MEDICINE | Admitting: INTERNAL MEDICINE
Payer: OTHER GOVERNMENT

## 2020-01-01 ENCOUNTER — PATIENT OUTREACH (OUTPATIENT)
Dept: CARE COORDINATION | Facility: OTHER | Age: 65
End: 2020-01-01

## 2020-01-01 ENCOUNTER — MYC MEDICAL ADVICE (OUTPATIENT)
Dept: FAMILY MEDICINE | Facility: OTHER | Age: 65
End: 2020-01-01

## 2020-01-01 ENCOUNTER — ONCOLOGY VISIT (OUTPATIENT)
Dept: ONCOLOGY | Facility: OTHER | Age: 65
End: 2020-01-01
Attending: INTERNAL MEDICINE
Payer: OTHER GOVERNMENT

## 2020-01-01 ENCOUNTER — TELEPHONE (OUTPATIENT)
Dept: FAMILY MEDICINE | Facility: OTHER | Age: 65
End: 2020-01-01

## 2020-01-01 ENCOUNTER — VIRTUAL VISIT (OUTPATIENT)
Dept: FAMILY MEDICINE | Facility: OTHER | Age: 65
End: 2020-01-01
Attending: NURSE PRACTITIONER
Payer: OTHER GOVERNMENT

## 2020-01-01 ENCOUNTER — TELEPHONE (OUTPATIENT)
Dept: CT IMAGING | Facility: HOSPITAL | Age: 65
End: 2020-01-01

## 2020-01-01 ENCOUNTER — VIRTUAL VISIT (OUTPATIENT)
Dept: ONCOLOGY | Facility: CLINIC | Age: 65
End: 2020-01-01
Attending: OBSTETRICS & GYNECOLOGY
Payer: OTHER GOVERNMENT

## 2020-01-01 ENCOUNTER — HOSPITAL ENCOUNTER (OUTPATIENT)
Dept: PET IMAGING | Facility: HOSPITAL | Age: 65
Discharge: HOME | End: 2020-04-22
Attending: INTERNAL MEDICINE | Admitting: INTERNAL MEDICINE
Payer: OTHER GOVERNMENT

## 2020-01-01 ENCOUNTER — PATIENT OUTREACH (OUTPATIENT)
Dept: ONCOLOGY | Facility: CLINIC | Age: 65
End: 2020-01-01

## 2020-01-01 ENCOUNTER — TELEPHONE (OUTPATIENT)
Dept: ONCOLOGY | Facility: CLINIC | Age: 65
End: 2020-01-01

## 2020-01-01 ENCOUNTER — HOSPITAL ENCOUNTER (OUTPATIENT)
Facility: HOSPITAL | Age: 65
Setting detail: OBSERVATION
Discharge: HOME | End: 2020-03-28
Attending: PHYSICIAN ASSISTANT | Admitting: INTERNAL MEDICINE
Payer: OTHER GOVERNMENT

## 2020-01-01 VITALS
SYSTOLIC BLOOD PRESSURE: 92 MMHG | HEIGHT: 65 IN | BODY MASS INDEX: 23.69 KG/M2 | OXYGEN SATURATION: 97 % | RESPIRATION RATE: 18 BRPM | HEART RATE: 113 BPM | DIASTOLIC BLOOD PRESSURE: 68 MMHG | TEMPERATURE: 97.5 F | WEIGHT: 142.2 LBS

## 2020-01-01 VITALS — BODY MASS INDEX: 22.82 KG/M2 | HEIGHT: 65 IN | WEIGHT: 137 LBS

## 2020-01-01 VITALS
OXYGEN SATURATION: 97 % | TEMPERATURE: 99.3 F | RESPIRATION RATE: 18 BRPM | HEART RATE: 83 BPM | SYSTOLIC BLOOD PRESSURE: 103 MMHG | DIASTOLIC BLOOD PRESSURE: 68 MMHG

## 2020-01-01 DIAGNOSIS — Z86.711 HISTORY OF PULMONARY EMBOLISM: ICD-10-CM

## 2020-01-01 DIAGNOSIS — K56.609 SMALL BOWEL OBSTRUCTION (H): ICD-10-CM

## 2020-01-01 DIAGNOSIS — K56.609 SMALL BOWEL OBSTRUCTION (H): Primary | ICD-10-CM

## 2020-01-01 DIAGNOSIS — I26.99 OTHER ACUTE PULMONARY EMBOLISM WITHOUT ACUTE COR PULMONALE (H): Primary | ICD-10-CM

## 2020-01-01 DIAGNOSIS — I26.99 OTHER ACUTE PULMONARY EMBOLISM WITHOUT ACUTE COR PULMONALE (H): ICD-10-CM

## 2020-01-01 DIAGNOSIS — C54.1 ENDOMETRIAL CANCER (H): Primary | ICD-10-CM

## 2020-01-01 DIAGNOSIS — D64.81 ANTINEOPLASTIC CHEMOTHERAPY INDUCED ANEMIA: Primary | ICD-10-CM

## 2020-01-01 DIAGNOSIS — Z86.718 HISTORY OF DEEP VENOUS THROMBOSIS: ICD-10-CM

## 2020-01-01 DIAGNOSIS — R91.8 PULMONARY NODULES: ICD-10-CM

## 2020-01-01 DIAGNOSIS — T45.1X5A ANTINEOPLASTIC CHEMOTHERAPY INDUCED ANEMIA: Primary | ICD-10-CM

## 2020-01-01 DIAGNOSIS — C54.1 ENDOMETRIAL CANCER (H): ICD-10-CM

## 2020-01-01 DIAGNOSIS — D64.81 ANTINEOPLASTIC CHEMOTHERAPY INDUCED ANEMIA: ICD-10-CM

## 2020-01-01 DIAGNOSIS — K56.600 PARTIAL BOWEL OBSTRUCTION (H): ICD-10-CM

## 2020-01-01 DIAGNOSIS — C76.2 ABDOMINAL CARCINOMATOSIS (H): ICD-10-CM

## 2020-01-01 DIAGNOSIS — C76.2 ABDOMINAL CARCINOMATOSIS (H): Primary | ICD-10-CM

## 2020-01-01 DIAGNOSIS — R79.1 SUPRATHERAPEUTIC INR: ICD-10-CM

## 2020-01-01 DIAGNOSIS — T45.1X5A ANTINEOPLASTIC CHEMOTHERAPY INDUCED ANEMIA: ICD-10-CM

## 2020-01-01 DIAGNOSIS — Z09 HOSPITAL DISCHARGE FOLLOW-UP: ICD-10-CM

## 2020-01-01 DIAGNOSIS — Z79.899 ENCOUNTER FOR LONG-TERM (CURRENT) USE OF MEDICATIONS: ICD-10-CM

## 2020-01-01 DIAGNOSIS — C79.9 METASTATIC CANCER (H): ICD-10-CM

## 2020-01-01 LAB
ALBUMIN SERPL-MCNC: 2.2 G/DL (ref 3.4–5)
ALBUMIN SERPL-MCNC: 2.3 G/DL (ref 3.4–5)
ALBUMIN SERPL-MCNC: 3.2 G/DL (ref 3.4–5)
ALBUMIN UR-MCNC: 10 MG/DL
ALP SERPL-CCNC: 103 U/L (ref 40–150)
ALP SERPL-CCNC: 69 U/L (ref 40–150)
ALP SERPL-CCNC: 98 U/L (ref 40–150)
ALT SERPL W P-5'-P-CCNC: 34 U/L (ref 0–50)
ALT SERPL W P-5'-P-CCNC: 43 U/L (ref 0–50)
ALT SERPL W P-5'-P-CCNC: 72 U/L (ref 0–50)
ANION GAP SERPL CALCULATED.3IONS-SCNC: 5 MMOL/L (ref 3–14)
ANION GAP SERPL CALCULATED.3IONS-SCNC: 6 MMOL/L (ref 3–14)
ANION GAP SERPL CALCULATED.3IONS-SCNC: 7 MMOL/L (ref 3–14)
APPEARANCE UR: CLEAR
AST SERPL W P-5'-P-CCNC: 19 U/L (ref 0–45)
AST SERPL W P-5'-P-CCNC: 28 U/L (ref 0–45)
AST SERPL W P-5'-P-CCNC: 36 U/L (ref 0–45)
BACTERIA #/AREA URNS HPF: ABNORMAL /HPF
BASOPHILS # BLD AUTO: 0 10E9/L (ref 0–0.2)
BASOPHILS # BLD AUTO: 0 10E9/L (ref 0–0.2)
BASOPHILS NFR BLD AUTO: 0.3 %
BASOPHILS NFR BLD AUTO: 0.5 %
BILIRUB SERPL-MCNC: 0.7 MG/DL (ref 0.2–1.3)
BILIRUB SERPL-MCNC: 0.8 MG/DL (ref 0.2–1.3)
BILIRUB SERPL-MCNC: 1.1 MG/DL (ref 0.2–1.3)
BILIRUB UR QL STRIP: NEGATIVE
BUN SERPL-MCNC: 14 MG/DL (ref 7–30)
BUN SERPL-MCNC: 17 MG/DL (ref 7–30)
BUN SERPL-MCNC: 18 MG/DL (ref 7–30)
CALCIUM SERPL-MCNC: 7.9 MG/DL (ref 8.5–10.1)
CALCIUM SERPL-MCNC: 8 MG/DL (ref 8.5–10.1)
CALCIUM SERPL-MCNC: 8.8 MG/DL (ref 8.5–10.1)
CAPILLARY BLOOD COLLECTION: NORMAL
CHLORIDE SERPL-SCNC: 102 MMOL/L (ref 94–109)
CHLORIDE SERPL-SCNC: 103 MMOL/L (ref 94–109)
CHLORIDE SERPL-SCNC: 110 MMOL/L (ref 94–109)
CO2 SERPL-SCNC: 25 MMOL/L (ref 20–32)
CO2 SERPL-SCNC: 26 MMOL/L (ref 20–32)
CO2 SERPL-SCNC: 28 MMOL/L (ref 20–32)
COLOR UR AUTO: YELLOW
CREAT BLD-MCNC: 1 MG/DL (ref 0.52–1.04)
CREAT SERPL-MCNC: 0.9 MG/DL (ref 0.52–1.04)
CREAT SERPL-MCNC: 0.91 MG/DL (ref 0.52–1.04)
CREAT SERPL-MCNC: 0.96 MG/DL (ref 0.52–1.04)
DIFFERENTIAL METHOD BLD: ABNORMAL
DIFFERENTIAL METHOD BLD: ABNORMAL
EOSINOPHIL # BLD AUTO: 0 10E9/L (ref 0–0.7)
EOSINOPHIL # BLD AUTO: 0 10E9/L (ref 0–0.7)
EOSINOPHIL NFR BLD AUTO: 0 %
EOSINOPHIL NFR BLD AUTO: 0.3 %
ERYTHROCYTE [DISTWIDTH] IN BLOOD BY AUTOMATED COUNT: 14.7 % (ref 10–15)
ERYTHROCYTE [DISTWIDTH] IN BLOOD BY AUTOMATED COUNT: 14.8 % (ref 10–15)
ERYTHROCYTE [DISTWIDTH] IN BLOOD BY AUTOMATED COUNT: 18.1 % (ref 10–15)
GFR SERPL CREATININE-BSD FRML MDRD: 56 ML/MIN/{1.73_M2}
GFR SERPL CREATININE-BSD FRML MDRD: 62 ML/MIN/{1.73_M2}
GFR SERPL CREATININE-BSD FRML MDRD: 67 ML/MIN/{1.73_M2}
GFR SERPL CREATININE-BSD FRML MDRD: 67 ML/MIN/{1.73_M2}
GLUCOSE SERPL-MCNC: 110 MG/DL (ref 70–99)
GLUCOSE SERPL-MCNC: 78 MG/DL (ref 70–99)
GLUCOSE SERPL-MCNC: 82 MG/DL (ref 70–99)
GLUCOSE UR STRIP-MCNC: NEGATIVE MG/DL
HCT VFR BLD AUTO: 39.7 % (ref 35–47)
HCT VFR BLD AUTO: 48.6 % (ref 35–47)
HCT VFR BLD AUTO: 49.3 % (ref 35–47)
HGB BLD-MCNC: 12.7 G/DL (ref 11.7–15.7)
HGB BLD-MCNC: 15.8 G/DL (ref 11.7–15.7)
HGB BLD-MCNC: 16.5 G/DL (ref 11.7–15.7)
HGB UR QL STRIP: NEGATIVE
IMM GRANULOCYTES # BLD: 0 10E9/L (ref 0–0.4)
IMM GRANULOCYTES # BLD: 0 10E9/L (ref 0–0.4)
IMM GRANULOCYTES NFR BLD: 0.3 %
IMM GRANULOCYTES NFR BLD: 0.3 %
INR BLD: 1.5 (ref 0.86–1.14)
INR BLD: 3.8 (ref 0.86–1.14)
INR POINT OF CARE: 2.2 (ref 0.86–1.14)
INR POINT OF CARE: 3.4 (ref 0.86–1.14)
INR PPP: 1.58 (ref 0.86–1.14)
INR PPP: 1.7 (ref 0.86–1.14)
INR PPP: 8.1 (ref 0.86–1.14)
KETONES UR STRIP-MCNC: 40 MG/DL
LACTATE BLD-SCNC: 0.4 MMOL/L (ref 0.7–2)
LDH SERPL L TO P-CCNC: 286 U/L (ref 81–234)
LEUKOCYTE ESTERASE UR QL STRIP: ABNORMAL
LYMPHOCYTES # BLD AUTO: 0.8 10E9/L (ref 0.8–5.3)
LYMPHOCYTES # BLD AUTO: 1.2 10E9/L (ref 0.8–5.3)
LYMPHOCYTES NFR BLD AUTO: 21.3 %
LYMPHOCYTES NFR BLD AUTO: 28 %
MCH RBC QN AUTO: 28.9 PG (ref 26.5–33)
MCH RBC QN AUTO: 28.9 PG (ref 26.5–33)
MCH RBC QN AUTO: 29.1 PG (ref 26.5–33)
MCHC RBC AUTO-ENTMCNC: 32 G/DL (ref 31.5–36.5)
MCHC RBC AUTO-ENTMCNC: 32.5 G/DL (ref 31.5–36.5)
MCHC RBC AUTO-ENTMCNC: 33.5 G/DL (ref 31.5–36.5)
MCV RBC AUTO: 86 FL (ref 78–100)
MCV RBC AUTO: 90 FL (ref 78–100)
MCV RBC AUTO: 90 FL (ref 78–100)
MONOCYTES # BLD AUTO: 0.3 10E9/L (ref 0–1.3)
MONOCYTES # BLD AUTO: 0.9 10E9/L (ref 0–1.3)
MONOCYTES NFR BLD AUTO: 11.3 %
MONOCYTES NFR BLD AUTO: 14.6 %
MUCOUS THREADS #/AREA URNS LPF: PRESENT /LPF
NEUTROPHILS # BLD AUTO: 1.8 10E9/L (ref 1.6–8.3)
NEUTROPHILS # BLD AUTO: 3.7 10E9/L (ref 1.6–8.3)
NEUTROPHILS NFR BLD AUTO: 60.1 %
NEUTROPHILS NFR BLD AUTO: 63 %
NITRATE UR QL: NEGATIVE
NRBC # BLD AUTO: 0 10*3/UL
NRBC # BLD AUTO: 0 10*3/UL
NRBC BLD AUTO-RTO: 0 /100
NRBC BLD AUTO-RTO: 0 /100
PH UR STRIP: 5.5 PH (ref 4.7–8)
PLATELET # BLD AUTO: 168 10E9/L (ref 150–450)
PLATELET # BLD AUTO: 173 10E9/L (ref 150–450)
PLATELET # BLD AUTO: 267 10E9/L (ref 150–450)
POTASSIUM SERPL-SCNC: 3.5 MMOL/L (ref 3.4–5.3)
POTASSIUM SERPL-SCNC: 3.9 MMOL/L (ref 3.4–5.3)
POTASSIUM SERPL-SCNC: 4 MMOL/L (ref 3.4–5.3)
PROT SERPL-MCNC: 4.8 G/DL (ref 6.8–8.8)
PROT SERPL-MCNC: 4.8 G/DL (ref 6.8–8.8)
PROT SERPL-MCNC: 6.6 G/DL (ref 6.8–8.8)
RBC # BLD AUTO: 4.39 10E12/L (ref 3.8–5.2)
RBC # BLD AUTO: 5.43 10E12/L (ref 3.8–5.2)
RBC # BLD AUTO: 5.71 10E12/L (ref 3.8–5.2)
RBC #/AREA URNS AUTO: 2 /HPF (ref 0–2)
SODIUM SERPL-SCNC: 135 MMOL/L (ref 133–144)
SODIUM SERPL-SCNC: 137 MMOL/L (ref 133–144)
SODIUM SERPL-SCNC: 140 MMOL/L (ref 133–144)
SOURCE: ABNORMAL
SP GR UR STRIP: 1.02 (ref 1–1.03)
UROBILINOGEN UR STRIP-MCNC: NORMAL MG/DL (ref 0–2)
WBC # BLD AUTO: 2.5 10E9/L (ref 4–11)
WBC # BLD AUTO: 3 10E9/L (ref 4–11)
WBC # BLD AUTO: 5.8 10E9/L (ref 4–11)
WBC #/AREA URNS AUTO: 5 /HPF (ref 0–5)

## 2020-01-01 PROCEDURE — 25800030 ZZH RX IP 258 OP 636: Performed by: PHYSICIAN ASSISTANT

## 2020-01-01 PROCEDURE — 78815 PET IMAGE W/CT SKULL-THIGH: CPT | Mod: TC,PI

## 2020-01-01 PROCEDURE — 85610 PROTHROMBIN TIME: CPT | Mod: QW

## 2020-01-01 PROCEDURE — 74018 RADEX ABDOMEN 1 VIEW: CPT | Mod: TC

## 2020-01-01 PROCEDURE — 80053 COMPREHEN METABOLIC PANEL: CPT | Performed by: INTERNAL MEDICINE

## 2020-01-01 PROCEDURE — 93306 TTE W/DOPPLER COMPLETE: CPT | Mod: TC

## 2020-01-01 PROCEDURE — 25500064 ZZH RX 255 OP 636: Performed by: RADIOLOGY

## 2020-01-01 PROCEDURE — 96523 IRRIG DRUG DELIVERY DEVICE: CPT | Performed by: INTERNAL MEDICINE

## 2020-01-01 PROCEDURE — 85025 COMPLETE CBC W/AUTO DIFF WBC: CPT | Performed by: PHYSICIAN ASSISTANT

## 2020-01-01 PROCEDURE — 85025 COMPLETE CBC W/AUTO DIFF WBC: CPT | Performed by: INTERNAL MEDICINE

## 2020-01-01 PROCEDURE — 85610 PROTHROMBIN TIME: CPT | Performed by: PHYSICIAN ASSISTANT

## 2020-01-01 PROCEDURE — 99217 ZZC OBSERVATION CARE DISCHARGE: CPT | Performed by: INTERNAL MEDICINE

## 2020-01-01 PROCEDURE — 25800030 ZZH RX IP 258 OP 636: Performed by: INTERNAL MEDICINE

## 2020-01-01 PROCEDURE — 25000132 ZZH RX MED GY IP 250 OP 250 PS 637: Performed by: INTERNAL MEDICINE

## 2020-01-01 PROCEDURE — 36416 COLLJ CAPILLARY BLOOD SPEC: CPT

## 2020-01-01 PROCEDURE — 93306 TTE W/DOPPLER COMPLETE: CPT | Mod: 26 | Performed by: INTERNAL MEDICINE

## 2020-01-01 PROCEDURE — 85610 PROTHROMBIN TIME: CPT | Performed by: INTERNAL MEDICINE

## 2020-01-01 PROCEDURE — 25000128 H RX IP 250 OP 636: Performed by: INTERNAL MEDICINE

## 2020-01-01 PROCEDURE — 85610 PROTHROMBIN TIME: CPT | Mod: QW | Performed by: NURSE PRACTITIONER

## 2020-01-01 PROCEDURE — 99220 ZZC INITIAL OBSERVATION CARE,LEVL III: CPT | Performed by: INTERNAL MEDICINE

## 2020-01-01 PROCEDURE — 99285 EMERGENCY DEPT VISIT HI MDM: CPT | Mod: 25

## 2020-01-01 PROCEDURE — 36416 COLLJ CAPILLARY BLOOD SPEC: CPT | Performed by: NURSE PRACTITIONER

## 2020-01-01 PROCEDURE — 82565 ASSAY OF CREATININE: CPT

## 2020-01-01 PROCEDURE — 83605 ASSAY OF LACTIC ACID: CPT | Performed by: INTERNAL MEDICINE

## 2020-01-01 PROCEDURE — 81001 URINALYSIS AUTO W/SCOPE: CPT | Performed by: INTERNAL MEDICINE

## 2020-01-01 PROCEDURE — 99203 OFFICE O/P NEW LOW 30 MIN: CPT | Performed by: INTERNAL MEDICINE

## 2020-01-01 PROCEDURE — 94640 AIRWAY INHALATION TREATMENT: CPT

## 2020-01-01 PROCEDURE — 71260 CT THORAX DX C+: CPT | Mod: TC

## 2020-01-01 PROCEDURE — A9552 F18 FDG: HCPCS | Performed by: INTERNAL MEDICINE

## 2020-01-01 PROCEDURE — 25000128 H RX IP 250 OP 636: Performed by: PHYSICIAN ASSISTANT

## 2020-01-01 PROCEDURE — G0378 HOSPITAL OBSERVATION PER HR: HCPCS

## 2020-01-01 PROCEDURE — 40000114 ZZH STATISTIC NO CHARGE CLINIC VISIT

## 2020-01-01 PROCEDURE — 99284 EMERGENCY DEPT VISIT MOD MDM: CPT | Mod: Z6 | Performed by: PHYSICIAN ASSISTANT

## 2020-01-01 PROCEDURE — 96374 THER/PROPH/DIAG INJ IV PUSH: CPT

## 2020-01-01 PROCEDURE — 99215 OFFICE O/P EST HI 40 MIN: CPT | Mod: 95 | Performed by: OBSTETRICS & GYNECOLOGY

## 2020-01-01 PROCEDURE — 12000000 ZZH R&B MED SURG/OB

## 2020-01-01 PROCEDURE — 36415 COLL VENOUS BLD VENIPUNCTURE: CPT | Performed by: INTERNAL MEDICINE

## 2020-01-01 PROCEDURE — 25000128 H RX IP 250 OP 636

## 2020-01-01 PROCEDURE — 25000125 ZZHC RX 250: Performed by: INTERNAL MEDICINE

## 2020-01-01 PROCEDURE — 83615 LACTATE (LD) (LDH) ENZYME: CPT | Performed by: INTERNAL MEDICINE

## 2020-01-01 PROCEDURE — 99213 OFFICE O/P EST LOW 20 MIN: CPT | Mod: 95 | Performed by: NURSE PRACTITIONER

## 2020-01-01 PROCEDURE — 85027 COMPLETE CBC AUTOMATED: CPT | Performed by: INTERNAL MEDICINE

## 2020-01-01 PROCEDURE — 99214 OFFICE O/P EST MOD 30 MIN: CPT | Performed by: SURGERY

## 2020-01-01 PROCEDURE — 80053 COMPREHEN METABOLIC PANEL: CPT | Performed by: PHYSICIAN ASSISTANT

## 2020-01-01 PROCEDURE — 34300033 ZZH RX 343: Performed by: INTERNAL MEDICINE

## 2020-01-01 RX ORDER — HEPARIN SODIUM (PORCINE) LOCK FLUSH IV SOLN 100 UNIT/ML 100 UNIT/ML
5 SOLUTION INTRAVENOUS
Status: DISCONTINUED | OUTPATIENT
Start: 2020-01-01 | End: 2020-01-01 | Stop reason: HOSPADM

## 2020-01-01 RX ORDER — HEPARIN SODIUM (PORCINE) LOCK FLUSH IV SOLN 100 UNIT/ML 100 UNIT/ML
5 SOLUTION INTRAVENOUS ONCE
Status: COMPLETED | OUTPATIENT
Start: 2020-01-01 | End: 2020-01-01

## 2020-01-01 RX ORDER — HEPARIN SODIUM (PORCINE) LOCK FLUSH IV SOLN 100 UNIT/ML 100 UNIT/ML
5 SOLUTION INTRAVENOUS ONCE
Status: CANCELLED | OUTPATIENT
Start: 2020-01-01

## 2020-01-01 RX ORDER — PROCHLORPERAZINE MALEATE 10 MG
10 TABLET ORAL EVERY 6 HOURS PRN
Qty: 30 TABLET | Refills: 2 | Status: SHIPPED | OUTPATIENT
Start: 2020-01-01 | End: 2020-01-01

## 2020-01-01 RX ORDER — SODIUM CHLORIDE 9 MG/ML
INJECTION, SOLUTION INTRAVENOUS CONTINUOUS
Status: DISCONTINUED | OUTPATIENT
Start: 2020-01-01 | End: 2020-01-01 | Stop reason: HOSPADM

## 2020-01-01 RX ORDER — LIDOCAINE 40 MG/G
CREAM TOPICAL
Status: DISCONTINUED | OUTPATIENT
Start: 2020-01-01 | End: 2020-01-01 | Stop reason: HOSPADM

## 2020-01-01 RX ORDER — IOPAMIDOL 612 MG/ML
100 INJECTION, SOLUTION INTRAVASCULAR ONCE
Status: COMPLETED | OUTPATIENT
Start: 2020-01-01 | End: 2020-01-01

## 2020-01-01 RX ORDER — ONDANSETRON 4 MG/1
4 TABLET, ORALLY DISINTEGRATING ORAL EVERY 6 HOURS PRN
Status: DISCONTINUED | OUTPATIENT
Start: 2020-01-01 | End: 2020-01-01 | Stop reason: HOSPADM

## 2020-01-01 RX ORDER — NALOXONE HYDROCHLORIDE 0.4 MG/ML
.1-.4 INJECTION, SOLUTION INTRAMUSCULAR; INTRAVENOUS; SUBCUTANEOUS
Status: DISCONTINUED | OUTPATIENT
Start: 2020-01-01 | End: 2020-01-01 | Stop reason: HOSPADM

## 2020-01-01 RX ORDER — ONDANSETRON 2 MG/ML
4 INJECTION INTRAMUSCULAR; INTRAVENOUS EVERY 6 HOURS PRN
Status: DISCONTINUED | OUTPATIENT
Start: 2020-01-01 | End: 2020-01-01 | Stop reason: HOSPADM

## 2020-01-01 RX ORDER — WARFARIN SODIUM 5 MG/1
TABLET ORAL
Qty: 100 TABLET | Refills: 1 | Status: ON HOLD | OUTPATIENT
Start: 2020-01-01 | End: 2020-01-01

## 2020-01-01 RX ORDER — BISACODYL 10 MG
10 SUPPOSITORY, RECTAL RECTAL 2 TIMES DAILY
Status: DISCONTINUED | OUTPATIENT
Start: 2020-01-01 | End: 2020-01-01

## 2020-01-01 RX ORDER — PROCHLORPERAZINE 25 MG
25 SUPPOSITORY, RECTAL RECTAL EVERY 12 HOURS PRN
Status: DISCONTINUED | OUTPATIENT
Start: 2020-01-01 | End: 2020-01-01 | Stop reason: HOSPADM

## 2020-01-01 RX ORDER — HEPARIN SODIUM (PORCINE) LOCK FLUSH IV SOLN 100 UNIT/ML 100 UNIT/ML
5 SOLUTION INTRAVENOUS
Status: CANCELLED | OUTPATIENT
Start: 2020-01-01

## 2020-01-01 RX ORDER — HEPARIN SODIUM (PORCINE) LOCK FLUSH IV SOLN 100 UNIT/ML 100 UNIT/ML
SOLUTION INTRAVENOUS
Status: COMPLETED
Start: 2020-01-01 | End: 2020-01-01

## 2020-01-01 RX ORDER — METOCLOPRAMIDE HYDROCHLORIDE 5 MG/ML
5 INJECTION INTRAMUSCULAR; INTRAVENOUS 2 TIMES DAILY
Status: DISCONTINUED | OUTPATIENT
Start: 2020-01-01 | End: 2020-01-01 | Stop reason: HOSPADM

## 2020-01-01 RX ORDER — HYDROMORPHONE HYDROCHLORIDE 1 MG/ML
.5-1 INJECTION, SOLUTION INTRAMUSCULAR; INTRAVENOUS; SUBCUTANEOUS EVERY 4 HOURS PRN
Status: DISCONTINUED | OUTPATIENT
Start: 2020-01-01 | End: 2020-01-01 | Stop reason: HOSPADM

## 2020-01-01 RX ORDER — PROCHLORPERAZINE MALEATE 10 MG
10 TABLET ORAL EVERY 6 HOURS PRN
Status: DISCONTINUED | OUTPATIENT
Start: 2020-01-01 | End: 2020-01-01 | Stop reason: HOSPADM

## 2020-01-01 RX ORDER — WARFARIN SODIUM 5 MG/1
TABLET ORAL
Qty: 100 TABLET | Refills: 1 | Status: SHIPPED | OUTPATIENT
Start: 2020-01-01

## 2020-01-01 RX ADMIN — BISACODYL 10 MG: 10 SUPPOSITORY RECTAL at 21:37

## 2020-01-01 RX ADMIN — SODIUM CHLORIDE: 9 INJECTION, SOLUTION INTRAVENOUS at 09:35

## 2020-01-01 RX ADMIN — SODIUM CHLORIDE 1000 ML: 9 INJECTION, SOLUTION INTRAVENOUS at 17:48

## 2020-01-01 RX ADMIN — SODIUM CHLORIDE: 9 INJECTION, SOLUTION INTRAVENOUS at 00:18

## 2020-01-01 RX ADMIN — DIATRIZOATE MEGLUMINE AND DIATRIZOATE SODIUM 30 ML: 660; 100 SOLUTION ORAL; RECTAL at 13:55

## 2020-01-01 RX ADMIN — HEPARIN SODIUM (PORCINE) LOCK FLUSH IV SOLN 100 UNIT/ML 5 ML: 100 SOLUTION at 12:27

## 2020-01-01 RX ADMIN — HEPARIN SODIUM (PORCINE) LOCK FLUSH IV SOLN 100 UNIT/ML 5 ML: 100 SOLUTION at 14:07

## 2020-01-01 RX ADMIN — FLUDEOXYGLUCOSE F-18 9.66 MCI.: 500 INJECTION, SOLUTION INTRAVENOUS at 10:10

## 2020-01-01 RX ADMIN — LIDOCAINE HYDROCHLORIDE 3 ML: 40 INJECTION, SOLUTION RETROBULBAR; TOPICAL at 18:49

## 2020-01-01 RX ADMIN — ONDANSETRON 4 MG: 2 INJECTION INTRAMUSCULAR; INTRAVENOUS at 13:16

## 2020-01-01 RX ADMIN — HEPARIN 5 ML: 100 SYRINGE at 14:03

## 2020-01-01 RX ADMIN — IOPAMIDOL 100 ML: 612 INJECTION, SOLUTION INTRAVENOUS at 13:56

## 2020-01-01 RX ADMIN — HEPARIN SODIUM (PORCINE) LOCK FLUSH IV SOLN 100 UNIT/ML 5 ML: 100 SOLUTION at 14:05

## 2020-01-01 RX ADMIN — FAMOTIDINE 20 MG: 20 INJECTION, SOLUTION INTRAVENOUS at 09:36

## 2020-01-01 RX ADMIN — METOCLOPRAMIDE 5 MG: 5 INJECTION, SOLUTION INTRAMUSCULAR; INTRAVENOUS at 21:36

## 2020-01-01 RX ADMIN — HEPARIN SODIUM (PORCINE) LOCK FLUSH IV SOLN 100 UNIT/ML 5 ML: 100 SOLUTION at 14:03

## 2020-01-01 RX ADMIN — SODIUM CHLORIDE: 9 INJECTION, SOLUTION INTRAVENOUS at 18:34

## 2020-01-01 RX ADMIN — METOCLOPRAMIDE 5 MG: 5 INJECTION, SOLUTION INTRAMUSCULAR; INTRAVENOUS at 09:36

## 2020-01-01 RX ADMIN — PHYTONADIONE 5 MG: 10 INJECTION, EMULSION INTRAMUSCULAR; INTRAVENOUS; SUBCUTANEOUS at 18:01

## 2020-01-01 RX ADMIN — FAMOTIDINE 20 MG: 20 INJECTION, SOLUTION INTRAVENOUS at 21:36

## 2020-01-01 ASSESSMENT — ENCOUNTER SYMPTOMS
FEVER: 0
BRUISES/BLEEDS EASILY: 1
VOMITING: 1
ABDOMINAL PAIN: 1
BLOATING: 1
ABDOMINAL PAIN: 1
DIARRHEA: 1
WEIGHT LOSS: 1
CHILLS: 0
VOMITING: 1
NAUSEA: 1
HEARTBURN: 1
DECREASED APPETITE: 1
FATIGUE: 1
DIARRHEA: 1
ABDOMINAL DISTENTION: 0
NAUSEA: 1

## 2020-01-01 ASSESSMENT — PATIENT HEALTH QUESTIONNAIRE - PHQ9: SUM OF ALL RESPONSES TO PHQ QUESTIONS 1-9: 16

## 2020-01-01 ASSESSMENT — ACTIVITIES OF DAILY LIVING (ADL)
ADLS_ACUITY_SCORE: 11
ADLS_ACUITY_SCORE: 13
ADLS_ACUITY_SCORE: 11

## 2020-01-01 ASSESSMENT — PAIN SCALES - GENERAL
PAINLEVEL: MILD PAIN (3)
PAINLEVEL: MODERATE PAIN (4)

## 2020-01-01 ASSESSMENT — MIFFLIN-ST. JEOR
SCORE: 1200.88
SCORE: 1177.31

## 2020-01-13 NOTE — PROGRESS NOTES
Hand hygiene performed: yes   Mask donned by caregiver: yes   Site prepped with CHG: yes   Labs drawn: no   Dressing applied using aseptic technique: yes    Patients right sided port accessed using non-coring, 20 gauge, 3/4 inch needle. Port accessed per facility protocol. Port flushed easily, blood return noted.  No signs and symptoms of infection or infiltration.  Port flushed 10 mLs Normal Saline then Heparin 5mLs of 100 unit/mL.  Needle removed, small dressing applied. Patient asked to be scheduled at 6 weeks out. Note to [facility] with date and time requested by patient. Patient declined appointment card, placed directly into calendar.  Patient discharged with no complaints.

## 2020-01-13 NOTE — TELEPHONE ENCOUNTER
Coumadin      Last Written Prescription Date:  06/10/19  Last Fill Quantity: 130,   # refills: 1  Last Office Visit: 11/13/19  Future Office visit:

## 2020-01-20 NOTE — PROGRESS NOTES
ANTICOAGULATION FOLLOW-UP CLINIC VISIT    Patient Name:  Gerri Owens  Date:  2020  Contact Type:  Face to Face    SUBJECTIVE:  Patient Findings     Comments:   No bleeding/bruising. No  New changes in diet/meds/activity. States she is feeling really good        Clinical Outcomes     Negatives:   Major bleeding event, Thromboembolic event, Anticoagulation-related hospital admission, Anticoagulation-related ED visit, Anticoagulation-related fatality    Comments:   No bleeding/bruising. No  New changes in diet/meds/activity. States she is feeling really good           OBJECTIVE    INR Protime   Date Value Ref Range Status   2020 2.2 (A) 0.86 - 1.14 Final       ASSESSMENT / PLAN  INR assessment THER    Recheck INR In: 6 WEEKS    INR Location Clinic      Anticoagulation Summary  As of 2020    INR goal:   2.0-3.0   TTR:   56.4 % (1 y)   INR used for dosin.2 (2020)   Warfarin maintenance plan:   2.5 mg (5 mg x 0.5) every Mon, Fri; 5 mg (5 mg x 1) all other days   Full warfarin instructions:   2.5 mg every Mon, Fri; 5 mg all other days   Weekly warfarin total:   30 mg   No change documented:   Tena Castillo, RN   Plan last modified:   Tena Castillo RN (10/14/2019)   Next INR check:   3/2/2020   Priority:   Maintenance   Target end date:       Indications    DVT of upper extremity (deep vein thrombosis) (H) (Resolved) [I82.629]  Pulmonary embolus (H) [I26.99]             Anticoagulation Episode Summary     INR check location:       Preferred lab:       Send INR reminders to:   ARIANNA KIMBLE    Comments:   MTV 30 mcg. Consistent spinach and broccoli intake. DVT subclavian vein . PE 2018. Brother positive Factor V Leiden. Another brother on lifetime AC. 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       Anticoagulation Care Providers     Provider Role Specialty Phone number    Karo Doty MD [facility] [phone]            See the Encounter Report to  view Anticoagulation Flowsheet and Dosing Calendar (Go to Encounters tab in chart review, and find the Anticoagulation Therapy Visit)        Tena Castillo RN

## 2020-01-20 NOTE — TELEPHONE ENCOUNTER
1/20/20 - lm or pt to reschedule as Dr. Tierney appt on 3/16 needs to be rescheduled as he is not in clinic as well as CT in [facility]

## 2020-02-24 NOTE — PROGRESS NOTES
Hand hygiene performed: yes   Mask donned by caregiver: yes   Site prepped with CHG: yes   Labs drawn: yes   Dressing applied using aseptic technique: yes   Comment:    Patients right sided  port accessed using non-coring, 19 gauge, 3/4 needle. Port accessed per facility protocol. Port flushed easily, blood return noted.  No signs and symptoms of infection or infiltration.  Port flushed 20 mLs Normal Saline then Heparin 5mLs of 100 unit/mL.  Needle removed, small dressing applied.  Patient discharged with no complaints.

## 2020-03-20 NOTE — PROGRESS NOTES
ANTICOAGULATION FOLLOW-UP CLINIC VISIT    Patient Name:  Gerri Owens  Date:  3/20/2020  Contact Type:  Telephone    SUBJECTIVE:  Patient Findings     Comments:   Call placed to patient.  Her last INR was slightly elevated r/t diarhhea, previous INR's were therapeutic. We discussed INR recheck will be extended r/t covid-19 and clinics altering patient contacts. She states she had one episode of vomiting and did decrease her warfarin that day which has worked previously. We discussed that her INR appointment will be extended out to 4/24. She is to continue her current warfarin dosing but she will cut dose back x 1 day if she has vomiting/diarrhea. She has no bleeding/bruising and has been feeling good. She verbalized understanding of warfarin dosing/INR recheck date and has no questions. She states she is scheduled for an endoscopy on 4/27/20. We discussed that if it is not cancelled by 4/20/20 then she should call warfarin clinic and we will stop warfarin at the appropriate time for that procedure. Per PCP, Katty Garcia, she is to be bridged with lovenox 1mg/kg q12h.         Clinical Outcomes     Negatives:   Major bleeding event, Thromboembolic event, Anticoagulation-related hospital admission, Anticoagulation-related ED visit, Anticoagulation-related fatality    Comments:   Call placed to patient.  Her last INR was slightly elevated r/t diarhhea, previous INR's were therapeutic. We discussed INR recheck will be extended r/t covid-19 and clinics altering patient contacts. She states she had one episode of vomiting and did decrease her warfarin that day which has worked previously. We discussed that her INR appointment will be extended out to 4/24. She is to continue her current warfarin dosing but she will cut dose back x 1 day if she has vomiting/diarrhea. She has no bleeding/bruising and has been feeling good. She verbalized understanding of warfarin dosing/INR recheck date and has no questions. She states  she is scheduled for an endoscopy on 4/27/20. We discussed that if it is not cancelled by 4/20/20 then she should call warfarin clinic and we will stop warfarin at the appropriate time for that procedure. Per PCP, Katty Garcia, she is to be bridged with lovenox 1mg/kg q12h.            OBJECTIVE    INR Protime   Date Value Ref Range Status   03/02/2020 3.4 (A) 0.86 - 1.14 Final       ASSESSMENT / PLAN  No question data found.  Anticoagulation Summary  As of 3/20/2020    INR goal:   2.0-3.0   TTR:   67.4 % (11.6 mo)   INR used for dosing:   No new INR was available at the time of this encounter.   Warfarin maintenance plan:   2.5 mg (5 mg x 0.5) every Mon, Fri; 5 mg (5 mg x 1) all other days   Full warfarin instructions:   2.5 mg every Mon, Fri; 5 mg all other days   Weekly warfarin total:   30 mg   No change documented:   Tena Castillo RN   Plan last modified:   Tena Castillo RN (10/14/2019)   Next INR check:   4/24/2020   Priority:   Maintenance   Target end date:       Indications    DVT of upper extremity (deep vein thrombosis) (H) (Resolved) [I82.629]  Pulmonary embolus (H) [I26.99]             Anticoagulation Episode Summary     INR check location:       Preferred lab:       Send INR reminders to:   ARIANNA KIMBLE    Comments:   MTV 30 mcg.  DVT subclavian vein 2012. PE 7/2018. Brother positive Factor V Leiden. Another brother on lifetime AC. 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   Endoscopy 4/27/20, bridge 1mg/kg q12h per TALI Garcia      Anticoagulation Care Providers     Provider Role Specialty Phone number    Karo Doty MD [facility] [phone]            See the Encounter Report to view Anticoagulation Flowsheet and Dosing Calendar (Go to Encounters tab in chart review, and find the Anticoagulation Therapy Visit)        Tena Castillo RN

## 2020-03-27 PROBLEM — K56.609 SBO (SMALL BOWEL OBSTRUCTION) (H): Status: ACTIVE | Noted: 2020-01-01

## 2020-03-27 NOTE — H&P
[facility]    History and Physical - Hospitalist Service       Date of Admission:  3/27/2020    Assessment & Plan   Gerri Owens is a 64 year old female admitted on 3/27/2020.     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 IV Vit K given; will trend INR. Will need heparin infusion when therapeutic.    General: check UA     Diet: NPO  DVT Prophylaxis: Pneumatic Compression Devices (currently supra therapeudic)  Lund Catheter: not present  Code Status:  FULL    Disposition Plan   Expected discharge: 2 - 3 days, recommended to prior living arrangement once SBO resolution.  Entered: Kevin Presley DO 03/27/2020, 5:48 PM     The patient's care was discussed with the Patient.    Kevin Presley DO  [facility]    ______________________________________________________________________    Chief Complaint   Mid abdominal pain    History is obtained from the patient    History of Present Illness   Gerri Owens is a 64 year old female who  Was diagnosed with metastatic uterine cancer approximately two years ago. She underwent a hysterectomy, was intolerant of chemotherapy and completed radiation treatments. She has a history of UE DVT and is on Coumadin    For about 1 month, she's noticed a color change (more pale) in her stools and had been burping more than usual and she heard loud stomach gurgling. She also was having intermittent mid abdominal pain. She didn't seek medical attention, but was using simethicone with some relief    She was schedule for a follow up CT scan to assess a pulmonary nodule, but the scan of the abdomen showed anSBO; she was directed to the ER    ER Course: vitals were normal and she was in no distress. Lad diagnostics resulted stable stage 2 renal performance, and a stable heme profile; INR was elevated (8.10). she  was given IV fluids and IV Vit K    Review of Systems       Constitutional: No fever or chills, no generalized weakness, no unintentional weight change, less of an appetite over the last 3-4 weeks  Ears, Nose & Throat: no sore throat, no nasal drainage, no congestion. No ear pain, no ear drainage, no particular change in hearing  Eyes: no particular change in vision, no redness, no drainage  Cardiovascular: No chest pain at rest, no chest pain with familiar activities.  Pulmonary: No cough, no particular change in work of breathing, no particular change in shortness of breath with position changes  Gastrointestinal: central, crampy central abdominal pain that was fague and difficult to locate with a specific location, some nausea, with occasional vomiting, one episode of diarrhea after the CT contrast. Paler colored stools, without particular BM pattern change  Genitourinary: No particular change in incontinence, no pain with urination, no particular change in stream, no particular change in amount urinated with urge, no discharge  Skin: No particular change in bruising, no rashes  Musculoskeletal: no particular change in strength, no particular change in muscle development  Neurological: no numbness and tingling, no headache, no particular change in balance, no dizziness  Psychologic: No particular change in depression and/or anxiety  Endocrine: No particular change in heat or cold intolerance        Past Medical History    I have reviewed this patient's medical history and updated it with pertinent information if needed.   Past Medical History:   Diagnosis Date     Abnormal renal function test 12/04/2017    due to NSAIDS, normal after stopping taking them     Advanced directives, counseling/discussion      Antineoplastic chemotherapy induced anemia 7/27/2018     Arthritis      BMI 35.0-35.9,adult 11/12/2017     Degeneration of spine 5/21/2019     DVT of upper extremity (deep vein thrombosis) (H) 03/27/2012     clotting disorder workup negative     Encounter for antineoplastic chemotherapy 7/6/2018     Encounter for long-term (current) use of medications 5/21/2018     Endometrial cancer (H) 04/2018     Gastroesophageal reflux disease      Hyperlipidemia LDL goal < 160      Hyperlipidemia, unspecified hyperlipidemia type 11/12/2017     Low ferritin 7/19/2018     MEDICAL HISTORY OF - 1997    left breast density     Microscopic hematuria 3/22/2018     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  7/17/2012     Thyrotoxicosis 2007    hyperthyroid, enlarged right thyroid on thyroid uptake, treate by [facility]in [facility] with tapozole till 12/2008, FNA neg of 1.9 cm right lobe dominat nodule     Whooping cough due to Bordetella pertussis (p. pertussis) infant    whooping cough       Past Surgical History   I have reviewed this patient's surgical history and updated it with pertinent information if needed.  Past Surgical History:   Procedure Laterality Date     ARTHROPLASTY HIP Left 12/4/2017    Procedure: ARTHROPLASTY HIP;  Left total hip arthroplasty;  Surgeon: Rajinder Goodwin MD;  Location: [facility]     BREAST BIOPSY, RT/LT  2004    left breast     COLONOSCOPY N/A 8/28/2019    Procedure: COLONOSCOPY WITH BIOPSIES;  Surgeon: Rajinder Henley DO;  Location: [facility]     DILATION AND CURETTAGE, OPERATIVE HYSTEROSCOPY WITH MORCELLATOR, COMBINED N/A 4/11/2018    Procedure: COMBINED DILATION AND CURETTAGE, OPERATIVE HYSTEROSCOPY WITH MORCELLATOR;  Hysteroscopy, Dilation and Curettage Under Ultrasound Guidance ;  Surgeon: Adry Tineo MD;  [facility]     DILATION AND CURETTAGE, WITH ULTRASOUND GUIDANCE  4/11/2018    Procedure: DILATION AND CURETTAGE, WITH ULTRASOUND GUIDANCE;;  Surgeon: Adry Tineo MD;  [facility]     HYSTERECTOMY TOTAL ABDOMINAL, BILATERAL SALPINGO-OOPHORECTOMY, NODE DISSECTION, COMBINED Bilateral 4/27/2018    Procedure: COMBINED HYSTERECTOMY TOTAL ABDOMINAL,  SALPINGO-OOPHORECTOMY, NODE DISSECTION;;  Surgeon: Bradley Tierney MD;  [facility]     INSERT PORT VASCULAR ACCESS Right 2018    Procedure: INSERT PORT VASCULAR ACCESS;  Single Lumen Chest Power Port;  Surgeon: Jamila Major PA-C;  [facility]     LAPAROSCOPIC HYSTERECTOMY TOTAL, BILATERAL SALPINGO-OOPHORECTOMY, NODE DISSECTION, COMBINED N/A 2018    Procedure: COMBINED LAPAROSCOPIC HYSTERECTOMY TOTAL, SALPINGO-OOPHORECTOMY, NODE DISSECTION;  Laparoscopic converted to open Removal Of Uterus, Tubes, Ovaries, Cervix, Pelvic and Para Aortic Lymphnode Dissection, Tumor Debulking, CUSA, Partial Omentectomy, Anesthesia Block;  Surgeon: Bradley Tierney MD;  [facility]     TONSILLECTOMY  1960    tonsillectomy       Social History   I have reviewed this patient's social history and updated it with pertinent information if needed.  Social History     Tobacco Use     Smoking status: Never Smoker     Smokeless tobacco: Never Used   Substance Use Topics     Alcohol use: Yes     Comment: rarely     Drug use: No       Family History   I have reviewed this patient's family history and updated it with pertinent information if needed.   Family History   Problem Relation Age of Onset     Diabetes Mother         type 2     Heart Disease Mother      Hypertension Mother      C.A.D. Mother          after 2nd heart attack     Hypertension Father      C.A.D. Father         mild heart attack     Cancer Father         skin cancer     Heart Disease Father      Rheumatoid Arthritis Sister      Heart Disease Brother         herdetary heart murmur     Hyperlipidemia Brother      Clotting Disorder Brother         factor 5 leiden     Deep Vein Thrombosis Brother         leg     Hyperlipidemia Brother      Deep Vein Thrombosis Brother         leg; negative for clotting disorder     Kidney Disease No family hx of         Prior to Admission Medications   Prior to Admission Medications   Prescriptions  Last Dose Informant Patient Reported? Taking?   Ascorbic Acid (VITAMIN C) 500 MG CHEW Past Week at Unknown time Self Yes Yes   Sig: Take 1 tablet by mouth daily    FERROUS GLUCONATE PO More than a month at Unknown time Self Yes No   Sig: Take 325 mg by mouth once a week    GLUCOSAMINE CHONDROITIN COMPLX OR TABS Past Week at Unknown time Self Yes Yes   Sig: Take  by mouth. 1500 mg 1xqd.    RANITIDINE HCL PO Past Week at Unknown time Self Yes Yes   Sig: Take 150 mg by mouth 2 times daily    Simethicone (GAS RELIEF PO) 3/26/2020 at Unknown time  Yes Yes   Sig: Take by mouth as needed   calcium citrate-vitamin D (CITRACAL) 315-200 MG-UNIT TABS per tablet Past Week at Unknown time Self Yes Yes   Sig: Take 2 tablets by mouth daily   metoclopramide (REGLAN) 5 MG/5ML solution Past Week at Unknown time  Yes Yes   Sig: Take 5 mg by mouth 4 times daily (before meals and nightly)    multivitamin, therapeutic with minerals (THERA-VIT-M) TABS tablet Past Week at Unknown time Self Yes Yes   Sig: Take 1 tablet by mouth daily   ondansetron (ZOFRAN) 4 MG tablet 3/27/2020 at 0800  No Yes   Sig: Take 1 tablet (4 mg) by mouth every 8 hours as needed for nausea   pantoprazole (PROTONIX) 40 MG EC tablet 3/27/2020 at Unknown time  Yes Yes   Sig: Take 1 tablet (40 mg) by mouth daily   warfarin ANTICOAGULANT (COUMADIN) 5 MG tablet 3/26/2020 at Unknown time  No Yes   Sig: Take 2.5 mg (0.5 tablet) every Mon, Fri and 5 mg (1 tablet) all other days or as directed by the [facility]      Facility-Administered Medications: None     Allergies   Allergies   Allergen Reactions     Paclitaxel Anaphylaxis     reportedTaxol= anaphylaxsis     Nickel Rash       Physical Exam   Vital Signs: Temp: 98.3  F (36.8  C) Temp src: Oral BP: 95/69 Pulse: 91 Heart Rate: 83 Resp: 18 SpO2: 95 % O2 Device: None (Room air)    Weight: 0 lbs 0 oz       Case discussed with the ER Provider; EHR reviewed; patient seen in ER room 11    Vital signs:  Temp: 98.3  F (36.8  " C) Temp src: Oral BP: 103/70 Pulse: 90 Heart Rate: 83 Resp: 18 SpO2: 95 % O2 Device: None (Room air)        Estimated body mass index is 23.63 kg/m  as calculated from the following:    Height as of 11/13/19: 1.651 m (5' 5\").    Weight as of 11/13/19: 64.4 kg (142 lb).      General: No distress, interactive  Head: normocephalic, no obvious trauma  Eyes: Gaze directed normally, sclera clear, no discharge, no abnormal ocular movements  Ears: Normal appearing age-related external ears, no discharge, stable hearing acuity loss  Nose: Normal age-related appearance  Mouth: Normal appearing oral mucosa, Gums and throat appear age-related normal  Neck: Normal age-related appearance, age-related range of motion, supple, no adenopathy  Pulmonary: Normal work of breathing, no expiratory delay, no coarseness, no wheezing  Cardiovascular: Distant heart sounds, regular rhythm   Abdomen: No obvious distention, soft, rare bowel sound present with normal frequency and pitch  Rectal: Deferred  Back: Age-related normal  Skin: Age-related normal, no rashes  Extremities: Not tender, no lower extremity edema. Moving upper and lower extremities  Neurological: Grossly in tact  Psychiatric: Mood is stable, appropriately interactive        Data   Data reviewed today: I reviewed all medications, new labs and imaging results over the last 24 hours.   "

## 2020-03-27 NOTE — ED NOTES
DATE:  3/27/2020   TIME OF RECEIPT FROM LAB:  7543  LAB TEST:  INR  LAB VALUE:  8.10  RESULTS GIVEN WITH READ-BACK TO (PROVIDER):  Daphne Gutierrez PA-C  TIME LAB VALUE REPORTED TO PROVIDER:   2381

## 2020-03-27 NOTE — TELEPHONE ENCOUNTER
Received a call from radiology. Patient had a CT of her chest, abdomen, and pelvis and it appears she has a partial high grade bowel obstruction. I did call patient and she is having increased nausea with sharp abdominal pain. She was instructed to go to the ER. Report called to the ER.

## 2020-03-27 NOTE — ED TRIAGE NOTES
Patient arrives by self for evaluation of abdominal pain. Intermittent pain and nausea over the past 3 weeks. Worsened the last week. Patient was in to have a CT chest/abdomen today and was called with results of a small bowel obstruction. History of metastatic uterine cancer. Rates pain 4/10 currently. Nausea without emesis today.

## 2020-03-27 NOTE — ED PROVIDER NOTES
History     Chief Complaint   Patient presents with     Abdominal Pain     The history is provided by the patient.     Gerri Owens is a 64 year old female who presented to the emergency department ambulatory for evaluation of a 3-week history of mid abdominal pain.  The patient is also been suffering from intermittent nausea with vomiting as well as intermittent diarrhea.  Most of can past medical history for metastatic uterine cancer.  CT scan of the chest abdomen pelvis ordered by oncologist for staging purposes found partial small bowel obstruction.  Nausea continues without vomiting since yesterday.  Abdominal pain of a 5 on the 10 scale.  Denies any hematochezia, melena, hematemesis.    Allergies:  Allergies   Allergen Reactions     Paclitaxel Anaphylaxis     reportedTaxol= anaphylaxsis     Nickel Rash       Problem List:    Patient Active Problem List    Diagnosis Date Noted     History of colonoscopy 08/29/2019     Priority: Medium     8/29/2019, repeat 10 years       Pulmonary nodules 05/22/2019     Priority: Medium     Chronic anticoagulation 05/21/2019     Priority: Medium     Arthritis of hand 05/21/2019     Priority: Medium     Oa       Degeneration of spine 05/21/2019     Priority: Medium     Antineoplastic chemotherapy induced anemia 07/27/2018     Priority: Medium     Osteoarthritis of both hands 07/18/2018     Priority: Medium     Pulmonary embolus (H) 07/06/2018     Priority: Medium     Endometrial cancer (H) 05/21/2018     Priority: Medium     Encounter for long-term (current) use of medications 05/21/2018     Priority: Medium     S/P DEMETRIO-BSO 04/27/2018     Priority: Medium     CKD (chronic kidney disease) stage 2, GFR 60-89 ml/min 03/22/2018     Priority: Medium     Calculus of left kidney 03/22/2018     Priority: Medium     Presence of left hip implant 12/26/2017     Priority: Medium     Primary osteoarthritis of both hips 11/12/2017     Priority: Medium     History of deep venous  thrombosis 11/12/2017     Priority: Medium     Advanced directives, counseling/discussion 05/07/2012     Priority: Medium     Discussed advance care planning with patient; information given to patient to review. 5/16/2012             Past Medical History:    Past Medical History:   Diagnosis Date     Abnormal renal function test 12/04/2017     Advanced directives, counseling/discussion      Antineoplastic chemotherapy induced anemia 7/27/2018     Arthritis      BMI 35.0-35.9,adult 11/12/2017     Degeneration of spine 5/21/2019     DVT of upper extremity (deep vein thrombosis) (H) 03/27/2012     Encounter for antineoplastic chemotherapy 7/6/2018     Encounter for long-term (current) use of medications 5/21/2018     Endometrial cancer (H) 04/2018     Gastroesophageal reflux disease      Hyperlipidemia LDL goal < 160      Hyperlipidemia, unspecified hyperlipidemia type 11/12/2017     Low ferritin 7/19/2018     MEDICAL HISTORY OF - 1997     Microscopic hematuria 3/22/2018     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  7/17/2012     Thyrotoxicosis 2007     Whooping cough due to Bordetella pertussis (p. pertussis) infant       Past Surgical History:    Past Surgical History:   Procedure Laterality Date     ARTHROPLASTY HIP Left 12/4/2017    Procedure: ARTHROPLASTY HIP;  Left total hip arthroplasty;  Surgeon: Rajinder Goodwin MD;  [facility]: [facility]     BREAST BIOPSY, RT/LT  2004    left breast     COLONOSCOPY N/A 8/28/2019    Procedure: COLONOSCOPY WITH BIOPSIES;  Surgeon: Rajinder Henley DO;  [facility]     DILATION AND CURETTAGE, OPERATIVE HYSTEROSCOPY WITH MORCELLATOR, COMBINED N/A 4/11/2018    Procedure: COMBINED DILATION AND CURETTAGE, OPERATIVE HYSTEROSCOPY WITH MORCELLATOR;  Hysteroscopy, Dilation and Curettage Under Ultrasound Guidance ;  Surgeon: Adry Tineo MD;  [facility]     DILATION AND CURETTAGE, WITH ULTRASOUND GUIDANCE  4/11/2018    Procedure: DILATION AND  CURETTAGE, WITH ULTRASOUND GUIDANCE;;  Surgeon: Adry Tineo MD;  [facility]     HYSTERECTOMY TOTAL ABDOMINAL, BILATERAL SALPINGO-OOPHORECTOMY, NODE DISSECTION, COMBINED Bilateral 2018    Procedure: COMBINED HYSTERECTOMY TOTAL ABDOMINAL, SALPINGO-OOPHORECTOMY, NODE DISSECTION;;  Surgeon: Bradley Tierney MD;  [facility]     INSERT PORT VASCULAR ACCESS Right 2018    Procedure: INSERT PORT VASCULAR ACCESS;  Single Lumen Chest Power Port;  Surgeon: Jamila Major PA-C;  [facility]     LAPAROSCOPIC HYSTERECTOMY TOTAL, BILATERAL SALPINGO-OOPHORECTOMY, NODE DISSECTION, COMBINED N/A 2018    Procedure: COMBINED LAPAROSCOPIC HYSTERECTOMY TOTAL, SALPINGO-OOPHORECTOMY, NODE DISSECTION;  Laparoscopic converted to open Removal Of Uterus, Tubes, Ovaries, Cervix, Pelvic and Para Aortic Lymphnode Dissection, Tumor Debulking, CUSA, Partial Omentectomy, Anesthesia Block;  Surgeon: Bradley Tierney MD;  [facility]     TONSILLECTOMY  1960    tonsillectomy       Family History:    Family History   Problem Relation Age of Onset     Diabetes Mother         type 2     Heart Disease Mother      Hypertension Mother      C.A.D. Mother          after 2nd heart attack     Hypertension Father      C.A.D. Father         mild heart attack     Cancer Father         skin cancer     Heart Disease Father      Rheumatoid Arthritis Sister      Heart Disease Brother         herdetary heart murmur     Hyperlipidemia Brother      Clotting Disorder Brother         factor 5 leiden     Deep Vein Thrombosis Brother         leg     Hyperlipidemia Brother      Deep Vein Thrombosis Brother         leg; negative for clotting disorder     Kidney Disease No family hx of        Social History:  Marital Status:   [2]  Social History     Tobacco Use     Smoking status: Never Smoker     Smokeless tobacco: Never Used   Substance Use Topics     Alcohol use: Yes     Comment: rarely     Drug use: No         Medications:    Ascorbic Acid (VITAMIN C) 500 MG CHEW  calcium citrate-vitamin D (CITRACAL) 315-200 MG-UNIT TABS per tablet  GLUCOSAMINE CHONDROITIN COMPLX OR TABS  metoclopramide (REGLAN) 5 MG/5ML solution  multivitamin, therapeutic with minerals (THERA-VIT-M) TABS tablet  ondansetron (ZOFRAN) 4 MG tablet  pantoprazole (PROTONIX) 40 MG EC tablet  RANITIDINE HCL PO  Simethicone (GAS RELIEF PO)  warfarin ANTICOAGULANT (COUMADIN) 5 MG tablet  FERROUS GLUCONATE PO          Review of Systems   Constitutional: Negative for chills and fever.   Gastrointestinal: Positive for abdominal pain, diarrhea, nausea and vomiting. Negative for abdominal distention.   Genitourinary: Negative.    Skin: Negative.    Hematological: Bruises/bleeds easily.       Physical Exam   BP: 115/82  Pulse: 91  Heart Rate: 100  Temp: 97  F (36.1  C)  Resp: 16  SpO2: 97 %      Physical Exam  Vitals signs and nursing note reviewed.   Constitutional:       General: She is not in acute distress.     Appearance: Normal appearance. She is normal weight. She is not ill-appearing, toxic-appearing or diaphoretic.      Comments: Pleasant and talkative 64-year-old female found in no distress.   Cardiovascular:      Comments: Mild tachycardia  Pulmonary:      Effort: Pulmonary effort is normal.      Breath sounds: Normal breath sounds.   Abdominal:      Palpations: Abdomen is soft.      Tenderness: There is abdominal tenderness.   Skin:     Capillary Refill: Capillary refill takes less than 2 seconds.   Neurological:      General: No focal deficit present.      Mental Status: She is alert and oriented to person, place, and time.         ED Course        Procedures               Critical Care time:  none               Results for orders placed or performed during the hospital encounter of 03/27/20 (from the past 24 hour(s))   CBC with platelets differential   Result Value Ref Range    WBC 5.8 4.0 - 11.0 10e9/L    RBC Count 5.43 (H) 3.8 - 5.2 10e12/L     Hemoglobin 15.8 (H) 11.7 - 15.7 g/dL    Hematocrit 48.6 (H) 35.0 - 47.0 %    MCV 90 78 - 100 fl    MCH 29.1 26.5 - 33.0 pg    MCHC 32.5 31.5 - 36.5 g/dL    RDW 14.7 10.0 - 15.0 %    Platelet Count 267 150 - 450 10e9/L    Diff Method Automated Method     % Neutrophils 63.0 %    % Lymphocytes 21.3 %    % Monocytes 14.6 %    % Eosinophils 0.3 %    % Basophils 0.5 %    % Immature Granulocytes 0.3 %    Nucleated RBCs 0 0 /100    Absolute Neutrophil 3.7 1.6 - 8.3 10e9/L    Absolute Lymphocytes 1.2 0.8 - 5.3 10e9/L    Absolute Monocytes 0.9 0.0 - 1.3 10e9/L    Absolute Eosinophils 0.0 0.0 - 0.7 10e9/L    Absolute Basophils 0.0 0.0 - 0.2 10e9/L    Abs Immature Granulocytes 0.0 0 - 0.4 10e9/L    Absolute Nucleated RBC 0.0    Comprehensive metabolic panel   Result Value Ref Range    Sodium 137 133 - 144 mmol/L    Potassium 3.9 3.4 - 5.3 mmol/L    Chloride 103 94 - 109 mmol/L    Carbon Dioxide 28 20 - 32 mmol/L    Anion Gap 6 3 - 14 mmol/L    Glucose 110 (H) 70 - 99 mg/dL    Urea Nitrogen 18 7 - 30 mg/dL    Creatinine 0.96 0.52 - 1.04 mg/dL    GFR Estimate 62 >60 mL/min/[1.73_m2]    GFR Estimate If Black 72 >60 mL/min/[1.73_m2]    Calcium 8.8 8.5 - 10.1 mg/dL    Bilirubin Total 0.7 0.2 - 1.3 mg/dL    Albumin 3.2 (L) 3.4 - 5.0 g/dL    Protein Total 6.6 (L) 6.8 - 8.8 g/dL    Alkaline Phosphatase 98 40 - 150 U/L    ALT 43 0 - 50 U/L    AST 28 0 - 45 U/L   INR   Result Value Ref Range    INR 8.10 (HH) 0.86 - 1.14       Medications   phytonadione (AQUA-MEPHYTON) 5 mg in sodium chloride 0.9 % 50 mL intermittent infusion (has no administration in time range)       Assessments & Plan (with Medical Decision Making)   With the current subjective and objective findings, the patient would fit any reasonable indication for hospitalization.  Discussed with and graciously accepted by Dr. Presley.    This document was prepared using a combination of typing and voice generated software.  While every attempt was made for accuracy, spelling and  grammatical errors may exist.    I have reviewed the nursing notes.    I have reviewed the findings, diagnosis, plan and need for follow up with the patient.       New Prescriptions    No medications on file       Final diagnoses:   Partial bowel obstruction (H)   Metastatic cancer (H)   Supratherapeutic INR       3/27/2020   [facility]     Daphne Gutierrez PA-C  03/27/20 1720       Daphne Gutierrez PA-C  03/27/20 1743

## 2020-03-28 PROBLEM — K56.609 SMALL BOWEL OBSTRUCTION (H): Status: ACTIVE | Noted: 2020-01-01

## 2020-03-28 NOTE — PHARMACY-ANTICOAGULATION SERVICE
Pharmacy Consult-Coumadin Assessment Day #1    Gerri Owens is a 64 year old female admitted on 3/27/2020 with <principal problem not specified>    Primary Indication(s) for Anticoagulation: DVT/PE    Goal INR:2-3    FYI, patient is followed by the Anticoagulation/Protime Clinic at: [facility] [place]      Patient Active Problem List   Diagnosis     Advanced directives, counseling/discussion     Primary osteoarthritis of both hips     History of deep venous thrombosis     Presence of left hip implant     CKD (chronic kidney disease) stage 2, GFR 60-89 ml/min     Calculus of left kidney     S/P DEMETRIO-BSO     Endometrial cancer (H)     Encounter for long-term (current) use of medications     Pulmonary embolus (H)     Osteoarthritis of both hands     Antineoplastic chemotherapy induced anemia     Chronic anticoagulation     Arthritis of hand     Degeneration of spine     Pulmonary nodules     History of colonoscopy     SBO (small bowel obstruction) (H)       Patient previously anticoagulated on Coumadin at a dose of 30 mg/week; dosed as: 5 mg daily except 2.5 mg on on Monday & Friday    Factors that may increase patient's bleeding risk and/or sensitivity to warfarin (Coumadin) include: Recent diarrhea, NPO status on admission/decreased oral intake    Factors that may decrease patient's sensitivity to warfarin (Coumadin) include: none    Dietary Considerations: ADAT today    Anticoagulation Dose History     Recent Dosing and Labs Latest Ref Rng & Units 3/27/2020 3/28/2020    INR 0.86 - 1.14 8.10(HH) 1.70(H)    INR 0.86 - 1.14 Vitamin K 5 mg IV -          Assessment/Plan: INR now subtherapeutic with orders to discharge home. Will have [facility] call patient Monday morning for next INR check-per chart review protime appointments have been delayed due to COVID-19. However due to recent elevated INR, would recommend patient be rechecked next week.    OK to resume home dose of warfarin 5 mg daily except  2.5 mg Monday & Friday.    Thank You for the Consult. Will continue to follow.    Delmar Ramos [facility] ....................  3/28/2020   9:37 AM

## 2020-03-28 NOTE — PLAN OF CARE
[facility] Inpatient Admission Note:    Patient admitted to 3228/3228-1 at approximately 1808 via cart accompanied by transport tech from emergency room . Report received from Christelle in SBAR format at 1752 via telephone. Patient transferred to bed via self.. Patient is alert and oriented X 3, reports pain; rates at 4 on 0-10 scale.  Patient oriented to room, unit, hourly rounding, and plan of care. Explained admission packet and patient handbook with patient bill of rights brochure. Will continue to monitor and document as needed.     Inpatient Nursing criteria listed below was met:    Health care directives status obtained and documented: Yes    Care Everywhere authorization obtained No    MRSA swab completed for patient 65 years and older: N/A    Patient identifies a surrogate decision maker: No      If initial lactic acid >2.0, repeat lactic acid drawn within one hour of arrival to unit: No. If no, state reason: n/a    Vaccination assessment and education completed: Yes   Vaccinations received prior to admission: Pneumovax yes  Influenza(seasonal)  YES       Clergy visit ordered if patient requests: No    Skin issues/needs documented: Yes    Isolation Patient: no Education given, correct sign in place and documentation row added to PCS:  n/a    Fall Prevention Yes: Care plan updated, education given and documented, sticker and magnet in place: Yes    Care Plan initiated: Yes    Education Documented (including assessment): Yes    Patient has discharge needs : No If yes, please explain:         NG placed beside xray and MD confirmed placement. No complaints of pain or nausea.       Face to face report given with opportunity to observe patient.    Report given to Yris BERG and Barb West RN   3/27/2020  11:36 PM

## 2020-03-28 NOTE — CONSULTS
"Surgery Consult Note      RE: Gerri Owens  : 1955    Chief Complaint:  SBO    History of Present Illness:  Mrs. Owens is a very pleasant 64 year old female who I am seeing at the request of Dr. Presley for evaluation of small bowel obstruction in the setting of endometrial carcinomatosis and to make further recommendations.  She underwent DEMETRIO-BSO and lymphadenectomy in April in .  She was unable to complete chemotherapy secondary to anaphylaxis.  She did get radiation therapy.  She's known to me from having performed a colonoscopy last year. They have long suspected that she had recurrence in the mesentery and she's also been on surveillance for a lung nodule.  They did offer to enroll her in a study which she refused in November.  She was getting this CT chest/abdomen/pelvis in surveillance of her cancer when it was reported out as a small bowel obstruction and she was directed to present to the ER.  Over the last month she's had increased fullness, early satiety, burping, nausea/vomiting and abdominal pain. The pain usually proceeds the vomiting and then goes away after she vomiting yellow looking bile.  This is associated with diarrhea.  She's passing flatus, feels much better this morning.  Had NG placed.  She's scheduled for a telemedicine appointment with her [facility] oncologist a week from Monday to discuss the results of the CT. Her first words to me this morning were, \"I don't think I need to be here\". Denies any vomiting blood or seeing blood in her stools.  She's anticoagulated for history of DVT and PE and thus is not a candidate for hormone therapy as well.      Medical history:  Past Medical History:   Diagnosis Date     Abnormal renal function test 2017    due to NSAIDS, normal after stopping taking them     Advanced directives, counseling/discussion      Antineoplastic chemotherapy induced anemia 2018     Arthritis      BMI 35.0-35.9,adult 2017     Degeneration of spine " 5/21/2019     DVT of upper extremity (deep vein thrombosis) (H) 03/27/2012    clotting disorder workup negative     Encounter for antineoplastic chemotherapy 7/6/2018     Encounter for long-term (current) use of medications 5/21/2018     Endometrial cancer (H) 04/2018     Gastroesophageal reflux disease      Hyperlipidemia LDL goal < 160      Hyperlipidemia, unspecified hyperlipidemia type 11/12/2017     Low ferritin 7/19/2018     MEDICAL HISTORY OF - 1997    left breast density     Microscopic hematuria 3/22/2018     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  7/17/2012     Thyrotoxicosis 2007    hyperthyroid, enlarged right thyroid on thyroid uptake, treate by [facility]in [facility] with tapozole till 12/2008, FNA neg of 1.9 cm right lobe dominat nodule     Whooping cough due to Bordetella pertussis (p. pertussis) infant    whooping cough       Surgical history:  Past Surgical History:   Procedure Laterality Date     ARTHROPLASTY HIP Left 12/4/2017    Procedure: ARTHROPLASTY HIP;  Left total hip arthroplasty;  Surgeon: Rajinder Goodwin MD;  Location: [facility] OR     BREAST BIOPSY, RT/LT  2004    left breast     COLONOSCOPY N/A 8/28/2019    Procedure: COLONOSCOPY WITH BIOPSIES;  Surgeon: Rajinder Henley DO;  Location: [facility] OR     DILATION AND CURETTAGE, OPERATIVE HYSTEROSCOPY WITH MORCELLATOR, COMBINED N/A 4/11/2018    Procedure: COMBINED DILATION AND CURETTAGE, OPERATIVE HYSTEROSCOPY WITH MORCELLATOR;  Hysteroscopy, Dilation and Curettage Under Ultrasound Guidance ;  Surgeon: Adry Tineo MD;  Location: [facility] OR     DILATION AND CURETTAGE, WITH ULTRASOUND GUIDANCE  4/11/2018    Procedure: DILATION AND CURETTAGE, WITH ULTRASOUND GUIDANCE;;  Surgeon: Adry Tineo MD;  Location: [facility] OR     HYSTERECTOMY TOTAL ABDOMINAL, BILATERAL SALPINGO-OOPHORECTOMY, NODE DISSECTION, COMBINED Bilateral 4/27/2018    Procedure: COMBINED HYSTERECTOMY TOTAL ABDOMINAL, SALPINGO-OOPHORECTOMY, NODE  DISSECTION;;  Surgeon: Bradley Tierney MD;  Location: [facility] OR     INSERT PORT VASCULAR ACCESS Right 2018    Procedure: INSERT PORT VASCULAR ACCESS;  Single Lumen Chest Power Port;  Surgeon: Jamila Major PA-C;  Location: [facility]     LAPAROSCOPIC HYSTERECTOMY TOTAL, BILATERAL SALPINGO-OOPHORECTOMY, NODE DISSECTION, COMBINED N/A 2018    Procedure: COMBINED LAPAROSCOPIC HYSTERECTOMY TOTAL, SALPINGO-OOPHORECTOMY, NODE DISSECTION;  Laparoscopic converted to open Removal Of Uterus, Tubes, Ovaries, Cervix, Pelvic and Para Aortic Lymphnode Dissection, Tumor Debulking, CUSA, Partial Omentectomy, Anesthesia Block;  Surgeon: Bradley Tierney MD;  Location: [facility] OR     TONSILLECTOMY  1960    tonsillectomy       Family history:  Family History   Problem Relation Age of Onset     Diabetes Mother         type 2     Heart Disease Mother      Hypertension Mother      C.A.D. Mother          after 2nd heart attack     Hypertension Father      C.A.D. Father         mild heart attack     Cancer Father         skin cancer     Heart Disease Father      Rheumatoid Arthritis Sister      Heart Disease Brother         herdetary heart murmur     Hyperlipidemia Brother      Clotting Disorder Brother         factor 5 leiden     Deep Vein Thrombosis Brother         leg     Hyperlipidemia Brother      Deep Vein Thrombosis Brother         leg; negative for clotting disorder     Kidney Disease No family hx of        Medications:  Prior to Admission medications    Medication Sig Start Date End Date Taking? Authorizing Provider   Ascorbic Acid (VITAMIN C) 500 MG CHEW Take 1 tablet by mouth daily    Yes Reported, Patient   calcium citrate-vitamin D (CITRACAL) 315-200 MG-UNIT TABS per tablet Take 2 tablets by mouth daily   Yes Reported, Patient   GLUCOSAMINE CHONDROITIN COMPLX OR TABS Take  by mouth. 1500 mg 1xqd.  10/23/07  Yes Spatz, Lisa, MD   metoclopramide (REGLAN) 5 MG/5ML solution Take 5 mg by mouth 4 times daily  (before meals and nightly)  10/15/19  Yes Reported, Patient   multivitamin, therapeutic with minerals (THERA-VIT-M) TABS tablet Take 1 tablet by mouth daily   Yes Reported, Patient   ondansetron (ZOFRAN) 4 MG tablet Take 1 tablet (4 mg) by mouth every 8 hours as needed for nausea 11/13/19  Yes Katty Garcia NP   pantoprazole (PROTONIX) 40 MG EC tablet Take 1 tablet (40 mg) by mouth daily 9/13/19  Yes Katty Garcia NP   RANITIDINE HCL PO Take 150 mg by mouth 2 times daily    Yes Reported, Patient   Simethicone (GAS RELIEF PO) Take by mouth as needed   Yes Reported, Patient   warfarin ANTICOAGULANT (COUMADIN) 5 MG tablet Take 2.5 mg (0.5 tablet) every Mon, Fri and 5 mg (1 tablet) all other days or as directed by the Coumadin Clinic 1/14/20  Yes Katty Garcia NP   FERROUS GLUCONATE PO Take 325 mg by mouth once a week     Reported, Patient       Allergies:  The patientis allergic to paclitaxel and nickel.  .  Social history:  Social History     Tobacco Use     Smoking status: Never Smoker     Smokeless tobacco: Never Used   Substance Use Topics     Alcohol use: Yes     Comment: rarely     Marital status: .  Review of Systems:    Constitutional: Per HPI  HENT: Negative for ear pain, nosebleeds, congestion, sore throat, tinnitus and ear discharge.    Eyes: Negative for blurred vision, double vision, photophobia and pain.   Respiratory: Negative for cough, hemoptysis, shortness of breath, wheezing and stridor.    Cardiovascular: Negative for chest pain, palpitations and orthopnea.   Gastrointestinal: Per HPI  Genitourinary: Negative for urgency, frequency and hematuria.   Musculoskeletal: Negative for myalgias, back pain and joint pain.   Neurological: Negative for tingling, speech change and headaches.   Endo/Heme/Allergies: Does not bruise/bleed easily.   Psychiatric/Behavioral: Negative for depression, suicidal ideas and hallucinations. The patient is not nervous/anxious.    Physical Examination:  BP  122/72   Pulse 83   Temp 97.9  F (36.6  C) (Tympanic)   Resp 18   LMP 03/04/2005   SpO2 98%   General: AAOx4, NAD, WN/WD, non toxic looking, 300 mL of clear fluid in NGT canister  HEENT:NCAT, EOMI, PERRL Sclerae anicteric; Trachea mideline, no JVD  Abdomen: Flat, soft, ND/NT no rebound, no guarding, well healed low midline incision  Extremities: Cursory exam unremarkable.  Skin: Warm, dry, < 2 sec cap refill  Neuro: CN 2-12 grossly intact, no focal deficit, GCS 15  Psych: happy, calm, asks appropriate questions    Results for orders placed or performed during the hospital encounter of 03/27/20 (from the past 24 hour(s))   CBC with platelets differential   Result Value Ref Range    WBC 5.8 4.0 - 11.0 10e9/L    RBC Count 5.43 (H) 3.8 - 5.2 10e12/L    Hemoglobin 15.8 (H) 11.7 - 15.7 g/dL    Hematocrit 48.6 (H) 35.0 - 47.0 %    MCV 90 78 - 100 fl    MCH 29.1 26.5 - 33.0 pg    MCHC 32.5 31.5 - 36.5 g/dL    RDW 14.7 10.0 - 15.0 %    Platelet Count 267 150 - 450 10e9/L    Diff Method Automated Method     % Neutrophils 63.0 %    % Lymphocytes 21.3 %    % Monocytes 14.6 %    % Eosinophils 0.3 %    % Basophils 0.5 %    % Immature Granulocytes 0.3 %    Nucleated RBCs 0 0 /100    Absolute Neutrophil 3.7 1.6 - 8.3 10e9/L    Absolute Lymphocytes 1.2 0.8 - 5.3 10e9/L    Absolute Monocytes 0.9 0.0 - 1.3 10e9/L    Absolute Eosinophils 0.0 0.0 - 0.7 10e9/L    Absolute Basophils 0.0 0.0 - 0.2 10e9/L    Abs Immature Granulocytes 0.0 0 - 0.4 10e9/L    Absolute Nucleated RBC 0.0    Comprehensive metabolic panel   Result Value Ref Range    Sodium 137 133 - 144 mmol/L    Potassium 3.9 3.4 - 5.3 mmol/L    Chloride 103 94 - 109 mmol/L    Carbon Dioxide 28 20 - 32 mmol/L    Anion Gap 6 3 - 14 mmol/L    Glucose 110 (H) 70 - 99 mg/dL    Urea Nitrogen 18 7 - 30 mg/dL    Creatinine 0.96 0.52 - 1.04 mg/dL    GFR Estimate 62 >60 mL/min/[1.73_m2]    GFR Estimate If Black 72 >60 mL/min/[1.73_m2]    Calcium 8.8 8.5 - 10.1 mg/dL    Bilirubin Total  0.7 0.2 - 1.3 mg/dL    Albumin 3.2 (L) 3.4 - 5.0 g/dL    Protein Total 6.6 (L) 6.8 - 8.8 g/dL    Alkaline Phosphatase 98 40 - 150 U/L    ALT 43 0 - 50 U/L    AST 28 0 - 45 U/L   INR   Result Value Ref Range    INR 8.10 (HH) 0.86 - 1.14   XR Abdomen Port 1 View    Narrative    XR ABDOMEN PORT 1 VW    HISTORY: 64 years Female NG placement    COMPARISON: CT same day    TECHNIQUE: Single V abdomen    FINDINGS: A nasogastric tube is been placed. The tip and sidehole are  below the level of the diaphragms. There is moderate distention of  loops of small bowel.    A port infusion catheter is present.      Impression    IMPRESSION: Nasogastric tube position within the stomach.    DAVE SPEARS MD   UA reflex to Microscopic and Culture    Specimen: Urine Void; Midstream Urine   Result Value Ref Range    Color Urine Yellow     Appearance Urine Clear     Glucose Urine Negative NEG^Negative mg/dL    Bilirubin Urine Negative NEG^Negative    Ketones Urine 40 (A) NEG^Negative mg/dL    Specific Gravity Urine 1.020 1.003 - 1.035    Blood Urine Negative NEG^Negative    pH Urine 5.5 4.7 - 8.0 pH    Protein Albumin Urine 10 (A) NEG^Negative mg/dL    Urobilinogen mg/dL Normal 0.0 - 2.0 mg/dL    Nitrite Urine Negative NEG^Negative    Leukocyte Esterase Urine Trace (A) NEG^Negative    Source Midstream Urine     RBC Urine 2 0 - 2 /HPF    WBC Urine 5 0 - 5 /HPF    Bacteria Urine None (A) NEG^Negative /HPF    Mucous Urine Present (A) NEG^Negative /LPF   Comprehensive metabolic panel   Result Value Ref Range    Sodium 140 133 - 144 mmol/L    Potassium 3.5 3.4 - 5.3 mmol/L    Chloride 110 (H) 94 - 109 mmol/L    Carbon Dioxide 25 20 - 32 mmol/L    Anion Gap 5 3 - 14 mmol/L    Glucose 78 70 - 99 mg/dL    Urea Nitrogen 14 7 - 30 mg/dL    Creatinine 0.91 0.52 - 1.04 mg/dL    GFR Estimate 67 >60 mL/min/[1.73_m2]    GFR Estimate If Black 77 >60 mL/min/[1.73_m2]    Calcium 7.9 (L) 8.5 - 10.1 mg/dL    Bilirubin Total 0.8 0.2 - 1.3 mg/dL    Albumin  2.3 (L) 3.4 - 5.0 g/dL    Protein Total 4.8 (L) 6.8 - 8.8 g/dL    Alkaline Phosphatase 69 40 - 150 U/L    ALT 34 0 - 50 U/L    AST 19 0 - 45 U/L   CBC with platelets   Result Value Ref Range    WBC 2.5 (L) 4.0 - 11.0 10e9/L    RBC Count 4.39 3.8 - 5.2 10e12/L    Hemoglobin 12.7 11.7 - 15.7 g/dL    Hematocrit 39.7 35.0 - 47.0 %    MCV 90 78 - 100 fl    MCH 28.9 26.5 - 33.0 pg    MCHC 32.0 31.5 - 36.5 g/dL    RDW 14.8 10.0 - 15.0 %    Platelet Count 173 150 - 450 10e9/L   Lactic acid whole blood   Result Value Ref Range    Lactic Acid 0.4 (L) 0.7 - 2.0 mmol/L   INR   Result Value Ref Range    INR 1.70 (H) 0.86 - 1.14       Assessment/Plan:  #1 metastatic endometrial cancer  #2 Carcinomatosis  #3 Partial small bowel obstruction  #4 Chronic anticoagulation    Thank you for the consult.  Radiation enteritis is not likely as the radiation therapy was relatively recent, this usually takes decades to occur.  This is either adhesive partial small bowel obstruction or more likely tumor infiltration of the small bowel.  There's no sign of threatened bowel with non bilious return of the NG.  I've spoken to Dr. Freitas and recommended pulling the NG and advancing her diet.  If she can hydrate herself and tolerate a soft diet, then I would discharge her.  I've recommended that she consider the experimental chemotherapy as that's the only real good hope for her in the setting of carcinomatosis. There's no indication for surgery at this point.  I will sign off at this point, please don't hesitate to call with any surgical questions or concerns.     Dr. Kale DO, [facility]  [address], [address]  [place]    [address]    Referring Provider:  No referring provider defined for this encounter.     Primary Care Provider:  Katty Garcia

## 2020-03-28 NOTE — PLAN OF CARE
Patient VSS, IV removed, discharge instructions completed.    Patient discharged at 4:15 PM via wheel chair accompanied by other:self  and staff. Prescriptions sent to patients preferred pharmacy. All belongings sent with patient.     Discharge instructions reviewed with patient. Listed belongings gathered and returned to patient. Yes    Patient discharged to none.   Report called to none    Core Measures and Vaccines  Core Measures applicable during stay: No. If yes, state diagnosis:  NA  Pneumonia and Influenza given prior to discharge, if indicated: N/A    Surgical Patient   Surgical Procedures during stay: None  Did patient receive discharge instruction on wound care and recognition of infection symptoms? N/A    MISC  Follow up appointment made:  No (weekend, instructed to call on Monday)  Home and hospital aquired medications returned to patient: Yes  Patient reports pain was well managed at discharge: Yes

## 2020-03-28 NOTE — PLAN OF CARE
"Patient has denied pain overall besides the discomfort she's been experiencing for the last month. She describes her discomfort as \"bubbles and gas pain\" in her abdomen. Patient has declined offer for pain medications. VSS, afebrile. NG discontinued this morning. Bowel sounds hypoactive. She has c/o intermittent nausea but no more than what she has been experiencing. Patient did request to have proactively. Zofran before she tried to eat a soft diet for lunch. Tolerated soft diet. Oral intake adequate. Urine output adequate. She did have soft to loose tiara colored medium BM. Up independently. IVF continues.    Face to face report given with opportunity to observe patient.    Report given to Beulah BERG.    Irasema Leung RN   3/28/2020  3:32 PM      "

## 2020-03-28 NOTE — PLAN OF CARE
Patient remains A&O, VSS. Bowel sounds hyperactive x4. Patient initially denies pain. Abdomen is soft and nontender. Patient denies nausea/vomiting on this shift and reports that she is starting to feel better. NS continues to run at 100 mL/hr. L/S clear. NG tube set to intermittent suction with approximately 200 mL green output. At approximately 0600, patient reports abdominal discomfort but states it is relieved by sitting up high in bed with knees bent towards chest. Patient was offered PRN medication and extra pillows to reposition. Patient pleasantly denied reporting that she could manage the discomfort with independent repositioning.     Face to face given to: OTIS Villalpando RN

## 2020-03-28 NOTE — DISCHARGE SUMMARY
[facility]    Discharge Summary  Hospitalist    Date of Admission:  3/27/2020  Date of Discharge:  3/28/2020  Discharging Provider: Lee Moulton  Date of Service (when I saw the patient): 03/28/20    Discharge Diagnoses   Subacute small bowel obstruction, adhesions vs endometrial carcinomatosis  Metastatic endometrial malignancy with probable pulmonary metastasis  Induced coagulopathy  Warfarin anticoagulation, prior DVT, PE 2018    History of Present Illness   Gerri Owens is a 64 year old woman diagnosed with metastatic endometrial cancer approximately two years ago. Treatment included DEMETRIO-BSO and lymphadenectomy in April in 2018.  She was unable to complete chemotherapy because of anaphylaxis.  She did get radiation therapy.  Colonoscopy last year. For some time there have been concerns of recurrence in the mesentery; ongoing surveillance for a lung nodule which appears to be increasing in size.  She was offered study treatment enrollment which she declined in November.  A planned CT chest/abdomen/pelvis in surveillance of her cancer was performed yesterday with most prominent findings of small bowel obstruction; she was instructed to present to our emergency department.  She notes that for the past 4 weeks or so she has noted increased fullness, early satiety, burping, with less frequent vomiting, and abdominal pain.  Her pain usually proceeds the vomiting and then goes away after she has emesis of yellow looking bile.  This is associated with diarrhea.bowel movements have been irregular but she rarely goes more than several days without variable loose or formed stool.      Hospital Course   Gerri Owens was admitted on 3/27/2020.  The following problems were addressed during her hospitalization:    1.  Small bowel obstruction  No evident change in her bowel function recently and overall she is been able to manage what is likely to be slowly progressive small bowel obstruction over the  past several months.  Gastric tube was placed but small volume of output.  She is been able to tolerate removing this and advancing her diet at least to a soft diet.  Discussed with her importance of continuing fluid intake as well as continuing with been essentially a soft diet.  Discussed options of dietary supplements perhaps and smaller volumes that she is tried simply as a nutritionally dense intake.  Unfortunately the source of her obstruction is likely endometrial small bowel  carcinomatosis although this could represent adhesions.  Agree radiation enteritis is less likely.  She already has a follow-up planned with Dr. Tierney in approximately 10 days.  Placed referral in medical record in the hopes that a consultation either face-to-face or by telephone could be arranged before then.  She is quite agreeable with the plan and in fact is anxious to leave the hospital feeling fit enough to be able to manage at home.  2.  Induced coagulopathy  She has had some difficulties with her INR management because of variable oral intake.  INR approximately 8 on her evaluation last night decreasing rapidly to 1.7 after only 5 mg of phytonadione.  I would question whether her initial measurement may have been artifactually increased although this is certainly not certain.  She does carry a history of a pulmonary embolism most recently in 2018.  Almost certainly, however, any recurrent thrombus is endothelialized and rapid resumption of therapeutic range of INR of 2-3 is not mandated.  Will resume her usual dosing regimen with planned follow-up at our INR clinic in 2 days on Monday.  Although face-to-face clinic visits are being minimized I would hope at a minimum arrangements can be made for laboratory monitoring.    Lee Moulton MD    Significant Results and Procedures   CT imaging; right upper lobe lung nodule.  Right lower lobe pleural thickening.    Code Status   Full Code       Primary Care Physician   Katty  "NATACHA Garcia    Vital signs:  Temp: 98.5  F (36.9  C) Temp src: Tympanic BP: 101/57 Pulse: 83 Heart Rate: 71 Resp: 18 SpO2: 97 % O2 Device: None (Room air)        Estimated body mass index is 23.63 kg/m  as calculated from the following:    Height as of 11/13/19: 1.651 m (5' 5\").    Weight as of 11/13/19: 64.4 kg (142 lb).        Awake, alert, pleasant woman lying in bed on medical wards.  Speech is clear.  HEENT: Pupils equal, conjugate. No icterus or nystagmus. Oral mucosa moist. No facial asymmetry.   Neck: Supple, jugular veins not elevated. Trachea midline   Chest: No chest wall movement asymmetry. Aeration preserved to bases. Accessory muscles not in use. Expiratory time not increased. No tidal wheezes. No rhonchi. No discrete crackles.   Cardiac: PMI not displaced. S1, S2 unremarkable. No S3, S4. P2 not accentuated. No murmurs. Carotid upstroke preserved. Carotid amplitude preserved.   Abdomen: Soft. No palpation or percussion tenderness. No distention. Normoactive bowel sounds. Liver and spleen not increased in size.  Well-healed midline lower abdominal scar.  No bruits, masses, or pulsations.   Extremities: No lower extremity edema.  Warm distally.  No eccymoses, clubbing.   Neurologic: Mental state above. Motor 5/5 and bilaterally equal. Tone preserved. No fasiculations or tremors.     Discharge Disposition   Discharged to home  Condition at discharge: Stable    Consultations This Hospital Stay   SURGERY GENERAL IP CONSULT    Time Spent on this Encounter   I, Lee Moulton MD, personally saw the patient today and spent greater than 30 minutes discharging this patient.    Discharge Orders      Oncology/Hematology Adult Referral      Reason for your hospital stay    Gerri Owens is a 64 year old woman diagnosed with metastatic uterine cancer approximately two years ago. She underwent a hysterectomy, was intolerant of chemotherapy and completed radiation treatments. She has a history of thromboembolic " teddel treated with warfarin.  She presented to emergency department after planned surveillance CT imaging showed bowel obstruction, given her history concerning for carcinomatosis.  Symptoms in fact including eructation, stomach gurgling and intermittent nausea have in fact been present for the past month.  She attributed her symptoms to gastroparesis.  She has had some nausea/emesis but has been able to tolerate at least a limited amount of oral intake of fluid and some soft diet.  Evaluated here by general surgery who agreed that if her symptoms can be controlled, continuing her conservative management at home and reevaluation by gynecologic oncology would be most reasonable.  Telephone consult in fact planned in the near future referral placed in hopes a more rapid consultation  may be possible.     Follow-up and recommended labs and tests     Gynecologic oncology follow-up when possible  Follow up with primary care provider, Katty Garcia, within 1-2 weeks for follow-up.  [facility] follow-up Monday and as needed.     Activity    Your activity upon discharge: activity as tolerated     Full Code    On admission     Diet    Follow this diet upon discharge: Orders Placed This Encounter      Low Fiber Diet; consider dietary supplements such as Boost or Ensure if tolerated     Discharge Medications   Current Discharge Medication List      CONTINUE these medications which have CHANGED    Details   warfarin ANTICOAGULANT (COUMADIN) 5 MG tablet Take 2.5 mg (0.5 tablet) every Mon, Fri and 5 mg (1 tablet) all other days or as directed by the Coumadin Clinic  Qty: 100 tablet, Refills: 1    Associated Diagnoses: Other acute pulmonary embolism without acute cor pulmonale (H); History of deep venous thrombosis         CONTINUE these medications which have NOT CHANGED    Details   Ascorbic Acid (VITAMIN C) 500 MG CHEW Take 1 tablet by mouth daily       calcium citrate-vitamin D (CITRACAL) 315-200 MG-UNIT  TABS per tablet Take 2 tablets by mouth daily      GLUCOSAMINE CHONDROITIN COMPLX OR TABS Take  by mouth. 1500 mg 1xqd.   Refills: 0    Associated Diagnoses: Routine general medical examination at a health care facility      metoclopramide (REGLAN) 5 MG/5ML solution Take 5 mg by mouth 4 times daily (before meals and nightly)       multivitamin, therapeutic with minerals (THERA-VIT-M) TABS tablet Take 1 tablet by mouth daily      ondansetron (ZOFRAN) 4 MG tablet Take 1 tablet (4 mg) by mouth every 8 hours as needed for nausea  Qty: 180 tablet, Refills: 1    Associated Diagnoses: Nausea      pantoprazole (PROTONIX) 40 MG EC tablet Take 1 tablet (40 mg) by mouth daily    Comments: Ordered by GI      RANITIDINE HCL PO Take 150 mg by mouth 2 times daily       Simethicone (GAS RELIEF PO) Take by mouth as needed      FERROUS GLUCONATE PO Take 325 mg by mouth once a week            Allergies   Allergies   Allergen Reactions     Paclitaxel Anaphylaxis     reportedTaxol= anaphylaxsis     Nickel Rash     Data   Most Recent 3 CBC's:  Recent Labs   Lab Test 03/28/20  0508 03/27/20  1708 09/23/19  1350   WBC 2.5* 5.8 4.2   HGB 12.7 15.8* 14.7   MCV 90 90 94    267 170      Most Recent 3 BMP's:  Recent Labs   Lab Test 03/28/20  0508 03/27/20  1708 09/23/19  1350    137 143   POTASSIUM 3.5 3.9 3.9   CHLORIDE 110* 103 109   CO2 25 28 29   BUN 14 18 13   CR 0.91 0.96 1.02   ANIONGAP 5 6 5   LAURENT 7.9* 8.8 9.2   GLC 78 110* 96     Most Recent 2 LFT's:  Recent Labs   Lab Test 03/28/20  0508 03/27/20  1708   AST 19 28   ALT 34 43   ALKPHOS 69 98   BILITOTAL 0.8 0.7     Most Recent INR's and Anticoagulation Dosing History:  Anticoagulation Dose History     Recent Dosing and Labs Latest Ref Rng & Units 10/14/2019 11/4/2019 12/9/2019 1/20/2020 3/2/2020 3/27/2020 3/28/2020    INR 0.86 - 1.14 - - - - - 8.10(HH) 1.70(H)    INR 0.86 - 1.14 2.5(A) 2.5(A) 2.1(A) 2.2(A) 3.4(A) - -        Most Recent 3 Troponin's:No lab results  found.  Most Recent Cholesterol Panel:  Recent Labs   Lab Test 07/19/18  0930   CHOL 173   *   HDL 51   TRIG 89     Most Recent 6 Bacteria Isolates From Any Culture (See EPIC Reports for Culture Details):  Recent Labs   Lab Test 09/23/19  1347 09/13/19  1415 08/15/19  0850 07/19/19  1616 08/07/18  0935 04/09/18  0845   CULT >100,000 colonies/mL  Mixed bacterial attila  No further identification or sensitivity done  * 50,000 to 100,000 colonies/mL  mixed urogenital attila  No further identification or sensitivity done   No Salmonella, Shigella, Campylobacter, E. coli O157, Aeromonas, or Plesiomonas isolated.  No Yersinia enterocolitica isolated >100,000 colonies/mL  Mixed bacterial attila  No further identification or sensitivity done  * >100,000 colonies/mL  Escherichia coli  *  <10,000 colonies/mL  mixed urogenital attila  Susceptibility testing not routinely done   50,000 to 100,000 colonies/mL  Escherichia coli  *  <10,000 colonies/mL  mixed urogenital attila  Susceptibility testing not routinely done       Most Recent TSH, T4 and A1c Labs:  Recent Labs   Lab Test 06/06/18  1103   TSH 1.58     Results for orders placed or performed during the hospital encounter of 03/27/20   XR Abdomen Port 1 View    Narrative    XR ABDOMEN PORT 1 VW    HISTORY: 64 years Female NG placement    COMPARISON: CT same day    TECHNIQUE: Single V abdomen    FINDINGS: A nasogastric tube is been placed. The tip and sidehole are  below the level of the diaphragms. There is moderate distention of  loops of small bowel.    A port infusion catheter is present.      Impression    IMPRESSION: Nasogastric tube position within the stomach.    DAVE SPEARS MD

## 2020-03-28 NOTE — PROGRESS NOTES
Medical record and Yoni Score reviewed. Participated in interdisciplinary rounds.  No apparent needs noted at this time. Care Transitions will remain available if needs arise.

## 2020-04-02 NOTE — PROGRESS NOTES
ANTICOAGULATION FOLLOW-UP CLINIC VISIT    Patient Name:  Gerri Owens  Date:  2020  Contact Type:  Telephone    SUBJECTIVE:  Patient Findings     Comments:   INR done by lab. Noted is that patient got vit K 5mg 6 days ago when admitted to hospital. Call placed to patient and spoke to her re: INR result, warfarin dosing/INR recheck date. She states she has not vomited in 1 week but still having some diarrhea. No bleeding/bruising. No new changes in meds/activity. We discussed warfarin dosing/INR recheck date. She verbalized understanding and has no questions. She will call warfarin clinic if any issues.         Clinical Outcomes     Negatives:   Major bleeding event, Thromboembolic event, Anticoagulation-related hospital admission, Anticoagulation-related ED visit, Anticoagulation-related fatality    Comments:   INR done by lab. Noted is that patient got vit K 5mg 6 days ago when admitted to hospital. Call placed to patient and spoke to her re: INR result, warfarin dosing/INR recheck date. She states she has not vomited in 1 week but still having some diarrhea. No bleeding/bruising. No new changes in meds/activity. We discussed warfarin dosing/INR recheck date. She verbalized understanding and has no questions. She will call warfarin clinic if any issues.            OBJECTIVE    INR Point of Care   Date Value Ref [facility] Status   2020 1.5 (H) 0.86 - 1.14 Final     Comment:     This test is intended for monitoring Coumadin therapy.  Results are not   accurate in patients with prolonged INR due to factor deficiency.         ASSESSMENT / PLAN  INR assessment SUB got vit K 5mg when in hospital 6 days ago   Recheck INR In: 1 WEEK    INR Location Clinic      Anticoagulation Summary  As of 2020    INR goal:   2.0-3.0   TTR:   60.8 % (1 y)   INR used for dosin.5! (2020)   Warfarin maintenance plan:   2.5 mg (5 mg x 0.5) every Mon, Fri; 5 mg (5 mg x 1) all other days   Full warfarin instructions:    4/2: 7.5 mg; 4/3: 5 mg; Otherwise 2.5 mg every Mon, Fri; 5 mg all other days   Weekly warfarin total:   30 mg   Plan last modified:   Tena Castillo RN (10/14/2019)   Next INR check:   4/9/2020   Priority:   High   Target end date:       Indications    DVT of upper extremity (deep vein thrombosis) (H) (Resolved) [I82.629]  Pulmonary embolus (H) [I26.99]             Anticoagulation Episode Summary     INR check location:       Preferred lab:       Send INR reminders to:   ARIANNA KIMBLE    Comments:   MTV 30 mcg.  DVT subclavian vein 2012. PE 7/2018. Brother positive Factor V Leiden. Another brother on lifetime AC. 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   Endoscopy 4/27/20, bridge 1mg/kg q12h per TALI Garcia      Anticoagulation Care Providers     Provider Role Specialty Phone number    Karo Doty MD [facility] [phone]            See the Encounter Report to view Anticoagulation Flowsheet and Dosing Calendar (Go to Encounters tab in chart review, and find the Anticoagulation Therapy Visit)        Tena Castillo, RN

## 2020-04-05 NOTE — PROGRESS NOTES
"Subjective     Gerri Owens is a 64 year old female who is being evaluated via a billable telephone visit.      The patient has been notified of following:     \"This telephone visit will be conducted via a call between you and your physician/provider. We have found that certain health care needs can be provided without the need for a physical exam.  This service lets us provide the care you need with a short phone conversation.  If a prescription is necessary we can send it directly to your pharmacy.  If lab work is needed we can place an order for that and you can then stop by our lab to have the test done at a later time.    If during the course of the call the physician/provider feels a telephone visit is not appropriate, you will not be charged for this service.\"     Patient has given verbal consent for Telephone visit?  Yes    Gerri Owens complains of   Chief Complaint   Patient presents with     Telephone     Endometrial cancer        ALLERGIES  Paclitaxel and Nickel    I have reviewed and updated the patient's allergies and medication list.    Concerns:  Pt was recently hospitalized a week ago, for a suspected bowel obstruction. NG tube was placed. Small Bowel Obstruction. Has diarrhea and abdominal pain, which is being treated.    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    Oncology History:  18- OBGYN visit with Dr. Tineo.  Complains of 1.5 years of post-menopa                         ENDOMETRIAL CURETTINGS: usal bleeding.                           EMB performed and showed      18- EUA, hysteroscopy D&C under ultrasound guidance.  Diffuse proliferative vascular endometrium " noted.                         Pathology:                          FINAL DIAGNOSIS:                          - Endometrial endometrioid adenocarcinoma, FIGO grade 2      4/27/18 DEMETRIO/BSO and staging:  PATH:  A. ANTERIOR FUNDUS, BIOPSY:   - Positive for adenocarcinoma     B. UTERUS, CERVIX, BILATERAL TUBES AND OVARIES, HYSTERECTOMY WITH   BILATERAL SALPINGO-OOPHORECTOMY:   - Endometrial endometrioid adenocarcinoma, FIGO grade 1   - Adenomyosis   - Cervix invaded by endometrial adenocarcinoma   - Right ovarian surface adhesions involved by endometrial adenocarcinoma   - Right fallopian tube with endometriosis   - Left adnexa invaded by endometrial adenocarcinoma (5.5 cm)     C. LYMPH NODES, LEFT PELVIC, EXCISION:   - Metastatic adenocarcinoma to one of eight lymph nodes (1/8)   - The metastatic focus is 3 mm in greatest dimension with no extranodal   extension     D. LYMPH NODES, LEFT PARA-AORTIC, EXCISION:   - One reactive lymph node, negative for malignancy (0/1)     E. LYMPH NODES, RIGHT PELVIC, EXCISION:   - Metastatic adenocarcinoma to one of six lymph nodes (1/6)   - The metastatic focus is 21 mm in greatest dimension with positive   extranodal extension     F. LYMPH NODES, RIGHT PARA-AORTIC, EXCISION:   - Three reactive lymph nodes, negative for malignancy (0/3)     G. OMENTUM, RESECTION:   - Omental adipose tissue with focal inflammation and surface mesothelial   hyperplasia   - Negative for malignancy       TUMOR     Tumor Site:         - Endometrium         - Lower uterine segment     Histologic Type:         - Endometrioid carcinoma, NOS     Histologic Grade:         - FIGO grade 1     Tumor Size: 5.5 Centimeters (cm)     Tumor Extent       Myometrial Invasion:           - Present         Depth of Invasion: 15 Millimeters (mm)         Myometrial Thickness: 15 Millimeters (mm)         Percentage of Myometrial Invasion: 100%       Adenomyosis:           - Present, involved by carcinoma       Uterine Serosa  Involvement:           - Present       Lower Uterine Segment Involvement:           - Present         Level of Tumor Involvement:             - Myoinvasive       Cervical Stromal Involvement:           - Present       Other Tissue / Organ Involvement:           - Right ovary           - Left ovary           - Left fallopian tube       Peritoneal Ascitic Fluid:           - Negative for malignancy (normal / benign)     Accessory Findings       Lymphovascular Invasion:           - Present     PATHOLOGIC STAGE CLASSIFICATION (PTNM, AJCC 8TH EDITION)     Primary Tumor (pT):         - pT3a     Regional Lymph Nodes (pN)       Category (pN):           - pN1a     FIGO STAGE     FIGO Stage:         - IIIC1      TREATMENT 1     6/13/18: C1D1 carboplatin/paclitaxel- anaphylactic reaction to paclitaxel, no carboplatin received.    7/6/18: C1 single agent carboplatin AUC 6     7/27/18: C2 SA carboplatin AUC 6    8/17/18: C3 SA carboplatin AUC 6, deferred due to thrombocytopenia    8-11/2018 RT: EBRT and brachytherapy.      Due to intolerance of chemotherapy plan was to forgo additional chemotherapy in favor of observation.    2/2019: CT  IMPRESSION: In this patient with history of endometrial cancer status  post hysterectomy:  1. Increased size of a nodule in the posterior left upper lobe, which  now measures up to 6 mm (previously 3 mm. This is suspicious for  metastatic disease. Recommend short-term follow-up. Biopsy is an  option, though small size may limit yield.  2. Indeterminate mesenteric nodule, which may represent a mildly  enlarged lymph node. This is unchanged in size in comparison to  8/27/2018. Attention on follow-up.  3. Resolution of the previously seen heterogeneous irregularity of the  left psoas muscle. Biopsy 9/10/2018 compatible with granulomatous  inflammation and necrosis.    4/2019 CT  IMPRESSION: In this patient with history of endometrial cancer status  post hysterectomy, chemotherapy, and radiation:  1.  Further slight increase in size of a now 7 mm subpleural nodule in  the superior segment of the left lower lobe, which remains suspicious  for metastatic disease.  2. New part solid part groundglass nodule measuring 4 mm in the  posterior medial left lower lobe could be infectious/inflammatory or  metastatic. Recommend attention on follow-up.  3. Continued stability of indeterminate pleural nodularity along the  right hemidiaphragm and a central mesenteric nodule. Recommend  continued attention on follow-up.  4. Mild diffuse bladder wall thickening with associated pericystic fat  stranding, likely sequela of radiation.    7/2019 CT:  IMPRESSION: In this patient with history of endometrial cancer status  post hysterectomy, chemotherapy, and radiation:  1. Further increased size of a now 9 mm subpleural nodule in the  superior segment of the left lower lobe, compatible with metastatic  disease.  2. Numerous new scattered groundglass nodules and a new 3 mm solid  nodule in the posterior left lower lobe are indeterminant,  infectious/inflammatory versus metastatic.  3. Stable pleural nodularity along the right hemidiaphragm.  4. Increased central mesenteric and peritoneal haziness, indeterminate  but raising concern for peritoneal carcinomatosis. An enhancing  central mesenteric nodule remains unchanged.    Discussed initiation of treatment, but declined in light of poor tolerance of prior treatment.      11/2019: IMPRESSION: Concerning nodule in the superior segment of the left  lower lobe. Size has not significantly changed since the July 2018  exam however this has developed and increased in size since 8/27/2018.  Differential would include metastatic disease or primary lung  neoplasm. PET/CT imaging may be useful for further evaluation.     New small 6 mm groundglass nodular opacity in the left lung. Smaller  subtle groundglass opacities are again seen in both lungs.      31895 - She was admitted for high grade  bowel obstruction.    3/27/20 CT  IMPRESSION:   1. High grade partial small bowel obstruction with transition point in  the distal ileum where there are thick-walled loops of ileum which  could be due to tumor infiltration, inflammatory bowel disease, or  radiation.  2. Small amount of free fluid in the pelvis.   3. Increase in size and number of bilateral pulmonary nodules and  sessile pleural mass in the right lung base, consistent with  progression of metastatic disease.  4. Slight interval increase in size of small retroperitoneal and  central mesenteric lymph nodes      Past Medical History:   Diagnosis Date     Abnormal renal function test 12/04/2017    due to NSAIDS, normal after stopping taking them     Advanced directives, counseling/discussion      Antineoplastic chemotherapy induced anemia 7/27/2018     Arthritis      BMI 35.0-35.9,adult 11/12/2017     Degeneration of spine 5/21/2019     DVT of upper extremity (deep vein thrombosis) (H) 03/27/2012    clotting disorder workup negative     Encounter for antineoplastic chemotherapy 7/6/2018     Encounter for long-term (current) use of medications 5/21/2018     Endometrial cancer (H) 04/2018     Gastroesophageal reflux disease      Hyperlipidemia LDL goal < 160      Hyperlipidemia, unspecified hyperlipidemia type 11/12/2017     Low ferritin 7/19/2018     MEDICAL HISTORY OF - 1997    left breast density     Microscopic hematuria 3/22/2018     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  7/17/2012     Thyrotoxicosis 2007    hyperthyroid, enlarged right thyroid on thyroid uptake, treate by [facility]in [facility] with tapozole till 12/2008, FNA neg of 1.9 cm right lobe dominat nodule     Whooping cough due to Bordetella pertussis (p. pertussis) infant    whooping cough       Past Surgical History:   Procedure Laterality Date     ARTHROPLASTY HIP Left 12/4/2017    Procedure: ARTHROPLASTY HIP;  Left total hip arthroplasty;  Surgeon: Buddy  Rajinder Duran MD;  Location: [facility] OR     BREAST BIOPSY, RT/LT  2004    left breast     COLONOSCOPY N/A 8/28/2019    Procedure: COLONOSCOPY WITH BIOPSIES;  Surgeon: Rajinder Henley DO;  Location: [facility] OR     DILATION AND CURETTAGE, OPERATIVE HYSTEROSCOPY WITH MORCELLATOR, COMBINED N/A 4/11/2018    Procedure: COMBINED DILATION AND CURETTAGE, OPERATIVE HYSTEROSCOPY WITH MORCELLATOR;  Hysteroscopy, Dilation and Curettage Under Ultrasound Guidance ;  Surgeon: Adry Tineo MD;  Location: [facility]     DILATION AND CURETTAGE, WITH ULTRASOUND GUIDANCE  4/11/2018    Procedure: DILATION AND CURETTAGE, WITH ULTRASOUND GUIDANCE;;  Surgeon: Adry Tineo MD;  Location: [facility]     HYSTERECTOMY TOTAL ABDOMINAL, BILATERAL SALPINGO-OOPHORECTOMY, NODE DISSECTION, COMBINED Bilateral 4/27/2018    Procedure: COMBINED HYSTERECTOMY TOTAL ABDOMINAL, SALPINGO-OOPHORECTOMY, NODE DISSECTION;;  Surgeon: Bradley Tierney MD;  Location: [facility]     INSERT PORT VASCULAR ACCESS Right 5/31/2018    Procedure: INSERT PORT VASCULAR ACCESS;  Single Lumen Chest Power Port;  Surgeon: Jamila Major PA-C;  Location[facility]     LAPAROSCOPIC HYSTERECTOMY TOTAL, BILATERAL SALPINGO-OOPHORECTOMY, NODE DISSECTION, COMBINED N/A 4/27/2018    Procedure: COMBINED LAPAROSCOPIC HYSTERECTOMY TOTAL, SALPINGO-OOPHORECTOMY, NODE DISSECTION;  Laparoscopic converted to open Removal Of Uterus, Tubes, Ovaries, Cervix, Pelvic and Para Aortic Lymphnode Dissection, Tumor Debulking, CUSA, Partial Omentectomy, Anesthesia Block;  Surgeon: Bradley Tierney MD;  Location: [facility] OR     TONSILLECTOMY  1960    tonsillectomy       Current Outpatient Medications   Medication     Ascorbic Acid (VITAMIN C) 500 MG CHEW     calcium citrate-vitamin D (CITRACAL) 315-200 MG-UNIT TABS per tablet     FERROUS GLUCONATE PO     GLUCOSAMINE CHONDROITIN COMPLX OR TABS     metoclopramide (REGLAN) 5 MG/5ML solution     multivitamin, therapeutic with minerals (THERA-VIT-M) TABS  tablet     ondansetron (ZOFRAN) 4 MG tablet     pantoprazole (PROTONIX) 40 MG EC tablet     RANITIDINE HCL PO     Simethicone (GAS RELIEF PO)     warfarin ANTICOAGULANT (COUMADIN) 5 MG tablet     No current facility-administered medications for this visit.      Facility-Administered Medications Ordered in Other Visits   Medication     heparin 100 UNIT/ML injection 500 Units       Allergies   Allergen Reactions     Paclitaxel Anaphylaxis     reportedTaxol= anaphylaxsis     Nickel Rash       Family History   Problem Relation Age of Onset     Diabetes Mother         type 2     Heart Disease Mother      Hypertension Mother      C.A.D. Mother          after 2nd heart attack     Hypertension Father      C.A.D. Father         mild heart attack     Cancer Father         skin cancer     Heart Disease Father      Rheumatoid Arthritis Sister      Heart Disease Brother         herdetary heart murmur     Hyperlipidemia Brother      Clotting Disorder Brother         factor 5 leiden     Deep Vein Thrombosis Brother         leg     Hyperlipidemia Brother      Deep Vein Thrombosis Brother         leg; negative for clotting disorder     Kidney Disease No family hx of        Social History     Socioeconomic History     Marital status:      Spouse name: Doug     Number of children: 0     Years of education: Not on file     Highest education level: Not on file   Occupational History     Occupation: rn     Employer: [employer]   Social Needs     Financial resource strain: Not on file     Food insecurity     Worry: Not on file     Inability: Not on file     Transportation needs     Medical: Not on file     Non-medical: Not on file   Tobacco Use     Smoking status: Never Smoker     Smokeless tobacco: Never Used   Substance and Sexual Activity     Alcohol use: Yes     Comment: rarely     Drug use: No     Sexual activity: Yes     Partners: Male   Lifestyle     Physical activity     Days per week: Not on file     Minutes  per session: Not on file     Stress: Not on file   Relationships     Social connections     Talks on phone: Not on file     Gets together: Not on file     Attends Confucianism service: Not on file     Active member of club or organization: Not on file     Attends meetings of clubs or organizations: Not on file     Relationship status: Not on file     Intimate partner violence     Fear of current or ex partner: Not on file     Emotionally abused: Not on file     Physically abused: Not on file     Forced sexual activity: Not on file   Other Topics Concern     Parent/sibling w/ CABG, MI or angioplasty before 65F 55M? 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  She does not smoke and was never smoker, she drinks alcohol about a month, no illicit drugs            Balanced Diet - Yes, in the last 6 months    Osteoporosis Prevention Measures - Dairy servings per day: 2 servings plus a supplement    Regular Exercise -  Yes Describe walks for 30 to 40 minutes 4 to 5 times a week    Dental Exam - YES - Date: 1 year ago, and is going today    Eye Exam - YES - Date: 4 months ago    Self Breast Exam - Yes    Abuse: Current or Past (Physical, Sexual or Emotional)- No    Do you feel safe in your environment - Yes    Guns stored in the home - Yes, locked    Sunscreen used - Yes    Seatbelts used - Yes    Lipids -  YES - Date: 10-02    Glucose -  YES - Date: 10-02    Colon Cancer Screening - No    Hemoccults - NO    Pap Test -  YES - Date: 10-02    Do you have any concerns about STD's -  No    Mammography - YES - Date: 8-06    DEXA - NO    Immunizations reviewed and up to date - Yes, lst td 7-2000    10-23-07  MEL Mueller CMA           ROS  See below    I have reviewed the patient's ROS and discussed all pertinent information as noted in the HPI.    Physical Exam:    No examiantion - phone consultation    Assessment/Plan:   1)  Stage IIIC1 low grade endometrial cancer: S/p EBRT and vaginal cuff therapy - did not tolerate chemo (Taxol reaction and neutropenia) - with CT findings suggestive of possible slow progression versus stable disease (I have reviewed the films personally with the patient).  I had previously recommended a biopsy, which was felt to be infeasible owing to small size and location.  I am concerned especially in light of her progressive abdominal symptoms (which may be surgery, RT, cancer or some combination of the above).    We have discussed the options including chemo (which she tolerated poorly in the initial treatment; anaphylaxis with Taxol and persistent thrombocytopenia with SA Carboplatin).  I would advocate for the use of SA doxil followed by the addition of Carboplating if this is tolerated.  She has had a pancytopenia evaluation per her report - which did not identify a clear cause, but certain RT may be a key component).      We also discussed the use of progestins.  This would be challenging in the setting of remote 2012 and relatively recent PE (2018); she is however anti-coagulated at present and might be able to tolerate this without threat to her bone marrow.      Last we discussed surgery - which would potentially alleviate her obstruction but would not address her global picture.  This would allow for confirmation of a cancer diagnosis - but in light of the new CT findings I think treatment can be justified.    At the conclusion of this she is inclined to chemo and would like to have this closer to home (she has identified Dr. Frank as a potential provider in her region).  In the mean time she will present with worsening obstructive symptoms (she is still passing stool from below today) and convert to a liquid diet.      2) PE: Asymptomatic at present    3) Genetic counseling: MMR proteins with intact nuclear expression making Peterson syndrome unlikely.    4) Health maintenance issues discussed include  to follow up with PCP for non-gynecologic concerns and co-morbid conditions    5) Patient verbalized understanding of and agreement with plan        Bradley Tierney      Total time 55 josiane  Direct phone contact 45 mins        Answers for HPI/ROS submitted by the patient on 4/2/2020   General Symptoms: Yes  Skin Symptoms: No  HENT Symptoms: No  EYE SYMPTOMS: No  HEART SYMPTOMS: No  LUNG SYMPTOMS: No  INTESTINAL SYMPTOMS: Yes  URINARY SYMPTOMS: No  GYNECOLOGIC SYMPTOMS: No  BREAST SYMPTOMS: No  SKELETAL SYMPTOMS: No  BLOOD SYMPTOMS: No  NERVOUS SYSTEM SYMPTOMS: No  MENTAL HEALTH SYMPTOMS: No  Loss of appetite: Yes  Weight loss: Yes  Fatigue: Yes  Heart burn or indigestion: Yes  Nausea: Yes  Vomiting: Yes  Abdominal pain: Yes  Bloating: Yes  Diarrhea: Yes  Change in stools: Yes

## 2020-04-06 NOTE — LETTER
"2020       RE: Gerri Owens  [address]  [place] [address]     Dear Colleague,    Thank you for referring your patient, Gerri Owens, to the [facility]. Please see a copy of my visit note below.    Subjective     Gerri Owens is a 64 year old female who is being evaluated via a billable telephone visit.      The patient has been notified of following:     \"This telephone visit will be conducted via a call between you and your physician/provider. We have found that certain health care needs can be provided without the need for a physical exam.  This service lets us provide the care you need with a short phone conversation.  If a prescription is necessary we can send it directly to your pharmacy.  If lab work is needed we can place an order for that and you can then stop by our lab to have the test done at a later time.    If during the course of the call the physician/provider feels a telephone visit is not appropriate, you will not be charged for this service.\"     Patient has given verbal consent for Telephone visit?  Yes    Gerri Owens complains of   Chief Complaint   Patient presents with     Telephone     Endometrial cancer        ALLERGIES  Paclitaxel and Nickel    I have reviewed and updated the patient's allergies and medication list.    Concerns:  Pt was recently hospitalized a week ago, for a suspected bowel obstruction. NG tube was placed. Small Bowel Obstruction. Has diarrhea and abdominal pain, which is being treated.    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    Oncology History:  18- OBGYN visit with Dr. Tineo.  Complains of 1.5 years of " post-menopa                         ENDOMETRIAL CURETTINGS: usal bleeding.                           EMB performed and showed      4/11/18- EUA, hysteroscopy D&C under ultrasound guidance.  Diffuse proliferative vascular endometrium noted.                         Pathology:                          FINAL DIAGNOSIS:                          - Endometrial endometrioid adenocarcinoma, FIGO grade 2      4/27/18 DEMETRIO/BSO and staging:  PATH:  A. ANTERIOR FUNDUS, BIOPSY:   - Positive for adenocarcinoma     B. UTERUS, CERVIX, BILATERAL TUBES AND OVARIES, HYSTERECTOMY WITH   BILATERAL SALPINGO-OOPHORECTOMY:   - Endometrial endometrioid adenocarcinoma, FIGO grade 1   - Adenomyosis   - Cervix invaded by endometrial adenocarcinoma   - Right ovarian surface adhesions involved by endometrial adenocarcinoma   - Right fallopian tube with endometriosis   - Left adnexa invaded by endometrial adenocarcinoma (5.5 cm)     C. LYMPH NODES, LEFT PELVIC, EXCISION:   - Metastatic adenocarcinoma to one of eight lymph nodes (1/8)   - The metastatic focus is 3 mm in greatest dimension with no extranodal   extension     D. LYMPH NODES, LEFT PARA-AORTIC, EXCISION:   - One reactive lymph node, negative for malignancy (0/1)     E. LYMPH NODES, RIGHT PELVIC, EXCISION:   - Metastatic adenocarcinoma to one of six lymph nodes (1/6)   - The metastatic focus is 21 mm in greatest dimension with positive   extranodal extension     F. LYMPH NODES, RIGHT PARA-AORTIC, EXCISION:   - Three reactive lymph nodes, negative for malignancy (0/3)     G. OMENTUM, RESECTION:   - Omental adipose tissue with focal inflammation and surface mesothelial   hyperplasia   - Negative for malignancy       TUMOR     Tumor Site:         - Endometrium         - Lower uterine segment     Histologic Type:         - Endometrioid carcinoma, NOS     Histologic Grade:         - FIGO grade 1     Tumor Size: 5.5 Centimeters (cm)     Tumor Extent       Myometrial Invasion:           -  Present         Depth of Invasion: 15 Millimeters (mm)         Myometrial Thickness: 15 Millimeters (mm)         Percentage of Myometrial Invasion: 100%       Adenomyosis:           - Present, involved by carcinoma       Uterine Serosa Involvement:           - Present       Lower Uterine Segment Involvement:           - Present         Level of Tumor Involvement:             - Myoinvasive       Cervical Stromal Involvement:           - Present       Other Tissue / Organ Involvement:           - Right ovary           - Left ovary           - Left fallopian tube       Peritoneal Ascitic Fluid:           - Negative for malignancy (normal / benign)     Accessory Findings       Lymphovascular Invasion:           - Present     PATHOLOGIC STAGE CLASSIFICATION (PTNM, AJCC 8TH EDITION)     Primary Tumor (pT):         - pT3a     Regional Lymph Nodes (pN)       Category (pN):           - pN1a     FIGO STAGE     FIGO Stage:         - IIIC1      TREATMENT 1     6/13/18: C1D1 carboplatin/paclitaxel- anaphylactic reaction to paclitaxel, no carboplatin received.    7/6/18: C1 single agent carboplatin AUC 6     7/27/18: C2 SA carboplatin AUC 6    8/17/18: C3 SA carboplatin AUC 6, deferred due to thrombocytopenia    8-11/2018 RT: EBRT and brachytherapy.      Due to intolerance of chemotherapy plan was to forgo additional chemotherapy in favor of observation.    2/2019: CT  IMPRESSION: In this patient with history of endometrial cancer status  post hysterectomy:  1. Increased size of a nodule in the posterior left upper lobe, which  now measures up to 6 mm (previously 3 mm. This is suspicious for  metastatic disease. Recommend short-term follow-up. Biopsy is an  option, though small size may limit yield.  2. Indeterminate mesenteric nodule, which may represent a mildly  enlarged lymph node. This is unchanged in size in comparison to  8/27/2018. Attention on follow-up.  3. Resolution of the previously seen heterogeneous irregularity of  the  left psoas muscle. Biopsy 9/10/2018 compatible with granulomatous  inflammation and necrosis.    4/2019 CT  IMPRESSION: In this patient with history of endometrial cancer status  post hysterectomy, chemotherapy, and radiation:  1. Further slight increase in size of a now 7 mm subpleural nodule in  the superior segment of the left lower lobe, which remains suspicious  for metastatic disease.  2. New part solid part groundglass nodule measuring 4 mm in the  posterior medial left lower lobe could be infectious/inflammatory or  metastatic. Recommend attention on follow-up.  3. Continued stability of indeterminate pleural nodularity along the  right hemidiaphragm and a central mesenteric nodule. Recommend  continued attention on follow-up.  4. Mild diffuse bladder wall thickening with associated pericystic fat  stranding, likely sequela of radiation.    7/2019 CT:  IMPRESSION: In this patient with history of endometrial cancer status  post hysterectomy, chemotherapy, and radiation:  1. Further increased size of a now 9 mm subpleural nodule in the  superior segment of the left lower lobe, compatible with metastatic  disease.  2. Numerous new scattered groundglass nodules and a new 3 mm solid  nodule in the posterior left lower lobe are indeterminant,  infectious/inflammatory versus metastatic.  3. Stable pleural nodularity along the right hemidiaphragm.  4. Increased central mesenteric and peritoneal haziness, indeterminate  but raising concern for peritoneal carcinomatosis. An enhancing  central mesenteric nodule remains unchanged.    Discussed initiation of treatment, but declined in light of poor tolerance of prior treatment.      11/2019: IMPRESSION: Concerning nodule in the superior segment of the left  lower lobe. Size has not significantly changed since the July 2018  exam however this has developed and increased in size since 8/27/2018.  Differential would include metastatic disease or primary lung  neoplasm.  PET/CT imaging may be useful for further evaluation.     New small 6 mm groundglass nodular opacity in the left lung. Smaller  subtle groundglass opacities are again seen in both lungs.      [id number] - She was admitted for high grade bowel obstruction.    3/27/20 CT  IMPRESSION:   1. High grade partial small bowel obstruction with transition point in  the distal ileum where there are thick-walled loops of ileum which  could be due to tumor infiltration, inflammatory bowel disease, or  radiation.  2. Small amount of free fluid in the pelvis.   3. Increase in size and number of bilateral pulmonary nodules and  sessile pleural mass in the right lung base, consistent with  progression of metastatic disease.  4. Slight interval increase in size of small retroperitoneal and  central mesenteric lymph nodes      Past Medical History:   Diagnosis Date     Abnormal renal function test 12/04/2017    due to NSAIDS, normal after stopping taking them     Advanced directives, counseling/discussion      Antineoplastic chemotherapy induced anemia 7/27/2018     Arthritis      BMI 35.0-35.9,adult 11/12/2017     Degeneration of spine 5/21/2019     DVT of upper extremity (deep vein thrombosis) (H) 03/27/2012    clotting disorder workup negative     Encounter for antineoplastic chemotherapy 7/6/2018     Encounter for long-term (current) use of medications 5/21/2018     Endometrial cancer (H) 04/2018     Gastroesophageal reflux disease      Hyperlipidemia LDL goal < 160      Hyperlipidemia, unspecified hyperlipidemia type 11/12/2017     Low ferritin 7/19/2018     MEDICAL HISTORY OF - 1997    left breast density     Microscopic hematuria 3/22/2018     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  7/17/2012     Thyrotoxicosis 2007    hyperthyroid, enlarged right thyroid on thyroid uptake, treate [facility] with tapozole till 12/2008, FNA neg of 1.9 cm right lobe dominat nodule     Whooping cough due to  Bordetella pertussis (p. pertussis) infant    whooping cough       Past Surgical History:   Procedure Laterality Date     ARTHROPLASTY HIP Left 12/4/2017    Procedure: ARTHROPLASTY HIP;  Left total hip arthroplasty;  Surgeon: Rajinder [facility] MD;  Location: [facility] OR     BREAST BIOPSY, RT/LT  2004    left breast     COLONOSCOPY N/A 8/28/2019    Procedure: COLONOSCOPY WITH BIOPSIES;  Surgeon: Rajinder Henley, DO;  Location: [facility]     DILATION AND CURETTAGE, OPERATIVE HYSTEROSCOPY WITH MORCELLATOR, COMBINED N/A 4/11/2018    Procedure: COMBINED DILATION AND CURETTAGE, OPERATIVE HYSTEROSCOPY WITH MORCELLATOR;  Hysteroscopy, Dilation and Curettage Under Ultrasound Guidance ;  Surgeon: Adry Tineo MD;  Location: [facility]     DILATION AND CURETTAGE, WITH ULTRASOUND GUIDANCE  4/11/2018    Procedure: DILATION AND CURETTAGE, WITH ULTRASOUND GUIDANCE;;  Surgeon: Adry Tineo MD;  Location: [facility]     HYSTERECTOMY TOTAL ABDOMINAL, BILATERAL SALPINGO-OOPHORECTOMY, NODE DISSECTION, COMBINED Bilateral 4/27/2018    Procedure: COMBINED HYSTERECTOMY TOTAL ABDOMINAL, SALPINGO-OOPHORECTOMY, NODE DISSECTION;;  Surgeon: Bradley Tierney MD;  Location: [facility] OR     INSERT PORT VASCULAR ACCESS Right 5/31/2018    Procedure: INSERT PORT VASCULAR ACCESS;  Single Lumen Chest Power Port;  Surgeon: Jamila Major PA-C;  Location: [facility]     LAPAROSCOPIC HYSTERECTOMY TOTAL, BILATERAL SALPINGO-OOPHORECTOMY, NODE DISSECTION, COMBINED N/A 4/27/2018    Procedure: COMBINED LAPAROSCOPIC HYSTERECTOMY TOTAL, SALPINGO-OOPHORECTOMY, NODE DISSECTION;  Laparoscopic converted to open Removal Of Uterus, Tubes, Ovaries, Cervix, Pelvic and Para Aortic Lymphnode Dissection, Tumor Debulking, CUSA, Partial Omentectomy, Anesthesia Block;  Surgeon: Bradley Tierney MD;  Location: [facility] OR     TONSILLECTOMY  1960    tonsillectomy       Current Outpatient Medications   Medication     Ascorbic Acid (VITAMIN C) 500 MG CHEW     calcium  citrate-vitamin D (CITRACAL) 315-200 MG-UNIT TABS per tablet     FERROUS GLUCONATE PO     GLUCOSAMINE CHONDROITIN COMPLX OR TABS     metoclopramide (REGLAN) 5 MG/5ML solution     multivitamin, therapeutic with minerals (THERA-VIT-M) TABS tablet     ondansetron (ZOFRAN) 4 MG tablet     pantoprazole (PROTONIX) 40 MG EC tablet     RANITIDINE HCL PO     Simethicone (GAS RELIEF PO)     warfarin ANTICOAGULANT (COUMADIN) 5 MG tablet     No current facility-administered medications for this visit.      Facility-Administered Medications Ordered in Other Visits   Medication     heparin 100 UNIT/ML injection 500 Units       Allergies   Allergen Reactions     Paclitaxel Anaphylaxis     reportedTaxol= anaphylaxsis     Nickel Rash       Family History   Problem Relation Age of Onset     Diabetes Mother         type 2     Heart Disease Mother      Hypertension Mother      C.A.D. Mother          after 2nd heart attack     Hypertension Father      C.A.D. Father         mild heart attack     Cancer Father         skin cancer     Heart Disease Father      Rheumatoid Arthritis Sister      Heart Disease Brother         herdetary heart murmur     Hyperlipidemia Brother      Clotting Disorder Brother         factor 5 leiden     Deep Vein Thrombosis Brother         leg     Hyperlipidemia Brother      Deep Vein Thrombosis Brother         leg; negative for clotting disorder     Kidney Disease No family hx of        Social History     Socioeconomic History     Marital status:      Spouse name: Doug     Number of children: 0     Years of education: Not on file     Highest education level: Not on file   Occupational History     Occupation: rn     Employer: [employer]   Social Needs     Financial resource strain: Not on file     Food insecurity     Worry: Not on file     Inability: Not on file     Transportation needs     Medical: Not on file     Non-medical: Not on file   Tobacco Use     Smoking status: Never Smoker      Smokeless tobacco: Never Used   Substance and Sexual Activity     Alcohol use: Yes     Comment: rarely     Drug use: No     Sexual activity: Yes     Partners: Male   Lifestyle     Physical activity     Days per week: Not on file     Minutes per session: Not on file     Stress: Not on file   Relationships     Social connections     Talks on phone: Not on file     Gets together: Not on file     Attends Synagogue service: Not on file     Active member of club or organization: Not on file     Attends meetings of clubs or organizations: Not on file     Relationship status: Not on file     Intimate partner violence     Fear of current or ex partner: Not on file     Emotionally abused: Not on file     Physically abused: Not on file     Forced sexual activity: Not on file   Other Topics Concern     Parent/sibling w/ CABG, MI or angioplasty before 65F 55M? 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  She does not smoke and was never smoker, she drinks alcohol about a month, no illicit drugs            Balanced Diet - Yes, in the last 6 months    Osteoporosis Prevention Measures - Dairy servings per day: 2 servings plus a supplement    Regular Exercise -  Yes Describe walks for 30 to 40 minutes 4 to 5 times a week    Dental Exam - YES - Date: 1 year ago, and is going today    Eye Exam - YES - Date: 4 months ago    Self Breast Exam - Yes    Abuse: Current or Past (Physical, Sexual or Emotional)- No    Do you feel safe in your environment - Yes    Guns stored in the home - Yes, locked    Sunscreen used - Yes    Seatbelts used - Yes    Lipids -  YES - Date: 10-02    Glucose -  YES - Date: 10-02    Colon Cancer Screening - No    Hemoccults - NO    Pap Test -  YES - Date: 10-02    Do you have any concerns about STD's -  No    Mammography - YES - Date: 8-06    DEXA - NO    Immunizations reviewed and up to date - Yes, lst td  7-2000    10-23-07  [facility]           ROS  See below    I have reviewed the patient's ROS and discussed all pertinent information as noted in the HPI.    Physical Exam:    No examiantion - phone consultation    Assessment/Plan:   1) Stage IIIC1 low grade endometrial cancer: S/p EBRT and vaginal cuff therapy - did not tolerate chemo (Taxol reaction and neutropenia) - with CT findings suggestive of possible slow progression versus stable disease (I have reviewed the films personally with the patient).  I had previously recommended a biopsy, which was felt to be infeasible owing to small size and location.  I am concerned especially in light of her progressive abdominal symptoms (which may be surgery, RT, cancer or some combination of the above).    We have discussed the options including chemo (which she tolerated poorly in the initial treatment; anaphylaxis with Taxol and persistent thrombocytopenia with SA Carboplatin).  I would advocate for the use of SA doxil followed by the addition of Carboplating if this is tolerated.  She has had a pancytopenia evaluation per her report - which did not identify a clear cause, but certain RT may be a key component).      We also discussed the use of progestins.  This would be challenging in the setting of remote 2012 and relatively recent PE (2018); she is however anti-coagulated at present and might be able to tolerate this without threat to her bone marrow.      Last we discussed surgery - which would potentially alleviate her obstruction but would not address her global picture.  This would allow for confirmation of a cancer diagnosis - but in light of the new CT findings I think treatment can be justified.    At the conclusion of this she is inclined to chemo and would like to have this closer to home (she has identified Dr. Frank as a potential provider in her region).  In the mean time she will present with worsening obstructive symptoms (she is still passing  stool from below today) and convert to a liquid diet.      2) PE: Asymptomatic at present    3) Genetic counseling: MMR proteins with intact nuclear expression making Peterson syndrome unlikely.    4) Health maintenance issues discussed include to follow up with PCP for non-gynecologic concerns and co-morbid conditions    5) Patient verbalized understanding of and agreement with plan        Bradley Tierney      Total time 55 josiane  Direct phone contact 45 mins        Answers for HPI/ROS submitted by the patient on 4/2/2020   General Symptoms: Yes  Skin Symptoms: No  HENT Symptoms: No  EYE SYMPTOMS: No  HEART SYMPTOMS: No  LUNG SYMPTOMS: No  INTESTINAL SYMPTOMS: Yes  URINARY SYMPTOMS: No  GYNECOLOGIC SYMPTOMS: No  BREAST SYMPTOMS: No  SKELETAL SYMPTOMS: No  BLOOD SYMPTOMS: No  NERVOUS SYSTEM SYMPTOMS: No  MENTAL HEALTH SYMPTOMS: No  Loss of appetite: Yes  Weight loss: Yes  Fatigue: Yes  Heart burn or indigestion: Yes  Nausea: Yes  Vomiting: Yes  Abdominal pain: Yes  Bloating: Yes  Diarrhea: Yes  Change in stools: Yes      Again, thank you for allowing me to participate in the care of your patient.      Sincerely,    Bradley Tierney MD

## 2020-04-06 NOTE — PROGRESS NOTES
Left message let her know that I have faxed over Dr. Tierney's recommendations to start up chemotherapy.  Lore in Medical Oncology will reach out to Gerri to set up a new patient consult with Dr. Muller so to have her chemotherapy closer to home.  Patient can call me back with any questions [phone].

## 2020-04-07 PROBLEM — Z86.711 HISTORY OF PULMONARY EMBOLISM: Status: ACTIVE | Noted: 2020-01-01

## 2020-04-07 PROBLEM — C76.2 ABDOMINAL CARCINOMATOSIS (H): Status: ACTIVE | Noted: 2020-01-01

## 2020-04-07 PROBLEM — K56.609 SBO (SMALL BOWEL OBSTRUCTION) (H): Status: RESOLVED | Noted: 2020-01-01 | Resolved: 2020-01-01

## 2020-04-07 NOTE — PROGRESS NOTES
"Subjective     Gerri Owens is a 64 year old female who is being evaluated via a billable telephone visit.      The patient has been notified of following:     \"This telephone visit will be conducted via a call between you and your physician/provider. We have found that certain health care needs can be provided without the need for a physical exam.  This service lets us provide the care you need with a short phone conversation.  If a prescription is necessary we can send it directly to your pharmacy.  If lab work is needed we can place an order for that and you can then stop by our lab to have the test done at a later time.    If during the course of the call the physician/provider feels a telephone visit is not appropriate, you will not be charged for this service.\"     Patient has given verbal consent for Telephone visit?  Yes    Gerri Owens complains of No chief complaint on file.      ALLERGIES  Paclitaxel and Nickel        Hospital Follow-up Visit:    Hospital/Nursing Home/IP Rehab Facility: [facility]  Date of Admission: 3/27/20  Date of Discharge: 3/28/20  Reason(s) for Admission: Subacute small bowel obstruction, adhesions vs endometrial carcinomatosis  Metastatic endometrial malignancy with probable pulmonary metastasis            Problems taking medications regularly:  None       Medication changes since discharge: None       Problems adhering to non-medication therapy:  None    Summary of hospitalization:  Gerri Owens is a 64 year old woman who was diagnosed with metastatic endometrial cancer approximately two years ago. Treatment included DEMETRIO-BSO and lymphadenectomy in April in 2018.  S/p EBRT and vaginal cuff therapy - did not tolerate chemo (Taxol reaction and neutropenia.)  For some time, there have been concerns of recurrence in the mesentery; ongoing surveillance for a lung nodule which appears to be increasing in size.  She was offered a study treatment enrollment which she " declined in November.  A planned CT of her chest/abdomen/pelvis in surveillance of her cancer was performed on 3/27/20 with was most prominent for findings of a small bowel obstruction; she was instructed to present to our emergency department which she did later that day.  She had noted that for the previous 4 weeks, she had increased abdominal fullness, early satiety, burping, with less frequent vomiting, and abdominal pain.  Her pain usually proceeded the vomiting and then went away after she has emesis of yellow looking bile. She was admitted and an NG tube was placed for gastric decompression. She did have little gastric output. This was then removed and her diet was advanced to soft. 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  She was discharged home on 3/28/20 and followed up with her oncologist via telephone call on 4/6/20. Note was reviewed. Concern for slow progression of disease. Options for treatment discussed. At the end of the visit, patient was inclined to chemo and preferred to do this with Dr. Sadler here in [place]. She has already been in contact with the [place] infusion center to set up consult with Dr. Sadler. She also talked with Dr. Arizmendi this am, via telephone call, and he would like her to first meet with oncology and then he wants to proceed with follow up EGD in 6/2020.     Patient also has h/o recurrent PEs. On warfarin. Managed by the Warfarin clinic. Some difficulties with INR management due to variable oral intake. Next INR scheduled for tomorrow. INR was 1.5 on 4/2/20.     Diagnostic Tests/Treatments reviewed.  Follow up needed: none  Other Healthcare Providers Involved in Patient s Care:         Specialist appointment - oncology  Update since discharge: stable. Patient continues with intermittent abdominal pain and nausea, but notes that it has not worsened since recently being discharged.  Denies vomiting. Passing gas and small amounts of loose stool several times per day. No fevers. No chest pain or shortness of breath. Taking Zofran twice daily and she feels this helps. Also taking Reglan as recommended by Dr. Arizmendi. Trying to move more and stick to a full liquid diet.     Post Discharge Medication Reconciliation: medication reconcilation previously completed during another office visit.  Plan of care communicated with patient           Patient Active Problem List   Diagnosis     Advanced directives, counseling/discussion     Primary osteoarthritis of both hips     History of deep venous thrombosis     Presence of left hip implant     CKD (chronic kidney disease) stage 2, GFR 60-89 ml/min     Calculus of left kidney     S/P DEMETRIO-BSO     Endometrial cancer (H)     Encounter for long-term (current) use of medications     Pulmonary embolus (H)     Osteoarthritis of both hands     Antineoplastic chemotherapy induced anemia     Chronic anticoagulation     Arthritis of hand     Degeneration of spine     Pulmonary nodules     History of colonoscopy     SBO (small bowel obstruction) (H)     Small bowel obstruction (H)     Past Surgical History:   Procedure Laterality Date     ARTHROPLASTY HIP Left 12/4/2017    Procedure: ARTHROPLASTY HIP;  Left total hip arthroplasty;  Surgeon: Rajinder Goodwin MD;  Location: [facility] OR     BREAST BIOPSY, RT/LT  2004    left breast     COLONOSCOPY N/A 8/28/2019    Procedure: COLONOSCOPY WITH BIOPSIES;  Surgeon: Rajinder Henley DO;  Location: [facility] OR     DILATION AND CURETTAGE, OPERATIVE HYSTEROSCOPY WITH MORCELLATOR, COMBINED N/A 4/11/2018    Procedure: COMBINED DILATION AND CURETTAGE, OPERATIVE HYSTEROSCOPY WITH MORCELLATOR;  Hysteroscopy, Dilation and Curettage Under Ultrasound Guidance ;  Surgeon: Adry Tineo MD;  Location: [facility] OR     DILATION AND CURETTAGE, WITH ULTRASOUND GUIDANCE  4/11/2018    Procedure: DILATION AND CURETTAGE, WITH ULTRASOUND GUIDANCE;;   Surgeon: Adry Tineo MD;  Location: [facility]     HYSTERECTOMY TOTAL ABDOMINAL, BILATERAL SALPINGO-OOPHORECTOMY, NODE DISSECTION, COMBINED Bilateral 2018    Procedure: COMBINED HYSTERECTOMY TOTAL ABDOMINAL, SALPINGO-OOPHORECTOMY, NODE DISSECTION;;  Surgeon: Bradley Tierney MD;  Location: [facility]     INSERT PORT VASCULAR ACCESS Right 2018    Procedure: INSERT PORT VASCULAR ACCESS;  Single Lumen Chest Power Port;  Surgeon: Jamila Major PA-C;  Location: [facility]     LAPAROSCOPIC HYSTERECTOMY TOTAL, BILATERAL SALPINGO-OOPHORECTOMY, NODE DISSECTION, COMBINED N/A 2018    Procedure: COMBINED LAPAROSCOPIC HYSTERECTOMY TOTAL, SALPINGO-OOPHORECTOMY, NODE DISSECTION;  Laparoscopic converted to open Removal Of Uterus, Tubes, Ovaries, Cervix, Pelvic and Para Aortic Lymphnode Dissection, Tumor Debulking, CUSA, Partial Omentectomy, Anesthesia Block;  Surgeon: Bradley Tierney MD;  Location: [facility]     TONSILLECTOMY  1960    tonsillectomy       Social History     Tobacco Use     Smoking status: Never Smoker     Smokeless tobacco: Never Used   Substance Use Topics     Alcohol use: Yes     Comment: rarely     Family History   Problem Relation Age of Onset     Diabetes Mother         type 2     Heart Disease Mother      Hypertension Mother      C.A.D. Mother          after 2nd heart attack     Hypertension Father      C.A.D. Father         mild heart attack     Cancer Father         skin cancer     Heart Disease Father      Rheumatoid Arthritis Sister      Heart Disease Brother         herdetary heart murmur     Hyperlipidemia Brother      Clotting Disorder Brother         factor 5 leiden     Deep Vein Thrombosis Brother         leg     Hyperlipidemia Brother      Deep Vein Thrombosis Brother         leg; negative for clotting disorder     Kidney Disease No family hx of          Current Outpatient Medications   Medication Sig Dispense Refill     Ascorbic Acid (VITAMIN C) 500 MG CHEW Take 1  tablet by mouth daily        calcium citrate-vitamin D (CITRACAL) 315-200 MG-UNIT TABS per tablet Take 2 tablets by mouth daily       FERROUS GLUCONATE PO Take 325 mg by mouth once a week        GLUCOSAMINE CHONDROITIN COMPLX OR TABS Take  by mouth. 1500 mg 1xqd.   0     metoclopramide (REGLAN) 5 MG/5ML solution Take 5 mg by mouth 4 times daily (before meals and nightly)        multivitamin, therapeutic with minerals (THERA-VIT-M) TABS tablet Take 1 tablet by mouth daily       ondansetron (ZOFRAN) 4 MG tablet Take 1 tablet (4 mg) by mouth every 8 hours as needed for nausea 180 tablet 1     pantoprazole (PROTONIX) 40 MG EC tablet Take 1 tablet (40 mg) by mouth daily       RANITIDINE HCL PO Take 150 mg by mouth 2 times daily        Simethicone (GAS RELIEF PO) Take by mouth as needed       warfarin ANTICOAGULANT (COUMADIN) 5 MG tablet Take 2.5 mg (0.5 tablet) every Mon, Fri and 5 mg (1 tablet) all other days or as directed by the Coumadin Clinic 100 tablet 1     Allergies   Allergen Reactions     Paclitaxel Anaphylaxis     reportedTaxol= anaphylaxsis     Nickel Rash       Reviewed and updated as needed this visit by Provider         Review of Systems   As noted in the HPI.        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 Some abdominal pain and nausea, but she notes that it has not worsened since being discharged. Still passing gas and small amounts of stool. Would like to proceed with chemo here in [place]. Plans to meet with Dr. Sadler. Will continue the Reglan as recommended by Dr. Arizmendi. Will also continue the Zofran. She will continue to follow with the INR clinic. Next draw planned for Thursday. Encouraged to stay as  active as possible and stick with full liquid to soft diet as recommended by GI. If she would like to meet with a dietitian in the future, she will let me know. She agrees to follow up with any new or worsening symptoms.         No follow-ups on file.      Phone call duration:  10 minutes    Katty Garcia NP

## 2020-04-07 NOTE — NURSING NOTE
"Chief Complaint   Patient presents with     Hospital F/U       Initial LMP 03/04/2005  Estimated body mass index is 23.63 kg/m  as calculated from the following:    Height as of 11/13/19: 1.651 m (5' 5\").    Weight as of 11/13/19: 64.4 kg (142 lb).  Medication Reconciliation: complete  Ann Marie Rosado LPN  "

## 2020-04-09 NOTE — PROGRESS NOTES
ANTICOAGULATION FOLLOW-UP CLINIC VISIT    Patient Name:  Gerri Owens  Date:  4/9/2020  Contact Type:  Telephone    SUBJECTIVE:  Patient Findings     Positives:   Change in health (cancer returned), Other complaints (constant diarrhea)    Comments:   INR done by lab. Call placed to patient and spoke to her re: INR result, warfarin dosing/INR recheck date. She verbalized understanding and has no questions. She states that her cancer is back and she will be doing some chemo in the near future but has to see oncology here first. We discussed that chemo can alter INR's and to let me know when she will be starting chemo. She states she has almost constant diarrhea now. No vomiting. She will let me know if she has any bleeding/bruising, new changes in diet/meds/activity, questions or illness.        Clinical Outcomes     Negatives:   Major bleeding event, Thromboembolic event, Anticoagulation-related hospital admission, Anticoagulation-related ED visit, Anticoagulation-related fatality    Comments:   INR done by lab. Call placed to patient and spoke to her re: INR result, warfarin dosing/INR recheck date. She verbalized understanding and has no questions. She states that her cancer is back and she will be doing some chemo in the near future but has to see oncology here first. We discussed that chemo can alter INR's and to let me know when she will be starting chemo. She states she has almost constant diarrhea now. No vomiting. She will let me know if she has any bleeding/bruising, new changes in diet/meds/activity, questions or illness.           OBJECTIVE    INR Point of Care   Date Value Ref Range Status   04/09/2020 3.8 (H) 0.86 - 1.14 Final     Comment:     This test is intended for monitoring Coumadin therapy.  Results are not   accurate in patients with prolonged INR due to factor deficiency.         ASSESSMENT / PLAN  INR assessment SUPRA dose increase last week r/t vit K therapy 2 weeks ago   Recheck INR In: 2  WEEKS    INR Location Clinic      Anticoagulation Summary  As of 4/9/2020    INR goal:   2.0-3.0   TTR:   59.7 % (1 y)   INR used for dosing:   3.8! (4/9/2020)   Warfarin maintenance plan:   2.5 mg (5 mg x 0.5) every Mon, Wed, Fri; 5 mg (5 mg x 1) all other days   Full warfarin instructions:   4/9: Hold; Otherwise 2.5 mg every Mon, Wed, Fri; 5 mg all other days   Weekly warfarin total:   27.5 mg   Plan last modified:   Tena Castillo, RN (4/9/2020)   Next INR check:   4/23/2020   Priority:   High   Target end date:       Indications    DVT of upper extremity (deep vein thrombosis) (H) (Resolved) [I82.629]  Pulmonary embolus (H) [I26.99]             Anticoagulation Episode Summary     INR check location:       Preferred lab:       Send INR reminders to:   ARIANNA KIMBLE    Comments:   MTV 30 mcg.  DVT subclavian vein 2012. PE 7/2018. Brother positive Factor V Leiden. Another brother on lifetime AC. 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   Endoscopy 4/27/20, bridge 1mg/kg q12h per TALI Garcia      Anticoagulation Care Providers     Provider Role Specialty Phone number    Karo Doty MD [facility] [phone]            See the Encounter Report to view Anticoagulation Flowsheet and Dosing Calendar (Go to Encounters tab in chart review, and find the Anticoagulation Therapy Visit)        Tena Castillo, RN

## 2020-04-09 NOTE — PROGRESS NOTES
Clinic Care Coordination Contact  Care Team Conversations    Reviewed referal from Dr. Tierney with Dr. Sadler. He would like a PET scan and ECHO prior to seeing patient. He would also like CBC, CMP, LDH, Ca125 Patient informed. Luisa Casas RN Oncology Care Coordinator

## 2020-04-20 NOTE — PROGRESS NOTES
ANTICOAGULATION FOLLOW-UP CLINIC VISIT    Patient Name:  Gerri Owens  Date:  4/20/2020  Contact Type:  Telephone    SUBJECTIVE:  Patient Findings     Positives:   Change in diet/appetite (not eating, doing clear liquids, unable to tolerate Ensure.)    Comments:   Call from patient requesting INr be done with other labs on 4/27/20.  She also states she has been sick with vomiting. She requested I call her back which I will do. Call placed to patient . She has decreased a few doses of warfarin r/t vomiting, diarrhea. She is also unable to eat much so is not getting vit K intake. We discussed the possibility of a liquid adult multi vit and she will call pharmacy to see if that is available. We also discussed green tea as it is clear liquid and as long as she is on protonix it should not make her stomach worse. We discussed overall cut to warfarin dosing r/t current health status. She verbalized understanding and has no questions. She has no bleeding but does have a few bruises. No other changes except not eating and has diarrhea.        Clinical Outcomes     Negatives:   Major bleeding event, Thromboembolic event, Anticoagulation-related hospital admission, Anticoagulation-related ED visit, Anticoagulation-related fatality    Comments:   Call from patient requesting INr be done with other labs on 4/27/20.  She also states she has been sick with vomiting. She requested I call her back which I will do. Call placed to patient . She has decreased a few doses of warfarin r/t vomiting, diarrhea. She is also unable to eat much so is not getting vit K intake. We discussed the possibility of a liquid adult multi vit and she will call pharmacy to see if that is available. We also discussed green tea as it is clear liquid and as long as she is on protonix it should not make her stomach worse. We discussed overall cut to warfarin dosing r/t current health status. She verbalized understanding and has no questions. She has no  bleeding but does have a few bruises. No other changes except not eating and has diarrhea.           OBJECTIVE    INR Point of Care   Date Value Ref Range Status   04/09/2020 3.8 (H) 0.86 - 1.14 Final     Comment:     This test is intended for monitoring Coumadin therapy.  Results are not   accurate in patients with prolonged INR due to factor deficiency.         ASSESSMENT / PLAN  No question data found.  Anticoagulation Summary  As of 4/20/2020    INR goal:   2.0-3.0   TTR:   58.5 % (11.8 mo)   INR used for dosing:   No new INR was available at the time of this encounter.   Warfarin maintenance plan:   2.5 mg (5 mg x 0.5) every day   Full warfarin instructions:   2.5 mg every day   Weekly warfarin total:   17.5 mg   Plan last modified:   Tena Castillo RN (4/20/2020)   Next INR check:   4/27/2020   Priority:   High   Target end date:       Indications    DVT of upper extremity (deep vein thrombosis) (H) (Resolved) [I82.629]  Pulmonary embolus (H) [I26.99]             Anticoagulation Episode Summary     INR check location:       Preferred lab:       Send INR reminders to:   ARIANNA KIMBLE    Comments:   MTV 30 mcg.  DVT subclavian vein 2012. PE 7/2018. Brother positive Factor V Leiden. Another brother on lifetime AC. 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   Endoscopy 4/27/20, bridge 1mg/kg q12h per TALI Garcia      Anticoagulation Care Providers     Provider Role Specialty Phone number    Karo Doty MD [facility] [phone]            See the Encounter Report to view Anticoagulation Flowsheet and Dosing Calendar (Go to Encounters tab in chart review, and find the Anticoagulation Therapy Visit)        Tena Castillo, RN

## 2020-04-23 NOTE — LETTER
[facility] [place]  [address]  [place] [address] [phone]      April 23, 2020      Gerri Owens  [address] [facility] [address]      EMERGENCY CARE PLAN  Presenting Problem Treatment Plan   Questions or concerns during clinic hours    24 hour Nurse line available - Call main clinic line and follow prompts I will call the clinic directly: [phone]    24 Hour Nurse Line [phone]- Follow prompts and press option for nurse advisor  [facility] [phone]  [facility]  [address] [place] [address]   Patient needs to schedule an appointment  I will call the scheduling team during business hours at [phone]   Same day treatment   I will call the clinic first, then urgent care if needed   Clinic Care Coordinators [facility]  Oncology Care Coordinator [phone]    [phone]   Crisis Services:  Behavioral or Mental Health [facility]  [phone]   Emergency treatment--Immediately CALL 911

## 2020-04-23 NOTE — PROGRESS NOTES
PRE VISIT MEDICAL ONCOLOGY     REASON FOR APPOINTMENT    Type of Cancer -   4-27-18 [id number]  FIGO Stage IIIC1      SYMPTOMS   Symptom onset - Discharge and blood, about a year previous to diagnosis    Medical Provider Seen Initially -     PROVIDERS  Referring MD - Dr. Tierney  PCP - Katty Garcia  Surgeon -   Other provider(s) seen for consultation or treatment -      TREATMENT TO-DATE FOR THIS CANCER  Biopsy -4-27-18    A. Anterior Fundus biopsy: Positive for adenocarcinoma  B. UTERUS, CERVIX, BILATERALLY TUBES AND OVARIES, HYSTERECTOMY WITH BILATERALLY SALPINGO-OOPHORECTOMY:  -Endometrial endometrioid adenocarcinoma, FIGO grade 1  -Adenomyosis  -Cervix invaded by endometrial adenocarcinoma  -Right ovarian surface adhesions involved by endometrial adenocarcinoma  -Right Fallopian tube with endometriosis  -Left adnexa invaded by endometrial adenocarcinoma  C. Lymph Nodes, Left Pelvic excision: Metastatic adenocarcinoma to 1/8 lymph nodes  D. Lymph nodes Left para-Aortic Excision:  0/1 negative for malignance  E. Lymph nodes, Right Pelvic Excision:   -Metastatic adenocarcinoma to 1/6 lymph nodes  F. Lymph nodes, Right Para-Aortic Excision: 0/3 negative for malignancy  G. Omentum resection  -Omental adipose tissue with focal inflammation and surface mesothelial hyperplasia.  -Negative for Malignancy  Surgery -4-27-18  Hysterectomy with bilateral salpingo-oophorectomy      Chemotherapy - 6-13-18: C1D1 carboplatin/Paclitaxel-Anaphylactic reaction to paclitaxel no carboplatin received.  7-16-18 2 cycles of carboplatin received AUC 6, 3 cycle deferred d/t thrombocytopenia        Oncologist - Dr. Sadler  Hormonal therapy used -   Other treatments for this Cancer 8/11/18 EBRT and Brachytherapy  PRIOR HISTORY OF CANCER  See above    RECENT IMAGING STUDIES    (list setting/date)  U/S-Mammogram -   PET - 4-22-20  CT - (CAP)- 3-27-20  Bone Scan (Dexa) - 10-23-07  MRI - Not in epic  ECHO -  4-16-20  X-Ray - 3/27/20   EKG Not  in Epic  Other -     LABS PERTINENT TO DIAGNOSIS  Labs drawn - (list most recent type/setting/date)   BMP   CBC w/ Platelets   LDH   CMP   CEA   Creatinine   Other - (list)     CENTRAL LINE/PORT   PORT      ADDITIONAL INFORMATION FOR BREAST AND GYN MALIGNANCIES  Age at first menses -  12  Age at menopause or LMP - 46  Age at first pregnancy - 1  Number of pregnancies - 0  Breast Fed - Yes - Duration          /   Never       HEALTH SCREENING (list most recent dates)  Mammogram - 2-27-18  Colonoscopy - 8-28-19  Dental Exam - Not been to the dentist for that past year and half   T-Dap - 2012  Pneumonia - Not in [facility]  Flu Shot - 2019  Shingles - Not in [facility]    FAMILY CANCER HISTORY (list relative/type of cancer)  Father Skin Cancer                TOBACCO USE HISTORY    Never a smoker     OTHER PERTINENT CANCER INFORMATION NOT IDENTIFIED ELSEWHERE  Hx of Pulmonary Embolism  Hx of DVT in 2012  On Warfarin  Small bowel obstruction D/T Tumor infiltration, inflammatory bowel disease or radiation  Patient reports being weak and eating very little about 600 calories a day. Eating causes gas and bloating because of diagnosis of gastroparesis. She takes reglan 4 times a day with meals and gas X which helps to relieve symptoms. She is on coumadin. She reports that she is checking her bowel sounds and passes gas, however does not have much when passing stool. She reports abdominal pain when standing and feels better when she alexandra over. She reports this has been going on for over a year. She has lower extremity edema, stating that her PCP states it is from poor nutrition. She drinks 1 ensure a day, a little cottage cheese. Encouraged her to try increasing her ensure by sipping on an additional one through out the day. She is open to the idea of home care services as needed, she is homebound at this time.  Luisa Casas, RN Oncology Care Coordinator

## 2020-04-27 NOTE — PROGRESS NOTES
Clinic Care Coordination Contact  Care Team Conversations    Attended patients consult appointment. Dr. Sadler is going to refer patient to hospice services as he does not feel that she would tolerate chemotherapy d/t performance status. She verbalizes understanding. Nurse to nurse given to [facility].  Luisa Casas RN Oncology Care Coordinator      
declines

## 2020-04-27 NOTE — PROGRESS NOTES
"Patients right sided port accessed using non-coring, 19 gauge, 3/4\" needle. Port accessed per facility protocol.  Hand hygiene performed: yes   Mask donned by caregiver: yes Site prepped with CHG: yes Labs drawn: yes Dressing applied using aseptic technique: yes Port flushed easily, without resistance. Flushed with 10 cc's normal saline.   Immediate blood return noted. 10 cc blood discarded.  4 vials blood draw and sent to lab for results.  Port flushed with 20 cc 0.9% normal saline and 5 cc heparinized saline.  Needle removed intact, sterile dressing applied.  Slight pressure applied for 30 seconds.   Patient tolerated port flush well, denies pain nor discomfort at this time. Patient instructed to leave dressing intact for a minimum of one hour, and to call with questions or concerns.  Patient states understanding and is in agreement with this plan. Patient discharged ambulatory. Jewell Pisano RN    "

## 2020-04-27 NOTE — PROGRESS NOTES
Visit Date:   04/27/2020      HEMATOLOGY/ONCOLOGY CONSULTATION      REASON FOR CONSULTATION:  Metastatic endometrial cancer.      REQUESTING PHYSICIAN:  Bradley Tierney MD; Katty Garcia NP.      HISTORY OF PRESENT ILLNESS:  Ms. Owens is a 64-year-old female with history of stage IIIC low-grade endometrioid adenocarcinoma, who now presents with metastatic endometrial cancer.  She was diagnosed in 02/2018 after EUA and hysteroscopy revealed endometrioid adenocarcinoma on 04/11/2018.  She underwent DEMETRIO/BSO on 4/27/2008 performed by Dr. Tierney which revealed pathologic T3a N1a or stage IIIC1.  She was started on carboplatin and paclitaxel on 06/13/2018 and developed an anaphylactic reaction to paclitaxel; therefore, carboplatin was not received.  She went onto single agent carboplatin on 07/06/2018 at an AUC of 6.  In cycle 2, she received single-agent carboplatin AUC of 6 on 07/27/2018.  On 08/17/2018, she received cycle 3.  She was due to receive cycle 3 of single-agent carboplatin but this deferred due to thrombocytopenia.  Subsequently, she underwent EBRT and brachytherapy from 08-11/2018.  Due to her intolerance to chemotherapy, we decided to forego initial chemotherapy in favor of observation.  She had serial CTs in 07/2019 and it was noted that there was increasing size of subpleural nodule in the superior segment of the left lower lobe compatible with metastatic disease.  There were numerous new scattered ground-glass nodules and a 3 mm solid nodule in the posterior left lower lobe that was indeterminate.  Scans were repeated again in 11/2019 and she was found to have concerning nodules in the superior segment of the left lower lobe, which remained stable but had increased in size since 08/2018.  Apparently, she was offered study treatment clinical trial in 11/2019, she declined.  She underwent CT chest, abdomen and pelvis which revealed prominent finding a small-bowel obstruction and she was told to go to the  emergency room.  She had noted that she had increased fullness, early satiety, belching, abdominal pain.  She described pain and then followed by vomiting.  She also had some diarrhea.  She was admitted and a gastric tube was placed.  She was discharged subsequently on 03/28 and then underwent a virtual telephone visit with Dr. Tierney who felt the patient had a partial small-bowel obstruction.  He felt the options would be either surgery or RT.  He discussed surgery, which would potentially alleviate her obstruction but not address her global picture.  He felt the next best option would be Doxil chemotherapy.  He recommended a liquid diet.        The patient otherwise comes in today.  She says she is losing weight.  She denies any constant abdominal pain.  She is having ankle edema, some shortness of breath.  Her performance status is 3.  She did have a PET scan done which confirmed metastatic disease in the lungs.  This was performed on 04/22 and the findings were  consistent with metastatic involvement of the lung and peritoneal cavity.  There was a 10 mm FDG-avid pleural-based nodule located posteriorly in the superior segment of the left lower lobe that was felt likely metastatic disease.  There were other small nodules throughout the lungs again which had a similar in appearance dating back to 03/27/2020.  The smaller nodule did not demonstrate any evidence of significant FDG uptake; however, small size of each of these nodes limit the reliability of the PET portion of the exam.  There was nodular soft tissue density along the dome of the liver felt to represent intraperitoneal metastatic disease.  There was worsening anasarca and increasing volume of ascites in the abdomen and pelvis.  In terms of symptoms, again she is basically in bed for 18 hours a day.  She apparently gets up to move due to the increasing leg edema and anasarca.  She is hardly taking any p.o.  She is trying to take Ensure she says, but  she could not hold it for now as her stomach is bloated with pain.      PAST MEDICAL HISTORY:  Significant for history of pulmonary embolus.      CURRENT MEDICATIONS:  Include Coumadin, Zofran, Reglan, Protonix, ferrous gluconate.      SOCIAL HISTORY:  Tobacco is negative.  Alcohol is negative.  She is a retired psych nurse.      FAMILY HISTORY:  Noncontributory.      REVIEW OF SYSTEMS:  As per HPI.      PHYSICAL EXAMINATION:   GENERAL:  She is a frail middle-aged white female, ECOG performance status is 3.   VITAL SIGNS:  Blood pressure 92/68, pulse 113, respirations 18, temperature 97.5.   The rest of the physical exam was not performed.      IMPRESSION:  Terminal metastatic endometrial cancer with increasing peritoneal disease, lung metastases and partial small-bowel obstruction.  Given her poor performance status, she is not a candidate for chemotherapy.  The patient is in agreement.  We would therefore recommend hospice referral.  She likely has less than 6 months survival.  The patient is in agreement.  We will refer the patient to hospice.      Forty minutes was spent with the patient, greater than half that time was spent in counseling and coordination of care.         BASILIA GALVAN MD             D: 2020   T: 2020   MT: ASHLEY      Name:     MARILEE EDWARD   MRN:      [id number]        Account:      [id number]   :      1955           Visit Date:   2020      Document: [id number]       cc: Katty Tierney MD

## 2020-04-27 NOTE — NURSING NOTE
"Chief Complaint   Patient presents with     Consult     endometrial cancer/transfer from Dr Tierney at [facility]       Initial BP 92/68   Pulse 113   Temp 97.5  F (36.4  C) (Tympanic)   Resp 18   Ht 1.651 m (5' 5\")   Wt 64.5 kg (142 lb 3.2 oz)   LMP 03/04/2005   SpO2 97%   BMI 23.66 kg/m   Estimated body mass index is 23.66 kg/m  as calculated from the following:    Height as of this encounter: 1.651 m (5' 5\").    Weight as of this encounter: 64.5 kg (142 lb 3.2 oz).  Medication Reconciliation: complete.  Immunizations and advance directives status reviewed. Pain scale =4 abdominal , PHQ-9= 16 reported to Dr Sadler and Rn care coordinator.      Patient was assessed using the NCCN psychosocial distress thermometer. Patient rated the score as a 5. Patient rated current stressors as per scanned form. Stressors will be brought to the attention of provider or Oncology RN Care Coordinator for a score of 6 or greater or per nurses discretion.         Yane Richard LPN    "

## 2020-04-28 NOTE — TELEPHONE ENCOUNTER
Hospice called from [facility] wanted to update you that they will be admitting pt to hospice tomorrow.they just wanted to update you.    Iqra Senior LPN

## 2020-05-26 ENCOUNTER — TELEPHONE (OUTPATIENT)
Dept: FAMILY MEDICINE | Facility: OTHER | Age: 65
End: 2020-05-26

## 2020-05-26 NOTE — TELEPHONE ENCOUNTER
Call received from Alycia with [facility]. She is calling to let PCP know that pt passed away Sunday 5/24.

## 2023-06-15 NOTE — LETTER
[address] [address]  Phone: [phone]    [birth year]17    Gerri Owens  [address] [facility] [address]        The reason your receiving this letter is because we have noticed that you are currently overdue for your mammogram. Since having a Mammogram is a vital key in prevention of breast cancer we encourage you to schedule one. This can be done in 2 ways:    1. Call Central Scheduling at [phone]. They will assist you with finding the most convenient time and location.      2. If you are under or uninsured, we recommend you contact the [facility]. They offer mammograms at no charge or on a sliding fee charge. You can schedule with them at [phone]. Ask them to send us the results      If you have already had a mammogram in the past 2 years, please let us know so that we can update our records. You can send us a message on [employer] or call the clinic at [phone] to give us an update.    We would like to thank you in advance for taking the time to take care of your health. We appologize in advance for any duplicate messages you may receive; we hope you understand that our primary goal is your health. Of course if you have any questions give us a call at [phone].       Sincerely,     Your [facility] Care Team       Did You Know: Three-quarters of women that are diagnosed with breast cancer are feeling fine, did not find a lump and have no history of breast cancer in their family.         Sincerely,      [facility]             no no

## 2025-02-17 ENCOUNTER — MEDICAL CORRESPONDENCE (OUTPATIENT)
Dept: HEALTH INFORMATION MANAGEMENT | Facility: CLINIC | Age: 70
End: 2025-02-17
Payer: COMMERCIAL

## (undated) DEVICE — Device

## (undated) DEVICE — ENDO TROCAR 05MM VERSAONE BLADELESS W/STD FIX CAN NONB5STF

## (undated) DEVICE — GLOVE PROTEXIS BLUE W/NEU-THERA 7.0  2D73EB70

## (undated) DEVICE — SOL NACL 0.9% IRRIG 3000ML BAG 2B7477

## (undated) DEVICE — SYR BULB IRRIG 50ML LATEX FREE 0035280

## (undated) DEVICE — DEVICE SUTURE GRASPER TROCAR CLOSURE 14GA PMITCSG

## (undated) DEVICE — NDL INSUFFLATION 120MM VERRES 172015

## (undated) DEVICE — SOL WATER IRRIG 1000ML BOTTLE 2F7114

## (undated) DEVICE — SU VICRYL 0 TIE 54" J608H

## (undated) DEVICE — DEVICE ENDO STITCH APPLIER 10MM 173016

## (undated) DEVICE — ESU ELEC BLADE 6" COATED/INSULATED E1455-6

## (undated) DEVICE — GLOVE PROTEXIS POWDER FREE 8.0 ORTHOPEDIC 2D73ET80

## (undated) DEVICE — SUCTION TIP POOLE K770

## (undated) DEVICE — DRSG TELFA 3X8" 1238

## (undated) DEVICE — TUBING SUCTION 10'X3/16" N510

## (undated) DEVICE — SU VICRYL 2-0 TIE 54" J615H

## (undated) DEVICE — LINEN TOWEL PACK X5 5464

## (undated) DEVICE — DRSG MEDIPORE 3 1/2X13 3/4" 3573

## (undated) DEVICE — SU MONOCRYL 4-0 P-3 18" UND Y494G

## (undated) DEVICE — SUCTION IRR STRYKERFLOW II W/TIP 250-070-520

## (undated) DEVICE — DRSG ADAPTIC 3X8" 6113

## (undated) DEVICE — SU VICRYL 3-0 SH 27" J316H

## (undated) DEVICE — LINEN TOWEL PACK X30 5481

## (undated) DEVICE — STPL SKIN 35W 059037

## (undated) DEVICE — ESU ELEC BLADE 4" COATED

## (undated) DEVICE — KIT PATIENT POSITIONING PIGAZZI LATEX FREE 40580

## (undated) DEVICE — SU VICRYL 2-0 CT-2 27" J333H

## (undated) DEVICE — ESU PENCIL W/COATED BLADE E2450H

## (undated) DEVICE — SPECIMEN TRAP MUCOUS 40ML LUKI C30200A

## (undated) DEVICE — TUBING SYS AQUILEX BLUE INFLOW AQL-110 YLW OUTFLOW AQL-111

## (undated) DEVICE — KOH COLPOTOMIZER OCCLUDER  CPO-6

## (undated) DEVICE — SU VICRYL 0 UR-6 27" J603H

## (undated) DEVICE — SU ETHIBOND 1 CT-1 30" X425H

## (undated) DEVICE — SET HANDPIECE INTERPULSE W/COAXIAL FAN SPRAY TIP 0210118000

## (undated) DEVICE — LIGHT HANDLE X2

## (undated) DEVICE — CUSA 23KHZ WRENCH C5601

## (undated) DEVICE — JELLY LUBRICATING SURGILUBE 2OZ TUBE

## (undated) DEVICE — SU ENDO STITCH POLYSORB 2-0 ES-9 48"  170053

## (undated) DEVICE — DECANTER BAG 2002S

## (undated) DEVICE — SUCTION TIP YANKAUER W/O VENT K86

## (undated) DEVICE — LINEN GOWN X4 5410

## (undated) DEVICE — DRSG ABDOMINAL 07 1/2X8" 7197D

## (undated) DEVICE — DRSG GAUZE 4X4" TRAY

## (undated) DEVICE — FORCEP-COLON BIOPSY LARGE W/NEEDLE 240CM

## (undated) DEVICE — DRSG GAUZE 4X8"

## (undated) DEVICE — LINEN HALF SHEET 5512

## (undated) DEVICE — LINEN TOWEL PACK X6 WHITE 5487

## (undated) DEVICE — SOL NACL 0.9% IRRIG 1000ML BOTTLE 2F7124

## (undated) DEVICE — ENDO SHEARS 5MM 176643

## (undated) DEVICE — GLOVE PROTEXIS W/NEU-THERA 6.5  2D73TE65

## (undated) DEVICE — ENDO SCOPE WARMER SEAL  C3101

## (undated) DEVICE — IRRIGATION-H2O 1000ML

## (undated) DEVICE — COVER ULTRASOUND PROBE W/GEL FLEXI-FEEL 6"X58" LF  25-FF658

## (undated) DEVICE — DRSG GAUZE 2X2" 8042

## (undated) DEVICE — BAG CLEAR TRASH 1.3M 39X33" P4040C

## (undated) DEVICE — SU PDS II 0 TP-1 60" Z991G

## (undated) DEVICE — LINEN ORTHO PACK 5446

## (undated) DEVICE — BLADE KNIFE SURG 10 371110

## (undated) DEVICE — SPONGE LAP 18X18" X8435

## (undated) DEVICE — SUCTION MANIFOLD NEPTUNE 2 SYS 4 PORT 0702-020-000

## (undated) DEVICE — SU DERMABOND ADVANCED .7ML DNX12

## (undated) DEVICE — PACK TOTAL HIP RIDGES LATEX PO15HIFSG

## (undated) DEVICE — SOL ADH LIQUID BENZOIN SWAB 0.6ML C1544

## (undated) DEVICE — ENDO TROCAR 12MM VERSAONE BLADELESS W/STD FIX CAN NONB12STF

## (undated) DEVICE — GLOVE PROTEXIS BLUE W/NEU-THERA 8.0  2D73EB80

## (undated) DEVICE — SUCTION CANISTER BEMIS HI FLOW 006772-901

## (undated) DEVICE — PAD CHUX UNDERPAD 30X30"

## (undated) DEVICE — SU MONOCRYL 4-0 PS-2 27" UND Y426H

## (undated) DEVICE — SOL NACL 0.9% INJ 1000ML BAG 07983-09

## (undated) DEVICE — ENDO CANNULA 05MM VERSAONE UNIVERSAL UNVCA5STF

## (undated) DEVICE — KIT INTRODUCER FLUENT MICRO 5FRX10CM ECHO TIP KIT-038-04

## (undated) DEVICE — SU VICRYL 2-0 CP-1 27" UND J266H

## (undated) DEVICE — GLOVE PROTEXIS POWDER FREE 7.5 ORTHOPEDIC 2D73ET75

## (undated) DEVICE — TUBING-SUCTION 20FT

## (undated) DEVICE — UTERINE MANIPULATOR RUMI 6.7MMX10CM UMG670

## (undated) DEVICE — LINEN DRAPE 54X72" 5467

## (undated) DEVICE — GOWN XLG DISP 9545

## (undated) DEVICE — ESU LIGASURE LAPAROSCOPIC BLUNT TIP SEALER 5MMX37CM LF1837

## (undated) DEVICE — PREP CHLORAPREP 26ML TINTED ORANGE  260815

## (undated) DEVICE — DRAIN HEMOVAC RESERVOIR KIT 10FR 1/8" MED 00-2550-002-10

## (undated) DEVICE — SU VICRYL 2-0 CTX 36" J369H

## (undated) DEVICE — TUBING CUSA MANIFOLD 23KHZ C3600

## (undated) DEVICE — DRSG TEGADERM 2 3/8X2 3/4" 1624W

## (undated) DEVICE — TUBING INSUFFLATION W/FILTER CPC TO LUER 620-030-301

## (undated) DEVICE — BLADE SAW SAGITTAL STRK 18X90X1.27MM HD SYS 6 6118-127-090

## (undated) DEVICE — CUSA 23KHZ TIP STD C4601S

## (undated) DEVICE — SU VICRYL 2-0 CT-1 27" UND J259H

## (undated) DEVICE — SPECIMEN BAG BEMIS HI FLOW SUCTION WHITE SOCK 533810

## (undated) DEVICE — SU VICRYL 0 CP-1 27" J467H

## (undated) DEVICE — SUCTION MANIFOLD DORNOCH ULTRA CART UL-CL500

## (undated) DEVICE — LINEN FULL SHEET 5511

## (undated) DEVICE — SUCTION TIP YANKAUER STR K87

## (undated) DEVICE — CLIP HORIZON MED BLUE 002200

## (undated) DEVICE — SU VICRYL 2-0 CT-2 27" UND J269H

## (undated) DEVICE — GLOVE PROTEXIS POWDER FREE SMT 7.5  2D72PT75X

## (undated) DEVICE — STRAP KNEE/BODY 31143004

## (undated) DEVICE — PACK CENTRAL LINE INSERTION SAN32CLFCG

## (undated) DEVICE — CONNECTOR-ERBEFLO 2 PORT

## (undated) DEVICE — DEVICE RETRIEVER HEWSON 71111579

## (undated) RX ORDER — SODIUM CHLORIDE 9 MG/ML
INJECTION, SOLUTION INTRAVENOUS
Status: DISPENSED
Start: 2018-04-27

## (undated) RX ORDER — HEPARIN SODIUM (PORCINE) LOCK FLUSH IV SOLN 100 UNIT/ML 100 UNIT/ML
SOLUTION INTRAVENOUS
Status: DISPENSED
Start: 2018-05-31

## (undated) RX ORDER — ONDANSETRON 2 MG/ML
INJECTION INTRAMUSCULAR; INTRAVENOUS
Status: DISPENSED
Start: 2018-04-27

## (undated) RX ORDER — HYDROMORPHONE HYDROCHLORIDE 1 MG/ML
INJECTION, SOLUTION INTRAMUSCULAR; INTRAVENOUS; SUBCUTANEOUS
Status: DISPENSED
Start: 2018-04-27

## (undated) RX ORDER — PROPOFOL 10 MG/ML
INJECTION, EMULSION INTRAVENOUS
Status: DISPENSED
Start: 2019-01-01

## (undated) RX ORDER — ACETAMINOPHEN 325 MG/1
TABLET ORAL
Status: DISPENSED
Start: 2018-04-27

## (undated) RX ORDER — HEPARIN SODIUM (PORCINE) LOCK FLUSH IV SOLN 100 UNIT/ML 100 UNIT/ML
SOLUTION INTRAVENOUS
Status: DISPENSED
Start: 2019-04-26

## (undated) RX ORDER — GABAPENTIN 300 MG/1
CAPSULE ORAL
Status: DISPENSED
Start: 2018-05-31

## (undated) RX ORDER — FENTANYL CITRATE 50 UG/ML
INJECTION, SOLUTION INTRAMUSCULAR; INTRAVENOUS
Status: DISPENSED
Start: 2018-04-27

## (undated) RX ORDER — ONDANSETRON 2 MG/ML
INJECTION INTRAMUSCULAR; INTRAVENOUS
Status: DISPENSED
Start: 2018-04-11

## (undated) RX ORDER — HEPARIN SODIUM (PORCINE) LOCK FLUSH IV SOLN 100 UNIT/ML 100 UNIT/ML
SOLUTION INTRAVENOUS
Status: DISPENSED
Start: 2018-09-10

## (undated) RX ORDER — FENTANYL CITRATE 50 UG/ML
INJECTION, SOLUTION INTRAMUSCULAR; INTRAVENOUS
Status: DISPENSED
Start: 2018-09-10

## (undated) RX ORDER — PHENYLEPHRINE HCL IN 0.9% NACL 1 MG/10 ML
SYRINGE (ML) INTRAVENOUS
Status: DISPENSED
Start: 2018-04-11

## (undated) RX ORDER — EPHEDRINE SULFATE 50 MG/ML
INJECTION, SOLUTION INTRAMUSCULAR; INTRAVENOUS; SUBCUTANEOUS
Status: DISPENSED
Start: 2018-04-11

## (undated) RX ORDER — HEPARIN SODIUM (PORCINE) LOCK FLUSH IV SOLN 100 UNIT/ML 100 UNIT/ML
SOLUTION INTRAVENOUS
Status: DISPENSED
Start: 2019-01-01

## (undated) RX ORDER — ACETAMINOPHEN 325 MG/1
TABLET ORAL
Status: DISPENSED
Start: 2018-04-11

## (undated) RX ORDER — LIDOCAINE 50 MG/G
OINTMENT TOPICAL
Status: DISPENSED
Start: 2018-11-09

## (undated) RX ORDER — DEXAMETHASONE SODIUM PHOSPHATE 4 MG/ML
INJECTION, SOLUTION INTRA-ARTICULAR; INTRALESIONAL; INTRAMUSCULAR; INTRAVENOUS; SOFT TISSUE
Status: DISPENSED
Start: 2018-04-11

## (undated) RX ORDER — LIDOCAINE HYDROCHLORIDE 20 MG/ML
INJECTION, SOLUTION EPIDURAL; INFILTRATION; INTRACAUDAL; PERINEURAL
Status: DISPENSED
Start: 2018-04-27

## (undated) RX ORDER — CEFAZOLIN SODIUM 1 G/3ML
INJECTION, POWDER, FOR SOLUTION INTRAMUSCULAR; INTRAVENOUS
Status: DISPENSED
Start: 2018-04-27

## (undated) RX ORDER — ROCURONIUM BROMIDE 50 MG/5 ML
SYRINGE (ML) INTRAVENOUS
Status: DISPENSED
Start: 2018-04-11

## (undated) RX ORDER — PROPOFOL 10 MG/ML
INJECTION, EMULSION INTRAVENOUS
Status: DISPENSED
Start: 2018-04-11

## (undated) RX ORDER — HEPARIN SODIUM,PORCINE 10 UNIT/ML
VIAL (ML) INTRAVENOUS
Status: DISPENSED
Start: 2018-05-31

## (undated) RX ORDER — CEFAZOLIN SODIUM 2 G/100ML
INJECTION, SOLUTION INTRAVENOUS
Status: DISPENSED
Start: 2017-12-04

## (undated) RX ORDER — ALBUMIN, HUMAN INJ 5% 5 %
SOLUTION INTRAVENOUS
Status: DISPENSED
Start: 2018-04-27

## (undated) RX ORDER — LIDOCAINE HYDROCHLORIDE 20 MG/ML
INJECTION, SOLUTION EPIDURAL; INFILTRATION; INTRACAUDAL; PERINEURAL
Status: DISPENSED
Start: 2019-01-01

## (undated) RX ORDER — LIDOCAINE HYDROCHLORIDE 20 MG/ML
INJECTION, SOLUTION EPIDURAL; INFILTRATION; INTRACAUDAL; PERINEURAL
Status: DISPENSED
Start: 2018-04-11

## (undated) RX ORDER — BUPIVACAINE HYDROCHLORIDE 2.5 MG/ML
INJECTION, SOLUTION EPIDURAL; INFILTRATION; INTRACAUDAL
Status: DISPENSED
Start: 2018-04-27

## (undated) RX ORDER — ACETAMINOPHEN 325 MG/1
TABLET ORAL
Status: DISPENSED
Start: 2018-05-31

## (undated) RX ORDER — LIDOCAINE HYDROCHLORIDE 10 MG/ML
INJECTION, SOLUTION EPIDURAL; INFILTRATION; INTRACAUDAL; PERINEURAL
Status: DISPENSED
Start: 2018-05-31

## (undated) RX ORDER — LIDOCAINE HYDROCHLORIDE 10 MG/ML
INJECTION, SOLUTION EPIDURAL; INFILTRATION; INTRACAUDAL; PERINEURAL
Status: DISPENSED
Start: 2018-04-11

## (undated) RX ORDER — CEFAZOLIN SODIUM 2 G/100ML
INJECTION, SOLUTION INTRAVENOUS
Status: DISPENSED
Start: 2018-04-27

## (undated) RX ORDER — PROPOFOL 10 MG/ML
INJECTION, EMULSION INTRAVENOUS
Status: DISPENSED
Start: 2018-04-27

## (undated) RX ORDER — GLYCOPYRROLATE 0.2 MG/ML
INJECTION, SOLUTION INTRAMUSCULAR; INTRAVENOUS
Status: DISPENSED
Start: 2018-04-27

## (undated) RX ORDER — DEXAMETHASONE SODIUM PHOSPHATE 4 MG/ML
INJECTION, SOLUTION INTRA-ARTICULAR; INTRALESIONAL; INTRAMUSCULAR; INTRAVENOUS; SOFT TISSUE
Status: DISPENSED
Start: 2018-04-27

## (undated) RX ORDER — HEPARIN SODIUM (PORCINE) LOCK FLUSH IV SOLN 100 UNIT/ML 100 UNIT/ML
SOLUTION INTRAVENOUS
Status: DISPENSED
Start: 2018-07-06

## (undated) RX ORDER — FENTANYL CITRATE 50 UG/ML
INJECTION, SOLUTION INTRAMUSCULAR; INTRAVENOUS
Status: DISPENSED
Start: 2018-04-11

## (undated) RX ORDER — LIDOCAINE HYDROCHLORIDE 10 MG/ML
INJECTION, SOLUTION EPIDURAL; INFILTRATION; INTRACAUDAL; PERINEURAL
Status: DISPENSED
Start: 2018-09-10

## (undated) RX ORDER — HEPARIN SODIUM (PORCINE) LOCK FLUSH IV SOLN 100 UNIT/ML 100 UNIT/ML
SOLUTION INTRAVENOUS
Status: DISPENSED
Start: 2019-02-12

## (undated) RX ORDER — PHENAZOPYRIDINE HYDROCHLORIDE 200 MG/1
TABLET, FILM COATED ORAL
Status: DISPENSED
Start: 2018-04-27